# Patient Record
Sex: MALE | Race: WHITE | NOT HISPANIC OR LATINO
[De-identification: names, ages, dates, MRNs, and addresses within clinical notes are randomized per-mention and may not be internally consistent; named-entity substitution may affect disease eponyms.]

---

## 2017-01-04 ENCOUNTER — APPOINTMENT (OUTPATIENT)
Dept: PHYSICAL MEDICINE AND REHAB | Facility: CLINIC | Age: 77
End: 2017-01-04

## 2017-01-04 VITALS
DIASTOLIC BLOOD PRESSURE: 65 MMHG | BODY MASS INDEX: 21.97 KG/M2 | WEIGHT: 140 LBS | HEART RATE: 72 BPM | HEIGHT: 67 IN | SYSTOLIC BLOOD PRESSURE: 98 MMHG | OXYGEN SATURATION: 99 %

## 2017-01-04 LAB — INR PPP: 2.24 RATIO

## 2017-01-09 ENCOUNTER — FORM ENCOUNTER (OUTPATIENT)
Age: 77
End: 2017-01-09

## 2017-01-09 LAB — INR PPP: 1.84 RATIO

## 2017-01-10 ENCOUNTER — OUTPATIENT (OUTPATIENT)
Dept: OUTPATIENT SERVICES | Facility: HOSPITAL | Age: 77
LOS: 1 days | End: 2017-01-10
Payer: MEDICARE

## 2017-01-10 DIAGNOSIS — Z95.1 PRESENCE OF AORTOCORONARY BYPASS GRAFT: Chronic | ICD-10-CM

## 2017-01-10 DIAGNOSIS — R13.19 OTHER DYSPHAGIA: ICD-10-CM

## 2017-01-10 DIAGNOSIS — Z98.89 OTHER SPECIFIED POSTPROCEDURAL STATES: Chronic | ICD-10-CM

## 2017-01-10 DIAGNOSIS — Z95.2 PRESENCE OF PROSTHETIC HEART VALVE: Chronic | ICD-10-CM

## 2017-01-10 PROCEDURE — 92611 MOTION FLUOROSCOPY/SWALLOW: CPT

## 2017-01-10 PROCEDURE — 74230 X-RAY XM SWLNG FUNCJ C+: CPT | Mod: 26

## 2017-01-10 PROCEDURE — G8997: CPT | Mod: CJ

## 2017-01-10 PROCEDURE — 74230 X-RAY XM SWLNG FUNCJ C+: CPT

## 2017-01-10 PROCEDURE — G8996: CPT | Mod: CJ

## 2017-01-10 PROCEDURE — G8998: CPT | Mod: CJ

## 2017-01-11 ENCOUNTER — APPOINTMENT (OUTPATIENT)
Dept: PHYSICAL MEDICINE AND REHAB | Facility: CLINIC | Age: 77
End: 2017-01-11

## 2017-01-13 ENCOUNTER — OUTPATIENT (OUTPATIENT)
Dept: OUTPATIENT SERVICES | Facility: HOSPITAL | Age: 77
LOS: 1 days | End: 2017-01-13

## 2017-01-13 DIAGNOSIS — R27.9 UNSPECIFIED LACK OF COORDINATION: ICD-10-CM

## 2017-01-13 DIAGNOSIS — Z95.2 PRESENCE OF PROSTHETIC HEART VALVE: Chronic | ICD-10-CM

## 2017-01-13 DIAGNOSIS — Z95.1 PRESENCE OF AORTOCORONARY BYPASS GRAFT: Chronic | ICD-10-CM

## 2017-01-13 DIAGNOSIS — I63.9 CEREBRAL INFARCTION, UNSPECIFIED: ICD-10-CM

## 2017-01-13 DIAGNOSIS — I69.322 DYSARTHRIA FOLLOWING CEREBRAL INFARCTION: ICD-10-CM

## 2017-01-13 DIAGNOSIS — Z98.89 OTHER SPECIFIED POSTPROCEDURAL STATES: Chronic | ICD-10-CM

## 2017-01-13 DIAGNOSIS — I69.851 HEMIPLEGIA AND HEMIPARESIS FOLLOWING OTHER CEREBROVASCULAR DISEASE AFFECTING RIGHT DOMINANT SIDE: ICD-10-CM

## 2017-01-13 DIAGNOSIS — G81.91 HEMIPLEGIA, UNSPECIFIED AFFECTING RIGHT DOMINANT SIDE: ICD-10-CM

## 2017-01-13 DIAGNOSIS — R26.89 OTHER ABNORMALITIES OF GAIT AND MOBILITY: ICD-10-CM

## 2017-01-16 LAB — INR PPP: 2.54 RATIO

## 2017-01-23 LAB — INR PPP: 2.14 RATIO

## 2017-01-25 ENCOUNTER — APPOINTMENT (OUTPATIENT)
Dept: INTERNAL MEDICINE | Facility: CLINIC | Age: 77
End: 2017-01-25

## 2017-01-25 VITALS — DIASTOLIC BLOOD PRESSURE: 64 MMHG | HEART RATE: 72 BPM | RESPIRATION RATE: 16 BRPM | SYSTOLIC BLOOD PRESSURE: 110 MMHG

## 2017-01-25 DIAGNOSIS — Z23 ENCOUNTER FOR IMMUNIZATION: ICD-10-CM

## 2017-01-31 ENCOUNTER — MESSAGE (OUTPATIENT)
Age: 77
End: 2017-01-31

## 2017-01-31 LAB — INR PPP: 1.7 RATIO

## 2017-02-02 LAB
ALBUMIN SERPL ELPH-MCNC: 4.4 G/DL
ALP BLD-CCNC: 105 U/L
ALT SERPL-CCNC: 53 U/L
ANION GAP SERPL CALC-SCNC: 21 MMOL/L
AST SERPL-CCNC: 27 U/L
BASOPHILS # BLD AUTO: 0.02 K/UL
BASOPHILS NFR BLD AUTO: 0.2 %
BILIRUB SERPL-MCNC: 0.5 MG/DL
BUN SERPL-MCNC: 68 MG/DL
CALCIUM SERPL-MCNC: 10 MG/DL
CHLORIDE SERPL-SCNC: 102 MMOL/L
CHOLEST SERPL-MCNC: 135 MG/DL
CHOLEST/HDLC SERPL: 3.4 RATIO
CO2 SERPL-SCNC: 16 MMOL/L
CREAT SERPL-MCNC: 2.51 MG/DL
EOSINOPHIL # BLD AUTO: 0.11 K/UL
EOSINOPHIL NFR BLD AUTO: 1.4 %
GLUCOSE SERPL-MCNC: 131 MG/DL
HBA1C MFR BLD HPLC: 6.6 %
HCT VFR BLD CALC: 41.8 %
HDLC SERPL-MCNC: 40 MG/DL
HGB BLD-MCNC: 13.9 G/DL
IMM GRANULOCYTES NFR BLD AUTO: 0.2 %
LDLC SERPL CALC-MCNC: 61 MG/DL
LYMPHOCYTES # BLD AUTO: 0.79 K/UL
LYMPHOCYTES NFR BLD AUTO: 9.7 %
MAN DIFF?: NORMAL
MCHC RBC-ENTMCNC: 32.6 PG
MCHC RBC-ENTMCNC: 33.3 GM/DL
MCV RBC AUTO: 98.1 FL
MONOCYTES # BLD AUTO: 0.54 K/UL
MONOCYTES NFR BLD AUTO: 6.6 %
NEUTROPHILS # BLD AUTO: 6.65 K/UL
NEUTROPHILS NFR BLD AUTO: 81.9 %
PLATELET # BLD AUTO: 192 K/UL
POTASSIUM SERPL-SCNC: 5 MMOL/L
PROT SERPL-MCNC: 7.9 G/DL
RBC # BLD: 4.26 M/UL
RBC # FLD: 14.7 %
SODIUM SERPL-SCNC: 139 MMOL/L
TRIGL SERPL-MCNC: 172 MG/DL
WBC # FLD AUTO: 8.13 K/UL

## 2017-02-06 ENCOUNTER — APPOINTMENT (OUTPATIENT)
Dept: NEPHROLOGY | Facility: CLINIC | Age: 77
End: 2017-02-06

## 2017-02-06 VITALS
BODY MASS INDEX: 22.29 KG/M2 | HEIGHT: 67 IN | DIASTOLIC BLOOD PRESSURE: 70 MMHG | SYSTOLIC BLOOD PRESSURE: 100 MMHG | WEIGHT: 142 LBS

## 2017-02-06 DIAGNOSIS — I25.2 OLD MYOCARDIAL INFARCTION: ICD-10-CM

## 2017-02-06 DIAGNOSIS — Z87.448 PERSONAL HISTORY OF OTHER DISEASES OF URINARY SYSTEM: ICD-10-CM

## 2017-02-06 DIAGNOSIS — Z86.19 PERSONAL HISTORY OF OTHER INFECTIOUS AND PARASITIC DISEASES: ICD-10-CM

## 2017-02-07 ENCOUNTER — FORM ENCOUNTER (OUTPATIENT)
Age: 77
End: 2017-02-07

## 2017-02-07 LAB — INR PPP: 1.94 RATIO

## 2017-02-08 ENCOUNTER — APPOINTMENT (OUTPATIENT)
Dept: ULTRASOUND IMAGING | Facility: CLINIC | Age: 77
End: 2017-02-08

## 2017-02-08 ENCOUNTER — OUTPATIENT (OUTPATIENT)
Dept: OUTPATIENT SERVICES | Facility: HOSPITAL | Age: 77
LOS: 1 days | End: 2017-02-08
Payer: MEDICARE

## 2017-02-08 DIAGNOSIS — N18.3 CHRONIC KIDNEY DISEASE, STAGE 3 (MODERATE): ICD-10-CM

## 2017-02-08 DIAGNOSIS — Z95.2 PRESENCE OF PROSTHETIC HEART VALVE: Chronic | ICD-10-CM

## 2017-02-08 DIAGNOSIS — Z98.89 OTHER SPECIFIED POSTPROCEDURAL STATES: Chronic | ICD-10-CM

## 2017-02-08 DIAGNOSIS — Z95.1 PRESENCE OF AORTOCORONARY BYPASS GRAFT: Chronic | ICD-10-CM

## 2017-02-08 PROCEDURE — 76770 US EXAM ABDO BACK WALL COMP: CPT

## 2017-02-13 ENCOUNTER — APPOINTMENT (OUTPATIENT)
Dept: NEPHROLOGY | Facility: CLINIC | Age: 77
End: 2017-02-13

## 2017-02-13 VITALS — HEIGHT: 67 IN | DIASTOLIC BLOOD PRESSURE: 70 MMHG | SYSTOLIC BLOOD PRESSURE: 110 MMHG

## 2017-02-14 LAB — INR PPP: 2.1 RATIO

## 2017-02-15 LAB
ALBUMIN SERPL ELPH-MCNC: 4.4 G/DL
ANION GAP SERPL CALC-SCNC: 17 MMOL/L
BASOPHILS # BLD AUTO: 0.02 K/UL
BASOPHILS NFR BLD AUTO: 0.3 %
BUN SERPL-MCNC: 66 MG/DL
CALCIUM SERPL-MCNC: 9.9 MG/DL
CHLORIDE SERPL-SCNC: 99 MMOL/L
CO2 SERPL-SCNC: 20 MMOL/L
CREAT SERPL-MCNC: 2.53 MG/DL
EOSINOPHIL # BLD AUTO: 0.16 K/UL
EOSINOPHIL NFR BLD AUTO: 2.2 %
GLUCOSE SERPL-MCNC: 149 MG/DL
HCT VFR BLD CALC: 41.8 %
HGB BLD-MCNC: 13.3 G/DL
IMM GRANULOCYTES NFR BLD AUTO: 0.1 %
LYMPHOCYTES # BLD AUTO: 0.74 K/UL
LYMPHOCYTES NFR BLD AUTO: 10.4 %
MAN DIFF?: NORMAL
MCHC RBC-ENTMCNC: 31.8 GM/DL
MCHC RBC-ENTMCNC: 31.8 PG
MCV RBC AUTO: 100 FL
MONOCYTES # BLD AUTO: 0.41 K/UL
MONOCYTES NFR BLD AUTO: 5.8 %
NEUTROPHILS # BLD AUTO: 5.79 K/UL
NEUTROPHILS NFR BLD AUTO: 81.2 %
PHOSPHATE SERPL-MCNC: 2.8 MG/DL
PLATELET # BLD AUTO: 151 K/UL
POTASSIUM SERPL-SCNC: 4.7 MMOL/L
RBC # BLD: 4.18 M/UL
RBC # FLD: 14.2 %
SODIUM SERPL-SCNC: 136 MMOL/L
WBC # FLD AUTO: 7.13 K/UL

## 2017-02-21 LAB — INR PPP: 2.1 RATIO

## 2017-02-24 ENCOUNTER — APPOINTMENT (OUTPATIENT)
Dept: DERMATOLOGY | Facility: CLINIC | Age: 77
End: 2017-02-24

## 2017-02-27 LAB — INR PPP: 2.7 RATIO

## 2017-03-06 LAB — INR PPP: 2.2 RATIO

## 2017-03-13 LAB — INR PPP: 2.4 RATIO

## 2017-03-15 ENCOUNTER — APPOINTMENT (OUTPATIENT)
Dept: PHYSICAL MEDICINE AND REHAB | Facility: CLINIC | Age: 77
End: 2017-03-15

## 2017-03-20 LAB — INR PPP: 1.75 RATIO

## 2017-03-27 LAB — INR PPP: 2.16 RATIO

## 2017-03-28 ENCOUNTER — RX RENEWAL (OUTPATIENT)
Age: 77
End: 2017-03-28

## 2017-04-03 LAB — INR PPP: 2.1 RATIO

## 2017-04-04 ENCOUNTER — RX RENEWAL (OUTPATIENT)
Age: 77
End: 2017-04-04

## 2017-04-06 ENCOUNTER — APPOINTMENT (OUTPATIENT)
Dept: PHYSICAL MEDICINE AND REHAB | Facility: CLINIC | Age: 77
End: 2017-04-06

## 2017-04-06 VITALS
DIASTOLIC BLOOD PRESSURE: 68 MMHG | SYSTOLIC BLOOD PRESSURE: 106 MMHG | WEIGHT: 142 LBS | HEART RATE: 75 BPM | BODY MASS INDEX: 22.29 KG/M2 | HEIGHT: 67 IN | OXYGEN SATURATION: 99 %

## 2017-04-11 LAB — INR PPP: 1.9 RATIO

## 2017-04-18 LAB — INR PPP: 2.47 RATIO

## 2017-04-21 ENCOUNTER — APPOINTMENT (OUTPATIENT)
Dept: INTERNAL MEDICINE | Facility: CLINIC | Age: 77
End: 2017-04-21

## 2017-04-21 VITALS — RESPIRATION RATE: 16 BRPM | SYSTOLIC BLOOD PRESSURE: 110 MMHG | HEART RATE: 76 BPM | DIASTOLIC BLOOD PRESSURE: 70 MMHG

## 2017-04-21 DIAGNOSIS — R73.03 PREDIABETES.: ICD-10-CM

## 2017-04-21 DIAGNOSIS — Z79.899 OTHER LONG TERM (CURRENT) DRUG THERAPY: ICD-10-CM

## 2017-04-21 DIAGNOSIS — N18.3 CHRONIC KIDNEY DISEASE, STAGE 3 (MODERATE): ICD-10-CM

## 2017-04-21 DIAGNOSIS — E87.2 ACIDOSIS: ICD-10-CM

## 2017-04-21 DIAGNOSIS — D69.6 THROMBOCYTOPENIA, UNSPECIFIED: ICD-10-CM

## 2017-04-22 LAB
ALBUMIN SERPL ELPH-MCNC: 4.2 G/DL
ALP BLD-CCNC: 96 U/L
ALT SERPL-CCNC: 34 U/L
ANION GAP SERPL CALC-SCNC: 16 MMOL/L
AST SERPL-CCNC: 19 U/L
BASOPHILS # BLD AUTO: 0.02 K/UL
BASOPHILS NFR BLD AUTO: 0.3 %
BILIRUB SERPL-MCNC: 0.3 MG/DL
BUN SERPL-MCNC: 61 MG/DL
CALCIUM SERPL-MCNC: 9.5 MG/DL
CHLORIDE SERPL-SCNC: 103 MMOL/L
CHOLEST SERPL-MCNC: 119 MG/DL
CHOLEST/HDLC SERPL: 3.1 RATIO
CO2 SERPL-SCNC: 20 MMOL/L
CREAT SERPL-MCNC: 2.28 MG/DL
EOSINOPHIL # BLD AUTO: 0.29 K/UL
EOSINOPHIL NFR BLD AUTO: 4.1 %
GLUCOSE SERPL-MCNC: 93 MG/DL
HBA1C MFR BLD HPLC: 6.3 %
HCT VFR BLD CALC: 38.8 %
HDLC SERPL-MCNC: 39 MG/DL
HGB BLD-MCNC: 12.4 G/DL
IMM GRANULOCYTES NFR BLD AUTO: 0.3 %
LDLC SERPL CALC-MCNC: 45 MG/DL
LYMPHOCYTES # BLD AUTO: 0.68 K/UL
LYMPHOCYTES NFR BLD AUTO: 9.7 %
MAN DIFF?: NORMAL
MCHC RBC-ENTMCNC: 32 GM/DL
MCHC RBC-ENTMCNC: 32.5 PG
MCV RBC AUTO: 101.8 FL
MONOCYTES # BLD AUTO: 0.79 K/UL
MONOCYTES NFR BLD AUTO: 11.3 %
NEUTROPHILS # BLD AUTO: 5.22 K/UL
NEUTROPHILS NFR BLD AUTO: 74.3 %
PLATELET # BLD AUTO: 160 K/UL
POTASSIUM SERPL-SCNC: 5.2 MMOL/L
PROT SERPL-MCNC: 7.7 G/DL
RBC # BLD: 3.81 M/UL
RBC # FLD: 14.4 %
SODIUM SERPL-SCNC: 139 MMOL/L
TRIGL SERPL-MCNC: 175 MG/DL
WBC # FLD AUTO: 7.02 K/UL

## 2017-04-24 ENCOUNTER — RESULT REVIEW (OUTPATIENT)
Age: 77
End: 2017-04-24

## 2017-04-24 LAB — INR PPP: 3.2 RATIO

## 2017-05-02 LAB — INR PPP: 2.39 RATIO

## 2017-05-09 ENCOUNTER — RX RENEWAL (OUTPATIENT)
Age: 77
End: 2017-05-09

## 2017-05-09 LAB — INR PPP: 2.24 RATIO

## 2017-05-16 LAB — INR PPP: 1.97 RATIO

## 2017-05-17 ENCOUNTER — APPOINTMENT (OUTPATIENT)
Dept: PHYSICAL MEDICINE AND REHAB | Facility: CLINIC | Age: 77
End: 2017-05-17

## 2017-05-17 VITALS
SYSTOLIC BLOOD PRESSURE: 100 MMHG | DIASTOLIC BLOOD PRESSURE: 64 MMHG | BODY MASS INDEX: 22.29 KG/M2 | HEIGHT: 67 IN | WEIGHT: 142 LBS | OXYGEN SATURATION: 97 % | HEART RATE: 88 BPM

## 2017-05-22 LAB — INR PPP: 2.46 RATIO

## 2017-05-31 LAB — INR PPP: 2.17 RATIO

## 2017-06-05 LAB — INR PPP: 1.83 RATIO

## 2017-06-12 LAB — INR PPP: 2.02 RATIO

## 2017-06-19 LAB — INR PPP: 2.05 RATIO

## 2017-06-26 LAB — INR PPP: 1.9 RATIO

## 2017-07-05 LAB — INR PPP: 1.86 RATIO

## 2017-07-06 ENCOUNTER — APPOINTMENT (OUTPATIENT)
Dept: PHYSICAL MEDICINE AND REHAB | Facility: CLINIC | Age: 77
End: 2017-07-06

## 2017-07-06 VITALS
OXYGEN SATURATION: 99 % | DIASTOLIC BLOOD PRESSURE: 73 MMHG | SYSTOLIC BLOOD PRESSURE: 113 MMHG | HEART RATE: 95 BPM | HEIGHT: 67 IN | WEIGHT: 142 LBS | BODY MASS INDEX: 22.29 KG/M2

## 2017-07-06 DIAGNOSIS — R26.9 UNSPECIFIED ABNORMALITIES OF GAIT AND MOBILITY: ICD-10-CM

## 2017-07-06 DIAGNOSIS — G81.10 SPASTIC HEMIPLEGIA AFFECTING UNSPECIFIED SIDE: ICD-10-CM

## 2017-07-11 LAB — INR PPP: 2.68 RATIO

## 2017-07-18 LAB — INR PPP: 2.3 RATIO

## 2017-07-24 LAB — INR PPP: 1.62 RATIO

## 2017-07-25 ENCOUNTER — OUTPATIENT (OUTPATIENT)
Dept: OUTPATIENT SERVICES | Facility: HOSPITAL | Age: 77
LOS: 1 days | End: 2017-07-25
Payer: MEDICARE

## 2017-07-25 DIAGNOSIS — Z95.1 PRESENCE OF AORTOCORONARY BYPASS GRAFT: Chronic | ICD-10-CM

## 2017-07-25 DIAGNOSIS — Z98.89 OTHER SPECIFIED POSTPROCEDURAL STATES: Chronic | ICD-10-CM

## 2017-07-25 DIAGNOSIS — Z51.89 ENCOUNTER FOR OTHER SPECIFIED AFTERCARE: ICD-10-CM

## 2017-07-25 DIAGNOSIS — Z95.2 PRESENCE OF PROSTHETIC HEART VALVE: Chronic | ICD-10-CM

## 2017-07-25 DIAGNOSIS — I63.312 CEREBRAL INFARCTION DUE TO THROMBOSIS OF LEFT MIDDLE CEREBRAL ARTERY: ICD-10-CM

## 2017-07-25 DIAGNOSIS — R27.9 UNSPECIFIED LACK OF COORDINATION: ICD-10-CM

## 2017-07-26 ENCOUNTER — APPOINTMENT (OUTPATIENT)
Dept: INTERNAL MEDICINE | Facility: CLINIC | Age: 77
End: 2017-07-26

## 2017-07-26 VITALS — HEART RATE: 80 BPM | DIASTOLIC BLOOD PRESSURE: 60 MMHG | RESPIRATION RATE: 18 BRPM | SYSTOLIC BLOOD PRESSURE: 114 MMHG

## 2017-07-26 DIAGNOSIS — D63.8 ANEMIA IN OTHER CHRONIC DISEASES CLASSIFIED ELSEWHERE: ICD-10-CM

## 2017-07-26 DIAGNOSIS — Z79.01 LONG TERM (CURRENT) USE OF ANTICOAGULANTS: ICD-10-CM

## 2017-07-26 DIAGNOSIS — I25.10 ATHEROSCLEROTIC HEART DISEASE OF NATIVE CORONARY ARTERY W/OUT ANGINA PECTORIS: ICD-10-CM

## 2017-07-26 DIAGNOSIS — Z74.09 OTHER REDUCED MOBILITY: ICD-10-CM

## 2017-07-26 DIAGNOSIS — I48.91 UNSPECIFIED ATRIAL FIBRILLATION: ICD-10-CM

## 2017-07-26 DIAGNOSIS — Z95.2 PRESENCE OF PROSTHETIC HEART VALVE: ICD-10-CM

## 2017-07-27 ENCOUNTER — RESULT REVIEW (OUTPATIENT)
Age: 77
End: 2017-07-27

## 2017-07-27 LAB
ALBUMIN SERPL ELPH-MCNC: 4.1 G/DL
ALP BLD-CCNC: 103 U/L
ALT SERPL-CCNC: 58 U/L
ANION GAP SERPL CALC-SCNC: 17 MMOL/L
AST SERPL-CCNC: 29 U/L
BASOPHILS # BLD AUTO: 0.02 K/UL
BASOPHILS NFR BLD AUTO: 0.3 %
BILIRUB SERPL-MCNC: 0.3 MG/DL
BUN SERPL-MCNC: 87 MG/DL
CALCIUM SERPL-MCNC: 9.9 MG/DL
CHLORIDE SERPL-SCNC: 95 MMOL/L
CO2 SERPL-SCNC: 24 MMOL/L
CREAT SERPL-MCNC: 2.38 MG/DL
EOSINOPHIL # BLD AUTO: 0.21 K/UL
EOSINOPHIL NFR BLD AUTO: 2.8 %
GLUCOSE SERPL-MCNC: 124 MG/DL
HCT VFR BLD CALC: 37.2 %
HGB BLD-MCNC: 12.1 G/DL
IMM GRANULOCYTES NFR BLD AUTO: 0.3 %
LYMPHOCYTES # BLD AUTO: 0.93 K/UL
LYMPHOCYTES NFR BLD AUTO: 12.2 %
MAN DIFF?: NORMAL
MCHC RBC-ENTMCNC: 32.1 PG
MCHC RBC-ENTMCNC: 32.5 GM/DL
MCV RBC AUTO: 98.7 FL
MONOCYTES # BLD AUTO: 1 K/UL
MONOCYTES NFR BLD AUTO: 13.1 %
NEUTROPHILS # BLD AUTO: 5.45 K/UL
NEUTROPHILS NFR BLD AUTO: 71.3 %
PLATELET # BLD AUTO: 255 K/UL
POTASSIUM SERPL-SCNC: 4.3 MMOL/L
PROT SERPL-MCNC: 8 G/DL
RBC # BLD: 3.77 M/UL
RBC # FLD: 14.1 %
SODIUM SERPL-SCNC: 136 MMOL/L
TSH SERPL-ACNC: 2.09 UIU/ML
WBC # FLD AUTO: 7.63 K/UL

## 2017-07-31 LAB — INR PPP: 1.6 RATIO

## 2017-08-07 ENCOUNTER — FORM ENCOUNTER (OUTPATIENT)
Age: 77
End: 2017-08-07

## 2017-08-07 LAB — INR PPP: 1.42 RATIO

## 2017-08-08 ENCOUNTER — OUTPATIENT (OUTPATIENT)
Dept: OUTPATIENT SERVICES | Facility: HOSPITAL | Age: 77
LOS: 1 days | End: 2017-08-08
Payer: MEDICARE

## 2017-08-08 DIAGNOSIS — Z95.2 PRESENCE OF PROSTHETIC HEART VALVE: Chronic | ICD-10-CM

## 2017-08-08 DIAGNOSIS — R13.10 DYSPHAGIA, UNSPECIFIED: ICD-10-CM

## 2017-08-08 DIAGNOSIS — Z95.1 PRESENCE OF AORTOCORONARY BYPASS GRAFT: Chronic | ICD-10-CM

## 2017-08-08 DIAGNOSIS — Z98.89 OTHER SPECIFIED POSTPROCEDURAL STATES: Chronic | ICD-10-CM

## 2017-08-08 PROCEDURE — 74230 X-RAY XM SWLNG FUNCJ C+: CPT

## 2017-08-08 PROCEDURE — 74230 X-RAY XM SWLNG FUNCJ C+: CPT | Mod: 26

## 2017-08-08 PROCEDURE — 92611 MOTION FLUOROSCOPY/SWALLOW: CPT

## 2017-08-08 PROCEDURE — G8997: CPT | Mod: CJ

## 2017-08-08 PROCEDURE — G8996: CPT | Mod: CJ

## 2017-08-08 PROCEDURE — G8998: CPT | Mod: CJ

## 2017-08-09 ENCOUNTER — APPOINTMENT (OUTPATIENT)
Dept: INTERNAL MEDICINE | Facility: CLINIC | Age: 77
End: 2017-08-09
Payer: MEDICARE

## 2017-08-09 VITALS — DIASTOLIC BLOOD PRESSURE: 70 MMHG | SYSTOLIC BLOOD PRESSURE: 110 MMHG | HEART RATE: 76 BPM

## 2017-08-09 DIAGNOSIS — S51.019A LACERATION W/OUT FOREIGN BODY OF UNSPECIFIED ELBOW, INITIAL ENCOUNTER: ICD-10-CM

## 2017-08-09 DIAGNOSIS — T14.8 OTHER INJURY OF UNSPECIFIED BODY REGION: ICD-10-CM

## 2017-08-09 DIAGNOSIS — Z91.81 HISTORY OF FALLING: ICD-10-CM

## 2017-08-09 PROCEDURE — 99214 OFFICE O/P EST MOD 30 MIN: CPT

## 2017-08-11 DIAGNOSIS — R63.0 ANOREXIA: ICD-10-CM

## 2017-08-14 LAB — INR PPP: 1.7 RATIO

## 2017-08-23 ENCOUNTER — APPOINTMENT (OUTPATIENT)
Dept: PHYSICAL MEDICINE AND REHAB | Facility: CLINIC | Age: 77
End: 2017-08-23
Payer: MEDICARE

## 2017-08-23 VITALS
WEIGHT: 142 LBS | HEIGHT: 67 IN | BODY MASS INDEX: 22.29 KG/M2 | DIASTOLIC BLOOD PRESSURE: 58 MMHG | SYSTOLIC BLOOD PRESSURE: 87 MMHG

## 2017-08-23 DIAGNOSIS — I69.30 UNSPECIFIED SEQUELAE OF CEREBRAL INFARCTION: ICD-10-CM

## 2017-08-23 DIAGNOSIS — R13.19 OTHER DYSPHAGIA: ICD-10-CM

## 2017-08-23 LAB — INR PPP: 1.5 RATIO

## 2017-08-23 PROCEDURE — 99212 OFFICE O/P EST SF 10 MIN: CPT

## 2017-08-24 ENCOUNTER — APPOINTMENT (OUTPATIENT)
Dept: DERMATOLOGY | Facility: CLINIC | Age: 77
End: 2017-08-24

## 2017-08-25 PROCEDURE — 97110 THERAPEUTIC EXERCISES: CPT

## 2017-08-25 PROCEDURE — 97116 GAIT TRAINING THERAPY: CPT

## 2017-08-25 PROCEDURE — G8978: CPT | Mod: CM

## 2017-08-25 PROCEDURE — 97163 PT EVAL HIGH COMPLEX 45 MIN: CPT

## 2017-08-25 PROCEDURE — 97530 THERAPEUTIC ACTIVITIES: CPT

## 2017-08-25 PROCEDURE — 97112 NEUROMUSCULAR REEDUCATION: CPT

## 2017-08-25 PROCEDURE — G8979: CPT | Mod: CL

## 2017-08-28 LAB — INR PPP: 1.6 RATIO

## 2017-08-30 ENCOUNTER — APPOINTMENT (OUTPATIENT)
Dept: CT IMAGING | Facility: CLINIC | Age: 77
End: 2017-08-30
Payer: MEDICARE

## 2017-08-30 ENCOUNTER — APPOINTMENT (OUTPATIENT)
Dept: GASTROENTEROLOGY | Facility: CLINIC | Age: 77
End: 2017-08-30
Payer: MEDICARE

## 2017-08-30 ENCOUNTER — LABORATORY RESULT (OUTPATIENT)
Age: 77
End: 2017-08-30

## 2017-08-30 ENCOUNTER — OUTPATIENT (OUTPATIENT)
Dept: OUTPATIENT SERVICES | Facility: HOSPITAL | Age: 77
LOS: 1 days | End: 2017-08-30
Payer: MEDICARE

## 2017-08-30 VITALS
DIASTOLIC BLOOD PRESSURE: 62 MMHG | HEIGHT: 67 IN | SYSTOLIC BLOOD PRESSURE: 98 MMHG | BODY MASS INDEX: 21.19 KG/M2 | WEIGHT: 135 LBS | HEART RATE: 91 BPM

## 2017-08-30 DIAGNOSIS — N17.9 ACUTE KIDNEY FAILURE, UNSPECIFIED: ICD-10-CM

## 2017-08-30 DIAGNOSIS — R11.2 NAUSEA WITH VOMITING, UNSPECIFIED: ICD-10-CM

## 2017-08-30 DIAGNOSIS — Z95.2 PRESENCE OF PROSTHETIC HEART VALVE: Chronic | ICD-10-CM

## 2017-08-30 DIAGNOSIS — L30.9 DERMATITIS, UNSPECIFIED: ICD-10-CM

## 2017-08-30 DIAGNOSIS — Z95.1 PRESENCE OF AORTOCORONARY BYPASS GRAFT: Chronic | ICD-10-CM

## 2017-08-30 DIAGNOSIS — I10 ESSENTIAL (PRIMARY) HYPERTENSION: ICD-10-CM

## 2017-08-30 DIAGNOSIS — K94.23 GASTROSTOMY MALFUNCTION: ICD-10-CM

## 2017-08-30 DIAGNOSIS — Z87.891 PERSONAL HISTORY OF NICOTINE DEPENDENCE: ICD-10-CM

## 2017-08-30 DIAGNOSIS — Z98.89 OTHER SPECIFIED POSTPROCEDURAL STATES: Chronic | ICD-10-CM

## 2017-08-30 LAB
BASOPHILS # BLD AUTO: 0.01 K/UL
BASOPHILS NFR BLD AUTO: 0.1 %
EOSINOPHIL # BLD AUTO: 0.05 K/UL
EOSINOPHIL NFR BLD AUTO: 0.7 %
HCT VFR BLD CALC: 34.9 %
HGB BLD-MCNC: 11.9 G/DL
IMM GRANULOCYTES NFR BLD AUTO: 0.1 %
LYMPHOCYTES # BLD AUTO: 0.27 K/UL
LYMPHOCYTES NFR BLD AUTO: 3.7 %
MAN DIFF?: NORMAL
MCHC RBC-ENTMCNC: 31.6 PG
MCHC RBC-ENTMCNC: 34.1 GM/DL
MCV RBC AUTO: 92.8 FL
MONOCYTES # BLD AUTO: 0.74 K/UL
MONOCYTES NFR BLD AUTO: 10.2 %
NEUTROPHILS # BLD AUTO: 6.17 K/UL
NEUTROPHILS NFR BLD AUTO: 85.2 %
PLATELET # BLD AUTO: 176 K/UL
RBC # BLD: 3.76 M/UL
RBC # FLD: 14.2 %
WBC # FLD AUTO: 7.25 K/UL

## 2017-08-30 PROCEDURE — 74176 CT ABD & PELVIS W/O CONTRAST: CPT | Mod: 26

## 2017-08-30 PROCEDURE — 74176 CT ABD & PELVIS W/O CONTRAST: CPT

## 2017-08-30 PROCEDURE — 99215 OFFICE O/P EST HI 40 MIN: CPT

## 2017-08-30 RX ORDER — CLOTRIMAZOLE AND BETAMETHASONE DIPROPIONATE 10; .5 MG/G; MG/G
1-0.05 CREAM TOPICAL
Qty: 45 | Refills: 0 | Status: ACTIVE | COMMUNITY
Start: 2016-11-22

## 2017-08-30 RX ORDER — LACTOSE-REDUCED FOOD/FIBER 0.06 G-1.2
LIQUID (ML) ORAL DAILY
Qty: 192 | Refills: 11 | Status: ACTIVE | COMMUNITY
Start: 2017-08-30 | End: 1900-01-01

## 2017-08-30 RX ORDER — DRONABINOL 5 MG/1
5 CAPSULE ORAL TWICE DAILY
Qty: 180 | Refills: 1 | Status: ACTIVE | COMMUNITY
Start: 2017-08-11

## 2017-08-30 RX ORDER — BUPROPION HYDROCHLORIDE 100 MG/1
100 TABLET, FILM COATED, EXTENDED RELEASE ORAL TWICE DAILY
Qty: 180 | Refills: 1 | Status: ACTIVE | COMMUNITY
Start: 2017-01-25

## 2017-08-31 LAB
ALBUMIN SERPL ELPH-MCNC: 4.4 G/DL
ALP BLD-CCNC: 90 U/L
ALT SERPL-CCNC: 67 U/L
AMYLASE/CREAT SERPL: 188 U/L
ANION GAP SERPL CALC-SCNC: 16 MMOL/L
AST SERPL-CCNC: 36 U/L
BILIRUB SERPL-MCNC: 0.4 MG/DL
BUN SERPL-MCNC: 103 MG/DL
CALCIUM SERPL-MCNC: 9.4 MG/DL
CHLORIDE SERPL-SCNC: 75 MMOL/L
CO2 SERPL-SCNC: 30 MMOL/L
CREAT SERPL-MCNC: 2.37 MG/DL
GLUCOSE SERPL-MCNC: 246 MG/DL
LPL SERPL-CCNC: 101 U/L
POTASSIUM SERPL-SCNC: 3.8 MMOL/L
PROT SERPL-MCNC: 7.9 G/DL
SODIUM SERPL-SCNC: 121 MMOL/L

## 2017-09-01 ENCOUNTER — INPATIENT (INPATIENT)
Facility: HOSPITAL | Age: 77
LOS: 4 days | Discharge: HOME | DRG: 683 | End: 2017-09-06
Attending: HOSPITALIST | Admitting: INTERNAL MEDICINE
Payer: MEDICARE

## 2017-09-01 VITALS
SYSTOLIC BLOOD PRESSURE: 105 MMHG | HEIGHT: 69 IN | RESPIRATION RATE: 18 BRPM | TEMPERATURE: 98 F | OXYGEN SATURATION: 97 % | WEIGHT: 169.98 LBS | DIASTOLIC BLOOD PRESSURE: 63 MMHG | HEART RATE: 79 BPM

## 2017-09-01 DIAGNOSIS — I25.10 ATHEROSCLEROTIC HEART DISEASE OF NATIVE CORONARY ARTERY WITHOUT ANGINA PECTORIS: ICD-10-CM

## 2017-09-01 DIAGNOSIS — Z98.89 OTHER SPECIFIED POSTPROCEDURAL STATES: Chronic | ICD-10-CM

## 2017-09-01 DIAGNOSIS — N17.9 ACUTE KIDNEY FAILURE, UNSPECIFIED: ICD-10-CM

## 2017-09-01 DIAGNOSIS — E87.1 HYPO-OSMOLALITY AND HYPONATREMIA: ICD-10-CM

## 2017-09-01 DIAGNOSIS — Z95.2 PRESENCE OF PROSTHETIC HEART VALVE: ICD-10-CM

## 2017-09-01 DIAGNOSIS — I48.91 UNSPECIFIED ATRIAL FIBRILLATION: ICD-10-CM

## 2017-09-01 DIAGNOSIS — Z95.2 PRESENCE OF PROSTHETIC HEART VALVE: Chronic | ICD-10-CM

## 2017-09-01 DIAGNOSIS — I63.9 CEREBRAL INFARCTION, UNSPECIFIED: ICD-10-CM

## 2017-09-01 DIAGNOSIS — Z95.1 PRESENCE OF AORTOCORONARY BYPASS GRAFT: Chronic | ICD-10-CM

## 2017-09-01 DIAGNOSIS — I10 ESSENTIAL (PRIMARY) HYPERTENSION: ICD-10-CM

## 2017-09-01 DIAGNOSIS — E78.00 PURE HYPERCHOLESTEROLEMIA, UNSPECIFIED: ICD-10-CM

## 2017-09-01 LAB
ALBUMIN SERPL ELPH-MCNC: 3.5 G/DL — SIGNIFICANT CHANGE UP (ref 3.3–5.2)
ALP SERPL-CCNC: 72 U/L — SIGNIFICANT CHANGE UP (ref 40–120)
ALT FLD-CCNC: 49 U/L — HIGH
ANION GAP SERPL CALC-SCNC: 15 MMOL/L — SIGNIFICANT CHANGE UP (ref 5–17)
ANION GAP SERPL CALC-SCNC: 16 MMOL/L — SIGNIFICANT CHANGE UP (ref 5–17)
ANION GAP SERPL CALC-SCNC: 18 MMOL/L — HIGH (ref 5–17)
ANISOCYTOSIS BLD QL: SLIGHT — SIGNIFICANT CHANGE UP
APPEARANCE UR: CLEAR — SIGNIFICANT CHANGE UP
APPEARANCE UR: CLEAR — SIGNIFICANT CHANGE UP
APTT BLD: 30.3 SEC — SIGNIFICANT CHANGE UP (ref 27.5–37.4)
AST SERPL-CCNC: 27 U/L — SIGNIFICANT CHANGE UP
BACTERIA # UR AUTO: ABNORMAL
BACTERIA # UR AUTO: ABNORMAL
BILIRUB SERPL-MCNC: 0.5 MG/DL — SIGNIFICANT CHANGE UP (ref 0.4–2)
BILIRUB UR-MCNC: NEGATIVE — SIGNIFICANT CHANGE UP
BILIRUB UR-MCNC: NEGATIVE — SIGNIFICANT CHANGE UP
BUN SERPL-MCNC: 87 MG/DL — HIGH (ref 8–20)
BUN SERPL-MCNC: 88 MG/DL — HIGH (ref 8–20)
BUN SERPL-MCNC: 98 MG/DL — HIGH (ref 8–20)
CALCIUM SERPL-MCNC: 8.7 MG/DL — SIGNIFICANT CHANGE UP (ref 8.6–10.2)
CHLORIDE SERPL-SCNC: 72 MMOL/L — LOW (ref 98–107)
CHLORIDE SERPL-SCNC: 80 MMOL/L — LOW (ref 98–107)
CHLORIDE SERPL-SCNC: 84 MMOL/L — LOW (ref 98–107)
CO2 SERPL-SCNC: 25 MMOL/L — SIGNIFICANT CHANGE UP (ref 22–29)
CO2 SERPL-SCNC: 26 MMOL/L — SIGNIFICANT CHANGE UP (ref 22–29)
CO2 SERPL-SCNC: 26 MMOL/L — SIGNIFICANT CHANGE UP (ref 22–29)
COLOR SPEC: YELLOW — SIGNIFICANT CHANGE UP
COLOR SPEC: YELLOW — SIGNIFICANT CHANGE UP
CREAT SERPL-MCNC: 1.98 MG/DL — HIGH (ref 0.5–1.3)
CREAT SERPL-MCNC: 2.1 MG/DL — HIGH (ref 0.5–1.3)
CREAT SERPL-MCNC: 2.35 MG/DL — HIGH (ref 0.5–1.3)
DIFF PNL FLD: ABNORMAL
DIFF PNL FLD: ABNORMAL
ELLIPTOCYTES BLD QL SMEAR: SLIGHT — SIGNIFICANT CHANGE UP
EOSINOPHIL NFR BLD AUTO: 1 % — SIGNIFICANT CHANGE UP (ref 0–6)
EPI CELLS # UR: SIGNIFICANT CHANGE UP
EPI CELLS # UR: SIGNIFICANT CHANGE UP
GLUCOSE SERPL-MCNC: 133 MG/DL — HIGH (ref 70–115)
GLUCOSE SERPL-MCNC: 199 MG/DL — HIGH (ref 70–115)
GLUCOSE SERPL-MCNC: 99 MG/DL — SIGNIFICANT CHANGE UP (ref 70–115)
GLUCOSE UR QL: NEGATIVE MG/DL — SIGNIFICANT CHANGE UP
GLUCOSE UR QL: NEGATIVE MG/DL — SIGNIFICANT CHANGE UP
HCT VFR BLD CALC: 28 % — LOW (ref 42–52)
HGB BLD-MCNC: 10.2 G/DL — LOW (ref 14–18)
HYPOCHROMIA BLD QL: SLIGHT — SIGNIFICANT CHANGE UP
INR BLD: 1.67 RATIO — HIGH (ref 0.88–1.16)
KETONES UR-MCNC: NEGATIVE — SIGNIFICANT CHANGE UP
KETONES UR-MCNC: NEGATIVE — SIGNIFICANT CHANGE UP
LEUKOCYTE ESTERASE UR-ACNC: ABNORMAL
LEUKOCYTE ESTERASE UR-ACNC: ABNORMAL
LYMPHOCYTES # BLD AUTO: 6 % — LOW (ref 20–55)
MAGNESIUM SERPL-MCNC: 2 MG/DL — SIGNIFICANT CHANGE UP (ref 1.6–2.6)
MCHC RBC-ENTMCNC: 32.4 PG — HIGH (ref 27–31)
MCHC RBC-ENTMCNC: 36.4 G/DL — HIGH (ref 32–36)
MCV RBC AUTO: 88.9 FL — SIGNIFICANT CHANGE UP (ref 80–94)
MONOCYTES NFR BLD AUTO: 3 % — SIGNIFICANT CHANGE UP (ref 3–10)
NEUTROPHILS NFR BLD AUTO: 90 % — HIGH (ref 37–73)
NITRITE UR-MCNC: NEGATIVE — SIGNIFICANT CHANGE UP
NITRITE UR-MCNC: NEGATIVE — SIGNIFICANT CHANGE UP
OSMOLALITY SERPL: 286 MOS/KG — SIGNIFICANT CHANGE UP (ref 275–300)
OSMOLALITY UR: 165 MOSM/KG — LOW (ref 300–1000)
PH UR: 5 — SIGNIFICANT CHANGE UP (ref 5–8)
PH UR: 6 — SIGNIFICANT CHANGE UP (ref 5–8)
PHOSPHATE SERPL-MCNC: 2.5 MG/DL — SIGNIFICANT CHANGE UP (ref 2.4–4.7)
PLAT MORPH BLD: NORMAL — SIGNIFICANT CHANGE UP
PLATELET # BLD AUTO: 128 K/UL — LOW (ref 150–400)
POIKILOCYTOSIS BLD QL AUTO: SLIGHT — SIGNIFICANT CHANGE UP
POTASSIUM SERPL-MCNC: 2.7 MMOL/L — CRITICAL LOW (ref 3.5–5.3)
POTASSIUM SERPL-MCNC: 3 MMOL/L — LOW (ref 3.5–5.3)
POTASSIUM SERPL-MCNC: 3.6 MMOL/L — SIGNIFICANT CHANGE UP (ref 3.5–5.3)
POTASSIUM SERPL-SCNC: 2.7 MMOL/L — CRITICAL LOW (ref 3.5–5.3)
POTASSIUM SERPL-SCNC: 3 MMOL/L — LOW (ref 3.5–5.3)
POTASSIUM SERPL-SCNC: 3.6 MMOL/L — SIGNIFICANT CHANGE UP (ref 3.5–5.3)
PROT SERPL-MCNC: 7 G/DL — SIGNIFICANT CHANGE UP (ref 6.6–8.7)
PROT UR-MCNC: 15 MG/DL
PROT UR-MCNC: 30 MG/DL
PROTHROM AB SERPL-ACNC: 18.6 SEC — HIGH (ref 9.8–12.7)
RBC # BLD: 3.15 M/UL — LOW (ref 4.6–6.2)
RBC # FLD: 13.8 % — SIGNIFICANT CHANGE UP (ref 11–15.6)
RBC BLD AUTO: ABNORMAL
RBC CASTS # UR COMP ASSIST: ABNORMAL /HPF (ref 0–4)
RBC CASTS # UR COMP ASSIST: ABNORMAL /HPF (ref 0–4)
SODIUM SERPL-SCNC: 116 MMOL/L — CRITICAL LOW (ref 135–145)
SODIUM SERPL-SCNC: 122 MMOL/L — LOW (ref 135–145)
SODIUM SERPL-SCNC: 124 MMOL/L — LOW (ref 135–145)
SODIUM UR-SCNC: <30 MMOL/L — SIGNIFICANT CHANGE UP
SP GR SPEC: 1 — LOW (ref 1.01–1.02)
SP GR SPEC: 1.01 — SIGNIFICANT CHANGE UP (ref 1.01–1.02)
T3 SERPL-MCNC: 67 NG/DL — LOW (ref 80–200)
T4 AB SER-ACNC: 7.9 UG/DL — SIGNIFICANT CHANGE UP (ref 4.5–12)
TSH SERPL-MCNC: 1.06 UIU/ML — SIGNIFICANT CHANGE UP (ref 0.27–4.2)
UROBILINOGEN FLD QL: NEGATIVE MG/DL — SIGNIFICANT CHANGE UP
UROBILINOGEN FLD QL: NEGATIVE MG/DL — SIGNIFICANT CHANGE UP
WBC # BLD: 9.8 K/UL — SIGNIFICANT CHANGE UP (ref 4.8–10.8)
WBC # FLD AUTO: 9.8 K/UL — SIGNIFICANT CHANGE UP (ref 4.8–10.8)
WBC UR QL: ABNORMAL
WBC UR QL: ABNORMAL

## 2017-09-01 PROCEDURE — 99223 1ST HOSP IP/OBS HIGH 75: CPT

## 2017-09-01 PROCEDURE — 93010 ELECTROCARDIOGRAM REPORT: CPT

## 2017-09-01 PROCEDURE — 76770 US EXAM ABDO BACK WALL COMP: CPT | Mod: 26

## 2017-09-01 PROCEDURE — 99285 EMERGENCY DEPT VISIT HI MDM: CPT

## 2017-09-01 PROCEDURE — 70450 CT HEAD/BRAIN W/O DYE: CPT | Mod: 26

## 2017-09-01 PROCEDURE — 71010: CPT | Mod: 26

## 2017-09-01 PROCEDURE — 76775 US EXAM ABDO BACK WALL LIM: CPT | Mod: 26

## 2017-09-01 RX ORDER — SODIUM CHLORIDE 9 MG/ML
1000 INJECTION INTRAMUSCULAR; INTRAVENOUS; SUBCUTANEOUS
Qty: 0 | Refills: 0 | Status: DISCONTINUED | OUTPATIENT
Start: 2017-09-01 | End: 2017-09-02

## 2017-09-01 RX ORDER — FOLIC ACID 0.8 MG
1 TABLET ORAL DAILY
Qty: 0 | Refills: 0 | Status: DISCONTINUED | OUTPATIENT
Start: 2017-09-01 | End: 2017-09-06

## 2017-09-01 RX ORDER — FERROUS SULFATE 325(65) MG
325 TABLET ORAL DAILY
Qty: 0 | Refills: 0 | Status: DISCONTINUED | OUTPATIENT
Start: 2017-09-01 | End: 2017-09-06

## 2017-09-01 RX ORDER — FOLIC ACID 0.8 MG
1 TABLET ORAL
Qty: 0 | Refills: 0 | COMMUNITY

## 2017-09-01 RX ORDER — FINASTERIDE 5 MG/1
1 TABLET, FILM COATED ORAL
Qty: 0 | Refills: 0 | COMMUNITY

## 2017-09-01 RX ORDER — WARFARIN SODIUM 2.5 MG/1
4 TABLET ORAL ONCE
Qty: 0 | Refills: 0 | Status: COMPLETED | OUTPATIENT
Start: 2017-09-01 | End: 2017-09-01

## 2017-09-01 RX ORDER — FERROUS SULFATE 325(65) MG
0 TABLET ORAL
Qty: 0 | Refills: 0 | COMMUNITY

## 2017-09-01 RX ORDER — SODIUM CHLORIDE 9 MG/ML
1000 INJECTION INTRAMUSCULAR; INTRAVENOUS; SUBCUTANEOUS
Qty: 0 | Refills: 0 | Status: DISCONTINUED | OUTPATIENT
Start: 2017-09-01 | End: 2017-09-01

## 2017-09-01 RX ORDER — BUPROPION HYDROCHLORIDE 150 MG/1
150 TABLET, EXTENDED RELEASE ORAL DAILY
Qty: 0 | Refills: 0 | Status: DISCONTINUED | OUTPATIENT
Start: 2017-09-01 | End: 2017-09-06

## 2017-09-01 RX ORDER — ATORVASTATIN CALCIUM 80 MG/1
10 TABLET, FILM COATED ORAL AT BEDTIME
Qty: 0 | Refills: 0 | Status: DISCONTINUED | OUTPATIENT
Start: 2017-09-01 | End: 2017-09-06

## 2017-09-01 RX ORDER — POTASSIUM CHLORIDE 20 MEQ
10 PACKET (EA) ORAL ONCE
Qty: 0 | Refills: 0 | Status: COMPLETED | OUTPATIENT
Start: 2017-09-01 | End: 2017-09-01

## 2017-09-01 RX ORDER — METOPROLOL TARTRATE 50 MG
25 TABLET ORAL
Qty: 0 | Refills: 0 | Status: DISCONTINUED | OUTPATIENT
Start: 2017-09-01 | End: 2017-09-06

## 2017-09-01 RX ORDER — PREGABALIN 225 MG/1
1 CAPSULE ORAL
Qty: 0 | Refills: 0 | COMMUNITY

## 2017-09-01 RX ORDER — HEPARIN SODIUM 5000 [USP'U]/ML
5000 INJECTION INTRAVENOUS; SUBCUTANEOUS EVERY 12 HOURS
Qty: 0 | Refills: 0 | Status: DISCONTINUED | OUTPATIENT
Start: 2017-09-01 | End: 2017-09-06

## 2017-09-01 RX ORDER — POTASSIUM CHLORIDE 20 MEQ
10 PACKET (EA) ORAL
Qty: 0 | Refills: 0 | Status: COMPLETED | OUTPATIENT
Start: 2017-09-01 | End: 2017-09-01

## 2017-09-01 RX ADMIN — Medication 100 MILLIEQUIVALENT(S): at 19:54

## 2017-09-01 RX ADMIN — SODIUM CHLORIDE 500 MILLILITER(S): 9 INJECTION INTRAMUSCULAR; INTRAVENOUS; SUBCUTANEOUS at 10:55

## 2017-09-01 RX ADMIN — Medication 100 MILLIEQUIVALENT(S): at 18:50

## 2017-09-01 RX ADMIN — Medication 1 MILLIGRAM(S): at 14:45

## 2017-09-01 RX ADMIN — Medication 100 MILLIEQUIVALENT(S): at 10:55

## 2017-09-01 RX ADMIN — Medication 325 MILLIGRAM(S): at 14:47

## 2017-09-01 RX ADMIN — WARFARIN SODIUM 4 MILLIGRAM(S): 2.5 TABLET ORAL at 14:47

## 2017-09-01 RX ADMIN — Medication 100 MILLIEQUIVALENT(S): at 11:58

## 2017-09-01 RX ADMIN — ATORVASTATIN CALCIUM 10 MILLIGRAM(S): 80 TABLET, FILM COATED ORAL at 21:17

## 2017-09-01 RX ADMIN — SODIUM CHLORIDE 100 MILLILITER(S): 9 INJECTION INTRAMUSCULAR; INTRAVENOUS; SUBCUTANEOUS at 13:40

## 2017-09-01 RX ADMIN — SODIUM CHLORIDE 75 MILLILITER(S): 9 INJECTION INTRAMUSCULAR; INTRAVENOUS; SUBCUTANEOUS at 19:54

## 2017-09-01 RX ADMIN — Medication 100 MILLIEQUIVALENT(S): at 17:46

## 2017-09-01 RX ADMIN — SODIUM CHLORIDE 500 MILLILITER(S): 9 INJECTION INTRAMUSCULAR; INTRAVENOUS; SUBCUTANEOUS at 10:05

## 2017-09-01 RX ADMIN — HEPARIN SODIUM 5000 UNIT(S): 5000 INJECTION INTRAVENOUS; SUBCUTANEOUS at 17:46

## 2017-09-01 RX ADMIN — Medication 25 MILLIGRAM(S): at 17:46

## 2017-09-01 RX ADMIN — BUPROPION HYDROCHLORIDE 150 MILLIGRAM(S): 150 TABLET, EXTENDED RELEASE ORAL at 14:45

## 2017-09-01 RX ADMIN — SODIUM CHLORIDE 75 MILLILITER(S): 9 INJECTION INTRAMUSCULAR; INTRAVENOUS; SUBCUTANEOUS at 18:39

## 2017-09-01 NOTE — CHART NOTE - NSCHARTNOTEFT_GEN_A_CORE
09-01    124<L>  |  84<L>  |  87.0<H>  ----------------------------<  133<H>  3.6   |  25.0  |  1.98<H>    Ca    8.7      01 Sep 2017 21:26  Phos  2.5     09-01  Mg     2.0     09-01      Results of repeat BMP as above.    as requested spoke with renal Dr. shannon Patten about results  -patient admitted with na+ of 116, then increased to 122, now 124 (on 75 cc/hr of NS)    -as discussed with Dr. Patten will turn fluids off and repeat BMP in AM  -renal and MICu attending ok for patient downgrade to medicine at this time, will call and discuss case with hospitalist team in morning

## 2017-09-01 NOTE — CONSULT NOTE ADULT - ASSESSMENT
HypoNatremia etiology? appears clinically dry hypovolemic  Agree for now with IVF NS  I have ordered Urine osm, serum osm, Urine Na, uric acid  will adjust IVF or fluid restict based on labs     KAPIL etiology? hypoperfusion cont IVF  will check renal sono r/u obstructive etiology HypoNatremia etiology? appears clinically dry hypovolemic  Agree for now with IVF NS  I have ordered Urine osm, serum osm, Urine Na, uric acid  will adjust IVF or fluid restict based on labs     KAPIL etiology? hypoperfusion cont IVF  will check renal sono r/u obstructive etiology    HypoKalemia due to GI losses? supplementation noted

## 2017-09-01 NOTE — H&P ADULT - ATTENDING COMMENTS
I saw this patient and agree with the above except would add that his ostomy is clean, dry, intact, pink mucosa.  Unclear reason for it (?diverting?); no sacral issues.  Repeat Na is up to 122 mEQ; will d/c NS at this point and recheck at 2000; if >125mEQ may require free water in PEG vs sterile d5w intravenously.  If <125 mEQ may be able to transfer to hospitalist at that point -- would continue to feed, recheck electrolytes, and attempt no more that 8-10 meQ/24h increase in Na.  Social work/palliative medicine consults pending. I saw this patient and agree with the above except would add that his ostomy is clean, dry, intact, pink mucosa.  Unclear reason for it (history of colectomy for unclear reasons); no sacral issues.  Repeat Na is up to 122 mEQ; will d/c NS at this point and recheck at 2000; if >125mEQ may require free water in PEG vs sterile d5w intravenously.  If <125 mEQ may be able to transfer to hospitalist at that point -- would continue to feed, recheck electrolytes, and attempt no more that 8-10 meQ/24h increase in Na.  Social work/palliative medicine consults pending.

## 2017-09-01 NOTE — ED PROVIDER NOTE - PMH
Afib    BPH (benign prostatic hyperplasia)    CAD (coronary artery disease)    CVA (cerebral vascular accident)    High cholesterol    HTN (hypertension)    Mitral valve replaced

## 2017-09-01 NOTE — H&P ADULT - HISTORY OF PRESENT ILLNESS
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  Now presents with worsening weakness and inability to swallow. He had been eating up until a month ago, with supplemental nutrition through his PEG as needed. He also had been on 'Salt" tablets according to the pt's wife, however he had not been taking them in the last few weeks because their pharmacy was out of them.  IN the ER he was found t have a Sodium of 116.  His Head CT showed no new neurologic event. HIs BP and HR were stable.

## 2017-09-01 NOTE — ED ADULT NURSE NOTE - OBJECTIVE STATEMENT
Patient wife reports HX CVA 1 year ago with dysphagia issues, paralysis to right arm, right leg moves slightly, expressive asphasia. Patient has PEG and ileostomy present, patient progressively over past year has made advancement in diet with therapy and was eating regular diet until July this year. Wife reports event in July where he was gagging, vomiting and rigors and since then every time he eats or drinks he gags. patient wife reports increased malaise, patient not toleraing PO intake, patient wife reinitiated PEG feedings and hydration. During assessment lab reports critical lab value, Na 116 K 2.7. Reported immediately to MD carballo with recommendation of ICU consult. awaiting orders. Patient reports he feels "lousy"

## 2017-09-01 NOTE — ED ADULT NURSE NOTE - CHIEF COMPLAINT QUOTE
Wife called the ambulance because pt "has been sleeping since Wednesday and has not eaten or drank." States went to gastro Wed  ([facility]) and was told he was severely dehydrated and kidney function was poor. Pt paralyzed on right side and speech is severely slurred as per wife from CVA last year. [place] in left ear. 20g present to left AC placed by EMS. PEG and ileostomy present.

## 2017-09-01 NOTE — ED PROVIDER NOTE - CHPI ED SYMPTOMS NEG
no tingling/no decreased eating/drinking/no vomiting/no chills/no nausea/no dizziness/no pain/no fever

## 2017-09-01 NOTE — ED PROVIDER NOTE - OBJECTIVE STATEMENT
patient is a 77 yo male with a hx of a stroke with inability to speak has had increasing trouble swallowing over the last few months patient is also unable to speak  had workup by gi specialist barium swallow is negative ct scan of abdomen was unrevealing PMD is Dr. Pimentel has not eaten or drank for several days . has a PEG tube

## 2017-09-01 NOTE — CONSULT NOTE ADULT - SUBJECTIVE AND OBJECTIVE BOX
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  Now presents with worsening weakness and inability to swallow.  denies HA CP .He had been eating up until a month ago, with supplemental nutrition through his PEG as needed. He also had been on 'Salt" tablets according to the pt's wife, however he had not been taking them in the last few weeks because their pharmacy was out of them.  IN the ER he was found t have a Sodium of 116 after IVF NS Na 118  His Head CT showed no new neurologic event. HIs BP and HR were stable.    ROS per HPI      PAST MEDICAL & SURGICAL HISTORY:  CAD (coronary artery disease)  Afib  CVA (cerebral vascular accident)  Mitral valve replaced  BPH (benign prostatic hyperplasia)  High cholesterol  HTN (hypertension)  H/O heart valve replacement with mechanical valve  S/P CABG x 1  H/O colectomy   DM 2, MO, hypothyroidism, prior DVT and IVC filter; Cholecystectomy    FAMILY HISTORY:  No pertinent family history in first degree relatives  [place]    Social History:Non smoker    MEDICATIONS  (STANDING):  heparin  Injectable 5000 Unit(s) SubCutaneous every 12 hours  buPROPion XL . 150 milliGRAM(s) Oral daily  folic acid 1 milliGRAM(s) Oral daily  metoprolol 25 milliGRAM(s) Oral two times a day  ferrous    sulfate 325 milliGRAM(s) Oral daily  atorvastatin 10 milliGRAM(s) Oral at bedtime  potassium chloride  10 mEq/100 mL IVPB 10 milliEquivalent(s) IV Intermittent every 1 hour    MEDICATIONS  (PRN):   Meds reviewed      Vital Signs Last 24 Hrs  T(C): 36.3 (01 Sep 2017 16:10), Max: 37 (01 Sep 2017 13:06)  T(F): 97.4 (01 Sep 2017 16:10), Max: 98.6 (01 Sep 2017 13:06)  HR: 73 (01 Sep 2017 16:04) (67 - 82)  BP: 102/56 (01 Sep 2017 16:04) (102/52 - 142/82)  BP(mean): 77 (01 Sep 2017 16:04) (72 - 102)  RR: 18 (01 Sep 2017 16:04) (12 - 19)  SpO2: 98% (01 Sep 2017 16:04) (96% - 98%)  Daily Height in cm: 175 (01 Sep 2017 13:06)    Daily Weight in k.8 (01 Sep 2017 13:00)    PHYSICAL EXAM:    GENERAL: appears chronically ill, oriented.  HEAD:  Atraumatic, Normocephalic  EYES: EOMI wears glasses  NECK: Supple  NERVOUS SYSTEM:  Alert & Oriented X3  CHEST/LUNG: Clear to percussion bilaterally  HEART: Regular rate and rhythm; No murmurs  ABDOMEN: Soft, Nontender, Nondistended; Bowel sounds present  EXTREMITIES:  No edema      LABS:                        10.2   9.8   )-----------( 128      ( 01 Sep 2017 08:47 )             28.0         122<L>  |  80<L>  |  88.0<H>  ----------------------------<  99  3.0<L>   |  26.0  |  2.10<H>    Ca    8.7      01 Sep 2017 14:38  Phos  2.5       Mg     2.0         TPro  7.0  /  Alb  3.5  /  TBili  0.5  /  DBili  x   /  AST  27  /  ALT  49<H>  /  AlkPhos  72      PT/INR - ( 01 Sep 2017 08:47 )   PT: 18.6 sec;   INR: 1.67 ratio         PTT - ( 01 Sep 2017 08:47 )  PTT:30.3 sec    Magnesium, Serum: 2.0 mg/dL ( @ 08:47)  Phosphorus Level, Serum: 2.5 mg/dL ( @ 08:47)          RADIOLOGY & ADDITIONAL TESTS:

## 2017-09-01 NOTE — ED ADULT TRIAGE NOTE - CHIEF COMPLAINT QUOTE
Wife called the ambulance because pt "has been sleeping since Wednesday and has not eaten or drank." States went to [facility]  ([facility]) and was told he was severely dehydrated and kidney function was poor. Pt paralyzed on right side and speech is severely slurred as per wife from CVA last year. [place] in left ear. 20g present to left AC placed by EMS. Wife called the ambulance because pt "has been sleeping since Wednesday and has not eaten or drank." States went to gastro Wed  ([facility]) and was told he was severely dehydrated and kidney function was poor. Pt paralyzed on right side and speech is severely slurred as per wife from CVA last year. [place] in left ear. 20g present to left AC placed by EMS. PEG and ileostomy present.

## 2017-09-01 NOTE — H&P ADULT - ASSESSMENT
Impression/ plan    1) Hyponatremia: Etiology not completely clear at this point. He does not appear volume overload or in a sever state of dehydration. The condition appears to be sub-acute , as having occurred over the last few weeks, perhaps related to the lapse in "salt" tabs. At this point will check serum osmolality. Start IV saline and repeat serial BMP to slowly correct Na level. Will ask nephrology to evaluate him as well.    2) Acute on chronic renal failure: Multi-factoral in nature. At this point will gently hydrate and replace the potassium deficit. Will follow, serial BMP level, avoid nephrotoxic agents. Check UA and have Renal evaluate.    3) Worsening weakness/Dysphagia: Head CT doesnt show evidence of new/Acute CVA, especially in caitlyn presence of subtherapeutic INR.  Can not exclude further CVA, however over all decline has been over weeks and months and is more likely related to his general decline, deconditioned status, and poor nutrition, dehydration. Will keep NPO for now. Begin enteral feeds via the PEG. Ask Speech therapy to evaluate his swallowing mechanism. Will consider MRI to exclude neurologic event. Ask PT to evaluate and help with deconditioned state.    4) Afib: Will continue Coumadin and target INR 2.5-3.5    5) Poor nutritional status/Protein malnutrition: Will begin enteral feeds and ask dietary to evaluate for needs.    His over all decline has been over weeks/months and is most likely related to his underlying general deconditioned state following the CVA last year. Will ask Palliative care to see him as well. I spoke to the patient's wife about this and all of the above issues. She understands and agrees.    A total of 45 mins was spent evaluating and treating this critical patient.

## 2017-09-02 DIAGNOSIS — I48.91 UNSPECIFIED ATRIAL FIBRILLATION: ICD-10-CM

## 2017-09-02 DIAGNOSIS — I10 ESSENTIAL (PRIMARY) HYPERTENSION: ICD-10-CM

## 2017-09-02 DIAGNOSIS — N17.9 ACUTE KIDNEY FAILURE, UNSPECIFIED: ICD-10-CM

## 2017-09-02 DIAGNOSIS — I69.30 UNSPECIFIED SEQUELAE OF CEREBRAL INFARCTION: ICD-10-CM

## 2017-09-02 LAB
ANION GAP SERPL CALC-SCNC: 14 MMOL/L — SIGNIFICANT CHANGE UP (ref 5–17)
ANION GAP SERPL CALC-SCNC: 15 MMOL/L — SIGNIFICANT CHANGE UP (ref 5–17)
ANION GAP SERPL CALC-SCNC: 17 MMOL/L — SIGNIFICANT CHANGE UP (ref 5–17)
ANION GAP SERPL CALC-SCNC: 17 MMOL/L — SIGNIFICANT CHANGE UP (ref 5–17)
BUN SERPL-MCNC: 80 MG/DL — HIGH (ref 8–20)
BUN SERPL-MCNC: 82 MG/DL — HIGH (ref 8–20)
BUN SERPL-MCNC: 83 MG/DL — HIGH (ref 8–20)
BUN SERPL-MCNC: 86 MG/DL — HIGH (ref 8–20)
CALCIUM SERPL-MCNC: 8.9 MG/DL — SIGNIFICANT CHANGE UP (ref 8.6–10.2)
CALCIUM SERPL-MCNC: 9.4 MG/DL — SIGNIFICANT CHANGE UP (ref 8.6–10.2)
CALCIUM SERPL-MCNC: 9.4 MG/DL — SIGNIFICANT CHANGE UP (ref 8.6–10.2)
CALCIUM SERPL-MCNC: 9.6 MG/DL — SIGNIFICANT CHANGE UP (ref 8.6–10.2)
CHLORIDE SERPL-SCNC: 88 MMOL/L — LOW (ref 98–107)
CHLORIDE SERPL-SCNC: 88 MMOL/L — LOW (ref 98–107)
CHLORIDE SERPL-SCNC: 90 MMOL/L — LOW (ref 98–107)
CHLORIDE SERPL-SCNC: 91 MMOL/L — LOW (ref 98–107)
CO2 SERPL-SCNC: 23 MMOL/L — SIGNIFICANT CHANGE UP (ref 22–29)
CO2 SERPL-SCNC: 24 MMOL/L — SIGNIFICANT CHANGE UP (ref 22–29)
CREAT SERPL-MCNC: 1.94 MG/DL — HIGH (ref 0.5–1.3)
CREAT SERPL-MCNC: 1.96 MG/DL — HIGH (ref 0.5–1.3)
CREAT SERPL-MCNC: 2.09 MG/DL — HIGH (ref 0.5–1.3)
CREAT SERPL-MCNC: 2.12 MG/DL — HIGH (ref 0.5–1.3)
GLUCOSE SERPL-MCNC: 132 MG/DL — HIGH (ref 70–115)
GLUCOSE SERPL-MCNC: 137 MG/DL — HIGH (ref 70–115)
GLUCOSE SERPL-MCNC: 143 MG/DL — HIGH (ref 70–115)
GLUCOSE SERPL-MCNC: 154 MG/DL — HIGH (ref 70–115)
INR BLD: 1.99 RATIO — HIGH (ref 0.88–1.16)
MAGNESIUM SERPL-MCNC: 2 MG/DL — SIGNIFICANT CHANGE UP (ref 1.6–2.6)
MAGNESIUM SERPL-MCNC: 2.2 MG/DL — SIGNIFICANT CHANGE UP (ref 1.6–2.6)
OSMOLALITY SERPL: 295 MOS/KG — SIGNIFICANT CHANGE UP (ref 275–300)
OSMOLALITY SERPL: 301 MOS/KG — HIGH (ref 275–300)
PHOSPHATE SERPL-MCNC: 2.1 MG/DL — LOW (ref 2.4–4.7)
PHOSPHATE SERPL-MCNC: 2.3 MG/DL — LOW (ref 2.4–4.7)
POTASSIUM SERPL-MCNC: 2.8 MMOL/L — CRITICAL LOW (ref 3.5–5.3)
POTASSIUM SERPL-MCNC: 3.6 MMOL/L — SIGNIFICANT CHANGE UP (ref 3.5–5.3)
POTASSIUM SERPL-MCNC: 3.9 MMOL/L — SIGNIFICANT CHANGE UP (ref 3.5–5.3)
POTASSIUM SERPL-MCNC: 4.6 MMOL/L — SIGNIFICANT CHANGE UP (ref 3.5–5.3)
POTASSIUM SERPL-SCNC: 2.8 MMOL/L — CRITICAL LOW (ref 3.5–5.3)
POTASSIUM SERPL-SCNC: 3.6 MMOL/L — SIGNIFICANT CHANGE UP (ref 3.5–5.3)
POTASSIUM SERPL-SCNC: 3.9 MMOL/L — SIGNIFICANT CHANGE UP (ref 3.5–5.3)
POTASSIUM SERPL-SCNC: 4.6 MMOL/L — SIGNIFICANT CHANGE UP (ref 3.5–5.3)
PROTHROM AB SERPL-ACNC: 22.2 SEC — HIGH (ref 9.8–12.7)
SODIUM SERPL-SCNC: 126 MMOL/L — LOW (ref 135–145)
SODIUM SERPL-SCNC: 128 MMOL/L — LOW (ref 135–145)
SODIUM SERPL-SCNC: 129 MMOL/L — LOW (ref 135–145)
SODIUM SERPL-SCNC: 130 MMOL/L — LOW (ref 135–145)
URATE SERPL-MCNC: 11.3 MG/DL — HIGH (ref 3.4–7)

## 2017-09-02 PROCEDURE — 99233 SBSQ HOSP IP/OBS HIGH 50: CPT

## 2017-09-02 RX ORDER — POTASSIUM CHLORIDE 20 MEQ
40 PACKET (EA) ORAL
Qty: 0 | Refills: 0 | Status: COMPLETED | OUTPATIENT
Start: 2017-09-02 | End: 2017-09-02

## 2017-09-02 RX ORDER — METOPROLOL TARTRATE 50 MG
5 TABLET ORAL ONCE
Qty: 0 | Refills: 0 | Status: COMPLETED | OUTPATIENT
Start: 2017-09-02 | End: 2017-09-02

## 2017-09-02 RX ADMIN — ATORVASTATIN CALCIUM 10 MILLIGRAM(S): 80 TABLET, FILM COATED ORAL at 21:02

## 2017-09-02 RX ADMIN — Medication 1 MILLIGRAM(S): at 11:47

## 2017-09-02 RX ADMIN — Medication 40 MILLIEQUIVALENT(S): at 05:03

## 2017-09-02 RX ADMIN — Medication 5 MILLIGRAM(S): at 03:22

## 2017-09-02 RX ADMIN — Medication 25 MILLIGRAM(S): at 18:43

## 2017-09-02 RX ADMIN — Medication 325 MILLIGRAM(S): at 11:47

## 2017-09-02 RX ADMIN — Medication 40 MILLIEQUIVALENT(S): at 06:54

## 2017-09-02 RX ADMIN — HEPARIN SODIUM 5000 UNIT(S): 5000 INJECTION INTRAVENOUS; SUBCUTANEOUS at 05:03

## 2017-09-02 RX ADMIN — BUPROPION HYDROCHLORIDE 150 MILLIGRAM(S): 150 TABLET, EXTENDED RELEASE ORAL at 11:47

## 2017-09-02 RX ADMIN — Medication 25 MILLIGRAM(S): at 05:02

## 2017-09-02 RX ADMIN — HEPARIN SODIUM 5000 UNIT(S): 5000 INJECTION INTRAVENOUS; SUBCUTANEOUS at 18:43

## 2017-09-02 NOTE — SWALLOW BEDSIDE ASSESSMENT ADULT - SWALLOW EVAL: RECOMMENDED FEEDING/EATING TECHNIQUES
no straws/maintain upright posture during/after eating for 30 mins/alternate food with liquid/small sips/bites/allow for swallow between intakes/crush medication (when feasible)/Effortful swallow/multiple swallows per bolus

## 2017-09-02 NOTE — SWALLOW BEDSIDE ASSESSMENT ADULT - SWALLOW EVAL: DIAGNOSIS
Per MBS 8/8/17 pt with a mild oral stage dysphagia and a mild-moderate pharyngeal stage dysphagia. Clinically swallow function this date is consistent with MBS results

## 2017-09-02 NOTE — PROGRESS NOTE ADULT - SUBJECTIVE AND OBJECTIVE BOX
NEPHROLOGY INTERVAL HPI/OVERNIGHT EVENTS:  pt lying flat and comfortable when seen earlier  no acute distress noted    MEDICATIONS  (STANDING):  heparin  Injectable 5000 Unit(s) SubCutaneous every 12 hours  buPROPion XL . 150 milliGRAM(s) Oral daily  folic acid 1 milliGRAM(s) Oral daily  metoprolol 25 milliGRAM(s) Oral two times a day  ferrous    sulfate 325 milliGRAM(s) Oral daily  atorvastatin 10 milliGRAM(s) Oral at bedtime  levoFLOXacin  Tablet 250 milliGRAM(s) Oral every 24 hours    MEDICATIONS  (PRN):      Allergies    penicillins (Hives)    Intolerances          Vital Signs Last 24 Hrs  T(C): 36.7 (02 Sep 2017 04:00), Max: 37.1 (01 Sep 2017 20:00)  T(F): 98 (02 Sep 2017 04:00), Max: 98.8 (01 Sep 2017 20:00)  HR: 83 (02 Sep 2017 09:00) (67 - 129)  BP: 99/65 (02 Sep 2017 09:00) (93/51 - 142/82)  BP(mean): 77 (02 Sep 2017 09:00) (67 - 102)  RR: 24 (02 Sep 2017 09:00) (12 - 29)  SpO2: 97% (02 Sep 2017 09:00) (96% - 98%)    PHYSICAL EXAM:  GENERAL: appears chronically ill  HEAD:  Atraumatic, Normocephalic  NECK: Supple  NERVOUS SYSTEM:  Alert & Oriented X3  CHEST/LUNG: Clear to percussion bilaterally  HEART: Regular rate and rhythm; No murmurs  ABDOMEN: Soft, Nontender, Nondistended; Bowel sounds present  EXTREMITIES:  No edema    LABS:                        10.2   9.8   )-----------( 128      ( 01 Sep 2017 08:47 )             28.0     09-02    129<L>  |  91<L>  |  83.0<H>  ----------------------------<  154<H>  4.6   |  24.0  |  2.12<H>    Creatinine, Serum: 1.96 mg/dL (17 @ 03:27)        Ca    9.6      02 Sep 2017 08:45  Phos  2.1       Mg     2.2     -02    TPro  7.0  /  Alb  3.5  /  TBili  0.5  /  DBili  x   /  AST  27  /  ALT  49<H>  /  AlkPhos  72      PT/INR - ( 02 Sep 2017 08:44 )   PT: 22.2 sec;   INR: 1.99 ratio         PTT - ( 01 Sep 2017 08:47 )  PTT:30.3 sec  Urinalysis Basic - ( 01 Sep 2017 19:28 )    Color: Yellow / Appearance: Clear / S.010 / pH: x  Gluc: x / Ketone: Negative  / Bili: Negative / Urobili: Negative mg/dL   Blood: x / Protein: 15 mg/dL / Nitrite: Negative   Leuk Esterase: Moderate / RBC: 3-5 /HPF / WBC 11-25   Sq Epi: x / Non Sq Epi: x / Bacteria: x      Magnesium, Serum: 2.2 mg/dL ( @ 08:45)  Phosphorus Level, Serum: 2.1 mg/dL ( @ 08:45)  Magnesium, Serum: 2.0 mg/dL ( @ 03:27)  Phosphorus Level, Serum: 2.3 mg/dL ( @ 03:27)      RADIOLOGY & ADDITIONAL TESTS:  < from: US Renal (17 @ 18:26) >     EXAM:  US KIDNEY(S)                          PROCEDURE DATE:  2017          INTERPRETATION:  CLINICAL HISTORY: Acute renal injury     TECHNIQUE: Sonogram of the kidneys and bladder was performed.     COMPARISON:        FINDINGS: The right kidney shows thinned echogenic renal cortex, right   kidney measuring 9.9 cm in longitudinal dimension. Multiple small   echogenic stones noted within the upper pole calyces. There is mild right   renal hydroureteronephrosis with the proximal ureter measuring 8 mm in   diameter.        The left kidney shows a thinned echogenic renal cortex, left kidney,   measuring 10.7 cm in longitudinal dimension. No left hydronephrosis, mass   or calculi is visualized. Upper pole lateral cyst measures 1.8 x 2.2 cm.    The bladder is normally distended containing sludgelike material within   the dependent the bladder. Ureteral jets not visualized.  IMPRESSION:  Asymmetric mild right hydroureteronephrosis with upper pole calyceal   stones as described.  Echogenic thin renal cortex which may indicate chronic medical renal   disease.                ELISHA KIRAN M.D., ATTENDING RADIOLOGIST  This document has been electronically signed. Sep  1 2017  8:49PM    < end of copied text >

## 2017-09-02 NOTE — PROGRESS NOTE ADULT - SUBJECTIVE AND OBJECTIVE BOX
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  Now presents with worsening weakness and inability to swallow. He had been eating up until a month ago, with supplemental nutrition through his PEG as needed. He also had been on 'Salt" tablets according to the pt's wife, however he had not been taking them in the last few weeks because their pharmacy was out of them.  IN the ER he was found t have a Sodium of 116.  His Head CT showed no new neurologic event. HIs BP and HR were stable. He had received  iv fluid , na level improving, seen by speech therapist continue same diet , satrted on nocturnal feed as well due to dehydration poor oral intake       Allergies    penicillins (Hives)    Intolerances        REVIEW OF SYSTEMS:  per wife weakness, difficulty to swallow   Vital Signs Last 24 Hrs  T(C): 37.1 (02 Sep 2017 16:02), Max: 37.1 (01 Sep 2017 20:00)  T(F): 98.8 (02 Sep 2017 16:02), Max: 98.8 (01 Sep 2017 20:00)  HR: 83 (02 Sep 2017 18:00) (70 - 129)  BP: 109/59 (02 Sep 2017 18:00) (92/55 - 124/78)  BP(mean): 79 (02 Sep 2017 18:00) (67 - 94)  RR: 36 (02 Sep 2017 18:00) (14 - 63)  SpO2: 97% (02 Sep 2017 18:00) (96% - 99%)    PHYSICAL EXAM:    GENERAL: NAD, well-groomed, well-developed  HEAD:  Atraumatic, Normocephalic  EYES: EOMI, PERRLA, conjunctiva and sclera clear  ENMT: No tonsillar erythema, exudates, or enlargement; Moist mucous membranes, Good dentition, No lesions  NECK: Supple, No JVD, Normal thyroid  NERVOUS SYSTEM:  Alert & Oriented X3, Good concentration; right sided weakness hemiplegia   CHEST/LUNG: Clear to auscultation  bilaterally; No rales, rhonchi, wheezing, or rubs  HEART: Regular rate and rhythm; No murmurs, rubs, or gallops  ABDOMEN: Soft, Nontender, Nondistended; Bowel sounds present, ileostomy bag right lower abdomen   EXTREMITIES:  2+ Peripheral Pulses, No clubbing, cyanosis, or edema    SKIN: No rashes or lesions      LABS:                        10.2   9.8   )-----------( 128      ( 01 Sep 2017 08:47 )             28.0     09-    130<L>  |  90<L>  |  80.0<H>  ----------------------------<  143<H>  3.9   |  23.0  |  2.09<H>    Ca    9.4      02 Sep 2017 18:31  Phos  2.1     -  Mg     2.2     -    TPro  7.0  /  Alb  3.5  /  TBili  0.5  /  DBili  x   /  AST  27  /  ALT  49<H>  /  AlkPhos  72  09-    PT/INR - ( 02 Sep 2017 08:44 )   PT: 22.2 sec;   INR: 1.99 ratio         PTT - ( 01 Sep 2017 08:47 )  PTT:30.3 sec  Urinalysis Basic - ( 01 Sep 2017 19:28 )    Color: Yellow / Appearance: Clear / S.010 / pH: x  Gluc: x / Ketone: Negative  / Bili: Negative / Urobili: Negative mg/dL   Blood: x / Protein: 15 mg/dL / Nitrite: Negative   Leuk Esterase: Moderate / RBC: 3-5 /HPF / WBC 11-25   Sq Epi: x / Non Sq Epi: x / Bacteria: x        RADIOLOGY & ADDITIONAL TESTS:

## 2017-09-02 NOTE — PROGRESS NOTE ADULT - SUBJECTIVE AND OBJECTIVE BOX
Patient is a 76y old  Male who presents with a chief complaint of Increasing weakness and poor appetite (01 Sep 2017 13:06)      BRIEF HOSPITAL COURSE: ***    Events last 24 hours: ***    PAST MEDICAL & SURGICAL HISTORY:  CAD (coronary artery disease)  Afib  CVA (cerebral vascular accident)  Mitral valve replaced  BPH (benign prostatic hyperplasia)  High cholesterol  HTN (hypertension)  H/O heart valve replacement with mechanical valve  S/P CABG x 1  H/O colectomy      Review of Systems:  CONSTITUTIONAL: No fever, chills, or fatigue  EYES: No eye pain, visual disturbances, or discharge  ENMT:  No difficulty hearing, tinnitus, vertigo; No sinus or throat pain  NECK: No pain or stiffness  RESPIRATORY: No cough, wheezing, chills or hemoptysis; No shortness of breath  CARDIOVASCULAR: No chest pain, palpitations, dizziness, or leg swelling  GASTROINTESTINAL: No abdominal or epigastric pain. No nausea, vomiting, or hematemesis; No diarrhea or constipation. No melena or hematochezia.  GENITOURINARY: No dysuria, frequency, hematuria, or incontinence  NEUROLOGICAL: No headaches, memory loss, loss of strength, numbness, or tremors  SKIN: No itching, burning, rashes, or lesions   MUSCULOSKELETAL: No joint pain or swelling; No muscle, back, or extremity pain  PSYCHIATRIC: No depression, anxiety, mood swings, or difficulty sleeping      Medications:  levoFLOXacin  Tablet 250 milliGRAM(s) Oral every 24 hours    metoprolol 25 milliGRAM(s) Oral two times a day      buPROPion XL . 150 milliGRAM(s) Oral daily      heparin  Injectable 5000 Unit(s) SubCutaneous every 12 hours        atorvastatin 10 milliGRAM(s) Oral at bedtime    folic acid 1 milliGRAM(s) Oral daily  ferrous    sulfate 325 milliGRAM(s) Oral daily                ICU Vital Signs Last 24 Hrs  T(C): 36.7 (02 Sep 2017 04:00), Max: 37.1 (01 Sep 2017 20:00)  T(F): 98 (02 Sep 2017 04:00), Max: 98.8 (01 Sep 2017 20:00)  HR: 73 (02 Sep 2017 07:00) (67 - 129)  BP: 100/55 (02 Sep 2017 07:00) (93/51 - 142/82)  BP(mean): 74 (02 Sep 2017 07:00) (67 - 102)  ABP: --  ABP(mean): --  RR: 22 (02 Sep 2017 07:00) (12 - 29)  SpO2: 96% (02 Sep 2017 07:00) (96% - 98%)          I&O's Detail    01 Sep 2017 07:01  -  02 Sep 2017 07:00  --------------------------------------------------------  IN:    Enteral Tube Flush: 180 mL    Nepro: 690 mL    sodium chloride 0.9%: 450 mL    sodium chloride 0.9%: 1000 mL    sodium chloride 0.9%: 200 mL    Solution: 300 mL  Total IN: 2820 mL    OUT:    Ileostomy: 1000 mL    Voided: 1950 mL  Total OUT: 2950 mL    Total NET: -130 mL            LABS:                        10.2   9.8   )-----------( 128      ( 01 Sep 2017 08:47 )             28.0     09-02    128<L>  |  88<L>  |  86.0<H>  ----------------------------<  137<H>  2.8<LL>   |  23.0  |  1.96<H>    Ca    8.9      02 Sep 2017 03:27  Phos  2.3     09-02  Mg     2.0     -    TPro  7.0  /  Alb  3.5  /  TBili  0.5  /  DBili  x   /  AST  27  /  ALT  49<H>  /  AlkPhos  72  09-01          CAPILLARY BLOOD GLUCOSE        PT/INR - ( 01 Sep 2017 08:47 )   PT: 18.6 sec;   INR: 1.67 ratio         PTT - ( 01 Sep 2017 08:47 )  PTT:30.3 sec  Urinalysis Basic - ( 01 Sep 2017 19:28 )    Color: Yellow / Appearance: Clear / S.010 / pH: x  Gluc: x / Ketone: Negative  / Bili: Negative / Urobili: Negative mg/dL   Blood: x / Protein: 15 mg/dL / Nitrite: Negative   Leuk Esterase: Moderate / RBC: 3-5 /HPF / WBC 11-25   Sq Epi: x / Non Sq Epi: x / Bacteria: x      CULTURES:      Physical Examination:    General: No acute distress.  Alert, oriented, interactive,     HEENT: Pupils equal, reactive to light.  Symmetric.    PULM: Clear to auscultation bilaterally, no significant sputum production    CVS: Irregular    ABD: Soft, nondistended, nontender, normoactive bowel sounds, no masses, PEG site and colostomy site C/I/D    EXT: No edema, nontender    SKIN: Warm and well perfused, no rashes noted.        CRITICAL CARE TIME SPENT: ***

## 2017-09-02 NOTE — SWALLOW BEDSIDE ASSESSMENT ADULT - SLP PERTINENT HISTORY OF CURRENT PROBLEM
Pt known well to outpt ST service. Dysphagia tx with multiple MBSs completed. Last study completed 8/8/17 with rx for a regular diet with nectar liquids. Results were unchanged from prior MBS 11/22/16.

## 2017-09-03 DIAGNOSIS — R13.12 DYSPHAGIA, OROPHARYNGEAL PHASE: ICD-10-CM

## 2017-09-03 DIAGNOSIS — N40.0 BENIGN PROSTATIC HYPERPLASIA WITHOUT LOWER URINARY TRACT SYMPTOMS: ICD-10-CM

## 2017-09-03 LAB
INR BLD: 1.66 RATIO — HIGH (ref 0.88–1.16)
PROTHROM AB SERPL-ACNC: 18.5 SEC — HIGH (ref 9.8–12.7)

## 2017-09-03 PROCEDURE — 99233 SBSQ HOSP IP/OBS HIGH 50: CPT

## 2017-09-03 RX ORDER — WARFARIN SODIUM 2.5 MG/1
5 TABLET ORAL ONCE
Qty: 0 | Refills: 0 | Status: COMPLETED | OUTPATIENT
Start: 2017-09-03 | End: 2017-09-03

## 2017-09-03 RX ORDER — PANTOPRAZOLE SODIUM 20 MG/1
40 TABLET, DELAYED RELEASE ORAL
Qty: 0 | Refills: 0 | Status: DISCONTINUED | OUTPATIENT
Start: 2017-09-03 | End: 2017-09-06

## 2017-09-03 RX ORDER — FINASTERIDE 5 MG/1
5 TABLET, FILM COATED ORAL DAILY
Qty: 0 | Refills: 0 | Status: DISCONTINUED | OUTPATIENT
Start: 2017-09-03 | End: 2017-09-06

## 2017-09-03 RX ORDER — ACETYLCYSTEINE 200 MG/ML
2 VIAL (ML) MISCELLANEOUS EVERY 6 HOURS
Qty: 0 | Refills: 0 | Status: DISCONTINUED | OUTPATIENT
Start: 2017-09-03 | End: 2017-09-03

## 2017-09-03 RX ORDER — SODIUM CHLORIDE 9 MG/ML
1000 INJECTION INTRAMUSCULAR; INTRAVENOUS; SUBCUTANEOUS
Qty: 0 | Refills: 0 | Status: DISCONTINUED | OUTPATIENT
Start: 2017-09-03 | End: 2017-09-05

## 2017-09-03 RX ORDER — ALBUTEROL 90 UG/1
2.5 AEROSOL, METERED ORAL EVERY 6 HOURS
Qty: 0 | Refills: 0 | Status: DISCONTINUED | OUTPATIENT
Start: 2017-09-03 | End: 2017-09-06

## 2017-09-03 RX ORDER — ACETYLCYSTEINE 200 MG/ML
1 VIAL (ML) MISCELLANEOUS EVERY 6 HOURS
Qty: 0 | Refills: 0 | Status: COMPLETED | OUTPATIENT
Start: 2017-09-03 | End: 2017-09-05

## 2017-09-03 RX ADMIN — BUPROPION HYDROCHLORIDE 150 MILLIGRAM(S): 150 TABLET, EXTENDED RELEASE ORAL at 13:25

## 2017-09-03 RX ADMIN — HEPARIN SODIUM 5000 UNIT(S): 5000 INJECTION INTRAVENOUS; SUBCUTANEOUS at 17:51

## 2017-09-03 RX ADMIN — HEPARIN SODIUM 5000 UNIT(S): 5000 INJECTION INTRAVENOUS; SUBCUTANEOUS at 05:29

## 2017-09-03 RX ADMIN — Medication 325 MILLIGRAM(S): at 13:25

## 2017-09-03 RX ADMIN — ATORVASTATIN CALCIUM 10 MILLIGRAM(S): 80 TABLET, FILM COATED ORAL at 22:09

## 2017-09-03 RX ADMIN — Medication 1 MILLIGRAM(S): at 13:25

## 2017-09-03 RX ADMIN — SODIUM CHLORIDE 75 MILLILITER(S): 9 INJECTION INTRAMUSCULAR; INTRAVENOUS; SUBCUTANEOUS at 13:25

## 2017-09-03 RX ADMIN — Medication 1 MILLILITER(S): at 20:15

## 2017-09-03 RX ADMIN — FINASTERIDE 5 MILLIGRAM(S): 5 TABLET, FILM COATED ORAL at 13:25

## 2017-09-03 RX ADMIN — ALBUTEROL 2.5 MILLIGRAM(S): 90 AEROSOL, METERED ORAL at 15:13

## 2017-09-03 RX ADMIN — Medication 25 MILLIGRAM(S): at 05:29

## 2017-09-03 RX ADMIN — Medication 25 MILLIGRAM(S): at 17:51

## 2017-09-03 RX ADMIN — ALBUTEROL 2.5 MILLIGRAM(S): 90 AEROSOL, METERED ORAL at 20:16

## 2017-09-03 RX ADMIN — Medication 63.75 MILLIMOLE(S): at 13:25

## 2017-09-03 RX ADMIN — WARFARIN SODIUM 5 MILLIGRAM(S): 2.5 TABLET ORAL at 22:11

## 2017-09-03 NOTE — CONSULT NOTE ADULT - SUBJECTIVE AND OBJECTIVE BOX
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  Now presents with worsening weakness and inability to swallow. He had been eating up until a month ago, with supplemental nutrition through his PEG as needed. He also had been on 'Salt" tablets according to the pt's wife, however he had not been taking them in the last few weeks because their pharmacy was out of them.  IN the ER he was found t have a Sodium of 116.  His Head CT showed no new neurologic event. HIs BP and HR were stable. (01 Sep 2017 13:06)    Patient has chronic renal insufficiency and renal sono revealed minimal right renal upper pole hydronephrosis.  He has no voiding complaints since having a turp by Dr. Cox      PAST MEDICAL & SURGICAL HISTORY:  CAD (coronary artery disease)  Afib  CVA (cerebral vascular accident)  Mitral valve replaced  BPH (benign prostatic hyperplasia)  High cholesterol  HTN (hypertension)  H/O heart valve replacement with mechanical valve  S/P CABG x 1  H/O colectomy      REVIEW OF SYSTEMS:    Reviewed h and p ROS  MEDICATIONS  (STANDING):  heparin  Injectable 5000 Unit(s) SubCutaneous every 12 hours  buPROPion XL . 150 milliGRAM(s) Oral daily  folic acid 1 milliGRAM(s) Oral daily  metoprolol 25 milliGRAM(s) Oral two times a day  ferrous    sulfate 325 milliGRAM(s) Oral daily  atorvastatin 10 milliGRAM(s) Oral at bedtime  levoFLOXacin  Tablet 250 milliGRAM(s) Oral every 24 hours  ALBUTerol    0.083% 2.5 milliGRAM(s) Nebulizer every 6 hours  acetylcysteine 20% Inhalation 1 milliLiter(s) Inhalation every 6 hours  sodium chloride 0.9%. 1000 milliLiter(s) (75 mL/Hr) IV Continuous <Continuous>  finasteride 5 milliGRAM(s) Oral daily  warfarin 5 milliGRAM(s) Oral once  pantoprazole    Tablet 40 milliGRAM(s) Oral before breakfast    MEDICATIONS  (PRN):      Allergies    penicillins (Hives)    Intolerances        SOCIAL HISTORY:    FAMILY HISTORY:  No pertinent family history in first degree relatives      Vital Signs Last 24 Hrs  T(C): 36.9 (03 Sep 2017 15:43), Max: 37.1 (02 Sep 2017 19:44)  T(F): 98.4 (03 Sep 2017 15:43), Max: 98.7 (02 Sep 2017 19:44)  HR: 84 (03 Sep 2017 15:43) (67 - 89)  BP: 107/61 (03 Sep 2017 15:43) (99/55 - 120/56)  BP(mean): 80 (03 Sep 2017 01:00) (73 - 80)  RR: 19 (03 Sep 2017 15:43) (18 - 46)  SpO2: 98% (03 Sep 2017 15:43) (96% - 99%)    PHYSICAL EXAM:    abd- soft, nt, nd, bs+, ostomy functioning        LABS:        130<L>  |  90<L>  |  80.0<H>  ----------------------------<  143<H>  3.9   |  23.0  |  2.09<H>    Ca    9.4      02 Sep 2017 18:31  Phos  2.1     09-02  Mg     2.2     09-02      PT/INR - ( 03 Sep 2017 14:56 )   PT: 18.5 sec;   INR: 1.66 ratio           Urinalysis Basic - ( 01 Sep 2017 19:28 )    Color: Yellow / Appearance: Clear / S.010 / pH: x  Gluc: x / Ketone: Negative  / Bili: Negative / Urobili: Negative mg/dL   Blood: x / Protein: 15 mg/dL / Nitrite: Negative   Leuk Esterase: Moderate / RBC: 3-5 /HPF / WBC 11-25   Sq Epi: x / Non Sq Epi: x / Bacteria: x        RADIOLOGY & ADDITIONAL STUDIES:    CT- No hydro    Sono- minimal right upper pole hydronephrosis

## 2017-09-03 NOTE — DIETITIAN INITIAL EVALUATION ADULT. - ETIOLOGY
related to insufficient protein calorie intake, swallowing difficulty, and mood changes after stroke

## 2017-09-03 NOTE — PROGRESS NOTE ADULT - SUBJECTIVE AND OBJECTIVE BOX
NEPHROLOGY INTERVAL HPI/OVERNIGHT EVENTS:  pt lying flat and comfortable  no new complaints    MEDICATIONS  (STANDING):  heparin  Injectable 5000 Unit(s) SubCutaneous every 12 hours  buPROPion XL . 150 milliGRAM(s) Oral daily  folic acid 1 milliGRAM(s) Oral daily  metoprolol 25 milliGRAM(s) Oral two times a day  ferrous    sulfate 325 milliGRAM(s) Oral daily  atorvastatin 10 milliGRAM(s) Oral at bedtime  levoFLOXacin  Tablet 250 milliGRAM(s) Oral every 24 hours  ALBUTerol    0.083% 2.5 milliGRAM(s) Nebulizer every 6 hours  acetylcysteine 20% Inhalation 1 milliLiter(s) Inhalation every 6 hours    MEDICATIONS  (PRN):      Allergies    penicillins (Hives)    Intolerances      Vital Signs Last 24 Hrs  T(C): 36.7 (03 Sep 2017 02:01), Max: 37.1 (02 Sep 2017 16:02)  T(F): 98.1 (03 Sep 2017 02:01), Max: 98.8 (02 Sep 2017 16:02)  HR: 83 (03 Sep 2017 02:01) (67 - 91)  BP: 111/63 (03 Sep 2017 02:01) (92/55 - 124/78)  BP(mean): 80 (03 Sep 2017 01:00) (69 - 94)  RR: 18 (03 Sep 2017 02:01) (18 - 63)  SpO2: 96% (03 Sep 2017 01:00) (96% - 99%)    PHYSICAL EXAM:  GENERAL: appears chronically ill  HEAD:  Atraumatic, Normocephalic  NECK: Supple  NERVOUS SYSTEM:  Alert & Oriented X3  CHEST/LUNG: Clear to percussion bilaterally  HEART: Regular rate and rhythm; No murmurs  ABDOMEN: Soft, Nontender, Nondistended; Bowel sounds present  EXTREMITIES:  No edema    LABS:        130<L>  |  90<L>  |  80.0<H>  ----------------------------<  143<H>  3.9   |  23.0  |  2.09<H>    Ca    9.4      02 Sep 2017 18:31  Phos  2.1       Mg     2.2           PT/INR - ( 02 Sep 2017 08:44 )   PT: 22.2 sec;   INR: 1.99 ratio           Urinalysis Basic - ( 01 Sep 2017 19:28 )    Color: Yellow / Appearance: Clear / S.010 / pH: x  Gluc: x / Ketone: Negative  / Bili: Negative / Urobili: Negative mg/dL   Blood: x / Protein: 15 mg/dL / Nitrite: Negative   Leuk Esterase: Moderate / RBC: 3-5 /HPF / WBC 11-25   Sq Epi: x / Non Sq Epi: x / Bacteria: x          RADIOLOGY & ADDITIONAL TESTS:  < from: US Renal (17 @ 18:26) >   EXAM:  US KIDNEY(S)                          PROCEDURE DATE:  2017          INTERPRETATION:  CLINICAL HISTORY: Acute renal injury     TECHNIQUE: Sonogram of the kidneys and bladder was performed.     COMPARISON:        FINDINGS: The right kidney shows thinned echogenic renal cortex, right   kidney measuring 9.9 cm in longitudinal dimension. Multiple small   echogenic stones noted within the upper pole calyces. There is mild right   renal hydroureteronephrosis with the proximal ureter measuring 8 mm in   diameter.        The left kidney shows a thinned echogenic renal cortex, left kidney,   measuring 10.7 cm in longitudinal dimension. No left hydronephrosis, mass   or calculi is visualized. Upper pole lateral cyst measures 1.8 x 2.2 cm.    The bladder is normally distended containing sludgelike material within   the dependent the bladder. Ureteral jets not visualized.  IMPRESSION:  Asymmetric mild right hydroureteronephrosis with upper pole calyceal   stones as described.  Echogenic thin renal cortex which may indicate chronic medical renal   disease.                ELISHA KIRAN M.D., ATTENDING RADIOLOGIST  This document has been electronically signed. Sep  1 2017  8:49PM    < end of copied text >

## 2017-09-03 NOTE — CHART NOTE - NSCHARTNOTEFT_GEN_A_CORE
Upon Nutritional Assessment by the Registered Dietitian your patient was determined to meet criteria / has evidence of the following diagnosis/diagnoses:          [ ]  Mild Protein Calorie Malnutrition        [ ]  Moderate Protein Calorie Malnutrition        [x ] Severe Protein Calorie Malnutrition        [ ] Unspecified Protein Calorie Malnutrition        [ ] Underweight / BMI <19        [ ] Morbid Obesity / BMI > 40      Findings as based on:  •  Comprehensive nutrition assessment and consultation  •  Calorie counts (nutrient intake analysis)  •  Food acceptance and intake status from observations by staff  •  Follow up  •  Patient education  •  Intervention secondary to interdisciplinary rounds  •   concerns      Treatment:    The following diet has been recommended: Change Nepro to 80 ml/hr 7pm-7am  continue PO diet  ensure pudding TID  Appetite stimulant ? Marinol?  Consider change anti depressant regimine per pt's wife  PROVIDER Section:     By signing this assessment you are acknowledging and agree with the diagnosis/diagnoses assigned by the Registered Dietitian    Comments:

## 2017-09-03 NOTE — PROGRESS NOTE ADULT - SUBJECTIVE AND OBJECTIVE BOX
chart reviewed , pt is seen examined he is awake laert speech problems due to old CVA difficult to understand what he says  He is hungry however difficulty to swallow , PEG ffed overnight completed will resume   renal follow up appreciated noted     Allergies    penicillins (Hives)    Intolerances        REVIEW OF SYSTEMS:  per wife weakness, difficulty to swallow   Vital Signs Last 24 Hrs  T(C): 36.7 (03 Sep 2017 02:01), Max: 37.1 (02 Sep 2017 16:02)  T(F): 98.1 (03 Sep 2017 02:01), Max: 98.8 (02 Sep 2017 16:02)  HR: 83 (03 Sep 2017 02:01) (67 - 91)  BP: 111/63 (03 Sep 2017 02:01) (99/55 - 124/78)  BP(mean): 80 (03 Sep 2017 01:00) (73 - 94)  RR: 18 (03 Sep 2017 02:01) (18 - 63)  SpO2: 96% (03 Sep 2017 01:00) (96% - 99%))    PHYSICAL EXAM:    GENERAL: NAD, well-groomed, well-developed  HEAD:  Atraumatic, Normocephalic  NECK: Supple, No JVD, Normal thyroid  CHEST/LUNG: Bilateral coarse breath sound , rhonchi diffuse   HEART: Regular rate and rhythm; No murmurs, rubs, or gallops  ABDOMEN: Soft, Nontender, Nondistended; Bowel sounds present, ileostomy bag right lower abdomen   EXTREMITIES:  2+ Peripheral Pulses, No clubbing, cyanosis, or edema  SKIN: No rashes or lesions      LABS:  09-02    130<L>  |  90<L>  |  80.0<H>  ----------------------------<  143<H>  3.9   |  23.0  |  2.09<H>    Ca    9.4      02 Sep 2017 18:31  Phos  2.1     09-02  Mg     2.2     09-02

## 2017-09-03 NOTE — CONSULT NOTE ADULT - ASSESSMENT
Renal insufficiency is caused by his medical renal disease.  No need to intervene for his upper pole hydronephrosis

## 2017-09-04 DIAGNOSIS — E87.6 HYPOKALEMIA: ICD-10-CM

## 2017-09-04 DIAGNOSIS — E83.42 HYPOMAGNESEMIA: ICD-10-CM

## 2017-09-04 LAB
ANION GAP SERPL CALC-SCNC: 15 MMOL/L — SIGNIFICANT CHANGE UP (ref 5–17)
BUN SERPL-MCNC: 76 MG/DL — HIGH (ref 8–20)
CALCIUM SERPL-MCNC: 8.7 MG/DL — SIGNIFICANT CHANGE UP (ref 8.6–10.2)
CHLORIDE SERPL-SCNC: 96 MMOL/L — LOW (ref 98–107)
CO2 SERPL-SCNC: 22 MMOL/L — SIGNIFICANT CHANGE UP (ref 22–29)
CREAT SERPL-MCNC: 1.88 MG/DL — HIGH (ref 0.5–1.3)
GLUCOSE SERPL-MCNC: 125 MG/DL — HIGH (ref 70–115)
INR BLD: 1.51 RATIO — HIGH (ref 0.88–1.16)
MAGNESIUM SERPL-MCNC: 1.5 MG/DL — LOW (ref 1.6–2.6)
POTASSIUM SERPL-MCNC: 3.2 MMOL/L — LOW (ref 3.5–5.3)
POTASSIUM SERPL-SCNC: 3.2 MMOL/L — LOW (ref 3.5–5.3)
PROTHROM AB SERPL-ACNC: 16.7 SEC — HIGH (ref 9.8–12.7)
SODIUM SERPL-SCNC: 133 MMOL/L — LOW (ref 135–145)

## 2017-09-04 PROCEDURE — 99233 SBSQ HOSP IP/OBS HIGH 50: CPT

## 2017-09-04 PROCEDURE — 93010 ELECTROCARDIOGRAM REPORT: CPT

## 2017-09-04 RX ORDER — POTASSIUM CHLORIDE 20 MEQ
40 PACKET (EA) ORAL ONCE
Qty: 0 | Refills: 0 | Status: DISCONTINUED | OUTPATIENT
Start: 2017-09-04 | End: 2017-09-04

## 2017-09-04 RX ORDER — POTASSIUM CHLORIDE 20 MEQ
40 PACKET (EA) ORAL EVERY 4 HOURS
Qty: 0 | Refills: 0 | Status: COMPLETED | OUTPATIENT
Start: 2017-09-04 | End: 2017-09-04

## 2017-09-04 RX ORDER — WARFARIN SODIUM 2.5 MG/1
5 TABLET ORAL ONCE
Qty: 0 | Refills: 0 | Status: COMPLETED | OUTPATIENT
Start: 2017-09-04 | End: 2017-09-04

## 2017-09-04 RX ORDER — MAGNESIUM SULFATE 500 MG/ML
2 VIAL (ML) INJECTION
Qty: 0 | Refills: 0 | Status: COMPLETED | OUTPATIENT
Start: 2017-09-04 | End: 2017-09-04

## 2017-09-04 RX ADMIN — Medication 1 MILLILITER(S): at 08:59

## 2017-09-04 RX ADMIN — Medication 25 MILLIGRAM(S): at 17:37

## 2017-09-04 RX ADMIN — Medication 50 GRAM(S): at 10:03

## 2017-09-04 RX ADMIN — Medication 50 GRAM(S): at 12:04

## 2017-09-04 RX ADMIN — PANTOPRAZOLE SODIUM 40 MILLIGRAM(S): 20 TABLET, DELAYED RELEASE ORAL at 06:24

## 2017-09-04 RX ADMIN — Medication 40 MILLIEQUIVALENT(S): at 13:04

## 2017-09-04 RX ADMIN — ALBUTEROL 2.5 MILLIGRAM(S): 90 AEROSOL, METERED ORAL at 20:52

## 2017-09-04 RX ADMIN — Medication 1 MILLILITER(S): at 15:37

## 2017-09-04 RX ADMIN — HEPARIN SODIUM 5000 UNIT(S): 5000 INJECTION INTRAVENOUS; SUBCUTANEOUS at 17:37

## 2017-09-04 RX ADMIN — ALBUTEROL 2.5 MILLIGRAM(S): 90 AEROSOL, METERED ORAL at 08:59

## 2017-09-04 RX ADMIN — Medication 1 MILLIGRAM(S): at 07:56

## 2017-09-04 RX ADMIN — HEPARIN SODIUM 5000 UNIT(S): 5000 INJECTION INTRAVENOUS; SUBCUTANEOUS at 06:24

## 2017-09-04 RX ADMIN — Medication 40 MILLIEQUIVALENT(S): at 10:05

## 2017-09-04 RX ADMIN — ALBUTEROL 2.5 MILLIGRAM(S): 90 AEROSOL, METERED ORAL at 15:37

## 2017-09-04 RX ADMIN — WARFARIN SODIUM 5 MILLIGRAM(S): 2.5 TABLET ORAL at 22:10

## 2017-09-04 RX ADMIN — Medication 1 MILLILITER(S): at 20:52

## 2017-09-04 RX ADMIN — FINASTERIDE 5 MILLIGRAM(S): 5 TABLET, FILM COATED ORAL at 07:56

## 2017-09-04 RX ADMIN — ATORVASTATIN CALCIUM 10 MILLIGRAM(S): 80 TABLET, FILM COATED ORAL at 22:09

## 2017-09-04 RX ADMIN — Medication 325 MILLIGRAM(S): at 07:55

## 2017-09-04 RX ADMIN — SODIUM CHLORIDE 75 MILLILITER(S): 9 INJECTION INTRAMUSCULAR; INTRAVENOUS; SUBCUTANEOUS at 17:38

## 2017-09-04 RX ADMIN — Medication 25 MILLIGRAM(S): at 07:55

## 2017-09-04 RX ADMIN — BUPROPION HYDROCHLORIDE 150 MILLIGRAM(S): 150 TABLET, EXTENDED RELEASE ORAL at 07:55

## 2017-09-04 NOTE — PHYSICAL THERAPY INITIAL EVALUATION ADULT - ACTIVE RANGE OF MOTION EXAMINATION, REHAB EVAL
Left UE Active ROM was WNL (within normal limits)/Min to no active right UE and LE mobility due to severe weakness, minimal left ankle DF active mobility with significant contracture

## 2017-09-04 NOTE — CONSULT NOTE ADULT - SUBJECTIVE AND OBJECTIVE BOX
[facility]                                   [address] [address]                                                      PHONE: [phone]                                                         FAX: [phone]  -------------------------------------------------------------------------------------------------------------------------------    76y Male with past medical history as under presented with worsening weakness and inability to swallow. He had been eating up until a month ago, with supplemental nutrition through his PEG as needed. He also had been on 'Salt" tablets according to the pt's wife, however he had not been taking them in the last few weeks because their pharmacy was out of them.  IN the ER he was found to have a Sodium of 116.  His Head CT showed no new neurologic event. HIs BP and HR were stable. He was transferred to telemetry and noted to have episodes of wide complex tachycardia. At the time of evaluation, pt denies any complains. He has prior CVA with residual deficits. He denies chest pain or dizziness lying in the bed comfortable. He is getting PEG feeds.    PAST MEDICAL & SURGICAL HISTORY:  CAD (coronary artery disease)  Afib  CVA (cerebral vascular accident)  Mitral valve replaced  BPH (benign prostatic hyperplasia)  High cholesterol  HTN (hypertension)  H/O heart valve replacement with mechanical valve  S/P CABG x 1  H/O colectomy      penicillins (Hives)      Review of Systems:   Positive for weakness, dysarthria  Rest of the systems were reviewed and was negative.     Family history reviewed and non-contributory    Social History:  No smoking   No alcohol  No other drug use    Vital Signs Last 24 Hrs  T(C): 36.8 (04 Sep 2017 07:38), Max: 37.1 (03 Sep 2017 20:15)  T(F): 98.2 (04 Sep 2017 07:38), Max: 98.8 (03 Sep 2017 20:15)  HR: 81 (04 Sep 2017 07:38) (79 - 95)  BP: 102/52 (04 Sep 2017 07:38) (94/54 - 113/55)  BP(mean): --  RR: 18 (04 Sep 2017 07:38) (18 - 20)  SpO2: 99% (04 Sep 2017 07:38) (98% - 99%)    PHYSICAL EXAM:  Constitutional: Appears alert  HEENT:     Conjunctiva normal, Normal oral mucosa, No JVD	  Cardiovascular: S1, S2 irregular  Respiratory: Lungs clear to auscultation; no crepitations, no wheeze  Gastrointestinal:  Soft, Non-tender, + BS	+ PEG tube  Extremities: No cyanosis, clubbing or edema  Skin: Warm and dry  Neurologic: Right sided hemiplegia  Psychiatric: affect was flat        LABS:    09-04    133<L>  |  96<L>  |  76.0<H>  ----------------------------<  125<H>  3.2<L>   |  22.0  |  1.88<H>    Ca    8.7      04 Sep 2017 08:01  Mg     1.5     09-04          PT/INR - ( 04 Sep 2017 08:01 )   PT: 16.7 sec;   INR: 1.51 ratio             RADIOLOGY & ADDITIONAL STUDIES:    CARDIOLOGY TESTING:     ECG: AF with inferior infarct    Echocardiogram:    MEDICATIONS:  MEDICATIONS  (STANDING):  heparin  Injectable 5000 Unit(s) SubCutaneous every 12 hours  buPROPion XL . 150 milliGRAM(s) Oral daily  folic acid 1 milliGRAM(s) Oral daily  metoprolol 25 milliGRAM(s) Oral two times a day  ferrous    sulfate 325 milliGRAM(s) Oral daily  atorvastatin 10 milliGRAM(s) Oral at bedtime  ALBUTerol    0.083% 2.5 milliGRAM(s) Nebulizer every 6 hours  acetylcysteine 20% Inhalation 1 milliLiter(s) Inhalation every 6 hours  sodium chloride 0.9%. 1000 milliLiter(s) (75 mL/Hr) IV Continuous <Continuous>  finasteride 5 milliGRAM(s) Oral daily  pantoprazole    Tablet 40 milliGRAM(s) Oral before breakfast  warfarin 5 milliGRAM(s) Oral once  potassium chloride   Powder 40 milliEquivalent(s) Oral every 4 hours  magnesium sulfate  IVPB 2 Gram(s) IV Intermittent every 1 hour    MEDICATIONS  (PRN):      ASSESSMENT AND PLAN:    76 M with prior history of permanent AF on coumadin, CAD s/p CABG/AVR, HTN , Chronic renal insufficiency, CVA with R hemiparesis, Dysphagia, requiring a PEG admitted with increasing weakness and poor appetite and hyponatremia. Na levels improved and pt currently denies any complains. Telemetry noted to have very brief wide complex rhythm without significant change in axis possibly AF with aberrancy.    -  Repeat echo to assess LVEF  -  Fluid status appears stable  -  On coumadin for permanent AF

## 2017-09-04 NOTE — PHYSICAL THERAPY INITIAL EVALUATION ADULT - PASSIVE RANGE OF MOTION EXAMINATION, REHAB EVAL
Left ankle DF contracture, otherwise left LE PROM WNL, left UE WNL, right LE  decreased PROM due to contractures and stiffness

## 2017-09-04 NOTE — PROGRESS NOTE ADULT - SUBJECTIVE AND OBJECTIVE BOX
NEPHROLOGY INTERVAL HPI/OVERNIGHT EVENTS:  pt resting comfortably  no new issues    MEDICATIONS  (STANDING):  heparin  Injectable 5000 Unit(s) SubCutaneous every 12 hours  buPROPion XL . 150 milliGRAM(s) Oral daily  folic acid 1 milliGRAM(s) Oral daily  metoprolol 25 milliGRAM(s) Oral two times a day  ferrous    sulfate 325 milliGRAM(s) Oral daily  atorvastatin 10 milliGRAM(s) Oral at bedtime  ALBUTerol    0.083% 2.5 milliGRAM(s) Nebulizer every 6 hours  acetylcysteine 20% Inhalation 1 milliLiter(s) Inhalation every 6 hours  sodium chloride 0.9%. 1000 milliLiter(s) (75 mL/Hr) IV Continuous <Continuous>  finasteride 5 milliGRAM(s) Oral daily  pantoprazole    Tablet 40 milliGRAM(s) Oral before breakfast  warfarin 5 milliGRAM(s) Oral once  potassium chloride   Powder 40 milliEquivalent(s) Oral every 4 hours  magnesium sulfate  IVPB 2 Gram(s) IV Intermittent every 1 hour    MEDICATIONS  (PRN):      Allergies    penicillins (Hives)    Intolerances            Vital Signs Last 24 Hrs  T(C): 36.8 (04 Sep 2017 07:38), Max: 37.1 (03 Sep 2017 20:15)  T(F): 98.2 (04 Sep 2017 07:38), Max: 98.8 (03 Sep 2017 20:15)  HR: 81 (04 Sep 2017 07:38) (79 - 95)  BP: 102/52 (04 Sep 2017 07:38) (94/54 - 113/55)  BP(mean): --  RR: 18 (04 Sep 2017 07:38) (18 - 20)  SpO2: 99% (04 Sep 2017 07:38) (98% - 99%)    PHYSICAL EXAM:  GENERAL: appears chronically ill  HEAD:  Atraumatic, Normocephalic  NECK: Supple  NERVOUS SYSTEM:  Alert & Oriented X3  CHEST/LUNG: Clear to percussion bilaterally  HEART: Regular rate and rhythm; No murmurs  ABDOMEN: Soft, Nontender, Nondistended; Bowel sounds present  EXTREMITIES:  No edema    LABS:    09-04    133<L>  |  96<L>  |  76.0<H>  ----------------------------<  125<H>  3.2<L>   |  22.0  |  1.88<H>    Ca    8.7      04 Sep 2017 08:01  Mg     1.5     09-04    Creatinine, Serum: 2.09 mg/dL (09.02.17 @ 18:31)  Sodium, Serum: 130 mmol/L (09.02.17 @ 18:31)        PT/INR - ( 04 Sep 2017 08:01 )   PT: 16.7 sec;   INR: 1.51 ratio             Magnesium, Serum: 1.5 mg/dL (09-04 @ 08:01)      RADIOLOGY & ADDITIONAL TESTS:  < from: US Renal (09.01.17 @ 18:26) >   EXAM:  US KIDNEY(S)                          PROCEDURE DATE:  09/01/2017          INTERPRETATION:  CLINICAL HISTORY: Acute renal injury     TECHNIQUE: Sonogram of the kidneys and bladder was performed.     COMPARISON:        FINDINGS: The right kidney shows thinned echogenic renal cortex, right   kidney measuring 9.9 cm in longitudinal dimension. Multiple small   echogenic stones noted within the upper pole calyces. There is mild right   renal hydroureteronephrosis with the proximal ureter measuring 8 mm in   diameter.        The left kidney shows a thinned echogenic renal cortex, left kidney,   measuring 10.7 cm in longitudinal dimension. No left hydronephrosis, mass   or calculi is visualized. Upper pole lateral cyst measures 1.8 x 2.2 cm.    The bladder is normally distended containing sludgelike material within   the dependent the bladder. Ureteral jets not visualized.  IMPRESSION:  Asymmetric mild right hydroureteronephrosis with upper pole calyceal   stones as described.  Echogenic thin renal cortex which may indicate chronic medical renal   disease.    < end of copied text >

## 2017-09-04 NOTE — PHYSICAL THERAPY INITIAL EVALUATION ADULT - ADDITIONAL COMMENTS
Patient is very difficult to understand and no family present, therefore social history is limited.  patient reports that he lives at home with his wife, no home health assistance.  Patient reports that he is not ambulatory but his wife is able to transfer him from bed to chair and back independently, he states that he has rolling walker but does not use.  Patient reports that his wife is also able to help him with shower transfer and car transfer.  Patient has w/c, shower chair.

## 2017-09-04 NOTE — PHYSICAL THERAPY INITIAL EVALUATION ADULT - RANGE OF MOTION EXAMINATION, REHAB EVAL
Left UE ROM was WNL (within normal limits)/left LE WNL except ankle DF significantly limited due to contracture,  right LE limited throughout due to significant weakness and contractures

## 2017-09-04 NOTE — PROGRESS NOTE ADULT - SUBJECTIVE AND OBJECTIVE BOX
teresa reviewed , pt is seen examined he is awake laert speech problems due to old CVA difficult to understand hoarse voice   He is with  difficulty to swallow ,VT?  on cardiac monitor   denies chest pain or dizziness lying in the bed comfortable     Allergies    penicillins (Hives)    Intolerances        REVIEW OF SYSTEMS:   weakness, difficulty to swallow   Vital Signs Last 24 Hrs  T(C): 36.8 (04 Sep 2017 07:38), Max: 37.1 (03 Sep 2017 20:15)  T(F): 98.2 (04 Sep 2017 07:38), Max: 98.8 (03 Sep 2017 20:15)  HR: 81 (04 Sep 2017 07:38) (79 - 95)  BP: 102/52 (04 Sep 2017 07:38) (94/54 - 113/55)  BP(mean): --  RR: 18 (04 Sep 2017 07:38) (18 - 20)  SpO2: 99% (04 Sep 2017 07:38) (98% - 99%)  PHYSICAL EXAM:    GENERAL: NAD, well-groomed, well-developed  NECK: Supple, No JVD, Normal thyroid  CHEST/LUNG: Bilateral coarse breath sound , rhonchi scattered   HEART: Regular rate and rhythm; No murmurs, rubs, or gallops  ABDOMEN: Soft, Nontender, Nondistended; Bowel sounds present, ileostomy bag right lower abdomen   EXTREMITIES:  2+ Peripheral Pulses, No clubbing, cyanosis, or edema  SKIN: No rashes or lesions      LABS:  09-04    133<L>  |  96<L>  |  76.0<H>  ----------------------------<  125<H>  3.2<L>   |  22.0  |  1.88<H>    Ca    8.7      04 Sep 2017 08:01  Mg     1.5     09-04

## 2017-09-04 NOTE — PHYSICAL THERAPY INITIAL EVALUATION ADULT - PERTINENT HX OF CURRENT PROBLEM, REHAB EVAL
Patient with chronic debility due to old CVA resulting in right hemiplegia.  (+) dysphagia and PEG for supplemental feeding, presents to hospital due to decrease in swallowing ability.

## 2017-09-04 NOTE — PHYSICAL THERAPY INITIAL EVALUATION ADULT - MANUAL MUSCLE TESTING RESULTS, REHAB EVAL
left hip and knee at least 3+/5, left ankle DF 1/5,  Right LE with severe weakness 1+ to 2-/5 throughout

## 2017-09-05 DIAGNOSIS — I69.398 OTHER SEQUELAE OF CEREBRAL INFARCTION: ICD-10-CM

## 2017-09-05 DIAGNOSIS — F41.9 ANXIETY DISORDER, UNSPECIFIED: ICD-10-CM

## 2017-09-05 LAB
ANION GAP SERPL CALC-SCNC: 16 MMOL/L — SIGNIFICANT CHANGE UP (ref 5–17)
BUN SERPL-MCNC: 66 MG/DL — HIGH (ref 8–20)
CALCIUM SERPL-MCNC: 8.4 MG/DL — LOW (ref 8.6–10.2)
CHLORIDE SERPL-SCNC: 104 MMOL/L — SIGNIFICANT CHANGE UP (ref 98–107)
CO2 SERPL-SCNC: 19 MMOL/L — LOW (ref 22–29)
CREAT SERPL-MCNC: 1.83 MG/DL — HIGH (ref 0.5–1.3)
CULTURE RESULTS: SIGNIFICANT CHANGE UP
GLUCOSE SERPL-MCNC: 140 MG/DL — HIGH (ref 70–115)
MAGNESIUM SERPL-MCNC: 2.2 MG/DL — SIGNIFICANT CHANGE UP (ref 1.6–2.6)
PHOSPHATE SERPL-MCNC: 3 MG/DL — SIGNIFICANT CHANGE UP (ref 2.4–4.7)
POTASSIUM SERPL-MCNC: 3.9 MMOL/L — SIGNIFICANT CHANGE UP (ref 3.5–5.3)
POTASSIUM SERPL-SCNC: 3.9 MMOL/L — SIGNIFICANT CHANGE UP (ref 3.5–5.3)
SODIUM SERPL-SCNC: 139 MMOL/L — SIGNIFICANT CHANGE UP (ref 135–145)
SPECIMEN SOURCE: SIGNIFICANT CHANGE UP

## 2017-09-05 PROCEDURE — 99223 1ST HOSP IP/OBS HIGH 75: CPT

## 2017-09-05 PROCEDURE — 99233 SBSQ HOSP IP/OBS HIGH 50: CPT

## 2017-09-05 PROCEDURE — 71010: CPT | Mod: 26

## 2017-09-05 RX ORDER — WARFARIN SODIUM 2.5 MG/1
5 TABLET ORAL ONCE
Qty: 0 | Refills: 0 | Status: COMPLETED | OUTPATIENT
Start: 2017-09-05 | End: 2017-09-05

## 2017-09-05 RX ORDER — DRONABINOL 2.5 MG
2.5 CAPSULE ORAL
Qty: 0 | Refills: 0 | Status: DISCONTINUED | OUTPATIENT
Start: 2017-09-05 | End: 2017-09-06

## 2017-09-05 RX ADMIN — ATORVASTATIN CALCIUM 10 MILLIGRAM(S): 80 TABLET, FILM COATED ORAL at 22:24

## 2017-09-05 RX ADMIN — BUPROPION HYDROCHLORIDE 150 MILLIGRAM(S): 150 TABLET, EXTENDED RELEASE ORAL at 13:30

## 2017-09-05 RX ADMIN — PANTOPRAZOLE SODIUM 40 MILLIGRAM(S): 20 TABLET, DELAYED RELEASE ORAL at 06:15

## 2017-09-05 RX ADMIN — Medication 1 MILLIGRAM(S): at 13:30

## 2017-09-05 RX ADMIN — ALBUTEROL 2.5 MILLIGRAM(S): 90 AEROSOL, METERED ORAL at 15:25

## 2017-09-05 RX ADMIN — HEPARIN SODIUM 5000 UNIT(S): 5000 INJECTION INTRAVENOUS; SUBCUTANEOUS at 17:29

## 2017-09-05 RX ADMIN — SODIUM CHLORIDE 75 MILLILITER(S): 9 INJECTION INTRAMUSCULAR; INTRAVENOUS; SUBCUTANEOUS at 06:14

## 2017-09-05 RX ADMIN — HEPARIN SODIUM 5000 UNIT(S): 5000 INJECTION INTRAVENOUS; SUBCUTANEOUS at 06:14

## 2017-09-05 RX ADMIN — Medication 25 MILLIGRAM(S): at 17:29

## 2017-09-05 RX ADMIN — FINASTERIDE 5 MILLIGRAM(S): 5 TABLET, FILM COATED ORAL at 13:30

## 2017-09-05 RX ADMIN — Medication 325 MILLIGRAM(S): at 13:30

## 2017-09-05 RX ADMIN — WARFARIN SODIUM 5 MILLIGRAM(S): 2.5 TABLET ORAL at 22:24

## 2017-09-05 RX ADMIN — Medication 2.5 MILLIGRAM(S): at 17:32

## 2017-09-05 RX ADMIN — ALBUTEROL 2.5 MILLIGRAM(S): 90 AEROSOL, METERED ORAL at 08:35

## 2017-09-05 RX ADMIN — Medication 1 MILLILITER(S): at 08:35

## 2017-09-05 NOTE — PROGRESS NOTE ADULT - PROBLEM SELECTOR PROBLEM 3
Acute renal failure superimposed on stage 3 chronic kidney disease, unspecified acute renal failure type
Atrial fibrillation, unspecified type
Atrial fibrillation, unspecified type
History of CVA with residual deficit

## 2017-09-05 NOTE — PROGRESS NOTE ADULT - PROBLEM SELECTOR PROBLEM 6
Atrial fibrillation, unspecified type
Acute renal failure superimposed on stage 3 chronic kidney disease, unspecified acute renal failure type
Acute renal failure superimposed on stage 3 chronic kidney disease, unspecified acute renal failure type

## 2017-09-05 NOTE — CONSULT NOTE ADULT - PROBLEM SELECTOR RECOMMENDATION 9
Fu primary urologist    no need to intervene for benign finding on renal sono as the ct is less impressive    will sign off
GI work-up negative-Passing Swallowing assessments  Honey consistency, soft yogurt-no solid food

## 2017-09-05 NOTE — PROGRESS NOTE ADULT - SUBJECTIVE AND OBJECTIVE BOX
[place] - [facility]                                   [address] [place] [address]                                                      PHONE: [phone]                                                         FAX: [phone]  [email or web address]  ---------------------------------------------------------------------------------------------------------------------------------    Overnight events/patient complaints:  Denies chest pain or sob     penicillins (Hives)    MEDICATIONS  (STANDING):  heparin  Injectable 5000 Unit(s) SubCutaneous every 12 hours  buPROPion XL . 150 milliGRAM(s) Oral daily  folic acid 1 milliGRAM(s) Oral daily  metoprolol 25 milliGRAM(s) Oral two times a day  ferrous    sulfate 325 milliGRAM(s) Oral daily  atorvastatin 10 milliGRAM(s) Oral at bedtime  ALBUTerol    0.083% 2.5 milliGRAM(s) Nebulizer every 6 hours  sodium chloride 0.9%. 1000 milliLiter(s) (75 mL/Hr) IV Continuous <Continuous>  finasteride 5 milliGRAM(s) Oral daily  pantoprazole    Tablet 40 milliGRAM(s) Oral before breakfast    MEDICATIONS  (PRN):      Vital Signs Last 24 Hrs  T(C): 36.4 (05 Sep 2017 09:07), Max: 36.6 (04 Sep 2017 20:38)  T(F): 97.6 (05 Sep 2017 09:07), Max: 97.9 (04 Sep 2017 20:38)  HR: 76 (05 Sep 2017 09:07) (76 - 88)  BP: 101/58 (05 Sep 2017 09:07) (94/56 - 112/59)  BP(mean): --  RR: 20 (05 Sep 2017 09:07) (18 - 22)  SpO2: 98% (05 Sep 2017 09:07) (96% - 98%)  ICU Vital Signs Last 24 Hrs  CRISTIN ROQUE  I&O's Detail    04 Sep 2017 07:01  -  05 Sep 2017 07:00  --------------------------------------------------------  IN:    Oral Fluid: 240 mL  Total IN: 240 mL    OUT:    Ileostomy: 1350 mL    Voided: 325 mL  Total OUT: 1675 mL    Total NET: -1435 mL        I&O's Summary    04 Sep 2017 07:01  -  05 Sep 2017 07:00  --------------------------------------------------------  IN: 240 mL / OUT: 1675 mL / NET: -1435 mL      Drug Dosing Weight  CRISTIN ROQUE      PHYSICAL EXAM:  General: Appears well developed, well nourished alert and cooperative.  HEENT: Head; normocephalic, atraumatic.  Eyes: Pupils reactive, cornea wnl.  Neck: Supple, no nodes adenopathy, no NVD or carotid bruit or thyromegaly.  CARDIOVASCULAR: Normal S1 and S2, 2/6 murmur, rub, gallop or lift.   LUNGS: No rales, rhonchi or wheeze. Normal breath sounds bilaterally.  ABDOMEN: Soft, nontender without mass or organomegaly. bowel sounds normoactive.  EXTREMITIES: No clubbing, cyanosis or edema. Distal pulses wnl.   SKIN: warm and dry with normal turgor.  PSYCH: normal affect.        LABS:    09-05    139  |  104  |  66.0<H>  ----------------------------<  140<H>  3.9   |  19.0<L>  |  1.83<H>    Ca    8.4<L>      05 Sep 2017 06:57  Phos  3.0     09-05  Mg     2.2     09-05      CRISTIN ROQUE      PT/INR - ( 04 Sep 2017 08:01 )   PT: 16.7 sec;   INR: 1.51 ratio               RADIOLOGY & ADDITIONAL STUDIES:    INTERPRETATION OF TELEMETRY (personally reviewed):    ECG:    ECHO:  Conclusions:  1. Moderate posterolateral mitral regurgitation.  2. Bioprosthetic aortic valve replacement.  3. Normal left ventricular internal dimensions and wall  thicknesses.  4. Endocardium not well visualized; grossly normal left  ventricular systolic function. Flattening of the  interventricular septum in both systole and diastole is  consistent with right ventricular pressure overload. Post  operative septal motion.  5. Severe right atrial enlargement.  6. Right ventricular enlargement with decreased right  ventricular systolic function.   A device wire is noted in  the right heart.  7. Probable tricuspid repair. Severe tricuspid  regurgitation.  8. Normal pulmonic valve.  Moderate pulmonic regurgitation.  Estimated pulmonary artery systolic pressure equals 34 mm  Hg, assuming right atrial pressure equals 8  mm Hg,  consistent with normal pulmonary pressures.  ------------------------------------------------------------------------  Confirmed on  12/12/2015 - 15:58:00 by Torin Cheema M.D.        ASSESSMENT AND PLAN:  In summary, CRISTIN ROQUE is an 76y Male with past medical history significant for permanent AF on coumadin, CAD s/p CABG/AVR, HTN , Chronic renal insufficiency, CVA with R hemiparesis, Dysphagia, requiring a PEG admitted with increasing weakness and poor appetite and hyponatremia AKPIL on CKD improving with IVF; no evidence of heart failure Telemetry noted to have very brief wide complex rhythm without significant change in axis possibly AF with aberrancy. no event over night       Plan  1.  Awaiting ECHO to re-evaluate EF Bio AVR  2.  Continue current CV medications   3.  Agree with IVF and renal follow up   4.  INR goal 2 to 3   5.  Continue TELE

## 2017-09-05 NOTE — CONSULT NOTE ADULT - SUBJECTIVE AND OBJECTIVE BOX
HPI:This is a debilitates 76yoM S/P CVA wioth right hemiaparesis since 12/2015.with increasing difficulty swallowing, speaking, managing his secretions-  He is dysarthric very soft low voice difficult to understand. Cries easily as per wife at bedside who has been his sole caregiver.  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  Now presents with worsening weakness and inability to swallow. He had been eating up until a month ago, with supplemental nutrition through his PEG as needed. He also had been on 'Salt" tablets according to the pt's wife, however he had not been taking them in the last few weeks because their pharmacy was out of them.  IN the ER he was found t have a Sodium of 116.  His Head CT showed no new neurologic event. HIs BP and HR were stable. (01 Sep 2017 13:06)      PERTINENT PMH REVIEWED: Yes                                  PAST MEDICAL & SURGICAL HISTORY:  CAD (coronary artery disease)  Afib  CVA (cerebral vascular accident)  Mitral valve replaced  BPH (benign prostatic hyperplasia)  High cholesterol  HTN (hypertension)  H/O heart valve replacement with mechanical valve  S/P CABG x 1  H/O colectomy      SOCIAL HISTORY:  EtOH    No                                    Drugs    No                                      nonsmoker                                    Admitted from: home Wife Sissy Salomon [phone]    FAMILY HISTORY:  No pertinent family history in first degree relatives      Allergies    penicillins (Hives)    Baseline ADLs (prior to admission):  Dependent      Present Symptoms:   Dyspnea: 0    Nausea/Vomiting:  No  Anxiety:  Yes  Depression: Yes   Fatigue: Yes  Loss of appetite: Yes    Pain: Denies            Character-            Duration-            Effect-            Factors-            Frequency-            Location-            Severity-    Review of Systems: Reviewed                      All others negative    MEDICATIONS  (STANDING):  heparin  Injectable 5000 Unit(s) SubCutaneous every 12 hours  buPROPion XL . 150 milliGRAM(s) Oral daily  folic acid 1 milliGRAM(s) Oral daily  metoprolol 25 milliGRAM(s) Oral two times a day  ferrous    sulfate 325 milliGRAM(s) Oral daily  atorvastatin 10 milliGRAM(s) Oral at bedtime  ALBUTerol    0.083% 2.5 milliGRAM(s) Nebulizer every 6 hours  finasteride 5 milliGRAM(s) Oral daily  pantoprazole    Tablet 40 milliGRAM(s) Oral before breakfast  warfarin 5 milliGRAM(s) Oral once  dronabinol 2.5 milliGRAM(s) Oral two times a day  levoFLOXacin  Tablet 250 milliGRAM(s) Oral every 24 hours    MEDICATIONS  (PRN):      PHYSICAL EXAM:    Vital Signs Last 24 Hrs  T(C): 36.4 (05 Sep 2017 09:07), Max: 36.6 (04 Sep 2017 20:38)  T(F): 97.6 (05 Sep 2017 09:07), Max: 97.9 (04 Sep 2017 20:38)  HR: 76 (05 Sep 2017 09:07) (76 - 88)  BP: 101/58 (05 Sep 2017 09:07) (94/56 - 112/59)  BP(mean): --  RR: 20 (05 Sep 2017 09:07) (18 - 22)  SpO2: 98% (05 Sep 2017 09:07) (96% - 98%)    General: alert  oriented x 2____ anxious                  cachexia  verbal  but difficult to understand    HEENT:  dry mouth     Lungs:   excessive secretions    CV: normal      GI: softly  distended   Ileostomy                PEG tube  constipation  last BM:no     :  puckett    MSK:  weakness              bedbound/wheelchair bound    Skin: normal  pressure - no rash    LABS:    09-05    139  |  104  |  66.0<H>  ----------------------------<  140<H>  3.9   |  19.0<L>  |  1.83<H>    Ca    8.4<L>      05 Sep 2017 06:57  Phos  3.0     09-05  Mg     2.2     09-05      PT/INR - ( 04 Sep 2017 08:01 )   PT: 16.7 sec;   INR: 1.51 ratio             I&O's Summary    04 Sep 2017 07:01  -  05 Sep 2017 07:00  --------------------------------------------------------  IN: 240 mL / OUT: 1675 mL / NET: -1435 mL        RADIOLOGY & ADDITIONAL STUDIES:    ADVANCE DIRECTIVES:   DNR  NO  Completed on:                     MOLST   NO   Completed on:  Living Will   NO   Completed on: HPI:This is a debilitated 75yo M S/P CVA with right hemiaparesis since 12/2015. He has been having  increasing difficulty swallowing, and speaking-voice weak, gravelly-Pharyngeal secretions had been probelmatic.-  He is dysarthric very soft low voice difficult to understand. Cries easily as per wife at bedside who has been his sole caregiver. Refuses sold food. Is now receiving continuous peg tube feeding at this time.  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  Now presents with worsening weakness and inability to swallow. He had been eating up until a month ago, with supplemental nutrition through his PEG as needed. He also had been on 'Salt" tablets according to the pt's wife, however he had not been taking them in the last few weeks because their pharmacy was out of them.  IN the ER he was found t have a Sodium of 116.  His Head CT showed no new neurologic event. HIs BP and HR were stable. (01 Sep 2017 13:06)      PERTINENT PMH REVIEWED: Yes                                  PAST MEDICAL & SURGICAL HISTORY:  CAD (coronary artery disease)  Afib  CVA (cerebral vascular accident)  Mitral valve replaced  BPH (benign prostatic hyperplasia)  High cholesterol  HTN (hypertension)  H/O heart valve replacement with mechanical valve  S/P CABG x 1  H/O colectomy      SOCIAL HISTORY:  EtOH    No                                    Drugs    No                                      nonsmoker                                    Admitted from: home Wife Sissy Salomon [phone]    FAMILY HISTORY:  No pertinent family history in first degree relatives    Allergies  penicillins (Hives)    Baseline ADLs (prior to admission):  Dependent      Present Symptoms:   Dyspnea: 0    Nausea/Vomiting:  No  Anxiety:  Yes  Depression: Yes   Fatigue: Yes  Loss of appetite: Yes    Pain: Denies            Character-            Duration-            Effect-            Factors-            Frequency-            Location-            Severity-    Review of Systems: Reviewed                      All others negative    MEDICATIONS  (STANDING):  heparin  Injectable 5000 Unit(s) SubCutaneous every 12 hours  buPROPion XL . 150 milliGRAM(s) Oral daily  folic acid 1 milliGRAM(s) Oral daily  metoprolol 25 milliGRAM(s) Oral two times a day  ferrous    sulfate 325 milliGRAM(s) Oral daily  atorvastatin 10 milliGRAM(s) Oral at bedtime  ALBUTerol    0.083% 2.5 milliGRAM(s) Nebulizer every 6 hours  finasteride 5 milliGRAM(s) Oral daily  pantoprazole    Tablet 40 milliGRAM(s) Oral before breakfast  warfarin 5 milliGRAM(s) Oral once  dronabinol 2.5 milliGRAM(s) Oral two times a day  levoFLOXacin  Tablet 250 milliGRAM(s) Oral every 24 hours    MEDICATIONS  (PRN):      PHYSICAL EXAM:    Vital Signs Last 24 Hrs  T(C): 36.4 (05 Sep 2017 09:07), Max: 36.6 (04 Sep 2017 20:38)  T(F): 97.6 (05 Sep 2017 09:07), Max: 97.9 (04 Sep 2017 20:38)  HR: 76 (05 Sep 2017 09:07) (76 - 88)  BP: 101/58 (05 Sep 2017 09:07) (94/56 - 112/59)  BP(mean): --  RR: 20 (05 Sep 2017 09:07) (18 - 22)  SpO2: 98% (05 Sep 2017 09:07) (96% - 98%)    General: alert  oriented x 2____ anxious                  cachexia  verbal  but difficult to understand    HEENT:  dry mouth     Lungs:   excessive secretions    CV: normal      GI: softly  distended   Ileostomy                PEG tube  constipation  last BM:?    :  lavern    MSK:  weakness              bedbound/wheelchair bound    Skin: - no rash    LABS:    09-05    139  |  104  |  66.0<H>  ----------------------------<  140<H>  3.9   |  19.0<L>  |  1.83<H>    Ca    8.4<L>      05 Sep 2017 06:57  Phos  3.0     09-05  Mg     2.2     09-05      PT/INR - ( 04 Sep 2017 08:01 )   PT: 16.7 sec;   INR: 1.51 ratio       I&O's Summary    04 Sep 2017 07:01  -  05 Sep 2017 07:00  --------------------------------------------------------  IN: 240 mL / OUT: 1675 mL / NET: -1435 mL      RADIOLOGY & ADDITIONAL STUDIES:    ADVANCE DIRECTIVES:   DNR  NO  Completed on:                     CRISTIN   NO   Completed on:  Introduced CRISTIN to his wife at the bedside. Will complete together tomorrow  Living Will   NO   Completed on:

## 2017-09-05 NOTE — PROGRESS NOTE ADULT - PROBLEM SELECTOR PLAN 6
improving continue to monitor daily lab  renal hydroureteronephrosis Urology consult apprecaited follow up outpatient with his urologist BPH and h/o prostate cancer
improving continue to monitor daily lab  renal hydroureteronephrosis Urology consult apprecaited follow up outpatient with his urologist BPH and h/o prostate cancer
rate mary, continue warfrain , pt /INR in am

## 2017-09-05 NOTE — PROGRESS NOTE ADULT - PROBLEM SELECTOR PLAN 2
I had discussed with Dr Honeycutt , GI who had seen and evaluated pt in the office  had MBS, CT scan of abdomen pelvis no pathology   no further Gi work up needed per GI , records from his office in the chart reviewed
controlled

## 2017-09-05 NOTE — CONSULT NOTE ADULT - ASSESSMENT
77yo M S/P CVA with R Hemiparesis  Significant decline past two months, having difficulty swallowing-GI workup normal  Dysarthria  Excessive secretions  Anxiety/Depression  Failure to Thrive                        PLAN  MRI Brain to R/O  new infarct  BRIDGET Momentum to maintain strength and improve mobility

## 2017-09-05 NOTE — PROGRESS NOTE ADULT - PROBLEM SELECTOR PROBLEM 4
History of CVA with residual deficit
Hypokalemia
Hypokalemia
Acute renal failure superimposed on stage 3 chronic kidney disease, unspecified acute renal failure type

## 2017-09-05 NOTE — PROGRESS NOTE ADULT - SUBJECTIVE AND OBJECTIVE BOX
hrt reviewed , pt is seen examined he is awake alert feels well , denies any complaints or pain   cardiology and renal   follow  up appreciated    no overnight events reported      Allergies    penicillins (Hives)    Intolerances        REVIEW OF SYSTEMS:   weakness, difficulty to swallow     Vital Signs Last 24 Hrs  T(C): 36.4 (05 Sep 2017 09:07), Max: 36.6 (04 Sep 2017 20:38)  T(F): 97.6 (05 Sep 2017 09:07), Max: 97.9 (04 Sep 2017 20:38)  HR: 76 (05 Sep 2017 09:07) (76 - 88)  BP: 101/58 (05 Sep 2017 09:07) (94/56 - 112/59)  BP(mean): --  RR: 20 (05 Sep 2017 09:07) (18 - 22)  SpO2: 98% (05 Sep 2017 09:07) (96% - 98%)  GENERAL: NAD, well-groomed, well-developed  CHEST/LUNG: clear to auscultation anteriorly , no wheezing   HEART: Regular rate and rhythm; No murmurs, rubs, or gallops  ABDOMEN: Soft, Nontender, Nondistended; Bowel sounds present, ileostomy bag right lower abdomen   EXTREMITIES:  2+ Peripheral Pulses, No clubbing, cyanosis, or edema  SKIN: No rashes or lesions      09-05    139  |  104  |  66.0<H>  ----------------------------<  140<H>  3.9   |  19.0<L>  |  1.83<H>    Ca    8.4<L>      05 Sep 2017 06:57  Phos  3.0     09-05  Mg     2.2     09-05

## 2017-09-05 NOTE — PROGRESS NOTE ADULT - SUBJECTIVE AND OBJECTIVE BOX
NEPHROLOGY INTERVAL HPI/OVERNIGHT EVENTS:  pt clinically stable  tolerating tube feeds   no distress    MEDICATIONS  (STANDING):  heparin  Injectable 5000 Unit(s) SubCutaneous every 12 hours  buPROPion XL . 150 milliGRAM(s) Oral daily  folic acid 1 milliGRAM(s) Oral daily  metoprolol 25 milliGRAM(s) Oral two times a day  ferrous    sulfate 325 milliGRAM(s) Oral daily  atorvastatin 10 milliGRAM(s) Oral at bedtime  ALBUTerol    0.083% 2.5 milliGRAM(s) Nebulizer every 6 hours  sodium chloride 0.9%. 1000 milliLiter(s) (75 mL/Hr) IV Continuous <Continuous>  finasteride 5 milliGRAM(s) Oral daily  pantoprazole    Tablet 40 milliGRAM(s) Oral before breakfast    MEDICATIONS  (PRN):      Allergies    penicillins (Hives)    Intolerances        Vital Signs Last 24 Hrs  T(C): 36.4 (05 Sep 2017 09:07), Max: 36.6 (04 Sep 2017 20:38)  T(F): 97.6 (05 Sep 2017 09:07), Max: 97.9 (04 Sep 2017 20:38)  HR: 76 (05 Sep 2017 09:07) (76 - 88)  BP: 101/58 (05 Sep 2017 09:07) (94/56 - 112/59)  BP(mean): --  RR: 20 (05 Sep 2017 09:07) (18 - 22)  SpO2: 98% (05 Sep 2017 09:07) (96% - 98%)    PHYSICAL EXAM:  GENERAL: appears chronically ill  HEAD:  Atraumatic, Normocephalic  NECK: Supple  NERVOUS SYSTEM:  Alert & Oriented X3  CHEST/LUNG: Clear to percussion bilaterally  HEART: Regular rate and rhythm; No murmurs  ABDOMEN: Soft, Nontender, Nondistended; Bowel sounds present  EXTREMITIES:  No edema    LABS:    09-05    139  |  104  |  66.0<H>  ----------------------------<  140<H>  3.9   |  19.0<L>  |  1.83<H>    Ca    8.4<L>      05 Sep 2017 06:57  Phos  3.0     09-05  Mg     2.2     09-05          PT/INR - ( 04 Sep 2017 08:01 )   PT: 16.7 sec;   INR: 1.51 ratio             Phosphorus Level, Serum: 3.0 mg/dL (09-05 @ 06:57)  Magnesium, Serum: 2.2 mg/dL (09-05 @ 06:57)      RADIOLOGY & ADDITIONAL TESTS:

## 2017-09-05 NOTE — CONSULT NOTE ADULT - ATTENDING COMMENTS
COUNSELING:    Face to face meeting to discuss Advanced Care Planning - Time Spent ______ Minutes.  See goals of care note.    More than 50% time spent in counseling and coordinating care. __45____ Minutes.     Thank you for the opportunity to assist with the care of this patient.   [facility] [phone].

## 2017-09-05 NOTE — PROGRESS NOTE ADULT - PROBLEM SELECTOR PLAN 3
improving continue to monitor daily lab  renal hydroureteronephrosis Urology consult called
has VT on monitor , continue B blocker   cardiology consult to evaluate replete K and mag   continue warfrain , pt /INR in am
has wide complex arythmia on monitor cardiac input appreciated   ] on monitor , continue B blocker , ECHO pending   replete electrolytes   continue warfrain , pt /INR in am

## 2017-09-05 NOTE — PROGRESS NOTE ADULT - PROBLEM SELECTOR PLAN 1
continue iv fluid, improving slowly, add free water via peg
continue iv fluid, improved
continue iv fluid, improving slowly, add free water via peg
continue iv fluid, renal consult

## 2017-09-06 ENCOUNTER — TRANSCRIPTION ENCOUNTER (OUTPATIENT)
Age: 77
End: 2017-09-06

## 2017-09-06 VITALS
DIASTOLIC BLOOD PRESSURE: 59 MMHG | RESPIRATION RATE: 20 BRPM | HEART RATE: 94 BPM | TEMPERATURE: 98 F | SYSTOLIC BLOOD PRESSURE: 103 MMHG

## 2017-09-06 LAB
ANION GAP SERPL CALC-SCNC: 13 MMOL/L — SIGNIFICANT CHANGE UP (ref 5–17)
BUN SERPL-MCNC: 65 MG/DL — HIGH (ref 8–20)
CALCIUM SERPL-MCNC: 9 MG/DL — SIGNIFICANT CHANGE UP (ref 8.6–10.2)
CHLORIDE SERPL-SCNC: 109 MMOL/L — HIGH (ref 98–107)
CO2 SERPL-SCNC: 21 MMOL/L — LOW (ref 22–29)
CREAT SERPL-MCNC: 1.77 MG/DL — HIGH (ref 0.5–1.3)
GLUCOSE SERPL-MCNC: 120 MG/DL — HIGH (ref 70–115)
HCT VFR BLD CALC: 28.6 % — LOW (ref 42–52)
HGB BLD-MCNC: 9.3 G/DL — LOW (ref 14–18)
INR BLD: 1.67 RATIO — HIGH (ref 0.88–1.16)
MCHC RBC-ENTMCNC: 32.3 PG — HIGH (ref 27–31)
MCHC RBC-ENTMCNC: 32.5 G/DL — SIGNIFICANT CHANGE UP (ref 32–36)
MCV RBC AUTO: 99.3 FL — HIGH (ref 80–94)
PLATELET # BLD AUTO: 150 K/UL — SIGNIFICANT CHANGE UP (ref 150–400)
POTASSIUM SERPL-MCNC: 4.1 MMOL/L — SIGNIFICANT CHANGE UP (ref 3.5–5.3)
POTASSIUM SERPL-SCNC: 4.1 MMOL/L — SIGNIFICANT CHANGE UP (ref 3.5–5.3)
PROTHROM AB SERPL-ACNC: 18.6 SEC — HIGH (ref 9.8–12.7)
RBC # BLD: 2.88 M/UL — LOW (ref 4.6–6.2)
RBC # FLD: 14.9 % — SIGNIFICANT CHANGE UP (ref 11–15.6)
SODIUM SERPL-SCNC: 143 MMOL/L — SIGNIFICANT CHANGE UP (ref 135–145)
WBC # BLD: 6 K/UL — SIGNIFICANT CHANGE UP (ref 4.8–10.8)
WBC # FLD AUTO: 6 K/UL — SIGNIFICANT CHANGE UP (ref 4.8–10.8)

## 2017-09-06 PROCEDURE — 99239 HOSP IP/OBS DSCHRG MGMT >30: CPT

## 2017-09-06 PROCEDURE — 70551 MRI BRAIN STEM W/O DYE: CPT | Mod: 26

## 2017-09-06 RX ORDER — WARFARIN SODIUM 2.5 MG/1
6 TABLET ORAL ONCE
Qty: 0 | Refills: 0 | Status: DISCONTINUED | OUTPATIENT
Start: 2017-09-06 | End: 2017-09-06

## 2017-09-06 RX ORDER — WARFARIN SODIUM 2.5 MG/1
5 TABLET ORAL ONCE
Qty: 0 | Refills: 0 | Status: DISCONTINUED | OUTPATIENT
Start: 2017-09-06 | End: 2017-09-06

## 2017-09-06 RX ORDER — ATORVASTATIN CALCIUM 80 MG/1
1 TABLET, FILM COATED ORAL
Qty: 0 | Refills: 0 | COMMUNITY

## 2017-09-06 RX ORDER — BUPROPION HYDROCHLORIDE 150 MG/1
1 TABLET, EXTENDED RELEASE ORAL
Qty: 0 | Refills: 0 | COMMUNITY

## 2017-09-06 RX ORDER — MAGNESIUM CARBONATE 54 MG/5 ML
0 LIQUID (ML) ORAL
Qty: 0 | Refills: 0 | COMMUNITY

## 2017-09-06 RX ORDER — DRONABINOL 2.5 MG
1 CAPSULE ORAL
Qty: 0 | Refills: 0 | COMMUNITY
Start: 2017-09-06

## 2017-09-06 RX ORDER — ALBUTEROL 90 UG/1
0 AEROSOL, METERED ORAL
Qty: 0 | Refills: 0 | COMMUNITY
Start: 2017-09-06

## 2017-09-06 RX ORDER — ERYTHROPOIETIN 10000 [IU]/ML
10000 INJECTION, SOLUTION INTRAVENOUS; SUBCUTANEOUS ONCE
Qty: 0 | Refills: 0 | Status: COMPLETED | OUTPATIENT
Start: 2017-09-06 | End: 2017-09-06

## 2017-09-06 RX ORDER — UBIDECARENONE 100 MG
1 CAPSULE ORAL
Qty: 0 | Refills: 0 | COMMUNITY

## 2017-09-06 RX ORDER — PANTOPRAZOLE SODIUM 20 MG/1
1 TABLET, DELAYED RELEASE ORAL
Qty: 0 | Refills: 0 | COMMUNITY
Start: 2017-09-06

## 2017-09-06 RX ADMIN — Medication 2.5 MILLIGRAM(S): at 18:19

## 2017-09-06 RX ADMIN — Medication 1 MILLIGRAM(S): at 12:47

## 2017-09-06 RX ADMIN — Medication 2.5 MILLIGRAM(S): at 05:47

## 2017-09-06 RX ADMIN — ALBUTEROL 2.5 MILLIGRAM(S): 90 AEROSOL, METERED ORAL at 14:13

## 2017-09-06 RX ADMIN — FINASTERIDE 5 MILLIGRAM(S): 5 TABLET, FILM COATED ORAL at 12:47

## 2017-09-06 RX ADMIN — Medication 25 MILLIGRAM(S): at 05:43

## 2017-09-06 RX ADMIN — PANTOPRAZOLE SODIUM 40 MILLIGRAM(S): 20 TABLET, DELAYED RELEASE ORAL at 06:02

## 2017-09-06 RX ADMIN — Medication 325 MILLIGRAM(S): at 12:47

## 2017-09-06 RX ADMIN — HEPARIN SODIUM 5000 UNIT(S): 5000 INJECTION INTRAVENOUS; SUBCUTANEOUS at 05:43

## 2017-09-06 RX ADMIN — ALBUTEROL 2.5 MILLIGRAM(S): 90 AEROSOL, METERED ORAL at 08:27

## 2017-09-06 RX ADMIN — ERYTHROPOIETIN 10000 UNIT(S): 10000 INJECTION, SOLUTION INTRAVENOUS; SUBCUTANEOUS at 18:23

## 2017-09-06 RX ADMIN — BUPROPION HYDROCHLORIDE 150 MILLIGRAM(S): 150 TABLET, EXTENDED RELEASE ORAL at 12:47

## 2017-09-06 NOTE — PROGRESS NOTE ADULT - ASSESSMENT
76M with and extensive history including afib on coumadin, CABG/AVR, HTN , Chronic renal insufficiency, CVA over a year ago with R hemiparesis, Dysphagia, requiring a PEG being treated with hyponatremia, acute on chronic renal failure and failure to thrive    Renal : Hyponatremia likely hypovolemic hyponatremia, responded to NS              Goal for first 24 hr is rise of 8-10meq              checking morning labs and may need to add free water to keep the target              renal failure improving, re[plete electrolytes  CVS; On warfarin for A-fib, monitor closely as patient on antibiotic for UTI           hemodynamically stable on anti-hypertensives  ID: On levofloxacin for UTI  GI: on tube feeds  Neuro: stable  Metabolic: stable  Dispo: will transfer out of ICU once rate of increase in Sodium meets target  DVT prophylaxis: on coumadin
1. elderly male with prior cva and failure to thrive  2. chronic afib-ac'd-current inr subtheraputic  3. cad cabg hx of avr  4. cri  5. right hemiplegia
75 yo male with h/o CVA right sided residual/ dysarthria, HTN admitted for hyponatremia, dysphagia, dehydration and renal failure   Renal sonogarm with right hyroureteonephrosis, renal stone , VT on cardiac monitor , due to dysphagia poor oral intake on continuos  tube feeding now
77 yo male with h/o CVA right sided residual/ dysarthria, HTN admitted for hyponatremia, dysphagia, dehydration and renal failure   Renal sonogarm with right hyroureteonephrosis, renal stone , VT on cardiac monitor , due to dysphagia poor oral intake on continuos  tube feeding now . hyponatremia resolved , hypokalemia and  hypomagnesemia repleted
CRF: suggested by renal sonogram  ARF improved  R mild hydro  - monitor labs  - urology noted==> no further w/u    Hyponatremia: resolved    Anemia - Continue epogen
CRF: suggested by renal sonogram  ARF improved  R mild hydro  - monitor labs  - urology noted==> no further w/u    Hyponatremia: resolved  -  d/c IVF
CRF: suggested by renal sonogram  ARF improved  R mild hydro==> ? obstruction from stones  - supplement K+ and Mg+  - monitor labs  - consider urologic eval    Hyponatremia: hypovolemic  - cont IV NS
CRF: suggested by renal sonogram  R mild hydro==> ? obstruction  - continue IVF and puckett catheter  - monitor labs  - may need urologic eval    Hyponatremia: hypovolemic  - cont IV NS as sodium improved steadily
CRF: suggested by renal sonogram  R mild hydro==> ? obstruction fromstones  - monitor labs  - consider urologic eval    Hyponatremia: hypovolemic  - cont IV NS as sodium improved steadily
77 yo male with h/o CVA right sided residual/ dysarthria, HTn admitted for hyponatremia, dysphagia, dehydration and renal failure   Renal sonogarm with right hyroureteonephrosis, renal stone

## 2017-09-06 NOTE — PROGRESS NOTE ADULT - SUBJECTIVE AND OBJECTIVE BOX
Chief Complaint: chart & course reviewed.    HPI: 77 yo M with multiple chronic and significant comorbidities including cad/cabg AVR/afib/cva/colostomy peg    PAST MEDICAL & SURGICAL HISTORY:  CAD (coronary artery disease)  Afib  CVA (cerebral vascular accident)  Mitral valve replaced  BPH (benign prostatic hyperplasia)  High cholesterol  HTN (hypertension)  H/O heart valve replacement with mechanical valve  S/P CABG x 1  H/O colectomy      PREVIOUS DIAGNOSTIC TESTING:      ECHO  FINDINGS:    STRESS  FINDINGS:    CATHETERIZATION  FINDINGS:    MEDICATIONS  (STANDING):  heparin  Injectable 5000 Unit(s) SubCutaneous every 12 hours  buPROPion XL . 150 milliGRAM(s) Oral daily  folic acid 1 milliGRAM(s) Oral daily  metoprolol 25 milliGRAM(s) Oral two times a day  ferrous    sulfate 325 milliGRAM(s) Oral daily  atorvastatin 10 milliGRAM(s) Oral at bedtime  ALBUTerol    0.083% 2.5 milliGRAM(s) Nebulizer every 6 hours  finasteride 5 milliGRAM(s) Oral daily  pantoprazole    Tablet 40 milliGRAM(s) Oral before breakfast  dronabinol 2.5 milliGRAM(s) Oral two times a day  levoFLOXacin  Tablet 250 milliGRAM(s) Oral every 24 hours    MEDICATIONS  (PRN):      FAMILY HISTORY:  No pertinent family history in first degree relatives      ROS: Negative other than as mentioned in HPI.    Vital Signs Last 24 Hrs  T(C): 36.8 (06 Sep 2017 06:08), Max: 36.8 (06 Sep 2017 06:08)  T(F): 98.2 (06 Sep 2017 06:08), Max: 98.2 (06 Sep 2017 06:08)  HR: 90 (06 Sep 2017 06:08) (90 - 96)  BP: 112/57 (06 Sep 2017 06:08) (110/59 - 112/57)  BP(mean): --  RR: 14 flat  SpO2: 95% (06 Sep 2017 08:28) (95% - 95%)    PHYSICAL EXAM:  General: elderly chronically ill appearing male nad  HEENT: Head; normocephalic, atraumatic.  Eyes;   Pupils reactive, cornea wnl.  Neck; Supple, no nodes adenopathy, no NVD or carotid bruit or thyromegaly.  CARDIOVASCULAR; irreg rhythm w/p murmur rub or gallop  LUNGS; good BS bilat  ABDOMEN ; colostomy and peg  EXTREMITIES; No clubbing, cyanosis or edema.   SKIN; warm and dry with normal turgor.  NEURO; Alert with right hemiplegia   PSYCH; can't assess            INTERPRETATION OF TELEMETRY:    ECG: afib    I&O's Detail    05 Sep 2017 07:01  -  06 Sep 2017 07:00  --------------------------------------------------------  IN:    Oral Fluid: 240 mL  Total IN: 240 mL    OUT:    Ileostomy: 575 mL    Voided: 300 mL  Total OUT: 875 mL    Total NET: -635 mL          LABS:                        9.3    6.0   )-----------( 150      ( 06 Sep 2017 05:46 )             28.6     09-06    143  |  109<H>  |  65.0<H>  ----------------------------<  120<H>  4.1   |  21.0<L>  |  1.77<H>    Ca    9.0      06 Sep 2017 05:46  Phos  3.0     09-05  Mg     2.2     09-05          PT/INR - ( 06 Sep 2017 05:46 )   PT: 18.6 sec;   INR: 1.67 ratio             I&O's Summary    05 Sep 2017 07:01  -  06 Sep 2017 07:00  --------------------------------------------------------  IN: 240 mL / OUT: 875 mL / NET: -635 mL        RADIOLOGY & ADDITIONAL STUDIES: Chief Complaint: chart & course reviewed.    HPI: 77 yo M with multiple chronic and significant comorbidities including cad/cabg AVR/afib/cva/colostomy peg    PAST MEDICAL & SURGICAL HISTORY:  CAD (coronary artery disease)  Afib  CVA (cerebral vascular accident)  Mitral valve replaced  BPH (benign prostatic hyperplasia)  High cholesterol  HTN (hypertension)  H/O heart valve replacement with mechanical valve  S/P CABG x 1  H/O colectomy      PREVIOUS DIAGNOSTIC TESTING:      ECHO  FINDINGS:    STRESS  FINDINGS:    CATHETERIZATION  FINDINGS:    MEDICATIONS  (STANDING):  heparin  Injectable 5000 Unit(s) SubCutaneous every 12 hours  buPROPion XL . 150 milliGRAM(s) Oral daily  folic acid 1 milliGRAM(s) Oral daily  metoprolol 25 milliGRAM(s) Oral two times a day  ferrous    sulfate 325 milliGRAM(s) Oral daily  atorvastatin 10 milliGRAM(s) Oral at bedtime  ALBUTerol    0.083% 2.5 milliGRAM(s) Nebulizer every 6 hours  finasteride 5 milliGRAM(s) Oral daily  pantoprazole    Tablet 40 milliGRAM(s) Oral before breakfast  dronabinol 2.5 milliGRAM(s) Oral two times a day  levoFLOXacin  Tablet 250 milliGRAM(s) Oral every 24 hours    MEDICATIONS  (PRN):      FAMILY HISTORY:  No pertinent family history in first degree relatives      ROS: Negative other than as mentioned in HPI.    Vital Signs Last 24 Hrs  T(C): 36.8 (06 Sep 2017 06:08), Max: 36.8 (06 Sep 2017 06:08)  T(F): 98.2 (06 Sep 2017 06:08), Max: 98.2 (06 Sep 2017 06:08)  HR: 90 (06 Sep 2017 06:08) (90 - 96)  BP: 112/57 (06 Sep 2017 06:08) (110/59 - 112/57)  BP(mean): --  RR: 14 flat  SpO2: 95% (06 Sep 2017 08:28) (95% - 95%)    PHYSICAL EXAM:  General: elderly chronically ill appearing male nad  HEENT: Head; normocephalic, atraumatic.  Eyes;   Pupils reactive, cornea wnl.  Neck; Supple, no nodes adenopathy, no NVD or carotid bruit or thyromegaly.  CARDIOVASCULAR; irreg rhythm w/p murmur rub or gallop  LUNGS; good BS bilat  ABDOMEN ; colostomy and peg  EXTREMITIES; No clubbing, cyanosis or edema.   SKIN; warm and dry with normal turgor.  NEURO; Alert with right hemiplegia   PSYCH; can't assess            INTERPRETATION OF TELEMETRY:    ECG: afib old iwmi  MRI old cva  I&O's Detail    05 Sep 2017 07:01  -  06 Sep 2017 07:00  --------------------------------------------------------  IN:    Oral Fluid: 240 mL  Total IN: 240 mL    OUT:    Ileostomy: 575 mL    Voided: 300 mL  Total OUT: 875 mL    Total NET: -635 mL          LABS:                        9.3    6.0   )-----------( 150      ( 06 Sep 2017 05:46 )             28.6     09-06    143  |  109<H>  |  65.0<H>  ----------------------------<  120<H>  4.1   |  21.0<L>  |  1.77<H>    Ca    9.0      06 Sep 2017 05:46  Phos  3.0     09-05  Mg     2.2     09-05          PT/INR - ( 06 Sep 2017 05:46 )   PT: 18.6 sec;   INR: 1.67 ratio             I&O's Summary    05 Sep 2017 07:01  -  06 Sep 2017 07:00  --------------------------------------------------------  IN: 240 mL / OUT: 875 mL / NET: -635 mL        RADIOLOGY & ADDITIONAL STUDIES:

## 2017-09-06 NOTE — DISCHARGE NOTE ADULT - CARE PROVIDERS DIRECT ADDRESSES
,[email or web address],[email or web address]_Unknown

## 2017-09-06 NOTE — GOALS OF CARE CONVERSATION - PERSONAL ADVANCE DIRECTIVE - CONVERSATION DETAILS
Mtg with ;Pt and wife to review Plan of Care.  Pt is alert and oriented awaiting transfer to [facility] today.  Pt has passed swallow eval but is hesitant to eat PO.  Wife verbalized that she will consider talking to her  about a MOLST but not today it [birth year] his [age].  Wife is hoping that [facility] will assist Pt in becoming stronger prior to his coming home.

## 2017-09-06 NOTE — DISCHARGE NOTE ADULT - HOSPITAL COURSE
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  Now presents with worsening weakness and inability to swallow. He had been eating up until a month ago, with supplemental nutrition through his PEG as needed. He also had been on 'Salt" tablets according to the pt's wife, however he had not been taking them in the last few weeks because their pharmacy was out of them.  IN the ER he was found t have a Sodium of 116.  His Head CT showed no new neurologic event. HIs BP and HR were stable. He had received  iv fluid , na level improving, seen by speech therapist continue same diet , however pt had difficulty to eat swallow , started  on peg feed 24 hours . Discussed with Dr Jones re dysphagia apparently he had extensive evaluation MBS, CT scan of abdomen / pelvis as  outpatient recently no findings and no further work up needed  Patient's sodium level improving , potassium and magnesium  repleted.  He developed wide complex arrythmia on the monitor cardiology consult called likely a fib with abberancy, ECHO ordered.   Due to worsening dyphagia increased weakness recently over the last few weeks wife request work up to r/o CVA , MRI of brain performed 9/06 :   Chronic hemorrhages are noted involving the left basal ganglia, corona   radiata, and left temporal lobe, best appreciated on the GRE series.    < end of copied text >  < from: MRI Head w/o Cont (09.06.17 @ 09:31) >  There is no evidence for recent acute infarct on the diffusion-weighted   series. There is no evidence for acute hemorrhage.  Pt 's INR on admission subthereupetic daily coumadin INR monitoring done, INR today 1.66 , need daily INR till > 2, adjust coumadin order.   Echo

## 2017-09-06 NOTE — DISCHARGE NOTE ADULT - MEDICATION SUMMARY - MEDICATIONS TO STOP TAKING
I will STOP taking the medications listed below when I get home from the hospital:    sodium bicarbonate 650 mg oral tablet  -- 1 tab(s) by gastrostomy tube 3 times a day    Centrum Silver Therapeutic Multiple Vitamins with Minerals oral tablet  -- 1 tab(s) by gastrostomy tube once a day    magnesium carbonate 250 mg oral capsule  --  by mouth

## 2017-09-06 NOTE — PROGRESS NOTE ADULT - PROVIDER SPECIALTY LIST ADULT
Cardiology
Cardiology
Hospitalist
Nephrology
Critical Care

## 2017-09-06 NOTE — DISCHARGE NOTE ADULT - MEDICATION SUMMARY - MEDICATIONS TO TAKE
I will START or STAY ON the medications listed below when I get home from the hospital:    finasteride 5 mg oral tablet  -- 1 tab(s) by mouth once a day  -- Indication: For prostate    Coumadin 2.5 mg oral tablet  -- 1 tab(s) by mouth once a day  monday, wednesday, friday  -- Indication: For Atrial fibrialltion     Coumadin 5 mg oral tablet  -- 1 tab(s) by mouth once a day  Tuesday, thursday, saturday, sunday   -- Indication: For Atrial fibrillation     dronabinol 2.5 mg oral capsule  -- 1 cap(s) by mouth 2 times a day  -- Indication: For Appetite stimulant     atorvastatin 10 mg oral tablet  -- 1 tab(s) by gastrostomy tube once a day (at bedtime)  -- Indication: For Hyperlipidemia     metoprolol tartrate 25 mg oral tablet  -- 0.5 tab(s) by gastrostomy tube once a day  -- Indication: For Atrial fibrillation     albuterol 2.5 mg/3 mL (0.083%) inhalation solution  --  inhaled every 6 hours, As Needed  -- Indication: For dyspnea    ferrous sulfate 325 mg (65 mg elemental iron) oral delayed release tablet  -- 1 tab(s) by gastrostomy tube once a day  -- Indication: For Anemia    pantoprazole 40 mg oral delayed release tablet  -- 1 tab(s) by mouth once a day (before a meal)  -- Indication: For GERD     levoFLOXacin 250 mg oral tablet  -- 1 tab(s) by mouth every 24 hours for 2 days  -- Indication: For infection     buPROPion 75 mg oral tablet  -- 1 tab(s) by mouth 2 times a day  -- Indication: For depression     B-12 Resin 1000 mcg oral tablet  -- 1 tab(s) by mouth once a day  -- Indication: For supplement     folic acid 1 mg oral tablet  -- 1 tab(s) by mouth once a day  -- Indication: For supplement

## 2017-09-06 NOTE — DISCHARGE NOTE ADULT - CARE PLAN
Principal Discharge DX:	Hyponatremia  Goal:	improved  Instructions for follow-up, activity and diet:	continue tube feeding and water via peg as needed  repeat labs in 1-2 weeks  Secondary Diagnosis:	Acute renal failure superimposed on stage 4 chronic kidney disease, unspecified acute renal failure type  Secondary Diagnosis:	Hypomagnesemia  Goal:	repleted  Secondary Diagnosis:	Hypokalemia  Secondary Diagnosis:	History of CVA with residual deficit  Secondary Diagnosis:	Oropharyngeal dysphagia  Secondary Diagnosis:	Chronic atrial fibrillation

## 2017-09-06 NOTE — DISCHARGE NOTE ADULT - SECONDARY DIAGNOSIS.
Acute renal failure superimposed on stage 4 chronic kidney disease, unspecified acute renal failure type Hypomagnesemia Hypokalemia History of CVA with residual deficit Oropharyngeal dysphagia Chronic atrial fibrillation

## 2017-09-06 NOTE — PROGRESS NOTE ADULT - SUBJECTIVE AND OBJECTIVE BOX
NEPHROLOGY INTERVAL HPI/OVERNIGHT EVENTS:    cardio follow up noted   No new events   Meds reviewed     MEDICATIONS  (STANDING):  buPROPion XL . 150 milliGRAM(s) Oral daily  folic acid 1 milliGRAM(s) Oral daily  metoprolol 25 milliGRAM(s) Oral two times a day  ferrous    sulfate 325 milliGRAM(s) Oral daily  atorvastatin 10 milliGRAM(s) Oral at bedtime  ALBUTerol    0.083% 2.5 milliGRAM(s) Nebulizer every 6 hours  finasteride 5 milliGRAM(s) Oral daily  pantoprazole    Tablet 40 milliGRAM(s) Oral before breakfast  dronabinol 2.5 milliGRAM(s) Oral two times a day  levoFLOXacin  Tablet 250 milliGRAM(s) Oral every 24 hours  warfarin 5 milliGRAM(s) Oral once  epoetin [place] Injectable 51306 Unit(s) SubCutaneous once    MEDICATIONS  (PRN):      Allergies    penicillins (Hives)    Intolerances          Vital Signs Last 24 Hrs  T(C): 36.7 (06 Sep 2017 12:44), Max: 36.8 (06 Sep 2017 06:08)  T(F): 98 (06 Sep 2017 12:44), Max: 98.2 (06 Sep 2017 06:08)  HR: 83 (06 Sep 2017 12:44) (83 - 96)  BP: 92/63 (06 Sep 2017 12:44) (92/63 - 112/57)  BP(mean): --  RR: 18 (06 Sep 2017 12:44) (18 - 18)  SpO2: 95% (06 Sep 2017 08:28) (95% - 95%)  Daily     Daily Weight in k.2 (06 Sep 2017 06:56)  I&O's Detail    05 Sep 2017 07:  -  06 Sep 2017 07:00  --------------------------------------------------------  IN:    Oral Fluid: 240 mL  Total IN: 240 mL    OUT:    Ileostomy: 575 mL    Voided: 300 mL  Total OUT: 875 mL    Total NET: -635 mL        I&O's Summary    05 Sep 2017 07:01  -  06 Sep 2017 07:00  --------------------------------------------------------  IN: 240 mL / OUT: 875 mL / NET: -635 mL        PHYSICAL EXAM:  GENERAL: appears chronically ill  HEAD:  Atraumatic, Normocephalic  NECK: Supple  NERVOUS SYSTEM:  Alert & Oriented X3  CHEST/LUNG: Clear to percussion bilaterally  HEART: Regular rate and rhythm; No murmurs  ABDOMEN: Soft, Nontender, Nondistended; Bowel sounds present  EXTREMITIES:  No edema      LABS:                        9.3    6.0   )-----------( 150      ( 06 Sep 2017 05:46 )             28.6     09-06    143  |  109<H>  |  65.0<H>  ----------------------------<  120<H>  4.1   |  21.0<L>  |  1.77<H>    Ca    9.0      06 Sep 2017 05:46  Phos  3.0       Mg     2.2           PT/INR - ( 06 Sep 2017 05:46 )   PT: 18.6 sec;   INR: 1.67 ratio                     RADIOLOGY & ADDITIONAL TESTS:

## 2017-09-06 NOTE — DISCHARGE NOTE ADULT - PATIENT PORTAL LINK FT
“You can access the Follow[employer] Patient Portal, offered by [facility], by registering with the following website: [email or web address]”

## 2017-09-06 NOTE — DISCHARGE NOTE ADULT - CARE PROVIDER_API CALL
Randy Pimentel), Internal Medicine  [address] [place] [address]  [place] [address]  Phone: [phone]  Fax: [phone]    Reinaldo Llamas), Cardiovascular Disease; Internal Medicine  [address] [place] [address]  Phone: [phone]  Fax: [phone]

## 2017-09-06 NOTE — DISCHARGE NOTE ADULT - MEDICATION SUMMARY - MEDICATIONS TO CHANGE
I will SWITCH the dose or number of times a day I take the medications listed below when I get home from the hospital:    buPROPion 100 mg oral tablet  -- 1 tab(s) by mouth 2 times a day

## 2017-10-19 PROCEDURE — 71045 X-RAY EXAM CHEST 1 VIEW: CPT

## 2017-10-19 PROCEDURE — 96376 TX/PRO/DX INJ SAME DRUG ADON: CPT

## 2017-10-19 PROCEDURE — 84436 ASSAY OF TOTAL THYROXINE: CPT

## 2017-10-19 PROCEDURE — 80053 COMPREHEN METABOLIC PANEL: CPT

## 2017-10-19 PROCEDURE — 81001 URINALYSIS AUTO W/SCOPE: CPT

## 2017-10-19 PROCEDURE — 83735 ASSAY OF MAGNESIUM: CPT

## 2017-10-19 PROCEDURE — 83935 ASSAY OF URINE OSMOLALITY: CPT

## 2017-10-19 PROCEDURE — 70450 CT HEAD/BRAIN W/O DYE: CPT

## 2017-10-19 PROCEDURE — 87086 URINE CULTURE/COLONY COUNT: CPT

## 2017-10-19 PROCEDURE — 84480 ASSAY TRIIODOTHYRONINE (T3): CPT

## 2017-10-19 PROCEDURE — 97163 PT EVAL HIGH COMPLEX 45 MIN: CPT

## 2017-10-19 PROCEDURE — 93306 TTE W/DOPPLER COMPLETE: CPT

## 2017-10-19 PROCEDURE — 85027 COMPLETE CBC AUTOMATED: CPT

## 2017-10-19 PROCEDURE — 76770 US EXAM ABDO BACK WALL COMP: CPT

## 2017-10-19 PROCEDURE — 85730 THROMBOPLASTIN TIME PARTIAL: CPT

## 2017-10-19 PROCEDURE — 94640 AIRWAY INHALATION TREATMENT: CPT

## 2017-10-19 PROCEDURE — 94667 MNPJ CHEST WALL 1ST: CPT

## 2017-10-19 PROCEDURE — 84100 ASSAY OF PHOSPHORUS: CPT

## 2017-10-19 PROCEDURE — 70551 MRI BRAIN STEM W/O DYE: CPT

## 2017-10-19 PROCEDURE — 83930 ASSAY OF BLOOD OSMOLALITY: CPT

## 2017-10-19 PROCEDURE — 76775 US EXAM ABDO BACK WALL LIM: CPT

## 2017-10-19 PROCEDURE — 85610 PROTHROMBIN TIME: CPT

## 2017-10-19 PROCEDURE — 84300 ASSAY OF URINE SODIUM: CPT

## 2017-10-19 PROCEDURE — 80048 BASIC METABOLIC PNL TOTAL CA: CPT

## 2017-10-19 PROCEDURE — 84443 ASSAY THYROID STIM HORMONE: CPT

## 2017-10-19 PROCEDURE — 36415 COLL VENOUS BLD VENIPUNCTURE: CPT

## 2017-10-19 PROCEDURE — 92610 EVALUATE SWALLOWING FUNCTION: CPT

## 2017-10-19 PROCEDURE — 84550 ASSAY OF BLOOD/URIC ACID: CPT

## 2017-10-19 PROCEDURE — 99285 EMERGENCY DEPT VISIT HI MDM: CPT | Mod: 25

## 2017-10-19 PROCEDURE — 93005 ELECTROCARDIOGRAM TRACING: CPT

## 2017-10-19 PROCEDURE — 96374 THER/PROPH/DIAG INJ IV PUSH: CPT

## 2017-12-18 ENCOUNTER — INPATIENT (INPATIENT)
Facility: HOSPITAL | Age: 77
LOS: 24 days | Discharge: HOME | DRG: 871 | End: 2018-01-12
Attending: HOSPITALIST | Admitting: INTERNAL MEDICINE
Payer: MEDICARE

## 2017-12-18 VITALS
OXYGEN SATURATION: 92 % | HEART RATE: 108 BPM | DIASTOLIC BLOOD PRESSURE: 58 MMHG | RESPIRATION RATE: 24 BRPM | SYSTOLIC BLOOD PRESSURE: 99 MMHG

## 2017-12-18 DIAGNOSIS — J96.01 ACUTE RESPIRATORY FAILURE WITH HYPOXIA: ICD-10-CM

## 2017-12-18 DIAGNOSIS — E87.2 ACIDOSIS: ICD-10-CM

## 2017-12-18 DIAGNOSIS — Z95.2 PRESENCE OF PROSTHETIC HEART VALVE: Chronic | ICD-10-CM

## 2017-12-18 DIAGNOSIS — A41.9 SEPSIS, UNSPECIFIED ORGANISM: ICD-10-CM

## 2017-12-18 DIAGNOSIS — N17.9 ACUTE KIDNEY FAILURE, UNSPECIFIED: ICD-10-CM

## 2017-12-18 DIAGNOSIS — I48.91 UNSPECIFIED ATRIAL FIBRILLATION: ICD-10-CM

## 2017-12-18 DIAGNOSIS — Z95.1 PRESENCE OF AORTOCORONARY BYPASS GRAFT: Chronic | ICD-10-CM

## 2017-12-18 DIAGNOSIS — Z98.89 OTHER SPECIFIED POSTPROCEDURAL STATES: Chronic | ICD-10-CM

## 2017-12-18 LAB
ALBUMIN SERPL ELPH-MCNC: 3.7 G/DL — SIGNIFICANT CHANGE UP (ref 3.3–5.2)
ALP SERPL-CCNC: 74 U/L — SIGNIFICANT CHANGE UP (ref 40–120)
ALT FLD-CCNC: 27 U/L — SIGNIFICANT CHANGE UP
ANION GAP SERPL CALC-SCNC: 22 MMOL/L — HIGH (ref 5–17)
ANISOCYTOSIS BLD QL: SLIGHT — SIGNIFICANT CHANGE UP
APPEARANCE UR: SIGNIFICANT CHANGE UP
AST SERPL-CCNC: 27 U/L — SIGNIFICANT CHANGE UP
BACTERIA # UR AUTO: ABNORMAL
BASE EXCESS BLDA CALC-SCNC: -15.4 MMOL/L — LOW (ref -2–2)
BASE EXCESS BLDA CALC-SCNC: -16.8 MMOL/L — LOW (ref -2–2)
BASOPHILS # BLD AUTO: 0 K/UL — SIGNIFICANT CHANGE UP (ref 0–0.2)
BASOPHILS NFR BLD AUTO: 0 % — SIGNIFICANT CHANGE UP (ref 0–2)
BILIRUB SERPL-MCNC: 0.6 MG/DL — SIGNIFICANT CHANGE UP (ref 0.4–2)
BILIRUB UR-MCNC: NEGATIVE — SIGNIFICANT CHANGE UP
BLOOD GAS COMMENTS ARTERIAL: SIGNIFICANT CHANGE UP
BLOOD GAS COMMENTS ARTERIAL: SIGNIFICANT CHANGE UP
BUN SERPL-MCNC: 97 MG/DL — HIGH (ref 8–20)
CALCIUM SERPL-MCNC: 9.5 MG/DL — SIGNIFICANT CHANGE UP (ref 8.6–10.2)
CHLORIDE SERPL-SCNC: 95 MMOL/L — LOW (ref 98–107)
CO2 SERPL-SCNC: 11 MMOL/L — LOW (ref 22–29)
COLOR SPEC: YELLOW — SIGNIFICANT CHANGE UP
CREAT SERPL-MCNC: 4.06 MG/DL — HIGH (ref 0.5–1.3)
DACRYOCYTES BLD QL SMEAR: SLIGHT — SIGNIFICANT CHANGE UP
DIFF PNL FLD: ABNORMAL
ELLIPTOCYTES BLD QL SMEAR: SLIGHT — SIGNIFICANT CHANGE UP
EOSINOPHIL # BLD AUTO: 0 K/UL — SIGNIFICANT CHANGE UP (ref 0–0.5)
EOSINOPHIL NFR BLD AUTO: 0 % — SIGNIFICANT CHANGE UP (ref 0–6)
EPI CELLS # UR: SIGNIFICANT CHANGE UP
GAS PNL BLDA: SIGNIFICANT CHANGE UP
GAS PNL BLDA: SIGNIFICANT CHANGE UP
GLUCOSE SERPL-MCNC: 257 MG/DL — HIGH (ref 70–115)
GLUCOSE UR QL: NEGATIVE — SIGNIFICANT CHANGE UP
HCO3 BLDA-SCNC: 12 MMOL/L — LOW (ref 20–26)
HCO3 BLDA-SCNC: 13 MMOL/L — LOW (ref 20–26)
HCT VFR BLD CALC: 37 % — LOW (ref 42–52)
HGB BLD-MCNC: 12.4 G/DL — LOW (ref 14–18)
HOROWITZ INDEX BLDA+IHG-RTO: 0.5 — SIGNIFICANT CHANGE UP
HOROWITZ INDEX BLDA+IHG-RTO: 50 — SIGNIFICANT CHANGE UP
HYPOCHROMIA BLD QL: SLIGHT — SIGNIFICANT CHANGE UP
INR BLD: 4.97 RATIO — HIGH (ref 0.88–1.16)
KETONES UR-MCNC: NEGATIVE — SIGNIFICANT CHANGE UP
LACTATE BLDV-MCNC: 2.9 MMOL/L — HIGH (ref 0.5–2)
LEUKOCYTE ESTERASE UR-ACNC: ABNORMAL
LYMPHOCYTES # BLD AUTO: 0.6 K/UL — LOW (ref 1–4.8)
LYMPHOCYTES # BLD AUTO: 4 % — LOW (ref 20–55)
MACROCYTES BLD QL: SLIGHT — SIGNIFICANT CHANGE UP
MCHC RBC-ENTMCNC: 30.6 PG — SIGNIFICANT CHANGE UP (ref 27–31)
MCHC RBC-ENTMCNC: 33.5 G/DL — SIGNIFICANT CHANGE UP (ref 32–36)
MCV RBC AUTO: 91.4 FL — SIGNIFICANT CHANGE UP (ref 80–94)
METAMYELOCYTES # FLD: 5 % — HIGH (ref 0–0)
MICROCYTES BLD QL: SLIGHT — SIGNIFICANT CHANGE UP
MONOCYTES # BLD AUTO: 1 K/UL — HIGH (ref 0–0.8)
MONOCYTES NFR BLD AUTO: 7 % — SIGNIFICANT CHANGE UP (ref 3–10)
NEUTROPHILS # BLD AUTO: 13.2 K/UL — HIGH (ref 1.8–8)
NEUTROPHILS NFR BLD AUTO: 75 % — HIGH (ref 37–73)
NEUTS BAND # BLD: 9 % — HIGH (ref 0–8)
NITRITE UR-MCNC: NEGATIVE — SIGNIFICANT CHANGE UP
PCO2 BLDA: 28 MMHG — LOW (ref 35–45)
PCO2 BLDA: 28 MMHG — LOW (ref 35–45)
PH BLDA: 7.18 — CRITICAL LOW (ref 7.35–7.45)
PH BLDA: 7.21 — LOW (ref 7.35–7.45)
PH UR: 5 — SIGNIFICANT CHANGE UP (ref 5–8)
PLAT MORPH BLD: NORMAL — SIGNIFICANT CHANGE UP
PLATELET # BLD AUTO: 189 K/UL — SIGNIFICANT CHANGE UP (ref 150–400)
PO2 BLDA: 137 MMHG — HIGH (ref 83–108)
PO2 BLDA: 90 MMHG — SIGNIFICANT CHANGE UP (ref 83–108)
POIKILOCYTOSIS BLD QL AUTO: SLIGHT — SIGNIFICANT CHANGE UP
POLYCHROMASIA BLD QL SMEAR: SLIGHT — SIGNIFICANT CHANGE UP
POTASSIUM SERPL-MCNC: 4.4 MMOL/L — SIGNIFICANT CHANGE UP (ref 3.5–5.3)
POTASSIUM SERPL-SCNC: 4.4 MMOL/L — SIGNIFICANT CHANGE UP (ref 3.5–5.3)
PROT SERPL-MCNC: 8.2 G/DL — SIGNIFICANT CHANGE UP (ref 6.6–8.7)
PROT UR-MCNC: 30 MG/DL
PROTHROM AB SERPL-ACNC: 56.5 SEC — HIGH (ref 9.8–12.7)
RBC # BLD: 4.05 M/UL — LOW (ref 4.6–6.2)
RBC # FLD: 14.7 % — SIGNIFICANT CHANGE UP (ref 11–15.6)
RBC BLD AUTO: ABNORMAL
RBC CASTS # UR COMP ASSIST: ABNORMAL /HPF (ref 0–4)
SAO2 % BLDA: 96 % — SIGNIFICANT CHANGE UP (ref 95–99)
SAO2 % BLDA: 99 % — SIGNIFICANT CHANGE UP (ref 95–99)
SCHISTOCYTES BLD QL AUTO: SLIGHT — SIGNIFICANT CHANGE UP
SODIUM SERPL-SCNC: 128 MMOL/L — LOW (ref 135–145)
SP GR SPEC: 1.02 — SIGNIFICANT CHANGE UP (ref 1.01–1.02)
SPHEROCYTES BLD QL SMEAR: SLIGHT — SIGNIFICANT CHANGE UP
UROBILINOGEN FLD QL: NEGATIVE — SIGNIFICANT CHANGE UP
WBC # BLD: 14.8 K/UL — HIGH (ref 4.8–10.8)
WBC # FLD AUTO: 14.8 K/UL — HIGH (ref 4.8–10.8)
WBC UR QL: ABNORMAL

## 2017-12-18 PROCEDURE — 71010: CPT | Mod: 26,76

## 2017-12-18 PROCEDURE — 99291 CRITICAL CARE FIRST HOUR: CPT

## 2017-12-18 PROCEDURE — 93010 ELECTROCARDIOGRAM REPORT: CPT

## 2017-12-18 RX ORDER — PANTOPRAZOLE SODIUM 20 MG/1
40 TABLET, DELAYED RELEASE ORAL DAILY
Qty: 0 | Refills: 0 | Status: DISCONTINUED | OUTPATIENT
Start: 2017-12-18 | End: 2017-12-20

## 2017-12-18 RX ORDER — ETOMIDATE 2 MG/ML
20 INJECTION INTRAVENOUS ONCE
Qty: 0 | Refills: 0 | Status: COMPLETED | OUTPATIENT
Start: 2017-12-18 | End: 2017-12-18

## 2017-12-18 RX ORDER — ROCURONIUM BROMIDE 10 MG/ML
50 VIAL (ML) INTRAVENOUS ONCE
Qty: 0 | Refills: 0 | Status: COMPLETED | OUTPATIENT
Start: 2017-12-18 | End: 2017-12-18

## 2017-12-18 RX ORDER — KETAMINE HYDROCHLORIDE 100 MG/ML
0.9 INJECTION INTRAMUSCULAR; INTRAVENOUS ONCE
Qty: 0 | Refills: 0 | Status: DISCONTINUED | OUTPATIENT
Start: 2017-12-18 | End: 2017-12-18

## 2017-12-18 RX ORDER — THIAMINE MONONITRATE (VIT B1) 100 MG
200 TABLET ORAL EVERY 12 HOURS
Qty: 0 | Refills: 0 | Status: DISCONTINUED | OUTPATIENT
Start: 2017-12-18 | End: 2017-12-20

## 2017-12-18 RX ORDER — CEFEPIME 1 G/1
1000 INJECTION, POWDER, FOR SOLUTION INTRAMUSCULAR; INTRAVENOUS EVERY 12 HOURS
Qty: 0 | Refills: 0 | Status: DISCONTINUED | OUTPATIENT
Start: 2017-12-18 | End: 2017-12-19

## 2017-12-18 RX ORDER — ASCORBIC ACID 60 MG
1500 TABLET,CHEWABLE ORAL EVERY 6 HOURS
Qty: 0 | Refills: 0 | Status: DISCONTINUED | OUTPATIENT
Start: 2017-12-18 | End: 2017-12-20

## 2017-12-18 RX ORDER — SODIUM CHLORIDE 9 MG/ML
1000 INJECTION, SOLUTION INTRAVENOUS
Qty: 0 | Refills: 0 | Status: DISCONTINUED | OUTPATIENT
Start: 2017-12-18 | End: 2017-12-19

## 2017-12-18 RX ORDER — HYDROCORTISONE 20 MG
50 TABLET ORAL EVERY 6 HOURS
Qty: 0 | Refills: 0 | Status: DISCONTINUED | OUTPATIENT
Start: 2017-12-18 | End: 2017-12-20

## 2017-12-18 RX ORDER — THIAMINE MONONITRATE (VIT B1) 100 MG
200 TABLET ORAL DAILY
Qty: 0 | Refills: 0 | Status: DISCONTINUED | OUTPATIENT
Start: 2017-12-18 | End: 2017-12-18

## 2017-12-18 RX ORDER — FENTANYL CITRATE 50 UG/ML
1 INJECTION INTRAVENOUS
Qty: 5000 | Refills: 0 | Status: DISCONTINUED | OUTPATIENT
Start: 2017-12-18 | End: 2017-12-19

## 2017-12-18 RX ORDER — SODIUM BICARBONATE 1 MEQ/ML
50 SYRINGE (ML) INTRAVENOUS ONCE
Qty: 0 | Refills: 0 | Status: COMPLETED | OUTPATIENT
Start: 2017-12-18 | End: 2017-12-18

## 2017-12-18 RX ORDER — SODIUM CHLORIDE 9 MG/ML
3 INJECTION INTRAMUSCULAR; INTRAVENOUS; SUBCUTANEOUS ONCE
Qty: 0 | Refills: 0 | Status: COMPLETED | OUTPATIENT
Start: 2017-12-18 | End: 2017-12-18

## 2017-12-18 RX ORDER — KETAMINE HYDROCHLORIDE 100 MG/ML
90 INJECTION INTRAMUSCULAR; INTRAVENOUS ONCE
Qty: 0 | Refills: 0 | Status: DISCONTINUED | OUTPATIENT
Start: 2017-12-18 | End: 2017-12-18

## 2017-12-18 RX ORDER — CHLORHEXIDINE GLUCONATE 213 G/1000ML
15 SOLUTION TOPICAL
Qty: 0 | Refills: 0 | Status: DISCONTINUED | OUTPATIENT
Start: 2017-12-18 | End: 2017-12-19

## 2017-12-18 RX ORDER — SUCCINYLCHOLINE CHLORIDE 100 MG/5ML
100 SYRINGE (ML) INTRAVENOUS ONCE
Qty: 0 | Refills: 0 | Status: COMPLETED | OUTPATIENT
Start: 2017-12-18 | End: 2017-12-18

## 2017-12-18 RX ORDER — PHENYLEPHRINE HYDROCHLORIDE 10 MG/ML
0.2 INJECTION INTRAVENOUS ONCE
Qty: 0 | Refills: 0 | Status: COMPLETED | OUTPATIENT
Start: 2017-12-18 | End: 2017-12-18

## 2017-12-18 RX ORDER — NOREPINEPHRINE BITARTRATE/D5W 8 MG/250ML
0.1 PLASTIC BAG, INJECTION (ML) INTRAVENOUS
Qty: 8 | Refills: 0 | Status: DISCONTINUED | OUTPATIENT
Start: 2017-12-18 | End: 2017-12-19

## 2017-12-18 RX ORDER — SODIUM CHLORIDE 9 MG/ML
1000 INJECTION INTRAMUSCULAR; INTRAVENOUS; SUBCUTANEOUS ONCE
Qty: 0 | Refills: 0 | Status: COMPLETED | OUTPATIENT
Start: 2017-12-18 | End: 2017-12-18

## 2017-12-18 RX ORDER — LIDOCAINE HCL 20 MG/ML
10 VIAL (ML) INJECTION ONCE
Qty: 0 | Refills: 0 | Status: COMPLETED | OUTPATIENT
Start: 2017-12-18 | End: 2017-12-18

## 2017-12-18 RX ORDER — SODIUM CHLORIDE 9 MG/ML
500 INJECTION INTRAMUSCULAR; INTRAVENOUS; SUBCUTANEOUS
Qty: 0 | Refills: 0 | Status: COMPLETED | OUTPATIENT
Start: 2017-12-18 | End: 2017-12-18

## 2017-12-18 RX ORDER — CEFEPIME 1 G/1
1000 INJECTION, POWDER, FOR SOLUTION INTRAMUSCULAR; INTRAVENOUS ONCE
Qty: 0 | Refills: 0 | Status: COMPLETED | OUTPATIENT
Start: 2017-12-18 | End: 2017-12-18

## 2017-12-18 RX ORDER — VANCOMYCIN HCL 1 G
1000 VIAL (EA) INTRAVENOUS EVERY 12 HOURS
Qty: 0 | Refills: 0 | Status: DISCONTINUED | OUTPATIENT
Start: 2017-12-18 | End: 2017-12-18

## 2017-12-18 RX ADMIN — Medication 100 MILLIGRAM(S): at 17:10

## 2017-12-18 RX ADMIN — SODIUM CHLORIDE 2000 MILLILITER(S): 9 INJECTION INTRAMUSCULAR; INTRAVENOUS; SUBCUTANEOUS at 16:40

## 2017-12-18 RX ADMIN — ETOMIDATE 20 MILLIGRAM(S): 2 INJECTION INTRAVENOUS at 17:56

## 2017-12-18 RX ADMIN — SODIUM CHLORIDE 2000 MILLILITER(S): 9 INJECTION INTRAMUSCULAR; INTRAVENOUS; SUBCUTANEOUS at 17:08

## 2017-12-18 RX ADMIN — Medication 102 MILLIGRAM(S): at 22:30

## 2017-12-18 RX ADMIN — Medication 16.88 MICROGRAM(S)/KG/MIN: at 19:30

## 2017-12-18 RX ADMIN — SODIUM CHLORIDE 2000 MILLILITER(S): 9 INJECTION INTRAMUSCULAR; INTRAVENOUS; SUBCUTANEOUS at 16:30

## 2017-12-18 RX ADMIN — SODIUM CHLORIDE 2000 MILLILITER(S): 9 INJECTION INTRAMUSCULAR; INTRAVENOUS; SUBCUTANEOUS at 16:00

## 2017-12-18 RX ADMIN — SODIUM CHLORIDE 100 MILLILITER(S): 9 INJECTION, SOLUTION INTRAVENOUS at 17:10

## 2017-12-18 RX ADMIN — Medication 50 MILLIGRAM(S): at 18:30

## 2017-12-18 RX ADMIN — Medication 100 MILLIGRAM(S): at 17:56

## 2017-12-18 RX ADMIN — PANTOPRAZOLE SODIUM 40 MILLIGRAM(S): 20 TABLET, DELAYED RELEASE ORAL at 22:18

## 2017-12-18 RX ADMIN — SODIUM CHLORIDE 3 MILLILITER(S): 9 INJECTION INTRAMUSCULAR; INTRAVENOUS; SUBCUTANEOUS at 16:00

## 2017-12-18 RX ADMIN — Medication 50 MILLIGRAM(S): at 22:18

## 2017-12-18 RX ADMIN — Medication 10 MILLILITER(S): at 17:29

## 2017-12-18 RX ADMIN — CEFEPIME 100 MILLIGRAM(S): 1 INJECTION, POWDER, FOR SOLUTION INTRAMUSCULAR; INTRAVENOUS at 16:55

## 2017-12-18 RX ADMIN — FENTANYL CITRATE 4.5 MICROGRAM(S)/KG/HR: 50 INJECTION INTRAVENOUS at 20:15

## 2017-12-18 RX ADMIN — KETAMINE HYDROCHLORIDE 90 MILLIGRAM(S): 100 INJECTION INTRAMUSCULAR; INTRAVENOUS at 18:31

## 2017-12-18 RX ADMIN — PHENYLEPHRINE HYDROCHLORIDE 0.2 MILLIGRAM(S): 10 INJECTION INTRAVENOUS at 18:30

## 2017-12-18 RX ADMIN — SODIUM CHLORIDE 2000 MILLILITER(S): 9 INJECTION INTRAMUSCULAR; INTRAVENOUS; SUBCUTANEOUS at 16:15

## 2017-12-18 RX ADMIN — CHLORHEXIDINE GLUCONATE 15 MILLILITER(S): 213 SOLUTION TOPICAL at 22:18

## 2017-12-18 RX ADMIN — Medication 50 MILLIEQUIVALENT(S): at 16:15

## 2017-12-18 NOTE — H&P ADULT - PROBLEM SELECTOR PLAN 3
likely acute on chronic but unable to compare since patient has not been to [facility] before  Avoid nephrotoxic agents  f/u urology reccomendation likely acute on chronic but unable to compare since patient has not been to [facility] before  Avoid nephrotoxic agents  consult nephrology  f/u urology recommendation likely AG lactic acidosis   Bicarb gtt   f/u serial ABG and repeat lactate

## 2017-12-18 NOTE — PROCEDURE NOTE - GENERAL PROCEDURE DETAILS
Multiple attempts by staff unsuccessful at placement of puckett.  I used a catheter guide and placed an 18Fr. puckett to drainage.  Hooked to drainage.  Needs to be irrigated but patient now undergoing emergent intubation.

## 2017-12-18 NOTE — H&P ADULT - ATTENDING COMMENTS
I have seen and evaluated this patient independently and agree with the above findings except as follows: 76 y/o MMP debility due to strokes and recent discharge from rehab with found with hypoxic respiratory failure in the setting of aspiration and shock with KAPIL ? CKD component and coagulopathy. Traumatic puckett insertion in ER consider reversal of coumadin if bleeding continues, vent and pressor support, broad spectrum ABx for possible HCAI. Met with patient/wife Benjamin and son at bedside prior to intubation to discuss GOC. Pt agreed to trial of intubation for support in acute phase of illness but expressed he did not want to be vent dependent. The discussion continued with wife and son outside of room while pt was being intubated by [facility] PA. Explained pt's overall critical state and the potential for him to decline further and risk of mortality. Explained resuscitation and what it entails and all potential outcomes, they agree for DNR at this time but to continue full medical therapies as described above as well as short term trial of intubation.

## 2017-12-18 NOTE — H&P ADULT - HISTORY OF PRESENT ILLNESS
76 y/o male pmhx CVA ( 2 years ago and in July 17) w/ residual right upper and lower extremity weakness, s/p PEG,  afib on coumadin, s/p colon resection with ileostomy 8 years ago, s/p CBAGx3,  prostate cancer s/p TURP 3 years ago was BIBEMS after wife found him unresponsive with vomit around his face. Wife states patient had an increase in SOB past 2 days and noticed he was becoming more weak.  Pt was discharged 2 days ago from rehab center and has had a decrease in appetite since he's been home according to his wife and son. Wife states she has been noticing that after PEG feeding he coughs a lot and she compares it to him choking.  She states he has been getting feed through his PEG as well as liquids by mouth which he has been tolerating. On arrival patient was unresponsive and hypoxic.

## 2017-12-18 NOTE — H&P ADULT - ASSESSMENT
76 y/o male pmhx CVA ( 2 years ago and in July 17) w/ residual right upper and lower extremity weakness, s/p PEG,  afib on coumadin, s/p colon resection with ileostomy 8 years ago, s/p CBAGx3,  prostate cancer s/p TURP 3 years ago was BIBEMS after wife found him unresponsive with vomit around his face. Pt admitted with septic shock 2/2 to aspiration pneumonia, hypoxic respiratory failure,  and KAPIL. Pt intubated in ED with single pressor shock.

## 2017-12-18 NOTE — H&P ADULT - PROBLEM SELECTOR PLAN 2
Intubated - AC/VC tv 450/ PEEP 5/ rate 22/ Fio2 60%    VAP ppx  f/u post intubation ABG and continue to trend

## 2017-12-18 NOTE — CONSULT NOTE ADULT - ASSESSMENT
KAPIL on CKD ( prior creat 1.7 ) , ? ATN   Resp failure   PNA   Septic shock   Mixed acidosis   Received Bolus in ER , Continue IV Bicarbonate   Vanco / Cefepime ( Vanco level in am )  Family has made DNR in ER   Continue the current supportive measures   Portable sonogram in am   Will follow

## 2017-12-18 NOTE — ED ADULT NURSE NOTE - NS ED NURSE TRANSPORT WITH
Report given and pt endorsed to oncoming RN Harshil Kerns for follow up and continuity of care and transport to floor. ventilator/Report given and pt endorsed to oncoming RN Harshil Kerns for follow up and continuity of care and transport to floor./IV/IV pump/cardiac monitor

## 2017-12-18 NOTE — PROCEDURE NOTE - PROCEDURE
<<-----Click on this checkbox to enter Procedure Urethral catheterization  12/18/2017    Active  [id number]

## 2017-12-18 NOTE — H&P ADULT - PROBLEM SELECTOR PLAN 1
likely 2/2 to aspiration pneumonia , will cover for HCAP organisms   Titrate levophed to maintain MAP >65  c/w abx   f/u cultures   c/w sepsis fluid resuscitation @ 30cc/kg likely 2/2 to aspiration pneumonia , will cover for HCAP organisms   Titrate levophed to maintain MAP >65  c/w abx   f/u cultures   Marick protocol   c/w sepsis fluid resuscitation @ 30cc/kg

## 2017-12-18 NOTE — ED ADULT NURSE REASSESSMENT NOTE - NS ED NURSE REASSESS COMMENT FT1
Dr Miner at bedside, aware pt bp 80's systolic, orders rec'd for NS bolus, administered as ordered. Wife and son at bedside.
[facility] PA intubated patient with Dr. Miner at bedside
patient to be intubated by Dr. Miner, Dr. Padgett at bedside who spoke with wife, patient is reportedly DNR only, will be intubated for failure of BiPAP and acidosis.
Pt. received at 1930, report given to MICU RN Marya by day shift ED RN, pt. central line confirmed via X-ray by MD Miner. Pt. VSS on vent. awaiting transport to ICU

## 2017-12-18 NOTE — ED ADULT TRIAGE NOTE - CHIEF COMPLAINT QUOTE
PT BIBA for respiratory distress. Pt was discharged from rehab 2 days ago for stroke with right sided deficit. Per EMS wife called and stated pt was having worsening difficulty breathing and lethargy. Pt arrives with non rebreather in place - Dr Miner and respiratory at bedside. Pt has PEG tube in place and right sided colostomy bag.

## 2017-12-18 NOTE — ED PROVIDER NOTE - CRITICAL CARE PROVIDED
direct patient care (not related to procedure)/documentation/conducted a detailed discussion of DNR status/interpretation of diagnostic studies/consult w/ pt's family directly relating to pts condition/additional history taking/consultation with other physicians

## 2017-12-18 NOTE — CONSULT NOTE ADULT - SUBJECTIVE AND OBJECTIVE BOX
Patient is a 77y old  Male who presents with a chief complaint of Found unresponsive in his vomit (18 Dec 2017 19:49)      HPI:  78 y/o male pmhx CVA ( 2 years ago and in ) w/ residual right upper and lower extremity weakness, s/p PEG,  afib on coumadin, s/p colon resection with ileostomy 8 years ago, s/p CBAGx3,  prostate cancer s/p TURP 3 years ago was BIBEMS after wife found him unresponsive with vomit around his face. Wife states patient had an increase in SOB past 2 days and noticed he was becoming more weak.  Pt was discharged 2 days ago from rehab center and has had a decrease in appetite since he's been home according to his wife and son. Wife states she has been noticing that after PEG feeding he coughs a lot and she compares it to him choking.  She states he has been getting feed through his PEG as well as liquids by mouth which he has been tolerating. On arrival patient was unresponsive and hypoxic. (18 Dec 2017 19:49)    Above noted . Pt with CKD ---> Prior creat was 1.7  Seen in ICU , Intubated . Abx's = Vancomycin load and Cefepime  for PNA   received 3 L NS in ER and now on Bicarbonate infusion   + Marik Protocol . Currently on levophed   F ey placed       PAST MEDICAL & SURGICAL HISTORY:  CAD (coronary artery disease)  Afib  CVA (cerebral vascular accident)  Mitral valve replaced  BPH (benign prostatic hyperplasia)  High cholesterol  HTN (hypertension)  H/O heart valve replacement with mechanical valve  S/P CABG x 1  H/O colectomy      FAMILY HISTORY:  No pertinent family history in first degree relatives      Social History:    MEDICATIONS  (STANDING):  ascorbic acid IVPB 1500 milliGRAM(s) IV Intermittent every 6 hours  cefepime  IVPB 1000 milliGRAM(s) IV Intermittent every 12 hours  chlorhexidine 0.12% Liquid 15 milliLiter(s) Swish and Spit two times a day  dextrose 5% 1000 milliLiter(s) (100 mL/Hr) IV Continuous <Continuous>  fentaNYL   Infusion 1 MICROgram(s)/kG/Hr (4.5 mL/Hr) IV Continuous <Continuous>  hydrocortisone sodium succinate Injectable 50 milliGRAM(s) IV Push every 6 hours  norepinephrine Infusion 0.1 MICROgram(s)/kG/Min (16.875 mL/Hr) IV Continuous <Continuous>  pantoprazole  Injectable 40 milliGRAM(s) IV Push daily  thiamine IVPB 200 milliGRAM(s) IV Intermittent every 12 hours    MEDICATIONS  (PRN):      Allergies    penicillins (Hives)    Intolerances        RE    Vital Signs Last 24 Hrs  T(C): 37.1 (18 Dec 2017 20:15), Max: 37.8 (18 Dec 2017 15:39)  T(F): 98.8 (18 Dec 2017 20:15), Max: 100 (18 Dec 2017 15:39)  HR: 95 (18 Dec 2017 22:00) (78 - 108)  BP: 81/49 (18 Dec 2017 22:00) (81/49 - 134/74)  BP(mean): 61 (18 Dec 2017 22:00) (61 - 84)  RR: 21 (18 Dec 2017 22:00) (21 - 32)  SpO2: 97% (18 Dec 2017 22:00) (92% - 100%)  Daily     Daily Weight in k.8 (18 Dec 2017 20:15)  I&O's Detail    18 Dec 2017 07:01  -  18 Dec 2017 23:30  --------------------------------------------------------  IN:    dextrose 5%: 200 mL    fentaNYL  Infusion: 2 mL    norepinephrine Infusion: 6.6 mL  Total IN: 208.6 mL    OUT:  Total OUT: 0 mL    Total NET: 208.6 mL        I&O's Summary    18 Dec 2017 07:01  -  18 Dec 2017 23:30  --------------------------------------------------------  IN: 208.6 mL / OUT: 0 mL / NET: 208.6 mL        PHYSICAL EXAM:    GENERAL: Appears chronically ill , orally intubated   NECK: No JVP   CVS S1S2 no rub   CHEST/LUNG: EAE , Bibasilar crackles   ABDOMEN: Soft, + Ostomy , + feeding tube   EXT - No edema     LABS:                        12.4   14.8  )-----------( 189      ( 18 Dec 2017 15:42 )             37.0     12-18    128<L>  |  95<L>  |  97.0<H>  ----------------------------<  257<H>  4.4   |  11.0<L>  |  4.06<H>    Ca    9.5      18 Dec 2017 15:42    TPro  8.2  /  Alb  3.7  /  TBili  0.6  /  DBili  x   /  AST  27  /  ALT  27  /  AlkPhos  74  12-18    PT/INR - ( 18 Dec 2017 15:42 )   PT: 56.5 sec;   INR: 4.97 ratio           Urinalysis Basic - ( 18 Dec 2017 16:03 )    Color: Yellow / Appearance: Turbid / S.020 / pH: x  Gluc: x / Ketone: Negative  / Bili: Negative / Urobili: Negative   Blood: x / Protein: 30 mg/dL / Nitrite: Negative   Leuk Esterase: Moderate / RBC: 3-5 /HPF / WBC 26-50   Sq Epi: x / Non Sq Epi: Occasional / Bacteria: Occasional        ABG - ( 18 Dec 2017 17:40 )  pH: 7.21  /  pCO2: 28    /  pO2: 137   / HCO3: 13    / Base Excess: -15.4 /  SaO2: 99             EXAM:  US KIDNEY(S)                          PROCEDURE DATE:  2017          INTERPRETATION:  CLINICAL HISTORY: Acute renal injury     TECHNIQUE: Sonogram of the kidneys and bladder was performed.     COMPARISON:        FINDINGS: The right kidney shows thinned echogenic renal cortex, right   kidney measuring 9.9 cm in longitudinal dimension. Multiple small   echogenic stones noted within the upper pole calyces. There is mild right   renal hydroureteronephrosis with the proximal ureter measuring 8 mm in   diameter.        The left kidney shows a thinned echogenic renal cortex, left kidney,   measuring 10.7 cm in longitudinal dimension. No left hydronephrosis, mass   or calculi is visualized. Upper pole lateral cyst measures 1.8 x 2.2 cm.    The bladder is normally distended containing sludgelike material within   the dependent the bladder. Ureteral jets not visualized.  IMPRESSION:  Asymmetric mild right hydroureteronephrosis with upper pole calyceal   stones as described.  Echogenic thin renal cortex which may indicate chronic medical renal   disease.                      RADIOLOGY & ADDITIONAL TESTS:

## 2017-12-18 NOTE — ED PROVIDER NOTE - OBJECTIVE STATEMENT
76 yo male multiple medical problems recently discharged from rehab after 3 month stay s/p cva; BIB wife via EMS for 2 days of increasing SOB now assoc with cough and sputum production; wife states he has difficulty talking and normally gets fed via PEG tube, but has been coughing and choking with a "wet" cough last 2 days; EMS found patient rhonchorous with poor O2 sat on non rebreather; patient poorly verbal and somnolent, difficult to obtain further history

## 2017-12-18 NOTE — ED ADULT NURSE NOTE - OBJECTIVE STATEMENT
77 year old male, tachypnic, alert, following commands, on bipap upon receipt of patient. Pt noted to have ileostomy, peg tube, Stage 1 to buttocks. Per wife pt discharged from rehab after 100 days and came home Thursday, ate Friday and then did not want to eat over the weekend, did not want to get up. Wife reports giving him water through peg "to keep him hydrated", pt vomiting today. Pt on cardiac and O2 monitoring, fall precautions in place, will monitor

## 2017-12-18 NOTE — ED PROVIDER NOTE - MEDICAL DECISION MAKING DETAILS
DNR, trial of intubation; patient increasingly somnolent and acidotic despite BiPAP and bicarb; will intubate, pressors, admit to MICU

## 2017-12-18 NOTE — ED PROVIDER NOTE - CARE PLAN
Principal Discharge DX:	Acute respiratory failure with hypoxia  Secondary Diagnosis:	Metabolic acidosis

## 2017-12-19 LAB
ALBUMIN SERPL ELPH-MCNC: 3.1 G/DL — LOW (ref 3.3–5.2)
ALP SERPL-CCNC: 49 U/L — SIGNIFICANT CHANGE UP (ref 40–120)
ALT FLD-CCNC: 19 U/L — SIGNIFICANT CHANGE UP
ANION GAP SERPL CALC-SCNC: 19 MMOL/L — HIGH (ref 5–17)
APTT BLD: 59.6 SEC — HIGH (ref 27.5–37.4)
AST SERPL-CCNC: 17 U/L — SIGNIFICANT CHANGE UP
BASE EXCESS BLDA CALC-SCNC: -10.2 MMOL/L — LOW (ref -2–2)
BILIRUB SERPL-MCNC: 0.5 MG/DL — SIGNIFICANT CHANGE UP (ref 0.4–2)
BLOOD GAS COMMENTS ARTERIAL: SIGNIFICANT CHANGE UP
BUN SERPL-MCNC: 90 MG/DL — HIGH (ref 8–20)
CALCIUM SERPL-MCNC: 8.4 MG/DL — LOW (ref 8.6–10.2)
CHLORIDE SERPL-SCNC: 99 MMOL/L — SIGNIFICANT CHANGE UP (ref 98–107)
CO2 SERPL-SCNC: 19 MMOL/L — LOW (ref 22–29)
CREAT SERPL-MCNC: 3.51 MG/DL — HIGH (ref 0.5–1.3)
GAS PNL BLDA: SIGNIFICANT CHANGE UP
GAS PNL BLDA: SIGNIFICANT CHANGE UP
GLUCOSE SERPL-MCNC: 186 MG/DL — HIGH (ref 70–115)
HCO3 BLDA-SCNC: 16 MMOL/L — LOW (ref 20–26)
HCT VFR BLD CALC: 31 % — LOW (ref 42–52)
HGB BLD-MCNC: 10.1 G/DL — LOW (ref 14–18)
HOROWITZ INDEX BLDA+IHG-RTO: 0.4 — SIGNIFICANT CHANGE UP
INR BLD: 8.45 RATIO — CRITICAL HIGH (ref 0.88–1.16)
MCHC RBC-ENTMCNC: 29.4 PG — SIGNIFICANT CHANGE UP (ref 27–31)
MCHC RBC-ENTMCNC: 32.6 G/DL — SIGNIFICANT CHANGE UP (ref 32–36)
MCV RBC AUTO: 90.4 FL — SIGNIFICANT CHANGE UP (ref 80–94)
PCO2 BLDA: 35 MMHG — SIGNIFICANT CHANGE UP (ref 35–45)
PH BLDA: 7.27 — LOW (ref 7.35–7.45)
PLATELET # BLD AUTO: 156 K/UL — SIGNIFICANT CHANGE UP (ref 150–400)
PO2 BLDA: 76 MMHG — LOW (ref 83–108)
POTASSIUM SERPL-MCNC: 3.9 MMOL/L — SIGNIFICANT CHANGE UP (ref 3.5–5.3)
POTASSIUM SERPL-SCNC: 3.9 MMOL/L — SIGNIFICANT CHANGE UP (ref 3.5–5.3)
PROT SERPL-MCNC: 6.6 G/DL — SIGNIFICANT CHANGE UP (ref 6.6–8.7)
PROTHROM AB SERPL-ACNC: 97.1 SEC — HIGH (ref 9.8–12.7)
RAPID RVP RESULT: DETECTED
RBC # BLD: 3.43 M/UL — LOW (ref 4.6–6.2)
RBC # FLD: 14.7 % — SIGNIFICANT CHANGE UP (ref 11–15.6)
RV+EV RNA SPEC QL NAA+PROBE: DETECTED
SAO2 % BLDA: 95 % — SIGNIFICANT CHANGE UP (ref 95–99)
SODIUM SERPL-SCNC: 137 MMOL/L — SIGNIFICANT CHANGE UP (ref 135–145)
WBC # BLD: 15.8 K/UL — HIGH (ref 4.8–10.8)
WBC # FLD AUTO: 15.8 K/UL — HIGH (ref 4.8–10.8)

## 2017-12-19 PROCEDURE — 76775 US EXAM ABDO BACK WALL LIM: CPT | Mod: 26

## 2017-12-19 PROCEDURE — 99291 CRITICAL CARE FIRST HOUR: CPT

## 2017-12-19 RX ORDER — VANCOMYCIN HCL 1 G
1000 VIAL (EA) INTRAVENOUS ONCE
Qty: 0 | Refills: 0 | Status: DISCONTINUED | OUTPATIENT
Start: 2017-12-19 | End: 2017-12-19

## 2017-12-19 RX ORDER — PHYTONADIONE (VIT K1) 5 MG
2.5 TABLET ORAL ONCE
Qty: 0 | Refills: 0 | Status: COMPLETED | OUTPATIENT
Start: 2017-12-19 | End: 2017-12-19

## 2017-12-19 RX ORDER — CEFEPIME 1 G/1
1000 INJECTION, POWDER, FOR SOLUTION INTRAMUSCULAR; INTRAVENOUS EVERY 24 HOURS
Qty: 0 | Refills: 0 | Status: COMPLETED | OUTPATIENT
Start: 2017-12-20 | End: 2017-12-23

## 2017-12-19 RX ORDER — VANCOMYCIN HCL 1 G
1000 VIAL (EA) INTRAVENOUS ONCE
Qty: 0 | Refills: 0 | Status: COMPLETED | OUTPATIENT
Start: 2017-12-19 | End: 2017-12-19

## 2017-12-19 RX ADMIN — Medication 50 MILLIGRAM(S): at 05:44

## 2017-12-19 RX ADMIN — Medication 50 MILLIGRAM(S): at 11:22

## 2017-12-19 RX ADMIN — CEFEPIME 100 MILLIGRAM(S): 1 INJECTION, POWDER, FOR SOLUTION INTRAMUSCULAR; INTRAVENOUS at 05:30

## 2017-12-19 RX ADMIN — Medication 2.5 MILLIGRAM(S): at 11:21

## 2017-12-19 RX ADMIN — PANTOPRAZOLE SODIUM 40 MILLIGRAM(S): 20 TABLET, DELAYED RELEASE ORAL at 11:22

## 2017-12-19 RX ADMIN — Medication 50 MILLIGRAM(S): at 01:23

## 2017-12-19 RX ADMIN — CHLORHEXIDINE GLUCONATE 15 MILLILITER(S): 213 SOLUTION TOPICAL at 05:31

## 2017-12-19 RX ADMIN — Medication 102 MILLIGRAM(S): at 05:31

## 2017-12-19 RX ADMIN — SODIUM CHLORIDE 100 MILLILITER(S): 9 INJECTION, SOLUTION INTRAVENOUS at 05:29

## 2017-12-19 RX ADMIN — Medication 102 MILLIGRAM(S): at 17:15

## 2017-12-19 RX ADMIN — Medication 250 MILLIGRAM(S): at 11:21

## 2017-12-19 RX ADMIN — Medication 50 MILLIGRAM(S): at 17:16

## 2017-12-19 RX ADMIN — Medication 50 MILLIGRAM(S): at 23:59

## 2017-12-19 RX ADMIN — Medication 103 MILLIGRAM(S): at 11:23

## 2017-12-19 RX ADMIN — Medication 103 MILLIGRAM(S): at 01:24

## 2017-12-19 RX ADMIN — Medication 103 MILLIGRAM(S): at 05:32

## 2017-12-19 NOTE — PROGRESS NOTE ADULT - ASSESSMENT
78 y/o male pmhx CVA ( 2 years ago and in July 17) w/ residual right upper and lower extremity weakness, s/p PEG,  afib on coumadin, s/p colon resection with ileostomy 8 years ago, s/p CBAGx3,  prostate cancer s/p TURP 3 years ago was BIBEMS after wife found him unresponsive with vomit around his face. Pt admitted with septic shock 2/2 to aspiration pneumonia, hypoxic respiratory failure,  and KAPIL. Pt intubated in ED with shock. 78 y/o male pmhx CVA ( 2 years ago and in July 17) w/ residual right upper and lower extremity weakness, s/p PEG,  afib on coumadin, s/p colon resection with ileostomy 8 years ago, s/p CBAGx3,  prostate cancer s/p TURP 3 years ago was BIBEMS after wife found him unresponsive with vomit around his face. Pt admitted with septic shock 2/2 to aspiration pneumonia, hypoxic respiratory failure,  and KAPIL.

## 2017-12-19 NOTE — PROGRESS NOTE ADULT - PROBLEM SELECTOR PLAN 4
f/u urology recommendation UOP adequate   creat improving   continue puckett for now as pt had traumatic placement w hematuria and remains with supratherapeutic INR

## 2017-12-19 NOTE — PROGRESS NOTE ADULT - SUBJECTIVE AND OBJECTIVE BOX
NEPHROLOGY INTERVAL HPI/OVERNIGHT EVENTS:    Examined earlier  ok uop    MEDICATIONS  (STANDING):  ascorbic acid IVPB 1500 milliGRAM(s) IV Intermittent every 6 hours  hydrocortisone sodium succinate Injectable 50 milliGRAM(s) IV Push every 6 hours  pantoprazole  Injectable 40 milliGRAM(s) IV Push daily  thiamine IVPB 200 milliGRAM(s) IV Intermittent every 12 hours    MEDICATIONS  (PRN):      Allergies    penicillins (Hives)    Intolerances        Vital Signs Last 24 Hrs  T(C): 36.9 (19 Dec 2017 12:01), Max: 37.2 (19 Dec 2017 00:00)  T(F): 98.4 (19 Dec 2017 12:01), Max: 99 (19 Dec 2017 00:00)  HR: 94 (19 Dec 2017 16:00) (76 - 133)  BP: 103/57 (19 Dec 2017 16:00) (81/49 - 153/87)  BP(mean): 78 (19 Dec 2017 16:00) (61 - 111)  RR: 18 (19 Dec 2017 16:00) (18 - 31)  SpO2: 95% (19 Dec 2017 16:00) (93% - 100%)  Daily Height in cm: 172.72 (18 Dec 2017 20:15)    Daily Weight in k.5 (19 Dec 2017 06:00)    PHYSICAL EXAM:  GENERAL: Appears chronically ill , orally intubated   NECK: No JVP   CVS S1S2 no rub   CHEST/LUNG: EAE , Bibasilar crackles   ABDOMEN: Soft, + Ostomy , + feeding tube   EXT - No edema     LABS:                        10.1   15.8  )-----------( 156      ( 19 Dec 2017 09:20 )             31.0     12-    137  |  99  |  90.0<H>  ----------------------------<  186<H>  3.9   |  19.0<L>  |  3.51<H>    Ca    8.4<L>      19 Dec 2017 09:20    TPro  6.6  /  Alb  3.1<L>  /  TBili  0.5  /  DBili  x   /  AST  17  /  ALT  19  /  AlkPhos  49  12-    PT/INR - ( 19 Dec 2017 09:20 )   PT: 97.1 sec;   INR: 8.45 ratio         PTT - ( 19 Dec 2017 09:20 )  PTT:59.6 sec  Urinalysis Basic - ( 18 Dec 2017 16:03 )    Color: Yellow / Appearance: Turbid / S.020 / pH: x  Gluc: x / Ketone: Negative  / Bili: Negative / Urobili: Negative   Blood: x / Protein: 30 mg/dL / Nitrite: Negative   Leuk Esterase: Moderate / RBC: 3-5 /HPF / WBC 26-50   Sq Epi: x / Non Sq Epi: Occasional / Bacteria: Occasional        ABG - ( 19 Dec 2017 08:45 )  pH: 7.30  /  pCO2: 36    /  pO2: 136   / HCO3: 18    / Base Excess: -7.7  /  SaO2: 100                   RADIOLOGY & ADDITIONAL TESTS:

## 2017-12-19 NOTE — PROGRESS NOTE ADULT - ASSESSMENT
KAPIL on CKD ( prior creat 1.7 ) Suspect 2/2 hypoperfusion in setting of sepsis/ ? ATN   renal function improving cr downtrending  Resp failure   PNA / sepsis on Abx/ Grm Neg rods in urine/ Marik protocol  Septic shock / Mixed acidosis / Continue IV Bicarbonate   Vanco / Cefepime ( Vanco level in am )  Family has made DNR in ER   Continue the current supportive measures   Sonogram nonobstructive calculi no hydronephrosis  AM labs

## 2017-12-19 NOTE — PROGRESS NOTE ADULT - PROBLEM SELECTOR PLAN 1
weaned off levophed maintaining BP   c/w abx   f/u cultures   Marik protocol - start to taper steroids tomorrow

## 2017-12-19 NOTE — PROGRESS NOTE ADULT - SUBJECTIVE AND OBJECTIVE BOX
Patient is a 77y old  Male who presents with a chief complaint of Found unresponsive in his vomit (18 Dec 2017 19:49)      BRIEF HOSPITAL COURSE: ***    Events last 24 hours: ***    PAST MEDICAL & SURGICAL HISTORY:  CAD (coronary artery disease)  Afib  CVA (cerebral vascular accident)  Mitral valve replaced  BPH (benign prostatic hyperplasia)  High cholesterol  HTN (hypertension)  H/O heart valve replacement with mechanical valve  S/P CABG x 1  H/O colectomy      Review of Systems:  CONSTITUTIONAL: No fever, chills, or fatigue  EYES: No eye pain, visual disturbances, or discharge  ENMT:  No difficulty hearing, tinnitus, vertigo; No sinus or throat pain  NECK: No pain or stiffness  RESPIRATORY: No cough, wheezing, chills or hemoptysis; No shortness of breath  CARDIOVASCULAR: No chest pain, palpitations, dizziness, or leg swelling  GASTROINTESTINAL: No abdominal or epigastric pain. No nausea, vomiting, or hematemesis; No diarrhea or constipation. No melena or hematochezia.  GENITOURINARY: No dysuria, frequency, hematuria, or incontinence  NEUROLOGICAL: No headaches, memory loss, loss of strength, numbness, or tremors  SKIN: No itching, burning, rashes, or lesions   MUSCULOSKELETAL: No joint pain or swelling; No muscle, back, or extremity pain  PSYCHIATRIC: No depression, anxiety, mood swings, or difficulty sleeping      Medications:    norepinephrine Infusion 0.1 MICROgram(s)/kG/Min IV Continuous <Continuous>            pantoprazole  Injectable 40 milliGRAM(s) IV Push daily      hydrocortisone sodium succinate Injectable 50 milliGRAM(s) IV Push every 6 hours    ascorbic acid IVPB 1500 milliGRAM(s) IV Intermittent every 6 hours  dextrose 5% 1000 milliLiter(s) IV Continuous <Continuous>  thiamine IVPB 200 milliGRAM(s) IV Intermittent every 12 hours      chlorhexidine 0.12% Liquid 15 milliLiter(s) Swish and Spit two times a day        Mode: CPAP with PS,As per MD Padgett.  FiO2: 40  PEEP: 5  PS: 5      ICU Vital Signs Last 24 Hrs  T(C): 36.9 (19 Dec 2017 12:01), Max: 37.8 (18 Dec 2017 15:39)  T(F): 98.4 (19 Dec 2017 12:01), Max: 100 (18 Dec 2017 15:39)  HR: 91 (19 Dec 2017 15:00) (76 - 101)  BP: 116/56 (19 Dec 2017 15:00) (81/49 - 153/87)  BP(mean): 80 (19 Dec 2017 15:00) (61 - 111)  ABP: --  ABP(mean): --  RR: 20 (19 Dec 2017 15:00) (20 - 32)  SpO2: 97% (19 Dec 2017 15:00) (93% - 100%)      ABG - ( 19 Dec 2017 08:45 )  pH: 7.30  /  pCO2: 36    /  pO2: 136   / HCO3: 18    / Base Excess: -7.7  /  SaO2: 100                 I&O's Detail    18 Dec 2017 07:01  -  19 Dec 2017 07:00  --------------------------------------------------------  IN:    dextrose 5%: 1200 mL    fentaNYL  Infusion: 11 mL    norepinephrine Infusion: 63.9 mL    Solution: 200 mL    Solution: 100 mL    Solution: 200 mL  Total IN: 1774.9 mL    OUT:    Indwelling Catheter - Urethral: 140 mL  Total OUT: 140 mL    Total NET: 1634.9 mL      19 Dec 2017 07:01  -  19 Dec 2017 15:22  --------------------------------------------------------  IN:    dextrose 5%: 500 mL    norepinephrine Infusion: 16.8 mL  Total IN: 516.8 mL    OUT:    Indwelling Catheter - Urethral: 255 mL  Total OUT: 255 mL    Total NET: 261.8 mL            LABS:                        10.1   15.8  )-----------( 156      ( 19 Dec 2017 09:20 )             31.0     12-    137  |  99  |  90.0<H>  ----------------------------<  186<H>  3.9   |  19.0<L>  |  3.51<H>    Ca    8.4<L>      19 Dec 2017 09:20    TPro  6.6  /  Alb  3.1<L>  /  TBili  0.5  /  DBili  x   /  AST  17  /  ALT  19  /  AlkPhos  49  12-          CAPILLARY BLOOD GLUCOSE        PT/INR - ( 19 Dec 2017 09:20 )   PT: 97.1 sec;   INR: 8.45 ratio         PTT - ( 19 Dec 2017 09:20 )  PTT:59.6 sec  Urinalysis Basic - ( 18 Dec 2017 16:03 )    Color: Yellow / Appearance: Turbid / S.020 / pH: x  Gluc: x / Ketone: Negative  / Bili: Negative / Urobili: Negative   Blood: x / Protein: 30 mg/dL / Nitrite: Negative   Leuk Esterase: Moderate / RBC: 3-5 /HPF / WBC 26-50   Sq Epi: x / Non Sq Epi: Occasional / Bacteria: Occasional      CULTURES:  Culture Results:   >100,000 CFU/ml Gram Negative Rods Identification and susceptibility to  follow.  Culture in progress ( @ 16:02)      Physical Examination:    General: No acute distress.      HEENT: Pupils equal, reactive to light.  Symmetric.    PULM: Clear to auscultation bilaterally, no significant sputum production    CVS: Regular rate and rhythm, no murmurs, rubs, or gallops    ABD: Soft, nondistended, nontender, normoactive bowel sounds, no masses    EXT: No edema, nontender    SKIN: Warm and well perfused, no rashes noted.    NEURO: Alert, oriented, interactive, nonfocal    RADIOLOGY: ***    CRITICAL CARE TIME SPENT: *** Patient is a 77y old  Male who presents with a chief complaint of Found unresponsive in his vomit (18 Dec 2017 19:49)      BRIEF HOSPITAL COURSE: 78 y/o male PMH CVA ( 2 years ago and in ) w/ residual right upper and lower extremity weakness, s/p PEG,  AF on coumadin, s/p colon resection with ileostomy 8 years ago, s/p CBAGx3,  prostate cancer s/p TURP 3 years ago was BIBEMS after wife found him unresponsive with vomit around his face. Pt admitted with septic shock 2/2 to aspiration pneumonia, hypoxic respiratory failure,  and KAPIL.     Events last 24 hours: off pressors since 6am, awake following commands    PAST MEDICAL & SURGICAL HISTORY:  CAD (coronary artery disease)  Afib  CVA (cerebral vascular accident)  Mitral valve replaced  BPH (benign prostatic hyperplasia)  High cholesterol  HTN (hypertension)  H/O heart valve replacement with mechanical valve  S/P CABG x 1  H/O colectomy      Review of Systems:  Cannot be obtained pt intubated       Medications:    norepinephrine Infusion 0.1 MICROgram(s)/kG/Min IV Continuous <Continuous>            pantoprazole  Injectable 40 milliGRAM(s) IV Push daily      hydrocortisone sodium succinate Injectable 50 milliGRAM(s) IV Push every 6 hours    ascorbic acid IVPB 1500 milliGRAM(s) IV Intermittent every 6 hours  dextrose 5% 1000 milliLiter(s) IV Continuous <Continuous>  thiamine IVPB 200 milliGRAM(s) IV Intermittent every 12 hours      chlorhexidine 0.12% Liquid 15 milliLiter(s) Swish and Spit two times a day        Mode: CPAP with PS,As per MD Padgett.  FiO2: 40  PEEP: 5  PS: 5      ICU Vital Signs Last 24 Hrs  T(C): 36.9 (19 Dec 2017 12:01), Max: 37.8 (18 Dec 2017 15:39)  T(F): 98.4 (19 Dec 2017 12:01), Max: 100 (18 Dec 2017 15:39)  HR: 91 (19 Dec 2017 15:00) (76 - 101)  BP: 116/56 (19 Dec 2017 15:00) (81/49 - 153/87)  BP(mean): 80 (19 Dec 2017 15:00) (61 - 111)  ABP: --  ABP(mean): --  RR: 20 (19 Dec 2017 15:00) (20 - 32)  SpO2: 97% (19 Dec 2017 15:00) (93% - 100%)      ABG - ( 19 Dec 2017 08:45 )  pH: 7.30  /  pCO2: 36    /  pO2: 136   / HCO3: 18    / Base Excess: -7.7  /  SaO2: 100                 I&O's Detail    18 Dec 2017 07:01  -  19 Dec 2017 07:00  --------------------------------------------------------  IN:    dextrose 5%: 1200 mL    fentaNYL  Infusion: 11 mL    norepinephrine Infusion: 63.9 mL    Solution: 200 mL    Solution: 100 mL    Solution: 200 mL  Total IN: 1774.9 mL    OUT:    Indwelling Catheter - Urethral: 140 mL  Total OUT: 140 mL    Total NET: 1634.9 mL      19 Dec 2017 07:01  -  19 Dec 2017 15:22  --------------------------------------------------------  IN:    dextrose 5%: 500 mL    norepinephrine Infusion: 16.8 mL  Total IN: 516.8 mL    OUT:    Indwelling Catheter - Urethral: 255 mL  Total OUT: 255 mL    Total NET: 261.8 mL            LABS:                        10.1   15.8  )-----------( 156      ( 19 Dec 2017 09:20 )             31.0     12-    137  |  99  |  90.0<H>  ----------------------------<  186<H>  3.9   |  19.0<L>  |  3.51<H>    Ca    8.4<L>      19 Dec 2017 09:20    TPro  6.6  /  Alb  3.1<L>  /  TBili  0.5  /  DBili  x   /  AST  17  /  ALT  19  /  AlkPhos  49  12-          CAPILLARY BLOOD GLUCOSE        PT/INR - ( 19 Dec 2017 09:20 )   PT: 97.1 sec;   INR: 8.45 ratio         PTT - ( 19 Dec 2017 09:20 )  PTT:59.6 sec  Urinalysis Basic - ( 18 Dec 2017 16:03 )    Color: Yellow / Appearance: Turbid / S.020 / pH: x  Gluc: x / Ketone: Negative  / Bili: Negative / Urobili: Negative   Blood: x / Protein: 30 mg/dL / Nitrite: Negative   Leuk Esterase: Moderate / RBC: 3-5 /HPF / WBC 26-50   Sq Epi: x / Non Sq Epi: Occasional / Bacteria: Occasional      CULTURES:  Culture Results:   >100,000 CFU/ml Gram Negative Rods Identification and susceptibility to  follow.  Culture in progress ( @ 16:02)      Physical Examination:    General: No acute distress.      HEENT: Pupils equal, reactive to light.  Symmetric.    PULM: diminished bronchovesicular to auscultation bilaterally, no significant sputum production    CVS: irregular rate and rhythm     ABD: Soft, nondistended, nontender, normoactive bowel sounds, ostomy pink and functioning    EXT: + edema, nontender    SKIN: Warm and well perfused, areas of ecchymosis on extremities     NEURO: Arousable, following commands, nonfocal diffusely weak    RADIOLOGY:     < from: US Renal (17 @ 09:30) >    FINDINGS:    Right kidney:  9.8 cm. Small nonobstructive calculus in the upper pole   measuring approximately 4 mm. No hydronephrosis.    Left kidney:  10.1 cm. There is a cyst in the upper pole measuring 2.3 x   2.5 cm. No renal calculi or hydronephrosis are seen.    IMPRESSION:     Nonobstructive calculus in the upper pole of the right kidney, no   hydronephrosis.    Simple cyst in the upper pole of the left kidney.    < end of copied text >  < from: Xray Chest 1 View AP/PA. (17 @ 20:39) >  FINDINGS /   IMPRESSION:     There are interval development of bilateral parenchymal airspace and   interstitial opacities with small bibasilar effusion and subsegmental   atelectasis suggestive of interstitial edema. Patient is status post and   not any and cardiac surgery. On follow-up exam, patient is status post   intubation with ET tube just below the level of clavicles. Nasogastric   tube is noted tip is not imaged but below diaphragm.  There is right IJ   catheter tip in the region of right atrium and just been the suggested.   There is osteopenia and multilevel thoracic and glenohumeral   degeneration.     < end of copied text >    CRITICAL CARE TIME SPENT: 60m

## 2017-12-19 NOTE — AIRWAY REMOVAL NOTE  ADULT & PEDS - ARTIFICAL AIRWAY REMOVAL COMMENTS
Pt extubated to 40% CAM as per MICU PA. Pt tolerated well, NARD. Current Sao2=96%. No stridor present.

## 2017-12-20 DIAGNOSIS — R13.19 OTHER DYSPHAGIA: ICD-10-CM

## 2017-12-20 DIAGNOSIS — R53.81 OTHER MALAISE: ICD-10-CM

## 2017-12-20 DIAGNOSIS — I63.9 CEREBRAL INFARCTION, UNSPECIFIED: ICD-10-CM

## 2017-12-20 DIAGNOSIS — Z51.5 ENCOUNTER FOR PALLIATIVE CARE: ICD-10-CM

## 2017-12-20 DIAGNOSIS — N39.0 URINARY TRACT INFECTION, SITE NOT SPECIFIED: ICD-10-CM

## 2017-12-20 LAB
-  AMIKACIN: SIGNIFICANT CHANGE UP
-  AZTREONAM: SIGNIFICANT CHANGE UP
-  CEFEPIME: SIGNIFICANT CHANGE UP
-  CEFTAZIDIME: SIGNIFICANT CHANGE UP
-  CIPROFLOXACIN: SIGNIFICANT CHANGE UP
-  GENTAMICIN: SIGNIFICANT CHANGE UP
-  IMIPENEM: SIGNIFICANT CHANGE UP
-  LEVOFLOXACIN: SIGNIFICANT CHANGE UP
-  MEROPENEM: SIGNIFICANT CHANGE UP
-  PIPERACILLIN/TAZOBACTAM: SIGNIFICANT CHANGE UP
-  TOBRAMYCIN: SIGNIFICANT CHANGE UP
ANION GAP SERPL CALC-SCNC: 21 MMOL/L — HIGH (ref 5–17)
BUN SERPL-MCNC: 95 MG/DL — HIGH (ref 8–20)
CALCIUM SERPL-MCNC: 8.6 MG/DL — SIGNIFICANT CHANGE UP (ref 8.6–10.2)
CHLORIDE SERPL-SCNC: 98 MMOL/L — SIGNIFICANT CHANGE UP (ref 98–107)
CO2 SERPL-SCNC: 19 MMOL/L — LOW (ref 22–29)
CREAT SERPL-MCNC: 3.67 MG/DL — HIGH (ref 0.5–1.3)
CULTURE RESULTS: SIGNIFICANT CHANGE UP
GLUCOSE SERPL-MCNC: 214 MG/DL — HIGH (ref 70–115)
HCT VFR BLD CALC: 27.7 % — LOW (ref 42–52)
HGB BLD-MCNC: 9.3 G/DL — LOW (ref 14–18)
INR BLD: 3.36 RATIO — HIGH (ref 0.88–1.16)
MAGNESIUM SERPL-MCNC: 1.9 MG/DL — SIGNIFICANT CHANGE UP (ref 1.6–2.6)
MCHC RBC-ENTMCNC: 30 PG — SIGNIFICANT CHANGE UP (ref 27–31)
MCHC RBC-ENTMCNC: 33.6 G/DL — SIGNIFICANT CHANGE UP (ref 32–36)
MCV RBC AUTO: 89.4 FL — SIGNIFICANT CHANGE UP (ref 80–94)
METHOD TYPE: SIGNIFICANT CHANGE UP
ORGANISM # SPEC MICROSCOPIC CNT: SIGNIFICANT CHANGE UP
ORGANISM # SPEC MICROSCOPIC CNT: SIGNIFICANT CHANGE UP
PHOSPHATE SERPL-MCNC: 4.4 MG/DL — SIGNIFICANT CHANGE UP (ref 2.4–4.7)
PLATELET # BLD AUTO: 135 K/UL — LOW (ref 150–400)
POTASSIUM SERPL-MCNC: 3.4 MMOL/L — LOW (ref 3.5–5.3)
POTASSIUM SERPL-SCNC: 3.4 MMOL/L — LOW (ref 3.5–5.3)
PROTHROM AB SERPL-ACNC: 37.9 SEC — HIGH (ref 9.8–12.7)
RBC # BLD: 3.1 M/UL — LOW (ref 4.6–6.2)
RBC # FLD: 15.1 % — SIGNIFICANT CHANGE UP (ref 11–15.6)
SODIUM SERPL-SCNC: 138 MMOL/L — SIGNIFICANT CHANGE UP (ref 135–145)
SPECIMEN SOURCE: SIGNIFICANT CHANGE UP
VANCOMYCIN TROUGH SERPL-MCNC: 14.1 UG/ML — SIGNIFICANT CHANGE UP (ref 10–20)
WBC # BLD: 11.3 K/UL — HIGH (ref 4.8–10.8)
WBC # FLD AUTO: 11.3 K/UL — HIGH (ref 4.8–10.8)

## 2017-12-20 PROCEDURE — 99233 SBSQ HOSP IP/OBS HIGH 50: CPT

## 2017-12-20 PROCEDURE — 99222 1ST HOSP IP/OBS MODERATE 55: CPT

## 2017-12-20 RX ORDER — HYDROCORTISONE 20 MG
50 TABLET ORAL EVERY 8 HOURS
Qty: 0 | Refills: 0 | Status: DISCONTINUED | OUTPATIENT
Start: 2017-12-20 | End: 2017-12-21

## 2017-12-20 RX ORDER — FERROUS SULFATE 325(65) MG
300 TABLET ORAL DAILY
Qty: 0 | Refills: 0 | Status: DISCONTINUED | OUTPATIENT
Start: 2017-12-21 | End: 2017-12-25

## 2017-12-20 RX ORDER — POTASSIUM CHLORIDE 20 MEQ
20 PACKET (EA) ORAL ONCE
Qty: 0 | Refills: 0 | Status: DISCONTINUED | OUTPATIENT
Start: 2017-12-20 | End: 2017-12-20

## 2017-12-20 RX ORDER — PANTOPRAZOLE SODIUM 20 MG/1
40 TABLET, DELAYED RELEASE ORAL
Qty: 0 | Refills: 0 | Status: DISCONTINUED | OUTPATIENT
Start: 2017-12-20 | End: 2017-12-31

## 2017-12-20 RX ORDER — ATORVASTATIN CALCIUM 80 MG/1
10 TABLET, FILM COATED ORAL AT BEDTIME
Qty: 0 | Refills: 0 | Status: DISCONTINUED | OUTPATIENT
Start: 2017-12-21 | End: 2018-01-09

## 2017-12-20 RX ORDER — FINASTERIDE 5 MG/1
5 TABLET, FILM COATED ORAL DAILY
Qty: 0 | Refills: 0 | Status: DISCONTINUED | OUTPATIENT
Start: 2017-12-20 | End: 2018-01-09

## 2017-12-20 RX ORDER — POTASSIUM CHLORIDE 20 MEQ
20 PACKET (EA) ORAL ONCE
Qty: 0 | Refills: 0 | Status: COMPLETED | OUTPATIENT
Start: 2017-12-20 | End: 2017-12-20

## 2017-12-20 RX ORDER — MAGNESIUM SULFATE 500 MG/ML
1 VIAL (ML) INJECTION ONCE
Qty: 0 | Refills: 0 | Status: COMPLETED | OUTPATIENT
Start: 2017-12-20 | End: 2017-12-20

## 2017-12-20 RX ORDER — FOLIC ACID 0.8 MG
1 TABLET ORAL DAILY
Qty: 0 | Refills: 0 | Status: DISCONTINUED | OUTPATIENT
Start: 2017-12-20 | End: 2018-01-12

## 2017-12-20 RX ADMIN — Medication 100 GRAM(S): at 14:38

## 2017-12-20 RX ADMIN — Medication 50 MILLIGRAM(S): at 22:23

## 2017-12-20 RX ADMIN — Medication 103 MILLIGRAM(S): at 00:00

## 2017-12-20 RX ADMIN — Medication 50 MILLIGRAM(S): at 05:15

## 2017-12-20 RX ADMIN — CEFEPIME 100 MILLIGRAM(S): 1 INJECTION, POWDER, FOR SOLUTION INTRAMUSCULAR; INTRAVENOUS at 05:14

## 2017-12-20 RX ADMIN — Medication 102 MILLIGRAM(S): at 05:15

## 2017-12-20 RX ADMIN — Medication 103 MILLIGRAM(S): at 05:14

## 2017-12-20 RX ADMIN — Medication 50 MILLIGRAM(S): at 14:38

## 2017-12-20 RX ADMIN — Medication 20 MILLIEQUIVALENT(S): at 11:00

## 2017-12-20 NOTE — PROGRESS NOTE ADULT - PROBLEM SELECTOR PLAN 4
Related to Septic Shock   creat improving and Urine Output improving   will remove puckett today and mobilize  Continue to follow BUN/Creat andf renal F/U   Avoid nephrotoxic agents

## 2017-12-20 NOTE — CHART NOTE - NSCHARTNOTEFT_GEN_A_CORE
Patient seen and examined at bedside. No acute events overnight. Patient lying in bed comfortably. Patient states he is feeling well, feels a dry mouth. Otherwise is trying to rest and slightly sleepy. Patient denies chest pain, SOB, abd pain, N/V, fever, chills, dysuria or any other complaints. All remainder ROS negative.     ICU Vital Signs Last 24 Hrs  T(C): 36.1 (20 Dec 2017 11:49), Max: 37.1 (20 Dec 2017 04:00)  T(F): 96.9 (20 Dec 2017 11:49), Max: 98.7 (20 Dec 2017 04:00)  HR: 93 (20 Dec 2017 11:49) (86 - 133)  BP: 97/59 (20 Dec 2017 11:49) (97/59 - 122/58)  BP(mean): 78 (20 Dec 2017 08:00) (69 - 82)  ABP: --  ABP(mean): --  RR: 18 (20 Dec 2017 11:49) (17 - 30)  SpO2: 97% (20 Dec 2017 11:49) (94% - 98%)        Physical Exam:  Constitutional: WD WN NAD   Cardio: S1S2 RRR, no MRG   Lungs: b/l rhonchi and decreased air entry   Gastro: Soft, NT ND BS+ normoactive  ostomy bag in place with brown stool  peg in place C/D/I   Ext: No edema, cyanosis or deformity       A/P:  78 y/o male pmhx CVA ( 2 years ago and in July 17) w/ 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 Placed on ventilator and vasopressor support. Septic shock resolved. No longer requiring vasopressors and s/p extubation. Continues on cefepime for pseudomona aeruginosa UTI and enterovirus positive, Bcx negative for growth. His renal function remains compromised but is slowly down trending and likely secondary to ATN from septic shock.     He is extubated and now breathing comfortably on his own. His renal function is still compromised, with the Creatnine being 3.6 today. This is improving though and he is actively making urine. He no can be transitioned out of the ICU to a monitored bed setting. Dr Lord will assume his care at this point and Palliative care will follow up with him. Patient seen and examined at bedside. No acute events overnight. Patient lying in bed comfortably. Patient states he is feeling well, feels a dry mouth. Otherwise is trying to rest and slightly sleepy. Patient denies chest pain, SOB, abd pain, N/V, fever, chills, dysuria or any other complaints. All remainder ROS negative.     ICU Vital Signs Last 24 Hrs  T(C): 36.1 (20 Dec 2017 11:49), Max: 37.1 (20 Dec 2017 04:00)  T(F): 96.9 (20 Dec 2017 11:49), Max: 98.7 (20 Dec 2017 04:00)  HR: 93 (20 Dec 2017 11:49) (86 - 133)  BP: 97/59 (20 Dec 2017 11:49) (97/59 - 122/58)  BP(mean): 78 (20 Dec 2017 08:00) (69 - 82)  ABP: --  ABP(mean): --  RR: 18 (20 Dec 2017 11:49) (17 - 30)  SpO2: 97% (20 Dec 2017 11:49) (94% - 98%)        Physical Exam:  Constitutional: WD WN NAD   Cardio: S1S2 RRR, no MRG   Lungs: b/l rhonchi and decreased air entry   Gastro: Soft, NT ND BS+ normoactive  ostomy bag in place with brown stool  peg in place C/D/I   Ext: No edema, cyanosis or deformity       A/P:  78 y/o male pmhx CVA ( 2 years ago and in July 17) w/ 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 Placed on ventilator and vasopressor support. Septic shock resolved. No longer requiring vasopressors and s/p extubation. Continues on cefepime for pseudomona aeruginosa UTI and enterovirus positive, Bcx negative for growth, leukocytosis trending down and remained afebrile. BP remains normotensive, will c/w tapering off of stress dose steroids. His renal function remains compromised but is slowly down trending and likely secondary to ATN from septic shock with underlying known hx of ckd stage 3. Renal US noted, no obstruction or hydronephrosis noted. Nephrology continues to follow the patient. Electrolytes with hypokalemia replaced. A fib with supratherapeutic INR likely secondary to abx and shock, to remain off of coumadin until INR at goal 2-3 range. Off of AV laurence agents for now given initial shock will monitor HR and BP for now. BPH restarted proscar will also restart flomax if bp remains stable. Anemia likely from CKD on ferrous sulfate and folic acid to be restarted in the am. CAD will restart atorvastatin tomorrow if lfts remain wnl. DVT ppx covered with supra therapeutic INR. GI ppx while on steroids.    .

## 2017-12-20 NOTE — DIETITIAN INITIAL EVALUATION ADULT. - PROBLEM SELECTOR PLAN 4
likely acute on chronic but unable to compare since patient has not been to [facility] before  Avoid nephrotoxic agents  consult nephrology  f/u urology recommendation

## 2017-12-20 NOTE — PROGRESS NOTE ADULT - ASSESSMENT
76 y/o male pmhx CVA ( 2 years ago and in July 17) w/ residual right upper and lower extremity weakness, s/p PEG,  afib on coumadin, s/p colon resection with ileostomy 8 years ago, s/p CBAGx3,  prostate cancer s/p TURP 3 years ago was BIBEMS after wife found him unresponsive with vomit around his face. Pt admitted with septic shock 2/2 to aspiration pneumonia, hypoxic respiratory failure,  and AKPIL.  He has now recovered his septic shock and is no longer requiring vasopressors. He is extubated and now breathing comfortably on his own. His renal function is still compromised, with the Creatnine being 3.6 today. This is improving though and he is actively making urine. He no can be transitioned out of the ICU to a monitored bed setting. Dr Lord will assume his care at this point and Palliative care will follow up with him.    A total of 30 mins was spent evaluating and treating this patient. 76 y/o male pmhx CVA ( 2 years ago and in July 17) w/ residual right upper and lower extremity weakness, s/p PEG,  afib on coumadin, s/p colon resection with ileostomy 8 years ago, s/p CBAGx3,  prostate cancer s/p TURP 3 years ago was BIBEMS after wife found him unresponsive with vomit around his face. Pt admitted with septic shock 2/2 to aspiration pneumonia, hypoxic respiratory failure,  and KAPIL.  He has now recovered his septic shock and is no longer requiring vasopressors. He is extubated and now breathing comfortably on his own. His renal function is still compromised, with the Creatnine being 3.6 today. This is improving though and he is actively making urine. He now can be transitioned out of the ICU to a monitored bed setting. Dr Lord will assume his care at this point and Palliative care will follow up with him.    A total of 30 mins was spent evaluating and treating this patient.

## 2017-12-20 NOTE — PROGRESS NOTE ADULT - PROBLEM SELECTOR PLAN 1
weaned off levophed maintaining BP   c/w abx   F/U cultures showing GNR in the urine. Continue Cefepime x 5 days  Marik protocol - start to taper steroids tomorrow

## 2017-12-20 NOTE — DIETITIAN INITIAL EVALUATION ADULT. - PERTINENT LABORATORY DATA
12-20 Na138 mmol/L Glu 214 mg/dL<H> K+ 3.4 mmol/L<L> Cr  3.67 mg/dL<H> BUN 95.0 mg/dL<H> Phos 4.4 mg/dL Alb n/a   PAB n/a

## 2017-12-20 NOTE — CONSULT NOTE ADULT - ASSESSMENT
77M post CVA in 2015 with right hemiparesis, dysphagia/PEG, acute on chronic kidney dysfunction with pseudomonas UTI, enterovirus, post MICU.

## 2017-12-20 NOTE — CONSULT NOTE ADULT - PROBLEM SELECTOR RECOMMENDATION 5
- poor overall prognosis, multiple readmissions, debilitated, will need to meet with wife to discuss longer term goals.

## 2017-12-20 NOTE — CONSULT NOTE ADULT - SUBJECTIVE AND OBJECTIVE BOX
HPI: 77M with PMH as listed admitted  with acute shortness of breath, weakness, hypoxic respiratory failure, intubation, acute kidney injury requiring admission to MICU.  Now s/p extubation and transfer out of ICU.     PERTINENT PMH REVIEWED: Yes     PAST MEDICAL & SURGICAL HISTORY:  CAD (coronary artery disease)  Afib  CVA (cerebral vascular accident)  Mitral valve replaced  BPH (benign prostatic hyperplasia)  High cholesterol  HTN (hypertension)  H/O heart valve replacement with mechanical valve  S/P CABG x 1  H/O colectomy    SOCIAL HISTORY:  lives with wife at home                                    Admitted from:  home     Surrogate - wife Sissy     FAMILY HISTORY:  No pertinent family history in first degree relatives    Baseline ADLs (prior to admission):  Independent/ Dependent      Allergies    penicillins (Hives)    Present Symptoms:     Dyspnea: 0 1 2 3   Nausea/Vomiting: Yes No  Anxiety:  Yes No  Depression: Yes No  Fatigue: Yes No  Loss of appetite: Yes No    Pain:             Character-            Duration-            Effect-            Factors-            Frequency-            Location-            Severity-    Review of Systems: Reviewed                     Negative:                     Positive:  Unable to obtain due to poor mentation   All others negative    MEDICATIONS  (STANDING):  cefepime  IVPB 1000 milliGRAM(s) IV Intermittent every 24 hours  hydrocortisone sodium succinate Injectable 50 milliGRAM(s) IV Push every 6 hours  magnesium sulfate  IVPB 1 Gram(s) IV Intermittent once  pantoprazole    Tablet 40 milliGRAM(s) Oral before breakfast  potassium chloride   Powder 20 milliEquivalent(s) Oral once    PHYSICAL EXAM:    Vital Signs Last 24 Hrs  T(C): 36.5 (20 Dec 2017 07:27), Max: 37.1 (20 Dec 2017 04:00)  T(F): 97.7 (20 Dec 2017 07:27), Max: 98.7 (20 Dec 2017 04:00)  HR: 86 (20 Dec 2017 08:00) (81 - 133)  BP: 107/55 (20 Dec 2017 08:00) (95/53 - 153/87)  BP(mean): 78 (20 Dec 2017 08:00) (67 - 111)  RR: 18 (20 Dec 2017 08:00) (17 - 30)  SpO2: 98% (20 Dec 2017 08:00) (94% - 98%)    General: alert  oriented x ____ lethargic agitated                  cachexia  nonverbal  coma    Karnofsky:  %    HEENT: normal  dry mouth  ET tube/trach    Lungs: comfortable tachypnea/labored breathing  excessive secretions    CV: normal  tachycardia    GI: normal  distended  tender  no BS               PEG/NG/OG tube  constipation  last BM:     : normal  incontinent  oliguria/anuria  puckett    MSK: normal  weakness  edema             ambulatory  bedbound/wheelchair bound    Skin: normal  pressure ulcers- Stage_____  no rash    LABS:                      9.3    11.3  )-----------( 135      ( 20 Dec 2017 05:56 )             27.7     12-20    138  |  98  |  95.0<H>  ----------------------------<  214<H>  3.4<L>   |  19.0<L>  |  3.67<H>    Ca    8.6      20 Dec 2017 05:56  Phos  4.4       Mg     1.9         TPro  6.6  /  Alb  3.1<L>  /  TBili  0.5  /  DBili  x   /  AST  17  /  ALT  19  /  AlkPhos  49  12-    PT/INR - ( 20 Dec 2017 05:56 )   PT: 37.9 sec;   INR: 3.36 ratio       PTT - ( 19 Dec 2017 09:20 )  PTT:59.6 sec  Urinalysis Basic - ( 18 Dec 2017 16:03 )    Color: Yellow / Appearance: Turbid / S.020 / pH: x  Gluc: x / Ketone: Negative  / Bili: Negative / Urobili: Negative   Blood: x / Protein: 30 mg/dL / Nitrite: Negative   Leuk Esterase: Moderate / RBC: 3-5 /HPF / WBC 26-50   Sq Epi: x / Non Sq Epi: Occasional / Bacteria: Occasional    I&O's Summary    19 Dec 2017 07:  -  20 Dec 2017 07:00  --------------------------------------------------------  IN: 1576.8 mL / OUT: 1055 mL / NET: 521.8 mL    20 Dec 2017 07:01  -  20 Dec 2017 10:23  --------------------------------------------------------  IN: 100 mL / OUT: 70 mL / NET: 30 mL    RADIOLOGY & ADDITIONAL STUDIES:    ADVANCE DIRECTIVES:   DNR YES NO  Completed on:                     MOLST  YES NO   Completed on:  Living Will  YES NO   Completed on: HPI: 77M with PMH as listed admitted  with acute shortness of breath, weakness, hypoxic respiratory failure, intubation, acute kidney injury requiring admission to MICU.  Now s/p extubation and transfer out of ICU. + entero/rhinovirus.     PERTINENT PMH REVIEWED: Yes     PAST MEDICAL & SURGICAL HISTORY:  CAD (coronary artery disease)  Afib  CVA (cerebral vascular accident)  Mitral valve replaced  BPH (benign prostatic hyperplasia)  High cholesterol  HTN (hypertension)  H/O heart valve replacement with mechanical valve  S/P CABG x 1  H/O colectomy    SOCIAL HISTORY:  lives with wife at home                                    Admitted from:  home     Surrogate - wife Sissy     FAMILY HISTORY:  No pertinent family history in first degree relatives    Baseline ADLs (prior to admission):  Independent/ Dependent      Allergies    penicillins (Hives)    Present Symptoms:     Dyspnea: 0 1 2 3   Nausea/Vomiting: Yes No  Anxiety:  Yes No  Depression: Yes No  Fatigue: Yes No  Loss of appetite: Yes No    Pain:             Character-            Duration-            Effect-            Factors-            Frequency-            Location-            Severity-    Review of Systems: Reviewed                     Negative:                     Positive:  Unable to obtain due to poor mentation   All others negative    MEDICATIONS  (STANDING):  cefepime  IVPB 1000 milliGRAM(s) IV Intermittent every 24 hours  hydrocortisone sodium succinate Injectable 50 milliGRAM(s) IV Push every 6 hours  magnesium sulfate  IVPB 1 Gram(s) IV Intermittent once  pantoprazole    Tablet 40 milliGRAM(s) Oral before breakfast  potassium chloride   Powder 20 milliEquivalent(s) Oral once    PHYSICAL EXAM:    Vital Signs Last 24 Hrs  T(C): 36.5 (20 Dec 2017 07:27), Max: 37.1 (20 Dec 2017 04:00)  T(F): 97.7 (20 Dec 2017 07:27), Max: 98.7 (20 Dec 2017 04:00)  HR: 86 (20 Dec 2017 08:00) (81 - 133)  BP: 107/55 (20 Dec 2017 08:00) (95/53 - 153/87)  BP(mean): 78 (20 Dec 2017 08:00) (67 - 111)  RR: 18 (20 Dec 2017 08:00) (17 - 30)  SpO2: 98% (20 Dec 2017 08:00) (94% - 98%)    General: alert  oriented x ____ lethargic agitated                  cachexia  nonverbal  coma    Karnofsky:  %    HEENT: normal  dry mouth  ET tube/trach    Lungs: comfortable tachypnea/labored breathing  excessive secretions    CV: normal  tachycardia    GI: normal  distended  tender  no BS               PEG/NG/OG tube  constipation  last BM:     : normal  incontinent  oliguria/anuria  puckett    MSK: normal  weakness  edema             ambulatory  bedbound/wheelchair bound    Skin: normal  pressure ulcers- Stage_____  no rash    LABS:                      9.3    11.3  )-----------( 135      ( 20 Dec 2017 05:56 )             27.7     12-20    138  |  98  |  95.0<H>  ----------------------------<  214<H>  3.4<L>   |  19.0<L>  |  3.67<H>    Ca    8.6      20 Dec 2017 05:56  Phos  4.4     12-20  Mg     1.9         TPro  6.6  /  Alb  3.1<L>  /  TBili  0.5  /  DBili  x   /  AST  17  /  ALT  19  /  AlkPhos  49  12-19    PT/INR - ( 20 Dec 2017 05:56 )   PT: 37.9 sec;   INR: 3.36 ratio       PTT - ( 19 Dec 2017 09:20 )  PTT:59.6 sec  Urinalysis Basic - ( 18 Dec 2017 16:03 )    Color: Yellow / Appearance: Turbid / S.020 / pH: x  Gluc: x / Ketone: Negative  / Bili: Negative / Urobili: Negative   Blood: x / Protein: 30 mg/dL / Nitrite: Negative   Leuk Esterase: Moderate / RBC: 3-5 /HPF / WBC 26-50   Sq Epi: x / Non Sq Epi: Occasional / Bacteria: Occasional    I&O's Summary    19 Dec 2017 07:  -  20 Dec 2017 07:00  --------------------------------------------------------  IN: 1576.8 mL / OUT: 1055 mL / NET: 521.8 mL    20 Dec 2017 07:  -  20 Dec 2017 10:23  --------------------------------------------------------  IN: 100 mL / OUT: 70 mL / NET: 30 mL    RADIOLOGY & ADDITIONAL STUDIES:    ADVANCE DIRECTIVES:   DNR YES NO  Completed on:                     MOLST  YES NO   Completed on:  Living Will  YES NO   Completed on: HPI: 77M with PMH as listed admitted  with acute shortness of breath, weakness, hypoxic respiratory failure, intubation, acute kidney injury requiring admission to MICU.  Now s/p extubation and transfer out of ICU. + entero/rhinovirus. Pseudomonas UTI, being treated with cefepime.     PERTINENT PMH REVIEWED: Yes     PAST MEDICAL & SURGICAL HISTORY:  CAD (coronary artery disease)  Afib  CVA (cerebral vascular accident)  Mitral valve replaced  BPH (benign prostatic hyperplasia)  High cholesterol  HTN (hypertension)  H/O heart valve replacement with mechanical valve  S/P CABG x 1  H/O colectomy    SOCIAL HISTORY:  lives with wife at home                                    Admitted from:  home     Surrogate - wife Sissy / [phone]     FAMILY HISTORY:  No pertinent family history in first degree relatives    Baseline ADLs (prior to admission):  Dependent      Allergies    penicillins (Hives)    Present Symptoms:     Dyspnea: 0   Nausea/Vomiting: No  Anxiety:  No  Depression: No  Fatigue: Yes   Loss of appetite: No    Pain: none             Character-            Duration-            Effect-            Factors-            Frequency-            Location-            Severity-    Review of Systems: Reviewed          Unable to obtain due to poor mentation   All others negative    MEDICATIONS  (STANDING):  cefepime  IVPB 1000 milliGRAM(s) IV Intermittent every 24 hours  hydrocortisone sodium succinate Injectable 50 milliGRAM(s) IV Push every 6 hours  magnesium sulfate  IVPB 1 Gram(s) IV Intermittent once  pantoprazole    Tablet 40 milliGRAM(s) Oral before breakfast  potassium chloride   Powder 20 milliEquivalent(s) Oral once    PHYSICAL EXAM:    Vital Signs Last 24 Hrs  T(C): 36.5 (20 Dec 2017 07:27), Max: 37.1 (20 Dec 2017 04:00)  T(F): 97.7 (20 Dec 2017 07:27), Max: 98.7 (20 Dec 2017 04:00)  HR: 86 (20 Dec 2017 08:00) (81 - 133)  BP: 107/55 (20 Dec 2017 08:00) (95/53 - 153/87)  BP(mean): 78 (20 Dec 2017 08:00) (67 - 111)  RR: 18 (20 Dec 2017 08:00) (17 - 30)  SpO2: 98% (20 Dec 2017 08:00) (94% - 98%)    General: alert not oriented             Karnofsky:  30%    HEENT: normal; dry mouth     Lungs: comfortable    CV: normal      GI: PEG     : normal    MSK: weakness      Skin:  no rash    LABS:                      9.3    11.3  )-----------( 135      ( 20 Dec 2017 05:56 )             27.7     12-    138  |  98  |  95.0<H>  ----------------------------<  214<H>  3.4<L>   |  19.0<L>  |  3.67<H>    Ca    8.6      20 Dec 2017 05:56  Phos  4.4       Mg     1.9         TPro  6.6  /  Alb  3.1<L>  /  TBili  0.5  /  DBili  x   /  AST  17  /  ALT  19  /  AlkPhos  49      PT/INR - ( 20 Dec 2017 05:56 )   PT: 37.9 sec;   INR: 3.36 ratio       PTT - ( 19 Dec 2017 09:20 )  PTT:59.6 sec  Urinalysis Basic - ( 18 Dec 2017 16:03 )    Color: Yellow / Appearance: Turbid / S.020 / pH: x  Gluc: x / Ketone: Negative  / Bili: Negative / Urobili: Negative   Blood: x / Protein: 30 mg/dL / Nitrite: Negative   Leuk Esterase: Moderate / RBC: 3-5 /HPF / WBC 26-50   Sq Epi: x / Non Sq Epi: Occasional / Bacteria: Occasional    I&O's Summary    19 Dec 2017 07:  -  20 Dec 2017 07:00  --------------------------------------------------------  IN: 1576.8 mL / OUT: 1055 mL / NET: 521.8 mL    20 Dec 2017 07:01  -  20 Dec 2017 10:23  --------------------------------------------------------  IN: 100 mL / OUT: 70 mL / NET: 30 mL    RADIOLOGY & ADDITIONAL STUDIES:    < from: Xray Chest 1 View AP/PA. (17 @ 20:39) >      There are interval development of bilateral parenchymal airspace and   interstitial opacities with small bibasilar effusion and subsegmental   atelectasis suggestive of interstitial edema. Patient is status post and   not any and cardiac surgery. On follow-up exam, patient is status post   intubation with ET tube just below the level of clavicles. Nasogastric   tube is noted tip is not imaged but below diaphragm.  There is right IJ   catheter tip in the region of right atrium and just been the suggested.   There is osteopenia and multilevel thoracic and glenohumeral   degeneration.     Culture - Urine (17 @ 16:02)    -  Amikacin: S <=16    -  Aztreonam: S <=4    -  Ceftazidime: S 4    -  Ciprofloxacin: S <=1    -  Gentamicin: S <=4    -  Imipenem: I 4    -  Levofloxacin: S <=2    -  Meropenem: S <=1    -  Cefepime: S <=4    -  Piperacillin/Tazobactam: S <=16    -  Tobramycin: S <=4    Specimen Source: .Urine Clean Catch (Midstream)    Culture Results:   >100,000 CFU/ml Pseudomonas aeruginosa    Organism Identification: Pseudomonas aeruginosa    Organism: Pseudomonas aeruginosa    Method Type: LILLY      ADVANCE DIRECTIVES: DNR

## 2017-12-20 NOTE — PROGRESS NOTE ADULT - SUBJECTIVE AND OBJECTIVE BOX
Patient is a 77y old  Male who presents with a chief complaint of Found unresponsive in his vomit (18 Dec 2017 19:49)      BRIEF HOSPITAL COURSE: 77M with Pmhx of CVA with residual Right sided weakness, Dysphagia, requiring PEG, HTN, S/P MVR, Afib on coumadin. Presented with hypoxemic respiratory distress, requiring Intubation. He was found to have septic shjock and Acute on Chronic renal failure, related to gram negative Urosepsis.  He was resusciated with aggressive hydration and pressor support. He was started on broad spectrum Abx and the Marick protocol. Although he had and active DNR in place, the patient agreed with intubation and ventilator support.  He improved and weaned from the pressors and was successfully extubated yesterday.         PAST MEDICAL & SURGICAL HISTORY:  CAD (coronary artery disease)  Afib  CVA (cerebral vascular accident)  Mitral valve replaced  BPH (benign prostatic hyperplasia)  High cholesterol  HTN (hypertension)  H/O heart valve replacement with mechanical valve  S/P CABG x 1  H/O colectomy      Review of Systems:  CONSTITUTIONAL: No fever, chills, or fatigue  EYES: No eye pain, visual disturbances, or discharge  ENMT:  No difficulty hearing, tinnitus, vertigo; No sinus or throat pain  NECK: No pain or stiffness  RESPIRATORY: No cough, wheezing, chills or hemoptysis; No shortness of breath  CARDIOVASCULAR: No chest pain, palpitations, dizziness, or leg swelling  GASTROINTESTINAL:Chronic PEG and Colostomy   GENITOURINARY: No dysuria, frequency, hematuria, or incontinence  NEUROLOGICAL:Right sided weakness   SKIN: No itching, burning, rashes, or lesions   MUSCULOSKELETAL: No joint pain or swelling; No muscle, back, or extremity pain  PSYCHIATRIC: No depression, anxiety, mood swings, or difficulty sleeping      Medications:  cefepime  IVPB 1000 milliGRAM(s) IV Intermittent every 24 hours  pantoprazole    Tablet 40 milliGRAM(s) Oral before breakfast  hydrocortisone sodium succinate Injectable 50 milliGRAM(s) IV Push every 6 hours  magnesium sulfate  IVPB 1 Gram(s) IV Intermittent once  potassium chloride   Powder 20 milliEquivalent(s) Oral once            Mode: standby      ICU Vital Signs Last 24 Hrs  T(C): 36.1 (20 Dec 2017 11:49), Max: 37.1 (20 Dec 2017 04:00)  T(F): 96.9 (20 Dec 2017 11:49), Max: 98.7 (20 Dec 2017 04:00)  HR: 93 (20 Dec 2017 11:49) (86 - 133)  BP: 97/59 (20 Dec 2017 11:49) (95/53 - 124/59)  BP(mean): 78 (20 Dec 2017 08:00) (69 - 85)  ABP: --  ABP(mean): --  RR: 18 (20 Dec 2017 11:49) (17 - 30)  SpO2: 97% (20 Dec 2017 11:49) (94% - 98%)      ABG - ( 19 Dec 2017 08:45 )  pH: 7.30  /  pCO2: 36    /  pO2: 136   / HCO3: 18    / Base Excess: -7.7  /  SaO2: 100                 I&O's Detail    19 Dec 2017 07:01  -  20 Dec 2017 07:00  --------------------------------------------------------  IN:    dextrose 5%: 800 mL    Jevity: 410 mL    norepinephrine Infusion: 16.8 mL    Solution: 200 mL    Solution: 50 mL    Solution: 100 mL  Total IN: 1576.8 mL    OUT:    Colostomy: 100 mL    Indwelling Catheter - Urethral: 955 mL  Total OUT: 1055 mL    Total NET: 521.8 mL      20 Dec 2017 07:01  -  20 Dec 2017 12:07  --------------------------------------------------------  IN:    Jevity: 100 mL  Total IN: 100 mL    OUT:    Indwelling Catheter - Urethral: 70 mL  Total OUT: 70 mL    Total NET: 30 mL            LABS:                        9.3    11.3  )-----------( 135      ( 20 Dec 2017 05:56 )             27.7     12-20    138  |  98  |  95.0<H>  ----------------------------<  214<H>  3.4<L>   |  19.0<L>  |  3.67<H>    Ca    8.6      20 Dec 2017 05:56  Phos  4.4     12-20  Mg     1.9     12-20    TPro  6.6  /  Alb  3.1<L>  /  TBili  0.5  /  DBili  x   /  AST  17  /  ALT  19  /  AlkPhos  49  12-19          CAPILLARY BLOOD GLUCOSE        PT/INR - ( 20 Dec 2017 05:56 )   PT: 37.9 sec;   INR: 3.36 ratio         PTT - ( 19 Dec 2017 09:20 )  PTT:59.6 sec  Urinalysis Basic - ( 18 Dec 2017 16:03 )    Color: Yellow / Appearance: Turbid / S.020 / pH: x  Gluc: x / Ketone: Negative  / Bili: Negative / Urobili: Negative   Blood: x / Protein: 30 mg/dL / Nitrite: Negative   Leuk Esterase: Moderate / RBC: 3-5 /HPF / WBC 26-50   Sq Epi: x / Non Sq Epi: Occasional / Bacteria: Occasional      CULTURES:  Rapid RVP Result: Detected (17 @ 20:00)  Culture Results:   >100,000 CFU/ml Pseudomonas aeruginosa (17 @ 16:02)      Physical Examination:    General: No acute distress.  Alert, oriented, interactive, nonfocal    HEENT: Pupils equal, reactive to light.  Symmetric.    PULM: Clear to auscultation bilaterally, no significant sputum production, decreased BS at bases     CVS: Regular rate and rhythm, no murmurs, rubs, or gallops    ABD: Soft, nondistended, nontender, normoactive bowel sounds, no masses. + Active drainage from colostomy     EXT: No edema, nontender    SKIN: Warm and well perfused, no rashes noted.    RADIOLOGY: CXR IMPRESSION:     There are interval development of bilateral parenchymal airspace and   interstitial opacities with small bibasilar effusion and subsegmental   atelectasis suggestive of interstitial edema. Patient is status post and   not any and cardiac surgery. On follow-up exam, patient is status post   intubation with ET tube just below the level of clavicles. Nasogastric   tube is noted tip is not imaged but below diaphragm.  There is right IJ   catheter tip in the region of right atrium and just been the suggested.   There is osteopenia and multilevel thoracic and glenohumeral   degeneration.     Renal US: IMPRESSION:     Nonobstructive calculus in the upper pole of the right kidney, no   hydronephrosis.    Simple cyst in the upper pole of the left kidney.

## 2017-12-20 NOTE — DIETITIAN INITIAL EVALUATION ADULT. - PROBLEM SELECTOR PLAN 1
likely 2/2 to aspiration pneumonia , will cover for HCAP organisms   Titrate levophed to maintain MAP >65  c/w abx   f/u cultures   Marick protocol   c/w sepsis fluid resuscitation @ 30cc/kg

## 2017-12-21 DIAGNOSIS — I48.91 UNSPECIFIED ATRIAL FIBRILLATION: ICD-10-CM

## 2017-12-21 DIAGNOSIS — B34.1 ENTEROVIRUS INFECTION, UNSPECIFIED: ICD-10-CM

## 2017-12-21 DIAGNOSIS — B96.5 PSEUDOMONAS (AERUGINOSA) (MALLEI) (PSEUDOMALLEI) AS THE CAUSE OF DISEASES CLASSIFIED ELSEWHERE: ICD-10-CM

## 2017-12-21 DIAGNOSIS — N17.9 ACUTE KIDNEY FAILURE, UNSPECIFIED: ICD-10-CM

## 2017-12-21 DIAGNOSIS — N40.0 BENIGN PROSTATIC HYPERPLASIA WITHOUT LOWER URINARY TRACT SYMPTOMS: ICD-10-CM

## 2017-12-21 DIAGNOSIS — J15.9 UNSPECIFIED BACTERIAL PNEUMONIA: ICD-10-CM

## 2017-12-21 DIAGNOSIS — Z29.9 ENCOUNTER FOR PROPHYLACTIC MEASURES, UNSPECIFIED: ICD-10-CM

## 2017-12-21 LAB
ALBUMIN SERPL ELPH-MCNC: 3 G/DL — LOW (ref 3.3–5.2)
ALP SERPL-CCNC: 58 U/L — SIGNIFICANT CHANGE UP (ref 40–120)
ALT FLD-CCNC: 17 U/L — SIGNIFICANT CHANGE UP
ANION GAP SERPL CALC-SCNC: 18 MMOL/L — HIGH (ref 5–17)
ANISOCYTOSIS BLD QL: SLIGHT — SIGNIFICANT CHANGE UP
AST SERPL-CCNC: 15 U/L — SIGNIFICANT CHANGE UP
BASE EXCESS BLDA CALC-SCNC: -3.2 MMOL/L — LOW (ref -2–2)
BILIRUB SERPL-MCNC: 0.4 MG/DL — SIGNIFICANT CHANGE UP (ref 0.4–2)
BLOOD GAS COMMENTS ARTERIAL: SIGNIFICANT CHANGE UP
BUN SERPL-MCNC: 101 MG/DL — HIGH (ref 8–20)
CALCIUM SERPL-MCNC: 8.9 MG/DL — SIGNIFICANT CHANGE UP (ref 8.6–10.2)
CHLORIDE SERPL-SCNC: 106 MMOL/L — SIGNIFICANT CHANGE UP (ref 98–107)
CO2 SERPL-SCNC: 20 MMOL/L — LOW (ref 22–29)
CREAT SERPL-MCNC: 3.43 MG/DL — HIGH (ref 0.5–1.3)
GAS PNL BLDA: SIGNIFICANT CHANGE UP
GLUCOSE SERPL-MCNC: 243 MG/DL — HIGH (ref 70–115)
HCO3 BLDA-SCNC: 22 MMOL/L — SIGNIFICANT CHANGE UP (ref 20–26)
HCT VFR BLD CALC: 28.9 % — LOW (ref 42–52)
HGB BLD-MCNC: 9.5 G/DL — LOW (ref 14–18)
HOROWITZ INDEX BLDA+IHG-RTO: SIGNIFICANT CHANGE UP
HYPOCHROMIA BLD QL: SLIGHT — SIGNIFICANT CHANGE UP
LYMPHOCYTES # BLD AUTO: 0.4 K/UL — LOW (ref 1–4.8)
LYMPHOCYTES # BLD AUTO: 3 % — LOW (ref 20–55)
MAGNESIUM SERPL-MCNC: 2.4 MG/DL — SIGNIFICANT CHANGE UP (ref 1.6–2.6)
MCHC RBC-ENTMCNC: 29.6 PG — SIGNIFICANT CHANGE UP (ref 27–31)
MCHC RBC-ENTMCNC: 32.9 G/DL — SIGNIFICANT CHANGE UP (ref 32–36)
MCV RBC AUTO: 90 FL — SIGNIFICANT CHANGE UP (ref 80–94)
METAMYELOCYTES # FLD: 1 % — HIGH (ref 0–0)
MONOCYTES # BLD AUTO: 0.3 K/UL — SIGNIFICANT CHANGE UP (ref 0–0.8)
MONOCYTES NFR BLD AUTO: 3 % — SIGNIFICANT CHANGE UP (ref 3–10)
NEUTROPHILS # BLD AUTO: 11.1 K/UL — HIGH (ref 1.8–8)
NEUTROPHILS NFR BLD AUTO: 87 % — HIGH (ref 37–73)
NEUTS BAND # BLD: 6 % — SIGNIFICANT CHANGE UP (ref 0–8)
PCO2 BLDA: 36 MMHG — SIGNIFICANT CHANGE UP (ref 35–45)
PH BLDA: 7.39 — SIGNIFICANT CHANGE UP (ref 7.35–7.45)
PLAT MORPH BLD: NORMAL — SIGNIFICANT CHANGE UP
PLATELET # BLD AUTO: 125 K/UL — LOW (ref 150–400)
PO2 BLDA: 60 MMHG — LOW (ref 83–108)
POIKILOCYTOSIS BLD QL AUTO: SLIGHT — SIGNIFICANT CHANGE UP
POTASSIUM SERPL-MCNC: 3.2 MMOL/L — LOW (ref 3.5–5.3)
POTASSIUM SERPL-SCNC: 3.2 MMOL/L — LOW (ref 3.5–5.3)
PROT SERPL-MCNC: 6.6 G/DL — SIGNIFICANT CHANGE UP (ref 6.6–8.7)
RBC # BLD: 3.21 M/UL — LOW (ref 4.6–6.2)
RBC # FLD: 14.8 % — SIGNIFICANT CHANGE UP (ref 11–15.6)
RBC BLD AUTO: ABNORMAL
SAO2 % BLDA: 92 % — LOW (ref 95–99)
SODIUM SERPL-SCNC: 144 MMOL/L — SIGNIFICANT CHANGE UP (ref 135–145)
WBC # BLD: 11.8 K/UL — HIGH (ref 4.8–10.8)
WBC # FLD AUTO: 11.8 K/UL — HIGH (ref 4.8–10.8)

## 2017-12-21 PROCEDURE — 99223 1ST HOSP IP/OBS HIGH 75: CPT

## 2017-12-21 PROCEDURE — 71250 CT THORAX DX C-: CPT | Mod: 26

## 2017-12-21 PROCEDURE — 99233 SBSQ HOSP IP/OBS HIGH 50: CPT

## 2017-12-21 RX ORDER — ACETYLCYSTEINE 200 MG/ML
1 VIAL (ML) MISCELLANEOUS EVERY 6 HOURS
Qty: 0 | Refills: 0 | Status: COMPLETED | OUTPATIENT
Start: 2017-12-21 | End: 2017-12-22

## 2017-12-21 RX ORDER — IPRATROPIUM/ALBUTEROL SULFATE 18-103MCG
3 AEROSOL WITH ADAPTER (GRAM) INHALATION ONCE
Qty: 0 | Refills: 0 | Status: COMPLETED | OUTPATIENT
Start: 2017-12-21 | End: 2017-12-21

## 2017-12-21 RX ORDER — ERYTHROPOIETIN 10000 [IU]/ML
20000 INJECTION, SOLUTION INTRAVENOUS; SUBCUTANEOUS
Qty: 0 | Refills: 0 | Status: DISCONTINUED | OUTPATIENT
Start: 2017-12-21 | End: 2018-01-05

## 2017-12-21 RX ORDER — HYDROCORTISONE 20 MG
50 TABLET ORAL EVERY 12 HOURS
Qty: 0 | Refills: 0 | Status: DISCONTINUED | OUTPATIENT
Start: 2017-12-21 | End: 2017-12-25

## 2017-12-21 RX ORDER — POTASSIUM CHLORIDE 20 MEQ
40 PACKET (EA) ORAL ONCE
Qty: 0 | Refills: 0 | Status: COMPLETED | OUTPATIENT
Start: 2017-12-21 | End: 2017-12-21

## 2017-12-21 RX ORDER — SACCHAROMYCES BOULARDII 250 MG
250 POWDER IN PACKET (EA) ORAL
Qty: 0 | Refills: 0 | Status: DISCONTINUED | OUTPATIENT
Start: 2017-12-21 | End: 2018-01-09

## 2017-12-21 RX ORDER — SODIUM CHLORIDE 9 MG/ML
1000 INJECTION, SOLUTION INTRAVENOUS
Qty: 0 | Refills: 0 | Status: DISCONTINUED | OUTPATIENT
Start: 2017-12-21 | End: 2017-12-22

## 2017-12-21 RX ORDER — POTASSIUM CHLORIDE 20 MEQ
40 PACKET (EA) ORAL ONCE
Qty: 0 | Refills: 0 | Status: DISCONTINUED | OUTPATIENT
Start: 2017-12-21 | End: 2017-12-21

## 2017-12-21 RX ORDER — IPRATROPIUM/ALBUTEROL SULFATE 18-103MCG
3 AEROSOL WITH ADAPTER (GRAM) INHALATION EVERY 4 HOURS
Qty: 0 | Refills: 0 | Status: DISCONTINUED | OUTPATIENT
Start: 2017-12-21 | End: 2017-12-26

## 2017-12-21 RX ADMIN — CEFEPIME 100 MILLIGRAM(S): 1 INJECTION, POWDER, FOR SOLUTION INTRAMUSCULAR; INTRAVENOUS at 06:35

## 2017-12-21 RX ADMIN — Medication 3 MILLILITER(S): at 11:10

## 2017-12-21 RX ADMIN — Medication 1 MILLILITER(S): at 15:21

## 2017-12-21 RX ADMIN — Medication 50 MILLIGRAM(S): at 06:35

## 2017-12-21 RX ADMIN — Medication 300 MILLIGRAM(S): at 12:59

## 2017-12-21 RX ADMIN — Medication 3 MILLILITER(S): at 20:48

## 2017-12-21 RX ADMIN — SODIUM CHLORIDE 75 MILLILITER(S): 9 INJECTION, SOLUTION INTRAVENOUS at 22:03

## 2017-12-21 RX ADMIN — FINASTERIDE 5 MILLIGRAM(S): 5 TABLET, FILM COATED ORAL at 12:59

## 2017-12-21 RX ADMIN — Medication 3 MILLILITER(S): at 09:05

## 2017-12-21 RX ADMIN — Medication 1 MILLILITER(S): at 11:09

## 2017-12-21 RX ADMIN — Medication 50 MILLIGRAM(S): at 17:55

## 2017-12-21 RX ADMIN — Medication 40 MILLIEQUIVALENT(S): at 13:11

## 2017-12-21 RX ADMIN — ATORVASTATIN CALCIUM 10 MILLIGRAM(S): 80 TABLET, FILM COATED ORAL at 21:06

## 2017-12-21 RX ADMIN — ERYTHROPOIETIN 20000 UNIT(S): 10000 INJECTION, SOLUTION INTRAVENOUS; SUBCUTANEOUS at 22:04

## 2017-12-21 RX ADMIN — Medication 250 MILLIGRAM(S): at 17:55

## 2017-12-21 RX ADMIN — Medication 1 MILLIGRAM(S): at 12:59

## 2017-12-21 RX ADMIN — Medication 3 MILLILITER(S): at 15:21

## 2017-12-21 RX ADMIN — PANTOPRAZOLE SODIUM 40 MILLIGRAM(S): 20 TABLET, DELAYED RELEASE ORAL at 06:35

## 2017-12-21 RX ADMIN — Medication 1 MILLILITER(S): at 20:53

## 2017-12-21 NOTE — PROGRESS NOTE ADULT - ASSESSMENT
76 y/o male pmhx CVA (2 years ago and in July 17) w/ 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 Placed on ventilator and vasopressor support. Septic shock resolved. No longer requiring vasopressors and s/p extubation and down grade from ICU to medicine hospitalist team on 12/20/17. Continues on cefepime for pseudomona aeruginosa UTI and enterovirus positive, Bcx negative for growth, leukocytosis trending down and remained afebrile. BP remained normotensive, pt continues with being tapered off of stress dose steroids. His renal function remains compromised but is slowly down trending and likely secondary to ATN from septic shock with underlying known hx of ckd stage 3. Renal US noted, no obstruction or hydronephrosis noted. Nephrology continues to follow the patient. 76 y/o male pmhx CVA (2 years ago and in July 17) w/ 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 Placed on ventilator and vasopressor support. Septic shock resolved. No longer requiring vasopressors and s/p extubation and down grade from ICU to medicine hospitalist team on 12/20/17. Continues on cefepime for pseudomona aeruginosa UTI and enterovirus positive, Bcx negative for growth, leukocytosis trending down and remained afebrile. BP remained normotensive, pt continues with being tapered off of stress dose steroids. His renal function remains compromised but is slowly down trending and likely secondary to ATN from septic shock with underlying known hx of ckd stage 3, d/c IVF today. Renal US noted, no obstruction or hydronephrosis noted. Nephrology continues to follow the patient.

## 2017-12-21 NOTE — PROGRESS NOTE ADULT - PROBLEM SELECTOR PLAN 5
-rate controlled  -AV laurence blockers/ anti hypertensives on hold if remains with normotensive bp will restart meds as per NH   -INR pending for this am will restart coumadin when INR goal 2-3 -rate controlled  -AV laurence blockers/ anti hypertensives on hold if remains with normotensive bp will restart meds as per NH   -INR pending for this am which was ordered and never completed, thereafter ordered again at night and still not completed until late, given supratherapeutic INR will hold another day check INR in the am will restart coumadin when INR goal 2-3  -pt is on abx and INR will fluctuate/elevated at times

## 2017-12-21 NOTE — PROGRESS NOTE ADULT - ASSESSMENT
KAPIL on CKD , ATN from shock   Suspect Asp PNA , Pseudomonas in Urine Cx ( blood culture negative )   Baseline creat 1.7   Improved Met Acidosis  No hydro on renal sonogram 12-19   Would resume IVF w/ Bicarb and Potassium   Bladder scan   Abx as per ID     Anemia - Add epogen   Trend labs

## 2017-12-21 NOTE — PROGRESS NOTE ADULT - PROBLEM SELECTOR PLAN 2
Enterovirus positive suspected aspiration pneumonia   -pt afebrile  -leukocytosis persists   -c/w bronchodilators   -c/w mucomyst x 24 hrs   -c/w cefepime 1G IVPB QD D#4  -Bcx negative   -ID consult noted and appreciated Enterovirus positive suspected aspiration pneumonia   -pt afebrile  -leukocytosis persists   -c/w bronchodilators   -c/w mucomyst x 24 hrs   -c/w cefepime 1G IVPB QD D#4  -Bcx negative   -c/w down titrating decadron for septic shock stress dose steroids   -ID consult noted and appreciated  -ct chest ordered and showed bilateral upper lobe infiltrates, right greater than left as well as tree-in-bud inflammatory changes in the lower lobes as well.

## 2017-12-21 NOTE — CONSULT NOTE ADULT - SUBJECTIVE AND OBJECTIVE BOX
[facility] [place] JOSEPH WATERS MD FACP   RICARDO HALL MD  Diplomates American Board of Internal Medicine and Infecctious Diseases      MRN-[id number]  CRISTIN ROQUE is a 77y  Male     CC:    POORLY RESPONSIVE WM TRANSF DROM ICU AFTER ADMIT FOR RESP FAILURE  H/O CVA  ADMITTED SEPT 2017 OFR ASPIRATION AND INABILITY TO SWALLOW - PEG IN PLACE  ILL AT HOME WITH COUGH AND FOUND WITH VOMITUS IN MOUTH  RESP FAILURE - WAS INTUBATED  PSEUDOMONAS UTI  ENTEROVIRUS NASAL SWAB    Past Medical & Surgical Hx:  PAST MEDICAL & SURGICAL HISTORY:  CAD (coronary artery disease)  Afib  CVA (cerebral vascular accident)  Mitral valve replaced  BPH (benign prostatic hyperplasia)  High cholesterol  HTN (hypertension)  H/O heart valve replacement with mechanical valve  S/P CABG x 1  H/O colectomy      Problem List:  HEALTH ISSUES - PROBLEM Dx:  Palliative care encounter: Palliative care encounter  Other dysphagia: Other dysphagia  Debility: Debility  Pseudomonas urinary tract infection: Pseudomonas urinary tract infection  Cerebrovascular accident (CVA), unspecified mechanism: Cerebrovascular accident (CVA), unspecified mechanism  Metabolic acidosis: Metabolic acidosis  Afib: Afib  KAPIL (acute kidney injury): KAPIL (acute kidney injury)  Acute respiratory failure with hypoxia: Acute respiratory failure with hypoxia  Septic shock: Septic shock            Allergies    penicillins (Hives)    Intolerances          ANTIBIOTICS:   cefepime  IVPB 1000 milliGRAM(s) IV Intermittent every 24 hours       Review of Systems: - Negative except as mentioned below  N/A    Physical Exam:    Vital Signs Last 24 Hrs  T(C): 36.8 (21 Dec 2017 04:42), Max: 36.9 (20 Dec 2017 15:54)  T(F): 98.2 (21 Dec 2017 04:42), Max: 98.4 (20 Dec 2017 15:54)  HR: 92 (21 Dec 2017 04:42) (82 - 93)  BP: 124/68 (21 Dec 2017 04:42) (97/59 - 124/68)  BP(mean): --  RR: 18 (21 Dec 2017 04:42) (16 - 18)  SpO2: 98% (21 Dec 2017 04:42) (97% - 98%)        GEN:POORLY RESPONSIVE  nd atraumatic. EOMI. ESTRELLA. Moist mucosa. Clear Posterior pharynx.  NECK: Supple. No carotid bruits.  No lymphadenopathy or thyromegaly.  LUNGS: COARSE RHONCHI  HEART: Regular rate and rhythm without murmur.  ABDOMEN: Soft, nontender, and nondistended.  Positive bowel sounds.  No hepatosplenomegaly was noted.  EXTREMITIES: Without any cyanosis, clubbing, rash, lesions or edema.  NEUROLOGIC: Cranial nerves II through XII are grossly intact.  MUSCULOSKELETAL:  SKIN: No ulceration or induration present.      Labs:                        9.5    11.8  )-----------( 125      ( 21 Dec 2017 07:20 )             28.9     12-21    144  |  106  |  101.0<H>  ----------------------------<  243<H>  3.2<L>   |  20.0<L>  |  3.43<H>    Ca    8.9      21 Dec 2017 07:20  Phos  4.4     12-20  Mg     2.4     12-21    TPro  6.6  /  Alb  3.0<L>  /  TBili  0.4  /  DBili  x   /  AST  15  /  ALT  17  /  AlkPhos  58  12-21    PT/INR - ( 20 Dec 2017 05:56 )   PT: 37.9 sec;   INR: 3.36 ratio               Hematocrit: 28.9 % (12-21 @ 07:20)  Hemoglobin: 9.5 g/dL (12-21 @ 07:20)  Platelet Count - Automated: 125 K/uL (12-21 @ 07:20)  WBC Count: 11.8 K/uL (12-21 @ 07:20)  Hematocrit: 27.7 % (12-20 @ 05:56)  Hemoglobin: 9.3 g/dL (12-20 @ 05:56)  Platelet Count - Automated: 135 K/uL (12-20 @ 05:56)  WBC Count: 11.3 K/uL (12-20 @ 05:56)    Potassium, Serum: 3.2 mmol/L <L> (12-21 @ 07:20)  Blood Urea Nitrogen, Serum: 101.0 mg/dL <H> (12-21 @ 07:20)  Sodium, Serum: 144 mmol/L (12-21 @ 07:20)  Sodium, Serum: 138 mmol/L (12-20 @ 05:56)  Potassium, Serum: 3.4 mmol/L <L> (12-20 @ 05:56)  Blood Urea Nitrogen, Serum: 95.0 mg/dL <H> (12-20 @ 05:56)          MICROBIOLOGY    Culture Results:   >100,000 CFU/ml Pseudomonas aeruginosa (12-18 @ 16:02)  Culture Results:   No growth at 48 hours (12-18 @ 15:48)  Culture Results:   No growth at 48 hours (12-18 @ 15:46)      RADIOLOGY  < from: Xray Chest 1 View AP/PA. (12.18.17 @ 20:39) >  here are interval development of bilateral parenchymal airspace and   interstitial opacities with small bibasilar effusion and subsegmental   atelectasis suggestive of interstitial edema. Patient is status post and   not any and cardiac surgery. On follow-up exam, patient is status post   intubation with ET tube just below the level of clavicles. Nasogastric   tube is noted tip is not imaged but below diaphragm.  There is right IJ   catheter tip in the region of right atrium and just been the suggested.   There is osteopenia and multilevel thoracic and glenohumeral   degeneration.     < end of copied text >              SANDRA WATERS MD FACP

## 2017-12-21 NOTE — CONSULT NOTE ADULT - ASSESSMENT
IMP:  ENTEROVIRAL INFECTION - NO ISOLATION  PSEUDOMONAS BACTERURIA - PROB COLONIZER AND NOT SOURCE OF SEPSIS  PROB ASPIRATION PNEUMONIA BASED ON HX AS COMPLICATION OF VIRAL ILLNESS    WILL CONTINUE CEFEPIME AS PT COLONIZED WITH PSEUDOMONAS AND POSSIBLE SECONDARY PNEUMONIA FROM ASPIRATION

## 2017-12-21 NOTE — PROGRESS NOTE ADULT - PROBLEM SELECTOR PLAN 3
underlying hx CKD stage 3 likely know KAPIL secondary to ATN from septic shock   -c/w monitoring bun/cr down trending   -baseline creatine around 1.7 per prior records  -avoid nephrotoxic meds   -nephrology f/u noted and appreciated underlying hx CKD stage 3 likely know KAPIL secondary to ATN from septic shock   -c/w monitoring bun/cr down trending   -baseline creatine around 1.7 per prior records  -d/c IVF  -metabolic acidosis improved   -avoid nephrotoxic meds   -nephrology f/u noted and appreciated

## 2017-12-21 NOTE — PROGRESS NOTE ADULT - PROBLEM SELECTOR PLAN 1
-Pt may be a colonizer   -pt afebrile - leukocytosis persists  -U cx positive    -Bcx negative   -c/w cefepime 1G IVPB QD D#4  -c/w probiotic  -Now out of shock down titrating steroids to decadron   -ID consult noted and appreciated -Pt may be a colonizer   -pt afebrile - leukocytosis persists  -U cx positive    -Bcx negative   -c/w cefepime 1G IVPB QD D#4  -c/w probiotic  -Now out of shock down titrating steroids to decadron   -d/c puckett trial of void  bladder scan order placed if needed for post void residual   -ID consult noted and appreciated

## 2017-12-21 NOTE — PROGRESS NOTE ADULT - SUBJECTIVE AND OBJECTIVE BOX
NEPHROLOGY INTERVAL HPI/OVERNIGHT EVENTS:    ID follow up noted   Abx = Cefepime   Off IVF       MEDICATIONS  (STANDING):  acetylcysteine 20% Inhalation 1 milliLiter(s) Inhalation every 6 hours  ALBUTerol/ipratropium for Nebulization 3 milliLiter(s) Nebulizer every 4 hours  atorvastatin 10 milliGRAM(s) Oral at bedtime  cefepime  IVPB 1000 milliGRAM(s) IV Intermittent every 24 hours  ferrous    sulfate Liquid 300 milliGRAM(s) Enteral Tube daily  finasteride 5 milliGRAM(s) Oral daily  folic acid 1 milliGRAM(s) Enteral Tube daily  hydrocortisone sodium succinate Injectable 50 milliGRAM(s) IV Push every 12 hours  pantoprazole    Tablet 40 milliGRAM(s) Oral before breakfast  saccharomyces boulardii 250 milliGRAM(s) Oral two times a day    MEDICATIONS  (PRN):      Allergies    penicillins (Hives)    Intolerances    Vital Signs Last 24 Hrs  T(C): 36.8 (21 Dec 2017 04:42), Max: 36.9 (20 Dec 2017 15:54)  T(F): 98.2 (21 Dec 2017 04:42), Max: 98.4 (20 Dec 2017 15:54)  HR: 92 (21 Dec 2017 04:42) (82 - 92)  BP: 124/68 (21 Dec 2017 04:42) (109/63 - 124/68)  BP(mean): --  RR: 18 (21 Dec 2017 04:42) (16 - 18)  SpO2: 98% (21 Dec 2017 04:42) (98% - 98%)  Daily     Daily   I&O's Detail    20 Dec 2017 07:01  -  21 Dec 2017 07:00  --------------------------------------------------------  IN:    Jevity: 500 mL  Total IN: 500 mL    OUT:    Colostomy: 800 mL    Indwelling Catheter - Urethral: 70 mL  Total OUT: 870 mL    Total NET: -370 mL      21 Dec 2017 07:01  -  21 Dec 2017 14:57  --------------------------------------------------------  IN:  Total IN: 0 mL    OUT:    Colostomy: 700 mL  Total OUT: 700 mL    Total NET: -700 mL        I&O's Summary    20 Dec 2017 07:01  -  21 Dec 2017 07:00  --------------------------------------------------------  IN: 500 mL / OUT: 870 mL / NET: -370 mL    21 Dec 2017 07:01  -  21 Dec 2017 14:57  --------------------------------------------------------  IN: 0 mL / OUT: 700 mL / NET: -700 mL        PHYSICAL EXAM:  GENERAL: Appears chronically ill   NECK: No JVP   CVS S1S2 no rub   CHEST/LUNG: EAE , Bibasilar crackles   ABDOMEN: Soft, + Ostomy , + feeding tube   EXT - No edema       LABS:                        9.5    11.8  )-----------( 125      ( 21 Dec 2017 07:20 )             28.9     12-21    144  |  106  |  101.0<H>  ----------------------------<  243<H>  3.2<L>   |  20.0<L>  |  3.43<H>    Ca    8.9      21 Dec 2017 07:20  Phos  4.4     12-20  Mg     2.4     12-21    TPro  6.6  /  Alb  3.0<L>  /  TBili  0.4  /  DBili  x   /  AST  15  /  ALT  17  /  AlkPhos  58  12-21    PT/INR - ( 20 Dec 2017 05:56 )   PT: 37.9 sec;   INR: 3.36 ratio             Magnesium, Serum: 2.4 mg/dL (12-21 @ 07:20)    ABG - ( 21 Dec 2017 10:39 )  pH: 7.39  /  pCO2: 36    /  pO2: 60    / HCO3: 22    / Base Excess: -3.2  /  SaO2: 92           EXAM:  US KIDNEY(S)                          PROCEDURE DATE:  12/19/2017          INTERPRETATION:  CLINICAL INFORMATION: 77-year-old renal failure    COMPARISON: None available.    TECHNIQUE: Sonography of the kidneys and bladder.     FINDINGS:    Right kidney:  9.8 cm. Small nonobstructive calculus in the upper pole   measuring approximately 4 mm. No hydronephrosis.    Left kidney:  10.1 cm. There is a cyst in the upper pole measuring 2.3 x   2.5 cm. No renal calculi or hydronephrosis are seen.    IMPRESSION:     Nonobstructive calculus in the upper pole of the right kidney, no   hydronephrosis.    Simple cyst in the upper pole of the left kidney.                    RADIOLOGY & ADDITIONAL TESTS:

## 2017-12-21 NOTE — PROGRESS NOTE ADULT - SUBJECTIVE AND OBJECTIVE BOX
Patient is a 77y old  Male who presents with a chief complaint of Found unresponsive in his vomit (18 Dec 2017 19:49)      HEALTH ISSUES - PROBLEM Dx:  Enteroviral infection: Enteroviral infection  Pseudomonas infection: Pseudomonas infection  Pneumonia, bacterial: Pneumonia, bacterial  Palliative care encounter: Palliative care encounter  Other dysphagia: Other dysphagia  Debility: Debility  Pseudomonas urinary tract infection: Pseudomonas urinary tract infection  Cerebrovascular accident (CVA), unspecified mechanism: Cerebrovascular accident (CVA), unspecified mechanism  Metabolic acidosis: Metabolic acidosis  Afib: Afib  KAPIL (acute kidney injury): KAPIL (acute kidney injury)  Acute respiratory failure with hypoxia: Acute respiratory failure with hypoxia  Septic shock: Septic shock          INTERVAL HPI/OVERNIGHT EVENTS:  Patient seen and examined at bedside. No acute events overnight. Patient states he feels slightly sob this morning, sounds very congested on exam, persistent cough but unable to expectorate and nebulizer tx given, also increased o2 b/c ABG showed evidence of hypoxia. D/w RN the plan of care. Otherwise patient denies any other complaints.     Vital Signs Last 24 Hrs  T(C): 36.8 (21 Dec 2017 04:42), Max: 36.9 (20 Dec 2017 15:54)  T(F): 98.2 (21 Dec 2017 04:42), Max: 98.4 (20 Dec 2017 15:54)  HR: 92 (21 Dec 2017 04:42) (82 - 92)  BP: 124/68 (21 Dec 2017 04:42) (109/63 - 124/68)  BP(mean): --  RR: 18 (21 Dec 2017 04:42) (16 - 18)  SpO2: 98% (21 Dec 2017 04:42) (98% - 98%)      I&O's Summary    20 Dec 2017 07:01  -  21 Dec 2017 07:00  --------------------------------------------------------  IN: 500 mL / OUT: 870 mL / NET: -370 mL    21 Dec 2017 07:01  -  21 Dec 2017 12:53  --------------------------------------------------------  IN: 0 mL / OUT: 400 mL / NET: -400 mL        CONSTITUTIONAL: Well appearing, well nourished, awake, alert and in no apparent distress  CARDIAC: Normal rate, regular rhythm.  Heart sounds S1, S2.  No murmurs, rubs or gallops   RESPIRATORY: Breath sounds clear and equal bilaterally. No wheezes, rhales or rhonchi  GASTROENTEROLOGY: Soft, NT ND BS +normoactive PEG in place   : Hewitt in place   EXTREMITIES: No edema, cyanosis or deformity   NEUROLOGICAL: Alert and oriented, no focal deficits, no motor or sensory deficits.  SKIN: No rash, skin turgor     MEDICATIONS  (STANDING):  acetylcysteine 20% Inhalation 1 milliLiter(s) Inhalation every 6 hours  ALBUTerol/ipratropium for Nebulization 3 milliLiter(s) Nebulizer every 4 hours  atorvastatin 10 milliGRAM(s) Oral at bedtime  cefepime  IVPB 1000 milliGRAM(s) IV Intermittent every 24 hours  ferrous    sulfate Liquid 300 milliGRAM(s) Enteral Tube daily  finasteride 5 milliGRAM(s) Oral daily  folic acid 1 milliGRAM(s) Enteral Tube daily  hydrocortisone sodium succinate Injectable 50 milliGRAM(s) IV Push every 8 hours  pantoprazole    Tablet 40 milliGRAM(s) Oral before breakfast  potassium chloride   Powder 40 milliEquivalent(s) Oral once  potassium chloride   Solution 40 milliEquivalent(s) Enteral Tube once    MEDICATIONS  (PRN):      LABS:                        9.5    11.8  )-----------( 125      ( 21 Dec 2017 07:20 )             28.9     12-21    144  |  106  |  101.0<H>  ----------------------------<  243<H>  3.2<L>   |  20.0<L>  |  3.43<H>    Ca    8.9      21 Dec 2017 07:20  Phos  4.4     12-20  Mg     2.4     12-21    TPro  6.6  /  Alb  3.0<L>  /  TBili  0.4  /  DBili  x   /  AST  15  /  ALT  17  /  AlkPhos  58  12-21    PT/INR - ( 20 Dec 2017 05:56 )   PT: 37.9 sec;   INR: 3.36 ratio             LIVER FUNCTIONS - ( 21 Dec 2017 07:20 )  Alb: 3.0 g/dL / Pro: 6.6 g/dL / ALK PHOS: 58 U/L / ALT: 17 U/L / AST: 15 U/L / GGT: x             RADIOLOGY & ADDITIONAL TESTS: Patient is a 77y old  Male who presents with a chief complaint of Found unresponsive in his vomit (18 Dec 2017 19:49)      HEALTH ISSUES - PROBLEM Dx:  Enteroviral infection: Enteroviral infection  Pseudomonas infection: Pseudomonas infection  Pneumonia, bacterial: Pneumonia, bacterial  Palliative care encounter: Palliative care encounter  Other dysphagia: Other dysphagia  Debility: Debility  Pseudomonas urinary tract infection: Pseudomonas urinary tract infection  Cerebrovascular accident (CVA), unspecified mechanism: Cerebrovascular accident (CVA), unspecified mechanism  Metabolic acidosis: Metabolic acidosis  Afib: Afib  KAPIL (acute kidney injury): KAPIL (acute kidney injury)  Acute respiratory failure with hypoxia: Acute respiratory failure with hypoxia  Septic shock: Septic shock          INTERVAL HPI/OVERNIGHT EVENTS:  Patient seen and examined at bedside. No acute events overnight. Patient states he feels slightly sob this morning, sounds very congested on exam, persistent cough but unable to expectorate and nebulizer tx given, also increased o2 b/c ABG showed evidence of hypoxia. D/w RN the plan of care. Otherwise patient denies any other complaints. Patient denies chest pain, abd pain, N/V, fever, chills, dysuria or any other complaints. All remainder ROS negative.     Vital Signs Last 24 Hrs  T(C): 36.8 (21 Dec 2017 04:42), Max: 36.9 (20 Dec 2017 15:54)  T(F): 98.2 (21 Dec 2017 04:42), Max: 98.4 (20 Dec 2017 15:54)  HR: 92 (21 Dec 2017 04:42) (82 - 92)  BP: 124/68 (21 Dec 2017 04:42) (109/63 - 124/68)  BP(mean): --  RR: 18 (21 Dec 2017 04:42) (16 - 18)  SpO2: 98% (21 Dec 2017 04:42) (98% - 98%)      I&O's Summary    20 Dec 2017 07:01  -  21 Dec 2017 07:00  --------------------------------------------------------  IN: 500 mL / OUT: 870 mL / NET: -370 mL    21 Dec 2017 07:01  -  21 Dec 2017 12:53  --------------------------------------------------------  IN: 0 mL / OUT: 400 mL / NET: -400 mL        CONSTITUTIONAL: Well appearing, well nourished, awake, alert and in no apparent distress  CARDIAC: Normal rate, regular rhythm.  Heart sounds S1, S2.  No murmurs, rubs or gallops   RESPIRATORY: Decreased air entry b/l, b/l rhonchi and exp wheezing   GASTROENTEROLOGY: Soft, NT ND BS +normoactive PEG in place   : Hewitt in place   EXTREMITIES: No edema, cyanosis or deformity   NEUROLOGICAL: Alert and awake oriented x2 (person and place)   SKIN: No rash, skin turgor poor     MEDICATIONS  (STANDING):  acetylcysteine 20% Inhalation 1 milliLiter(s) Inhalation every 6 hours  ALBUTerol/ipratropium for Nebulization 3 milliLiter(s) Nebulizer every 4 hours  atorvastatin 10 milliGRAM(s) Oral at bedtime  cefepime  IVPB 1000 milliGRAM(s) IV Intermittent every 24 hours  ferrous    sulfate Liquid 300 milliGRAM(s) Enteral Tube daily  finasteride 5 milliGRAM(s) Oral daily  folic acid 1 milliGRAM(s) Enteral Tube daily  hydrocortisone sodium succinate Injectable 50 milliGRAM(s) IV Push every 8 hours  pantoprazole    Tablet 40 milliGRAM(s) Oral before breakfast  potassium chloride   Powder 40 milliEquivalent(s) Oral once  potassium chloride   Solution 40 milliEquivalent(s) Enteral Tube once    MEDICATIONS  (PRN):      LABS:                        9.5    11.8  )-----------( 125      ( 21 Dec 2017 07:20 )             28.9     12-21    144  |  106  |  101.0<H>  ----------------------------<  243<H>  3.2<L>   |  20.0<L>  |  3.43<H>    Ca    8.9      21 Dec 2017 07:20  Phos  4.4     12-20  Mg     2.4     12-21    TPro  6.6  /  Alb  3.0<L>  /  TBili  0.4  /  DBili  x   /  AST  15  /  ALT  17  /  AlkPhos  58  12-21    PT/INR - ( 20 Dec 2017 05:56 )   PT: 37.9 sec;   INR: 3.36 ratio             LIVER FUNCTIONS - ( 21 Dec 2017 07:20 )  Alb: 3.0 g/dL / Pro: 6.6 g/dL / ALK PHOS: 58 U/L / ALT: 17 U/L / AST: 15 U/L / GGT: x             RADIOLOGY & ADDITIONAL TESTS:  No new imaging studies

## 2017-12-22 DIAGNOSIS — N17.1 ACUTE KIDNEY FAILURE WITH ACUTE CORTICAL NECROSIS: ICD-10-CM

## 2017-12-22 LAB
ANION GAP SERPL CALC-SCNC: 18 MMOL/L — HIGH (ref 5–17)
BUN SERPL-MCNC: 103 MG/DL — HIGH (ref 8–20)
CALCIUM SERPL-MCNC: 8.9 MG/DL — SIGNIFICANT CHANGE UP (ref 8.6–10.2)
CHLORIDE SERPL-SCNC: 110 MMOL/L — HIGH (ref 98–107)
CO2 SERPL-SCNC: 21 MMOL/L — LOW (ref 22–29)
CREAT SERPL-MCNC: 3.3 MG/DL — HIGH (ref 0.5–1.3)
GLUCOSE SERPL-MCNC: 318 MG/DL — HIGH (ref 70–115)
INR BLD: 1.96 RATIO — HIGH (ref 0.88–1.16)
INR BLD: 1.99 RATIO — HIGH (ref 0.88–1.16)
MAGNESIUM SERPL-MCNC: 2.4 MG/DL — SIGNIFICANT CHANGE UP (ref 1.6–2.6)
POTASSIUM SERPL-MCNC: 3.3 MMOL/L — LOW (ref 3.5–5.3)
POTASSIUM SERPL-SCNC: 3.3 MMOL/L — LOW (ref 3.5–5.3)
PROTHROM AB SERPL-ACNC: 21.9 SEC — HIGH (ref 9.8–12.7)
PROTHROM AB SERPL-ACNC: 22.2 SEC — HIGH (ref 9.8–12.7)
SODIUM SERPL-SCNC: 149 MMOL/L — HIGH (ref 135–145)

## 2017-12-22 PROCEDURE — 99231 SBSQ HOSP IP/OBS SF/LOW 25: CPT

## 2017-12-22 PROCEDURE — 99233 SBSQ HOSP IP/OBS HIGH 50: CPT

## 2017-12-22 PROCEDURE — 99232 SBSQ HOSP IP/OBS MODERATE 35: CPT

## 2017-12-22 RX ORDER — MORPHINE SULFATE 50 MG/1
2 CAPSULE, EXTENDED RELEASE ORAL EVERY 6 HOURS
Qty: 0 | Refills: 0 | Status: DISCONTINUED | OUTPATIENT
Start: 2017-12-22 | End: 2017-12-24

## 2017-12-22 RX ORDER — TAMSULOSIN HYDROCHLORIDE 0.4 MG/1
0.4 CAPSULE ORAL AT BEDTIME
Qty: 0 | Refills: 0 | Status: DISCONTINUED | OUTPATIENT
Start: 2017-12-22 | End: 2018-01-09

## 2017-12-22 RX ADMIN — Medication 1 MILLILITER(S): at 00:01

## 2017-12-22 RX ADMIN — Medication 250 MILLIGRAM(S): at 06:16

## 2017-12-22 RX ADMIN — PANTOPRAZOLE SODIUM 40 MILLIGRAM(S): 20 TABLET, DELAYED RELEASE ORAL at 06:16

## 2017-12-22 RX ADMIN — Medication 3 MILLILITER(S): at 00:01

## 2017-12-22 RX ADMIN — Medication 3 MILLILITER(S): at 12:12

## 2017-12-22 RX ADMIN — Medication 3 MILLILITER(S): at 23:59

## 2017-12-22 RX ADMIN — Medication 3 MILLILITER(S): at 08:27

## 2017-12-22 RX ADMIN — ATORVASTATIN CALCIUM 10 MILLIGRAM(S): 80 TABLET, FILM COATED ORAL at 22:03

## 2017-12-22 RX ADMIN — Medication 3 MILLILITER(S): at 04:07

## 2017-12-22 RX ADMIN — FINASTERIDE 5 MILLIGRAM(S): 5 TABLET, FILM COATED ORAL at 12:57

## 2017-12-22 RX ADMIN — Medication 1 MILLIGRAM(S): at 12:57

## 2017-12-22 RX ADMIN — Medication 3 MILLILITER(S): at 20:10

## 2017-12-22 RX ADMIN — Medication 50 MILLIGRAM(S): at 18:01

## 2017-12-22 RX ADMIN — Medication 50 MILLIGRAM(S): at 06:16

## 2017-12-22 RX ADMIN — TAMSULOSIN HYDROCHLORIDE 0.4 MILLIGRAM(S): 0.4 CAPSULE ORAL at 22:03

## 2017-12-22 RX ADMIN — Medication 300 MILLIGRAM(S): at 12:57

## 2017-12-22 RX ADMIN — Medication 250 MILLIGRAM(S): at 18:02

## 2017-12-22 RX ADMIN — Medication 1 MILLILITER(S): at 08:27

## 2017-12-22 RX ADMIN — CEFEPIME 100 MILLIGRAM(S): 1 INJECTION, POWDER, FOR SOLUTION INTRAMUSCULAR; INTRAVENOUS at 06:16

## 2017-12-22 NOTE — PROGRESS NOTE ADULT - SUBJECTIVE AND OBJECTIVE BOX
CRISTIN ROQUE  [id number]  77y, Male    Acute respiratory failure with hypoxia      Patient is a 77y old  Male who presents with a chief complaint of Found unresponsive in his vomit (18 Dec 2017 19:49)      HPI:  76 y/o male pmhx CVA ( 2 years ago and in July 17) w/ residual right upper and lower extremity weakness, s/p PEG,  afib on coumadin, s/p colon resection with ileostomy 8 years ago, s/p CBAGx3,  prostate cancer s/p TURP 3 years ago was BIBEMS after wife found him unresponsive with vomit around his face. Wife states patient had an increase in SOB past 2 days and noticed he was becoming more weak.  Pt was discharged 2 days ago from rehab center and has had a decrease in appetite since he's been home according to his wife and son. Wife states she has been noticing that after PEG feeding he coughs a lot and she compares it to him choking.  She states he has been getting feed through his PEG as well as liquids by mouth which he has been tolerating. On arrival patient was unresponsive and hypoxic. (18 Dec 2017 19:49)    Patient had puckett placed by me in ER a couple of days ago. Needed a cath guide as there was a likely false passage created prior to my attempt. Puckett was removed in ICU and the patient was voiding spontaneously.  no abdominal fullness noted. Overall has shown significant improvement.      Allergies    penicillins (Hives)    Intolerances        MEDICATIONS  (STANDING):  ALBUTerol/ipratropium for Nebulization 3 milliLiter(s) Nebulizer every 4 hours  atorvastatin 10 milliGRAM(s) Oral at bedtime  cefepime  IVPB 1000 milliGRAM(s) IV Intermittent every 24 hours  epoetin [place] Injectable 19910 Unit(s) SubCutaneous every 7 days  ferrous    sulfate Liquid 300 milliGRAM(s) Enteral Tube daily  finasteride 5 milliGRAM(s) Oral daily  folic acid 1 milliGRAM(s) Enteral Tube daily  hydrocortisone sodium succinate Injectable 50 milliGRAM(s) IV Push every 12 hours  pantoprazole    Tablet 40 milliGRAM(s) Oral before breakfast  saccharomyces boulardii 250 milliGRAM(s) Oral two times a day    MEDICATIONS  (PRN):  morphine  - Injectable 2 milliGRAM(s) IV Push every 6 hours PRN Moderate Pain (4 - 6)      PAST MEDICAL & SURGICAL HISTORY:  CAD (coronary artery disease)  Afib  CVA (cerebral vascular accident)  Mitral valve replaced  BPH (benign prostatic hyperplasia)  High cholesterol  HTN (hypertension)  H/O heart valve replacement with mechanical valve  S/P CABG x 1  H/O colectomy      I&O's Detail    21 Dec 2017 07:01  -  22 Dec 2017 07:00  --------------------------------------------------------  IN:  Total IN: 0 mL    OUT:    Colostomy: 1100 mL  Total OUT: 1100 mL    Total NET: -1100 mL          PE:    Vital Signs Last 24 Hrs  T(C): 36.4 (22 Dec 2017 10:43), Max: 37 (21 Dec 2017 20:16)  T(F): 97.5 (22 Dec 2017 10:43), Max: 98.6 (21 Dec 2017 20:16)  HR: 102 (22 Dec 2017 10:43) (90 - 117)  BP: 129/70 (22 Dec 2017 10:43) (122/63 - 158/70)  BP(mean): --  RR: 18 (22 Dec 2017 10:43) (17 - 20)  SpO2: 99% (22 Dec 2017 10:43) (96% - 99%)    Daily     Daily     PHYSICAL EXAM:      Constitutional: resting, appears comfortable    Eyes: conjunctivae normal    Respiratory: no use of accessory muscles    Gastrointestinal: non tender, no distention, no palpable mass    Labs:                          9.5    11.8  )-----------( 125      ( 21 Dec 2017 07:20 )             28.9       12-22    149<H>  |  110<H>  |  103.0<H>  ----------------------------<  318<H>  3.3<L>   |  21.0<L>  |  3.30<H>    Ca    8.9      22 Dec 2017 07:14  Mg     2.4     12-22    TPro  6.6  /  Alb  3.0<L>  /  TBili  0.4  /  DBili  x   /  AST  15  /  ALT  17  /  AlkPhos  58  12-21      Impression:    small clots but voiding after puckett removed.  acute on chronic kidney injury     Plan:    Ok to leave puckett out  daily bladder scan and call urology if greater than 150 ml PVR or significant drop in H/H  monitor creatinine    Randy Ellsworth MD  12-22-17 @ 12:02

## 2017-12-22 NOTE — PROGRESS NOTE ADULT - SUBJECTIVE AND OBJECTIVE BOX
Patient seen and examined    Lying quiety  grtting TF/PEG  no c/o CP SOB NV   No swelling feet    Vital Signs Last 24 Hrs  T(C): 36.8 (22 Dec 2017 16:47), Max: 36.9 (22 Dec 2017 05:08)  T(F): 98.3 (22 Dec 2017 16:47), Max: 98.4 (22 Dec 2017 05:08)  HR: 105 (22 Dec 2017 16:47) (102 - 105)  BP: 129/63 (22 Dec 2017 16:47) (122/63 - 129/70)  BP(mean): --  RR: 20 (22 Dec 2017 16:47) (17 - 20)  SpO2: 99% (22 Dec 2017 10:43) (96% - 99%)    PHYSICAL EXAM    GENERAL: NAD, sitting quietly  EYES:  conjunctiva and sclera clear  NECK: Supple, No JVD/Bruit  NERVOUS SYSTEM:  A/a; gen wkness L > R  CHEST:  CTA ,No rales few scatt rhonchi  HEART:  RRR, No murmurs  ABDOMEN: Soft, NT/ND BS+; PEG noted  EXTREMITIES:  No Edema;  SKIN: No rashes    22 Dec 2017 07:14    149    |  110    |  103.0  ----------------------------<  318    3.3     |  21.0   |  3.30     Ca    8.9        22 Dec 2017 07:14  Mg     2.4       22 Dec 2017 07:14    TPro  6.6    /  Alb  3.0    /  TBili  0.4    /  DBili  x      /  AST  15     /  ALT  17     /  AlkPhos  58     21 Dec 2017 07:20                          9.5    11.8  )-----------( 125      ( 21 Dec 2017 07:20 )             28.9       Creat remains elevated but sl lower4.6-> 3.3- cause unclear  Continue same treatment

## 2017-12-22 NOTE — CONSULT NOTE ADULT - ASSESSMENT
76 M with prior history of permanent AF on coumadin, CAD s/p CABG/AVR, HTN , chronic renal insufficiency, CVA with R hemiparesis,  colon resection with ileostomy, dysphagia s/p PEG who presented unresponsive and hypoxic found to have septic shock requiring pressors and acute hypoxic respiratory failure 2/2 aspiration PNA s/p intubation, with course further complicated by KAPIL on CKD, and now with recurrent NSVT    NSVT  - ventricular ectopy likely increased in the setting of hypokalemia   - repeat BMP and replete to K>4, check Mg   - start metoprolol 25mg Q12  - for now will continue maximal medical therapy for CAD and will forgo ischemic evaluation pending stabilization of his myriad of acute medical conditions     CAD s/p CABG/AVR  - recommend ASA 81mg daily   - continue statin therapy     Permanent atrial fibrillation  - coumadin held 2/2 hematuria   - hemodynamics improved, start metoprolol 25mg Q12 76 M with prior history of permanent AF on coumadin, CAD s/p CABG/AVR, HTN , chronic renal insufficiency, CVA with R hemiparesis,  colon resection with ileostomy, dysphagia s/p PEG who presented unresponsive and hypoxic found to have septic shock requiring pressors and acute hypoxic respiratory failure 2/2 aspiration PNA s/p intubation, with course further complicated by KAPIL on CKD, and now with recurrent NSVT    NSVT  - ventricular ectopy likely increased in the setting of hypokalemia and hypoxia, doubt related to acute ischemia    - repeat BMP and replete to K>4, check Mg   - start metoprolol 25mg Q12  - for now will continue maximal medical therapy for CAD and will forgo ischemic evaluation pending stabilization of his myriad of acute medical conditions     CAD s/p CABG/AVR  - recommend ASA 81mg daily   - continue statin therapy     Permanent atrial fibrillation  - coumadin held 2/2 hematuria   - hemodynamics improved, start metoprolol 25mg Q12    acute hypoxic respiratory failure 2/2 aspiration PNA s/p intubation  - close monitoring of respiratory status   - discussed with nursing staff

## 2017-12-22 NOTE — PROGRESS NOTE ADULT - SUBJECTIVE AND OBJECTIVE BOX
CC: Hematuria. Delirium from UTI sepsis, pneumonia, Entero/Rhinovirus infection.  Right side ileostomy with bag, peg tube .  Afib on coumadin.   HPI:  76 y/o male pmhx CVA ( 2 years ago and in July 17) w/ residual right upper and lower extremity weakness, s/p PEG,  afib on coumadin, s/p colon resection with ileostomy 8 years ago, s/p CBAGx3,  prostate cancer s/p TURP 3 years ago was BIBEMS after wife found him unresponsive with vomit around his face. Wife states patient had an increase in SOB past 2 days and noticed he was becoming more weak.  Pt was discharged 2 days ago from rehab center and has had a decrease in appetite since he's been home according to his wife and son. Wife states she has been noticing that after PEG feeding he coughs a lot and she compares it to him choking.  She states he has been getting feed through his PEG as well as liquids by mouth which he has been tolerating. On arrival patient was unresponsive and hypoxic. (18 Dec 2017 19:49)    REVIEW OF SYSTEMS:    Patient denied fever, chills, abdominal pain, nausea, vomiting, cough, shortness of breath, chest pain or palpitations    Vital Signs Last 24 Hrs  T(C): 36.8 (22 Dec 2017 16:47), Max: 37 (21 Dec 2017 20:16)  T(F): 98.3 (22 Dec 2017 16:47), Max: 98.6 (21 Dec 2017 20:16)  HR: 105 (22 Dec 2017 16:47) (90 - 105)  BP: 129/63 (22 Dec 2017 16:47) (122/63 - 140/68)  BP(mean): --  RR: 20 (22 Dec 2017 16:47) (17 - 20)  SpO2: 99% (22 Dec 2017 10:43) (96% - 99%)I&O's Summary    21 Dec 2017 07:01  -  22 Dec 2017 07:00  --------------------------------------------------------  IN: 0 mL / OUT: 1100 mL / NET: -1100 mL      PHYSICAL EXAM:  GENERAL: confusion and lethergy .  HEENT: PERRL, +EOMI, anicteric, no [place]  NECK: Supple, No JVD   CHEST/LUNG: bilateral rales.   HEART: S1S2 Normal intensity, no murmurs, gallops or rubs noted  ABDOMEN: Soft, BS Normoactive, NT, ND, no HSM noted  EXTREMITIES:  2+ radial and DP pulses noted, no clubbing, cyanosis, or edema noted. Limited mobility   SKIN: No rashes or lesions noted  NEURO: Confused, right extremities weakness, CN II-XII intact  PSYCH: depressed mood and affect; insight/judgement inappropriate  LABS:                        9.5    11.8  )-----------( 125      ( 21 Dec 2017 07:20 )             28.9     12-22    149<H>  |  110<H>  |  103.0<H>  ----------------------------<  318<H>  3.3<L>   |  21.0<L>  |  3.30<H>    Ca    8.9      22 Dec 2017 07:14  Mg     2.4     12-22    TPro  6.6  /  Alb  3.0<L>  /  TBili  0.4  /  DBili  x   /  AST  15  /  ALT  17  /  AlkPhos  58  12-21    PT/INR - ( 22 Dec 2017 07:14 )   PT: 21.9 sec;   INR: 1.96 ratio             RADIOLOGY & ADDITIONAL TESTS:    MEDICATIONS:  MEDICATIONS  (STANDING):  ALBUTerol/ipratropium for Nebulization 3 milliLiter(s) Nebulizer every 4 hours  atorvastatin 10 milliGRAM(s) Oral at bedtime  cefepime  IVPB 1000 milliGRAM(s) IV Intermittent every 24 hours  epoetin [place] Injectable 72532 Unit(s) SubCutaneous every 7 days  ferrous    sulfate Liquid 300 milliGRAM(s) Enteral Tube daily  finasteride 5 milliGRAM(s) Oral daily  folic acid 1 milliGRAM(s) Enteral Tube daily  hydrocortisone sodium succinate Injectable 50 milliGRAM(s) IV Push every 12 hours  pantoprazole    Tablet 40 milliGRAM(s) Oral before breakfast  saccharomyces boulardii 250 milliGRAM(s) Oral two times a day    MEDICATIONS  (PRN):  morphine  - Injectable 2 milliGRAM(s) IV Push every 6 hours PRN Moderate Pain (4 - 6)

## 2017-12-22 NOTE — PROGRESS NOTE ADULT - ASSESSMENT
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 Placed on ventilator and vasopressor support. Septic shock resolved. No longer requiring vasopressors and s/p extubation and down grade from ICU to medicine hospitalist team on 12/20/17. Continues on cefepime for pseudomona aeruginosa UTI and enterovirus positive, Bcx negative for growth, leukocytosis trending down and remained afebrile. BP remained normotensive, pt continues with being tapered off of stress dose steroids. His renal function remains compromised but is slowly down trending and likely secondary to ATN from septic shock with underlying known hx of ckd stage 3, d/c IVF today. Renal US noted, no obstruction or hydronephrosis noted. Nephrology continues to follow the patient.      Problem/Plan - 1:  ·  Problem: Pseudomonas urinary tract infection.  Plan: -Pt may be a colonizer   -pt afebrile - leukocytosis resolving   -U cx pseudomonas . Bld cx negative   -c/w cefepime 1G IVPB QD D#5  -c/w probiotic  -Now out of shock down titrating down stress dose hydrocortisone.  -d/c puckett trial of void  bladder scan order placed if needed for post void residual   -ID consult noted and appreciated.      Problem/Plan - 2:  ·  Problem: Pneumonia, bacterial.  Plan: Enterovirus positive suspected aspiration pneumonia   -pt afebrile  -leukocytosis resolving   -c/w bronchodilators   -c/w mucomyst x 24 hrs   -c/w cefepime 1G IVPB QD D#5  -Bcx negative   -ct chest ordered and showed bilateral upper lobe infiltrates, right greater than left as well as tree-in-bud inflammatory changes in the lower lobes as well.      Problem/Plan - 3:  ·  Problem: Acute on chronic renal failure.  Plan: underlying hx CKD stage 3 likely know KAPIL secondary to ATN from septic shock   -c/w monitoring bun/cr down trending   -baseline creatine around 1.7 per prior records  -avoid nephrotoxic meds   -nephrology f/u noted and appreciated.      Problem/Plan - 4:  ·  Problem: Cerebrovascular accident (CVA), unspecified mechanism.  Plan: -c/w atorvastatin 10mg po qhs   -pt is also on coumadin.      Problem/Plan - 5:  ·  Problem: Atrial fibrillation.  Plan: -rate controlled  -AV laurence blockers/ anti hypertensives on hold if remains with normotensive bp will restart meds    Will restart coumadin , daily pt/INR     Problem/Plan - 6:  Problem: BPH (benign prostatic hyperplasia). Plan: -c/w proscar and flomax. Urology recommend leave puckett out . Voiding trial and may recall urology if urinary retention.      Problem/Plan - 7:  ·  Problem: Prophylactic measure.  Plan: DVT ppx covered with coumadin    Disposition :Palliative consulted for goals of care and further quality of life discussions given recurrent hospitalizations and overall poor prognosis.

## 2017-12-22 NOTE — PROGRESS NOTE ADULT - ASSESSMENT
IMP  PROB RESOLVING ASPIRATION PNEUMONIA    ASX PSEUDOMONAS UTI    ENTEROVIRAL PROCESS    COMPLETE IV ABS AS ORDERED IN CHART  NO NEW ISSUES

## 2017-12-22 NOTE — PHYSICAL THERAPY INITIAL EVALUATION ADULT - RANGE OF MOTION EXAMINATION, REHAB EVAL
Left UE ROM was WNL (within normal limits)/except right upper shld flexion 0- 20 , flexed synergy pattern, right hip and knee flexion 0-20/Left LE ROM was WNL (within normal limits)

## 2017-12-22 NOTE — CONSULT NOTE ADULT - SUBJECTIVE AND OBJECTIVE BOX
[facility], [facility]                [address] [address]                                     PHONE: [phone]                                       FAX: [phone]  [email or web address]      Reason for Consult: NSVT    HPI:  76 M with prior history of permanent AF on coumadin, CAD s/p CABG/AVR, HTN , chronic renal insufficiency, CVA with R hemiparesis,  colon resection with ileostomy, dysphagia, requiring a PEG admitted after found unresponsive and hypoxic now with recurrent NSVT. Patient was recently discharged from rehab facility after an admission for weakness and per his wife, was noted to have increased shortness of breath and recurrent coughing post PEG tube feeding. He is now s/p intubation for hypoxic respiratory failure secondary to aspiration pneumonia/pneumonitis and transiently required pressor support for septic shock. He additionally was found to have pseudomonas aeruginosa UTI for which he is being treated, and his course was further complicated by KAPIL on CKD presumed to be secondary to ATN in the setting of septic shock. He is currently hemodynamically stable, however, was noted to have recurrent ventricular ectopy. Patient additionally with hematuria.   He has limited insight into his medical history and currently has no acute concerns.      PAST MEDICAL & SURGICAL HISTORY:  CAD (coronary artery disease)  Afib  CVA (cerebral vascular accident)  Mitral valve replaced  BPH (benign prostatic hyperplasia)  High cholesterol  HTN (hypertension)  H/O heart valve replacement with mechanical valve  S/P CABG x 1  H/O colectomy    PREVIOUS DIAGNOSTIC TESTING:      EKG: AF with inferior infarct age indeterminate     ECHO  FINDINGS:  < from: TTE Echo Complete w/Doppler (09.06.17 @ 15:51) >  1. Left ventricular ejection fraction, by visual estimation, is 60 to 65%.   2. Normal global left ventricular systolic function.   3. Basal and mid inferior wall is abnormal as described above.   4. Moderately dilated right atrium.   5. Thickening of the anterior and posterior mitral valve leaflets.   6. There is either MAC or a prosthetic mitral ring present. An eccentric moderately severe jet of MR is seen.   7. Severe tricuspid regurgitation.   8. Bioprosthesis in the aortic position.   9. Mild aortic regurgitation.  10. Estimated pulmonary artery systolic pressure is 37.8 mmHg assuming a right atrial pressure of 15 mmHg, which is consistent with borderline pulmonary hypertension.  11. Moderately enlarged left atrium.    MEDICATIONS  (STANDING):  ALBUTerol/ipratropium for Nebulization 3 milliLiter(s) Nebulizer every 4 hours  atorvastatin 10 milliGRAM(s) Oral at bedtime  cefepime  IVPB 1000 milliGRAM(s) IV Intermittent every 24 hours  epoetin [place] Injectable 26811 Unit(s) SubCutaneous every 7 days  ferrous    sulfate Liquid 300 milliGRAM(s) Enteral Tube daily  finasteride 5 milliGRAM(s) Oral daily  folic acid 1 milliGRAM(s) Enteral Tube daily  hydrocortisone sodium succinate Injectable 50 milliGRAM(s) IV Push every 12 hours  pantoprazole    Tablet 40 milliGRAM(s) Oral before breakfast  saccharomyces boulardii 250 milliGRAM(s) Oral two times a day  tamsulosin 0.4 milliGRAM(s) Oral at bedtime    MEDICATIONS  (PRN):  morphine  - Injectable 2 milliGRAM(s) IV Push every 6 hours PRN Moderate Pain (4 - 6)    FAMILY HISTORY:  No pertinent family history in first degree relatives    SOCIAL HISTORY:  CIGARETTES: denies  ALCOHOL: denies     ROS: Negative other than as mentioned in HPI.    Vital Signs Last 24 Hrs  T(C): 36.8 (22 Dec 2017 16:47), Max: 37 (21 Dec 2017 20:16)  T(F): 98.3 (22 Dec 2017 16:47), Max: 98.6 (21 Dec 2017 20:16)  HR: 105 (22 Dec 2017 16:47) (90 - 105)  BP: 129/63 (22 Dec 2017 16:47) (122/63 - 140/68)  BP(mean): --  RR: 20 (22 Dec 2017 16:47) (17 - 20)  SpO2: 99% (22 Dec 2017 10:43) (96% - 99%)    PHYSICAL EXAM:  General: Appears well developed, well nourished alert and cooperative.  HEENT: Head; normocephalic, atraumatic.  Eyes;   Pupils reactive, cornea wnl.  Neck; Supple, no nodes adenopathy, no NVD or carotid bruit or thyromegaly.  CARDIOVASCULAR; irregularly irregular, 2/6 ABIEL, no rub, gallop or lift. Normal S1 and S2.  LUNGS; No rales, rhonchi or wheeze. Normal breath sounds bilaterally.  ABDOMEN ; Soft, nontender without mass or organomegaly. bowel sounds normoactive.  EXTREMITIES; No clubbing, cyanosis or edema. Distal pulses wnl. ROM normal.  SKIN; warm and dry with normal turgor.  NEURO; Alert/oriented x 2, right hemiparesis   PSYCH; normal affect.        INTERPRETATION OF TELEMETRY:    ECG:    I&O's Detail    21 Dec 2017 07:01  -  22 Dec 2017 07:00  --------------------------------------------------------  IN:  Total IN: 0 mL    OUT:    Colostomy: 1100 mL  Total OUT: 1100 mL    Total NET: -1100 mL    LABS:                        9.5    11.8  )-----------( 125      ( 21 Dec 2017 07:20 )             28.9     12-22    149<H>  |  110<H>  |  103.0<H>  ----------------------------<  318<H>  3.3<L>   |  21.0<L>  |  3.30<H>    Ca    8.9      22 Dec 2017 07:14  Mg     2.4     12-22    TPro  6.6  /  Alb  3.0<L>  /  TBili  0.4  /  DBili  x   /  AST  15  /  ALT  17  /  AlkPhos  58  12-21    PT/INR - ( 22 Dec 2017 07:14 )   PT: 21.9 sec;   INR: 1.96 ratio         I&O's Summary    21 Dec 2017 07:01  -  22 Dec 2017 07:00  --------------------------------------------------------  IN: 0 mL / OUT: 1100 mL / NET: -1100 mL      RADIOLOGY & ADDITIONAL STUDIES:  < from: CT Chest No Cont (12.21.17 @ 12:33) >  Bilateral upper lobe infiltrates, right greater than left.  Tree-in-bud inflammatory changes in the lower lobes as well.. [facility], [facility]                [address] [address]                                     PHONE: [phone]                                       FAX: [phone]  [email or web address]      Reason for Consult: NSVT    HPI:  76 M with prior history of permanent AF on coumadin, CAD s/p CABG/AVR, HTN , chronic renal insufficiency, CVA with R hemiparesis,  colon resection with ileostomy, dysphagia, requiring a PEG admitted after found unresponsive and hypoxic now with recurrent NSVT. Patient was recently discharged from rehab facility after an admission for weakness and per his wife, was noted to have increased shortness of breath and recurrent coughing post PEG tube feeding. He is now s/p intubation for hypoxic respiratory failure secondary to aspiration pneumonia/pneumonitis and transiently required pressor support for septic shock. He additionally was found to have pseudomonas aeruginosa UTI for which he is being treated, and his course was further complicated by KAPIL on CKD presumed to be secondary to ATN in the setting of septic shock. He is currently hemodynamically stable, however, was noted to have recurrent ventricular ectopy. Patient additionally with hematuria.   He has limited insight into his medical history and currently has no acute concerns.  Patient with paucity of speech.     PAST MEDICAL & SURGICAL HISTORY:  CAD (coronary artery disease)  Afib  CVA (cerebral vascular accident)  Mitral valve replaced  BPH (benign prostatic hyperplasia)  High cholesterol  HTN (hypertension)  H/O heart valve replacement with mechanical valve  S/P CABG x 1  H/O colectomy    PREVIOUS DIAGNOSTIC TESTING:      EKG: AF with inferior infarct age indeterminate     ECHO  FINDINGS:  < from: TTE Echo Complete w/Doppler (09.06.17 @ 15:51) >  1. Left ventricular ejection fraction, by visual estimation, is 60 to 65%.   2. Normal global left ventricular systolic function.   3. Basal and mid inferior wall is abnormal as described above.   4. Moderately dilated right atrium.   5. Thickening of the anterior and posterior mitral valve leaflets.   6. There is either MAC or a prosthetic mitral ring present. An eccentric moderately severe jet of MR is seen.   7. Severe tricuspid regurgitation.   8. Bioprosthesis in the aortic position.   9. Mild aortic regurgitation.  10. Estimated pulmonary artery systolic pressure is 37.8 mmHg assuming a right atrial pressure of 15 mmHg, which is consistent with borderline pulmonary hypertension.  11. Moderately enlarged left atrium.    MEDICATIONS  (STANDING):  ALBUTerol/ipratropium for Nebulization 3 milliLiter(s) Nebulizer every 4 hours  atorvastatin 10 milliGRAM(s) Oral at bedtime  cefepime  IVPB 1000 milliGRAM(s) IV Intermittent every 24 hours  epoetin [place] Injectable 77034 Unit(s) SubCutaneous every 7 days  ferrous    sulfate Liquid 300 milliGRAM(s) Enteral Tube daily  finasteride 5 milliGRAM(s) Oral daily  folic acid 1 milliGRAM(s) Enteral Tube daily  hydrocortisone sodium succinate Injectable 50 milliGRAM(s) IV Push every 12 hours  pantoprazole    Tablet 40 milliGRAM(s) Oral before breakfast  saccharomyces boulardii 250 milliGRAM(s) Oral two times a day  tamsulosin 0.4 milliGRAM(s) Oral at bedtime    MEDICATIONS  (PRN):  morphine  - Injectable 2 milliGRAM(s) IV Push every 6 hours PRN Moderate Pain (4 - 6)    FAMILY HISTORY:  No pertinent family history in first degree relatives. Further details unable to be obtained 2/2 to patient's mental status     SOCIAL HISTORY: unable to obtain 2/2 patient's mental status     ROS: Negative other than as mentioned in HPI.    Vital Signs Last 24 Hrs  T(C): 36.8 (22 Dec 2017 16:47), Max: 37 (21 Dec 2017 20:16)  T(F): 98.3 (22 Dec 2017 16:47), Max: 98.6 (21 Dec 2017 20:16)  HR: 105 (22 Dec 2017 16:47) (90 - 105)  BP: 129/63 (22 Dec 2017 16:47) (122/63 - 140/68)  BP(mean): --  RR: 20 (22 Dec 2017 16:47) (17 - 20)  SpO2: 99% (22 Dec 2017 10:43) (96% - 99%)    PHYSICAL EXAM:  General: lethargic, minimally interactive with interview   HEENT: Head; normocephalic, atraumatic. face mask in place, sclera anicteric   Neck; Supple,no carotid bruit   CARDIOVASCULAR; irregularly irregular, 2/6 ABIEL, no rub, gallop or lift. Normal S1 and S2.  LUNGS; tachypneic, no rales, rhonchi or wheeze.   ABDOMEN ; Soft, nontender without mass or organomegaly. bowel sounds normoactive. PEG in place   EXTREMITIES; No clubbing, cyanosis or edema. ROM normal.  SKIN; warm and dry with normal turgor.  NEURO; Alert/oriented x 1, right hemiparesis   PSYCH; lethargic         INTERPRETATION OF TELEMETRY: atrial fibrillation with multiform PVCs     I&O's Detail    21 Dec 2017 07:01  -  22 Dec 2017 07:00  --------------------------------------------------------  IN:  Total IN: 0 mL    OUT:    Colostomy: 1100 mL  Total OUT: 1100 mL    Total NET: -1100 mL    LABS:                        9.5    11.8  )-----------( 125      ( 21 Dec 2017 07:20 )             28.9     12-22    149<H>  |  110<H>  |  103.0<H>  ----------------------------<  318<H>  3.3<L>   |  21.0<L>  |  3.30<H>    Ca    8.9      22 Dec 2017 07:14  Mg     2.4     12-22    TPro  6.6  /  Alb  3.0<L>  /  TBili  0.4  /  DBili  x   /  AST  15  /  ALT  17  /  AlkPhos  58  12-21    PT/INR - ( 22 Dec 2017 07:14 )   PT: 21.9 sec;   INR: 1.96 ratio         I&O's Summary    21 Dec 2017 07:01  -  22 Dec 2017 07:00  --------------------------------------------------------  IN: 0 mL / OUT: 1100 mL / NET: -1100 mL      RADIOLOGY & ADDITIONAL STUDIES:  < from: CT Chest No Cont (12.21.17 @ 12:33) >  Bilateral upper lobe infiltrates, right greater than left.  Tree-in-bud inflammatory changes in the lower lobes as well..

## 2017-12-22 NOTE — PROGRESS NOTE ADULT - SUBJECTIVE AND OBJECTIVE BOX
[facility] [place] JOSEPH WATERS MD FACP   RICARDO HALL MD  Diplomates American Board of Internal Medicine and Infectious Diseases      CRISTIN ROQUE  MRN-[id number]  77y    INTERVAL HPI/OVERNIGHT EVENTS:    AFEBRILE  VSS  CONFUSED  Vital Signs Last 24 Hrs  T(C): 36.9 (22 Dec 2017 05:08), Max: 37 (21 Dec 2017 20:16)  T(F): 98.4 (22 Dec 2017 05:08), Max: 98.6 (21 Dec 2017 20:16)  HR: 105 (22 Dec 2017 05:08) (90 - 117)  BP: 122/63 (22 Dec 2017 05:08) (122/63 - 158/70)  BP(mean): --  RR: 17 (22 Dec 2017 05:08) (17 - 20)  SpO2: 96% (22 Dec 2017 05:08) (96% - 98%)    ANTIBIOTICS  cefepime  IVPB 1000 milliGRAM(s) IV Intermittent every 24 hours  epoetin [place] Injectable 46913 Unit(s) SubCutaneous every 7 days      Allergies    penicillins (Hives)    Intolerances        REVIEW OF SYSTEMS:N/A    PHYSICAL EXAM:  Vital Signs Last 24 Hrs  T(C): 36.9 (22 Dec 2017 05:08), Max: 37 (21 Dec 2017 20:16)  T(F): 98.4 (22 Dec 2017 05:08), Max: 98.6 (21 Dec 2017 20:16)  HR: 105 (22 Dec 2017 05:08) (90 - 117)  BP: 122/63 (22 Dec 2017 05:08) (122/63 - 158/70)  BP(mean): --  RR: 17 (22 Dec 2017 05:08) (17 - 20)  SpO2: 96% (22 Dec 2017 05:08) (96% - 98%)      GEN: NAD, pleasant  HEENT: normocephalic and atraumatic. EOMI. ESTRELLA. Moist mucosa. Clear Posterior pharynx.  NECK: Supple. No carotid bruits.  No lymphadenopathy or thyromegaly.  LUNGS: Clear to auscultation.  HEART: Regular rate and rhythm without murmur.  ABDOMEN: Soft, nontender, and nondistended.  Positive bowel sounds.  No hepatosplenomegaly was noted.  EXTREMITIES: Without any cyanosis, clubbing, rash, lesions or edema.  NEUROLOGIC: Cranial nerves II through XII are grossly intact.  MUSCULOSKELETAL:  SKIN: No ulceration or induration present    LABS:                        9.5    11.8  )-----------( 125      ( 21 Dec 2017 07:20 )             28.9       12-22    149<H>  |  110<H>  |  103.0<H>  ----------------------------<  318<H>  3.3<L>   |  21.0<L>  |  3.30<H>    Ca    8.9      22 Dec 2017 07:14  Mg     2.4     12-22    TPro  6.6  /  Alb  3.0<L>  /  TBili  0.4  /  DBili  x   /  AST  15  /  ALT  17  /  AlkPhos  58  12-21 12-21 @ 07:20  hct 28.9 % hgb 9.5 g/dL    12-21 @ 07:20  plat 125 K/uL wbc 11.8 K/uL    12-20 @ 05:56  hct 27.7 % hgb 9.3 g/dL    12-20 @ 05:56  plat 135 K/uL wbc 11.3 K/uL      12-22-17  creat 3.30 mg/dL gfr 20 mL/min/1.73M2 bun 103.0 mg/dL k 3.3 mmol/L  12-21-17  creat 3.43 mg/dL gfr 19 mL/min/1.73M2 bun 101.0 mg/dL k 3.2 mmol/L  12-20-17  creat 3.67 mg/dL gfr 17 mL/min/1.73M2 bun 95.0 mg/dL k 3.4 mmol/L      MICROBIOLOGY:  Culture Results:   >100,000 CFU/ml Pseudomonas aeruginosa (12-18 @ 16:02)  Culture Results:   No growth at 48 hours (12-18 @ 15:48)  Culture Results:   No growth at 48 hours (12-18 @ 15:46)        RADIOLOGY & ADDITIONAL STUDIES:          SANDRA WATERS MD FACP

## 2017-12-23 DIAGNOSIS — R33.9 RETENTION OF URINE, UNSPECIFIED: ICD-10-CM

## 2017-12-23 LAB
ANION GAP SERPL CALC-SCNC: 19 MMOL/L — HIGH (ref 5–17)
ANISOCYTOSIS BLD QL: SLIGHT — SIGNIFICANT CHANGE UP
BUN SERPL-MCNC: 104 MG/DL — HIGH (ref 8–20)
CALCIUM SERPL-MCNC: 9 MG/DL — SIGNIFICANT CHANGE UP (ref 8.6–10.2)
CHLORIDE SERPL-SCNC: 110 MMOL/L — HIGH (ref 98–107)
CO2 SERPL-SCNC: 22 MMOL/L — SIGNIFICANT CHANGE UP (ref 22–29)
CREAT SERPL-MCNC: 2.99 MG/DL — HIGH (ref 0.5–1.3)
CULTURE RESULTS: SIGNIFICANT CHANGE UP
CULTURE RESULTS: SIGNIFICANT CHANGE UP
ELLIPTOCYTES BLD QL SMEAR: SLIGHT — SIGNIFICANT CHANGE UP
GLUCOSE SERPL-MCNC: 316 MG/DL — HIGH (ref 70–115)
HCT VFR BLD CALC: 27.8 % — LOW (ref 42–52)
HGB BLD-MCNC: 9 G/DL — LOW (ref 14–18)
HYPOCHROMIA BLD QL: SLIGHT — SIGNIFICANT CHANGE UP
INR BLD: 1.78 RATIO — HIGH (ref 0.88–1.16)
LYMPHOCYTES # BLD AUTO: 3 % — LOW (ref 20–55)
MACROCYTES BLD QL: SLIGHT — SIGNIFICANT CHANGE UP
MCHC RBC-ENTMCNC: 29.5 PG — SIGNIFICANT CHANGE UP (ref 27–31)
MCHC RBC-ENTMCNC: 32.4 G/DL — SIGNIFICANT CHANGE UP (ref 32–36)
MCV RBC AUTO: 91.1 FL — SIGNIFICANT CHANGE UP (ref 80–94)
MICROCYTES BLD QL: SLIGHT — SIGNIFICANT CHANGE UP
MONOCYTES NFR BLD AUTO: 2 % — LOW (ref 3–10)
MYELOCYTES NFR BLD: 1 % — HIGH (ref 0–0)
NEUTROPHILS NFR BLD AUTO: 93 % — HIGH (ref 37–73)
PLAT MORPH BLD: NORMAL — SIGNIFICANT CHANGE UP
PLATELET # BLD AUTO: 129 K/UL — LOW (ref 150–400)
POIKILOCYTOSIS BLD QL AUTO: SLIGHT — SIGNIFICANT CHANGE UP
POTASSIUM SERPL-MCNC: 3.6 MMOL/L — SIGNIFICANT CHANGE UP (ref 3.5–5.3)
POTASSIUM SERPL-SCNC: 3.6 MMOL/L — SIGNIFICANT CHANGE UP (ref 3.5–5.3)
PROTHROM AB SERPL-ACNC: 19.8 SEC — HIGH (ref 9.8–12.7)
RBC # BLD: 3.05 M/UL — LOW (ref 4.6–6.2)
RBC # FLD: 15.3 % — SIGNIFICANT CHANGE UP (ref 11–15.6)
RBC BLD AUTO: ABNORMAL
SODIUM SERPL-SCNC: 151 MMOL/L — HIGH (ref 135–145)
SPECIMEN SOURCE: SIGNIFICANT CHANGE UP
SPECIMEN SOURCE: SIGNIFICANT CHANGE UP
TROPONIN T SERPL-MCNC: 0.03 NG/ML — SIGNIFICANT CHANGE UP (ref 0–0.06)
VARIANT LYMPHS # BLD: 1 % — SIGNIFICANT CHANGE UP (ref 0–6)
WBC # BLD: 9.9 K/UL — SIGNIFICANT CHANGE UP (ref 4.8–10.8)
WBC # FLD AUTO: 9.9 K/UL — SIGNIFICANT CHANGE UP (ref 4.8–10.8)

## 2017-12-23 PROCEDURE — 99232 SBSQ HOSP IP/OBS MODERATE 35: CPT

## 2017-12-23 PROCEDURE — 99233 SBSQ HOSP IP/OBS HIGH 50: CPT

## 2017-12-23 RX ORDER — METOPROLOL TARTRATE 50 MG
25 TABLET ORAL
Qty: 0 | Refills: 0 | Status: DISCONTINUED | OUTPATIENT
Start: 2017-12-23 | End: 2017-12-23

## 2017-12-23 RX ORDER — SODIUM CHLORIDE 9 MG/ML
1000 INJECTION, SOLUTION INTRAVENOUS
Qty: 0 | Refills: 0 | Status: DISCONTINUED | OUTPATIENT
Start: 2017-12-23 | End: 2017-12-24

## 2017-12-23 RX ORDER — ASPIRIN/CALCIUM CARB/MAGNESIUM 324 MG
81 TABLET ORAL DAILY
Qty: 0 | Refills: 0 | Status: DISCONTINUED | OUTPATIENT
Start: 2017-12-23 | End: 2018-01-12

## 2017-12-23 RX ORDER — METOPROLOL TARTRATE 50 MG
25 TABLET ORAL THREE TIMES A DAY
Qty: 0 | Refills: 0 | Status: DISCONTINUED | OUTPATIENT
Start: 2017-12-23 | End: 2018-01-09

## 2017-12-23 RX ORDER — ASPIRIN/CALCIUM CARB/MAGNESIUM 324 MG
81 TABLET ORAL DAILY
Qty: 0 | Refills: 0 | Status: DISCONTINUED | OUTPATIENT
Start: 2017-12-23 | End: 2017-12-23

## 2017-12-23 RX ADMIN — Medication 3 MILLILITER(S): at 12:55

## 2017-12-23 RX ADMIN — Medication 3 MILLILITER(S): at 20:10

## 2017-12-23 RX ADMIN — Medication 25 MILLIGRAM(S): at 22:47

## 2017-12-23 RX ADMIN — Medication 25 MILLIGRAM(S): at 13:45

## 2017-12-23 RX ADMIN — Medication 1 MILLIGRAM(S): at 13:45

## 2017-12-23 RX ADMIN — Medication 50 MILLIGRAM(S): at 06:16

## 2017-12-23 RX ADMIN — CEFEPIME 100 MILLIGRAM(S): 1 INJECTION, POWDER, FOR SOLUTION INTRAMUSCULAR; INTRAVENOUS at 06:16

## 2017-12-23 RX ADMIN — Medication 50 MILLIGRAM(S): at 17:24

## 2017-12-23 RX ADMIN — Medication 3 MILLILITER(S): at 09:25

## 2017-12-23 RX ADMIN — SODIUM CHLORIDE 80 MILLILITER(S): 9 INJECTION, SOLUTION INTRAVENOUS at 09:11

## 2017-12-23 RX ADMIN — Medication 3 MILLILITER(S): at 23:21

## 2017-12-23 RX ADMIN — Medication 250 MILLIGRAM(S): at 17:24

## 2017-12-23 RX ADMIN — TAMSULOSIN HYDROCHLORIDE 0.4 MILLIGRAM(S): 0.4 CAPSULE ORAL at 22:47

## 2017-12-23 RX ADMIN — Medication 250 MILLIGRAM(S): at 06:16

## 2017-12-23 RX ADMIN — Medication 3 MILLILITER(S): at 16:12

## 2017-12-23 RX ADMIN — Medication 81 MILLIGRAM(S): at 17:40

## 2017-12-23 RX ADMIN — MORPHINE SULFATE 2 MILLIGRAM(S): 50 CAPSULE, EXTENDED RELEASE ORAL at 17:21

## 2017-12-23 RX ADMIN — Medication 300 MILLIGRAM(S): at 17:24

## 2017-12-23 RX ADMIN — FINASTERIDE 5 MILLIGRAM(S): 5 TABLET, FILM COATED ORAL at 17:24

## 2017-12-23 RX ADMIN — ATORVASTATIN CALCIUM 10 MILLIGRAM(S): 80 TABLET, FILM COATED ORAL at 22:47

## 2017-12-23 RX ADMIN — MORPHINE SULFATE 2 MILLIGRAM(S): 50 CAPSULE, EXTENDED RELEASE ORAL at 18:06

## 2017-12-23 RX ADMIN — Medication 3 MILLILITER(S): at 03:13

## 2017-12-23 NOTE — PROGRESS NOTE ADULT - ASSESSMENT
76 y/o male pmhx CVA (2 years ago and in July 17) w/ 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 Placed on ventilator and vasopressor support. Septic shock resolved. No longer requiring vasopressors and s/p extubation and down grade from ICU to medicine hospitalist team on 12/20/17. Continues on cefepime for pseudomona aeruginosa UTI and enterovirus positive, Bcx negative for growth, leukocytosis trending down and remained afebrile. BP remained normotensive, pt continues with being tapered off of stress dose steroids. His renal function remains compromised but is slowly down trending and likely secondary to ATN from septic shock with underlying known hx of ckd stage 3, d/c IVF today. Renal US noted, no obstruction or hydronephrosis noted. Nephrology continues to follow the patient.      Problem/Plan - 1:  ·  Problem: Pseudomonas urinary tract infection.  Plan: -Pt may be a colonizer    leukocytosis resolved  -U cx pseudomonas . Bld cx negative   Cefepime completed discontinued  -c/w probiotic  -Now out of shock down titrating down stress dose hydrocortisone.  Hewitt in place for urinary retention and hematuria         Problem/Plan - 2:  ·  Problem: Pneumonia, bacterial.  Plan: Enterovirus positive suspected aspiration pneumonia    -c/w bronchodilators   cefepime completed discontinued  -Bcx negative   -ct chest  showed bilateral upper lobe infiltrates, right greater than left as well as tree-in-bud inflammatory changes in the lower lobes as well.      Problem/Plan - 3:  ·  Problem: Acute on chronic renal failure.  Plan: underlying hx CKD stage 3 likely know KAPIL secondary to ATN from septic shock   worsening of renal indices .  Gentle hydration   -baseline creatine around 1.7 per prior records  -avoid nephrotoxic meds   Nephrology is following.      Problem/Plan - 4:  ·  Problem: Cerebrovascular accident (CVA), unspecified mechanism.  Plan:  Right side hemiparesis. -c/w atorvastatin 10mg po qhs   -pt is also on coumadin.      Problem/Plan - 5:  ·  Problem: Atrial fibrillation.  Plan: Cardiology dr Donovan evaluated, doubt acute ischemia. Considering etiology of electrolyte imbalance and hypoxia.  Recommend beta blocker , statin , aspirin.   Will restart coumadin once hematuria abates. , daily pt/INR     Problem/Plan - 6:  Problem: BPH (benign prostatic hyperplasia). Plan: -c/w proscar and flomax. Hewitt in place for urinary retention. . Voiding trial to follow and will recall urology if urinary retention.     Disposition :Palliative consulted for goals of care and further quality of life discussions given recurrent hospitalizations and overall poor prognosis.

## 2017-12-23 NOTE — PROVIDER CONTACT NOTE (OTHER) - ASSESSMENT
vss, pt denies chest pain and appears asymptomatic
vss
vss denies chest pain
vss, no apparent distress

## 2017-12-23 NOTE — PROGRESS NOTE ADULT - SUBJECTIVE AND OBJECTIVE BOX
Hewitt in place draining clear urine  good urine output  12-23    151<H>  |  110<H>  |  104.0<H>  ----------------------------<  316<H>  3.6   |  22.0  |  2.99<H>    Ca    9.0      23 Dec 2017 06:27  Mg     2.4     12-22                          9.0    9.9   )-----------( 129      ( 23 Dec 2017 06:27 )             27.8

## 2017-12-23 NOTE — PROGRESS NOTE ADULT - ASSESSMENT
IMP  PROB RESOLVING ASPIRATION PNEUMONIA    ASX PSEUDOMONAS UTI    ENTEROVIRAL PROCESS    COMPLETE IV ABS AS ORDERED IN CHART  NO NEW ISSUES    WILL NO LONGER SEE

## 2017-12-23 NOTE — PROGRESS NOTE ADULT - SUBJECTIVE AND OBJECTIVE BOX
CC: Acute resp failure resolving on oxygen via ventimask. Delirium . Aspiration pneumonia . Pseudomonal uti. Hematuria. Hewitt in place.   Multiple ventricular ectopies NSVT .  H  HPI:  76 y/o male pmhx CVA ( 2 years ago and in July 17) w/ residual right upper and lower extremity weakness, s/p PEG,  afib on coumadin, s/p colon resection with ileostomy 8 years ago, s/p CBAGx3,  prostate cancer s/p TURP 3 years ago was BIBEMS after wife found him unresponsive with vomit around his face. Wife states patient had an increase in SOB past 2 days and noticed he was becoming more weak.  Pt was discharged 2 days ago from rehab center and has had a decrease in appetite since he's been home according to his wife and son. Wife states she has been noticing that after PEG feeding he coughs a lot and she compares it to him choking.  She states he has been getting feed through his PEG as well as liquids by mouth which he has been tolerating. On arrival patient was unresponsive and hypoxic. (18 Dec 2017 19:49)    REVIEW OF SYSTEMS:    Patient denied fever, chills, abdominal pain, nausea, vomiting, cough, shortness of breath, chest pain or palpitations    Vital Signs Last 24 Hrs  T(C): 36.8 (23 Dec 2017 09:06), Max: 37.1 (22 Dec 2017 23:10)  T(F): 98.2 (23 Dec 2017 09:06), Max: 98.7 (22 Dec 2017 23:10)  HR: 110 (23 Dec 2017 09:06) (102 - 111)  BP: 119/71 (23 Dec 2017 09:06) (119/71 - 134/66)  BP(mean): --  RR: 20 (23 Dec 2017 09:06) (18 - 20)  SpO2: 92% (23 Dec 2017 09:06) (92% - 100%)I&O's Summary    22 Dec 2017 07:01  -  23 Dec 2017 07:00  --------------------------------------------------------  IN: 0 mL / OUT: 1350 mL / NET: -1350 mL      PHYSICAL EXAM:  GENERAL: ill looking   HEENT: PERRL, +EOMI, anicteric, no [place]  NECK: Supple, No JVD   CHEST/LUNG: Bilateral rales   HEART: S1S2 Normal intensity, no murmurs, gallops or rubs noted  ABDOMEN: Soft, BS Normoactive, NT, Peg tube , right ileostomy with bag  EXTREMITIES:  2+ radial and DP pulses noted, no clubbing, cyanosis, or edema noted. Limited mobility.   SKIN: No rashes or lesions noted  NEURO: Confused lethargic , Right hemiparesis. CN II-XII intact  PSYCH: Depressed mood and affect; insight/judgement inappropriate  LABS:                        9.0    9.9   )-----------( 129      ( 23 Dec 2017 06:27 )             27.8     12-23    151<H>  |  110<H>  |  104.0<H>  ----------------------------<  316<H>  3.6   |  22.0  |  2.99<H>    Ca    9.0      23 Dec 2017 06:27  Mg     2.4     12-22      PT/INR - ( 22 Dec 2017 07:14 )   PT: 21.9 sec;   INR: 1.96 ratio             RADIOLOGY & ADDITIONAL TESTS:    MEDICATIONS:  MEDICATIONS  (STANDING):  ALBUTerol/ipratropium for Nebulization 3 milliLiter(s) Nebulizer every 4 hours  atorvastatin 10 milliGRAM(s) Oral at bedtime  epoetin [place] Injectable 21086 Unit(s) SubCutaneous every 7 days  ferrous    sulfate Liquid 300 milliGRAM(s) Enteral Tube daily  finasteride 5 milliGRAM(s) Oral daily  folic acid 1 milliGRAM(s) Enteral Tube daily  hydrocortisone sodium succinate Injectable 50 milliGRAM(s) IV Push every 12 hours  pantoprazole    Tablet 40 milliGRAM(s) Oral before breakfast  saccharomyces boulardii 250 milliGRAM(s) Oral two times a day  sodium chloride 0.45%. 1000 milliLiter(s) (80 mL/Hr) IV Continuous <Continuous>  tamsulosin 0.4 milliGRAM(s) Oral at bedtime    MEDICATIONS  (PRN):  morphine  - Injectable 2 milliGRAM(s) IV Push every 6 hours PRN Moderate Pain (4 - 6)

## 2017-12-23 NOTE — PROGRESS NOTE ADULT - SUBJECTIVE AND OBJECTIVE BOX
[facility] [place] JOSEPH WATERS MD FACP   RICARDO HALL MD  Diplomates American Board of Internal Medicine and Infectious Diseases      CRISTIN ROQUE  [id number]  77y    INTERVAL HPI/OVERNIGHT EVENTS:    AFEBRILE  VSS  CONFUSED  AFEBRILE  COMPLETED IV ABS  Vital Signs Last 24 Hrs  T(C): 36.9 (22 Dec 2017 05:08), Max: 37 (21 Dec 2017 20:16)  T(F): 98.4 (22 Dec 2017 05:08), Max: 98.6 (21 Dec 2017 20:16)  HR: 105 (22 Dec 2017 05:08) (90 - 117)  BP: 122/63 (22 Dec 2017 05:08) (122/63 - 158/70)  BP(mean): --  RR: 17 (22 Dec 2017 05:08) (17 - 20)  SpO2: 96% (22 Dec 2017 05:08) (96% - 98%)    ANTIBIOTICS  NONE  epoetin [place] Injectable 46217 Unit(s) SubCutaneous every 7 days      Allergies    penicillins (Hives)    Intolerances        REVIEW OF SYSTEMS:N/A    PHYSICAL EXAM:  Vital Signs Last 24 Hrs  T(C): 36.9 (22 Dec 2017 05:08), Max: 37 (21 Dec 2017 20:16)  T(F): 98.4 (22 Dec 2017 05:08), Max: 98.6 (21 Dec 2017 20:16)  HR: 105 (22 Dec 2017 05:08) (90 - 117)  BP: 122/63 (22 Dec 2017 05:08) (122/63 - 158/70)  BP(mean): --  RR: 17 (22 Dec 2017 05:08) (17 - 20)  SpO2: 96% (22 Dec 2017 05:08) (96% - 98%)      GEN: NAD, pleasant  HEENT: normocephalic and atraumatic. EOMI. ESTRELLA. Moist mucosa. Clear Posterior pharynx.  NECK: Supple. No carotid bruits.  No lymphadenopathy or thyromegaly.  LUNGS: Clear to auscultation.  HEART: Regular rate and rhythm without murmur.  ABDOMEN: Soft, nontender, and nondistended.  Positive bowel sounds.  No hepatosplenomegaly was noted.  EXTREMITIES: Without any cyanosis, clubbing, rash, lesions or edema.  NEUROLOGIC: Cranial nerves II through XII are grossly intact.  MUSCULOSKELETAL:  SKIN: No ulceration or induration present    LABS:                        9.5    11.8  )-----------( 125      ( 21 Dec 2017 07:20 )             28.9       12-22    149<H>  |  110<H>  |  103.0<H>  ----------------------------<  318<H>  3.3<L>   |  21.0<L>  |  3.30<H>    Ca    8.9      22 Dec 2017 07:14  Mg     2.4     12-22    TPro  6.6  /  Alb  3.0<L>  /  TBili  0.4  /  DBili  x   /  AST  15  /  ALT  17  /  AlkPhos  58  12-21 12-21 @ 07:20  hct 28.9 % hgb 9.5 g/dL    12-21 @ 07:20  plat 125 K/uL wbc 11.8 K/uL    12-20 @ 05:56  hct 27.7 % hgb 9.3 g/dL    12-20 @ 05:56  plat 135 K/uL wbc 11.3 K/uL      12-22-17  creat 3.30 mg/dL gfr 20 mL/min/1.73M2 bun 103.0 mg/dL k 3.3 mmol/L  12-21-17  creat 3.43 mg/dL gfr 19 mL/min/1.73M2 bun 101.0 mg/dL k 3.2 mmol/L  12-20-17  creat 3.67 mg/dL gfr 17 mL/min/1.73M2 bun 95.0 mg/dL k 3.4 mmol/L      MICROBIOLOGY:  Culture Results:   >100,000 CFU/ml Pseudomonas aeruginosa (12-18 @ 16:02)  Culture Results:   No growth at 48 hours (12-18 @ 15:48)  Culture Results:   No growth at 48 hours (12-18 @ 15:46)        RADIOLOGY & ADDITIONAL STUDIES:          SANDRA WATERS MD FACP

## 2017-12-23 NOTE — PROGRESS NOTE ADULT - SUBJECTIVE AND OBJECTIVE BOX
[place] [facility] - [facility], THE HEART CENTER                                   [address] [place] [address]                                                      PHONE: [phone]                                                         FAX: [phone]  [email or web address]  ---------------------------------------------------------------------------------------------------------------------------------    FU for  Pt seen and examined.     Overnight events/patient complaints:    penicillins (Hives)    MEDICATIONS  (STANDING):  ALBUTerol/ipratropium for Nebulization 3 milliLiter(s) Nebulizer every 4 hours  aspirin enteric coated 81 milliGRAM(s) Oral daily  atorvastatin 10 milliGRAM(s) Oral at bedtime  epoetin [place] Injectable 33088 Unit(s) SubCutaneous every 7 days  ferrous    sulfate Liquid 300 milliGRAM(s) Enteral Tube daily  finasteride 5 milliGRAM(s) Oral daily  folic acid 1 milliGRAM(s) Enteral Tube daily  hydrocortisone sodium succinate Injectable 50 milliGRAM(s) IV Push every 12 hours  metoprolol     tartrate 25 milliGRAM(s) Oral two times a day  pantoprazole    Tablet 40 milliGRAM(s) Oral before breakfast  saccharomyces boulardii 250 milliGRAM(s) Oral two times a day  sodium chloride 0.45%. 1000 milliLiter(s) (80 mL/Hr) IV Continuous <Continuous>  tamsulosin 0.4 milliGRAM(s) Oral at bedtime    MEDICATIONS  (PRN):  morphine  - Injectable 2 milliGRAM(s) IV Push every 6 hours PRN Moderate Pain (4 - 6)      Vital Signs Last 24 Hrs  T(C): 36.8 (23 Dec 2017 09:06), Max: 37.1 (22 Dec 2017 23:10)  T(F): 98.2 (23 Dec 2017 09:06), Max: 98.7 (22 Dec 2017 23:10)  HR: 110 (23 Dec 2017 09:06) (104 - 111)  BP: 119/71 (23 Dec 2017 09:06) (119/71 - 134/66)  BP(mean): --  RR: 20 (23 Dec 2017 09:06) (18 - 20)  SpO2: 92% (23 Dec 2017 09:06) (92% - 100%)  ICU Vital Signs Last 24 Hrs  I&O's Summary    22 Dec 2017 07:01  -  23 Dec 2017 07:00  --------------------------------------------------------  IN: 0 mL / OUT: 1350 mL / NET: -1350 mL        PHYSICAL EXAM:  HEENT: no JVD  CARDIOVASCULAR: Normal S1 and S2, No murmur, rub, gallop or lift.   LUNGS: No rales, rhonchi or wheeze. Normal breath sounds bilaterally.  ABDOMEN: Soft, nontender without mass or organomegaly. bowel sounds normoactive.  EXTREMITIES: no edema          LABS:                        9.0    9.9   )-----------( 129      ( 23 Dec 2017 06:27 )             27.8     12-23    151<H>  |  110<H>  |  104.0<H>  ----------------------------<  316<H>  3.6   |  22.0  |  2.99<H>    Ca    9.0      23 Dec 2017 06:27  Mg     2.4     12-22      CRISTIN ROQUE      PT/INR - ( 23 Dec 2017 10:42 )   PT: 19.8 sec;   INR: 1.78 ratio               RADIOLOGY & ADDITIONAL STUDIES:    LABS:                        9.0    9.9   )-----------( 129      ( 23 Dec 2017 06:27 )             27.8     12-23    151<H>  |  110<H>  |  104.0<H>  ----------------------------<  316<H>  3.6   |  22.0  |  2.99<H>    Ca    9.0      23 Dec 2017 06:27  Mg     2.4     12-22      77y      PT/INR - ( 23 Dec 2017 10:42 )   PT: 19.8 sec;   INR: 1.78 ratio      Assessment and Plan:  76 M with prior history of permanent AF on coumadin, CAD s/p CABG/AVR, HTN , chronic renal insufficiency, CVA with R hemiparesis,  colon resection with ileostomy, dysphagia s/p PEG who presented unresponsive and hypoxic found to have septic shock requiring pressors and acute hypoxic respiratory failure 2/2 aspiration PNA s/p intubation, with course further complicated by KAPIL on CKD, and now with recurrent NSVT    NSVT  few triplets, increase metoprolol 25 tid, will hold off ischemic work up for now given multiple morbidities    CAD s/p CABG/AVR  - recommend ASA 81mg daily   - continue statin therapy     Permanent atrial fibrillation  - coumadin held 2/2 hematuria     acute hypoxic respiratory failure 2/2 aspiration PNA s/p intubation  - close monitoring of respiratory status   - discussed with nursing staff   Renal failure

## 2017-12-24 LAB
ANION GAP SERPL CALC-SCNC: 17 MMOL/L — SIGNIFICANT CHANGE UP (ref 5–17)
BUN SERPL-MCNC: 107 MG/DL — HIGH (ref 8–20)
CALCIUM SERPL-MCNC: 8.7 MG/DL — SIGNIFICANT CHANGE UP (ref 8.6–10.2)
CHLORIDE SERPL-SCNC: 114 MMOL/L — HIGH (ref 98–107)
CO2 SERPL-SCNC: 23 MMOL/L — SIGNIFICANT CHANGE UP (ref 22–29)
CREAT SERPL-MCNC: 2.8 MG/DL — HIGH (ref 0.5–1.3)
GLUCOSE SERPL-MCNC: 218 MG/DL — HIGH (ref 70–115)
HCT VFR BLD CALC: 26 % — LOW (ref 42–52)
HGB BLD-MCNC: 8.3 G/DL — LOW (ref 14–18)
INR BLD: 1.73 RATIO — HIGH (ref 0.88–1.16)
MCHC RBC-ENTMCNC: 29.4 PG — SIGNIFICANT CHANGE UP (ref 27–31)
MCHC RBC-ENTMCNC: 31.9 G/DL — LOW (ref 32–36)
MCV RBC AUTO: 92.2 FL — SIGNIFICANT CHANGE UP (ref 80–94)
PLATELET # BLD AUTO: 123 K/UL — LOW (ref 150–400)
POTASSIUM SERPL-MCNC: 3.6 MMOL/L — SIGNIFICANT CHANGE UP (ref 3.5–5.3)
POTASSIUM SERPL-SCNC: 3.6 MMOL/L — SIGNIFICANT CHANGE UP (ref 3.5–5.3)
PROTHROM AB SERPL-ACNC: 19.3 SEC — HIGH (ref 9.8–12.7)
RBC # BLD: 2.82 M/UL — LOW (ref 4.6–6.2)
RBC # FLD: 15.4 % — SIGNIFICANT CHANGE UP (ref 11–15.6)
SODIUM SERPL-SCNC: 154 MMOL/L — HIGH (ref 135–145)
WBC # BLD: 8.9 K/UL — SIGNIFICANT CHANGE UP (ref 4.8–10.8)
WBC # FLD AUTO: 8.9 K/UL — SIGNIFICANT CHANGE UP (ref 4.8–10.8)

## 2017-12-24 PROCEDURE — 99233 SBSQ HOSP IP/OBS HIGH 50: CPT

## 2017-12-24 RX ORDER — SODIUM CHLORIDE 9 MG/ML
1000 INJECTION INTRAMUSCULAR; INTRAVENOUS; SUBCUTANEOUS
Qty: 0 | Refills: 0 | Status: DISCONTINUED | OUTPATIENT
Start: 2017-12-24 | End: 2017-12-27

## 2017-12-24 RX ADMIN — Medication 81 MILLIGRAM(S): at 14:17

## 2017-12-24 RX ADMIN — MORPHINE SULFATE 2 MILLIGRAM(S): 50 CAPSULE, EXTENDED RELEASE ORAL at 13:13

## 2017-12-24 RX ADMIN — Medication 3 MILLILITER(S): at 08:21

## 2017-12-24 RX ADMIN — SODIUM CHLORIDE 80 MILLILITER(S): 9 INJECTION, SOLUTION INTRAVENOUS at 13:15

## 2017-12-24 RX ADMIN — Medication 3 MILLILITER(S): at 12:07

## 2017-12-24 RX ADMIN — Medication 3 MILLILITER(S): at 20:39

## 2017-12-24 RX ADMIN — Medication 25 MILLIGRAM(S): at 14:12

## 2017-12-24 RX ADMIN — SODIUM CHLORIDE 50 MILLILITER(S): 9 INJECTION INTRAMUSCULAR; INTRAVENOUS; SUBCUTANEOUS at 23:29

## 2017-12-24 RX ADMIN — TAMSULOSIN HYDROCHLORIDE 0.4 MILLIGRAM(S): 0.4 CAPSULE ORAL at 23:28

## 2017-12-24 RX ADMIN — Medication 50 MILLIGRAM(S): at 17:17

## 2017-12-24 RX ADMIN — Medication 25 MILLIGRAM(S): at 23:28

## 2017-12-24 RX ADMIN — FINASTERIDE 5 MILLIGRAM(S): 5 TABLET, FILM COATED ORAL at 14:12

## 2017-12-24 RX ADMIN — Medication 25 MILLIGRAM(S): at 06:15

## 2017-12-24 RX ADMIN — ATORVASTATIN CALCIUM 10 MILLIGRAM(S): 80 TABLET, FILM COATED ORAL at 23:28

## 2017-12-24 RX ADMIN — Medication 250 MILLIGRAM(S): at 06:15

## 2017-12-24 RX ADMIN — Medication 250 MILLIGRAM(S): at 17:17

## 2017-12-24 RX ADMIN — Medication 3 MILLILITER(S): at 03:30

## 2017-12-24 RX ADMIN — Medication 3 MILLILITER(S): at 23:33

## 2017-12-24 RX ADMIN — Medication 1 MILLIGRAM(S): at 14:12

## 2017-12-24 RX ADMIN — MORPHINE SULFATE 2 MILLIGRAM(S): 50 CAPSULE, EXTENDED RELEASE ORAL at 13:40

## 2017-12-24 RX ADMIN — Medication 3 MILLILITER(S): at 15:26

## 2017-12-24 RX ADMIN — Medication 300 MILLIGRAM(S): at 14:11

## 2017-12-24 RX ADMIN — Medication 50 MILLIGRAM(S): at 06:15

## 2017-12-24 NOTE — PROGRESS NOTE ADULT - SUBJECTIVE AND OBJECTIVE BOX
CC: Resp failure from aspiration pneumonia resolved . On oxygen via ventimask. CVA with right hemiparesis.  Pseudomonas uti.  Right ileostomy. Peg tube.  urinary puckett catheter for urinary retention, hematuria.  Afib, holding coumadin for hematuria. Dehydration, hypernatremia, prerenal azotemia.  Patient is more alert today.    HPI:  78 y/o male pmhx CVA ( 2 years ago and in July 17) w/ residual right upper and lower extremity weakness, s/p PEG,  afib on coumadin, s/p colon resection with ileostomy 8 years ago, s/p CBAGx3,  prostate cancer s/p TURP 3 years ago was BIBEMS after wife found him unresponsive with vomit around his face. Wife states patient had an increase in SOB past 2 days and noticed he was becoming more weak.  Pt was discharged 2 days ago from rehab center and has had a decrease in appetite since he's been home according to his wife and son. Wife states she has been noticing that after PEG feeding he coughs a lot and she compares it to him choking.  She states he has been getting feed through his PEG as well as liquids by mouth which he has been tolerating. On arrival patient was unresponsive and hypoxic. (18 Dec 2017 19:49)    REVIEW OF SYSTEMS:    Patient denied fever, chills, abdominal pain, nausea, vomiting, cough, shortness of breath, chest pain or palpitations    Vital Signs Last 24 Hrs  T(C): 37 (24 Dec 2017 05:02), Max: 37 (24 Dec 2017 05:02)  T(F): 98.6 (24 Dec 2017 05:02), Max: 98.6 (24 Dec 2017 05:02)  HR: 103 (24 Dec 2017 05:02) (95 - 137)  BP: 126/74 (24 Dec 2017 05:02) (126/74 - 136/68)  BP(mean): --  RR: 18 (24 Dec 2017 05:02) (18 - 21)  SpO2: 98% (24 Dec 2017 05:02) (96% - 100%)I&O's Summary    23 Dec 2017 07:01  -  24 Dec 2017 07:00  --------------------------------------------------------  IN: 0 mL / OUT: 1350 mL / NET: -1350 mL    24 Dec 2017 07:01  -  24 Dec 2017 11:07  --------------------------------------------------------  IN: 0 mL / OUT: 300 mL / NET: -300 mL      PHYSICAL EXAM:  GENERAL: ill looking   HEENT: PERRL, +EOMI, anicteric, no [place]  NECK: Supple, No JVD   CHEST/LUNG: bilateral rales   HEART: S1S2 Normal intensity, no murmurs, gallops or rubs noted  ABDOMEN: Soft, BS Normoactive, NT, ND, no HSM noted  EXTREMITIES: No cyanosis, No edema noted. Limited mobility   SKIN: No rashes or lesions noted  NEURO: Awake alert, speech is incomprehensible. , Right hemiparesis,   PSYCH: Depressed mood and affect; insight/judgement inappropriate  LABS:                        8.3    8.9   )-----------( 123      ( 24 Dec 2017 07:11 )             26.0     12-24    154<H>  |  114<H>  |  107.0<H>  ----------------------------<  218<H>  3.6   |  23.0  |  2.80<H>    Ca    8.7      24 Dec 2017 07:11      PT/INR - ( 24 Dec 2017 07:11 )   PT: 19.3 sec;   INR: 1.73 ratio             RADIOLOGY & ADDITIONAL TESTS:    MEDICATIONS:  MEDICATIONS  (STANDING):  ALBUTerol/ipratropium for Nebulization 3 milliLiter(s) Nebulizer every 4 hours  aspirin  chewable 81 milliGRAM(s) Oral daily  atorvastatin 10 milliGRAM(s) Oral at bedtime  epoetin [place] Injectable 41183 Unit(s) SubCutaneous every 7 days  ferrous    sulfate Liquid 300 milliGRAM(s) Enteral Tube daily  finasteride 5 milliGRAM(s) Oral daily  folic acid 1 milliGRAM(s) Enteral Tube daily  hydrocortisone sodium succinate Injectable 50 milliGRAM(s) IV Push every 12 hours  metoprolol     tartrate 25 milliGRAM(s) Oral three times a day  pantoprazole    Tablet 40 milliGRAM(s) Oral before breakfast  saccharomyces boulardii 250 milliGRAM(s) Oral two times a day  sodium chloride 0.45%. 1000 milliLiter(s) (80 mL/Hr) IV Continuous <Continuous>  tamsulosin 0.4 milliGRAM(s) Oral at bedtime    MEDICATIONS  (PRN):  morphine  - Injectable 2 milliGRAM(s) IV Push every 6 hours PRN Moderate Pain (4 - 6)

## 2017-12-24 NOTE — PROGRESS NOTE ADULT - ASSESSMENT
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 Placed on ventilator and vasopressor support. Septic shock resolved. No longer requiring vasopressors and s/p extubation and down grade from ICU to medicine hospitalist team on 12/20/17. Continues on cefepime for pseudomona aeruginosa UTI and enterovirus positive, Bcx negative for growth, leukocytosis trending down and remained afebrile. BP remained normotensive, pt continues with being tapered off of stress dose steroids. His renal function remains compromised but is slowly down trending and likely secondary to ATN from septic shock with underlying known hx of ckd stage 3, d/c IVF today.      Problem/Plan - 1:  ·  Problem: Pseudomonas urinary tract infection.  Plan: -    leukocytosis resolved  -U cx pseudomonas . Bld cx negative   Abx  completed discontinued  -c/w probiotic  -Now out of shock down titrating down stress dose hydrocortisone.  Hewitt in place for urinary retention and hematuria       Problem/Plan - 2:  ·  Problem: Pneumonia, bacterial.  Plan: Enterovirus positive suspected aspiration pneumonia    -c/w bronchodilators   cefepime completed discontinued  -Bcx negative   -ct chest  showed bilateral upper lobe infiltrates, right greater than left as well as tree-in-bud inflammatory changes in the lower lobes as well.      Problem/Plan - 3:  ·  Problem: Acute on chronic renal failure.  Plan: underlying hx CKD stage 3 likely know KAPIL secondary to ATN from septic shock   worsening of renal indices .  Gentle hydration   -baseline creatine around 1.7 per prior records  -avoid nephrotoxic meds      Problem/Plan - 4:  ·  Problem: Cerebrovascular accident (CVA), unspecified mechanism.  Plan:  Right side hemiparesis. -c/w atorvastatin 10mg po qhs   Will initiate PT OT when patient is fully orientated.      Problem/Plan - 5:  ·  Problem: Atrial fibrillation.  Plan: Cardiology dr Donovan evaluated, doubt acute ischemia. Considering etiology of electrolyte imbalance and hypoxia.  Recommend beta blocker , statin , aspirin.   Will restart coumadin once hematuria abates. , daily pt/INR     Problem/Plan - 6:  Problem: BPH (benign prostatic hyperplasia). Plan: -c/w proscar and flomax. Hewitt in place for urinary retention. . Voiding trial to follow and will recall urology if urinary retention.     Disposition :Palliative consulted for goals of care and further quality of life discussions given recurrent hospitalizations and overall poor prognosis.

## 2017-12-24 NOTE — PROGRESS NOTE ADULT - SUBJECTIVE AND OBJECTIVE BOX
Patient seen and examined    NS change, Ling quietly  wife present  no c/o CP SOB NV   No swelling feet    Vital Signs Last 24 Hrs  T(C): 36.7 (24 Dec 2017 16:46), Max: 37 (24 Dec 2017 05:02)  T(F): 98.1 (24 Dec 2017 16:46), Max: 98.6 (24 Dec 2017 05:02)  HR: 95 (24 Dec 2017 16:46) (95 - 108)  BP: 132/70 (24 Dec 2017 17:04) (126/74 - 164/82)  BP(mean): --  RR: 16 (24 Dec 2017 16:46) (16 - 21)  SpO2: 97% (24 Dec 2017 16:46) (97% - 100%)    PHYSICAL EXAM    GENERAL: NAD,   EYES:  conjunctiva and sclera clear  NECK: Supple, No JVD/Bruit  NERVOUS SYSTEM:  eyes open; R sided wk 1/5; L side 3-4/5,   CHEST:  CTA ,No rales few rhonchi  HEART:  RRR, No murmurs  ABDOMEN: Soft, NT/ND BS+  EXTREMITIES: mild Edema;  SKIN: No rashes    24 Dec 2017 07:11    154    |  114    |  107.0  ----------------------------<  218    3.6     |  23.0   |  2.80     Ca    8.7        24 Dec 2017 07:11                            8.3    8.9   )-----------( 123      ( 24 Dec 2017 07:11 )             26.0         Na worse  change IVFs to 1/4 NS; already on free water 200cc q 6H  check iron panel  creat sl better

## 2017-12-24 NOTE — PROGRESS NOTE ADULT - SUBJECTIVE AND OBJECTIVE BOX
[place] [facility] - [facility], THE HEART CENTER                                   [address] [place] [address]                                                      PHONE: [phone]                                                         FAX: [phone]  [email or web address]  ---------------------------------------------------------------------------------------------------------------------------------    FU for  Pt seen and examined.     Overnight events/patient complaints:    penicillins (Hives)    MEDICATIONS  (STANDING):  ALBUTerol/ipratropium for Nebulization 3 milliLiter(s) Nebulizer every 4 hours  aspirin  chewable 81 milliGRAM(s) Oral daily  atorvastatin 10 milliGRAM(s) Oral at bedtime  epoetin [place] Injectable 93967 Unit(s) SubCutaneous every 7 days  ferrous    sulfate Liquid 300 milliGRAM(s) Enteral Tube daily  finasteride 5 milliGRAM(s) Oral daily  folic acid 1 milliGRAM(s) Enteral Tube daily  hydrocortisone sodium succinate Injectable 50 milliGRAM(s) IV Push every 12 hours  metoprolol     tartrate 25 milliGRAM(s) Oral three times a day  pantoprazole    Tablet 40 milliGRAM(s) Oral before breakfast  saccharomyces boulardii 250 milliGRAM(s) Oral two times a day  sodium chloride 0.45%. 1000 milliLiter(s) (80 mL/Hr) IV Continuous <Continuous>  tamsulosin 0.4 milliGRAM(s) Oral at bedtime    MEDICATIONS  (PRN):  morphine  - Injectable 2 milliGRAM(s) IV Push every 6 hours PRN Moderate Pain (4 - 6)      Vital Signs Last 24 Hrs  T(C): 37 (24 Dec 2017 05:02), Max: 37 (24 Dec 2017 05:02)  T(F): 98.6 (24 Dec 2017 05:02), Max: 98.6 (24 Dec 2017 05:02)  HR: 103 (24 Dec 2017 05:02) (95 - 137)  BP: 126/74 (24 Dec 2017 05:02) (126/74 - 136/68)  BP(mean): --  RR: 18 (24 Dec 2017 05:02) (18 - 21)  SpO2: 98% (24 Dec 2017 05:02) (96% - 100%)  ICU Vital Signs Last 24 Hrs  I&O's Summary    23 Dec 2017 07:01  -  24 Dec 2017 07:00  --------------------------------------------------------  IN: 0 mL / OUT: 1350 mL / NET: -1350 mL        PHYSICAL EXAM:  HEENT: no JVD  CARDIOVASCULAR: Normal S1 and S2, No murmur, rub, gallop or lift.   LUNGS: No rales, rhonchi or wheeze. Normal breath sounds bilaterally.  ABDOMEN: Soft, nontender without mass or organomegaly. bowel sounds normoactive.  EXTREMITIES: no edema          LABS:                        8.3    8.9   )-----------( 123      ( 24 Dec 2017 07:11 )             26.0     12-24    154<H>  |  114<H>  |  107.0<H>  ----------------------------<  218<H>  3.6   |  23.0  |  2.80<H>    Ca    8.7      24 Dec 2017 07:11      [facility]  [facility] ( 23 Dec 2017 13:24 )  x     / 0.03 ng/mL / x     / x     / x          PT/INR - ( 24 Dec 2017 07:11 )   PT: 19.3 sec;   INR: 1.73 ratio               RADIOLOGY & ADDITIONAL STUDIES:    LABS:                        8.3    8.9   )-----------( 123      ( 24 Dec 2017 07:11 )             26.0     12-24    154<H>  |  114<H>  |  107.0<H>  ----------------------------<  218<H>  3.6   |  23.0  |  2.80<H>    Ca    8.7      24 Dec 2017 07:11      77y  CARDIAC MARKERS ( 23 Dec 2017 13:24 )  x     / 0.03 ng/mL / x     / x     / x          PT/INR - ( 24 Dec 2017 07:11 )   PT: 19.3 sec;   INR: 1.73 ratio               Assessment and Plan:  Assessment and Plan:  76 M with prior history of permanent AF on coumadin, CAD s/p CABG/AVR, HTN , chronic renal insufficiency, CVA with R hemiparesis,  colon resection with ileostomy, dysphagia s/p PEG who presented unresponsive and hypoxic found to have septic shock requiring pressors and acute hypoxic respiratory failure 2/2 aspiration PNA s/p intubation, with course further complicated by KAPIL on CKD, and now with recurrent NSVT    NSVT      CAD s/p CABG/AVR  - recommend ASA 81mg daily   - continue statin therapy     Permanent atrial fibrillation  - coumadin held 2/2 hematuria     acute hypoxic respiratory failure 2/2 aspiration PNA s/p intubation  - close monitoring of respiratory status   - discussed with nursing staff   Renal failure [place] [facility] - [facility] [place], [facility]                                   [address] [place] [address]                                                      PHONE: [phone]                                                         FAX: [phone]  [email or web address]  ---------------------------------------------------------------------------------------------------------------------------------    FU for  Pt seen and examined. resting comfortably    Overnight events/patient complaints:    penicillins (Hives)    MEDICATIONS  (STANDING):  ALBUTerol/ipratropium for Nebulization 3 milliLiter(s) Nebulizer every 4 hours  aspirin  chewable 81 milliGRAM(s) Oral daily  atorvastatin 10 milliGRAM(s) Oral at bedtime  epoetin [place] Injectable 81958 Unit(s) SubCutaneous every 7 days  ferrous    sulfate Liquid 300 milliGRAM(s) Enteral Tube daily  finasteride 5 milliGRAM(s) Oral daily  folic acid 1 milliGRAM(s) Enteral Tube daily  hydrocortisone sodium succinate Injectable 50 milliGRAM(s) IV Push every 12 hours  metoprolol     tartrate 25 milliGRAM(s) Oral three times a day  pantoprazole    Tablet 40 milliGRAM(s) Oral before breakfast  saccharomyces boulardii 250 milliGRAM(s) Oral two times a day  sodium chloride 0.45%. 1000 milliLiter(s) (80 mL/Hr) IV Continuous <Continuous>  tamsulosin 0.4 milliGRAM(s) Oral at bedtime    MEDICATIONS  (PRN):  morphine  - Injectable 2 milliGRAM(s) IV Push every 6 hours PRN Moderate Pain (4 - 6)      Vital Signs Last 24 Hrs  T(C): 37 (24 Dec 2017 05:02), Max: 37 (24 Dec 2017 05:02)  T(F): 98.6 (24 Dec 2017 05:02), Max: 98.6 (24 Dec 2017 05:02)  HR: 103 (24 Dec 2017 05:02) (95 - 137)  BP: 126/74 (24 Dec 2017 05:02) (126/74 - 136/68)  BP(mean): --  RR: 18 (24 Dec 2017 05:02) (18 - 21)  SpO2: 98% (24 Dec 2017 05:02) (96% - 100%)  ICU Vital Signs Last 24 Hrs  I&O's Summary    23 Dec 2017 07:01  -  24 Dec 2017 07:00  --------------------------------------------------------  IN: 0 mL / OUT: 1350 mL / NET: -1350 mL        PHYSICAL EXAM:  HEENT: no JVD  CARDIOVASCULAR: Normal S1 and S2, No murmur, rub, gallop or lift.   LUNGS: No rales, rhonchi or wheeze. Normal breath sounds bilaterally.  ABDOMEN: Soft, nontender without mass or organomegaly. bowel sounds normoactive.  EXTREMITIES: no edema          LABS:                        8.3    8.9   )-----------( 123      ( 24 Dec 2017 07:11 )             26.0     12-24    154<H>  |  114<H>  |  107.0<H>  ----------------------------<  218<H>  3.6   |  23.0  |  2.80<H>    Ca    8.7      24 Dec 2017 07:11      CRISTIN MYA  CARDIAC MARKERS ( 23 Dec 2017 13:24 )  x     / 0.03 ng/mL / x     / x     / x          PT/INR - ( 24 Dec 2017 07:11 )   PT: 19.3 sec;   INR: 1.73 ratio               RADIOLOGY & ADDITIONAL STUDIES:    LABS:                        8.3    8.9   )-----------( 123      ( 24 Dec 2017 07:11 )             26.0     12-24    154<H>  |  114<H>  |  107.0<H>  ----------------------------<  218<H>  3.6   |  23.0  |  2.80<H>    Ca    8.7      24 Dec 2017 07:11      77y  CARDIAC MARKERS ( 23 Dec 2017 13:24 )  x     / 0.03 ng/mL / x     / x     / x          PT/INR - ( 24 Dec 2017 07:11 )   PT: 19.3 sec;   INR: 1.73 ratio               Assessment and Plan:  Assessment and Plan:  76 M with prior history of permanent AF on coumadin, CAD s/p CABG/AVR, HTN , chronic renal insufficiency, CVA with R hemiparesis,  colon resection with ileostomy, dysphagia s/p PEG who presented unresponsive and hypoxic found to have septic shock requiring pressors and acute hypoxic respiratory failure 2/2 aspiration PNA s/p intubation, with course further complicated by KAPIL on CKD, and now with recurrent NSVT    NSVT: doing better on metoprolol      CAD s/p CABG/AVR  - recommend ASA 81mg daily   - continue statin therapy     Permanent atrial fibrillation  - coumadin held 2/2 hematuria     acute hypoxic respiratory failure 2/2 aspiration PNA s/p intubation  - close monitoring of respiratory status   - discussed with nursing staff   Renal failure

## 2017-12-24 NOTE — PROGRESS NOTE ADULT - SUBJECTIVE AND OBJECTIVE BOX
[place] VSS  urine clear via puckett                      8.3    8.9   )-----------( 123      ( 24 Dec 2017 07:11 )             26.0   12-24    154<H>  |  114<H>  |  107.0<H>  ----------------------------<  218<H>  3.6   |  23.0  |  2.80<H>    Ca    8.7      24 Dec 2017 07:11

## 2017-12-25 LAB
ANION GAP SERPL CALC-SCNC: 17 MMOL/L — SIGNIFICANT CHANGE UP (ref 5–17)
BUN SERPL-MCNC: 99 MG/DL — HIGH (ref 8–20)
CALCIUM SERPL-MCNC: 8.7 MG/DL — SIGNIFICANT CHANGE UP (ref 8.6–10.2)
CHLORIDE SERPL-SCNC: 113 MMOL/L — HIGH (ref 98–107)
CO2 SERPL-SCNC: 23 MMOL/L — SIGNIFICANT CHANGE UP (ref 22–29)
CREAT SERPL-MCNC: 2.59 MG/DL — HIGH (ref 0.5–1.3)
FERRITIN SERPL-MCNC: 179.5 NG/ML — SIGNIFICANT CHANGE UP (ref 30–400)
GLUCOSE SERPL-MCNC: 323 MG/DL — HIGH (ref 70–115)
HCT VFR BLD CALC: 27.4 % — LOW (ref 42–52)
HGB BLD-MCNC: 8.8 G/DL — LOW (ref 14–18)
INR BLD: 1.54 RATIO — HIGH (ref 0.88–1.16)
IRON SATN MFR SERPL: 11 % — LOW (ref 16–55)
IRON SATN MFR SERPL: 27 UG/DL — LOW (ref 59–158)
MCHC RBC-ENTMCNC: 29.9 PG — SIGNIFICANT CHANGE UP (ref 27–31)
MCHC RBC-ENTMCNC: 32.1 G/DL — SIGNIFICANT CHANGE UP (ref 32–36)
MCV RBC AUTO: 93.2 FL — SIGNIFICANT CHANGE UP (ref 80–94)
OB PNL STL: POSITIVE
PLATELET # BLD AUTO: 128 K/UL — LOW (ref 150–400)
POTASSIUM SERPL-MCNC: 4 MMOL/L — SIGNIFICANT CHANGE UP (ref 3.5–5.3)
POTASSIUM SERPL-SCNC: 4 MMOL/L — SIGNIFICANT CHANGE UP (ref 3.5–5.3)
PROTHROM AB SERPL-ACNC: 17.1 SEC — HIGH (ref 9.8–12.7)
RBC # BLD: 2.94 M/UL — LOW (ref 4.6–6.2)
RBC # FLD: 15.6 % — SIGNIFICANT CHANGE UP (ref 11–15.6)
SODIUM SERPL-SCNC: 153 MMOL/L — HIGH (ref 135–145)
TIBC SERPL-MCNC: 250 UG/DL — SIGNIFICANT CHANGE UP (ref 220–430)
TRANSFERRIN SERPL-MCNC: 175 MG/DL — LOW (ref 180–329)
WBC # BLD: 10.2 K/UL — SIGNIFICANT CHANGE UP (ref 4.8–10.8)
WBC # FLD AUTO: 10.2 K/UL — SIGNIFICANT CHANGE UP (ref 4.8–10.8)

## 2017-12-25 PROCEDURE — 99233 SBSQ HOSP IP/OBS HIGH 50: CPT

## 2017-12-25 RX ORDER — HYDROCORTISONE 20 MG
40 TABLET ORAL EVERY 12 HOURS
Qty: 0 | Refills: 0 | Status: DISCONTINUED | OUTPATIENT
Start: 2017-12-25 | End: 2017-12-27

## 2017-12-25 RX ORDER — IRON SUCROSE 20 MG/ML
100 INJECTION, SOLUTION INTRAVENOUS DAILY
Qty: 0 | Refills: 0 | Status: DISCONTINUED | OUTPATIENT
Start: 2017-12-25 | End: 2017-12-26

## 2017-12-25 RX ADMIN — Medication 50 MILLIGRAM(S): at 06:14

## 2017-12-25 RX ADMIN — Medication 250 MILLIGRAM(S): at 18:18

## 2017-12-25 RX ADMIN — Medication 25 MILLIGRAM(S): at 23:13

## 2017-12-25 RX ADMIN — Medication 3 MILLILITER(S): at 20:44

## 2017-12-25 RX ADMIN — TAMSULOSIN HYDROCHLORIDE 0.4 MILLIGRAM(S): 0.4 CAPSULE ORAL at 23:14

## 2017-12-25 RX ADMIN — Medication 25 MILLIGRAM(S): at 06:14

## 2017-12-25 RX ADMIN — Medication 1 MILLIGRAM(S): at 12:31

## 2017-12-25 RX ADMIN — Medication 3 MILLILITER(S): at 08:39

## 2017-12-25 RX ADMIN — ATORVASTATIN CALCIUM 10 MILLIGRAM(S): 80 TABLET, FILM COATED ORAL at 23:14

## 2017-12-25 RX ADMIN — FINASTERIDE 5 MILLIGRAM(S): 5 TABLET, FILM COATED ORAL at 12:31

## 2017-12-25 RX ADMIN — Medication 3 MILLILITER(S): at 12:33

## 2017-12-25 RX ADMIN — Medication 40 MILLIGRAM(S): at 18:18

## 2017-12-25 RX ADMIN — Medication 3 MILLILITER(S): at 03:29

## 2017-12-25 RX ADMIN — Medication 250 MILLIGRAM(S): at 06:14

## 2017-12-25 RX ADMIN — SODIUM CHLORIDE 125 MILLILITER(S): 9 INJECTION INTRAMUSCULAR; INTRAVENOUS; SUBCUTANEOUS at 20:41

## 2017-12-25 RX ADMIN — Medication 3 MILLILITER(S): at 23:06

## 2017-12-25 RX ADMIN — Medication 25 MILLIGRAM(S): at 15:43

## 2017-12-25 RX ADMIN — SODIUM CHLORIDE 125 MILLILITER(S): 9 INJECTION INTRAMUSCULAR; INTRAVENOUS; SUBCUTANEOUS at 18:19

## 2017-12-25 RX ADMIN — IRON SUCROSE 210 MILLIGRAM(S): 20 INJECTION, SOLUTION INTRAVENOUS at 12:31

## 2017-12-25 RX ADMIN — Medication 81 MILLIGRAM(S): at 12:31

## 2017-12-25 RX ADMIN — PANTOPRAZOLE SODIUM 40 MILLIGRAM(S): 20 TABLET, DELAYED RELEASE ORAL at 06:14

## 2017-12-25 RX ADMIN — Medication 3 MILLILITER(S): at 16:20

## 2017-12-25 NOTE — PROGRESS NOTE ADULT - SUBJECTIVE AND OBJECTIVE BOX
CC: Resp failure from aspiration pneumonia resolved . On oxygen via ventimask. CVA with right hemiparesis.  Pseudomonas uti.  Right ileostomy. Peg tube.  urinary puckett catheter for urinary retention, hematuria.  Afib, holding coumadin for hematuria. Dehydration, hypernatremia, prerenal azotemia.        Patient is more alert today.  puckett draining clear urine. seen by urology yesterday. to c/w puckett. seen by renal today. IVF was increased and vanofer was added. for labs in am.     HPI:  76 y/o male pmhx CVA ( 2 years ago and in July 17) w/ residual right upper and lower extremity weakness, s/p PEG,  afib on coumadin, s/p colon resection with ileostomy 8 years ago, s/p CBAGx3,  prostate cancer s/p TURP 3 years ago was BIBEMS after wife found him unresponsive with vomit around his face. Wife states patient had an increase in SOB past 2 days and noticed he was becoming more weak.  Pt was discharged 2 days ago from rehab center and has had a decrease in appetite since he's been home according to his wife and son. Wife states she has been noticing that after PEG feeding he coughs a lot and she compares it to him choking.  She states he has been getting feed through his PEG as well as liquids by mouth which he has been tolerating. On arrival patient was unresponsive and hypoxic. (18 Dec 2017 19:49)    REVIEW OF SYSTEMS:    Patient denied fever, chills, abdominal pain, nausea, vomiting, cough, shortness of breath, chest pain or palpitations      MEDICATIONS  (STANDING):  ALBUTerol/ipratropium for Nebulization 3 milliLiter(s) Nebulizer every 4 hours  aspirin  chewable 81 milliGRAM(s) Oral daily  atorvastatin 10 milliGRAM(s) Oral at bedtime  epoetin [place] Injectable 80225 Unit(s) SubCutaneous every 7 days  finasteride 5 milliGRAM(s) Oral daily  folic acid 1 milliGRAM(s) Enteral Tube daily  hydrocortisone sodium succinate Injectable 50 milliGRAM(s) IV Push every 12 hours  iron sucrose IVPB 100 milliGRAM(s) IV Intermittent daily  metoprolol     tartrate 25 milliGRAM(s) Oral three times a day  pantoprazole    Tablet 40 milliGRAM(s) Oral before breakfast  saccharomyces boulardii 250 milliGRAM(s) Oral two times a day  sodium chloride 0.225%. 1000 milliLiter(s) (125 mL/Hr) IV Continuous <Continuous>  tamsulosin 0.4 milliGRAM(s) Oral at bedtime    MEDICATIONS  (PRN):  morphine  - Injectable 2 milliGRAM(s) IV Push every 6 hours PRN Moderate Pain (4 - 6)        PHYSICAL EXAM:    Vital Signs Last 24 Hrs  T(C): 36.6 (25 Dec 2017 05:17), Max: 36.7 (24 Dec 2017 16:46)  T(F): 97.9 (25 Dec 2017 05:17), Max: 98.1 (24 Dec 2017 16:46)  HR: 94 (25 Dec 2017 05:17) (94 - 108)  BP: 130/67 (25 Dec 2017 05:17) (129/76 - 164/82)  BP(mean): --  RR: 17 (25 Dec 2017 05:17) (16 - 18)  SpO2: 97% (25 Dec 2017 05:17) (96% - 98%)      GENERAL: ill looking   HEENT: PERRL, +EOMI, anicteric, no [place]  NECK: Supple, No JVD   CHEST/LUNG: bilateral rales   HEART: S1S2 Normal intensity, no murmurs, gallops or rubs noted  ABDOMEN: Soft, BS Normoactive, NT, ND, no HSM noted  EXTREMITIES: No cyanosis, No edema noted. Limited mobility   SKIN: No rashes or lesions noted  NEURO: Awake alert, speech is incomprehensible. , Right hemiparesis,   PSYCH: Depressed mood and affect; insight/judgement inappropriate      LABS:                                   8.8    10.2  )-----------( 128      ( 25 Dec 2017 06:22 )             27.4       12-25    153<H>  |  113<H>  |  99.0<H>  ----------------------------<  323<H>  4.0   |  23.0  |  2.59<H>    Ca    8.7      25 Dec 2017 06:22          PT/INR - ( 24 Dec 2017 07:11 )   PT: 19.3 sec;   INR: 1.73 ratio             RADIOLOGY & ADDITIONAL TESTS:    < from: CT Chest No Cont (12.21.17 @ 12:33) >  IMPRESSION:     Bilateral upper lobe infiltrates, right greater than left.    Tree-in-bud inflammatory changes in the lower lobes as well..     < end of copied text > CC: Resp failure from aspiration pneumonia resolved . On oxygen via ventimask. CVA with right hemiparesis.  Pseudomonas uti.  Right ileostomy. Peg tube.  urinary puckett catheter for urinary retention, hematuria.  Afib, holding coumadin for hematuria. Dehydration, hypernatremia, prerenal azotemia.        Patient is more alert today.  puckett draining clear urine. seen by urology yesterday. to c/w puckett. sodium still high. seen by renal today. IVF was increased and vanofer was added. for labs in am.     HPI:  78 y/o male pmhx CVA ( 2 years ago and in July 17) w/ residual right upper and lower extremity weakness, s/p PEG,  afib on coumadin, s/p colon resection with ileostomy 8 years ago, s/p CBAGx3,  prostate cancer s/p TURP 3 years ago was BIBEMS after wife found him unresponsive with vomit around his face. Wife states patient had an increase in SOB past 2 days and noticed he was becoming more weak.  Pt was discharged 2 days ago from rehab center and has had a decrease in appetite since he's been home according to his wife and son. Wife states she has been noticing that after PEG feeding he coughs a lot and she compares it to him choking.  She states he has been getting feed through his PEG as well as liquids by mouth which he has been tolerating. On arrival patient was unresponsive and hypoxic. (18 Dec 2017 19:49)    REVIEW OF SYSTEMS:    Patient communicated through noding. denied fever, chills, abdominal pain, nausea, vomiting, cough, shortness of breath, chest pain or palpitations      MEDICATIONS  (STANDING):  ALBUTerol/ipratropium for Nebulization 3 milliLiter(s) Nebulizer every 4 hours  aspirin  chewable 81 milliGRAM(s) Oral daily  atorvastatin 10 milliGRAM(s) Oral at bedtime  epoetin [place] Injectable 07208 Unit(s) SubCutaneous every 7 days  finasteride 5 milliGRAM(s) Oral daily  folic acid 1 milliGRAM(s) Enteral Tube daily  hydrocortisone sodium succinate Injectable 50 milliGRAM(s) IV Push every 12 hours  iron sucrose IVPB 100 milliGRAM(s) IV Intermittent daily  metoprolol     tartrate 25 milliGRAM(s) Oral three times a day  pantoprazole    Tablet 40 milliGRAM(s) Oral before breakfast  saccharomyces boulardii 250 milliGRAM(s) Oral two times a day  sodium chloride 0.225%. 1000 milliLiter(s) (125 mL/Hr) IV Continuous <Continuous>  tamsulosin 0.4 milliGRAM(s) Oral at bedtime    MEDICATIONS  (PRN):  morphine  - Injectable 2 milliGRAM(s) IV Push every 6 hours PRN Moderate Pain (4 - 6)        PHYSICAL EXAM:    Vital Signs Last 24 Hrs  T(C): 36.6 (25 Dec 2017 05:17), Max: 36.7 (24 Dec 2017 16:46)  T(F): 97.9 (25 Dec 2017 05:17), Max: 98.1 (24 Dec 2017 16:46)  HR: 94 (25 Dec 2017 05:17) (94 - 108)  BP: 130/67 (25 Dec 2017 05:17) (129/76 - 164/82)  BP(mean): --  RR: 17 (25 Dec 2017 05:17) (16 - 18)  SpO2: 97% (25 Dec 2017 05:17) (96% - 98%)      GENERAL: ill looking .  HEENT: PERRL, +EOMI, anicteric, no [place]  NECK: Supple, No JVD   CHEST/LUNG: bilateral rales   HEART: S1S2 Normal intensity, no murmurs, gallops or rubs noted  ABDOMEN: Soft, BS Normoactive, NT, ND, no HSM noted  EXTREMITIES: No cyanosis, No edema noted. Limited mobility   SKIN: No rashes or lesions noted. ild erythema over sacral region  NEURO: Awake alert, speech is incomprehensible. , Right hemiparesis,   PSYCH: flat affect      LABS:                                   8.8    10.2  )-----------( 128      ( 25 Dec 2017 06:22 )             27.4       12-25    153<H>  |  113<H>  |  99.0<H>  ----------------------------<  323<H>  4.0   |  23.0  |  2.59<H>    Ca    8.7      25 Dec 2017 06:22          PT/INR - ( 24 Dec 2017 07:11 )   PT: 19.3 sec;   INR: 1.73 ratio             RADIOLOGY & ADDITIONAL TESTS:    < from: CT Chest No Cont (12.21.17 @ 12:33) >  IMPRESSION:     Bilateral upper lobe infiltrates, right greater than left.    Tree-in-bud inflammatory changes in the lower lobes as well..     < end of copied text >

## 2017-12-25 NOTE — PROGRESS NOTE ADULT - ASSESSMENT
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 Placed on ventilator and vasopressor support. Septic shock resolved. No longer requiring vasopressors and s/p extubation and down grade from ICU to medicine hospitalist team on 12/20/17. Continues on cefepime for pseudomona aeruginosa UTI and enterovirus positive, Bcx negative for growth, leukocytosis trending down and remained afebrile. BP remained normotensive, pt continues with being tapered off of stress dose steroids. His renal function remains compromised but is slowly down trending and likely secondary to ATN from septic shock with underlying known hx of ckd stage 3, Patient is more alert today.  puckett draining clear urine. seen by urology yesterday. to c/w puckett. seen by renal today. IVF was increased and vanofer was added. for labs in am.      Problem/Plan - 1:  ·  Problem: Pseudomonas urinary tract infection.  Plan: -    leukocytosis resolved  -U cx pseudomonas . Bld cx negative   Abx  completed discontinued  -c/w probiotic  -Now out of shock down titrating down stress dose hydrocortisone.  Puckett in place for urinary retention and hematuria. no sign of retension/ no hematuria noted      Problem/Plan - 2:  ·  Problem: Pneumonia, bacterial.  Plan: Enterovirus positive suspected aspiration pneumonia    -c/w bronchodilators   cefepime completed discontinued  -Bcx negative   -ct chest  showed bilateral upper lobe infiltrates, right greater than left as well as tree-in-bud inflammatory changes in the lower lobes as well.      Problem/Plan - 3:  ·  Problem: Acute on chronic renal failure.  Plan: underlying hx CKD stage 3 likely know KAPIL secondary to ATN from septic shock   worsening of renal indices .  Gentle hydration   -baseline creatine around 1.7 per prior records  -avoid nephrotoxic meds   - nephrology cx appreciated     Problem/Plan - 4:  ·  Problem: Cerebrovascular accident (CVA), unspecified mechanism.  Plan:  Right side hemiparesis. -c/w atorvastatin 10mg po qhs   Will initiate PT OT when patient is fully orientated.      Problem/Plan - 5:  ·  Problem: Atrial fibrillation.  Plan: Cardiology dr Donovan evaluated, doubt acute ischemia. Considering etiology of electrolyte imbalance and hypoxia.  Recommend beta blocker , statin , aspirin.   Will restart coumadin once hematuria abates. , daily pt/INR     Problem/Plan - 6:  Problem: BPH (benign prostatic hyperplasia). Plan: -c/w proscar and flomax. Puckett in place for urinary retention. urology following    Disposition :Palliative consulted for goals of care and further quality of life discussions given recurrent hospitalizations and overall poor prognosis. 76 y/o male pmhx CVA (2 years ago and in July 17) w/ 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 Placed on ventilator and vasopressor support. Septic shock resolved. No longer requiring vasopressors and s/p extubation and down grade from ICU to medicine hospitalist team on 12/20/17. Continues on cefepime for pseudomona aeruginosa UTI and enterovirus positive, Bcx negative for growth, leukocytosis trending down and remained afebrile. BP remained normotensive, pt continues with being tapered off of stress dose steroids. His renal function remains compromised but is slowly down trending and likely secondary to ATN from septic shock with underlying known hx of ckd stage 3, Patient is more alert today.  puckett draining clear urine. seen by urology yesterday. to c/w puckett. seen by renal today. IVF was increased and vanofer was added. for labs in am.      Problem/Plan - 1:  ·  Problem: Pseudomonas urinary tract infection.  Plan: -    leukocytosis resolved  -U cx pseudomonas . Bld cx negative   Abx  completed discontinued  -c/w probiotic  -Now out of shock down titrating down stress dose hydrocortisone.  Puckett in place for urinary retention and hematuria. no sign of retension/ no hematuria noted. clear urine noted. I and O noted      Problem/Plan - 2:  ·  Problem: Pneumonia, bacterial.  Plan: Enterovirus positive suspected aspiration pneumonia    -c/w bronchodilators   cefepime completed discontinued  -Bcx negative   -ct chest  showed bilateral upper lobe infiltrates, right greater than left as well as tree-in-bud inflammatory changes in the lower lobes as well.      Problem/Plan - 3:  ·  Problem: Acute on chronic renal failure.  Plan: underlying hx CKD stage 3 likely know KAPIL secondary to ATN from septic shock   worsening of renal indices .  Gentle hydration   -baseline creatine around 1.7 per prior records  -avoid nephrotoxic meds   - nephrology cx appreciated     Problem/Plan - 4:  ·  Problem: Cerebrovascular accident (CVA), unspecified mechanism.  Plan:  Right side hemiparesis. -c/w atorvastatin 10mg po qhs   Will initiate PT OT when patient is fully orientated.      Problem/Plan - 5:  ·  Problem: Atrial fibrillation.  Plan: Cardiology dr Donovan evaluated, doubt acute ischemia. Considering etiology of electrolyte imbalance and hypoxia.  Recommend beta blocker , statin , aspirin.   Will restart coumadin once hematuria abates. , daily pt/INR     Problem/Plan - 6:  Problem: BPH (benign prostatic hyperplasia). Plan: -c/w proscar and flomax. Puckett in place for urinary retention. urology following    Disposition :Palliative consulted for goals of care and further quality of life discussions given recurrent hospitalizations and overall poor prognosis. 76 y/o male pmhx CVA (2 years ago and in July 17) w/ 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 Placed on ventilator and vasopressor support. Septic shock resolved. No longer requiring vasopressors and s/p extubation and down grade from ICU to medicine hospitalist team on 12/20/17. Continues on cefepime for pseudomona aeruginosa UTI and enterovirus positive, Bcx negative for growth, leukocytosis trending down and remained afebrile. BP remained normotensive, pt continues with being tapered off of stress dose steroids. His renal function remains compromised but is slowly down trending and likely secondary to ATN from septic shock with underlying known hx of ckd stage 3, Patient is more alert today.  puckett draining clear urine. seen by urology yesterday. to c/w puckett. seen by renal today. IVF was increased and vanofer was added. for labs in am.      Problem/Plan - 1:  ·  Problem: Pseudomonas urinary tract infection.  Plan: -    leukocytosis resolved  -U cx pseudomonas . Bld cx negative   Abx  completed discontinued  -c/w probiotic  -Now out of shock down titrating down stress dose hydrocortisone.  Puckett in place for urinary retention and hematuria. no sign of retension/ no hematuria noted. clear urine noted. I and O noted      Problem/Plan - 2:  ·  Problem: Pneumonia, bacterial.  Plan: Enterovirus positive suspected aspiration pneumonia    -c/w bronchodilators   cefepime completed discontinued  -Bcx negative   -ct chest  showed bilateral upper lobe infiltrates, right greater than left as well as tree-in-bud inflammatory changes in the lower lobes as well.      Problem/Plan - 3:  ·  Problem: Acute on chronic renal failure.  Plan: underlying hx CKD stage 3 likely know KAPIL secondary to ATN from septic shock   worsening of renal indices .  Gentle hydration   -baseline creatine around 1.7 per prior records  -avoid nephrotoxic meds   - nephrology cx appreciated     Problem/Plan - 4:  ·  Problem: Cerebrovascular accident (CVA), unspecified mechanism.  Plan:  Right side hemiparesis. -c/w atorvastatin 10mg po qhs   Will initiate PT OT when patient is fully orientated.      Problem/Plan - 5:  ·  Problem: Atrial fibrillation.  Plan: Cardiology dr Donovan evaluated, doubt acute ischemia. Considering etiology of electrolyte imbalance and hypoxia.  Recommend beta blocker , statin , aspirin.   Will restart coumadin once hematuria abates. , daily pt/INR     Problem/Plan - 6:  Problem: BPH (benign prostatic hyperplasia). Plan: -c/w proscar and flomax. Puckett in place for urinary retention. urology following    problem 6: hypernatremia: on IVF. renal following    Disposition :Palliative consulted for goals of care and further quality of life discussions given recurrent hospitalizations and overall poor prognosis.

## 2017-12-26 DIAGNOSIS — R19.5 OTHER FECAL ABNORMALITIES: ICD-10-CM

## 2017-12-26 DIAGNOSIS — D63.8 ANEMIA IN OTHER CHRONIC DISEASES CLASSIFIED ELSEWHERE: ICD-10-CM

## 2017-12-26 LAB
ALBUMIN SERPL ELPH-MCNC: 2.5 G/DL — LOW (ref 3.3–5.2)
ALP SERPL-CCNC: 86 U/L — SIGNIFICANT CHANGE UP (ref 40–120)
ALT FLD-CCNC: 36 U/L — SIGNIFICANT CHANGE UP
ANION GAP SERPL CALC-SCNC: 15 MMOL/L — SIGNIFICANT CHANGE UP (ref 5–17)
AST SERPL-CCNC: 21 U/L — SIGNIFICANT CHANGE UP
BASE EXCESS BLDA CALC-SCNC: -0.2 MMOL/L — SIGNIFICANT CHANGE UP (ref -2–2)
BILIRUB SERPL-MCNC: 0.4 MG/DL — SIGNIFICANT CHANGE UP (ref 0.4–2)
BLOOD GAS COMMENTS ARTERIAL: SIGNIFICANT CHANGE UP
BUN SERPL-MCNC: 91 MG/DL — HIGH (ref 8–20)
CALCIUM SERPL-MCNC: 8.5 MG/DL — LOW (ref 8.6–10.2)
CHLORIDE SERPL-SCNC: 109 MMOL/L — HIGH (ref 98–107)
CO2 SERPL-SCNC: 24 MMOL/L — SIGNIFICANT CHANGE UP (ref 22–29)
CREAT SERPL-MCNC: 2.4 MG/DL — HIGH (ref 0.5–1.3)
GAS PNL BLDA: SIGNIFICANT CHANGE UP
GLUCOSE SERPL-MCNC: 291 MG/DL — HIGH (ref 70–115)
HCO3 BLDA-SCNC: 24 MMOL/L — SIGNIFICANT CHANGE UP (ref 20–26)
HCT VFR BLD CALC: 25.1 % — LOW (ref 42–52)
HGB BLD-MCNC: 7.9 G/DL — LOW (ref 14–18)
HOROWITZ INDEX BLDA+IHG-RTO: 30 — SIGNIFICANT CHANGE UP
INR BLD: 1.5 RATIO — HIGH (ref 0.88–1.16)
MCHC RBC-ENTMCNC: 29.3 PG — SIGNIFICANT CHANGE UP (ref 27–31)
MCHC RBC-ENTMCNC: 31.5 G/DL — LOW (ref 32–36)
MCV RBC AUTO: 93 FL — SIGNIFICANT CHANGE UP (ref 80–94)
PCO2 BLDA: 36 MMHG — SIGNIFICANT CHANGE UP (ref 35–45)
PH BLDA: 7.43 — SIGNIFICANT CHANGE UP (ref 7.35–7.45)
PLATELET # BLD AUTO: 129 K/UL — LOW (ref 150–400)
PO2 BLDA: 137 MMHG — HIGH (ref 83–108)
POTASSIUM SERPL-MCNC: 3.6 MMOL/L — SIGNIFICANT CHANGE UP (ref 3.5–5.3)
POTASSIUM SERPL-SCNC: 3.6 MMOL/L — SIGNIFICANT CHANGE UP (ref 3.5–5.3)
PROT SERPL-MCNC: 6 G/DL — LOW (ref 6.6–8.7)
PROTHROM AB SERPL-ACNC: 16.6 SEC — HIGH (ref 9.8–12.7)
RBC # BLD: 2.7 M/UL — LOW (ref 4.6–6.2)
RBC # FLD: 15.6 % — SIGNIFICANT CHANGE UP (ref 11–15.6)
SAO2 % BLDA: 100 % — HIGH (ref 95–99)
SODIUM SERPL-SCNC: 148 MMOL/L — HIGH (ref 135–145)
WBC # BLD: 10.9 K/UL — HIGH (ref 4.8–10.8)
WBC # FLD AUTO: 10.9 K/UL — HIGH (ref 4.8–10.8)

## 2017-12-26 PROCEDURE — 99223 1ST HOSP IP/OBS HIGH 75: CPT

## 2017-12-26 PROCEDURE — 99233 SBSQ HOSP IP/OBS HIGH 50: CPT

## 2017-12-26 PROCEDURE — 71010: CPT | Mod: 26

## 2017-12-26 RX ORDER — IRON SUCROSE 20 MG/ML
200 INJECTION, SOLUTION INTRAVENOUS EVERY 24 HOURS
Qty: 0 | Refills: 0 | Status: COMPLETED | OUTPATIENT
Start: 2017-12-26 | End: 2017-12-29

## 2017-12-26 RX ORDER — ALBUTEROL 90 UG/1
2.5 AEROSOL, METERED ORAL EVERY 4 HOURS
Qty: 0 | Refills: 0 | Status: DISCONTINUED | OUTPATIENT
Start: 2017-12-26 | End: 2018-01-07

## 2017-12-26 RX ORDER — ACETYLCYSTEINE 200 MG/ML
3 VIAL (ML) MISCELLANEOUS EVERY 4 HOURS
Qty: 0 | Refills: 0 | Status: DISCONTINUED | OUTPATIENT
Start: 2017-12-26 | End: 2017-12-30

## 2017-12-26 RX ORDER — AMIODARONE HYDROCHLORIDE 400 MG/1
400 TABLET ORAL DAILY
Qty: 0 | Refills: 0 | Status: DISCONTINUED | OUTPATIENT
Start: 2017-12-26 | End: 2018-01-09

## 2017-12-26 RX ADMIN — Medication 250 MILLIGRAM(S): at 18:42

## 2017-12-26 RX ADMIN — SODIUM CHLORIDE 125 MILLILITER(S): 9 INJECTION INTRAMUSCULAR; INTRAVENOUS; SUBCUTANEOUS at 18:43

## 2017-12-26 RX ADMIN — Medication 40 MILLIGRAM(S): at 18:41

## 2017-12-26 RX ADMIN — PANTOPRAZOLE SODIUM 40 MILLIGRAM(S): 20 TABLET, DELAYED RELEASE ORAL at 05:59

## 2017-12-26 RX ADMIN — Medication 1 MILLIGRAM(S): at 13:06

## 2017-12-26 RX ADMIN — Medication 3 MILLILITER(S): at 12:40

## 2017-12-26 RX ADMIN — ALBUTEROL 2.5 MILLIGRAM(S): 90 AEROSOL, METERED ORAL at 15:32

## 2017-12-26 RX ADMIN — Medication 3 MILLILITER(S): at 08:23

## 2017-12-26 RX ADMIN — Medication 3 MILLILITER(S): at 03:21

## 2017-12-26 RX ADMIN — ALBUTEROL 2.5 MILLIGRAM(S): 90 AEROSOL, METERED ORAL at 20:51

## 2017-12-26 RX ADMIN — Medication 25 MILLIGRAM(S): at 13:10

## 2017-12-26 RX ADMIN — Medication 250 MILLIGRAM(S): at 05:58

## 2017-12-26 RX ADMIN — Medication 3 MILLILITER(S): at 15:32

## 2017-12-26 RX ADMIN — Medication 3 MILLILITER(S): at 20:51

## 2017-12-26 RX ADMIN — Medication 81 MILLIGRAM(S): at 13:05

## 2017-12-26 RX ADMIN — Medication 25 MILLIGRAM(S): at 22:02

## 2017-12-26 RX ADMIN — FINASTERIDE 5 MILLIGRAM(S): 5 TABLET, FILM COATED ORAL at 13:05

## 2017-12-26 RX ADMIN — Medication 40 MILLIGRAM(S): at 05:59

## 2017-12-26 RX ADMIN — ALBUTEROL 2.5 MILLIGRAM(S): 90 AEROSOL, METERED ORAL at 12:40

## 2017-12-26 RX ADMIN — SODIUM CHLORIDE 125 MILLILITER(S): 9 INJECTION INTRAMUSCULAR; INTRAVENOUS; SUBCUTANEOUS at 06:01

## 2017-12-26 RX ADMIN — ATORVASTATIN CALCIUM 10 MILLIGRAM(S): 80 TABLET, FILM COATED ORAL at 22:02

## 2017-12-26 RX ADMIN — Medication 25 MILLIGRAM(S): at 05:58

## 2017-12-26 RX ADMIN — TAMSULOSIN HYDROCHLORIDE 0.4 MILLIGRAM(S): 0.4 CAPSULE ORAL at 22:02

## 2017-12-26 RX ADMIN — IRON SUCROSE 210 MILLIGRAM(S): 20 INJECTION, SOLUTION INTRAVENOUS at 13:06

## 2017-12-26 NOTE — PROGRESS NOTE ADULT - ASSESSMENT
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 Placed on ventilator and vasopressor support. Septic shock resolved. No longer requiring vasopressors and s/p extubation and down grade from ICU to medicine hospitalist team on 12/20/17. Continues on cefepime for pseudomona aeruginosa UTI and enterovirus positive, Bcx negative for growth, leukocytosis trending down and remained afebrile. BP remained normotensive, pt continues with being tapered off of stress dose steroids. His renal function remains compromised but is slowly down trending and likely secondary to ATN from septic shock with underlying known hx of ckd stage 3, Patient is alert but more lethargic today. maintaining saturation with venturi mask 30% oxygen.   puckett draining clear urine. seen by urology yesterday. to c/w puckett. seen by renal today. IVF was increased and vanofer was added. for labs in am.      Problem/Plan - 1:  ·  Problem: Pseudomonas urinary tract infection.  Plan: -    leukocytosis resolved  -U cx pseudomonas . Bld cx negative   Abx  completed discontinued  -c/w probiotic  -Now out of shock down titrating down stress dose hydrocortisone.  Puckett in place for urinary retention and hematuria. no sign of retention no hematuria noted. clear urine noted. I and O noted      Problem/Plan - 2:  ·  Problem: Pneumonia, bacterial.  Plan: Enterovirus positive suspected aspiration pneumonia ,cefepime completed   - chest xray portable urgent to r/o repeat aspiration/fluid overload.   -c/w bronchodilators. changed duoneb to albuterol with Mucomyst  -  ABG stat  - ICu eval as patient is more lethargic ,anxious and breathing mild to moderately laboured. ?impending failure  -Bcx negative   -ct chest  showed bilateral upper lobe infiltrates, right greater than left as well as tree-in-bud inflammatory changes in the lower lobes as well.      Problem/Plan - 3:  ·  Problem: Acute on chronic renal failure.  Plan: underlying hx CKD stage 3 likely know KAPIL secondary to ATN from septic shock   worsening of renal indices .  Gentle hydration   -baseline creatine around 1.7 per prior records  -avoid nephrotoxic meds   - nephrology cx appreciated     Problem/Plan - 4:  ·  Problem: Cerebrovascular accident (CVA), unspecified mechanism.  Plan:  Right side hemiparesis. -c/w atorvastatin 10mg po qhs   Will initiate PT OT when patient is fully orientated.      Problem/Plan - 5:  ·  Problem: Atrial fibrillation.  Plan: Cardiology dr Donovan evaluated, doubt acute ischemia. Considering etiology of electrolyte imbalance and hypoxia.  Recommend beta blocker , statin , aspirin. seen by cardiology today. strated on amioderon for PSVT. hematuria resolved. FOBT positive. coumadin on hold. needs to reassess by cardiology before DC      Problem/Plan - 6:  Problem: BPH (benign prostatic hyperplasia). Plan: -c/w proscar and flomax. Puckett in place for urinary retention. urology following    problem 6: hypernatremia: on IVF. renal following    Disposition :Palliative consulted for goals of care and further quality of life discussions given recurrent hospitalizations and overall poor prognosis. 76 y/o male pmhx CVA (2 years ago and in July 17) w/ 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 Placed on ventilator and vasopressor support. Septic shock resolved. No longer requiring vasopressors and s/p extubation and down grade from ICU to medicine hospitalist team on 12/20/17. Continues on cefepime for pseudomona aeruginosa UTI and enterovirus positive, Bcx negative for growth, leukocytosis trending down and remained afebrile. BP remained normotensive, pt continues with being tapered off of stress dose steroids. His renal function remains compromised but is slowly down trending and likely secondary to ATN from septic shock with underlying known hx of ckd stage 3, Patient is alert but more lethargic today. maintaining saturation with venturi mask 30% oxygen.   puckett draining clear urine. seen by urology yesterday. to c/w puckett. seen by renal today. IVF was increased and vanofer was added. for labs in am.      Problem/Plan - 1:  ·  Problem: Pseudomonas urinary tract infection.  Plan: -    leukocytosis resolved  -U cx pseudomonas . Bld cx negative   Abx  completed discontinued  -c/w probiotic  -Now out of shock down titrating down stress dose hydrocortisone.  Puckett in place for urinary retention and hematuria. no sign of retention no hematuria noted. clear urine noted. I and O noted      Problem/Plan - 2:  ·  Problem: Pneumonia, bacterial.  Plan: Enterovirus positive suspected aspiration pneumonia ,cefepime completed   - chest xray portable urgent to r/o repeat aspiration/fluid overload.   -c/w bronchodilators. changed duoneb to albuterol with Mucomyst  -  ABG stat  - ICu eval as patient is more lethargic ,anxious and breathing mild to moderately laboured. ?impending failure  -Bcx negative   -ct chest  showed bilateral upper lobe infiltrates, right greater than left as well as tree-in-bud inflammatory changes in the lower lobes as well.      Problem/Plan - 3:  ·  Problem: Acute on chronic renal failure.  Plan: underlying hx CKD stage 3 likely know KAPIL secondary to ATN from septic shock   worsening of renal indices .  Gentle hydration   -baseline creatine around 1.7 per prior records  -avoid nephrotoxic meds   - nephrology cx appreciated     Problem/Plan - 4:  ·  Problem: Cerebrovascular accident (CVA), unspecified mechanism.  Plan:  Right side hemiparesis. -c/w atorvastatin 10mg po qhs   Will initiate PT OT when patient is fully orientated.      Problem/Plan - 5:  ·  Problem: Atrial fibrillation.  Plan: Cardiology dr Donovan evaluated, doubt acute ischemia. Considering etiology of electrolyte imbalance and hypoxia.  Recommend beta blocker , statin , aspirin. seen by cardiology today. strated on amiodarone for PSVT. hematuria resolved. FOBT positive. coumadin on hold. needs to reassess by cardiology before DC and to restart coumadin. hb dropping. GI cx called     Problem 6: GI bleed: FOBT positive. hb dropping gradullay. GI eval [ dropping hb, FOBT +, need AC h/o afib]. PPI     Problem/Plan - 6:  Problem: BPH (benign prostatic hyperplasia). Plan: -c/w proscar and flomax. Puckett in place for urinary retention. urology following    problem 6: hypernatremia: on IVF. renal following    Disposition :Palliative consulted for goals of care and further quality of life discussions given recurrent hospitalizations and overall poor prognosis.  DNR 76 y/o male pmhx CVA (2 years ago and in July 17) w/ 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 Placed on ventilator and vasopressor support. Septic shock resolved. No longer requiring vasopressors and s/p extubation and down grade from ICU to medicine hospitalist team on 12/20/17. Continues on cefepime for pseudomona aeruginosa UTI and enterovirus positive, Bcx negative for growth, leukocytosis trending down and remained afebrile. BP remained normotensive, pt continues with being tapered off of stress dose steroids. His renal function remains compromised but is slowly down trending and likely secondary to ATN from septic shock with underlying known hx of ckd stage 3, Patient is alert but more lethargic today. maintaining saturation with venturi mask 30% oxygen.   puckett draining clear urine. seen by urology yesterday. to c/w puckett. seen by renal today. IVF was increased and vanofer was added. for labs in am.      Problem/Plan - 1:  ·  Problem: Pseudomonas urinary tract infection.  Plan: -    leukocytosis resolved  -U cx pseudomonas . Bld cx negative   Abx  completed discontinued  -c/w probiotic  -Now out of shock down titrating down stress dose hydrocortisone.  Puckett in place for urinary retention and hematuria. no sign of retention no hematuria noted. clear urine noted. I and O noted      Problem/Plan - 2:  ·  Problem: Pneumonia, bacterial.  Plan: Enterovirus positive suspected aspiration pneumonia ,cefepime completed   - chest xray portable urgent to r/o repeat aspiration/fluid overload.   -c/w bronchodilators. changed duoneb to albuterol with Mucomyst  -  ABG stat  - ICu eval as patient is more lethargic ,anxious and breathing mild to moderately laboured. ?impending failure  -Bcx negative   -ct chest  showed bilateral upper lobe infiltrates, right greater than left as well as tree-in-bud inflammatory changes in the lower lobes as well.      Problem/Plan - 3:  ·  Problem: Acute on chronic renal failure.  Plan: underlying hx CKD stage 3 likely know KAPIL secondary to ATN from septic shock   worsening of renal indices .  Gentle hydration   -baseline creatine around 1.7 per prior records  -avoid nephrotoxic meds   - nephrology cx appreciated     Problem/Plan - 4:  ·  Problem: Cerebrovascular accident (CVA), unspecified mechanism.  Plan:  Right side hemiparesis. -c/w atorvastatin 10mg po qhs   Will initiate PT OT when patient is fully orientated.      Problem/Plan - 5:  ·  Problem: Atrial fibrillation.  Plan: Cardiology dr Donovan evaluated, doubt acute ischemia. Considering etiology of electrolyte imbalance and hypoxia.  Recommend beta blocker , statin , aspirin. seen by cardiology today. strated on amiodarone for PSVT. hematuria resolved. FOBT positive. coumadin on hold. needs to reassess by cardiology before DC and to restart coumadin. hb dropping. GI cx called     Problem 6: GI bleed: FOBT positive. hb dropping gradullay. GI eval [ dropping hb, FOBT +, need AC h/o afib]. PPI     Problem/Plan - 6:  Problem: BPH (benign prostatic hyperplasia). Plan: -c/w proscar and flomax. Puckett in place for urinary retention. urology following    problem 6: hypernatremia: on IVF. renal following    Disposition :Palliative consulted for goals of care and further quality of life discussions given recurrent hospitalizations and overall poor prognosis. spoke to wife today 78 y/o male pmhx CVA (2 years ago and in July 17) w/ 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 Placed on ventilator and vasopressor support. Septic shock resolved. No longer requiring vasopressors and s/p extubation and down grade from ICU to medicine hospitalist team on 12/20/17. Continues on cefepime for pseudomona aeruginosa UTI and enterovirus positive, Bcx negative for growth, leukocytosis trending down and remained afebrile. BP remained normotensive, pt continues with being tapered off of stress dose steroids. His renal function remains compromised but is slowly down trending and likely secondary to ATN from septic shock with underlying known hx of ckd stage 3, Patient is alert but more lethargic today. maintaining saturation with venturi mask 30% oxygen.   puckett draining clear urine. seen by urology yesterday. to c/w puckett. seen by renal today. IVF was increased and vanofer was added. for labs in am.      Problem/Plan - 1:  ·  Problem: Pseudomonas urinary tract infection.  Plan: -    leukocytosis resolved  -U cx pseudomonas . Bld cx negative   Abx  completed discontinued  -c/w probiotic  -Now out of shock down titrating down stress dose hydrocortisone.  Puckett in place for urinary retention and hematuria. no sign of retention no hematuria noted. clear urine noted. I and O noted      Problem/Plan - 2:  ·  Problem: Pneumonia, bacterial.  Plan: Enterovirus positive suspected aspiration pneumonia ,cefepime completed   - chest xray portable urgent to r/o repeat aspiration/fluid overload.   -c/w bronchodilators. changed duoneb to albuterol with Mucomyst  -  ABG stat  - ICu eval as patient is more lethargic ,anxious and breathing mild to moderately laboured. ?impending failure  -Bcx negative   -ct chest  showed bilateral upper lobe infiltrates, right greater than left as well as tree-in-bud inflammatory changes in the lower lobes as well.      Problem/Plan - 3:  ·  Problem: Acute on chronic renal failure.  Plan: underlying hx CKD stage 3 likely know KAPIL secondary to ATN from septic shock   worsening of renal indices .  Gentle hydration   -baseline creatine around 1.7 per prior records  -avoid nephrotoxic meds   - nephrology cx appreciated     Problem/Plan - 4:  ·  Problem: Cerebrovascular accident (CVA), unspecified mechanism.  Plan:  Right side hemiparesis. -c/w atorvastatin 10mg po qhs   Will initiate PT OT when patient is fully orientated.      Problem/Plan - 5:  ·  Problem: Atrial fibrillation.  Plan: Cardiology dr Donovan evaluated, doubt acute ischemia. Considering etiology of electrolyte imbalance and hypoxia.  Recommend beta blocker , statin , aspirin. seen by cardiology today. strated on amiodarone for PSVT. hematuria resolved. FOBT positive. coumadin on hold. needs to reassess by cardiology before DC and to restart coumadin. hb dropping. GI cx called     Problem 6: GI bleed: FOBT positive. hb dropping gradullay. GI eval [ dropping hb, FOBT +, need AC h/o afib]. PPI     Problem/Plan - 6:  Problem: BPH (benign prostatic hyperplasia). Plan: -c/w proscar and flomax. Puckett in place for urinary retention. urology following    problem 6: hypernatremia: on IVF. renal following    Disposition :Palliative consulted for goals of care and further quality of life discussions given recurrent hospitalizations and overall poor prognosis. spoke to wife today [ Ms.Jacqueline Salomon at [phone]. She wants full code. states he does not want DNR/DNI. she will come to hospital today will speak to him again and will sign MOLST form. dced order for DNR. palliative follow up needed.

## 2017-12-26 NOTE — PROGRESS NOTE ADULT - ASSESSMENT
Hypernatremia better  KAPIL - creatinine slowly improving  continue 1/4 for now Hypernatremia better  KAPIL - creatinine slowly improving  continue 1/4 for now  Stool OB +   Add venofer  GI f/u for stool OB +

## 2017-12-26 NOTE — PROGRESS NOTE ADULT - SUBJECTIVE AND OBJECTIVE BOX
Patient seen and examined    NS change  lying quietly   no c/o elicited  No swelling feet    Vital Signs Last 24 Hrs  T(C): 36.7 (12-26-17 @ 15:58), Max: 36.9 (12-26-17 @ 05:00)  T(F): 98 (12-26-17 @ 15:58), Max: 98.5 (12-26-17 @ 05:00)  HR: 98 (12-26-17 @ 15:58) (89 - 101)  BP: 135/74 (12-26-17 @ 15:58) (113/65 - 135/74)  BP(mean): --  RR: 18 (12-26-17 @ 15:58) (16 - 20)  SpO2: 98% (12-26-17 @ 05:00) (97% - 98%)    Chest   clear  CV   no murmur  Abd   soft, NT BS+  Extr   No edema  Neuro  grossly iintact motor      26 Dec 2017 06:06    148    |  109    |  91.0   ----------------------------<  291    3.6     |  24.0   |  2.40     Ca    8.5        26 Dec 2017 06:06    TPro  6.0    /  Alb  2.5    /  TBili  0.4    /  DBili  x      /  AST  21     /  ALT  36     /  AlkPhos  86     26 Dec 2017 06:06                          7.9    10.9  )-----------( 129      ( 26 Dec 2017 06:06 )             25.1

## 2017-12-26 NOTE — PROGRESS NOTE ADULT - SUBJECTIVE AND OBJECTIVE BOX
CC:a/w Resp failure from aspiration pneumonia . breathing labored, lethargic, anxious.      seen at bedside. patient looks more lethargic compared to yesterday. saturating 99% with venturi mask. incomprehensible sound. non-verbal. not answering question even with noding. alert. more secretions  CVA with right hemiparesis.  Pseudomonas uti.  Right ileostomy. Peg tube.  urinary puckett catheter for urinary retention, hematuria.  Afib, holding coumadin for hematuria/FOBT posi. Dehydration, hypernatremia, prerenal azotemia.  on duoneb. not on mucormyst.   seen by cardiology today. started on amiodaron  puckett draining clear urine. urology is following,. to c/w puckett.   seen by nephrology yesterday. IVF was increased and vanofer was added.     HPI:  76 y/o male pmhx CVA ( 2 years ago and in July 17) w/ residual right upper and lower extremity weakness, s/p PEG,  afib on coumadin, s/p colon resection with ileostomy 8 years ago, s/p CBAGx3,  prostate cancer s/p TURP 3 years ago was BIBEMS after wife found him unresponsive with vomit around his face. Wife states patient had an increase in SOB past 2 days and noticed he was becoming more weak.  Pt was discharged 2 days ago from rehab center and has had a decrease in appetite since he's been home according to his wife and son. Wife states she has been noticing that after PEG feeding he coughs a lot and she compares it to him choking.  She states he has been getting feed through his PEG as well as liquids by mouth which he has been tolerating. On arrival patient was unresponsive and hypoxic. (18 Dec 2017 19:49)    REVIEW OF SYSTEMS:    Patient communicated through noding. denied fever, chills, abdominal pain, nausea, vomiting, cough, shortness of breath, chest pain or palpitations      MEDICATIONS  (STANDING):  acetylcysteine 20% Inhalation 3 milliLiter(s) Inhalation every 4 hours  ALBUTerol    0.083% 2.5 milliGRAM(s) Nebulizer every 4 hours  amiodarone    Tablet 400 milliGRAM(s) Oral daily  aspirin  chewable 81 milliGRAM(s) Oral daily  atorvastatin 10 milliGRAM(s) Oral at bedtime  epoetin [place] Injectable 98359 Unit(s) SubCutaneous every 7 days  finasteride 5 milliGRAM(s) Oral daily  folic acid 1 milliGRAM(s) Enteral Tube daily  hydrocortisone sodium succinate Injectable 40 milliGRAM(s) IV Push every 12 hours  iron sucrose IVPB 100 milliGRAM(s) IV Intermittent daily  metoprolol     tartrate 25 milliGRAM(s) Oral three times a day  pantoprazole    Tablet 40 milliGRAM(s) Oral before breakfast  saccharomyces boulardii 250 milliGRAM(s) Oral two times a day  sodium chloride 0.225%. 1000 milliLiter(s) (125 mL/Hr) IV Continuous <Continuous>  tamsulosin 0.4 milliGRAM(s) Oral at bedtime    MEDICATIONS  (PRN):  morphine  - Injectable 2 milliGRAM(s) IV Push every 6 hours PRN Moderate Pain (4 - 6)      PHYSICAL EXAM:    Vital Signs Last 24 Hrs  T(C): 36.9 (26 Dec 2017 08:13), Max: 36.9 (26 Dec 2017 05:00)  T(F): 98.4 (26 Dec 2017 08:13), Max: 98.5 (26 Dec 2017 05:00)  HR: 89 (26 Dec 2017 08:13) (89 - 101)  BP: 117/69 (26 Dec 2017 08:13) (113/65 - 141/69)  BP(mean): --  RR: 16 (26 Dec 2017 08:13) (16 - 20)  SpO2: 98% (26 Dec 2017 05:00) (97% - 98%)    GENERAL: ill looking .  HEENT: PERRL, +EOMI, anicteric, no [place]  NECK: Supple, No JVD   CHEST/LUNG: bilateral rales, increased compared to yesterday. ronchi+.   HEART: S1S2 Normal intensity, no murmurs, gallops or rubs noted  ABDOMEN: Soft, BS Normoactive, NT, ND,   EXTREMITIES: No cyanosis, No edema noted. Limited mobility   SKIN: No rashes or lesions noted. mild erythema over perianal region  NEURO: Awake alert, speech is incomprehensible. , Right hemiparesis,   PSYCH: flat affect      LABS:                                   7.9    10.9  )-----------( 129      ( 26 Dec 2017 06:06 )             25.1       12-26    148<H>  |  109<H>  |  91.0<H>  ----------------------------<  291<H>  3.6   |  24.0  |  2.40<H>    Ca    8.5<L>      26 Dec 2017 06:06    TPro  6.0<L>  /  Alb  2.5<L>  /  TBili  0.4  /  DBili  x   /  AST  21  /  ALT  36  /  AlkPhos  86  12-26       RADIOLOGY & ADDITIONAL TESTS:    < from: CT Chest No Cont (12.21.17 @ 12:33) >  IMPRESSION:     Bilateral upper lobe infiltrates, right greater than left.    Tree-in-bud inflammatory changes in the lower lobes as well..     < end of copied text > CC:a/w Resp failure from aspiration pneumonia . breathing labored, lethargic, anxious.      seen at bedside. patient looks more lethargic compared to yesterday. saturating 99% with venturi mask. incomprehensible sound. non-verbal. not answering question even with noding. alert. more secretions  CVA with right hemiparesis.  Pseudomonas uti.  Right ileostomy. Peg tube.  urinary puckett catheter for urinary retention, hematuria.  Afib, holding coumadin for hematuria/FOBT posi. Dehydration, hypernatremia, prerenal azotemia.  on duoneb. not on mucormyst.   seen by cardiology today. started on amiodaron  puckett draining clear urine. urology is following,. to c/w puckett.   seen by nephrology yesterday. IVF was increased and vanofer was added.     HPI:  78 y/o male pmhx CVA ( 2 years ago and in July 17) w/ residual right upper and lower extremity weakness, s/p PEG,  afib on coumadin, s/p colon resection with ileostomy 8 years ago, s/p CBAGx3,  prostate cancer s/p TURP 3 years ago was BIBEMS after wife found him unresponsive with vomit around his face. Wife states patient had an increase in SOB past 2 days and noticed he was becoming more weak.  Pt was discharged 2 days ago from rehab center and has had a decrease in appetite since he's been home according to his wife and son. Wife states she has been noticing that after PEG feeding he coughs a lot and she compares it to him choking.  She states he has been getting feed through his PEG as well as liquids by mouth which he has been tolerating. On arrival patient was unresponsive and hypoxic. (18 Dec 2017 19:49)    REVIEW OF SYSTEMS:    unable to obtain.      MEDICATIONS  (STANDING):  acetylcysteine 20% Inhalation 3 milliLiter(s) Inhalation every 4 hours  ALBUTerol    0.083% 2.5 milliGRAM(s) Nebulizer every 4 hours  amiodarone    Tablet 400 milliGRAM(s) Oral daily  aspirin  chewable 81 milliGRAM(s) Oral daily  atorvastatin 10 milliGRAM(s) Oral at bedtime  epoetin [place] Injectable 28274 Unit(s) SubCutaneous every 7 days  finasteride 5 milliGRAM(s) Oral daily  folic acid 1 milliGRAM(s) Enteral Tube daily  hydrocortisone sodium succinate Injectable 40 milliGRAM(s) IV Push every 12 hours  iron sucrose IVPB 100 milliGRAM(s) IV Intermittent daily  metoprolol     tartrate 25 milliGRAM(s) Oral three times a day  pantoprazole    Tablet 40 milliGRAM(s) Oral before breakfast  saccharomyces boulardii 250 milliGRAM(s) Oral two times a day  sodium chloride 0.225%. 1000 milliLiter(s) (125 mL/Hr) IV Continuous <Continuous>  tamsulosin 0.4 milliGRAM(s) Oral at bedtime    MEDICATIONS  (PRN):  morphine  - Injectable 2 milliGRAM(s) IV Push every 6 hours PRN Moderate Pain (4 - 6)      PHYSICAL EXAM:    Vital Signs Last 24 Hrs  T(C): 36.9 (26 Dec 2017 08:13), Max: 36.9 (26 Dec 2017 05:00)  T(F): 98.4 (26 Dec 2017 08:13), Max: 98.5 (26 Dec 2017 05:00)  HR: 89 (26 Dec 2017 08:13) (89 - 101)  BP: 117/69 (26 Dec 2017 08:13) (113/65 - 141/69)  BP(mean): --  RR: 16 (26 Dec 2017 08:13) (16 - 20)  SpO2: 98% (26 Dec 2017 05:00) (97% - 98%)    GENERAL: ill looking .  HEENT: PERRL, +EOMI, anicteric, no [place]  NECK: Supple, No JVD   CHEST/LUNG: bilateral rales, increased compared to yesterday. ronchi+.   HEART: S1S2 Normal intensity, no murmurs, gallops or rubs noted  ABDOMEN: Soft, BS Normoactive, NT, ND,   EXTREMITIES: No cyanosis, No edema noted. Limited mobility   SKIN: No rashes or lesions noted. mild erythema over perianal region  NEURO: Awake alert, speech is incomprehensible. , Right hemiparesis,   PSYCH: flat affect      LABS:                                   7.9    10.9  )-----------( 129      ( 26 Dec 2017 06:06 )             25.1       12-26    148<H>  |  109<H>  |  91.0<H>  ----------------------------<  291<H>  3.6   |  24.0  |  2.40<H>    Ca    8.5<L>      26 Dec 2017 06:06    TPro  6.0<L>  /  Alb  2.5<L>  /  TBili  0.4  /  DBili  x   /  AST  21  /  ALT  36  /  AlkPhos  86  12-26       RADIOLOGY & ADDITIONAL TESTS:    < from: CT Chest No Cont (12.21.17 @ 12:33) >  IMPRESSION:     Bilateral upper lobe infiltrates, right greater than left.    Tree-in-bud inflammatory changes in the lower lobes as well..     < end of copied text > CC:a/w Resp failure from aspiration pneumonia . breathing labored, lethargic, anxious.      seen at bedside. patient looks more lethargic compared to yesterday. saturating 99% with venturi mask. incomprehensible sound. non-verbal. not answering question even with noding. alert. more secretions  CVA with right hemiparesis.  Pseudomonas uti.  Right ileostomy. Peg tube.  urinary puckett catheter for urinary retention, hematuria.  Afib, holding coumadin for hematuria/FOBT posi. Dehydration, hypernatremia, prerenal azotemia.  on duoneb. not on mucormyst.   seen by cardiology today. started on amiodaron  puckett draining clear urine. urology is following,. to c/w puckett.   seen by nephrology yesterday. IVF was increased and vanofer was added.   WIFE REPORTED FULL CODE    HPI:  78 y/o male pmhx CVA ( 2 years ago and in July 17) w/ residual right upper and lower extremity weakness, s/p PEG,  afib on coumadin, s/p colon resection with ileostomy 8 years ago, s/p CBAGx3,  prostate cancer s/p TURP 3 years ago was BIBEMS after wife found him unresponsive with vomit around his face. Wife states patient had an increase in SOB past 2 days and noticed he was becoming more weak.  Pt was discharged 2 days ago from rehab center and has had a decrease in appetite since he's been home according to his wife and son. Wife states she has been noticing that after PEG feeding he coughs a lot and she compares it to him choking.  She states he has been getting feed through his PEG as well as liquids by mouth which he has been tolerating. On arrival patient was unresponsive and hypoxic. (18 Dec 2017 19:49)    REVIEW OF SYSTEMS:    unable to obtain.      MEDICATIONS  (STANDING):  acetylcysteine 20% Inhalation 3 milliLiter(s) Inhalation every 4 hours  ALBUTerol    0.083% 2.5 milliGRAM(s) Nebulizer every 4 hours  amiodarone    Tablet 400 milliGRAM(s) Oral daily  aspirin  chewable 81 milliGRAM(s) Oral daily  atorvastatin 10 milliGRAM(s) Oral at bedtime  epoetin [place] Injectable 75744 Unit(s) SubCutaneous every 7 days  finasteride 5 milliGRAM(s) Oral daily  folic acid 1 milliGRAM(s) Enteral Tube daily  hydrocortisone sodium succinate Injectable 40 milliGRAM(s) IV Push every 12 hours  iron sucrose IVPB 100 milliGRAM(s) IV Intermittent daily  metoprolol     tartrate 25 milliGRAM(s) Oral three times a day  pantoprazole    Tablet 40 milliGRAM(s) Oral before breakfast  saccharomyces boulardii 250 milliGRAM(s) Oral two times a day  sodium chloride 0.225%. 1000 milliLiter(s) (125 mL/Hr) IV Continuous <Continuous>  tamsulosin 0.4 milliGRAM(s) Oral at bedtime    MEDICATIONS  (PRN):  morphine  - Injectable 2 milliGRAM(s) IV Push every 6 hours PRN Moderate Pain (4 - 6)      PHYSICAL EXAM:    Vital Signs Last 24 Hrs  T(C): 36.9 (26 Dec 2017 08:13), Max: 36.9 (26 Dec 2017 05:00)  T(F): 98.4 (26 Dec 2017 08:13), Max: 98.5 (26 Dec 2017 05:00)  HR: 89 (26 Dec 2017 08:13) (89 - 101)  BP: 117/69 (26 Dec 2017 08:13) (113/65 - 141/69)  BP(mean): --  RR: 16 (26 Dec 2017 08:13) (16 - 20)  SpO2: 98% (26 Dec 2017 05:00) (97% - 98%)    GENERAL: ill looking .  HEENT: PERRL, +EOMI, anicteric, no [place]  NECK: Supple, No JVD   CHEST/LUNG: bilateral rales, increased compared to yesterday. ronchi+.   HEART: S1S2 Normal intensity, no murmurs, gallops or rubs noted  ABDOMEN: Soft, BS Normoactive, NT, ND,   EXTREMITIES: No cyanosis, No edema noted. Limited mobility   SKIN: No rashes or lesions noted. mild erythema over perianal region  NEURO: Awake alert, speech is incomprehensible. , Right hemiparesis,   PSYCH: flat affect      LABS:                                   7.9    10.9  )-----------( 129      ( 26 Dec 2017 06:06 )             25.1       12-26    148<H>  |  109<H>  |  91.0<H>  ----------------------------<  291<H>  3.6   |  24.0  |  2.40<H>    Ca    8.5<L>      26 Dec 2017 06:06    TPro  6.0<L>  /  Alb  2.5<L>  /  TBili  0.4  /  DBili  x   /  AST  21  /  ALT  36  /  AlkPhos  86  12-26       RADIOLOGY & ADDITIONAL TESTS:    < from: CT Chest No Cont (12.21.17 @ 12:33) >  IMPRESSION:     Bilateral upper lobe infiltrates, right greater than left.    Tree-in-bud inflammatory changes in the lower lobes as well..     < end of copied text >

## 2017-12-26 NOTE — CONSULT NOTE ADULT - SUBJECTIVE AND OBJECTIVE BOX
Patient is a 77y old  Male who presents with a chief complaint of Found unresponsive in his vomit (18 Dec 2017 19:49)      HPI:  78 y/o male pmhx CVA ( 2 years ago and in July 17) w/ residual right upper and lower extremity weakness, s/p PEG,  afib on coumadin, s/p colon resection with ileostomy 8 years ago, s/p CBAGx3,  prostate cancer s/p TURP 3 years ago was BIBEMS after wife found him unresponsive with vomit around his face. Wife states patient had an increase in SOB past 2 days and noticed he was becoming more weak.  Pt was discharged 2 days ago from rehab center and has had a decrease in appetite since he's been home according to his wife and son. Wife states she has been noticing that after PEG feeding he coughs a lot and she compares it to him choking.  She states he has been getting feed through his PEG as well as liquids by mouth which he has been tolerating. On arrival patient was unresponsive and hypoxic. (18 Dec 2017 19:49). Pt. is referred for GI evaluation of dropping Hb and + FOBT. Pt. unable to provide history and is s/p colon resection with chronic RLQ ileosomy and s/p PEG tube placement for previous CVA's and oropharyngeal dysphagia. No Hb dropping and + FOBT w/o gross blood or melena in ostomy bag.      REVIEW OF SYSTEMS: Limited because of pt's current mental status which according to the hospitalist is worse this AM.  Constitutional: No fever, weight loss or fatigue  ENMT:  No difficulty hearing, tinnitus, vertigo; No sinus or throat pain  Respiratory: No cough, wheezing, chills or hemoptysis  Cardiovascular: No chest pain, palpitations, dizziness or leg swelling  Gastrointestinal: No abdominal or epigastric pain. No nausea, vomiting or hematemesis; No diarrhea or constipation. No melena or hematochezia.  Skin: No itching, burning, rashes or lesions   Musculoskeletal: No joint pain or swelling; No muscle, back or extremity pain  Patient has no cardiopulmonary, peripheral vascular, musculoskeletal, dermatological, neurological, or psychological symptoms or complaints at this time    PAST MEDICAL & SURGICAL HISTORY:  CAD (coronary artery disease)  Afib  CVA (cerebral vascular accident)  Mitral valve replaced  BPH (benign prostatic hyperplasia)  High cholesterol  HTN (hypertension)  H/O heart valve replacement with mechanical valve  S/P CABG x 1  H/O colectomy      FAMILY HISTORY:  No pertinent family history in first degree relatives      SOCIAL HISTORY:  Smoking Status: [ ] Current, [ ] Former, [ ] Never Unknown  Pack Years: Unknown no ETOH or drug abuse history.    MEDICATIONS:  MEDICATIONS  (STANDING):  acetylcysteine 20% Inhalation 3 milliLiter(s) Inhalation every 4 hours  ALBUTerol    0.083% 2.5 milliGRAM(s) Nebulizer every 4 hours  amiodarone    Tablet 400 milliGRAM(s) Oral daily  aspirin  chewable 81 milliGRAM(s) Oral daily  atorvastatin 10 milliGRAM(s) Oral at bedtime  epoetin [place] Injectable 00338 Unit(s) SubCutaneous every 7 days  finasteride 5 milliGRAM(s) Oral daily  folic acid 1 milliGRAM(s) Enteral Tube daily  hydrocortisone sodium succinate Injectable 40 milliGRAM(s) IV Push every 12 hours  iron sucrose IVPB 100 milliGRAM(s) IV Intermittent daily  metoprolol     tartrate 25 milliGRAM(s) Oral three times a day  pantoprazole    Tablet 40 milliGRAM(s) Oral before breakfast  saccharomyces boulardii 250 milliGRAM(s) Oral two times a day  sodium chloride 0.225%. 1000 milliLiter(s) (125 mL/Hr) IV Continuous <Continuous>  tamsulosin 0.4 milliGRAM(s) Oral at bedtime    MEDICATIONS  (PRN):  morphine  - Injectable 2 milliGRAM(s) IV Push every 6 hours PRN Moderate Pain (4 - 6)      Allergies    penicillins (Hives)    Intolerances        Vital Signs Last 24 Hrs  T(C): 36.9 (26 Dec 2017 08:13), Max: 36.9 (26 Dec 2017 05:00)  T(F): 98.4 (26 Dec 2017 08:13), Max: 98.5 (26 Dec 2017 05:00)  HR: 89 (26 Dec 2017 08:13) (89 - 101)  BP: 117/69 (26 Dec 2017 08:13) (113/65 - 141/69)  BP(mean): --  RR: 16 (26 Dec 2017 08:13) (16 - 20)  SpO2: 98% (26 Dec 2017 05:00) (97% - 98%)    12-25 @ 07:01  -  12-26 @ 07:00  --------------------------------------------------------  IN: 2275 mL / OUT: 2350 mL / NET: -75 mL          PHYSICAL EXAM:    General: Well developed; well nourished; in no acute distress  HEENT: MMM, conjunctiva pink and sclera anicteric.  Lungs; Decreased breath sound bilaterally.  Cor: Irregular rhythym  Gastrointestinal: Soft, non-tender non-distended; Normal bowel sounds; + RLQ ileostomy with brown stool in ostomy bag. + Peg tube also noted with no surrounding erythema or cellulitis at gastrostomy site.   DAVIAN: Not done as pt has ileostomy.  Extremities: No clubbing, cyanosis or edema  Neurological: Alert but lethargic.        LABS:                        7.9    10.9  )-----------( 129      ( 26 Dec 2017 06:06 )             25.1     12-26    148<H>  |  109<H>  |  91.0<H>  ----------------------------<  291<H>  3.6   |  24.0  |  2.40<H>    Ca    8.5<L>      26 Dec 2017 06:06    TPro  6.0<L>  /  Alb  2.5<L>  /  TBili  0.4  /  DBili  x   /  AST  21  /  ALT  36  /  AlkPhos  86  12-26          RADIOLOGY & ADDITIONAL STUDIES:

## 2017-12-26 NOTE — CONSULT NOTE ADULT - SUBJECTIVE AND OBJECTIVE BOX
PULMONARY CONSULT NOTE      CRISTIN [id number]    Patient is a 77y old  Male who presents with a chief complaint of Found unresponsive in his vomit (18 Dec 2017 19:49)  76 y/o male pmhx CVA ( 2 years ago and in July 17) w/ residual right upper and lower extremity weakness, s/p PEG,  afib on coumadin, s/p colon resection with ileostomy 8 years ago, s/p CBAGx3,  prostate cancer s/p TURP 3 years ago was BIBEMS after wife found him unresponsive with vomit around his face. Wife states patient had an increase in SOB past 2 days and noticed he was becoming more weak.  Pt was discharged 2 days ago from rehab center and has had a decrease in appetite since he's been home according to his wife and son. Wife states she has been noticing that after PEG feeding he coughs a lot and she compares it to him choking.  She states he has been getting feed through his PEG as well as liquids by mouth which he has been tolerating. On arrival patient was unresponsive and hypoxic. (18 Dec 2017 19:49)    HISTORY OF PRESENT ILLNESS:  currently denies any chest pain or sob  MEDICATIONS  (STANDING):  acetylcysteine 20% Inhalation 3 milliLiter(s) Inhalation every 4 hours  ALBUTerol    0.083% 2.5 milliGRAM(s) Nebulizer every 4 hours  amiodarone    Tablet 400 milliGRAM(s) Oral daily  aspirin  chewable 81 milliGRAM(s) Oral daily  atorvastatin 10 milliGRAM(s) Oral at bedtime  epoetin [place] Injectable 84172 Unit(s) SubCutaneous every 7 days  finasteride 5 milliGRAM(s) Oral daily  folic acid 1 milliGRAM(s) Enteral Tube daily  hydrocortisone sodium succinate Injectable 40 milliGRAM(s) IV Push every 12 hours  iron sucrose IVPB 100 milliGRAM(s) IV Intermittent daily  metoprolol     tartrate 25 milliGRAM(s) Oral three times a day  pantoprazole    Tablet 40 milliGRAM(s) Oral before breakfast  saccharomyces boulardii 250 milliGRAM(s) Oral two times a day  sodium chloride 0.225%. 1000 milliLiter(s) (125 mL/Hr) IV Continuous <Continuous>  tamsulosin 0.4 milliGRAM(s) Oral at bedtime      MEDICATIONS  (PRN):  morphine  - Injectable 2 milliGRAM(s) IV Push every 6 hours PRN Moderate Pain (4 - 6)      Allergies    penicillins (Hives)    Intolerances        PAST MEDICAL & SURGICAL HISTORY:  CAD (coronary artery disease)  Afib  CVA (cerebral vascular accident)  Mitral valve replaced  BPH (benign prostatic hyperplasia)  High cholesterol  HTN (hypertension)  H/O heart valve replacement with mechanical valve  S/P CABG x 1  H/O colectomy      FAMILY HISTORY:  No pertinent family history in first degree relatives      SOCIAL HISTORY  Smoking History:     REVIEW OF SYSTEMS:    CONSTITUTIONAL:  No fevers, chills, sweats    HEENT:  Eyes:  No diplopia or blurred vision. ENT:  No earache, sore throat or runny nose.    CARDIOVASCULAR:  No pressure, squeezing, tightness, or heaviness about the chest; no palpitations.    RESPIRATORY:  see hpi    GASTROINTESTINAL:  No abdominal pain, nausea, vomiting or diarrhea.    GENITOURINARY:  No dysuria, frequency or urgency.    NEUROLOGIC:  No paresthesias, fasciculations, seizures or weakness.    PSYCHIATRIC:  No disorder of thought or mood.    Vital Signs Last 24 Hrs  T(C): 36.9 (26 Dec 2017 08:13), Max: 36.9 (26 Dec 2017 05:00)  T(F): 98.4 (26 Dec 2017 08:13), Max: 98.5 (26 Dec 2017 05:00)  HR: 89 (26 Dec 2017 08:13) (89 - 101)  BP: 117/69 (26 Dec 2017 08:13) (113/65 - 141/69)  BP(mean): --  RR: 16 (26 Dec 2017 08:13) (16 - 20)  SpO2: 98% (26 Dec 2017 05:00) (97% - 98%)    PHYSICAL EXAMINATION:    GENERAL: The patient is a well-developed, in no apparent distress.     HEENT: Head is normocephalic and atraumatic. Extraocular muscles are intact. Mucous membranes are moist.     NECK: Supple.     LUNGS: moderate air entry with crackles R base. Respirations unlabored    HEART: Regular rate and rhythm without murmur.    ABDOMEN: Soft, nontender, and nondistended.  No hepatosplenomegaly is noted.    EXTREMITIES: Without any cyanosis, clubbing, rash, lesions or edema.    NEUROLOGIC: Grossly intact.      LABS:                        7.9    10.9  )-----------( 129      ( 26 Dec 2017 06:06 )             25.1     12-26    148<H>  |  109<H>  |  91.0<H>  ----------------------------<  291<H>  3.6   |  24.0  |  2.40<H>    Ca    8.5<L>      26 Dec 2017 06:06    TPro  6.0<L>  /  Alb  2.5<L>  /  TBili  0.4  /  DBili  x   /  AST  21  /  ALT  36  /  AlkPhos  86  12-26    PT/INR - ( 26 Dec 2017 07:13 )   PT: 16.6 sec;   INR: 1.50 ratio             ABG - ( 26 Dec 2017 09:22 )  pH: 7.43  /  pCO2: 36    /  pO2: 137   / HCO3: 24    / Base Excess: -0.2  /  SaO2: 100                             MICROBIOLOGY:    RADIOLOGY & ADDITIONAL STUDIES:  ct can and CXR reviewed

## 2017-12-26 NOTE — CONSULT NOTE ADULT - PROBLEM SELECTOR RECOMMENDATION 2
No clinical signs of active GI bleeding as there is no blood or melena in his ileostomy bag, See above recommendations. Repeat labs ordered for tomorrow AM. Pt. is not a suitable candidate for GI intervention presently.

## 2017-12-26 NOTE — PROGRESS NOTE ADULT - SUBJECTIVE AND OBJECTIVE BOX
[place] [facility] - [facility], THE HEART CENTER                                   [address] [place] [address]                                                      PHONE: [phone]                                                         FAX: [phone]  [email or web address]  ---------------------------------------------------------------------------------------------------------------------------------    Overnight events/patient complaints:  no events overnight     PAST MEDICAL & SURGICAL HISTORY:  CAD (coronary artery disease)  Afib  CVA (cerebral vascular accident)  Mitral valve replaced  BPH (benign prostatic hyperplasia)  High cholesterol  HTN (hypertension)  H/O heart valve replacement with mechanical valve  S/P CABG x 1  H/O colectomy      penicillins (Hives)    MEDICATIONS  (STANDING):  ALBUTerol/ipratropium for Nebulization 3 milliLiter(s) Nebulizer every 4 hours  aspirin  chewable 81 milliGRAM(s) Oral daily  atorvastatin 10 milliGRAM(s) Oral at bedtime  epoetin [place] Injectable 70977 Unit(s) SubCutaneous every 7 days  finasteride 5 milliGRAM(s) Oral daily  folic acid 1 milliGRAM(s) Enteral Tube daily  hydrocortisone sodium succinate Injectable 40 milliGRAM(s) IV Push every 12 hours  iron sucrose IVPB 100 milliGRAM(s) IV Intermittent daily  metoprolol     tartrate 25 milliGRAM(s) Oral three times a day  pantoprazole    Tablet 40 milliGRAM(s) Oral before breakfast  saccharomyces boulardii 250 milliGRAM(s) Oral two times a day  sodium chloride 0.225%. 1000 milliLiter(s) (125 mL/Hr) IV Continuous <Continuous>  tamsulosin 0.4 milliGRAM(s) Oral at bedtime    MEDICATIONS  (PRN):  morphine  - Injectable 2 milliGRAM(s) IV Push every 6 hours PRN Moderate Pain (4 - 6)      Vital Signs Last 24 Hrs  T(C): 36.9 (26 Dec 2017 05:00), Max: 36.9 (26 Dec 2017 05:00)  T(F): 98.5 (26 Dec 2017 05:00), Max: 98.5 (26 Dec 2017 05:00)  HR: 101 (26 Dec 2017 05:00) (96 - 101)  BP: 113/65 (26 Dec 2017 05:00) (113/65 - 141/69)  BP(mean): --  RR: 20 (26 Dec 2017 05:00) (18 - 20)  SpO2: 98% (26 Dec 2017 05:00) (97% - 98%)  ICU Vital Signs Last 24 Hrs  CRISTIN ROQUE  I&O's Detail    25 Dec 2017 07:01  -  26 Dec 2017 07:00  --------------------------------------------------------  IN:    Jevity: 650 mL    sodium chloride 0.225%.: 1625 mL  Total IN: 2275 mL    OUT:    Colostomy: 350 mL    Indwelling Catheter - Urethral: 2000 mL  Total OUT: 2350 mL    Total NET: -75 mL        I&O's Summary    25 Dec 2017 07:01  -  26 Dec 2017 07:00  --------------------------------------------------------  IN: 2275 mL / OUT: 2350 mL / NET: -75 mL      Drug Dosing Weight  CRISTIN ROQUE      PHYSICAL EXAM:  General: Appears well developed, well nourished alert and cooperative.  HEENT: Head; normocephalic, atraumatic.  Eyes: Pupils reactive, cornea wnl.  Neck: Supple, no nodes adenopathy, no NVD or carotid bruit or thyromegaly.  CARDIOVASCULAR: Normal S1 and S2, No murmur, rub, gallop or lift.   LUNGS: No rales, rhonchi or wheeze. Normal breath sounds bilaterally.  ABDOMEN: Soft, nontender without mass or organomegaly. bowel sounds normoactive.  EXTREMITIES: No clubbing, cyanosis or edema. Distal pulses wnl.   SKIN: warm and dry with normal turgor.  NEURO: Alert/oriented x 3/normal motor exam. No pathologic reflexes.    PSYCH: normal affect.        LABS:                        7.9    10.9  )-----------( 129      ( 26 Dec 2017 06:06 )             25.1     12-26    148<H>  |  109<H>  |  91.0<H>  ----------------------------<  291<H>  3.6   |  24.0  |  2.40<H>    Ca    8.5<L>      26 Dec 2017 06:06    TPro  6.0<L>  /  Alb  2.5<L>  /  TBili  0.4  /  DBili  x   /  AST  21  /  ALT  36  /  AlkPhos  86  12-26    CRISTIN ROQUE      PT/INR - ( 26 Dec 2017 07:13 )   PT: 16.6 sec;   INR: 1.50 ratio               RADIOLOGY & ADDITIONAL STUDIES:    INTERPRETATION OF TELEMETRY (personally reviewed):       ASSESSMENT AND PLAN:  76 M with prior history of permanent AF on coumadin, CAD s/p CABG/AVR, HTN , chronic renal insufficiency, CVA with R hemiparesis,  colon resection with ileostomy, dysphagia s/p PEG who presented unresponsive and hypoxic found to have septic shock requiring pressors and acute hypoxic respiratory failure 2/2 aspiration PNA s/p intubation, with course further complicated by KAPIL on CKD, and now with recurrent NSVT    NSVT:  no further episodes      CAD s/p CABG/AVR  - ASA 81mg daily   - continue remainder of cardiac meds/dosages     Permanent atrial fibrillation  - coumadin held 2/2 hematuria and guiac +   - will need to address prior to d/c    acute hypoxic respiratory failure 2/2 aspiration PNA s/p intubation  -improved     Anemia  + guiac   -   likely secondary to CKD +/- Guiac + stools       Thank you for allowing [facility] to participate in the care of this patient.  Please feel free to call with any questions or concerns. [place] [facility], [facility]                                   [address] [place] [address]                                                      PHONE: [phone]                                                         FAX: [phone]  [email or web address]  ---------------------------------------------------------------------------------------------------------------------------------    Overnight events/patient complaints:  no events overnight , nsvt on tele     PAST MEDICAL & SURGICAL HISTORY:  CAD (coronary artery disease)  Afib  CVA (cerebral vascular accident)  Mitral valve replaced  BPH (benign prostatic hyperplasia)  High cholesterol  HTN (hypertension)  H/O heart valve replacement with mechanical valve  S/P CABG x 1  H/O colectomy      penicillins (Hives)    MEDICATIONS  (STANDING):  ALBUTerol/ipratropium for Nebulization 3 milliLiter(s) Nebulizer every 4 hours  aspirin  chewable 81 milliGRAM(s) Oral daily  atorvastatin 10 milliGRAM(s) Oral at bedtime  epoetin [place] Injectable 73129 Unit(s) SubCutaneous every 7 days  finasteride 5 milliGRAM(s) Oral daily  folic acid 1 milliGRAM(s) Enteral Tube daily  hydrocortisone sodium succinate Injectable 40 milliGRAM(s) IV Push every 12 hours  iron sucrose IVPB 100 milliGRAM(s) IV Intermittent daily  metoprolol     tartrate 25 milliGRAM(s) Oral three times a day  pantoprazole    Tablet 40 milliGRAM(s) Oral before breakfast  saccharomyces boulardii 250 milliGRAM(s) Oral two times a day  sodium chloride 0.225%. 1000 milliLiter(s) (125 mL/Hr) IV Continuous <Continuous>  tamsulosin 0.4 milliGRAM(s) Oral at bedtime    MEDICATIONS  (PRN):  morphine  - Injectable 2 milliGRAM(s) IV Push every 6 hours PRN Moderate Pain (4 - 6)      Vital Signs Last 24 Hrs  T(C): 36.9 (26 Dec 2017 05:00), Max: 36.9 (26 Dec 2017 05:00)  T(F): 98.5 (26 Dec 2017 05:00), Max: 98.5 (26 Dec 2017 05:00)  HR: 101 (26 Dec 2017 05:00) (96 - 101)  BP: 113/65 (26 Dec 2017 05:00) (113/65 - 141/69)  BP(mean): --  RR: 20 (26 Dec 2017 05:00) (18 - 20)  SpO2: 98% (26 Dec 2017 05:00) (97% - 98%)  ICU Vital Signs Last 24 Hrs  CRISTIN ROQUE  I&O's Detail    25 Dec 2017 07:01  -  26 Dec 2017 07:00  --------------------------------------------------------  IN:    Jevity: 650 mL    sodium chloride 0.225%.: 1625 mL  Total IN: 2275 mL    OUT:    Colostomy: 350 mL    Indwelling Catheter - Urethral: 2000 mL  Total OUT: 2350 mL    Total NET: -75 mL        I&O's Summary    25 Dec 2017 07:01  -  26 Dec 2017 07:00  --------------------------------------------------------  IN: 2275 mL / OUT: 2350 mL / NET: -75 mL      Drug Dosing Weight  CRISTIN ROQUE      PHYSICAL EXAM:  General: Appears well developed, well nourished alert and cooperative.  HEENT: Head; normocephalic, atraumatic.  Eyes: Pupils reactive, cornea wnl.  Neck: Supple, no nodes adenopathy, no NVD or carotid bruit or thyromegaly.  CARDIOVASCULAR: Normal S1 and S2, No murmur, rub, gallop or lift.   LUNGS: No rales, rhonchi or wheeze. Normal breath sounds bilaterally.  ABDOMEN: Soft, nontender without mass or organomegaly. bowel sounds normoactive.  EXTREMITIES: No clubbing, cyanosis or edema. Distal pulses wnl.   SKIN: warm and dry with normal turgor.  NEURO: Alert/oriented x 3/normal motor exam. No pathologic reflexes.    PSYCH: normal affect.        LABS:                        7.9    10.9  )-----------( 129      ( 26 Dec 2017 06:06 )             25.1     12-26    148<H>  |  109<H>  |  91.0<H>  ----------------------------<  291<H>  3.6   |  24.0  |  2.40<H>    Ca    8.5<L>      26 Dec 2017 06:06    TPro  6.0<L>  /  Alb  2.5<L>  /  TBili  0.4  /  DBili  x   /  AST  21  /  ALT  36  /  AlkPhos  86  12-26    CRISTIN ROQUE      PT/INR - ( 26 Dec 2017 07:13 )   PT: 16.6 sec;   INR: 1.50 ratio               RADIOLOGY & ADDITIONAL STUDIES:    INTERPRETATION OF TELEMETRY (personally reviewed):       ASSESSMENT AND PLAN:  76 M with prior history of permanent AF on coumadin, CAD s/p CABG/AVR, HTN , chronic renal insufficiency, CVA with R hemiparesis,  colon resection with ileostomy, dysphagia s/p PEG who presented unresponsive and hypoxic found to have septic shock requiring pressors and acute hypoxic respiratory failure 2/2 aspiration PNA s/p intubation, with course further complicated by KAPIL on CKD, and now with recurrent NSVT    NSVT:  -starting amio       CAD s/p CABG/AVR  - ASA 81mg daily   - continue remainder of cardiac meds/dosages     Permanent atrial fibrillation  - coumadin held 2/2 hematuria and guiac +   - will need to address prior to d/c - not sure at this point if benefits outweigh risks     acute hypoxic respiratory failure 2/2 aspiration PNA s/p intubation  -improved     Anemia  + guiac   -   likely secondary to CKD +/- Guiac + stools     no further interventions at this time  Thank you for allowing [facility] to participate in the care of this patient.  Please feel free to call with any questions or concerns.

## 2017-12-26 NOTE — CONSULT NOTE ADULT - ASSESSMENT
76 M with prior history of permanent AF on coumadin, CAD s/p CABG/AVR, HTN , chronic renal insufficiency, CVA with R hemiparesis,  colon resection with ileostomy, dysphagia s/p PEG who presented unresponsive and hypoxic found to have septic shock requiring pressors and acute hypoxic respiratory failure 2/2 aspiration PNA s/p intubation, with course further complicated by KAPIL on CKD, and with recurrent NSVT  currently in no distress, oxygenating  adequately on cannula  repeat CXR pending  aggressive  pulmonary toilet, nebs  decrease IVF  off abx per ID  wean hydrocortisone  prognosis guarded, palliative is DNR 76 M with prior history of permanent AF on coumadin, CAD s/p CABG/AVR, HTN , chronic renal insufficiency, CVA with R hemiparesis,  colon resection with ileostomy, dysphagia s/p PEG who presented unresponsive and hypoxic found to have septic shock requiring pressors and acute hypoxic respiratory failure 2/2 aspiration PNA s/p intubation, with course further complicated by KAPIL on CKD, and with recurrent NSVT  currently in no distress, oxygenating  adequately on cannula  repeat CXR pending  last echo with significant MR/TR 9/17  aggressive  pulmonary toilet, nebs  decrease IVF  off abx per ID  wean hydrocortisone  prognosis guarded, palliative is DNR

## 2017-12-27 LAB
ALBUMIN SERPL ELPH-MCNC: 2.6 G/DL — LOW (ref 3.3–5.2)
ALP SERPL-CCNC: 88 U/L — SIGNIFICANT CHANGE UP (ref 40–120)
ALT FLD-CCNC: 38 U/L — SIGNIFICANT CHANGE UP
ANION GAP SERPL CALC-SCNC: 14 MMOL/L — SIGNIFICANT CHANGE UP (ref 5–17)
AST SERPL-CCNC: 22 U/L — SIGNIFICANT CHANGE UP
BASOPHILS # BLD AUTO: 0 K/UL — SIGNIFICANT CHANGE UP (ref 0–0.2)
BASOPHILS NFR BLD AUTO: 0.1 % — SIGNIFICANT CHANGE UP (ref 0–2)
BILIRUB SERPL-MCNC: 0.4 MG/DL — SIGNIFICANT CHANGE UP (ref 0.4–2)
BUN SERPL-MCNC: 80 MG/DL — HIGH (ref 8–20)
CALCIUM SERPL-MCNC: 8.2 MG/DL — LOW (ref 8.6–10.2)
CHLORIDE SERPL-SCNC: 105 MMOL/L — SIGNIFICANT CHANGE UP (ref 98–107)
CO2 SERPL-SCNC: 23 MMOL/L — SIGNIFICANT CHANGE UP (ref 22–29)
CREAT SERPL-MCNC: 2.32 MG/DL — HIGH (ref 0.5–1.3)
EOSINOPHIL # BLD AUTO: 0.1 K/UL — SIGNIFICANT CHANGE UP (ref 0–0.5)
EOSINOPHIL NFR BLD AUTO: 0.8 % — SIGNIFICANT CHANGE UP (ref 0–5)
GLUCOSE BLDC GLUCOMTR-MCNC: 175 MG/DL — HIGH (ref 70–99)
GLUCOSE BLDC GLUCOMTR-MCNC: 184 MG/DL — HIGH (ref 70–99)
GLUCOSE SERPL-MCNC: 318 MG/DL — HIGH (ref 70–115)
HCT VFR BLD CALC: 24.6 % — LOW (ref 42–52)
HGB BLD-MCNC: 7.8 G/DL — LOW (ref 14–18)
INR BLD: 1.48 RATIO — HIGH (ref 0.88–1.16)
LYMPHOCYTES # BLD AUTO: 0.7 K/UL — LOW (ref 1–4.8)
LYMPHOCYTES # BLD AUTO: 5.4 % — LOW (ref 20–55)
MAGNESIUM SERPL-MCNC: 1.5 MG/DL — LOW (ref 1.6–2.6)
MCHC RBC-ENTMCNC: 29.3 PG — SIGNIFICANT CHANGE UP (ref 27–31)
MCHC RBC-ENTMCNC: 31.7 G/DL — LOW (ref 32–36)
MCV RBC AUTO: 92.5 FL — SIGNIFICANT CHANGE UP (ref 80–94)
MONOCYTES # BLD AUTO: 0.3 K/UL — SIGNIFICANT CHANGE UP (ref 0–0.8)
MONOCYTES NFR BLD AUTO: 2.1 % — LOW (ref 3–10)
NEUTROPHILS # BLD AUTO: 11.7 K/UL — HIGH (ref 1.8–8)
NEUTROPHILS NFR BLD AUTO: 90.7 % — HIGH (ref 37–73)
PLATELET # BLD AUTO: 131 K/UL — LOW (ref 150–400)
POTASSIUM SERPL-MCNC: 3.9 MMOL/L — SIGNIFICANT CHANGE UP (ref 3.5–5.3)
POTASSIUM SERPL-SCNC: 3.9 MMOL/L — SIGNIFICANT CHANGE UP (ref 3.5–5.3)
PROT SERPL-MCNC: 5.8 G/DL — LOW (ref 6.6–8.7)
PROTHROM AB SERPL-ACNC: 16.4 SEC — HIGH (ref 9.8–12.7)
RBC # BLD: 2.66 M/UL — LOW (ref 4.6–6.2)
RBC # FLD: 15.6 % — SIGNIFICANT CHANGE UP (ref 11–15.6)
SODIUM SERPL-SCNC: 142 MMOL/L — SIGNIFICANT CHANGE UP (ref 135–145)
WBC # BLD: 12.9 K/UL — HIGH (ref 4.8–10.8)
WBC # FLD AUTO: 12.9 K/UL — HIGH (ref 4.8–10.8)

## 2017-12-27 PROCEDURE — 99233 SBSQ HOSP IP/OBS HIGH 50: CPT

## 2017-12-27 RX ORDER — HEPARIN SODIUM 5000 [USP'U]/ML
5000 INJECTION INTRAVENOUS; SUBCUTANEOUS EVERY 12 HOURS
Qty: 0 | Refills: 0 | Status: DISCONTINUED | OUTPATIENT
Start: 2017-12-27 | End: 2017-12-31

## 2017-12-27 RX ORDER — SODIUM CHLORIDE 9 MG/ML
1000 INJECTION INTRAMUSCULAR; INTRAVENOUS; SUBCUTANEOUS
Qty: 0 | Refills: 0 | Status: DISCONTINUED | OUTPATIENT
Start: 2017-12-27 | End: 2018-01-02

## 2017-12-27 RX ADMIN — AMIODARONE HYDROCHLORIDE 400 MILLIGRAM(S): 400 TABLET ORAL at 05:42

## 2017-12-27 RX ADMIN — Medication 250 MILLIGRAM(S): at 17:27

## 2017-12-27 RX ADMIN — TAMSULOSIN HYDROCHLORIDE 0.4 MILLIGRAM(S): 0.4 CAPSULE ORAL at 21:42

## 2017-12-27 RX ADMIN — ALBUTEROL 2.5 MILLIGRAM(S): 90 AEROSOL, METERED ORAL at 11:59

## 2017-12-27 RX ADMIN — IRON SUCROSE 110 MILLIGRAM(S): 20 INJECTION, SOLUTION INTRAVENOUS at 05:41

## 2017-12-27 RX ADMIN — ALBUTEROL 2.5 MILLIGRAM(S): 90 AEROSOL, METERED ORAL at 15:17

## 2017-12-27 RX ADMIN — Medication 3 MILLILITER(S): at 23:51

## 2017-12-27 RX ADMIN — Medication 25 MILLIGRAM(S): at 05:41

## 2017-12-27 RX ADMIN — Medication 3 MILLILITER(S): at 08:31

## 2017-12-27 RX ADMIN — ALBUTEROL 2.5 MILLIGRAM(S): 90 AEROSOL, METERED ORAL at 08:32

## 2017-12-27 RX ADMIN — ALBUTEROL 2.5 MILLIGRAM(S): 90 AEROSOL, METERED ORAL at 20:53

## 2017-12-27 RX ADMIN — PANTOPRAZOLE SODIUM 40 MILLIGRAM(S): 20 TABLET, DELAYED RELEASE ORAL at 05:41

## 2017-12-27 RX ADMIN — ATORVASTATIN CALCIUM 10 MILLIGRAM(S): 80 TABLET, FILM COATED ORAL at 21:42

## 2017-12-27 RX ADMIN — Medication 81 MILLIGRAM(S): at 13:24

## 2017-12-27 RX ADMIN — FINASTERIDE 5 MILLIGRAM(S): 5 TABLET, FILM COATED ORAL at 13:24

## 2017-12-27 RX ADMIN — Medication 3 MILLILITER(S): at 00:05

## 2017-12-27 RX ADMIN — Medication 3 MILLILITER(S): at 15:17

## 2017-12-27 RX ADMIN — Medication 250 MILLIGRAM(S): at 05:44

## 2017-12-27 RX ADMIN — Medication 3 MILLILITER(S): at 04:36

## 2017-12-27 RX ADMIN — Medication 3 MILLILITER(S): at 11:57

## 2017-12-27 RX ADMIN — ALBUTEROL 2.5 MILLIGRAM(S): 90 AEROSOL, METERED ORAL at 04:36

## 2017-12-27 RX ADMIN — Medication 40 MILLIGRAM(S): at 05:43

## 2017-12-27 RX ADMIN — Medication 25 MILLIGRAM(S): at 21:42

## 2017-12-27 RX ADMIN — Medication 3 MILLILITER(S): at 20:54

## 2017-12-27 RX ADMIN — ALBUTEROL 2.5 MILLIGRAM(S): 90 AEROSOL, METERED ORAL at 23:50

## 2017-12-27 RX ADMIN — Medication 1 MILLIGRAM(S): at 13:24

## 2017-12-27 RX ADMIN — ALBUTEROL 2.5 MILLIGRAM(S): 90 AEROSOL, METERED ORAL at 00:05

## 2017-12-27 RX ADMIN — SODIUM CHLORIDE 60 MILLILITER(S): 9 INJECTION INTRAMUSCULAR; INTRAVENOUS; SUBCUTANEOUS at 20:10

## 2017-12-27 NOTE — PROGRESS NOTE ADULT - SUBJECTIVE AND OBJECTIVE BOX
NEPHROLOGY INTERVAL HPI/OVERNIGHT EVENTS:    Examined earlier  Looks comfortable    MEDICATIONS  (STANDING):  acetylcysteine 20% Inhalation 3 milliLiter(s) Inhalation every 4 hours  ALBUTerol    0.083% 2.5 milliGRAM(s) Nebulizer every 4 hours  amiodarone    Tablet 400 milliGRAM(s) Oral daily  aspirin  chewable 81 milliGRAM(s) Oral daily  atorvastatin 10 milliGRAM(s) Oral at bedtime  epoetin [place] Injectable 19118 Unit(s) SubCutaneous every 7 days  finasteride 5 milliGRAM(s) Oral daily  folic acid 1 milliGRAM(s) Enteral Tube daily  heparin  Injectable 5000 Unit(s) SubCutaneous every 12 hours  iron sucrose IVPB 200 milliGRAM(s) IV Intermittent every 24 hours  metoprolol     tartrate 25 milliGRAM(s) Oral three times a day  pantoprazole    Tablet 40 milliGRAM(s) Oral before breakfast  saccharomyces boulardii 250 milliGRAM(s) Oral two times a day  sodium chloride 0.225%. 1000 milliLiter(s) (60 mL/Hr) IV Continuous <Continuous>  tamsulosin 0.4 milliGRAM(s) Oral at bedtime    MEDICATIONS  (PRN):  morphine  - Injectable 2 milliGRAM(s) IV Push every 6 hours PRN Moderate Pain (4 - 6)      Allergies    penicillins (Hives)    Intolerances        Vital Signs Last 24 Hrs  T(C): 36.3 (27 Dec 2017 08:17), Max: 36.8 (27 Dec 2017 05:05)  T(F): 97.4 (27 Dec 2017 08:17), Max: 98.3 (27 Dec 2017 05:05)  HR: 97 (27 Dec 2017 13:39) (79 - 98)  BP: 101/58 (27 Dec 2017 13:39) (101/58 - 138/70)  BP(mean): --  RR: 18 (27 Dec 2017 08:17) (17 - 20)  SpO2: 97% (27 Dec 2017 08:24) (96% - 98%)  Daily     Daily Weight in k.1 (27 Dec 2017 05:05)    PHYSICAL EXAM:  Chest   clear  CV   no murmur  Abd   soft, NT BS+  Extr   No edema  Neuro  grossly iintact motor    LABS:                        7.8    12.9  )-----------( 131      ( 27 Dec 2017 06:29 )             24.6     12-    142  |  105  |  80.0<H>  ----------------------------<  318<H>  3.9   |  23.0  |  2.32<H>    Ca    8.2<L>      27 Dec 2017 06:29  Mg     1.5         TPro  5.8<L>  /  Alb  2.6<L>  /  TBili  0.4  /  DBili  x   /  AST  22  /  ALT  38  /  AlkPhos  88      PT/INR - ( 27 Dec 2017 06:29 )   PT: 16.4 sec;   INR: 1.48 ratio             Magnesium, Serum: 1.5 mg/dL ( @ 06:29)    ABG - ( 26 Dec 2017 09:22 )  pH: 7.43  /  pCO2: 36    /  pO2: 137   / HCO3: 24    / Base Excess: -0.2  /  SaO2: 100                   RADIOLOGY & ADDITIONAL TESTS:

## 2017-12-27 NOTE — PROGRESS NOTE ADULT - ASSESSMENT
Patient with continued evidence of abnormal CXR but this does not translate into  increased O2 requirements and therefore may represent radiographic lag.  Unable to clear secretions well.  Immobile secondary to CVA and residual.      Plan:  1.Continue to observe  2.Bronchodilators  3.Per cardiology and renal  4.Advise palliative care involvement.

## 2017-12-27 NOTE — PROGRESS NOTE ADULT - SUBJECTIVE AND OBJECTIVE BOX
CC: Resp failure , pneumonia.  Urinary outlet obstruction. Hewitt in place.   Dehydration KAPIL. CVA with right hemiparesis, esophageal dysmotility , peg tube.  Right side ileostomy .  Patient is having labored breathing. CXR showing persistent multifocal airspace consolidation concerning for ARDS.  Anemia. Coumadin coagulopathy resolved.    HPI:  76 y/o male pmhx CVA ( 2 years ago and in July 17) w/ residual right upper and lower extremity weakness, s/p PEG,  afib on coumadin, s/p colon resection with ileostomy 8 years ago, s/p CBAGx3,  prostate cancer s/p TURP 3 years ago was BIBEMS after wife found him unresponsive with vomit around his face. Wife states patient had an increase in SOB past 2 days and noticed he was becoming more weak.  Pt was discharged 2 days ago from rehab center and has had a decrease in appetite since he's been home according to his wife and son. Wife states she has been noticing that after PEG feeding he coughs a lot and she compares it to him choking.  She states he has been getting feed through his PEG as well as liquids by mouth which he has been tolerating. On arrival patient was unresponsive and hypoxic. (18 Dec 2017 19:49)    REVIEW OF SYSTEMS:    Patient denied fever, chills, abdominal pain, nausea, vomiting, cough, shortness of breath, chest pain or palpitations    Vital Signs Last 24 Hrs  T(C): 36.3 (27 Dec 2017 08:17), Max: 36.8 (27 Dec 2017 05:05)  T(F): 97.4 (27 Dec 2017 08:17), Max: 98.3 (27 Dec 2017 05:05)  HR: 95 (27 Dec 2017 08:17) (79 - 98)  BP: 109/72 (27 Dec 2017 08:17) (109/72 - 138/70)  BP(mean): --  RR: 18 (27 Dec 2017 08:17) (17 - 20)  SpO2: 97% (27 Dec 2017 08:24) (96% - 98%)I&O's Summary    26 Dec 2017 07:01  -  27 Dec 2017 07:00  --------------------------------------------------------  IN: 0 mL / OUT: 1975 mL / NET: -1975 mL    27 Dec 2017 07:01  -  27 Dec 2017 11:04  --------------------------------------------------------  IN: 0 mL / OUT: 100 mL / NET: -100 mL      PHYSICAL EXAM:  GENERAL: ill looking .   HEENT: PERRL, +EOMI, anicteric, no [place]  NECK: Supple, No JVD   CHEST/LUNG: bilateral rale, rhonchi   HEART: S1S2 Normal intensity, no murmurs, gallops or rubs noted  ABDOMEN: Soft, BS Normoactive, NT, ND, no HSM noted  EXTREMITIES:  2+ radial and DP pulses noted, no clubbing, cyanosis, or edema noted. Limited mobility   SKIN: No rashes or lesions noted  NEURO: Confused, lethargy, Right hemiparesis.  CN II-XII intact  PSYCH: Depressed  mood and affect; insight/judgement inappropriate  LABS:                        7.8    12.9  )-----------( 131      ( 27 Dec 2017 06:29 )             24.6     12-27    142  |  105  |  80.0<H>  ----------------------------<  318<H>  3.9   |  23.0  |  2.32<H>    Ca    8.2<L>      27 Dec 2017 06:29  Mg     1.5     12-27    TPro  5.8<L>  /  Alb  2.6<L>  /  TBili  0.4  /  DBili  x   /  AST  22  /  ALT  38  /  AlkPhos  88  12-27    PT/INR - ( 27 Dec 2017 06:29 )   PT: 16.4 sec;   INR: 1.48 ratio             RADIOLOGY & ADDITIONAL TESTS:    MEDICATIONS:  MEDICATIONS  (STANDING):  acetylcysteine 20% Inhalation 3 milliLiter(s) Inhalation every 4 hours  ALBUTerol    0.083% 2.5 milliGRAM(s) Nebulizer every 4 hours  amiodarone    Tablet 400 milliGRAM(s) Oral daily  aspirin  chewable 81 milliGRAM(s) Oral daily  atorvastatin 10 milliGRAM(s) Oral at bedtime  epoetin [place] Injectable 98190 Unit(s) SubCutaneous every 7 days  finasteride 5 milliGRAM(s) Oral daily  folic acid 1 milliGRAM(s) Enteral Tube daily  hydrocortisone sodium succinate Injectable 40 milliGRAM(s) IV Push every 12 hours  iron sucrose IVPB 200 milliGRAM(s) IV Intermittent every 24 hours  metoprolol     tartrate 25 milliGRAM(s) Oral three times a day  pantoprazole    Tablet 40 milliGRAM(s) Oral before breakfast  saccharomyces boulardii 250 milliGRAM(s) Oral two times a day  sodium chloride 0.225%. 1000 milliLiter(s) (125 mL/Hr) IV Continuous <Continuous>  tamsulosin 0.4 milliGRAM(s) Oral at bedtime    MEDICATIONS  (PRN):  morphine  - Injectable 2 milliGRAM(s) IV Push every 6 hours PRN Moderate Pain (4 - 6)

## 2017-12-27 NOTE — PROGRESS NOTE ADULT - SUBJECTIVE AND OBJECTIVE BOX
PULMONARY PROGRESS NOTE      CRISTIN ROQUEGRADY-[id number]    Patient is a 77y old  Male who presents with a chief complaint of Found unresponsive in his vomit (18 Dec 2017 19:49)      INTERVAL HPI/OVERNIGHT EVENTS:  No new changes  +cough>rhonchi  Difficult to clear secretions  Remains however on nasal O2 with good sats    MEDICATIONS  (STANDING):  acetylcysteine 20% Inhalation 3 milliLiter(s) Inhalation every 4 hours  ALBUTerol    0.083% 2.5 milliGRAM(s) Nebulizer every 4 hours  amiodarone    Tablet 400 milliGRAM(s) Oral daily  aspirin  chewable 81 milliGRAM(s) Oral daily  atorvastatin 10 milliGRAM(s) Oral at bedtime  epoetin [place] Injectable 01477 Unit(s) SubCutaneous every 7 days  finasteride 5 milliGRAM(s) Oral daily  folic acid 1 milliGRAM(s) Enteral Tube daily  heparin  Injectable 5000 Unit(s) SubCutaneous every 12 hours  iron sucrose IVPB 200 milliGRAM(s) IV Intermittent every 24 hours  metoprolol     tartrate 25 milliGRAM(s) Oral three times a day  pantoprazole    Tablet 40 milliGRAM(s) Oral before breakfast  saccharomyces boulardii 250 milliGRAM(s) Oral two times a day  sodium chloride 0.225%. 1000 milliLiter(s) (60 mL/Hr) IV Continuous <Continuous>  tamsulosin 0.4 milliGRAM(s) Oral at bedtime      MEDICATIONS  (PRN):  morphine  - Injectable 2 milliGRAM(s) IV Push every 6 hours PRN Moderate Pain (4 - 6)      Allergies    penicillins (Hives)    Intolerances        PAST MEDICAL & SURGICAL HISTORY:  CAD (coronary artery disease)  Afib  CVA (cerebral vascular accident)  Mitral valve replaced  BPH (benign prostatic hyperplasia)  High cholesterol  HTN (hypertension)  H/O heart valve replacement with mechanical valve  S/P CABG x 1  H/O colectomy      SOCIAL HISTORY  Smoking History: Distant      REVIEW OF SYSTEMS:    CONSTITUTIONAL:  No distress    HEENT:  Eyes:  No diplopia or blurred vision. ENT:  No earache, sore throat or runny nose.    CARDIOVASCULAR:  No pressure, squeezing, tightness, heaviness or aching about the chest; no palpitations.    RESPIRATORY: Per HPI    GASTROINTESTINAL:  No nausea, vomiting or diarrhea.    GENITOURINARY:  No dysuria, frequency or urgency.    MUSCULOSKELETAL:  No joint pain    SKIN:  No new lesions.    NEUROLOGIC: Right hemiplegia>old    PSYCHIATRIC:  No disorder of thought or mood.    ENDOCRINE:  No heat or cold intolerance, polyuria or polydipsia.    HEMATOLOGICAL:  No easy bruising or bleeding.     Vital Signs Last 24 Hrs  T(C): 36.3 (27 Dec 2017 08:17), Max: 36.8 (27 Dec 2017 05:05)  T(F): 97.4 (27 Dec 2017 08:17), Max: 98.3 (27 Dec 2017 05:05)  HR: 97 (27 Dec 2017 13:39) (79 - 98)  BP: 101/58 (27 Dec 2017 13:39) (101/58 - 138/70)  BP(mean): --  RR: 18 (27 Dec 2017 08:17) (17 - 20)  SpO2: 97% (27 Dec 2017 08:24) (96% - 98%)    PHYSICAL EXAMINATION:    GENERAL: The patient is awake and alert in no apparent distress.     HEENT: Head is normocephalic and atraumatic. Extraocular muscles are intact. Mucous membranes are moist.    NECK: Supple.    LUNGS: Bilateral rhonchi, scattered wheezes    HEART: Iregular rate and rhythm without murmur.    ABDOMEN: Soft, nontender, and nondistended, PEG     EXTREMITIES: Without any cyanosis, clubbing, rash, lesions or edema.    NEUROLOGIC:Right hemiplegia    SKIN: No ulceration or induration present.      LABS:                        7.8    12.9  )-----------( 131      ( 27 Dec 2017 06:29 )             24.6     12-27    142  |  105  |  80.0<H>  ----------------------------<  318<H>  3.9   |  23.0  |  2.32<H>    Ca    8.2<L>      27 Dec 2017 06:29  Mg     1.5     12-27    TPro  5.8<L>  /  Alb  2.6<L>  /  TBili  0.4  /  DBili  x   /  AST  22  /  ALT  38  /  AlkPhos  88  12-27    PT/INR - ( 27 Dec 2017 06:29 )   PT: 16.4 sec;   INR: 1.48 ratio             ABG - ( 26 Dec 2017 09:22 )  pH: 7.43  /  pCO2: 36    /  pO2: 137   / HCO3: 24    / Base Excess: -0.2  /  SaO2: 100                             MICROBIOLOGY:    RADIOLOGY & ADDITIONAL STUDIES:< from: Xray Chest 1 View AP/PA. (12.26.17 @ 10:37) >   EXAM:  CHEST SINGLE VIEW FRONTAL                          PROCEDURE DATE:  12/26/2017          INTERPRETATION:  Portable chest radiograph        CLINICAL INFORMATION:   Aspiration pneumonia.    TECHNIQUE:  Portable  AP view of the chest was obtained.    COMPARISON: No previous examinations are available for review.    FINDINGS:   A multifocal chronic airspace consolidations are noted in the right left   lung which may represent ARDS..    The  heart is enlarged in transverse diameter. No hilar mass. Trachea   midline.       . Status post coronary artery bypass graft procedure. Visualized osseous   structures are intact.        IMPRESSION: Persistent bilateral multifocal airspace consolidations   concerning for ARDS.    < end of copied text >

## 2017-12-27 NOTE — CHART NOTE - NSCHARTNOTEFT_GEN_A_CORE
Source: Patient [ ]  Family [ ]   other [x ]    Current Diet: Jevity 1.5cal at 50cchr. On hold at this time. Provides 1500kcal, 64 g protein    Patient reports [ ] nausea  [ ] vomiting [ ] diarrhea [ ] constipation  [ ]chewing problems [ ] swallowing issues  [ ] other:     PO intake:  < 50% [ ]   50-75%  [ ]   %  [ ]  other :    Source for PO intake [ ] Patient [ ] family [ ] chart [ ] staff [ ] other    Enteral /Parenteral Nutrition: As per nsg puss coming from Peg site. TF on hold.    Current Weight:     % Weight Change     Pertinent Medications: MEDICATIONS  (STANDING):  acetylcysteine 20% Inhalation 3 milliLiter(s) Inhalation every 4 hours  ALBUTerol    0.083% 2.5 milliGRAM(s) Nebulizer every 4 hours  amiodarone    Tablet 400 milliGRAM(s) Oral daily  aspirin  chewable 81 milliGRAM(s) Oral daily  atorvastatin 10 milliGRAM(s) Oral at bedtime  epoetin [place] Injectable 89446 Unit(s) SubCutaneous every 7 days  finasteride 5 milliGRAM(s) Oral daily  folic acid 1 milliGRAM(s) Enteral Tube daily  hydrocortisone sodium succinate Injectable 40 milliGRAM(s) IV Push every 12 hours  iron sucrose IVPB 200 milliGRAM(s) IV Intermittent every 24 hours  metoprolol     tartrate 25 milliGRAM(s) Oral three times a day  pantoprazole    Tablet 40 milliGRAM(s) Oral before breakfast  saccharomyces boulardii 250 milliGRAM(s) Oral two times a day  sodium chloride 0.225%. 1000 milliLiter(s) (125 mL/Hr) IV Continuous <Continuous>  tamsulosin 0.4 milliGRAM(s) Oral at bedtime    MEDICATIONS  (PRN):  morphine  - Injectable 2 milliGRAM(s) IV Push every 6 hours PRN Moderate Pain (4 - 6)    Pertinent Labs: CBC Full  -  ( 27 Dec 2017 06:29 )  WBC Count : 12.9 K/uL  Hemoglobin : 7.8 g/dL  Hematocrit : 24.6 %  Platelet Count - Automated : 131 K/uL  Mean Cell Volume : 92.5 fl  Mean Cell Hemoglobin : 29.3 pg  Mean Cell Hemoglobin Concentration : 31.7 g/dL  Auto Neutrophil # : 11.7 K/uL  Auto Lymphocyte # : 0.7 K/uL  Auto Monocyte # : 0.3 K/uL  Auto Eosinophil # : 0.1 K/uL  Auto Basophil # : 0.0 K/uL  Auto Neutrophil % : 90.7 %  Auto Lymphocyte % : 5.4 %  Auto Monocyte % : 2.1 %  Auto Eosinophil % : 0.8 %  Auto Basophil % : 0.1 %      12-27 Na142 mmol/L Glu 318 mg/dL<H> K+ 3.9 mmol/L Cr  2.32 mg/dL<H> BUN 80.0 mg/dL<H> Phos n/a   Alb 2.6 g/dL<L> PAB n/a           Skin:     Nutrition focused physical exam conducted - found signs of malnutrition [x ]absent [ ]present    Subcutaneous fat loss: [ ] Orbital fat pads region, [ ]Buccal fat region, [ ]Triceps region,  [ ]Ribs region    Muscle wasting: [ ]Temples region, [ ]Clavicle region, [ ]Shoulder region, [ ]Scapula region, [ ]Interosseous region,  [ ]thigh region, [ ]Calf region    Estimated Needs:   [x ] no change since previous assessment  [ ] recalculated:     Current Nutrition Diagnosis: Nutrition Note: Pt with difficulty swallowing related to CVA as evidenced by need for Jevity tube feeds via PEG.  Pt is aphasic. TF on hold. Puss coming from PEG site.    Recommendations: Resume  TF as feasible to meet nutritional needs of pt.     Monitoring and Evaluation:   [ ] PO intake [ ] Tolerance to diet prescription [X] Weights  [X] Follow up per protocol [X] Labs:

## 2017-12-27 NOTE — PROGRESS NOTE ADULT - ASSESSMENT
Hypernatremia better  KAPIL on CKD cr  slowly improving  continue 1/4NS at 60 cc/hr for now  Stool OB +   cont venofer

## 2017-12-27 NOTE — PROGRESS NOTE ADULT - ASSESSMENT
76 y/o male pmhx CVA (2 years ago and in July 17) w/ 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 Placed on ventilator and vasopressor support. Septic shock resolved. No longer requiring vasopressors and s/p extubation and down grade from ICU to medicine hospitalist team on 12/20/17. Continues on cefepime for pseudomona aeruginosa UTI and enterovirus positive, Bcx negative for growth, leukocytosis trending down and remained afebrile. BP remained normotensive, pt continues with being tapered off of stress dose steroids. His renal function remains compromised but is slowly down trending and likely secondary to ATN from septic shock with underlying known hx of ckd stage 3, Patient is alert but more lethargic today. maintaining saturation with venturi mask 30% oxygen.   puckett draining clear urine. seen by urology yesterday. to c/w puckett. seen by renal today. IVF was increased and vanofer was added.      Problem/Plan - 1:  ·  Problem: Pseudomonas urinary tract infection.  Plan: -    leukocytosis resolved  -U cx pseudomonas . Bld cx negative   Abx  completed discontinued  -c/w probiotic  Puckett in place for urinary retention and hematuria. no sign of retention no hematuria noted. clear urine noted. I and O noted      Problem/Plan - 2:  ·  Problem: Pneumonia, bacterial.  Plan:  CXR showing possible ARDS.  Pulmonology dr Melvin is following    -c/w bronchodilators. albuterol and Mucomyst  Go down on fluid to 60ml/hr .  Hold peg tube feeding for one day for possible aspiration.       Problem/Plan - 3:  ·  Problem: Acute on chronic renal failure.  Plan: underlying hx CKD stage 3 likely know KAPIL secondary to ATN from septic shock   Renal indices is improving on gentle hydration , now at 60ml/hr   -baseline creatine around 1.7 per prior records     Problem/Plan - 4:  ·  Problem: Cerebrovascular accident (CVA), unspecified mechanism.  Plan:  Right side hemiparesis. -c/w atorvastatin 10mg po qhs   Will initiate PT OT when patient is fully orientated.      Problem/Plan - 5:  ·  Problem: Atrial fibrillation.  Plan: Cardiology dr Donovan evaluated, doubt acute ischemia. Considering etiology of electrolyte imbalance and hypoxia.  Recommend beta blocker , statin , aspirin. seen by cardiology today. started on amiodarone for PSVT. hematuria resolved. FOBT positive. coumadin on hold. needs to reassess by cardiology before DC and to restart coumadin. Hb dropping.     Problem 6: GI bleed: FOBT positive. Hb dropping.  GI eval  recommend against intervention procedure given patient poor physical condition except family agrees . Will transfuse to keep Hb at 8g% or higher.       Problem/Plan - 6:  Problem: BPH (benign prostatic hyperplasia). Plan: -c/w proscar and flomax. Puckett in place for urinary retention. urology following    problem 6: hypernatremia:  Resolved on IV fluid     Disposition :Palliative consulted for goals of care and further quality of life discussions given recurrent hospitalizations and overall poor prognosis. spoke to wife today [ Ms.Jacqueline Salomon at [phone]. She wants full code. states he does not want DNR/DNI. she will come to hospital and will sign MOLST form. dced order for DNR. palliative follow up needed.

## 2017-12-28 LAB
ANION GAP SERPL CALC-SCNC: 15 MMOL/L — SIGNIFICANT CHANGE UP (ref 5–17)
BUN SERPL-MCNC: 69 MG/DL — HIGH (ref 8–20)
CALCIUM SERPL-MCNC: 8.2 MG/DL — LOW (ref 8.6–10.2)
CHLORIDE SERPL-SCNC: 106 MMOL/L — SIGNIFICANT CHANGE UP (ref 98–107)
CO2 SERPL-SCNC: 21 MMOL/L — LOW (ref 22–29)
CREAT SERPL-MCNC: 2.3 MG/DL — HIGH (ref 0.5–1.3)
GLUCOSE SERPL-MCNC: 120 MG/DL — HIGH (ref 70–115)
HCT VFR BLD CALC: 24.3 % — LOW (ref 42–52)
HGB BLD-MCNC: 7.8 G/DL — LOW (ref 14–18)
MCHC RBC-ENTMCNC: 29.9 PG — SIGNIFICANT CHANGE UP (ref 27–31)
MCHC RBC-ENTMCNC: 32.1 G/DL — SIGNIFICANT CHANGE UP (ref 32–36)
MCV RBC AUTO: 93.1 FL — SIGNIFICANT CHANGE UP (ref 80–94)
PLATELET # BLD AUTO: 151 K/UL — SIGNIFICANT CHANGE UP (ref 150–400)
POTASSIUM SERPL-MCNC: 4.2 MMOL/L — SIGNIFICANT CHANGE UP (ref 3.5–5.3)
POTASSIUM SERPL-SCNC: 4.2 MMOL/L — SIGNIFICANT CHANGE UP (ref 3.5–5.3)
RBC # BLD: 2.61 M/UL — LOW (ref 4.6–6.2)
RBC # FLD: 15.6 % — SIGNIFICANT CHANGE UP (ref 11–15.6)
SODIUM SERPL-SCNC: 142 MMOL/L — SIGNIFICANT CHANGE UP (ref 135–145)
WBC # BLD: 13.8 K/UL — HIGH (ref 4.8–10.8)
WBC # FLD AUTO: 13.8 K/UL — HIGH (ref 4.8–10.8)

## 2017-12-28 PROCEDURE — 99233 SBSQ HOSP IP/OBS HIGH 50: CPT

## 2017-12-28 PROCEDURE — 99232 SBSQ HOSP IP/OBS MODERATE 35: CPT

## 2017-12-28 RX ADMIN — ALBUTEROL 2.5 MILLIGRAM(S): 90 AEROSOL, METERED ORAL at 08:23

## 2017-12-28 RX ADMIN — Medication 3 MILLILITER(S): at 08:23

## 2017-12-28 RX ADMIN — HEPARIN SODIUM 5000 UNIT(S): 5000 INJECTION INTRAVENOUS; SUBCUTANEOUS at 05:54

## 2017-12-28 RX ADMIN — ALBUTEROL 2.5 MILLIGRAM(S): 90 AEROSOL, METERED ORAL at 12:55

## 2017-12-28 RX ADMIN — IRON SUCROSE 110 MILLIGRAM(S): 20 INJECTION, SOLUTION INTRAVENOUS at 05:54

## 2017-12-28 RX ADMIN — ATORVASTATIN CALCIUM 10 MILLIGRAM(S): 80 TABLET, FILM COATED ORAL at 22:15

## 2017-12-28 RX ADMIN — Medication 3 MILLILITER(S): at 03:58

## 2017-12-28 RX ADMIN — Medication 81 MILLIGRAM(S): at 12:35

## 2017-12-28 RX ADMIN — PANTOPRAZOLE SODIUM 40 MILLIGRAM(S): 20 TABLET, DELAYED RELEASE ORAL at 05:54

## 2017-12-28 RX ADMIN — Medication 25 MILLIGRAM(S): at 22:15

## 2017-12-28 RX ADMIN — Medication 250 MILLIGRAM(S): at 05:54

## 2017-12-28 RX ADMIN — HEPARIN SODIUM 5000 UNIT(S): 5000 INJECTION INTRAVENOUS; SUBCUTANEOUS at 17:36

## 2017-12-28 RX ADMIN — ALBUTEROL 2.5 MILLIGRAM(S): 90 AEROSOL, METERED ORAL at 15:20

## 2017-12-28 RX ADMIN — TAMSULOSIN HYDROCHLORIDE 0.4 MILLIGRAM(S): 0.4 CAPSULE ORAL at 22:15

## 2017-12-28 RX ADMIN — Medication 3 MILLILITER(S): at 12:55

## 2017-12-28 RX ADMIN — Medication 25 MILLIGRAM(S): at 05:54

## 2017-12-28 RX ADMIN — Medication 3 MILLILITER(S): at 20:59

## 2017-12-28 RX ADMIN — Medication 3 MILLILITER(S): at 15:20

## 2017-12-28 RX ADMIN — FINASTERIDE 5 MILLIGRAM(S): 5 TABLET, FILM COATED ORAL at 12:35

## 2017-12-28 RX ADMIN — ALBUTEROL 2.5 MILLIGRAM(S): 90 AEROSOL, METERED ORAL at 03:58

## 2017-12-28 RX ADMIN — AMIODARONE HYDROCHLORIDE 400 MILLIGRAM(S): 400 TABLET ORAL at 05:54

## 2017-12-28 RX ADMIN — ALBUTEROL 2.5 MILLIGRAM(S): 90 AEROSOL, METERED ORAL at 20:59

## 2017-12-28 RX ADMIN — Medication 250 MILLIGRAM(S): at 17:36

## 2017-12-28 RX ADMIN — Medication 1 MILLIGRAM(S): at 12:35

## 2017-12-28 NOTE — PROGRESS NOTE ADULT - ASSESSMENT
Hypernatremia better  KAPIL on CKD cr  slowly improving  continue 1/4NS at 60 cc/hr for now  Stool OB +   cont venofer  AM labs Hypernatremia better- cont free water via peg tube  KAPIL on CKD cr  slowly improving  continue 1/4NS at 60 cc/hr for now  Stool OB +   cont venofer  AM labs

## 2017-12-28 NOTE — PROGRESS NOTE ADULT - ASSESSMENT
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 Placed on ventilator and vasopressor support. Septic shock resolved. No longer requiring vasopressors and s/p extubation and down grade from ICU to medicine hospitalist team on 12/20/17. Continues on cefepime for pseudomona aeruginosa UTI and enterovirus positive, Bcx negative for growth, leukocytosis trending down and remained afebrile. BP remained normotensive, pt continues with being tapered off of stress dose steroids. His renal function remains compromised but is slowly down trending and likely secondary to ATN from septic shock with underlying known hx of ckd stage 3, Patient is alert but more lethargic today. maintaining saturation with venturi mask 30% oxygen.   puckett draining clear urine. seen by urology yesterday. to c/w puckett. seen by renal today. IVF was increased and vanofer was added.      Problem/Plan - 1:  ·  Problem: Pseudomonas urinary tract infection.  Plan: -    leukocytosis resolved  -U cx pseudomonas . Bld cx negative   Abx  completed discontinued  -c/w probiotic  Upckett in place for urinary retention and hematuria. no sign of retention no hematuria noted. clear urine noted. I and O noted      Problem/Plan - 2:  ·  Problem: Pneumonia, bacterial.  Plan:  CXR showing possible ARDS.  Pulmonology dr Melvin is following    -c/w bronchodilators. albuterol and Mucomyst  Go down on fluid to 60ml/hr .  Hold peg tube feeding for one day for possible aspiration.       Problem/Plan - 3:  ·  Problem: Acute on chronic renal failure.  Plan: underlying hx CKD stage 3 likely know KAPIL secondary to ATN from septic shock   Renal indices is improving on gentle hydration , now at 60ml/hr   -baseline creatine around 1.7 per prior records     Problem/Plan - 4:  ·  Problem: Cerebrovascular accident (CVA), unspecified mechanism.  Plan:  Right side hemiparesis. -c/w atorvastatin 10mg po qhs   Will initiate PT OT when patient is fully orientated.      Problem/Plan - 5:  ·  Problem: Atrial fibrillation.  Plan: Cardiology dr Donovan evaluated, doubt acute ischemia. Considering etiology of electrolyte imbalance and hypoxia.  Recommend beta blocker , statin , aspirin. seen by cardiology today. started on amiodarone for PSVT. hematuria resolved. FOBT positive. coumadin on hold. needs to reassess by cardiology before DC and to restart coumadin. Hb dropping.     Problem 6: GI bleed: FOBT positive. Hb dropping.  GI eval  recommend against intervention procedure given patient poor physical condition except family agrees . Will transfuse to keep Hb at 8g% or higher.       Problem/Plan - 6:  Problem: BPH (benign prostatic hyperplasia). Plan: -c/w proscar and flomax. Puckett in place for urinary retention. urology following    problem 6: hypernatremia:  Resolved on IV fluid     Disposition :Palliative consulted for goals of care and further quality of life discussions given recurrent hospitalizations and overall poor prognosis. spoke to wife today [ Ms.Jacqueline Salomon at [phone]. She wants full code. states he does not want DNR/DNI. she will come to hospital and will sign MOLST form. dced order for DNR. palliative follow up needed. 76 y/o male pmhx CVA (2 years ago and in July 17) w/ 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 Placed on ventilator and vasopressor support. Septic shock resolved. No longer requiring vasopressors and s/p extubation and down grade from ICU to medicine hospitalist team on 12/20/17. Continues on cefepime for pseudomona aeruginosa UTI and enterovirus positive, Bcx negative for growth, leukocytosis trending down and remained afebrile. BP remained normotensive, pt continues with being tapered off of stress dose steroids. His renal function remains compromised but is slowly down trending and likely secondary to ATN from septic shock with underlying known hx of ckd stage 3, Patient is alert but more lethargic today. maintaining saturation with venturi mask 30% oxygen.   puckett draining clear urine. seen by urology yesterday. to c/w puckett. seen by renal today. IVF was increased and vanofer was added.      Problem/Plan - 1:  ·  Problem: Pseudomonas urinary tract infection.  Plan: - leukocytosis resolved, U cx pseudomonas, Bld cx negative , Abx  completed discontinued, c/w probiotic, Puckett in place for urinary retention and hematuria. no sign of retention no hematuria noted. clear urine noted. I and O noted      Problem/Plan - 2:  ·  Problem: Pneumonia, bacterial.  Plan:  CXR showing possible ARDS.  Pulmonology dr Melvin is following    -c/w bronchodilators. albuterol and Mucomyst, fluid to 60ml/hr .  Hold peg tube feeding for one day for possible aspiration.       Problem/Plan - 3:  ·  Problem: Acute on chronic renal failure.  Plan: underlying hx CKD stage 3 likely know KAPIL secondary to ATN from septic shock   Renal indices is improving on gentle hydration , now at 60ml/hr, baseline creatine around 1.7 per prior records     Problem/Plan - 4:  ·  Problem: Cerebrovascular accident (CVA), unspecified mechanism.  Plan:  Right side hemiparesis. -c/w atorvastatin 10mg po qhs   Will initiate PT OT when patient is fully orientated.      Problem/Plan - 5:  ·  Problem: Atrial fibrillation.  Plan: Cardiology dr Donovan evaluated, doubt acute ischemia. Considering etiology of electrolyte imbalance and hypoxia.  Recommend beta blocker , statin , aspirin. seen by cardiology, started on amiodarone for PSVT. hematuria resolved. FOBT positive. coumadin on hold. needs to reassess by cardiology before DC and to restart coumadin. Hb dropping.     Problem 6: GI bleed: FOBT positive. Hb dropping.  GI eval  recommend against intervention procedure given patient poor physical condition except family agrees . Will transfuse to keep Hb at 8g% or higher.       Problem/Plan - 6:  Problem: BPH (benign prostatic hyperplasia). Plan: -c/w proscar and flomax. Puckett in place for urinary retention. urology following    problem 6: hypernatremia:  Resolved on IV fluid     Disposition :Palliative consulted for goals of care and further quality of life discussions given recurrent hospitalizations and overall poor prognosis, spoke to wife [ Ms.Jacqueline Salomon at [phone]. She wants full code. states he does not want DNR/DNI. she will come to hospital and will sign MOLST form. dced order for DNR. palliative follow up needed.

## 2017-12-28 NOTE — PROGRESS NOTE ADULT - SUBJECTIVE AND OBJECTIVE BOX
CRISTIN ROQUE    [id number]    77y      Male    Patient is a 77y old  Male who presents with a chief complaint of Found unresponsive in his vomit (18 Dec 2017 19:49)      INTERVAL HPI/OVERNIGHT EVENTS:    REVIEW OF SYSTEMS:    Unable to get much info       Vital Signs Last 24 Hrs  T(C): 37.4 (28 Dec 2017 15:44), Max: 37.4 (28 Dec 2017 15:44)  T(F): 99.3 (28 Dec 2017 15:44), Max: 99.3 (28 Dec 2017 15:44)  HR: 101 (28 Dec 2017 15:44) (95 - 929)  BP: 112/54 (28 Dec 2017 15:44) (102/62 - 122/69)  BP(mean): --  RR: 18 (28 Dec 2017 15:44) (18 - 20)  SpO2: 93% (28 Dec 2017 08:00) (93% - 98%)    PHYSICAL EXAM:    GENERAL: ill looking .   HEENT: PERRL, +EOMI, anicteric, no [place]  NECK: Supple, No JVD   CHEST/LUNG: bilateral rale, rhonchi   HEART: S1S2 Normal intensity, no murmurs, gallops or rubs noted  ABDOMEN: Soft, BS Normoactive, NT, ND, no HSM noted  EXTREMITIES:  2+ radial and DP pulses noted, no clubbing, cyanosis, or edema noted. Limited mobility   SKIN: No rashes or lesions noted  NEURO: Confused, lethargy, Right hemiparesis.  CN II-XII intact  PSYCH: Depressed  mood and affect; insight/judgement inappropriate  LABS:      LABS:                        7.8    13.8  )-----------( 151      ( 28 Dec 2017 06:16 )             24.3     12-28    142  |  106  |  69.0<H>  ----------------------------<  120<H>  4.2   |  21.0<L>  |  2.30<H>    Ca    8.2<L>      28 Dec 2017 06:16  Mg     1.5     12-27    TPro  5.8<L>  /  Alb  2.6<L>  /  TBili  0.4  /  DBili  x   /  AST  22  /  ALT  38  /  AlkPhos  88  12-27    PT/INR - ( 27 Dec 2017 06:29 )   PT: 16.4 sec;   INR: 1.48 ratio                 I&O's Summary    27 Dec 2017 07:01  -  28 Dec 2017 07:00  --------------------------------------------------------  IN: 800 mL / OUT: 1775 mL / NET: -975 mL    28 Dec 2017 07:01  -  28 Dec 2017 16:51  --------------------------------------------------------  IN: 0 mL / OUT: 550 mL / NET: -550 mL        MEDICATIONS  (STANDING):  acetylcysteine 20% Inhalation 3 milliLiter(s) Inhalation every 4 hours  ALBUTerol    0.083% 2.5 milliGRAM(s) Nebulizer every 4 hours  amiodarone    Tablet 400 milliGRAM(s) Oral daily  aspirin  chewable 81 milliGRAM(s) Oral daily  atorvastatin 10 milliGRAM(s) Oral at bedtime  epoetin [place] Injectable 10550 Unit(s) SubCutaneous every 7 days  finasteride 5 milliGRAM(s) Oral daily  folic acid 1 milliGRAM(s) Enteral Tube daily  heparin  Injectable 5000 Unit(s) SubCutaneous every 12 hours  iron sucrose IVPB 200 milliGRAM(s) IV Intermittent every 24 hours  metoprolol     tartrate 25 milliGRAM(s) Oral three times a day  pantoprazole    Tablet 40 milliGRAM(s) Oral before breakfast  saccharomyces boulardii 250 milliGRAM(s) Oral two times a day  sodium chloride 0.225%. 1000 milliLiter(s) (60 mL/Hr) IV Continuous <Continuous>  tamsulosin 0.4 milliGRAM(s) Oral at bedtime    MEDICATIONS  (PRN):  morphine  - Injectable 2 milliGRAM(s) IV Push every 6 hours PRN Moderate Pain (4 - 6) CRISTIN BRIGIDSLICK    [id number]    77y      Male    Patient is a 77y old  Male who presents with a chief complaint of Found unresponsive in his vomit (18 Dec 2017 19:49)      INTERVAL HPI/OVERNIGHT EVENTS:    Patient is poor historian, could not get much info.     REVIEW OF SYSTEMS:    Unable to get much info       Vital Signs Last 24 Hrs  T(C): 37.4 (28 Dec 2017 15:44), Max: 37.4 (28 Dec 2017 15:44)  T(F): 99.3 (28 Dec 2017 15:44), Max: 99.3 (28 Dec 2017 15:44)  HR: 101 (28 Dec 2017 15:44) (95 - 929)  BP: 112/54 (28 Dec 2017 15:44) (102/62 - 122/69)  RR: 18 (28 Dec 2017 15:44) (18 - 20)  SpO2: 93% (28 Dec 2017 08:00) (93% - 98%)    PHYSICAL EXAM:    GENERAL: ill looking elderly male   HEENT: anicteric  NECK: Supple, No JVD   CHEST/LUNG: decrease air entry bilateral with some rales, rhonchi   HEART: S1S2 Normal intensity, no murmurs  ABDOMEN: Soft, BS +ve, NT, ND.   EXTREMITIES:  1+ radial and DP pulses noted, no edema noted.   SKIN: No rashes or lesions noted  NEURO: Confused, lethargy, Right hemiparesis.  CN II-XII intact  PSYCH: Depressed  mood and affect        LABS:                        7.8    13.8  )-----------( 151      ( 28 Dec 2017 06:16 )             24.3     12-28    142  |  106  |  69.0<H>  ----------------------------<  120<H>  4.2   |  21.0<L>  |  2.30<H>    Ca    8.2<L>      28 Dec 2017 06:16  Mg     1.5     12-27    TPro  5.8<L>  /  Alb  2.6<L>  /  TBili  0.4  /  DBili  x   /  AST  22  /  ALT  38  /  AlkPhos  88  12-27    PT/INR - ( 27 Dec 2017 06:29 )   PT: 16.4 sec;   INR: 1.48 ratio                 I&O's Summary    27 Dec 2017 07:01  -  28 Dec 2017 07:00  --------------------------------------------------------  IN: 800 mL / OUT: 1775 mL / NET: -975 mL    28 Dec 2017 07:01  -  28 Dec 2017 16:51  --------------------------------------------------------  IN: 0 mL / OUT: 550 mL / NET: -550 mL        MEDICATIONS  (STANDING):  acetylcysteine 20% Inhalation 3 milliLiter(s) Inhalation every 4 hours  ALBUTerol    0.083% 2.5 milliGRAM(s) Nebulizer every 4 hours  amiodarone    Tablet 400 milliGRAM(s) Oral daily  aspirin  chewable 81 milliGRAM(s) Oral daily  atorvastatin 10 milliGRAM(s) Oral at bedtime  epoetin [place] Injectable 37082 Unit(s) SubCutaneous every 7 days  finasteride 5 milliGRAM(s) Oral daily  folic acid 1 milliGRAM(s) Enteral Tube daily  heparin  Injectable 5000 Unit(s) SubCutaneous every 12 hours  iron sucrose IVPB 200 milliGRAM(s) IV Intermittent every 24 hours  metoprolol     tartrate 25 milliGRAM(s) Oral three times a day  pantoprazole    Tablet 40 milliGRAM(s) Oral before breakfast  saccharomyces boulardii 250 milliGRAM(s) Oral two times a day  sodium chloride 0.225%. 1000 milliLiter(s) (60 mL/Hr) IV Continuous <Continuous>  tamsulosin 0.4 milliGRAM(s) Oral at bedtime    MEDICATIONS  (PRN):  morphine  - Injectable 2 milliGRAM(s) IV Push every 6 hours PRN Moderate Pain (4 - 6)

## 2017-12-28 NOTE — PROGRESS NOTE ADULT - SUBJECTIVE AND OBJECTIVE BOX
NEPHROLOGY INTERVAL HPI/OVERNIGHT EVENTS:    Examined   mental status better  Good UOP 1100 cc 24hrs    MEDICATIONS  (STANDING):  acetylcysteine 20% Inhalation 3 milliLiter(s) Inhalation every 4 hours  ALBUTerol    0.083% 2.5 milliGRAM(s) Nebulizer every 4 hours  amiodarone    Tablet 400 milliGRAM(s) Oral daily  aspirin  chewable 81 milliGRAM(s) Oral daily  atorvastatin 10 milliGRAM(s) Oral at bedtime  epoetin [place] Injectable 41330 Unit(s) SubCutaneous every 7 days  finasteride 5 milliGRAM(s) Oral daily  folic acid 1 milliGRAM(s) Enteral Tube daily  heparin  Injectable 5000 Unit(s) SubCutaneous every 12 hours  iron sucrose IVPB 200 milliGRAM(s) IV Intermittent every 24 hours  metoprolol     tartrate 25 milliGRAM(s) Oral three times a day  pantoprazole    Tablet 40 milliGRAM(s) Oral before breakfast  saccharomyces boulardii 250 milliGRAM(s) Oral two times a day  sodium chloride 0.225%. 1000 milliLiter(s) (60 mL/Hr) IV Continuous <Continuous>  tamsulosin 0.4 milliGRAM(s) Oral at bedtime    MEDICATIONS  (PRN):  morphine  - Injectable 2 milliGRAM(s) IV Push every 6 hours PRN Moderate Pain (4 - 6)      Allergies    penicillins (Hives)    Intolerances        Vital Signs Last 24 Hrs  T(C): 36.7 (28 Dec 2017 07:56), Max: 36.8 (28 Dec 2017 05:28)  T(F): 98 (28 Dec 2017 07:56), Max: 98.3 (28 Dec 2017 05:28)  HR: 98 (28 Dec 2017 07:56) (92 - 98)  BP: 112/64 (28 Dec 2017 07:56) (101/58 - 122/69)  BP(mean): --  RR: 19 (28 Dec 2017 07:56) (18 - 20)  SpO2: 93% (28 Dec 2017 08:00) (93% - 100%)  Daily     Daily     PHYSICAL EXAM:  Chest   clear  CV   no murmur  Abd   soft, NT BS+  Extr   No edema  Neuro  grossly iintact motor      LABS:                        7.8    13.8  )-----------( 151      ( 28 Dec 2017 06:16 )             24.3     12-28    142  |  106  |  69.0<H>  ----------------------------<  120<H>  4.2   |  21.0<L>  |  2.30<H>    Ca    8.2<L>      28 Dec 2017 06:16  Mg     1.5     12-27    TPro  5.8<L>  /  Alb  2.6<L>  /  TBili  0.4  /  DBili  x   /  AST  22  /  ALT  38  /  AlkPhos  88  12-27    PT/INR - ( 27 Dec 2017 06:29 )   PT: 16.4 sec;   INR: 1.48 ratio                     RADIOLOGY & ADDITIONAL TESTS:

## 2017-12-28 NOTE — PROGRESS NOTE ADULT - SUBJECTIVE AND OBJECTIVE BOX
PULMONARY PROGRESS NOTE      [id number]    Patient is a 77y old  Male who presents with a chief complaint of Found unresponsive in his vomit (18 Dec 2017 19:49)      INTERVAL HPI/OVERNIGHT EVENTS:  Seems better  Coughing more productively and effectively per wife at bedside  Remains on nasal O2 with maintenance of good O2 sats    MEDICATIONS  (STANDING):  acetylcysteine 20% Inhalation 3 milliLiter(s) Inhalation every 4 hours  ALBUTerol    0.083% 2.5 milliGRAM(s) Nebulizer every 4 hours  amiodarone    Tablet 400 milliGRAM(s) Oral daily  aspirin  chewable 81 milliGRAM(s) Oral daily  atorvastatin 10 milliGRAM(s) Oral at bedtime  epoetin [place] Injectable 42757 Unit(s) SubCutaneous every 7 days  finasteride 5 milliGRAM(s) Oral daily  folic acid 1 milliGRAM(s) Enteral Tube daily  heparin  Injectable 5000 Unit(s) SubCutaneous every 12 hours  iron sucrose IVPB 200 milliGRAM(s) IV Intermittent every 24 hours  metoprolol     tartrate 25 milliGRAM(s) Oral three times a day  pantoprazole    Tablet 40 milliGRAM(s) Oral before breakfast  saccharomyces boulardii 250 milliGRAM(s) Oral two times a day  sodium chloride 0.225%. 1000 milliLiter(s) (60 mL/Hr) IV Continuous <Continuous>  tamsulosin 0.4 milliGRAM(s) Oral at bedtime      MEDICATIONS  (PRN):  morphine  - Injectable 2 milliGRAM(s) IV Push every 6 hours PRN Moderate Pain (4 - 6)      Allergies    penicillins (Hives)    Intolerances        PAST MEDICAL & SURGICAL HISTORY:  CAD (coronary artery disease)  Afib  CVA (cerebral vascular accident)  Mitral valve replaced  BPH (benign prostatic hyperplasia)  High cholesterol  HTN (hypertension)  H/O heart valve replacement with mechanical valve  S/P CABG x 1  H/O colectomy      SOCIAL HISTORY  Smoking History:       REVIEW OF SYSTEMS:    CONSTITUTIONAL:  No distress    HEENT:  Eyes:  No diplopia or blurred vision. ENT:  No earache, sore throat or runny nose.    CARDIOVASCULAR:  No pressure, squeezing, tightness, heaviness or aching about the chest; no palpitations.    RESPIRATORY: Per HPI    GASTROINTESTINAL:  No nausea, vomiting or diarrhea.    GENITOURINARY:  No dysuria, frequency or urgency.    MUSCULOSKELETAL:  No joint pain    SKIN:  No new lesions.    NEUROLOGIC: RIght hemiplegia;     PSYCHIATRIC:  No disorder of thought or mood.    ENDOCRINE:  No heat or cold intolerance, polyuria or polydipsia.    HEMATOLOGICAL:  No easy bruising or bleeding.     Vital Signs Last 24 Hrs  T(C): 36.7 (28 Dec 2017 07:56), Max: 36.8 (28 Dec 2017 05:28)  T(F): 98 (28 Dec 2017 07:56), Max: 98.3 (28 Dec 2017 05:28)  HR: 929 (28 Dec 2017 12:27) (92 - 929)  BP: 102/62 (28 Dec 2017 12:27) (102/62 - 122/69)  BP(mean): --  RR: 19 (28 Dec 2017 07:56) (18 - 20)  SpO2: 93% (28 Dec 2017 08:00) (93% - 100%)    PHYSICAL EXAMINATION:    GENERAL: The patient is awake and alert in no apparent distress.     HEENT: Head is normocephalic and atraumatic. Extraocular muscles are intact. Mucous membranes are moist.    NECK: Supple.    LUNGS: Bilateral rhonchi    HEART: Regular rate and rhythm without murmur.    ABDOMEN: Soft, nontender, and nondistended.      EXTREMITIES: Without any cyanosis, clubbing, rash, lesions or edema.    NEUROLOGIC: Post CVA right hemiplegia    SKIN: No ulceration or induration present.      LABS:                        7.8    13.8  )-----------( 151      ( 28 Dec 2017 06:16 )             24.3     12-28    142  |  106  |  69.0<H>  ----------------------------<  120<H>  4.2   |  21.0<L>  |  2.30<H>    Ca    8.2<L>      28 Dec 2017 06:16  Mg     1.5     12-27    TPro  5.8<L>  /  Alb  2.6<L>  /  TBili  0.4  /  DBili  x   /  AST  22  /  ALT  38  /  AlkPhos  88  12-27    PT/INR - ( 27 Dec 2017 06:29 )   PT: 16.4 sec;   INR: 1.48 ratio                             MICROBIOLOGY:    RADIOLOGY & ADDITIONAL STUDIES:

## 2017-12-28 NOTE — PROGRESS NOTE ADULT - ASSESSMENT
Post respiratory failure  ARDS>likely radiographic lag>stable at this time on nasal O2    Plan:  1.Pulmonary toilet measures  2.Wean O2 as tolerated  3.F/U CXR if stable in a few days.

## 2017-12-29 LAB
ANION GAP SERPL CALC-SCNC: 16 MMOL/L — SIGNIFICANT CHANGE UP (ref 5–17)
BUN SERPL-MCNC: 69 MG/DL — HIGH (ref 8–20)
CALCIUM SERPL-MCNC: 7.9 MG/DL — LOW (ref 8.6–10.2)
CHLORIDE SERPL-SCNC: 104 MMOL/L — SIGNIFICANT CHANGE UP (ref 98–107)
CO2 SERPL-SCNC: 21 MMOL/L — LOW (ref 22–29)
CREAT SERPL-MCNC: 2.47 MG/DL — HIGH (ref 0.5–1.3)
GLUCOSE SERPL-MCNC: 296 MG/DL — HIGH (ref 70–115)
HCT VFR BLD CALC: 23 % — LOW (ref 42–52)
HGB BLD-MCNC: 7.3 G/DL — LOW (ref 14–18)
INR BLD: 1.57 RATIO — HIGH (ref 0.88–1.16)
MCHC RBC-ENTMCNC: 29.2 PG — SIGNIFICANT CHANGE UP (ref 27–31)
MCHC RBC-ENTMCNC: 31.7 G/DL — LOW (ref 32–36)
MCV RBC AUTO: 92 FL — SIGNIFICANT CHANGE UP (ref 80–94)
PLATELET # BLD AUTO: 162 K/UL — SIGNIFICANT CHANGE UP (ref 150–400)
POTASSIUM SERPL-MCNC: 4 MMOL/L — SIGNIFICANT CHANGE UP (ref 3.5–5.3)
POTASSIUM SERPL-SCNC: 4 MMOL/L — SIGNIFICANT CHANGE UP (ref 3.5–5.3)
PROTHROM AB SERPL-ACNC: 17.4 SEC — HIGH (ref 9.8–12.7)
RBC # BLD: 2.5 M/UL — LOW (ref 4.6–6.2)
RBC # FLD: 16 % — HIGH (ref 11–15.6)
SODIUM SERPL-SCNC: 141 MMOL/L — SIGNIFICANT CHANGE UP (ref 135–145)
WBC # BLD: 11.2 K/UL — HIGH (ref 4.8–10.8)
WBC # FLD AUTO: 11.2 K/UL — HIGH (ref 4.8–10.8)

## 2017-12-29 PROCEDURE — 71010: CPT | Mod: 26

## 2017-12-29 PROCEDURE — 99233 SBSQ HOSP IP/OBS HIGH 50: CPT

## 2017-12-29 RX ORDER — IRON SUCROSE 20 MG/ML
250 INJECTION, SOLUTION INTRAVENOUS DAILY
Qty: 0 | Refills: 0 | Status: COMPLETED | OUTPATIENT
Start: 2017-12-29 | End: 2018-01-01

## 2017-12-29 RX ADMIN — Medication 3 MILLILITER(S): at 20:41

## 2017-12-29 RX ADMIN — ALBUTEROL 2.5 MILLIGRAM(S): 90 AEROSOL, METERED ORAL at 20:41

## 2017-12-29 RX ADMIN — Medication 3 MILLILITER(S): at 00:20

## 2017-12-29 RX ADMIN — Medication 3 MILLILITER(S): at 12:41

## 2017-12-29 RX ADMIN — Medication 3 MILLILITER(S): at 23:15

## 2017-12-29 RX ADMIN — ALBUTEROL 2.5 MILLIGRAM(S): 90 AEROSOL, METERED ORAL at 23:15

## 2017-12-29 RX ADMIN — ALBUTEROL 2.5 MILLIGRAM(S): 90 AEROSOL, METERED ORAL at 08:55

## 2017-12-29 RX ADMIN — FINASTERIDE 5 MILLIGRAM(S): 5 TABLET, FILM COATED ORAL at 12:15

## 2017-12-29 RX ADMIN — Medication 3 MILLILITER(S): at 08:55

## 2017-12-29 RX ADMIN — ALBUTEROL 2.5 MILLIGRAM(S): 90 AEROSOL, METERED ORAL at 16:18

## 2017-12-29 RX ADMIN — ATORVASTATIN CALCIUM 10 MILLIGRAM(S): 80 TABLET, FILM COATED ORAL at 22:11

## 2017-12-29 RX ADMIN — IRON SUCROSE 110 MILLIGRAM(S): 20 INJECTION, SOLUTION INTRAVENOUS at 05:39

## 2017-12-29 RX ADMIN — Medication 1 MILLIGRAM(S): at 12:15

## 2017-12-29 RX ADMIN — PANTOPRAZOLE SODIUM 40 MILLIGRAM(S): 20 TABLET, DELAYED RELEASE ORAL at 05:39

## 2017-12-29 RX ADMIN — SODIUM CHLORIDE 60 MILLILITER(S): 9 INJECTION INTRAMUSCULAR; INTRAVENOUS; SUBCUTANEOUS at 18:00

## 2017-12-29 RX ADMIN — TAMSULOSIN HYDROCHLORIDE 0.4 MILLIGRAM(S): 0.4 CAPSULE ORAL at 22:11

## 2017-12-29 RX ADMIN — Medication 25 MILLIGRAM(S): at 13:40

## 2017-12-29 RX ADMIN — Medication 81 MILLIGRAM(S): at 12:15

## 2017-12-29 RX ADMIN — ALBUTEROL 2.5 MILLIGRAM(S): 90 AEROSOL, METERED ORAL at 03:14

## 2017-12-29 RX ADMIN — ALBUTEROL 2.5 MILLIGRAM(S): 90 AEROSOL, METERED ORAL at 12:40

## 2017-12-29 RX ADMIN — HEPARIN SODIUM 5000 UNIT(S): 5000 INJECTION INTRAVENOUS; SUBCUTANEOUS at 05:39

## 2017-12-29 RX ADMIN — Medication 25 MILLIGRAM(S): at 22:11

## 2017-12-29 RX ADMIN — Medication 25 MILLIGRAM(S): at 05:39

## 2017-12-29 RX ADMIN — AMIODARONE HYDROCHLORIDE 400 MILLIGRAM(S): 400 TABLET ORAL at 05:39

## 2017-12-29 RX ADMIN — ALBUTEROL 2.5 MILLIGRAM(S): 90 AEROSOL, METERED ORAL at 00:21

## 2017-12-29 RX ADMIN — Medication 250 MILLIGRAM(S): at 05:40

## 2017-12-29 RX ADMIN — Medication 250 MILLIGRAM(S): at 18:00

## 2017-12-29 RX ADMIN — Medication 3 MILLILITER(S): at 16:18

## 2017-12-29 RX ADMIN — Medication 3 MILLILITER(S): at 03:14

## 2017-12-29 RX ADMIN — HEPARIN SODIUM 5000 UNIT(S): 5000 INJECTION INTRAVENOUS; SUBCUTANEOUS at 18:00

## 2017-12-29 RX ADMIN — IRON SUCROSE 112.5 MILLIGRAM(S): 20 INJECTION, SOLUTION INTRAVENOUS at 12:15

## 2017-12-29 NOTE — PROGRESS NOTE ADULT - ASSESSMENT
76 y/o male pmhx CVA (2 years ago and in July 17) w/ 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 Placed on ventilator and vasopressor support. Septic shock resolved. No longer requiring vasopressors and s/p extubation and down grade from ICU to medicine hospitalist team on 12/20/17. Continues on cefepime for pseudomona aeruginosa UTI and enterovirus positive, Bcx negative for growth, leukocytosis trending down and remained afebrile. BP remained normotensive, pt continues with being tapered off of stress dose steroids. His renal function remains compromised but is slowly down trending and likely secondary to ATN from septic shock with underlying known hx of ckd stage 3, Patient is alert but more lethargic today. maintaining saturation with venturi mask 30% oxygen.   puckett draining clear urine. seen by urology yesterday. to c/w puckett. seen by renal today. IVF was increased and vanofer was added.      Problem/Plan - 1:  ·  Problem: Pseudomonas urinary tract infection.  Plan: - leukocytosis resolved, U cx pseudomonas, Bld cx negative , Abx  completed discontinued, c/w probiotic, Puckett in place for urinary retention and hematuria. no sign of retention no hematuria noted. clear urine noted. I and O noted      Problem/Plan - 2:  ·  Problem: Pneumonia, bacterial.  Plan:  CXR showing possible ARDS.  Pulmonology dr Melvin is following    -c/w bronchodilators. albuterol and Mucomyst, fluid to 60ml/hr .  Hold peg tube feeding for one day for possible aspiration.       Problem/Plan - 3:  ·  Problem: Acute on chronic renal failure.  Plan: underlying hx CKD stage 3 likely know KAPIL secondary to ATN from septic shock   Renal indices is improving on gentle hydration , now at 60ml/hr, baseline creatine around 1.7 per prior records     Problem/Plan - 4:  ·  Problem: Cerebrovascular accident (CVA), unspecified mechanism.  Plan:  Right side hemiparesis. -c/w atorvastatin 10mg po qhs   Will initiate PT OT when patient is fully orientated.      Problem/Plan - 5:  ·  Problem: Atrial fibrillation.  Plan: Cardiology dr Donovan evaluated, doubt acute ischemia. Considering etiology of electrolyte imbalance and hypoxia.  Recommend beta blocker , statin , aspirin. seen by cardiology, started on amiodarone for PSVT. hematuria resolved. FOBT positive. coumadin on hold. needs to reassess by cardiology before DC and to restart coumadin. Hb dropping.     Problem 6: GI bleed: FOBT positive. Hb dropping.  GI eval  recommend against intervention procedure given patient poor physical condition except family agrees . Will transfuse to keep Hb at 8g% or higher.       Problem/Plan - 6:  Problem: BPH (benign prostatic hyperplasia). Plan: -c/w proscar and flomax. Puckett in place for urinary retention. urology following    problem 6: hypernatremia:  Resolved on IV fluid     Disposition :Palliative consulted for goals of care and further quality of life discussions given recurrent hospitalizations and overall poor prognosis, spoke to wife [ Ms.Jacqueline Salomon at [phone]. She wants full code. states he does not want DNR/DNI. she will come to hospital and will sign MOLST form. dced order for DNR. palliative follow up needed. 76 y/o male pmhx CVA (2 years ago and in July 17) w/ 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 Placed on ventilator and vasopressor support. Septic shock resolved. No longer requiring vasopressors and s/p extubation and down grade from ICU to medicine hospitalist team on 12/20/17. Continues on cefepime for pseudomona aeruginosa UTI and enterovirus positive, Bcx negative for growth, leukocytosis trending down and remained afebrile. BP remained normotensive, pt continues with being tapered off of stress dose steroids. His renal function remains compromised but is slowly down trending and likely secondary to ATN from septic shock with underlying known hx of ckd stage 3, Patient is alert but more lethargic today. maintaining saturation with venturi mask 30% oxygen.   puckett draining clear urine. seen by urology, to c/w puckett. seen by renal, IVF was increased and vanofer was added, CBC being monitored      Problem/Plan - 1:  ·  Problem: Pseudomonas urinary tract infection.  Plan: - leukocytosis resolved, U cx pseudomonas, Bld cx negative , Abx  completed., c/w probiotic, Puckett in place for urinary retention and hematuria. no sign of retention no hematuria noted. clear urine noted. I and O noted, will do voiding trial.       Problem/Plan - 2:  ·  Problem: Pneumonia, bacterial.  Plan:  CXR showing possible ARDS, repeat CXR pretty much same, Pulmonology dr Melvin is following    -c/w bronchodilators. albuterol and Mucomyst, fluid to 60ml/hr, on peg tube feeding, will monitor for aspiration, has completed course of antibiotics       Problem/Plan - 3:  ·  Problem: Acute on chronic renal failure.  Plan: underlying hx CKD stage 3 likely know KAPIL secondary to ATN from septic shock   Renal indices is improving on gentle hydration , now at 60ml/hr, baseline creatine around 1.7 per prior records, now creatinine is around 2.4, will continue to monitor.      Problem/Plan - 4:  ·  Problem: Cerebrovascular accident (CVA), unspecified mechanism.  Plan:  Right side hemiparesis. -c/w atorvastatin 10mg po qhs   Will initiate PT OT when patient is fully orientated.      Problem/Plan - 5:  ·  Problem: Atrial fibrillation.  Plan: Cardiology dr Donovan evaluated, doubt acute ischemia. Considering etiology of electrolyte imbalance and hypoxia.  Recommend beta blocker , statin , aspirin. seen by cardiology, started on amiodarone for PSVT. hematuria resolved. FOBT positive. coumadin on hold. needs to reassess by cardiology before DC and to restart coumadin. Hb dropping.     Problem 6: GI bleed: FOBT positive. Hb dropping.  GI eval  recommend against intervention procedure given patient poor physical condition except family agrees . Will transfuse to keep Hb at 8g% or higher.       Problem/Plan - 6:  Problem: BPH (benign prostatic hyperplasia). Plan: -c/w proscar and flomax. Puckett in place for urinary retention. urology following    problem 6: hypernatremia:  Resolved on IV fluid     Disposition :Palliative consulted for goals of care and further quality of life discussions given recurrent hospitalizations and overall poor prognosis, spoke to wife [ Ms.Jacqueline Salomon at [phone]. She wants full code. states he does not want DNR/DNI. she will come to hospital and will sign MOLST form. dced order for DNR. palliative follow up needed.

## 2017-12-29 NOTE — PROGRESS NOTE ADULT - SUBJECTIVE AND OBJECTIVE BOX
CRISTIN ROQUE    [id number]    77y      Male    Patient is a 77y old  Male who presents with a chief complaint of Found unresponsive in his vomit (18 Dec 2017 19:49)      INTERVAL HPI/OVERNIGHT EVENTS:  patient is not feeling good on general, he still have some SOB      REVIEW OF SYSTEMS:    CONSTITUTIONAL: No fever, has fatigue  RESPIRATORY: has shortness of breath  CARDIOVASCULAR: No chest pain    Vital Signs Last 24 Hrs  T(C): 37.1 (29 Dec 2017 16:40), Max: 37.1 (28 Dec 2017 22:52)  T(F): 98.8 (29 Dec 2017 16:40), Max: 98.8 (28 Dec 2017 22:52)  HR: 84 (29 Dec 2017 16:40) (84 - 104)  BP: 123/67 (29 Dec 2017 16:40) (90/57 - 128/67)  RR: 18 (29 Dec 2017 08:28) (18 - 18)  SpO2: 92% (29 Dec 2017 08:28) (92% - 98%)    PHYSICAL EXAM:    PHYSICAL EXAM:    GENERAL: ill looking elderly male   HEENT: anicteric  NECK: Supple, No JVD   CHEST/LUNG: decrease air entry bilateral with some rales, rhonchi   HEART: S1S2 Normal intensity, no murmurs  ABDOMEN: Soft, BS +ve, NT, ND.   EXTREMITIES:  1+ radial and DP pulses noted, no edema noted.   SKIN: No rashes or lesions noted  NEURO: Confused, lethargy, Right hemiparesis.  CN II-XII intact  PSYCH: Depressed  mood and affect      LABS:                        7.3    11.2  )-----------( 162      ( 29 Dec 2017 06:21 )             23.0     12-29    141  |  104  |  69.0<H>  ----------------------------<  296<H>  4.0   |  21.0<L>  |  2.47<H>    Ca    7.9<L>      29 Dec 2017 06:21      PT/INR - ( 29 Dec 2017 06:21 )   PT: 17.4 sec;   INR: 1.57 ratio                 I&O's Summary    28 Dec 2017 07:01  -  29 Dec 2017 07:00  --------------------------------------------------------  IN: 0 mL / OUT: 2030 mL / NET: -2030 mL    29 Dec 2017 07:01  -  29 Dec 2017 18:49  --------------------------------------------------------  IN: 200 mL / OUT: 1100 mL / NET: -900 mL        MEDICATIONS  (STANDING):  acetylcysteine 20% Inhalation 3 milliLiter(s) Inhalation every 4 hours  ALBUTerol    0.083% 2.5 milliGRAM(s) Nebulizer every 4 hours  amiodarone    Tablet 400 milliGRAM(s) Oral daily  aspirin  chewable 81 milliGRAM(s) Oral daily  atorvastatin 10 milliGRAM(s) Oral at bedtime  epoetin [place] Injectable 21856 Unit(s) SubCutaneous every 7 days  finasteride 5 milliGRAM(s) Oral daily  folic acid 1 milliGRAM(s) Enteral Tube daily  heparin  Injectable 5000 Unit(s) SubCutaneous every 12 hours  iron sucrose IVPB 250 milliGRAM(s) IV Intermittent daily  metoprolol     tartrate 25 milliGRAM(s) Oral three times a day  pantoprazole    Tablet 40 milliGRAM(s) Oral before breakfast  saccharomyces boulardii 250 milliGRAM(s) Oral two times a day  sodium chloride 0.225%. 1000 milliLiter(s) (60 mL/Hr) IV Continuous <Continuous>  tamsulosin 0.4 milliGRAM(s) Oral at bedtime    MEDICATIONS  (PRN):  morphine  - Injectable 2 milliGRAM(s) IV Push every 6 hours PRN Moderate Pain (4 - 6) CRISTIN ROQUE    [id number]    77y      Male    Patient is a 77y old  Male who presents with a chief complaint of Found unresponsive in his vomit (18 Dec 2017 19:49)      INTERVAL HPI/OVERNIGHT EVENTS:    patient is not feeling good in general, he still have some SOB, he looks lethargic      REVIEW OF SYSTEMS:    CONSTITUTIONAL: No fever, has fatigue  RESPIRATORY: has shortness of breath  CARDIOVASCULAR: No chest pain    Vital Signs Last 24 Hrs  T(C): 37.1 (29 Dec 2017 16:40), Max: 37.1 (28 Dec 2017 22:52)  T(F): 98.8 (29 Dec 2017 16:40), Max: 98.8 (28 Dec 2017 22:52)  HR: 84 (29 Dec 2017 16:40) (84 - 104)  BP: 123/67 (29 Dec 2017 16:40) (90/57 - 128/67)  RR: 18 (29 Dec 2017 08:28) (18 - 18)  SpO2: 92% (29 Dec 2017 08:28) (92% - 98%)    PHYSICAL EXAM:    PHYSICAL EXAM:    GENERAL: ill looking elderly male   HEENT: anicteric  NECK: Supple, No JVD   CHEST/LUNG: decrease air entry bilateral with some rales and rhonchi   HEART: S1S2 Normal intensity, no murmurs  ABDOMEN: Soft, BS +ve, NT, ND.   EXTREMITIES:  1+ radial and DP pulses noted, no edema noted.   SKIN: No rashes or lesions noted  NEURO: Confused, lethargy, Right hemiparesis.  CN II-XII intact  PSYCH: Depressed  mood and affect      LABS:                        7.3    11.2  )-----------( 162      ( 29 Dec 2017 06:21 )             23.0     12-29    141  |  104  |  69.0<H>  ----------------------------<  296<H>  4.0   |  21.0<L>  |  2.47<H>    Ca    7.9<L>      29 Dec 2017 06:21      PT/INR - ( 29 Dec 2017 06:21 )   PT: 17.4 sec;   INR: 1.57 ratio                 I&O's Summary    28 Dec 2017 07:01  -  29 Dec 2017 07:00  --------------------------------------------------------  IN: 0 mL / OUT: 2030 mL / NET: -2030 mL    29 Dec 2017 07:01  -  29 Dec 2017 18:49  --------------------------------------------------------  IN: 200 mL / OUT: 1100 mL / NET: -900 mL        MEDICATIONS  (STANDING):  acetylcysteine 20% Inhalation 3 milliLiter(s) Inhalation every 4 hours  ALBUTerol    0.083% 2.5 milliGRAM(s) Nebulizer every 4 hours  amiodarone    Tablet 400 milliGRAM(s) Oral daily  aspirin  chewable 81 milliGRAM(s) Oral daily  atorvastatin 10 milliGRAM(s) Oral at bedtime  epoetin [place] Injectable 41111 Unit(s) SubCutaneous every 7 days  finasteride 5 milliGRAM(s) Oral daily  folic acid 1 milliGRAM(s) Enteral Tube daily  heparin  Injectable 5000 Unit(s) SubCutaneous every 12 hours  iron sucrose IVPB 250 milliGRAM(s) IV Intermittent daily  metoprolol     tartrate 25 milliGRAM(s) Oral three times a day  pantoprazole    Tablet 40 milliGRAM(s) Oral before breakfast  saccharomyces boulardii 250 milliGRAM(s) Oral two times a day  sodium chloride 0.225%. 1000 milliLiter(s) (60 mL/Hr) IV Continuous <Continuous>  tamsulosin 0.4 milliGRAM(s) Oral at bedtime    MEDICATIONS  (PRN):  morphine  - Injectable 2 milliGRAM(s) IV Push every 6 hours PRN Moderate Pain (4 - 6)

## 2017-12-29 NOTE — PROGRESS NOTE ADULT - ASSESSMENT
CRF(III): ARF and hypernatremia better  Systemic hypotension contributing to renal hypoperfusion  - avoid nephrotoxins  - free water via PEG  - monitor labs    Anemia: multifactorial  - cont GONZALES  - consider PRBCs  - add IV Fe

## 2017-12-29 NOTE — PROGRESS NOTE ADULT - SUBJECTIVE AND OBJECTIVE BOX
PULMONARY PROGRESS NOTE      CRISTIN ROQUEGRADY-[id number]    Patient is a 77y old  Male who presents with a chief complaint of Found unresponsive in his vomit (18 Dec 2017 19:49)      INTERVAL HPI/OVERNIGHT EVENTS:  Awake on NCO2 NAD    MEDICATIONS  (STANDING):  acetylcysteine 20% Inhalation 3 milliLiter(s) Inhalation every 4 hours  ALBUTerol    0.083% 2.5 milliGRAM(s) Nebulizer every 4 hours  amiodarone    Tablet 400 milliGRAM(s) Oral daily  aspirin  chewable 81 milliGRAM(s) Oral daily  atorvastatin 10 milliGRAM(s) Oral at bedtime  epoetin [place] Injectable 28749 Unit(s) SubCutaneous every 7 days  finasteride 5 milliGRAM(s) Oral daily  folic acid 1 milliGRAM(s) Enteral Tube daily  heparin  Injectable 5000 Unit(s) SubCutaneous every 12 hours  iron sucrose IVPB 250 milliGRAM(s) IV Intermittent daily  metoprolol     tartrate 25 milliGRAM(s) Oral three times a day  pantoprazole    Tablet 40 milliGRAM(s) Oral before breakfast  saccharomyces boulardii 250 milliGRAM(s) Oral two times a day  sodium chloride 0.225%. 1000 milliLiter(s) (60 mL/Hr) IV Continuous <Continuous>  tamsulosin 0.4 milliGRAM(s) Oral at bedtime      MEDICATIONS  (PRN):  morphine  - Injectable 2 milliGRAM(s) IV Push every 6 hours PRN Moderate Pain (4 - 6)      Allergies    penicillins (Hives)    Intolerances        PAST MEDICAL & SURGICAL HISTORY:  CAD (coronary artery disease)  Afib  CVA (cerebral vascular accident)  Mitral valve replaced  BPH (benign prostatic hyperplasia)  High cholesterol  HTN (hypertension)  H/O heart valve replacement with mechanical valve  S/P CABG x 1  H/O colectomy      SOCIAL HISTORY  Smoking History:       REVIEW OF SYSTEMS:    Unable    Vital Signs Last 24 Hrs  T(C): 36.6 (29 Dec 2017 08:28), Max: 37.4 (28 Dec 2017 15:44)  T(F): 97.9 (29 Dec 2017 08:28), Max: 99.3 (28 Dec 2017 15:44)  HR: 90 (29 Dec 2017 08:28) (90 - 101)  BP: 90/57 (29 Dec 2017 08:28) (90/57 - 112/72)  BP(mean): --  RR: 18 (29 Dec 2017 08:28) (18 - 18)  SpO2: 92% (29 Dec 2017 08:28) (92% - 98%)    PHYSICAL EXAMINATION:    GENERAL: The patient is awake and alert in no apparent distress.     HEENT: Head is normocephalic and atraumatic. Extraocular muscles are intact. Mucous membranes are moist.    NECK: Supple.    LUNGS: Clear to auscultation without wheezing, rales or rhonchi; respirations unlabored    HEART: Regular rate and rhythm without murmur.    ABDOMEN: Soft, nontender, and nondistended.      EXTREMITIES: Without any cyanosis, clubbing, rash, lesions or edema.    NEUROLOGIC: Grossly intact.    SKIN: No ulceration or induration present.      LABS:                        7.3    11.2  )-----------( 162      ( 29 Dec 2017 06:21 )             23.0     12-29    141  |  104  |  69.0<H>  ----------------------------<  296<H>  4.0   |  21.0<L>  |  2.47<H>    Ca    7.9<L>      29 Dec 2017 06:21      PT/INR - ( 29 Dec 2017 06:21 )   PT: 17.4 sec;   INR: 1.57 ratio                             MICROBIOLOGY:    RADIOLOGY & ADDITIONAL STUDIES:

## 2017-12-29 NOTE — PROGRESS NOTE ADULT - SUBJECTIVE AND OBJECTIVE BOX
NEPHROLOGY INTERVAL HPI/OVERNIGHT EVENTS:  pt essentially the same  no acute distress  no cp, sob, n/v/d    MEDICATIONS  (STANDING):  acetylcysteine 20% Inhalation 3 milliLiter(s) Inhalation every 4 hours  ALBUTerol    0.083% 2.5 milliGRAM(s) Nebulizer every 4 hours  amiodarone    Tablet 400 milliGRAM(s) Oral daily  aspirin  chewable 81 milliGRAM(s) Oral daily  atorvastatin 10 milliGRAM(s) Oral at bedtime  epoetin [place] Injectable 17014 Unit(s) SubCutaneous every 7 days  finasteride 5 milliGRAM(s) Oral daily  folic acid 1 milliGRAM(s) Enteral Tube daily  heparin  Injectable 5000 Unit(s) SubCutaneous every 12 hours  metoprolol     tartrate 25 milliGRAM(s) Oral three times a day  pantoprazole    Tablet 40 milliGRAM(s) Oral before breakfast  saccharomyces boulardii 250 milliGRAM(s) Oral two times a day  sodium chloride 0.225%. 1000 milliLiter(s) (60 mL/Hr) IV Continuous <Continuous>  tamsulosin 0.4 milliGRAM(s) Oral at bedtime    MEDICATIONS  (PRN):  morphine  - Injectable 2 milliGRAM(s) IV Push every 6 hours PRN Moderate Pain (4 - 6)      Allergies    penicillins (Hives)        Vital Signs Last 24 Hrs  T(C): 36.6 (29 Dec 2017 08:28), Max: 37.4 (28 Dec 2017 15:44)  T(F): 97.9 (29 Dec 2017 08:28), Max: 99.3 (28 Dec 2017 15:44)  HR: 90 (29 Dec 2017 08:28) (90 - 101)  BP: 90/57 (29 Dec 2017 08:28) (90/57 - 112/72)  BP(mean): --  RR: 18 (29 Dec 2017 08:28) (18 - 18)  SpO2: 92% (29 Dec 2017 08:28) (92% - 98%)    PHYSICAL EXAM:  Pt appears chronically ill  Chest   clear  CV   no murmur  Abd   soft, NT BS+  Ext   No edema  Neuro  Awake and alert    LABS:                        7.3    11.2  )-----------( 162      ( 29 Dec 2017 06:21 )             23.0   Hemoglobin: 7.9 g/dL (12.26.17 @ 06:06)        12-29    141  |  104  |  69.0<H>  ----------------------------<  296<H>  4.0   |  21.0<L>  |  2.47<H>    % Saturation, Iron: 11 % (12.25.17 @ 06:22)  Ferritin, Serum: 179.5 ng/mL (12.25.17 @ 06:22)          Ca    7.9<L>      29 Dec 2017 06:21    Creatinine, Serum: 1.77 mg/dL (09.06.17 @ 05:46)  Creatinine, Serum: 3.43 mg/dL (12.21.17 @ 07:20)            PT/INR - ( 29 Dec 2017 06:21 )   PT: 17.4 sec;   INR: 1.57 ratio                 RADIOLOGY & ADDITIONAL TESTS:  < from: US Renal (12.19.17 @ 09:30) >   EXAM:  US KIDNEY(S)                          PROCEDURE DATE:  12/19/2017          INTERPRETATION:  CLINICAL INFORMATION: 77-year-old renal failure    COMPARISON: None available.    TECHNIQUE: Sonography of the kidneys and bladder.     FINDINGS:    Right kidney:  9.8 cm. Small nonobstructive calculus in the upper pole   measuring approximately 4 mm. No hydronephrosis.    Left kidney:  10.1 cm. There is a cyst in the upper pole measuring 2.3 x   2.5 cm. No renal calculi or hydronephrosis are seen.    IMPRESSION:     Nonobstructive calculus in the upper pole of the right kidney, no   hydronephrosis.    Simple cyst in the upper pole of the left kidney.    < end of copied text >

## 2017-12-30 LAB
ANION GAP SERPL CALC-SCNC: 16 MMOL/L — SIGNIFICANT CHANGE UP (ref 5–17)
APPEARANCE UR: ABNORMAL
BACTERIA # UR AUTO: ABNORMAL
BILIRUB UR-MCNC: NEGATIVE — SIGNIFICANT CHANGE UP
BUN SERPL-MCNC: 63 MG/DL — HIGH (ref 8–20)
CALCIUM SERPL-MCNC: 8 MG/DL — LOW (ref 8.6–10.2)
CHLORIDE SERPL-SCNC: 103 MMOL/L — SIGNIFICANT CHANGE UP (ref 98–107)
CO2 SERPL-SCNC: 22 MMOL/L — SIGNIFICANT CHANGE UP (ref 22–29)
COLOR SPEC: YELLOW — SIGNIFICANT CHANGE UP
CREAT SERPL-MCNC: 2.57 MG/DL — HIGH (ref 0.5–1.3)
DIFF PNL FLD: ABNORMAL
GLUCOSE SERPL-MCNC: 247 MG/DL — HIGH (ref 70–115)
GLUCOSE UR QL: NEGATIVE MG/DL — SIGNIFICANT CHANGE UP
HCT VFR BLD CALC: 25.4 % — LOW (ref 42–52)
HGB BLD-MCNC: 7.6 G/DL — LOW (ref 14–18)
KETONES UR-MCNC: NEGATIVE — SIGNIFICANT CHANGE UP
LEUKOCYTE ESTERASE UR-ACNC: ABNORMAL
MAGNESIUM SERPL-MCNC: 1.4 MG/DL — LOW (ref 1.8–2.6)
MCHC RBC-ENTMCNC: 28.7 PG — SIGNIFICANT CHANGE UP (ref 27–31)
MCHC RBC-ENTMCNC: 29.9 G/DL — LOW (ref 32–36)
MCV RBC AUTO: 95.8 FL — HIGH (ref 80–94)
NITRITE UR-MCNC: POSITIVE
PH UR: 6 — SIGNIFICANT CHANGE UP (ref 5–8)
PLATELET # BLD AUTO: 187 K/UL — SIGNIFICANT CHANGE UP (ref 150–400)
POTASSIUM SERPL-MCNC: 4 MMOL/L — SIGNIFICANT CHANGE UP (ref 3.5–5.3)
POTASSIUM SERPL-SCNC: 4 MMOL/L — SIGNIFICANT CHANGE UP (ref 3.5–5.3)
PROT UR-MCNC: 30 MG/DL
RBC # BLD: 2.65 M/UL — LOW (ref 4.6–6.2)
RBC # FLD: 16.5 % — HIGH (ref 11–15.6)
RBC CASTS # UR COMP ASSIST: ABNORMAL /HPF (ref 0–4)
SODIUM SERPL-SCNC: 141 MMOL/L — SIGNIFICANT CHANGE UP (ref 135–145)
SP GR SPEC: 1.01 — SIGNIFICANT CHANGE UP (ref 1.01–1.02)
UROBILINOGEN FLD QL: NEGATIVE MG/DL — SIGNIFICANT CHANGE UP
WBC # BLD: 11.7 K/UL — HIGH (ref 4.8–10.8)
WBC # FLD AUTO: 11.7 K/UL — HIGH (ref 4.8–10.8)
WBC UR QL: ABNORMAL

## 2017-12-30 PROCEDURE — 99233 SBSQ HOSP IP/OBS HIGH 50: CPT

## 2017-12-30 RX ORDER — CEFEPIME 1 G/1
INJECTION, POWDER, FOR SOLUTION INTRAMUSCULAR; INTRAVENOUS
Qty: 0 | Refills: 0 | Status: DISCONTINUED | OUTPATIENT
Start: 2017-12-30 | End: 2017-12-30

## 2017-12-30 RX ORDER — MAGNESIUM SULFATE 500 MG/ML
2 VIAL (ML) INJECTION ONCE
Qty: 0 | Refills: 0 | Status: COMPLETED | OUTPATIENT
Start: 2017-12-30 | End: 2017-12-30

## 2017-12-30 RX ORDER — CEFEPIME 1 G/1
500 INJECTION, POWDER, FOR SOLUTION INTRAMUSCULAR; INTRAVENOUS EVERY 24 HOURS
Qty: 0 | Refills: 0 | Status: DISCONTINUED | OUTPATIENT
Start: 2017-12-30 | End: 2018-01-02

## 2017-12-30 RX ORDER — ACETYLCYSTEINE 200 MG/ML
3 VIAL (ML) MISCELLANEOUS EVERY 6 HOURS
Qty: 0 | Refills: 0 | Status: DISCONTINUED | OUTPATIENT
Start: 2017-12-30 | End: 2018-01-02

## 2017-12-30 RX ADMIN — HEPARIN SODIUM 5000 UNIT(S): 5000 INJECTION INTRAVENOUS; SUBCUTANEOUS at 17:43

## 2017-12-30 RX ADMIN — IRON SUCROSE 112.5 MILLIGRAM(S): 20 INJECTION, SOLUTION INTRAVENOUS at 12:46

## 2017-12-30 RX ADMIN — Medication 50 GRAM(S): at 11:34

## 2017-12-30 RX ADMIN — SODIUM CHLORIDE 60 MILLILITER(S): 9 INJECTION INTRAMUSCULAR; INTRAVENOUS; SUBCUTANEOUS at 17:43

## 2017-12-30 RX ADMIN — Medication 25 MILLIGRAM(S): at 22:12

## 2017-12-30 RX ADMIN — ALBUTEROL 2.5 MILLIGRAM(S): 90 AEROSOL, METERED ORAL at 23:34

## 2017-12-30 RX ADMIN — ATORVASTATIN CALCIUM 10 MILLIGRAM(S): 80 TABLET, FILM COATED ORAL at 22:12

## 2017-12-30 RX ADMIN — PANTOPRAZOLE SODIUM 40 MILLIGRAM(S): 20 TABLET, DELAYED RELEASE ORAL at 05:48

## 2017-12-30 RX ADMIN — CEFEPIME 100 MILLIGRAM(S): 1 INJECTION, POWDER, FOR SOLUTION INTRAMUSCULAR; INTRAVENOUS at 23:55

## 2017-12-30 RX ADMIN — Medication 25 MILLIGRAM(S): at 05:47

## 2017-12-30 RX ADMIN — FINASTERIDE 5 MILLIGRAM(S): 5 TABLET, FILM COATED ORAL at 11:35

## 2017-12-30 RX ADMIN — ALBUTEROL 2.5 MILLIGRAM(S): 90 AEROSOL, METERED ORAL at 13:07

## 2017-12-30 RX ADMIN — ALBUTEROL 2.5 MILLIGRAM(S): 90 AEROSOL, METERED ORAL at 16:05

## 2017-12-30 RX ADMIN — ALBUTEROL 2.5 MILLIGRAM(S): 90 AEROSOL, METERED ORAL at 21:12

## 2017-12-30 RX ADMIN — AMIODARONE HYDROCHLORIDE 400 MILLIGRAM(S): 400 TABLET ORAL at 05:48

## 2017-12-30 RX ADMIN — Medication 250 MILLIGRAM(S): at 17:43

## 2017-12-30 RX ADMIN — Medication 25 MILLIGRAM(S): at 13:50

## 2017-12-30 RX ADMIN — Medication 81 MILLIGRAM(S): at 11:34

## 2017-12-30 RX ADMIN — Medication 1 MILLIGRAM(S): at 11:34

## 2017-12-30 RX ADMIN — Medication 3 MILLILITER(S): at 21:12

## 2017-12-30 RX ADMIN — ALBUTEROL 2.5 MILLIGRAM(S): 90 AEROSOL, METERED ORAL at 03:16

## 2017-12-30 RX ADMIN — HEPARIN SODIUM 5000 UNIT(S): 5000 INJECTION INTRAVENOUS; SUBCUTANEOUS at 05:47

## 2017-12-30 RX ADMIN — Medication 3 MILLILITER(S): at 16:08

## 2017-12-30 RX ADMIN — ALBUTEROL 2.5 MILLIGRAM(S): 90 AEROSOL, METERED ORAL at 08:30

## 2017-12-30 RX ADMIN — Medication 3 MILLILITER(S): at 03:16

## 2017-12-30 RX ADMIN — Medication 250 MILLIGRAM(S): at 05:48

## 2017-12-30 NOTE — PROGRESS NOTE ADULT - ASSESSMENT
76 y/o male pmhx CVA (2 years ago and in July 17) w/ 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 Placed on ventilator and vasopressor support. Septic shock resolved. No longer requiring vasopressors and s/p extubation and down grade from ICU to medicine hospitalist team on 12/20/17. Continues on cefepime for pseudomona aeruginosa UTI and enterovirus positive, Bcx negative for growth, leukocytosis trending down and remained afebrile. BP remained normotensive, pt continues with being tapered off of stress dose steroids. His renal function remains compromised but is slowly down trending and likely secondary to ATN from septic shock with underlying known hx of ckd stage 3, Patient is alert. maintaining saturation with NC.   puckett draining dark urine. seen by urology, to c/w puckett. seen by renal, IVF was increased and vanofer was added, CBC being monitored and remains stable. no ssx of active bleeding. In am, complaints of lower abd pain. abd NT,ND, PEG.      Problem/Plan - 1:  ·  Problem: Pseudomonas urinary tract infection.  Plan: - leukocytosis resolved, U cx pseudomonas, Bld cx negative , Abx  completed., c/w probiotic, Puckett in place for urinary retention and hematuria. no sign of retention no hematuria noted. dark urine noted. I and O noted, will do voiding trial. complaining of Lower abd pain. will repeat UA and UC. afebrile. abc wnl.       Problem/Plan - 2:  ·  Problem: Pneumonia, bacterial.  Plan:  CXR showing possible ARDS, repeat CXR pretty much same, Pulmonology dr Melvin is following    -c/w bronchodilators. albuterol and Mucomyst, fluid to 60ml/hr, on peg tube feeding, will monitor for aspiration, has completed course of antibiotics       Problem/Plan - 3:  ·  Problem: Acute on chronic renal failure.  Plan: underlying hx CKD stage 3 likely know KAPIL secondary to ATN from septic shock   Renal indices is improving on gentle hydration , now at 60ml/hr, baseline creatine around 1.7 per prior records, now creatinine is around 2.4, will continue to monitor.     problem 4: lower abd pain: better after flushing PEG after feeding. abd ND,NT. flush PEG with water after each feeding. repeat UA,UC     Problem/Plan - 4:  ·  Problem: Cerebrovascular accident (CVA), unspecified mechanism.  Plan:  Right side hemiparesis. -c/w atorvastatin 10mg po qhs   Will initiate PT OT when patient is fully orientated.      Problem/Plan - 5:  ·  Problem: Atrial fibrillation.  Plan: Cardiology dr Donovan evaluated, doubt acute ischemia. Considering etiology of electrolyte imbalance and hypoxia.  Recommend beta blocker , statin , aspirin. seen by cardiology, started on amiodarone for PSVT. hematuria resolved. FOBT positive. coumadin on hold. needs to reassess by cardiology before DC and to restart coumadin. Hb stable.     Problem 6: GI bleed: FOBT positive. Hb dropped but stable.  GI eval  recommend against intervention procedure given patient poor physical condition except family agrees . Will transfuse to keep Hb at 8g% or higher.  on epoetin and IV iron     Problem/Plan - 6:  Problem: BPH (benign prostatic hyperplasia). Plan: -c/w proscar and flomax. Puckett in place for urinary retention. urology following. voiding trial    problem 6: hypernatremia:  Resolved on IV fluid     Disposition :Palliative consulted for goals of care and further quality of life discussions given recurrent hospitalizations and overall poor prognosis, spoke to wife [ Ms.Jacqueline Salomon at [phone]. She wants full code. states he does not want DNR/DNI. she will come to hospital and will sign MOLST form. dced order for DNR. palliative follow up needed.

## 2017-12-30 NOTE — PROGRESS NOTE ADULT - SUBJECTIVE AND OBJECTIVE BOX
NEPHROLOGY INTERVAL HPI/OVERNIGHT EVENTS:  pt essentially the same  no acute distress noted    MEDICATIONS  (STANDING):  ALBUTerol    0.083% 2.5 milliGRAM(s) Nebulizer every 4 hours  amiodarone    Tablet 400 milliGRAM(s) Oral daily  aspirin  chewable 81 milliGRAM(s) Oral daily  atorvastatin 10 milliGRAM(s) Oral at bedtime  epoetin [place] Injectable 81301 Unit(s) SubCutaneous every 7 days  finasteride 5 milliGRAM(s) Oral daily  folic acid 1 milliGRAM(s) Enteral Tube daily  heparin  Injectable 5000 Unit(s) SubCutaneous every 12 hours  iron sucrose IVPB 250 milliGRAM(s) IV Intermittent daily  metoprolol     tartrate 25 milliGRAM(s) Oral three times a day  pantoprazole    Tablet 40 milliGRAM(s) Oral before breakfast  saccharomyces boulardii 250 milliGRAM(s) Oral two times a day  sodium chloride 0.225%. 1000 milliLiter(s) (60 mL/Hr) IV Continuous <Continuous>  tamsulosin 0.4 milliGRAM(s) Oral at bedtime    MEDICATIONS  (PRN):      Allergies    penicillins (Hives)          Vital Signs Last 24 Hrs  T(C): 37.1 (30 Dec 2017 04:33), Max: 37.2 (29 Dec 2017 22:04)  T(F): 98.7 (30 Dec 2017 04:33), Max: 98.9 (29 Dec 2017 22:04)  HR: 97 (30 Dec 2017 04:33) (84 - 104)  BP: 118/60 (30 Dec 2017 04:33) (118/60 - 128/67)  BP(mean): --  RR: 18 (30 Dec 2017 04:33) (17 - 18)  SpO2: 97% (30 Dec 2017 08:32) (96% - 97%)    PHYSICAL EXAM:    Pt appears chronically ill  Chest   clear; diminished BS at bases  CV   no murmur  Abd   soft, NT BS+  Ext   No edema  Neuro  Awake and alert; slow to respond    LABS:                        7.3    11.2  )-----------( 162      ( 29 Dec 2017 06:21 )             23.0     12-30    141  |  103  |  63.0<H>  ----------------------------<  247<H>  4.0   |  22.0  |  2.57<H>    Ca    8.0<L>      30 Dec 2017 07:59  Mg     1.4     12-30      PT/INR - ( 29 Dec 2017 06:21 )   PT: 17.4 sec;   INR: 1.57 ratio             Magnesium, Serum: 1.4 mg/dL (12-30 @ 07:59)      RADIOLOGY & ADDITIONAL TESTS:  < from: Xray Chest 1 View AP/PA. (12.29.17 @ 10:58) >     EXAM:  CHEST SINGLE VIEW FRONTAL                          PROCEDURE DATE:  12/29/2017          INTERPRETATION:  CHEST AP PORTABLE:    History: SOB and rhonchi.     Date and time of exam: 12/29/2017 10:35 AM.    Technique: A single AP view of the chest was obtained.    Comparison exam: 12/26/2017.    Findings:  Diffuse bilateral airspace consolidation demonstrating no significant   change since the prior study. Evidence of prior cardiac surgery. Small   right pleural effusion, unchanged..    Impression:  Stable exam without significant change since the previous study..      < end of copied text >

## 2017-12-30 NOTE — PROGRESS NOTE ADULT - ASSESSMENT
CRF(III): ARF and hypernatremia   Systemic hypotension contributing to renal hypoperfusion==> BP better  - avoid nephrotoxins  - free water via PEG  - monitor labs  - supplement Mg+    Anemia: multifactorial  - cont GONZALES  - consider PRBCs  - added IV Fe

## 2017-12-30 NOTE — PROGRESS NOTE ADULT - SUBJECTIVE AND OBJECTIVE BOX
CRISTIN ROQUE    [id number]    77y      Male    Patient is a 77y old  Male who presents with a chief complaint of Found unresponsive in his vomit (18 Dec 2017 19:49)      INTERVAL HPI/OVERNIGHT EVENTS:    seen at bedside. incomprehensible sound. RN reported this am he was complaining of lower abd pain. no fever/chills. no n/v/d/c. on PEG feeding. was comfortable after flushing PEG tube with free water after feeding.   urine dark. no blood.   seen by renal.       REVIEW OF SYSTEMS:  limited    CONSTITUTIONAL: No fever, has fatigue  RESPIRATORY: has shortness of breath, same  CARDIOVASCULAR: No chest pain/palpitation  GI/: as per HPI  CNS: right sided residual weakness, no new weakness or numbness      MEDICATIONS  (STANDING):  acetylcysteine 20% Inhalation 3 milliLiter(s) Inhalation every 6 hours  ALBUTerol    0.083% 2.5 milliGRAM(s) Nebulizer every 4 hours  amiodarone    Tablet 400 milliGRAM(s) Oral daily  aspirin  chewable 81 milliGRAM(s) Oral daily  atorvastatin 10 milliGRAM(s) Oral at bedtime  epoetin [place] Injectable 08032 Unit(s) SubCutaneous every 7 days  finasteride 5 milliGRAM(s) Oral daily  folic acid 1 milliGRAM(s) Enteral Tube daily  heparin  Injectable 5000 Unit(s) SubCutaneous every 12 hours  iron sucrose IVPB 250 milliGRAM(s) IV Intermittent daily  magnesium sulfate  IVPB 2 Gram(s) IV Intermittent once  metoprolol     tartrate 25 milliGRAM(s) Oral three times a day  pantoprazole    Tablet 40 milliGRAM(s) Oral before breakfast  saccharomyces boulardii 250 milliGRAM(s) Oral two times a day  sodium chloride 0.225%. 1000 milliLiter(s) (60 mL/Hr) IV Continuous <Continuous>  tamsulosin 0.4 milliGRAM(s) Oral at bedtime    MEDICATIONS  (PRN):      PHYSICAL EXAM:    Vital Signs Last 24 Hrs  T(C): 37.1 (30 Dec 2017 04:33), Max: 37.2 (29 Dec 2017 22:04)  T(F): 98.7 (30 Dec 2017 04:33), Max: 98.9 (29 Dec 2017 22:04)  HR: 97 (30 Dec 2017 04:33) (84 - 104)  BP: 118/60 (30 Dec 2017 04:33) (118/60 - 128/67)  BP(mean): --  RR: 18 (30 Dec 2017 04:33) (17 - 18)  SpO2: 97% (30 Dec 2017 08:32) (96% - 97%)    GENERAL: ill looking elderly male , sleeping comfortable. alert  HEENT: anicteric, mild Palor  NECK: Supple, No JVD   CHEST/LUNG: decrease air entry bilateral with some rales and rhonchi   HEART: S1S2 Normal intensity, no murmurs  ABDOMEN: Soft, BS +ve, NT, ND.   EXTREMITIES:  1+ radial and DP pulses noted, no edema noted. no calf tenderness  SKIN: No rashes or lesions noted  NEURO: Confused, lethargy, Right hemiparesis.  CN II-XII intact  PSYCH: Depressed  mood and affect      LABS:                                   7.6    11.7  )-----------( 187      ( 30 Dec 2017 08:03 )             25.4        12-30    141  |  103  |  63.0<H>  ----------------------------<  247<H>  4.0   |  22.0  |  2.57<H>    Ca    8.0<L>      30 Dec 2017 07:59  Mg     1.4     12-30      Magnesium, Serum in AM (12.30.17 @ 07:59)    Magnesium, Serum: 1.4 mg/dL    I&O's Summary    29 Dec 2017 07:01  -  30 Dec 2017 07:00  --------------------------------------------------------  IN: 200 mL / OUT: 2700 mL / NET: -2500 mL

## 2017-12-31 LAB
ALBUMIN SERPL ELPH-MCNC: 2.5 G/DL — LOW (ref 3.3–5.2)
ALP SERPL-CCNC: 85 U/L — SIGNIFICANT CHANGE UP (ref 40–120)
ALT FLD-CCNC: 26 U/L — SIGNIFICANT CHANGE UP
ANION GAP SERPL CALC-SCNC: 12 MMOL/L — SIGNIFICANT CHANGE UP (ref 5–17)
AST SERPL-CCNC: 16 U/L — SIGNIFICANT CHANGE UP
BILIRUB SERPL-MCNC: 0.5 MG/DL — SIGNIFICANT CHANGE UP (ref 0.4–2)
BUN SERPL-MCNC: 52 MG/DL — HIGH (ref 8–20)
CALCIUM SERPL-MCNC: 8.2 MG/DL — LOW (ref 8.6–10.2)
CHLORIDE SERPL-SCNC: 105 MMOL/L — SIGNIFICANT CHANGE UP (ref 98–107)
CO2 SERPL-SCNC: 23 MMOL/L — SIGNIFICANT CHANGE UP (ref 22–29)
CREAT SERPL-MCNC: 2.49 MG/DL — HIGH (ref 0.5–1.3)
GLUCOSE SERPL-MCNC: 203 MG/DL — HIGH (ref 70–115)
HCT VFR BLD CALC: 24.6 % — LOW (ref 42–52)
HGB BLD-MCNC: 7.4 G/DL — LOW (ref 14–18)
MAGNESIUM SERPL-MCNC: 1.8 MG/DL — SIGNIFICANT CHANGE UP (ref 1.6–2.6)
MCHC RBC-ENTMCNC: 29.1 PG — SIGNIFICANT CHANGE UP (ref 27–31)
MCHC RBC-ENTMCNC: 30.1 G/DL — LOW (ref 32–36)
MCV RBC AUTO: 96.9 FL — HIGH (ref 80–94)
PLATELET # BLD AUTO: 204 K/UL — SIGNIFICANT CHANGE UP (ref 150–400)
POTASSIUM SERPL-MCNC: 4.1 MMOL/L — SIGNIFICANT CHANGE UP (ref 3.5–5.3)
POTASSIUM SERPL-SCNC: 4.1 MMOL/L — SIGNIFICANT CHANGE UP (ref 3.5–5.3)
PROT SERPL-MCNC: 6.1 G/DL — LOW (ref 6.6–8.7)
RBC # BLD: 2.54 M/UL — LOW (ref 4.6–6.2)
RBC # FLD: 17.1 % — HIGH (ref 11–15.6)
SODIUM SERPL-SCNC: 140 MMOL/L — SIGNIFICANT CHANGE UP (ref 135–145)
WBC # BLD: 11.2 K/UL — HIGH (ref 4.8–10.8)
WBC # FLD AUTO: 11.2 K/UL — HIGH (ref 4.8–10.8)

## 2017-12-31 PROCEDURE — 93971 EXTREMITY STUDY: CPT | Mod: 26,LT

## 2017-12-31 PROCEDURE — 99233 SBSQ HOSP IP/OBS HIGH 50: CPT

## 2017-12-31 RX ORDER — VANCOMYCIN HCL 1 G
1000 VIAL (EA) INTRAVENOUS ONCE
Qty: 0 | Refills: 0 | Status: COMPLETED | OUTPATIENT
Start: 2017-12-31 | End: 2017-12-31

## 2017-12-31 RX ORDER — PANTOPRAZOLE SODIUM 20 MG/1
40 TABLET, DELAYED RELEASE ORAL
Qty: 0 | Refills: 0 | Status: DISCONTINUED | OUTPATIENT
Start: 2017-12-31 | End: 2018-01-02

## 2017-12-31 RX ORDER — ENOXAPARIN SODIUM 100 MG/ML
80 INJECTION SUBCUTANEOUS ONCE
Qty: 0 | Refills: 0 | Status: COMPLETED | OUTPATIENT
Start: 2017-12-31 | End: 2017-12-31

## 2017-12-31 RX ADMIN — ALBUTEROL 2.5 MILLIGRAM(S): 90 AEROSOL, METERED ORAL at 16:15

## 2017-12-31 RX ADMIN — FINASTERIDE 5 MILLIGRAM(S): 5 TABLET, FILM COATED ORAL at 13:20

## 2017-12-31 RX ADMIN — Medication 3 MILLILITER(S): at 08:45

## 2017-12-31 RX ADMIN — SODIUM CHLORIDE 60 MILLILITER(S): 9 INJECTION INTRAMUSCULAR; INTRAVENOUS; SUBCUTANEOUS at 00:20

## 2017-12-31 RX ADMIN — CEFEPIME 100 MILLIGRAM(S): 1 INJECTION, POWDER, FOR SOLUTION INTRAMUSCULAR; INTRAVENOUS at 23:14

## 2017-12-31 RX ADMIN — ALBUTEROL 2.5 MILLIGRAM(S): 90 AEROSOL, METERED ORAL at 20:52

## 2017-12-31 RX ADMIN — ATORVASTATIN CALCIUM 10 MILLIGRAM(S): 80 TABLET, FILM COATED ORAL at 22:36

## 2017-12-31 RX ADMIN — Medication 250 MILLIGRAM(S): at 13:00

## 2017-12-31 RX ADMIN — ALBUTEROL 2.5 MILLIGRAM(S): 90 AEROSOL, METERED ORAL at 12:40

## 2017-12-31 RX ADMIN — Medication 1 MILLIGRAM(S): at 13:20

## 2017-12-31 RX ADMIN — Medication 250 MILLIGRAM(S): at 19:08

## 2017-12-31 RX ADMIN — Medication 25 MILLIGRAM(S): at 05:56

## 2017-12-31 RX ADMIN — HEPARIN SODIUM 5000 UNIT(S): 5000 INJECTION INTRAVENOUS; SUBCUTANEOUS at 05:57

## 2017-12-31 RX ADMIN — IRON SUCROSE 112.5 MILLIGRAM(S): 20 INJECTION, SOLUTION INTRAVENOUS at 13:20

## 2017-12-31 RX ADMIN — Medication 3 MILLILITER(S): at 16:14

## 2017-12-31 RX ADMIN — Medication 3 MILLILITER(S): at 03:31

## 2017-12-31 RX ADMIN — Medication 25 MILLIGRAM(S): at 13:20

## 2017-12-31 RX ADMIN — AMIODARONE HYDROCHLORIDE 400 MILLIGRAM(S): 400 TABLET ORAL at 05:57

## 2017-12-31 RX ADMIN — Medication 81 MILLIGRAM(S): at 13:20

## 2017-12-31 RX ADMIN — ALBUTEROL 2.5 MILLIGRAM(S): 90 AEROSOL, METERED ORAL at 03:31

## 2017-12-31 RX ADMIN — HEPARIN SODIUM 5000 UNIT(S): 5000 INJECTION INTRAVENOUS; SUBCUTANEOUS at 19:08

## 2017-12-31 RX ADMIN — ALBUTEROL 2.5 MILLIGRAM(S): 90 AEROSOL, METERED ORAL at 23:23

## 2017-12-31 RX ADMIN — Medication 3 MILLILITER(S): at 20:52

## 2017-12-31 RX ADMIN — TAMSULOSIN HYDROCHLORIDE 0.4 MILLIGRAM(S): 0.4 CAPSULE ORAL at 22:36

## 2017-12-31 RX ADMIN — Medication 25 MILLIGRAM(S): at 22:36

## 2017-12-31 RX ADMIN — ENOXAPARIN SODIUM 80 MILLIGRAM(S): 100 INJECTION SUBCUTANEOUS at 22:44

## 2017-12-31 RX ADMIN — ALBUTEROL 2.5 MILLIGRAM(S): 90 AEROSOL, METERED ORAL at 08:45

## 2017-12-31 NOTE — PROGRESS NOTE ADULT - ASSESSMENT
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 Placed on ventilator and vasopressor support. Septic shock resolved. No longer requiring vasopressors and s/p extubation and down grade from ICU to medicine hospitalist team on 12/20/17. Continues on cefepime for pseudomona aeruginosa UTI and enterovirus positive, Bcx negative for growth, leukocytosis trending down and remained afebrile. BP remained normotensive, pt continues with being tapered off of stress dose steroids. His renal function remains compromised but is slowly down trending and likely secondary to ATN from septic shock with underlying known hx of ckd stage 3, Patient is alert. maintaining saturation with NC.   puckett draining dark urine. seen by urology, to c/w puckett. seen by renal, IVF was increased and vanofer was added, CBC being monitored and remains stable. no ssx of active bleeding. In am, complaints of lower abd pain. abd NT,ND, PEG.      Problem/Plan - 1:  ·  Problem: Pseudomonas urinary tract infection.  Plan: - leukocytosis resolved, U cx pseudomonas, Bld cx negative , Abx  completed., c/w probiotic, Puckett in place for urinary retention and hematuria. no sign of retention no hematuria noted. dark urine noted. I and O noted, will do voiding trial. complaining of Lower abd pain. will repeat UA and UC. afebrile. abc wnl.       Problem/Plan - 2:  ·  Problem: Pneumonia, bacterial.  Plan:  CXR showing possible ARDS, repeat CXR pretty much same, Pulmonology dr Melvin is following    -c/w bronchodilators. albuterol and Mucomyst, fluid to 60ml/hr, on peg tube feeding, will monitor for aspiration, has completed course of antibiotics       Problem/Plan - 3:  ·  Problem: Acute on chronic renal failure.  Plan: underlying hx CKD stage 3 likely know KAPIL secondary to ATN from septic shock   Renal indices is improving on gentle hydration , now at 60ml/hr, baseline creatine around 1.7 per prior records, now creatinine is around 2.4, will continue to monitor.     problem 4: lower abd pain: better after flushing PEG after feeding. abd ND,NT. flush PEG with water after each feeding. repeat UA,UC     Problem/Plan - 4:  ·  Problem: Cerebrovascular accident (CVA), unspecified mechanism.  Plan:  Right side hemiparesis. -c/w atorvastatin 10mg po qhs   Will initiate PT OT when patient is fully orientated.      Problem/Plan - 5:  ·  Problem: Atrial fibrillation.  Plan: Cardiology dr Donovan evaluated, doubt acute ischemia. Considering etiology of electrolyte imbalance and hypoxia.  Recommend beta blocker , statin , aspirin. seen by cardiology, started on amiodarone for PSVT. hematuria resolved. FOBT positive. coumadin on hold. needs to reassess by cardiology before DC and to restart coumadin. Hb stable.     Problem 6: GI bleed: FOBT positive. Hb dropped but stable.  GI eval  recommend against intervention procedure given patient poor physical condition except family agrees . Will transfuse to keep Hb at 8g% or higher.  on epoetin and IV iron     Problem/Plan - 6:  Problem: BPH (benign prostatic hyperplasia). Plan: -c/w proscar and flomax. Puckett in place for urinary retention. urology following. voiding trial    problem 6: hypernatremia:  Resolved on IV fluid     Disposition :Palliative consulted for goals of care and further quality of life discussions given recurrent hospitalizations and overall poor prognosis, spoke to wife [ Ms.Jacqueline Salomon at [phone]. She wants full code. states he does not want DNR/DNI. she will come to hospital and will sign MOLST form. dced order for DNR. palliative follow up needed. 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 Placed on ventilator and vasopressor support. Septic shock resolved. No longer requiring vasopressors and s/p extubation and down grade from ICU to medicine hospitalist team on 12/20/17. Continues on cefepime for pseudomona aeruginosa UTI and enterovirus positive, Bcx negative for growth, leukocytosis trending down and remained afebrile. BP remained normotensive, pt continues with being tapered off of stress dose steroids. His renal function remains compromised but is slowly down trending and likely secondary to ATN from septic shock with underlying known hx of ckd stage 3, Patient is alert. maintaining saturation with NC.   puckett draining dark urine. seen by urology, to c/w puckett. seen by renal, IVF was increased and vanofer was added, CBC being monitored and remains stable. no ssx of active bleeding. In am, complaints of lower abd pain. abd NT,ND, PEG.      Problem/Plan - 1:  ·  Problem: Pseudomonas urinary tract infection.  Plan: - leukocytosis resolved, U cx pseudomonas, Bld cx negative , Abx  completed., c/w probiotic, Puckett in place for urinary retention and hematuria. no sign of retention no hematuria noted. dark urine noted. I and O noted, will do voiding trial. complaining of Lower abd pain. will repeat UA and UC. afebrile. abc wnl.       Problem/Plan - 2:  ·  Problem: Pneumonia, bacterial.  Plan:  CXR showing possible ARDS, repeat CXR pretty much same, Pulmonology dr Melvin is following    -c/w bronchodilators. albuterol and Mucomyst, fluid to 60ml/hr, on peg tube feeding, will monitor for aspiration, has completed course of antibiotics       Problem/Plan - 3:  ·  Problem: Acute on chronic renal failure.  Plan: underlying hx CKD stage 3 likely know KAPIL secondary to ATN from septic shock   Renal indices is improving on gentle hydration , now at 60ml/hr, baseline creatine around 1.7 per prior records, now creatinine is around 2.4, will continue to monitor.     problem 4: lower abd pain: better after flushing PEG after feeding. abd ND,NT. flush PEG with water after each feeding. repeat UA         Problem/Plan - 4:  ·  Problem: Cerebrovascular accident (CVA), unspecified mechanism.  Plan:  Right side hemiparesis. -c/w atorvastatin 10mg po qhs   Will initiate PT OT when patient is fully orientated.      Problem/Plan - 5:  ·  Problem: Atrial fibrillation.  Plan: Cardiology dr Donoavn evaluated, doubt acute ischemia. Considering etiology of electrolyte imbalance and hypoxia.  Recommend beta blocker , statin , aspirin. seen by cardiology, started on amiodarone for PSVT. hematuria resolved. FOBT positive. coumadin on hold. needs to reassess by cardiology before DC and to restart coumadin. Hb stable.     Problem 6: GI bleed: FOBT positive. Hb dropped but stable.  GI eval  recommend against intervention procedure given patient poor physical condition except family agrees . Will transfuse to keep Hb at 8g% or higher.  on epoetin and IV iron     Problem/Plan - 6:  Problem: BPH (benign prostatic hyperplasia). Plan: -c/w proscar and flomax. Puckett in place for urinary retention. urology following. voiding trial    problem 6: hypernatremia:  Resolved on IV fluid     Disposition :Palliative consulted for goals of care and further quality of life discussions given recurrent hospitalizations and overall poor prognosis, spoke to wife [ Ms.Jacqueline Salomon at [phone]. She wants full code. states he does not want DNR/DNI. she will come to hospital and will sign MOLST form. dced order for DNR. palliative follow up needed. 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 Placed on ventilator and vasopressor support. Septic shock resolved. No longer requiring vasopressors and s/p extubation and down grade from ICU to medicine hospitalist team on 12/20/17. Continues on cefepime for pseudomona aeruginosa UTI and enterovirus positive, Bcx negative for growth, leukocytosis trending down and remained afebrile. BP remained normotensive, pt continues with being tapered off of stress dose steroids. His renal function remains compromised but is slowly down trending and likely secondary to ATN from septic shock with underlying known hx of ckd stage 3, Patient is alert. maintaining saturation with NC.   puckett draining dark urine. seen by urology, to c/w puckett. seen by renal, IVF was increased and vanofer was added, CBC being monitored and remains stable. no ssx of active bleeding. In am, complaints of lower abd pain. abd NT,ND, PEG.      Problem/Plan - 1:  ·  Problem: Pseudomonas urinary tract infection.  Plan: - leukocytosis resolved, U cx pseudomonas, Bld cx negative , Abx  completed., c/w probiotic, Puckett in place for urinary retention and hematuria. no sign of retention no hematuria noted. dark urine noted. I and O noted, will do voiding trial. complaining of Lower abd pain. will repeat UA and UC. afebrile. abc wnl.       Problem/Plan - 2:  ·  Problem: Pneumonia, bacterial.  Plan:  CXR showing possible ARDS, repeat CXR pretty much same, Pulmonology dr Melvin is following    -c/w bronchodilators. albuterol and Mucomyst, fluid to 60ml/hr, on peg tube feeding, will monitor for aspiration, has completed course of antibiotics       Problem/Plan - 3:  ·  Problem: Acute on chronic renal failure.  Plan: underlying hx CKD stage 3 likely know KAPIL secondary to ATN from septic shock   Renal indices is improving on gentle hydration , now at 60ml/hr, baseline creatine around 1.7 per prior records, now creatinine is around 2.4, will continue to monitor.     problem 4: lower abd pain: better after flushing PEG after feeding. abd ND,NT. flush PEG with water after each feeding.   repeat UA from puckett showed UTI, UC pending. puckett dced. patient voided 3 times. unable to collect clean catch yet as penis was turtle ling. bladder scan showed 92 mls residual.   Needs repeat UA and UC as preious sample was sent from puckett.  on cefepime     Problem/Plan - 4:  ·  Problem: Cerebrovascular accident (CVA), unspecified mechanism.  Plan:  Right side hemiparesis. -c/w atorvastatin 10mg po qhs   Will initiate PT OT when patient is fully orientated.      Problem/Plan - 5:  ·  Problem: Atrial fibrillation.  Plan: Cardiology dr Donovan evaluated, doubt acute ischemia. Considering etiology of electrolyte imbalance and hypoxia.  Recommend beta blocker , statin , aspirin. seen by cardiology, started on amiodarone for PSVT. hematuria resolved. FOBT positive. coumadin on hold. needs to reassess by cardiology before DC and to restart coumadin. Hb stable.     Problem 6: GI bleed: FOBT positive. Hb dropped but stable.  GI eval  recommend against intervention procedure given patient poor physical condition except family agrees . Will transfuse to keep Hb at 7g.  on epoetin and IV iron     Problem/Plan - 6:  Problem: BPH (benign prostatic hyperplasia). Plan: -c/w proscar and flomax. Puckett in place for urinary retention. urology was consulted.     problem 6: hypernatremia:  Resolved on IV fluid     Problem 7: left UE swelling/erythema/induration: cellulitis, to r/o DVT: Doppler USg requested. elevate limbs. on cefepime which will cover. one stat dose of vanco given.     Disposition :Palliative consulted for goals of care and further quality of life discussions given recurrent hospitalizations and overall poor prognosis, spoke to wife [ Ms.Jacqueline Salomon at [phone]. She wants full code. states he does not want DNR/DNI. she will come to hospital and will sign MOLST form. dced order for DNR. palliative follow up needed.

## 2017-12-31 NOTE — CHART NOTE - NSCHARTNOTEFT_GEN_A_CORE
PA called by radiologist to report patient with positive DVT to left upper arm. Report: Thrombus in the distal left brachial and basilic veins in the distal left   upper arm extending into the antecubital fossa. Discussed patient with Dr. Clifton. Patient is without active bleed. As per Dr. Clifton's discussion with Dr. Silver, recommends to D/C heparin and administer full dose Lovenox x1 dose tonight. Pt to be f/u in AM. Continue to monitor patient and notify providers in any changes in patient status.

## 2017-12-31 NOTE — PROGRESS NOTE ADULT - SUBJECTIVE AND OBJECTIVE BOX
CRISTIN MYA    [id number]    77y      Male    Patient is a 77y old  Male who presents with a chief complaint of Found unresponsive in his vomit (18 Dec 2017 19:49)      INTERVAL HPI/OVERNIGHT EVENTS:    seen at bedside. incomprehensible sound.   UA showed UTI. cefepime was started. puckett removed after 24-48 hrs voiding trial. on condom catheter awaiting repeat UA and UC   LUE swelling and redness with some induration.  patient denied any pain/fever/CP/SOB/palpitationn/v. no headaches/neck stiffness.  more alert today      REVIEW OF SYSTEMS:  limited    CONSTITUTIONAL: No fever, has fatigue  RESPIRATORY: no shortness of breath,   CARDIOVASCULAR: No chest pain/palpitation  GI/: as per HPI  CNS: right sided residual weakness, no new weakness or numbness    MEDICATIONS  (STANDING):  acetylcysteine 20% Inhalation 3 milliLiter(s) Inhalation every 6 hours  ALBUTerol    0.083% 2.5 milliGRAM(s) Nebulizer every 4 hours  amiodarone    Tablet 400 milliGRAM(s) Oral daily  aspirin  chewable 81 milliGRAM(s) Oral daily  atorvastatin 10 milliGRAM(s) Oral at bedtime  cefepime  IVPB 500 milliGRAM(s) IV Intermittent every 24 hours  epoetin [place] Injectable 02726 Unit(s) SubCutaneous every 7 days  finasteride 5 milliGRAM(s) Oral daily  folic acid 1 milliGRAM(s) Enteral Tube daily  heparin  Injectable 5000 Unit(s) SubCutaneous every 12 hours  iron sucrose IVPB 250 milliGRAM(s) IV Intermittent daily  metoprolol     tartrate 25 milliGRAM(s) Oral three times a day  pantoprazole   Suspension 40 milliGRAM(s) Oral before breakfast  saccharomyces boulardii 250 milliGRAM(s) Oral two times a day  sodium chloride 0.225%. 1000 milliLiter(s) (60 mL/Hr) IV Continuous <Continuous>  tamsulosin 0.4 milliGRAM(s) Oral at bedtime  vancomycin  IVPB 1000 milliGRAM(s) IV Intermittent once    MEDICATIONS  (PRN):        PHYSICAL EXAM:    Vital Signs Last 24 Hrs  T(C): 36.6 (31 Dec 2017 09:35), Max: 36.7 (30 Dec 2017 16:40)  T(F): 97.8 (31 Dec 2017 09:35), Max: 98.1 (30 Dec 2017 16:40)  HR: 90 (31 Dec 2017 09:35) (90 - 108)  BP: 112/62 (31 Dec 2017 09:35) (110/58 - 141/62)  BP(mean): --  RR: 18 (31 Dec 2017 09:35) (18 - 20)  SpO2: 98% (31 Dec 2017 09:35) (94% - 98%))    GENERAL: ill looking elderly male , awake and alert  HEENT: anicteric, mild Pallor  NECK: Supple, No JVD   CHEST/LUNG: decrease air entry bilateral with some rales, no ronchi  HEART: S1S2 Normal intensity, no murmurs  ABDOMEN: Soft, BS +ve, NT, ND.   EXTREMITIES:  1+ radial and DP pulses noted, no calf tenderness  SKIN: No rashes or lesions noted  NEURO: Confused, lethargy, Right hemiparesis.  CN II-XII intact  PSYCH: Depressed  mood and affect      LABS:                                   7.6    11.7  )-----------( 187      ( 30 Dec 2017 08:03 )             25.4        12-30    141  |  103  |  63.0<H>  ----------------------------<  247<H>  4.0   |  22.0  |  2.57<H>    Ca    8.0<L>      30 Dec 2017 07:59  Mg     1.4     12-30      Magnesium, Serum in AM (12.30.17 @ 07:59)    Magnesium, Serum: 1.4 mg/dL    I&O's Summary    29 Dec 2017 07:01  -  30 Dec 2017 07:00  --------------------------------------------------------  IN: 200 mL / OUT: 2700 mL / NET: -2500 mL CRISTIN MYA    [id number]    77y      Male    Patient is a 77y old  Male who presents with a chief complaint of Found unresponsive in his vomit (18 Dec 2017 19:49)      INTERVAL HPI/OVERNIGHT EVENTS:    seen at bedside. incomprehensible sound.   UA showed UTI. cefepime was started. puckett removed after 24-48 hrs voiding trial. on condom catheter awaiting repeat UA and UC   LUE swelling and redness with some induration.  patient denied any pain/fever/CP/SOB/palpitationn/v. no headaches/neck stiffness.  more alert today      REVIEW OF SYSTEMS:  limited    CONSTITUTIONAL: No fever, has fatigue  RESPIRATORY: no shortness of breath,   CARDIOVASCULAR: No chest pain/palpitation  GI/: as per HPI  CNS: right sided residual weakness, no new weakness or numbness    MEDICATIONS  (STANDING):  acetylcysteine 20% Inhalation 3 milliLiter(s) Inhalation every 6 hours  ALBUTerol    0.083% 2.5 milliGRAM(s) Nebulizer every 4 hours  amiodarone    Tablet 400 milliGRAM(s) Oral daily  aspirin  chewable 81 milliGRAM(s) Oral daily  atorvastatin 10 milliGRAM(s) Oral at bedtime  cefepime  IVPB 500 milliGRAM(s) IV Intermittent every 24 hours  epoetin [place] Injectable 37208 Unit(s) SubCutaneous every 7 days  finasteride 5 milliGRAM(s) Oral daily  folic acid 1 milliGRAM(s) Enteral Tube daily  heparin  Injectable 5000 Unit(s) SubCutaneous every 12 hours  iron sucrose IVPB 250 milliGRAM(s) IV Intermittent daily  metoprolol     tartrate 25 milliGRAM(s) Oral three times a day  pantoprazole   Suspension 40 milliGRAM(s) Oral before breakfast  saccharomyces boulardii 250 milliGRAM(s) Oral two times a day  sodium chloride 0.225%. 1000 milliLiter(s) (60 mL/Hr) IV Continuous <Continuous>  tamsulosin 0.4 milliGRAM(s) Oral at bedtime  vancomycin  IVPB 1000 milliGRAM(s) IV Intermittent once    MEDICATIONS  (PRN):        PHYSICAL EXAM:    Vital Signs Last 24 Hrs  T(C): 36.6 (31 Dec 2017 09:35), Max: 36.7 (30 Dec 2017 16:40)  T(F): 97.8 (31 Dec 2017 09:35), Max: 98.1 (30 Dec 2017 16:40)  HR: 90 (31 Dec 2017 09:35) (90 - 108)  BP: 112/62 (31 Dec 2017 09:35) (110/58 - 141/62)  BP(mean): --  RR: 18 (31 Dec 2017 09:35) (18 - 20)  SpO2: 98% (31 Dec 2017 09:35) (94% - 98%))    GENERAL: ill looking elderly male , awake and alert  HEENT: anicteric, mild Pallor  NECK: Supple, No JVD   CHEST/LUNG: decrease air entry bilateral with some rales, no ronchi  HEART: S1S2 Normal intensity, no murmurs  ABDOMEN: Soft, BS +ve, NT, ND.   EXTREMITIES:  1+ radial and DP pulses noted, no calf tenderness. LUE swelling with some erythema and induration.   SKIN: No rashes or lesions noted  NEURO: Confused, lethargy, Right hemiparesis.  CN II-XII intact  PSYCH: Depressed  mood and affect      LABS:                                   7.6    11.7  )-----------( 187      ( 30 Dec 2017 08:03 )             25.4        12-30    141  |  103  |  63.0<H>  ----------------------------<  247<H>  4.0   |  22.0  |  2.57<H>    Ca    8.0<L>      30 Dec 2017 07:59  Mg     1.4     12-30      Magnesium, Serum in AM (12.30.17 @ 07:59)    Magnesium, Serum: 1.4 mg/dL    I&O's Summary    29 Dec 2017 07:01  -  30 Dec 2017 07:00  --------------------------------------------------------  IN: 200 mL / OUT: 2700 mL / NET: -2500 mL

## 2017-12-31 NOTE — PROGRESS NOTE ADULT - SUBJECTIVE AND OBJECTIVE BOX
NEPHROLOGY INTERVAL HPI/OVERNIGHT EVENTS:  pt resting when seen earlier  no acute distress noted    MEDICATIONS  (STANDING):  acetylcysteine 20% Inhalation 3 milliLiter(s) Inhalation every 6 hours  ALBUTerol    0.083% 2.5 milliGRAM(s) Nebulizer every 4 hours  amiodarone    Tablet 400 milliGRAM(s) Oral daily  aspirin  chewable 81 milliGRAM(s) Oral daily  atorvastatin 10 milliGRAM(s) Oral at bedtime  cefepime  IVPB 500 milliGRAM(s) IV Intermittent every 24 hours  epoetin [place] Injectable 86534 Unit(s) SubCutaneous every 7 days  finasteride 5 milliGRAM(s) Oral daily  folic acid 1 milliGRAM(s) Enteral Tube daily  heparin  Injectable 5000 Unit(s) SubCutaneous every 12 hours  iron sucrose IVPB 250 milliGRAM(s) IV Intermittent daily  metoprolol     tartrate 25 milliGRAM(s) Oral three times a day  pantoprazole    Tablet 40 milliGRAM(s) Oral before breakfast  saccharomyces boulardii 250 milliGRAM(s) Oral two times a day  sodium chloride 0.225%. 1000 milliLiter(s) (60 mL/Hr) IV Continuous <Continuous>  tamsulosin 0.4 milliGRAM(s) Oral at bedtime    MEDICATIONS  (PRN):      Allergies    penicillins (Hives)          Vital Signs Last 24 Hrs  T(C): 36.4 (31 Dec 2017 05:49), Max: 37.3 (30 Dec 2017 11:00)  T(F): 97.5 (31 Dec 2017 05:49), Max: 99.1 (30 Dec 2017 11:00)  HR: 106 (31 Dec 2017 05:49) (72 - 108)  BP: 141/62 (31 Dec 2017 05:49) (110/58 - 151/74)  BP(mean): --  RR: 20 (31 Dec 2017 05:49) (18 - 20)  SpO2: 97% (31 Dec 2017 08:46) (94% - 97%)    PHYSICAL EXAM:  NAD  Chest   clear; diminished BS at bases  CV   no murmur  Abd   soft, NT BS+  Ext   No edema  Neuro  Awake and alert; slow to respond    LABS:                        7.4    11.2  )-----------( 204      ( 31 Dec 2017 05:51 )             24.6     12-    140  |  105  |  52.0<H>  ----------------------------<  203<H>  4.1   |  23.0  |  2.49<H>      Ca    8.2<L>      31 Dec 2017 05:51  Mg     1.8         TPro  6.1<L>  /  Alb  2.5<L>  /  TBili  0.5  /  DBili  x   /  AST  16  /  ALT  26  /  AlkPhos  85        Urinalysis Basic - ( 30 Dec 2017 19:11 )    Color: Yellow / Appearance: Slightly Turbid / S.015 / pH: x  Gluc: x / Ketone: Negative  / Bili: Negative / Urobili: Negative mg/dL   Blood: x / Protein: 30 mg/dL / Nitrite: Positive   Leuk Esterase: Moderate / RBC: 25-50 /HPF / WBC 11-25   Sq Epi: x / Non Sq Epi: x / Bacteria: Moderate      Magnesium, Serum: 1.8 mg/dL ( @ 05:51)      RADIOLOGY & ADDITIONAL TESTS:

## 2017-12-31 NOTE — PROGRESS NOTE ADULT - ASSESSMENT
CRF(III): ARF and hypernatremia ==> improved  Systemic hypotension resolving  - avoid nephrotoxins  - free water via PEG  - monitor labs      Anemia: multifactorial  - cont GONZALES  - consider PRBCs  - added IV Fe

## 2018-01-01 DIAGNOSIS — R19.5 OTHER FECAL ABNORMALITIES: ICD-10-CM

## 2018-01-01 DIAGNOSIS — I82.629 ACUTE EMBOLISM AND THROMBOSIS OF DEEP VEINS OF UNSPECIFIED UPPER EXTREMITY: ICD-10-CM

## 2018-01-01 LAB
-  AMIKACIN: SIGNIFICANT CHANGE UP
-  AZTREONAM: SIGNIFICANT CHANGE UP
-  CEFEPIME: SIGNIFICANT CHANGE UP
-  CEFTAZIDIME: SIGNIFICANT CHANGE UP
-  CIPROFLOXACIN: SIGNIFICANT CHANGE UP
-  GENTAMICIN: SIGNIFICANT CHANGE UP
-  IMIPENEM: SIGNIFICANT CHANGE UP
-  LEVOFLOXACIN: SIGNIFICANT CHANGE UP
-  MEROPENEM: SIGNIFICANT CHANGE UP
-  PIPERACILLIN/TAZOBACTAM: SIGNIFICANT CHANGE UP
-  TOBRAMYCIN: SIGNIFICANT CHANGE UP
ALBUMIN SERPL ELPH-MCNC: 2.5 G/DL — LOW (ref 3.3–5.2)
ALP SERPL-CCNC: 96 U/L — SIGNIFICANT CHANGE UP (ref 40–120)
ALT FLD-CCNC: 27 U/L — SIGNIFICANT CHANGE UP
ANION GAP SERPL CALC-SCNC: 13 MMOL/L — SIGNIFICANT CHANGE UP (ref 5–17)
AST SERPL-CCNC: 18 U/L — SIGNIFICANT CHANGE UP
BILIRUB SERPL-MCNC: 0.6 MG/DL — SIGNIFICANT CHANGE UP (ref 0.4–2)
BUN SERPL-MCNC: 54 MG/DL — HIGH (ref 8–20)
CALCIUM SERPL-MCNC: 8.4 MG/DL — LOW (ref 8.6–10.2)
CHLORIDE SERPL-SCNC: 104 MMOL/L — SIGNIFICANT CHANGE UP (ref 98–107)
CO2 SERPL-SCNC: 23 MMOL/L — SIGNIFICANT CHANGE UP (ref 22–29)
CREAT SERPL-MCNC: 2.54 MG/DL — HIGH (ref 0.5–1.3)
CULTURE RESULTS: SIGNIFICANT CHANGE UP
GLUCOSE SERPL-MCNC: 164 MG/DL — HIGH (ref 70–115)
HCT VFR BLD CALC: 25.3 % — LOW (ref 42–52)
HGB BLD-MCNC: 7.5 G/DL — LOW (ref 14–18)
MCHC RBC-ENTMCNC: 29 PG — SIGNIFICANT CHANGE UP (ref 27–31)
MCHC RBC-ENTMCNC: 29.6 G/DL — LOW (ref 32–36)
MCV RBC AUTO: 97.7 FL — HIGH (ref 80–94)
METHOD TYPE: SIGNIFICANT CHANGE UP
ORGANISM # SPEC MICROSCOPIC CNT: SIGNIFICANT CHANGE UP
ORGANISM # SPEC MICROSCOPIC CNT: SIGNIFICANT CHANGE UP
PLATELET # BLD AUTO: 242 K/UL — SIGNIFICANT CHANGE UP (ref 150–400)
POTASSIUM SERPL-MCNC: 4.6 MMOL/L — SIGNIFICANT CHANGE UP (ref 3.5–5.3)
POTASSIUM SERPL-SCNC: 4.6 MMOL/L — SIGNIFICANT CHANGE UP (ref 3.5–5.3)
PROT SERPL-MCNC: 6.5 G/DL — LOW (ref 6.6–8.7)
RBC # BLD: 2.59 M/UL — LOW (ref 4.6–6.2)
RBC # FLD: 17.8 % — HIGH (ref 11–15.6)
SODIUM SERPL-SCNC: 140 MMOL/L — SIGNIFICANT CHANGE UP (ref 135–145)
SPECIMEN SOURCE: SIGNIFICANT CHANGE UP
WBC # BLD: 9.6 K/UL — SIGNIFICANT CHANGE UP (ref 4.8–10.8)
WBC # FLD AUTO: 9.6 K/UL — SIGNIFICANT CHANGE UP (ref 4.8–10.8)

## 2018-01-01 PROCEDURE — 99232 SBSQ HOSP IP/OBS MODERATE 35: CPT

## 2018-01-01 PROCEDURE — 99233 SBSQ HOSP IP/OBS HIGH 50: CPT

## 2018-01-01 RX ORDER — ENOXAPARIN SODIUM 100 MG/ML
80 INJECTION SUBCUTANEOUS DAILY
Qty: 0 | Refills: 0 | Status: DISCONTINUED | OUTPATIENT
Start: 2018-01-01 | End: 2018-01-03

## 2018-01-01 RX ADMIN — Medication 3 MILLILITER(S): at 03:35

## 2018-01-01 RX ADMIN — Medication 3 MILLILITER(S): at 15:22

## 2018-01-01 RX ADMIN — Medication 25 MILLIGRAM(S): at 06:05

## 2018-01-01 RX ADMIN — Medication 250 MILLIGRAM(S): at 17:45

## 2018-01-01 RX ADMIN — FINASTERIDE 5 MILLIGRAM(S): 5 TABLET, FILM COATED ORAL at 13:25

## 2018-01-01 RX ADMIN — ALBUTEROL 2.5 MILLIGRAM(S): 90 AEROSOL, METERED ORAL at 12:10

## 2018-01-01 RX ADMIN — ALBUTEROL 2.5 MILLIGRAM(S): 90 AEROSOL, METERED ORAL at 23:39

## 2018-01-01 RX ADMIN — Medication 25 MILLIGRAM(S): at 22:53

## 2018-01-01 RX ADMIN — PANTOPRAZOLE SODIUM 40 MILLIGRAM(S): 20 TABLET, DELAYED RELEASE ORAL at 06:05

## 2018-01-01 RX ADMIN — ALBUTEROL 2.5 MILLIGRAM(S): 90 AEROSOL, METERED ORAL at 15:22

## 2018-01-01 RX ADMIN — IRON SUCROSE 112.5 MILLIGRAM(S): 20 INJECTION, SOLUTION INTRAVENOUS at 13:34

## 2018-01-01 RX ADMIN — ALBUTEROL 2.5 MILLIGRAM(S): 90 AEROSOL, METERED ORAL at 03:34

## 2018-01-01 RX ADMIN — Medication 250 MILLIGRAM(S): at 06:05

## 2018-01-01 RX ADMIN — Medication 3 MILLILITER(S): at 08:40

## 2018-01-01 RX ADMIN — ALBUTEROL 2.5 MILLIGRAM(S): 90 AEROSOL, METERED ORAL at 20:42

## 2018-01-01 RX ADMIN — Medication 1 MILLIGRAM(S): at 13:31

## 2018-01-01 RX ADMIN — AMIODARONE HYDROCHLORIDE 400 MILLIGRAM(S): 400 TABLET ORAL at 06:11

## 2018-01-01 RX ADMIN — ALBUTEROL 2.5 MILLIGRAM(S): 90 AEROSOL, METERED ORAL at 08:41

## 2018-01-01 RX ADMIN — Medication 81 MILLIGRAM(S): at 13:31

## 2018-01-01 RX ADMIN — ATORVASTATIN CALCIUM 10 MILLIGRAM(S): 80 TABLET, FILM COATED ORAL at 22:53

## 2018-01-01 RX ADMIN — Medication 3 MILLILITER(S): at 20:42

## 2018-01-01 RX ADMIN — CEFEPIME 100 MILLIGRAM(S): 1 INJECTION, POWDER, FOR SOLUTION INTRAMUSCULAR; INTRAVENOUS at 23:14

## 2018-01-01 RX ADMIN — Medication 25 MILLIGRAM(S): at 14:30

## 2018-01-01 RX ADMIN — ERYTHROPOIETIN 20000 UNIT(S): 10000 INJECTION, SOLUTION INTRAVENOUS; SUBCUTANEOUS at 23:17

## 2018-01-01 NOTE — CONSULT NOTE ADULT - PROBLEM SELECTOR RECOMMENDATION 2
Thrombus in the distal left brachial and basilic veins in the distal left   upper arm extending into the antecubital fossa.  Lovenox anticoagulation recommended 1 mg q 12 with continued monitoring for GIB/ serial H/H.  -Hb remains stable.  Risks and benefits of anticoagulation discussed and patients wife electing anticoagulation.  She is aware of FOBT +and no current intervention/workup planned by GI.

## 2018-01-01 NOTE — PROGRESS NOTE ADULT - ASSESSMENT
this 77 y.o. man with recently received course of antibiotics for aspiration PNA and pseudomonas UTI. this 77 y.o. man with recently received course of antibiotics for aspiration PNA and pseudomonas UTI.   RECENT TURBID URINE IN CONTEXT OF MOORE RAISES CONCERN OF CAUTI.

## 2018-01-01 NOTE — PROCEDURE NOTE - NSPOSTCAREGUIDE_GEN_A_CORE
Care for catheter as per unit/ICU protocols
Verbal/written post procedure instructions were given to patient/caregiver/Keep the cast/splint/dressing clean and dry
Verbal/written post procedure instructions were given to patient/caregiver/Instructed patient/caregiver regarding signs and symptoms of infection/Keep the cast/splint/dressing clean and dry/Care for catheter as per unit/ICU protocols

## 2018-01-01 NOTE — PROGRESS NOTE ADULT - SUBJECTIVE AND OBJECTIVE BOX
[facility]  INFECTIOUS DISEASES AND INTERNAL MEDICINE at [place]  =======================================================  Terence Linares MD [facility] CenterLEONARD Mcneal MD  Diplomates American Board of Internal Medicine and Infectious Diseases  =======================================================    CRISTIN ROQUE [id number]  chief complaint: LEFT arm redness; recent pseudomonas     patient completed a course of cefepime on 17;  on  17 per notes, had lower abd pain and darker urine.  Urine cx sent by medical team and restarted on cefepime for presume CAUTI.      patient seen and examined in follow up.  Chart and labs reviewed.     =======================================================  Allergies:  penicillins (Hives)      =======================================================  Medications:  acetylcysteine 20% Inhalation 3 milliLiter(s) Inhalation every 6 hours  ALBUTerol    0.083% 2.5 milliGRAM(s) Nebulizer every 4 hours  amiodarone    Tablet 400 milliGRAM(s) Oral daily  aspirin  chewable 81 milliGRAM(s) Oral daily  atorvastatin 10 milliGRAM(s) Oral at bedtime  cefepime  IVPB 500 milliGRAM(s) IV Intermittent every 24 hours  epoetin [place] Injectable 52157 Unit(s) SubCutaneous every 7 days  finasteride 5 milliGRAM(s) Oral daily  folic acid 1 milliGRAM(s) Enteral Tube daily  metoprolol     tartrate 25 milliGRAM(s) Oral three times a day  pantoprazole   Suspension 40 milliGRAM(s) Oral before breakfast  saccharomyces boulardii 250 milliGRAM(s) Oral two times a day  sodium chloride 0.225%. 1000 milliLiter(s) IV Continuous <Continuous>  tamsulosin 0.4 milliGRAM(s) Oral at bedtime        =======================================================     REVIEW OF SYSTEMS:  CONSTITUTIONAL:  No Fever or chills  HEENT:   No diplopia or blurred vision.  No earache, sore throat or runny nose.  CARDIOVASCULAR:  No pressure, squeezing, strangling, tightness, heaviness or aching about the chest, neck, axilla or epigastrium.  RESPIRATORY:  No cough, shortness of breath, PND or orthopnea.  GASTROINTESTINAL:  No nausea, vomiting or diarrhea.  GENITOURINARY:  No dysuria, frequency or urgency. No Blood in urine  MUSCULOSKELETAL:  no joint aches, no muscle pain  SKIN:  No change in skin, hair or nails.  NEUROLOGIC:  No paresthesias, fasciculations, seizures or weakness.  PSYCHIATRIC:  No disorder of thought or mood.  ENDOCRINE:  No heat or cold intolerance, polyuria or polydipsia.  HEMATOLOGICAL:  No easy bruising or bleeding.     =======================================================    Physical Exam:    Vital Signs Last 24 Hrs  T(C): 36.3 (2018 10:48), Max: 36.6 (31 Dec 2017 16:25)  T(F): 97.3 (2018 10:48), Max: 97.9 (31 Dec 2017 16:25)  HR: 102 (2018 10:48) (88 - 102)  BP: 100/60 (2018 13:44) (100/60 - 118/62)  BP(mean): --  RR: 20 (2018 10:48) (18 - 20)  SpO2: 96% (2018 12:12) (95% - 98%)  GEN: NAD, pleasant  HEENT: normocephalic and atraumatic. EOMI. ESTRELLA.    NECK: Supple. No carotid bruits.  No lymphadenopathy or thyromegaly.  LUNGS: Clear to auscultation.  HEART: Regular rate and rhythm without murmur.  ABDOMEN: Soft, nontender, and nondistended.  Positive bowel sounds.    : No CVA tenderness  EXTREMITIES: Without any cyanosis, clubbing, rash, lesions or edema.  MSK: no joint swelling  NEUROLOGIC: Cranial nerves II through XII are grossly intact.  PSYCHIATRIC: Appropriate affect .  SKIN: No ulceration or induration present.    =======================================================    Labs:      140  |  104  |  54.0<H>  ----------------------------<  164<H>  4.6   |  23.0  |  2.54<H>    Ca    8.4<L>      2018 11:25  Mg     1.8     12-31    TPro  6.5<L>  /  Alb  2.5<L>  /  TBili  0.6  /  DBili  x   /  AST  18  /  ALT  27  /  AlkPhos  96                            7.5    9.6   )-----------( 242      ( 2018 11:25 )             25.3         Urinalysis Basic - ( 30 Dec 2017 19:11 )    Color: Yellow / Appearance: Slightly Turbid / S.015 / pH: x  Gluc: x / Ketone: Negative  / Bili: Negative / Urobili: Negative mg/dL   Blood: x / Protein: 30 mg/dL / Nitrite: Positive   Leuk Esterase: Moderate / RBC: 25-50 /HPF / WBC 11-25   Sq Epi: x / Non Sq Epi: x / Bacteria: Moderate      LIVER FUNCTIONS - ( 2018 11:25 )  Alb: 2.5 g/dL / Pro: 6.5 g/dL / ALK PHOS: 96 U/L / ALT: 27 U/L / AST: 18 U/L / GGT: x               CAPILLARY BLOOD GLUCOSE         RECENT CULTURES:   @ 19:11 .Urine Catheterized     >100,000 CFU/ml Gram Negative Rods Identification and susceptibility to  follow.  Culture in progress          @ 20:00          Detected   @ 16:02 .Urine Clean Catch (Midstream) Pseudomonas aeruginosa    >100,000 CFU/ml Pseudomonas aeruginosa         @ 15:48 .Blood Blood-Peripheral     No growth at 5 days.         @ 15:46 .Blood Blood-Peripheral     No growth at 5 days. [facility]  INFECTIOUS DISEASES AND INTERNAL MEDICINE at [place]  =======================================================  Terence Linares MD [facility]   Froylan Mcneal MD  Diplomates American Board of Internal Medicine and Infectious Diseases  =======================================================    CRISTIN ROQUE [id number]  chief complaint: LEFT arm redness; recent pseudomonas     patient completed a course of cefepime on 17;  on  17 per notes, had lower abd pain and darker urine.  Urine cx sent by medical team and restarted on cefepime for presume CAUTI.   pt's wife provided hx.  he had clearing up of urine in the puckett tube after the first course of antibiotics.  Recently noted to become cloudy again.  eventually puckett removed but could not collect adequate specimen.   no fevers, no flank pain.    patient seen and examined in follow up.  Chart and labs reviewed.     =======================================================  Allergies:  penicillins (Hives)      =======================================================  Medications:  acetylcysteine 20% Inhalation 3 milliLiter(s) Inhalation every 6 hours  ALBUTerol    0.083% 2.5 milliGRAM(s) Nebulizer every 4 hours  amiodarone    Tablet 400 milliGRAM(s) Oral daily  aspirin  chewable 81 milliGRAM(s) Oral daily  atorvastatin 10 milliGRAM(s) Oral at bedtime  cefepime  IVPB 500 milliGRAM(s) IV Intermittent every 24 hours  epoetin [place] Injectable 87378 Unit(s) SubCutaneous every 7 days  finasteride 5 milliGRAM(s) Oral daily  folic acid 1 milliGRAM(s) Enteral Tube daily  metoprolol     tartrate 25 milliGRAM(s) Oral three times a day  pantoprazole   Suspension 40 milliGRAM(s) Oral before breakfast  saccharomyces boulardii 250 milliGRAM(s) Oral two times a day  sodium chloride 0.225%. 1000 milliLiter(s) IV Continuous <Continuous>  tamsulosin 0.4 milliGRAM(s) Oral at bedtime        =======================================================     REVIEW OF SYSTEMS:  CONSTITUTIONAL:  No Fever or chills  HEENT:   No diplopia or blurred vision.  No earache, sore throat or runny nose.  CARDIOVASCULAR:  No pressure, squeezing, strangling, tightness, heaviness or aching about the chest, neck, axilla or epigastrium.  RESPIRATORY:  No cough, shortness of breath, PND or orthopnea.  GASTROINTESTINAL:  No nausea, vomiting or diarrhea.  GENITOURINARY:  No dysuria, frequency or urgency. No Blood in urine  MUSCULOSKELETAL:  no joint aches, no muscle pain  SKIN:  No change in skin, hair or nails.  NEUROLOGIC:  No paresthesias, fasciculations, seizures or weakness.  PSYCHIATRIC:  No disorder of thought or mood.  ENDOCRINE:  No heat or cold intolerance, polyuria or polydipsia.  HEMATOLOGICAL:  No easy bruising or bleeding.     =======================================================    Physical Exam:    Vital Signs Last 24 Hrs  T(C): 36.3 (2018 10:48), Max: 36.6 (31 Dec 2017 16:25)  T(F): 97.3 (2018 10:48), Max: 97.9 (31 Dec 2017 16:25)  HR: 102 (2018 10:48) (88 - 102)  BP: 100/60 (2018 13:44) (100/60 - 118/62)    RR: 20 (2018 10:48) (18 - 20)  SpO2: 96% (2018 12:12) (95% - 98%)  GEN: NAD, pleasant  HEENT: normocephalic and atraumatic. EOMI. ESTRELLA.    NECK: Supple. No carotid bruits.  No lymphadenopathy or thyromegaly.  LUNGS: Clear to auscultation.  HEART: Regular rate and rhythm without murmur.  ABDOMEN: Soft, nontender, and nondistended.  Positive bowel sounds.   + PEG in place  : No CVA tenderness; NO SUPRAPUBIC TENDERNESS  EXTREMITIES: Without any cyanosis, clubbing, rash, lesions or edema.  MSK: no joint swelling  NEUROLOGIC: Cranial nerves II through XII are grossly intact.  PSYCHIATRIC: Appropriate affect .  SKIN: No ulceration or induration present.    =======================================================    Labs:      140  |  104  |  54.0<H>  ----------------------------<  164<H>  4.6   |  23.0  |  2.54<H>    Ca    8.4<L>      2018 11:25  Mg     1.8     12-31    TPro  6.5<L>  /  Alb  2.5<L>  /  TBili  0.6  /  DBili  x   /  AST  18  /  ALT  27  /  AlkPhos  96                            7.5    9.6   )-----------( 242      ( 2018 11:25 )             25.3         Urinalysis Basic - ( 30 Dec 2017 19:11 )    Color: Yellow / Appearance: Slightly Turbid / S.015 / pH: x  Gluc: x / Ketone: Negative  / Bili: Negative / Urobili: Negative mg/dL   Blood: x / Protein: 30 mg/dL / Nitrite: Positive   Leuk Esterase: Moderate / RBC: 25-50 /HPF / WBC 11-25   Sq Epi: x / Non Sq Epi: x / Bacteria: Moderate      LIVER FUNCTIONS - ( 2018 11:25 )  Alb: 2.5 g/dL / Pro: 6.5 g/dL / ALK PHOS: 96 U/L / ALT: 27 U/L / AST: 18 U/L / GGT: x               CAPILLARY BLOOD GLUCOSE         RECENT CULTURES:   @ 19:11 .Urine Catheterized     >100,000 CFU/ml Gram Negative Rods Identification and susceptibility to  follow.  Culture in progress          @ 20:00          Detected   @ 16:02 .Urine Clean Catch (Midstream) Pseudomonas aeruginosa    >100,000 CFU/ml Pseudomonas aeruginosa         @ 15:48 .Blood Blood-Peripheral     No growth at 5 days.         @ 15:46 .Blood Blood-Peripheral     No growth at 5 days.

## 2018-01-01 NOTE — PROGRESS NOTE ADULT - SUBJECTIVE AND OBJECTIVE BOX
PULMONARY PROGRESS NOTE      CRISTIN ROQUEGRADY-[id number]    Patient is a 77y old  Male who presents with a chief complaint of Found unresponsive in his vomit (18 Dec 2017 19:49)      INTERVAL HPI/OVERNIGHT EVENTS: On NCO2. Awake and alert. No resp distress. Getting TFs. Difficulty expectorating.     MEDICATIONS  (STANDING):  acetylcysteine 20% Inhalation 3 milliLiter(s) Inhalation every 6 hours  ALBUTerol    0.083% 2.5 milliGRAM(s) Nebulizer every 4 hours  amiodarone    Tablet 400 milliGRAM(s) Oral daily  aspirin  chewable 81 milliGRAM(s) Oral daily  atorvastatin 10 milliGRAM(s) Oral at bedtime  cefepime  IVPB 500 milliGRAM(s) IV Intermittent every 24 hours  epoetin [place] Injectable 57625 Unit(s) SubCutaneous every 7 days  finasteride 5 milliGRAM(s) Oral daily  folic acid 1 milliGRAM(s) Enteral Tube daily  iron sucrose IVPB 250 milliGRAM(s) IV Intermittent daily  metoprolol     tartrate 25 milliGRAM(s) Oral three times a day  pantoprazole   Suspension 40 milliGRAM(s) Oral before breakfast  saccharomyces boulardii 250 milliGRAM(s) Oral two times a day  sodium chloride 0.225%. 1000 milliLiter(s) (60 mL/Hr) IV Continuous <Continuous>  tamsulosin 0.4 milliGRAM(s) Oral at bedtime      MEDICATIONS  (PRN):      Allergies    penicillins (Hives)    Intolerances        PAST MEDICAL & SURGICAL HISTORY:  CAD (coronary artery disease)  Afib  CVA (cerebral vascular accident)  Mitral valve replaced  BPH (benign prostatic hyperplasia)  High cholesterol  HTN (hypertension)  H/O heart valve replacement with mechanical valve  S/P CABG x 1  H/O colectomy           REVIEW OF SYSTEMS:    unable to get complete ROS    Vital Signs Last 24 Hrs  T(C): 36.3 (01 Jan 2018 10:48), Max: 36.6 (31 Dec 2017 16:25)  T(F): 97.3 (01 Jan 2018 10:48), Max: 97.9 (31 Dec 2017 16:25)  HR: 102 (01 Jan 2018 10:48) (88 - 102)  BP: 103/63 (01 Jan 2018 10:48) (103/63 - 118/62)  BP(mean): --  RR: 20 (01 Jan 2018 10:48) (18 - 20)  SpO2: 96% (01 Jan 2018 09:04) (95% - 98%)    PHYSICAL EXAMINATION:    GENERAL: The patient is awake and alert in no apparent distress.     HEENT: Head is normocephalic and atraumatic. Extraocular muscles are intact. Mucous membranes are moist.    NECK: Supple.    LUNGS: Scattered rhonchi, respirations unlabored    HEART: Regular rate and rhythm      ABDOMEN: Soft, nontender, and nondistended.      EXTREMITIES: Without any cyanosis, clubbing, rash, lesions or edema.    NEUROLOGIC: Grossly intact.    LABS:                        7.5    9.6   )-----------( 242      ( 01 Jan 2018 11:25 )             25.3     01-01    140  |  104  |  54.0<H>  ----------------------------<  164<H>  4.6   |  23.0  |  2.54<H>    Ca    8.4<L>      01 Jan 2018 11:25  Mg     1.8     12-31    TPro  6.5<L>  /  Alb  2.5<L>  /  TBili  0.6  /  DBili  x   /  AST  18  /  ALT  27  /  AlkPhos  96  01-01         MICROBIOLOGY:  Culture - Urine (12.30.17 @ 19:11)    Specimen Source: .Urine Catheterized    Culture Results:   >100,000 CFU/ml Gram Negative Rods Identification and susceptibility to  follow.  Culture in progress

## 2018-01-01 NOTE — CONSULT NOTE ADULT - SUBJECTIVE AND OBJECTIVE BOX
HPI:  76 y/o male pmhx CVA ( 2 years ago and in 2017) w/ residual right upper and lower extremity weakness, s/p PEG,  afib on coumadin, s/p colon resection with ileostomy 8 years ago, s/p CBAGx3,  prostate cancer s/p TURP 3 years ago was BIBEMS after wife found him unresponsive with vomit around his face. Wife states patient had an increase in SOB past 2 days and noticed he was becoming more weak.  Pt was discharged 2 days ago from rehab center and has had a decrease in appetite since he's been home according to his wife and son. Wife states she has been noticing that after PEG feeding he coughs a lot and she compares it to him choking.  She states he has been getting feed through his PEG as well as liquids by mouth which he has been tolerating. On arrival patient was unresponsive and hypoxic.       PAST MEDICAL & SURGICAL HISTORY:  CAD (coronary artery disease)  Afib  CVA (cerebral vascular accident)  Mitral valve replaced  BPH (benign prostatic hyperplasia)  High cholesterol  HTN (hypertension)  H/O heart valve replacement with mechanical valve  S/P CABG x 1  H/O colectomy      MEDICATIONS  (STANDING):  acetylcysteine 20% Inhalation 3 milliLiter(s) Inhalation every 6 hours  ALBUTerol    0.083% 2.5 milliGRAM(s) Nebulizer every 4 hours  amiodarone    Tablet 400 milliGRAM(s) Oral daily  aspirin  chewable 81 milliGRAM(s) Oral daily  atorvastatin 10 milliGRAM(s) Oral at bedtime  cefepime  IVPB 500 milliGRAM(s) IV Intermittent every 24 hours  epoetin [place] Injectable 65184 Unit(s) SubCutaneous every 7 days  finasteride 5 milliGRAM(s) Oral daily  folic acid 1 milliGRAM(s) Enteral Tube daily  metoprolol     tartrate 25 milliGRAM(s) Oral three times a day  pantoprazole   Suspension 40 milliGRAM(s) Oral before breakfast  saccharomyces boulardii 250 milliGRAM(s) Oral two times a day  sodium chloride 0.225%. 1000 milliLiter(s) (60 mL/Hr) IV Continuous <Continuous>  tamsulosin 0.4 milliGRAM(s) Oral at bedtime    MEDICATIONS  (PRN):      Allergies    penicillins (Hives)    Intolerances        FAMILY HISTORY:  No pertinent family history in first degree relatives      Review of Systems    Constitutional, Eyes, ENT, Cardiovascular, Respiratory, Gastrointestinal, Genitourinary, Musculoskeletal, Integumentary, Neurological, Psychiatric, Endocrine, Heme/Lymph and Allergic/Immunologic review of systems are otherwise negative except as noted in HPI.     Vital Signs Last 24 Hrs  T(C): 36.3 (2018 10:48), Max: 36.6 (31 Dec 2017 22:00)  T(F): 97.3 (2018 10:48), Max: 97.8 (31 Dec 2017 22:00)  HR: 102 (2018 10:48) (88 - 102)  BP: 100/60 (2018 13:44) (100/60 - 118/62)  BP(mean): --  RR: 20 (2018 10:48) (18 - 20)  SpO2: 98% (2018 15:24) (95% - 98%)    Physical Exam  Constitutional: well developed, well nourished, in no acute distress and thin.   Eyes: PERRL, EOMI, no conjunctival infection, anicteric.   ENT: pharynx is unremarkable, moist mucus membrane, no oral lesions.   Neck: supple without JVD, no thyromegaly or masses appreciated.   Pulmonary: clear to auscultation bilaterally, no dullness, no wheezing.   Cardiac: RRR, normal S1S2, no murmurs, rubs, gallops.   Vascular: no JVD, no calf tenderness, venous stasis changes, varices.   Abdomen: normoactive bowel sounds, soft and nontender, no hepatosplenomegaly or masses appreciated.   Lymphatic: no peripheral adenopathy appreciated.   Musculoskeletal:  LUE swelling with some erythema and induration.   Skin: normal appearance, no rash, nodules, vesicles, ulcers, erythema.   Neurology: grossly intact.   Psychiatric: affect appropriate.       LABS:  CBC Full  -  ( 2018 11:25 )  WBC Count : 9.6 K/uL  Hemoglobin : 7.5 g/dL  Hematocrit : 25.3 %  Platelet Count - Automated : 242 K/uL  Mean Cell Volume : 97.7 fl  Mean Cell Hemoglobin : 29.0 pg  Mean Cell Hemoglobin Concentration : 29.6 g/dL  Auto Neutrophil # : x  Auto Lymphocyte # : x  Auto Monocyte # : x  Auto Eosinophil # : x  Auto Basophil # : x  Auto Neutrophil % : x  Auto Lymphocyte % : x  Auto Monocyte % : x  Auto Eosinophil % : x  Auto Basophil % : x        140  |  104  |  54.0<H>  ----------------------------<  164<H>  4.6   |  23.0  |  2.54<H>    Ca    8.4<L>      2018 11:25  Mg     1.8         TPro  6.5<L>  /  Alb  2.5<L>  /  TBili  0.6  /  DBili  x   /  AST  18  /  ALT  27  /  AlkPhos  96        Urinalysis Basic - ( 30 Dec 2017 19:11 )    Color: Yellow / Appearance: Slightly Turbid / S.015 / pH: x  Gluc: x / Ketone: Negative  / Bili: Negative / Urobili: Negative mg/dL   Blood: x / Protein: 30 mg/dL / Nitrite: Positive   Leuk Esterase: Moderate / RBC: 25-50 /HPF / WBC 11-25   Sq Epi: x / Non Sq Epi: x / Bacteria: Moderate        RADIOLOGY & ADDITIONAL STUDIES:  < from: US Duplex Venous Upper Ext Ltd, Left (12.31.17 @ 20:01) >  PROCEDURE DATE:  2017          INTERPRETATION:  CLINICAL INFORMATION: Left arm swelling and redness.    COMPARISON: None available.    TECHNIQUE: Duplex sonography of the LEFT UPPER extremity with color and   spectral Doppler, with and without compression.      FINDINGS:    Intraluminal echogenicity with lack of compression and lack of color flow   is seen in the distal left brachial vein and basilic vein in the distal   upper arm extending into the antecubital fossa compatible with an   occlusive thrombus. The distal left basilic vein in the forearm is patent.    The left internal jugular, subclavian, axillary, proximal brachial,   radial, ulnar and cephalic veins are patent and compressible where   applicable. Doppler examination shows normal spontaneous and phasic flow.    IMPRESSION:     Thrombus in the distal left brachial and basilic veins in the distal left   upper arm extending into the antecubital fossa. HPI:  78 y/o male pmhx CVA ( 2 years ago and in 2017) w/ residual right upper and lower extremity weakness, s/p PEG,  afib on coumadin, s/p colon resection with ileostomy 8 years ago, s/p CBAGx3,  prostate cancer s/p TURP 3 years ago was BIBEMS after wife found him unresponsive with vomit around his face. Wife states patient had an increase in SOB past 2 days and noticed he was becoming more weak.  Pt was discharged 2 days ago from rehab center and has had a decrease in appetite since he's been home according to his wife and son. Wife states she has been noticing that after PEG feeding he coughs a lot and she compares it to him choking.  She states he has been getting feed through his PEG as well as liquids by mouth which he has been tolerating. On arrival patient was unresponsive and hypoxic.       PAST MEDICAL & SURGICAL HISTORY:  CAD (coronary artery disease)  Afib  CVA (cerebral vascular accident)  Mitral valve replaced  BPH (benign prostatic hyperplasia)  High cholesterol  HTN (hypertension)  H/O heart valve replacement with mechanical valve  S/P CABG x 1  H/O colectomy      MEDICATIONS  (STANDING):  acetylcysteine 20% Inhalation 3 milliLiter(s) Inhalation every 6 hours  ALBUTerol    0.083% 2.5 milliGRAM(s) Nebulizer every 4 hours  amiodarone    Tablet 400 milliGRAM(s) Oral daily  aspirin  chewable 81 milliGRAM(s) Oral daily  atorvastatin 10 milliGRAM(s) Oral at bedtime  cefepime  IVPB 500 milliGRAM(s) IV Intermittent every 24 hours  epoetin [place] Injectable 08038 Unit(s) SubCutaneous every 7 days  finasteride 5 milliGRAM(s) Oral daily  folic acid 1 milliGRAM(s) Enteral Tube daily  metoprolol     tartrate 25 milliGRAM(s) Oral three times a day  pantoprazole   Suspension 40 milliGRAM(s) Oral before breakfast  saccharomyces boulardii 250 milliGRAM(s) Oral two times a day  sodium chloride 0.225%. 1000 milliLiter(s) (60 mL/Hr) IV Continuous <Continuous>  tamsulosin 0.4 milliGRAM(s) Oral at bedtime    MEDICATIONS  (PRN):      Allergies    penicillins (Hives)    Intolerances        FAMILY HISTORY:  No pertinent family history in first degree relatives      Review of Systems    Constitutional, Eyes, ENT, Cardiovascular, Respiratory, Gastrointestinal, Genitourinary, Musculoskeletal, Integumentary, Neurological, Psychiatric, Endocrine, Heme/Lymph and Allergic/Immunologic review of systems are otherwise negative except as noted in HPI.     Vital Signs Last 24 Hrs  T(C): 36.3 (2018 10:48), Max: 36.6 (31 Dec 2017 22:00)  T(F): 97.3 (2018 10:48), Max: 97.8 (31 Dec 2017 22:00)  HR: 102 (2018 10:48) (88 - 102)  BP: 100/60 (2018 13:44) (100/60 - 118/62)  BP(mean): --  RR: 20 (2018 10:48) (18 - 20)  SpO2: 98% (2018 15:24) (95% - 98%)      LABS:  CBC Full  -  ( 2018 11:25 )  WBC Count : 9.6 K/uL  Hemoglobin : 7.5 g/dL  Hematocrit : 25.3 %  Platelet Count - Automated : 242 K/uL  Mean Cell Volume : 97.7 fl  Mean Cell Hemoglobin : 29.0 pg  Mean Cell Hemoglobin Concentration : 29.6 g/dL  Auto Neutrophil # : x  Auto Lymphocyte # : x  Auto Monocyte # : x  Auto Eosinophil # : x  Auto Basophil # : x  Auto Neutrophil % : x  Auto Lymphocyte % : x  Auto Monocyte % : x  Auto Eosinophil % : x  Auto Basophil % : x        140  |  104  |  54.0<H>  ----------------------------<  164<H>  4.6   |  23.0  |  2.54<H>    Ca    8.4<L>      2018 11:25  Mg     1.8     12-31    TPro  6.5<L>  /  Alb  2.5<L>  /  TBili  0.6  /  DBili  x   /  AST  18  /  ALT  27  /  AlkPhos  96        Urinalysis Basic - ( 30 Dec 2017 19:11 )    Color: Yellow / Appearance: Slightly Turbid / S.015 / pH: x  Gluc: x / Ketone: Negative  / Bili: Negative / Urobili: Negative mg/dL   Blood: x / Protein: 30 mg/dL / Nitrite: Positive   Leuk Esterase: Moderate / RBC: 25-50 /HPF / WBC 11-25   Sq Epi: x / Non Sq Epi: x / Bacteria: Moderate        RADIOLOGY & ADDITIONAL STUDIES:  < from: US Duplex Venous Upper Ext Ltd, Left (12.31.17 @ 20:01) >  PROCEDURE DATE:  2017          INTERPRETATION:  CLINICAL INFORMATION: Left arm swelling and redness.    COMPARISON: None available.    TECHNIQUE: Duplex sonography of the LEFT UPPER extremity with color and   spectral Doppler, with and without compression.      FINDINGS:    Intraluminal echogenicity with lack of compression and lack of color flow   is seen in the distal left brachial vein and basilic vein in the distal   upper arm extending into the antecubital fossa compatible with an   occlusive thrombus. The distal left basilic vein in the forearm is patent.    The left internal jugular, subclavian, axillary, proximal brachial,   radial, ulnar and cephalic veins are patent and compressible where   applicable. Doppler examination shows normal spontaneous and phasic flow.    IMPRESSION:     Thrombus in the distal left brachial and basilic veins in the distal left   upper arm extending into the antecubital fossa.

## 2018-01-01 NOTE — PROGRESS NOTE ADULT - ASSESSMENT
76 y/o male pmhx CVA (2 years ago and in July 17) w/ 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 Placed on ventilator and vasopressor support. Septic shock resolved. No longer requiring vasopressors and s/p extubation and down grade from ICU to medicine hospitalist team on 12/20/17. Continues on cefepime for pseudomona aeruginosa UTI and enterovirus positive, Bcx negative for growth, leukocytosis trending down and remained afebrile. BP remained normotensive, pt was tapered off of stress dose steroids. His renal function remains compromised but is slowly down trending and likely secondary to ATN from septic shock with underlying known hx of ckd stage 3, Patient is alert. maintaining saturation with NC.   puckett removed. no retension. no hematuria. no active bleed at this time. on lovenox for DVT. risk of bleeding very high. patient and family was informed. consented for PRBC transfusion as needed. hematology cx called. ID follow up was called for recurrence of UTI     Problem/Plan - 1:  ·  Problem: Pseudomonas urinary tract infection. recurred  Suspected CaUTI, catheter removed yesterday, incontinent. could not collect enough clean catch sample. may need straight cath to collect sample for repeat UA and UC. on cefepime.  no sign of retention no hematuria noted. dark urine noted. I and O noted, f/u UC. ID f/u appreciated      Problem/Plan - 2:  ·  Problem: Pneumonia, bacterial.  Plan:  CXR showing possible ARDS, repeat CXR pretty much same, Pulmonology dr Melvin is following    -c/w bronchodilators. albuterol and Mucomyst, fluid to 60ml/hr, on peg tube feeding, will monitor for aspiration, has completed course of antibiotics . again on cefepime for UTI     Problem/Plan - 3:  ·  Problem: Acute on chronic renal failure.  Plan: underlying hx CKD  likely know KAPIL secondary to ATN from septic shock   Renal indices is fluctuating , now at 60ml/hr, baseline creatine around 1.7 per prior records, will continue to monitor. Renal f/u    Problem 4: left arm DVt, anemia: c/w lovenox. start warfarin if no bleeding and family and patient continues to agree. watch for bleeding. monitor h/h everyday/ PRBC transfusion as needed. hematology cx called.     problem 5: lower abd pain: improved     Problem/Plan - 4:  ·  Problem: Cerebrovascular accident (CVA), unspecified mechanism.  Plan:  Right side hemiparesis. -c/w atorvastatin 10mg po qhs   Will initiate PT OT when patient is fully orientated.      Problem/Plan - 5:  ·  Problem: Atrial fibrillation.  Plan: Cardiology dr Donovan evaluated, doubt acute ischemia. Considering etiology of electrolyte imbalance and hypoxia.  Recommend beta blocker , statin , aspirin. seen by cardiology, started on amiodarone for PSVT. hematuria resolved. FOBT positive. coumadin on hold. needs to reassess by cardiology before DC and to restart coumadin. Hb stable. currently on lovenox for DVT left arm.     Problem 6: GI bleed: FOBT positive. Hb dropped but stable.  GI eval  recommend against intervention procedure given patient poor physical condition except family agrees . Will transfuse to keep Hb at 7g.  on epoetin and IV iron. patient and family does not want any invasive GI procedure.     Problem/Plan - 7:  Problem: BPH (benign prostatic hyperplasia). Plan: -c/w proscar and flomax. Puckett in place for urinary retention. urology was consulted.     problem 8: hypernatremia:  Resolved on IV fluid       Disposition :Palliative consulted for goals of care and further quality of life discussions given recurrent hospitalizations and overall poor prognosis, spoke to wife [ Ms.Jacqueline Salomon at [phone]. She wants full code. states he does not want DNR/DNI. she will fill out and sign MOLST form. dced order for DNR. palliative follow up needed.

## 2018-01-01 NOTE — PROGRESS NOTE ADULT - ASSESSMENT
-Aspiration pna  -UIT  -Hypoxia  -Sepsis    RECC:  Abx. TFs. Repeat CXR. Mucolytic. O2. Nebs. -Aspiration pna  -UIT  -Hypoxia  -Sepsis  -UE DVT    RECC:  Abx. TFs. Repeat CXR. Mucolytic. O2. Nebs. DVT mgmt per PMT.

## 2018-01-01 NOTE — CONSULT NOTE ADULT - ASSESSMENT
78 y/o male with multiple medical comorbidites including CVA ( 2 years ago and in July 17) w/ residual right upper and lower extremity weakness, s/p PEG,  afib on coumadin, s/p colon resection with ileostomy 8 years ago, s/p CBAGx3,  prostate cancer s/p TURP 3 years ago was BIBEMS after wife found him unresponsive with vomit around his face. Pt admitted with septic shock 2/2 to aspiration pneumonia, hypoxic respiratory failure,  and KAPIL.  Consult called for LUE DVT.

## 2018-01-01 NOTE — PROGRESS NOTE ADULT - SUBJECTIVE AND OBJECTIVE BOX
CRISTIN ROQUE    [id number]    77y      Male    Patient is a 77y old  Male who presents with a chief complaint of Found unresponsive in his vomit (18 Dec 2017 19:49)      INTERVAL HPI/OVERNIGHT EVENTS:    seen at bedside. incomprehensible sound.   denied complaints. wife and other family members at bedside.   Venous doppler positive for DVT. s/p one dose of lovenox last night.   FOBT positive few days ago. currently no active bleed. seen by GI. not a candidate for any invasive procedure. patient and  wife agreed. She took him to his GI doctor on august, suggested the same. Rn reported no blood in urine or stool. no melena. wife reported h/o blood transfusion atleast 6 times in the past due to hematuria. he was on coumadin for Afib.  seen by cardiology. coumadin was held for hematuria and GIB. discussed with patient and his wife at bedside about current problems and risk ad benefit of lovenox/coumadin with ASA. they want to continue all if needed taking the risk of life threatening bleeding.wants to continue anticoag for recent clot  and heart condition. not sure about taking coumadin for stroke prevention only. hematology cx called. left arm redness and swelling is getting better  Seen by ID and suggested to c/w cefepime.   wife discussed with patient and signed blood transfusion for, in case he needed transfusion. risk and benefit discussed    REVIEW OF SYSTEMS:  CONSTITUTIONAL: No fever, has fatigue  RESPIRATORY: no shortness of breath,   CARDIOVASCULAR: No chest pain/palpitation  GI/: no abd pain/n/v/d/c. no hematochezia or melena. no dysuria  CNS: right sided residual weakness, no new weakness or numbness    MEDICATIONS  (STANDING):  acetylcysteine 20% Inhalation 3 milliLiter(s) Inhalation every 6 hours  ALBUTerol    0.083% 2.5 milliGRAM(s) Nebulizer every 4 hours  amiodarone    Tablet 400 milliGRAM(s) Oral daily  aspirin  chewable 81 milliGRAM(s) Oral daily  atorvastatin 10 milliGRAM(s) Oral at bedtime  cefepime  IVPB 500 milliGRAM(s) IV Intermittent every 24 hours  enoxaparin Injectable 80 milliGRAM(s) SubCutaneous daily  epoetin [place] Injectable 02198 Unit(s) SubCutaneous every 7 days  finasteride 5 milliGRAM(s) Oral daily  folic acid 1 milliGRAM(s) Enteral Tube daily  metoprolol     tartrate 25 milliGRAM(s) Oral three times a day  pantoprazole   Suspension 40 milliGRAM(s) Oral before breakfast  saccharomyces boulardii 250 milliGRAM(s) Oral two times a day  sodium chloride 0.225%. 1000 milliLiter(s) (60 mL/Hr) IV Continuous <Continuous>  tamsulosin 0.4 milliGRAM(s) Oral at bedtime    MEDICATIONS  (PRN):        PHYSICAL EXAM:    Vital Signs Last 24 Hrs  T(C): 36.3 (01 Jan 2018 10:48), Max: 36.6 (31 Dec 2017 22:00)  T(F): 97.3 (01 Jan 2018 10:48), Max: 97.8 (31 Dec 2017 22:00)  HR: 102 (01 Jan 2018 10:48) (88 - 102)  BP: 100/60 (01 Jan 2018 13:44) (100/60 - 118/62)  BP(mean): --  RR: 20 (01 Jan 2018 10:48) (18 - 20)  SpO2: 98% (01 Jan 2018 15:24) (95% - 98%)    GENERAL: ill looking elderly male , awake and alert  HEENT: anicteric, mild Pallor  NECK: Supple, No JVD   CHEST/LUNG: decrease air entry bilateral with some rales, no ronchi  HEART: S1S2 Normal intensity, no murmurs  ABDOMEN: Soft, BS +ve, NT, ND.   EXTREMITIES:  1+ radial and DP pulses noted, no calf tenderness. LUE swelling with some erythema and induration, improving  SKIN: No rashes. small laceration from SQ injection sites oozing blood, stopped  NEURO: Confused, lethargy, Right hemiparesis.  CN II-XII intact  PSYCH: Depressed  mood and affect. anxious      LABS:                                              7.5    9.6   )-----------( 242      ( 01 Jan 2018 11:25 )             25.3       01-01    140  |  104  |  54.0<H>  ----------------------------<  164<H>  4.6   |  23.0  |  2.54<H>    Ca    8.4<L>      01 Jan 2018 11:25  Mg     1.8     12-31    TPro  6.5<L>  /  Alb  2.5<L>  /  TBili  0.6  /  DBili  x   /  AST  18  /  ALT  27  /  AlkPhos  96  01-01    I&O's Summary    31 Dec 2017 07:01  -  01 Jan 2018 07:00  --------------------------------------------------------  IN: 810 mL / OUT: 425 mL / NET: 385 mL    01 Jan 2018 07:01  -  01 Jan 2018 18:27  --------------------------------------------------------  IN: 150 mL / OUT: 0 mL / NET: 150 mL      Culture - Urine (collected 30 Dec 2017 19:11)  Source: .Urine Catheterized  Final Report (01 Jan 2018 16:38):    >100,000 CFU/ml Pseudomonas aeruginosa  Organism: Pseudomonas aeruginosa (01 Jan 2018 16:38)  Organism: Pseudomonas aeruginosa (01 Jan 2018 16:38)

## 2018-01-02 LAB
ALBUMIN SERPL ELPH-MCNC: 2.4 G/DL — LOW (ref 3.3–5.2)
ALP SERPL-CCNC: 108 U/L — SIGNIFICANT CHANGE UP (ref 40–120)
ALT FLD-CCNC: 28 U/L — SIGNIFICANT CHANGE UP
ANION GAP SERPL CALC-SCNC: 13 MMOL/L — SIGNIFICANT CHANGE UP (ref 5–17)
APTT BLD: 22.7 SEC — LOW (ref 27.5–37.4)
AST SERPL-CCNC: 30 U/L — SIGNIFICANT CHANGE UP
BILIRUB SERPL-MCNC: 0.7 MG/DL — SIGNIFICANT CHANGE UP (ref 0.4–2)
BUN SERPL-MCNC: 53 MG/DL — HIGH (ref 8–20)
CALCIUM SERPL-MCNC: 8.4 MG/DL — LOW (ref 8.6–10.2)
CHLORIDE SERPL-SCNC: 102 MMOL/L — SIGNIFICANT CHANGE UP (ref 98–107)
CO2 SERPL-SCNC: 22 MMOL/L — SIGNIFICANT CHANGE UP (ref 22–29)
CREAT SERPL-MCNC: 2.48 MG/DL — HIGH (ref 0.5–1.3)
GLUCOSE SERPL-MCNC: 204 MG/DL — HIGH (ref 70–115)
HCT VFR BLD CALC: 24.9 % — LOW (ref 42–52)
HGB BLD-MCNC: 7.2 G/DL — LOW (ref 14–18)
INR BLD: 1.31 RATIO — HIGH (ref 0.88–1.16)
LDH SERPL L TO P-CCNC: 464 U/L — HIGH (ref 98–192)
MCHC RBC-ENTMCNC: 28.9 G/DL — LOW (ref 32–36)
MCHC RBC-ENTMCNC: 28.9 PG — SIGNIFICANT CHANGE UP (ref 27–31)
MCV RBC AUTO: 100 FL — HIGH (ref 80–94)
NT-PROBNP SERPL-SCNC: HIGH PG/ML (ref 0–300)
OB PNL STL: POSITIVE
PLATELET # BLD AUTO: 257 K/UL — SIGNIFICANT CHANGE UP (ref 150–400)
POTASSIUM SERPL-MCNC: 5 MMOL/L — SIGNIFICANT CHANGE UP (ref 3.5–5.3)
POTASSIUM SERPL-SCNC: 5 MMOL/L — SIGNIFICANT CHANGE UP (ref 3.5–5.3)
PROT SERPL-MCNC: 6.5 G/DL — LOW (ref 6.6–8.7)
PROTHROM AB SERPL-ACNC: 14.5 SEC — HIGH (ref 9.8–12.7)
RBC # BLD: 2.49 M/UL — LOW (ref 4.6–6.2)
RBC # BLD: 2.49 M/UL — LOW (ref 4.6–6.2)
RBC # FLD: 18.5 % — HIGH (ref 11–15.6)
RETICS #: 12.2 K/UL — LOW (ref 25–125)
RETICS/RBC NFR: 4.9 % — HIGH (ref 0.5–2.5)
SODIUM SERPL-SCNC: 137 MMOL/L — SIGNIFICANT CHANGE UP (ref 135–145)
WBC # BLD: 8.5 K/UL — SIGNIFICANT CHANGE UP (ref 4.8–10.8)
WBC # FLD AUTO: 8.5 K/UL — SIGNIFICANT CHANGE UP (ref 4.8–10.8)

## 2018-01-02 PROCEDURE — 99221 1ST HOSP IP/OBS SF/LOW 40: CPT

## 2018-01-02 PROCEDURE — 99233 SBSQ HOSP IP/OBS HIGH 50: CPT

## 2018-01-02 PROCEDURE — 93306 TTE W/DOPPLER COMPLETE: CPT | Mod: 26

## 2018-01-02 PROCEDURE — 71045 X-RAY EXAM CHEST 1 VIEW: CPT | Mod: 26

## 2018-01-02 PROCEDURE — 99232 SBSQ HOSP IP/OBS MODERATE 35: CPT

## 2018-01-02 RX ORDER — SUCRALFATE 1 G
1 TABLET ORAL
Qty: 0 | Refills: 0 | Status: DISCONTINUED | OUTPATIENT
Start: 2018-01-02 | End: 2018-01-09

## 2018-01-02 RX ORDER — CIPROFLOXACIN LACTATE 400MG/40ML
250 VIAL (ML) INTRAVENOUS EVERY 24 HOURS
Qty: 0 | Refills: 0 | Status: COMPLETED | OUTPATIENT
Start: 2018-01-02 | End: 2018-01-05

## 2018-01-02 RX ORDER — CIPROFLOXACIN LACTATE 400MG/40ML
250 VIAL (ML) INTRAVENOUS EVERY 24 HOURS
Qty: 0 | Refills: 0 | Status: DISCONTINUED | OUTPATIENT
Start: 2018-01-02 | End: 2018-01-02

## 2018-01-02 RX ORDER — PANTOPRAZOLE SODIUM 20 MG/1
40 TABLET, DELAYED RELEASE ORAL DAILY
Qty: 0 | Refills: 0 | Status: DISCONTINUED | OUTPATIENT
Start: 2018-01-02 | End: 2018-01-03

## 2018-01-02 RX ORDER — WARFARIN SODIUM 2.5 MG/1
1 TABLET ORAL ONCE
Qty: 0 | Refills: 0 | Status: COMPLETED | OUTPATIENT
Start: 2018-01-02 | End: 2018-01-02

## 2018-01-02 RX ADMIN — Medication 250 MILLIGRAM(S): at 18:36

## 2018-01-02 RX ADMIN — Medication 3 MILLILITER(S): at 15:17

## 2018-01-02 RX ADMIN — Medication 3 MILLILITER(S): at 03:28

## 2018-01-02 RX ADMIN — ALBUTEROL 2.5 MILLIGRAM(S): 90 AEROSOL, METERED ORAL at 20:21

## 2018-01-02 RX ADMIN — Medication 250 MILLIGRAM(S): at 06:16

## 2018-01-02 RX ADMIN — ALBUTEROL 2.5 MILLIGRAM(S): 90 AEROSOL, METERED ORAL at 08:38

## 2018-01-02 RX ADMIN — ALBUTEROL 2.5 MILLIGRAM(S): 90 AEROSOL, METERED ORAL at 15:18

## 2018-01-02 RX ADMIN — Medication 3 MILLILITER(S): at 08:38

## 2018-01-02 RX ADMIN — Medication 1 MILLIGRAM(S): at 13:06

## 2018-01-02 RX ADMIN — ENOXAPARIN SODIUM 80 MILLIGRAM(S): 100 INJECTION SUBCUTANEOUS at 13:12

## 2018-01-02 RX ADMIN — Medication 25 MILLIGRAM(S): at 06:12

## 2018-01-02 RX ADMIN — ALBUTEROL 2.5 MILLIGRAM(S): 90 AEROSOL, METERED ORAL at 12:05

## 2018-01-02 RX ADMIN — Medication 250 MILLIGRAM(S): at 18:35

## 2018-01-02 RX ADMIN — AMIODARONE HYDROCHLORIDE 400 MILLIGRAM(S): 400 TABLET ORAL at 06:12

## 2018-01-02 RX ADMIN — Medication 25 MILLIGRAM(S): at 13:06

## 2018-01-02 RX ADMIN — PANTOPRAZOLE SODIUM 40 MILLIGRAM(S): 20 TABLET, DELAYED RELEASE ORAL at 06:12

## 2018-01-02 RX ADMIN — FINASTERIDE 5 MILLIGRAM(S): 5 TABLET, FILM COATED ORAL at 13:06

## 2018-01-02 RX ADMIN — ALBUTEROL 2.5 MILLIGRAM(S): 90 AEROSOL, METERED ORAL at 03:27

## 2018-01-02 RX ADMIN — Medication 81 MILLIGRAM(S): at 13:06

## 2018-01-02 RX ADMIN — Medication 1 GRAM(S): at 18:37

## 2018-01-02 NOTE — PROGRESS NOTE ADULT - ASSESSMENT
-Aspiration pna / UTI  hypoxemia, currently oxygenating adequately on nasal cannula  small R eff minimal   UE DVT -brachial vein distal , with anemia and guaiac positive stools  needs vascular input given site and risk  will obtain echocardiogram  prn transfusion  d/c IVF  follow CXR  prognosis guarded

## 2018-01-02 NOTE — PROGRESS NOTE ADULT - SUBJECTIVE AND OBJECTIVE BOX
NEPHROLOGY INTERVAL HPI/OVERNIGHT EVENTS:  pt clinically the same  no acute distress  lying flat w/o difficulty    MEDICATIONS  (STANDING):  acetylcysteine 20% Inhalation 3 milliLiter(s) Inhalation every 6 hours  ALBUTerol    0.083% 2.5 milliGRAM(s) Nebulizer every 4 hours  amiodarone    Tablet 400 milliGRAM(s) Oral daily  aspirin  chewable 81 milliGRAM(s) Oral daily  atorvastatin 10 milliGRAM(s) Oral at bedtime  cefepime  IVPB 500 milliGRAM(s) IV Intermittent every 24 hours  enoxaparin Injectable 80 milliGRAM(s) SubCutaneous daily  epoetin [place] Injectable 18572 Unit(s) SubCutaneous every 7 days  finasteride 5 milliGRAM(s) Oral daily  folic acid 1 milliGRAM(s) Enteral Tube daily  metoprolol     tartrate 25 milliGRAM(s) Oral three times a day  pantoprazole   Suspension 40 milliGRAM(s) Oral before breakfast  saccharomyces boulardii 250 milliGRAM(s) Oral two times a day  sodium chloride 0.225%. 1000 milliLiter(s) (60 mL/Hr) IV Continuous <Continuous>  tamsulosin 0.4 milliGRAM(s) Oral at bedtime    MEDICATIONS  (PRN):      Allergies    penicillins (Hives)          Vital Signs Last 24 Hrs  T(C): 36.6 (02 Jan 2018 06:08), Max: 37 (01 Jan 2018 16:24)  T(F): 97.9 (02 Jan 2018 06:08), Max: 98.6 (01 Jan 2018 16:24)  HR: 91 (02 Jan 2018 06:08) (91 - 103)  BP: 113/64 (02 Jan 2018 06:08) (100/60 - 113/64)  BP(mean): --  RR: 20 (02 Jan 2018 06:08) (18 - 20)  SpO2: 97% (02 Jan 2018 08:41) (93% - 98%)    PHYSICAL EXAM:    NAD  Chest   clear; diminished BS at bases  CV   no murmur  Abd   soft, NT BS+  Ext   No edema  Neuro  Awake and alert; slow to respond    LABS:                        7.2    8.5   )-----------( 257      ( 02 Jan 2018 07:19 )             24.9     01-02    137  |  102  |  53.0<H>  ----------------------------<  204<H>  5.0   |  22.0  |  2.48<H>    Creatinine, Serum: 2.54 mg/dL (01.01.18 @ 11:25)        Ca    8.4<L>      02 Jan 2018 07:19    TPro  6.5<L>  /  Alb  2.4<L>  /  TBili  0.7  /  DBili  x   /  AST  30  /  ALT  28  /  AlkPhos  108  01-02    PT/INR - ( 02 Jan 2018 07:19 )   PT: 14.5 sec;   INR: 1.31 ratio         PTT - ( 02 Jan 2018 07:19 )  PTT:22.7 sec        RADIOLOGY & ADDITIONAL TESTS:

## 2018-01-02 NOTE — PROGRESS NOTE ADULT - ASSESSMENT
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 Placed on ventilator and vasopressor support. Septic shock resolved. No longer requiring vasopressors and s/p extubation and down grade from ICU to medicine hospitalist team on 12/20/17. Continues on cefepime for pseudomona aeruginosa UTI and enterovirus positive, Bcx negative for growth, leukocytosis trending down and remained afebrile. BP remained normotensive, pt was tapered off of stress dose steroids. His renal function remains compromised but is slowly down trending and likely secondary to ATN from septic shock with underlying known hx of ckd stage 3, Patient is alert. maintaining saturation with NC.   puckett removed. no retension. no hematuria. no active bleed at this time. on lovenox for DVT. risk of bleeding very high. patient and family was informed. consented for PRBC transfusion as needed. hematology cx called. ID follow up was called for recurrence of UTI     Problem/Plan - 1:  ·  Problem: Pseudomonas urinary tract infection. recurred  Suspected CaUTI, catheter removed, incontinent. on Cefepime. can DC with Cipro [ as per ID] mild fever. no leucocytosis      Problem/Plan - 2:  ·  Problem: Pneumonia, bacterial.  Plan:  CXR showing possible ARDS, repeat CXR imrpoving, Pulmonology dr Melvin is following    -c/w bronchodilators. albuterol and Mucomyst, fluid to 60ml/hr, on peg tube feeding, will monitor for aspiration, has completed course of antibiotics . again on cefepime for UTI     Problem/Plan - 3:  ·  Problem: Acute on chronic renal failure.  Plan: underlying hx CKD  likely know KAPIL secondary to ATN from septic shock   Renal indices is fluctuating , now at 60ml/hr, baseline creatine around 1.7 per prior records, will continue to monitor. Renal f/u appreciated  CXR showed new rt effusion. Na normalized. will DC IVF. on PEG feeding/free water    Problem 4: left arm DVt, anemia: c/w lovenox. start warfarin and slowly titrate. monitor INR daily. watch for bleeding. f/u hematology cx    problem 5: lower abd pain: improved     Problem/Plan - 4:  ·  Problem: Cerebrovascular accident (CVA), unspecified mechanism.  Plan:  Right side hemiparesis. -c/w atorvastatin 10mg po qhs   Will initiate PT OT when patient is fully orientated.      Problem/Plan - 5:  ·  Problem: Atrial fibrillation.  Plan: Cardiology dr Donovan evaluated, doubt acute ischemia. Considering etiology of electrolyte imbalance and hypoxia.  Recommend beta blocker , statin , aspirin. seen by cardiology, started on amiodarone for PSVT. hematuria resolved. FOBT positive. Hb stable. currently on lovenox and coumadin being started from [facility] for DVT left arm. patietn wants to continue for atleast for 3 motnhs then will decide about further continuation for prophylaxis for stroke prevention. high risk for stroke, h/o CVA     Problem 6: GI bleed: FOBT positive. Hb dropped but stable.  GI eval  recommend against intervention procedure given patient poor physical condition except family agrees . Will transfuse to keep Hb at 7g.  on epoetin and IV iron. patient and family does not want any invasive GI procedure.     Problem/Plan - 7:  Problem: BPH (benign prostatic hyperplasia). Plan: -c/w proscar and flomax. Puckett in place for urinary retention. urology was consulted.     problem 8: hypernatremia:  Resolved on IV fluid       Disposition :Palliative consulted for goals of care and further quality of life discussions given recurrent hospitalizations and overall poor prognosis, spoke to wife [ Ms.Sissy Salomon at [phone]. She wants full code. states he does not want DNR/DNI. she will fill out and sign MOLST form. dced order for DNR. palliative follow up needed. 76 y/o male pmhx CVA (2 years ago and in July 17) w/ 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 Placed on ventilator and vasopressor support. Septic shock resolved. No longer requiring vasopressors and s/p extubation and down grade from ICU to medicine hospitalist team on 12/20/17. Continues on cefepime for pseudomona aeruginosa UTI and enterovirus positive, Bcx negative for growth, leukocytosis trending down and remained afebrile. BP remained normotensive, pt was tapered off of stress dose steroids. His renal function remains compromised but is slowly down trending and likely secondary to ATN from septic shock with underlying known hx of ckd stage 3, Patient is alert. maintaining saturation with NC.   puckett removed. no retension. no hematuria. no active bleed at this time. on lovenox for DVT. risk of bleeding very high. patient and family was informed. consented for PRBC transfusion as needed. hematology cx called. ID follow up was called for recurrence of UTI     Problem/Plan - 1:  ·  Problem: Pseudomonas urinary tract infection. recurred  Suspected CaUTI, catheter removed, incontinent. on Cefepime. can DC with Cipro [ as per ID] mild fever. no leucocytosis      Problem/Plan - 2:  ·  Problem: Pneumonia, bacterial.  Plan:  CXR showing possible ARDS, repeat CXR imrpoving, Pulmonology dr Melvin is following    -c/w bronchodilators. albuterol and Mucomyst, fluid to 60ml/hr, on peg tube feeding, will monitor for aspiration, has completed course of antibiotics . again on cefepime for UTI     Problem/Plan - 3:  ·  Problem: Acute on chronic renal failure.  Plan: underlying hx CKD  likely know KAPLI secondary to ATN from septic shock   Renal indices is fluctuating , now at 60ml/hr, baseline creatine around 1.7 per prior records, will continue to monitor. Renal f/u appreciated  CXR showed new rt effusion. Na normalized. will DC IVF. on PEG feeding/free water    Problem 4: left arm DVt, anemia: c/w lovenox. start warfarin and slowly titrate. monitor INR daily. watch for bleeding. f/u hematology cx. vascular cx appreciated.     problem 5: lower abd pain: improved     Problem/Plan - 4:  ·  Problem: Cerebrovascular accident (CVA), unspecified mechanism.  Plan:  Right side hemiparesis. -c/w atorvastatin 10mg po qhs   Will initiate PT OT when patient is fully orientated.      Problem/Plan - 5:  ·  Problem: Atrial fibrillation.  Plan: Cardiology dr Donovan evaluated, doubt acute ischemia. Considering etiology of electrolyte imbalance and hypoxia.  Recommend beta blocker , statin , aspirin. seen by cardiology, started on amiodarone for PSVT. hematuria resolved. FOBT positive. Hb stable. currently on lovenox and coumadin being started [facility] for DVT left arm. patietn wants to continue for atleast for 3 motnhs then will decide about further continuation for prophylaxis for stroke prevention. high risk for stroke, h/o CVA     Problem 6: GI bleed: FOBT positive. Hb dropped but stable.  GI eval  recommend against intervention procedure given patient poor physical condition except family agrees . Will transfuse to keep Hb at 7g.  on epoetin and IV iron. patient and family does not want any invasive GI procedure.     Problem/Plan - 7:  Problem: BPH (benign prostatic hyperplasia). Plan: -c/w proscar and flomax. Puckett in place for urinary retention. urology was consulted.     problem 8: hypernatremia:  Resolved on IV fluid       Disposition :Palliative consulted for goals of care and further quality of life discussions given recurrent hospitalizations and overall poor prognosis, spoke to wife [ Ms.Jacqueline Salomon at [phone]. She wants full code. states he does not want DNR/DNI. she will fill out and sign MOLST form. dced order for DNR. palliative follow up needed.

## 2018-01-02 NOTE — PROGRESS NOTE ADULT - SUBJECTIVE AND OBJECTIVE BOX
PULMONARY PROGRESS NOTE      [id number]    Patient is a 77y old  Male who presents with a chief complaint of Found unresponsive in his vomit (18 Dec 2017 19:49)      INTERVAL HPI/OVERNIGHT EVENTS:  denies sob or chest arnold  MEDICATIONS  (STANDING):  acetylcysteine 20% Inhalation 3 milliLiter(s) Inhalation every 6 hours  ALBUTerol    0.083% 2.5 milliGRAM(s) Nebulizer every 4 hours  amiodarone    Tablet 400 milliGRAM(s) Oral daily  aspirin  chewable 81 milliGRAM(s) Oral daily  atorvastatin 10 milliGRAM(s) Oral at bedtime  ciprofloxacin     Tablet 250 milliGRAM(s) Oral every 24 hours  enoxaparin Injectable 80 milliGRAM(s) SubCutaneous daily  epoetin [place] Injectable 19773 Unit(s) SubCutaneous every 7 days  finasteride 5 milliGRAM(s) Oral daily  folic acid 1 milliGRAM(s) Enteral Tube daily  metoprolol     tartrate 25 milliGRAM(s) Oral three times a day  pantoprazole   Suspension 40 milliGRAM(s) Oral daily  saccharomyces boulardii 250 milliGRAM(s) Oral two times a day  sucralfate 1 Gram(s) Oral four times a day  tamsulosin 0.4 milliGRAM(s) Oral at bedtime  warfarin 1 milliGRAM(s) Oral once      MEDICATIONS  (PRN):      Allergies    penicillins (Hives)    Intolerances        PAST MEDICAL & SURGICAL HISTORY:  CAD (coronary artery disease)  Afib  CVA (cerebral vascular accident)  Mitral valve replaced  BPH (benign prostatic hyperplasia)  High cholesterol  HTN (hypertension)  H/O heart valve replacement with mechanical valve  S/P CABG x 1  H/O colectomy      SOCIAL HISTORY  Smoking History:       REVIEW OF SYSTEMS:    CONSTITUTIONAL:  No distress,    HEENT:  Eyes:  No diplopia or blurred vision. ENT:  No earache, sore throat or runny nose.    CARDIOVASCULAR:  No pressure, squeezing, tightness, heaviness or aching about the chest; no palpitations.    RESPIRATORY:  see hpi    GASTROINTESTINAL:  No nausea, vomiting or diarrhea.    GENITOURINARY:  No dysuria, frequency or urgency.    NEUROLOGIC:  No paresthesias, fasciculations, seizures or weakness.    PSYCHIATRIC:  No disorder of thought or mood.    Vital Signs Last 24 Hrs  T(C): 37.5 (02 Jan 2018 08:00), Max: 37.5 (02 Jan 2018 08:00)  T(F): 99.5 (02 Jan 2018 08:00), Max: 99.5 (02 Jan 2018 08:00)  HR: 84 (02 Jan 2018 08:00) (84 - 103)  BP: 106/70 (02 Jan 2018 08:00) (105/68 - 113/64)  BP(mean): --  RR: 16 (02 Jan 2018 08:00) (16 - 20)  SpO2: 97% (02 Jan 2018 08:41) (93% - 98%)    PHYSICAL EXAMINATION:    GENERAL: The patient is awake and alert in no apparent distress.     HEENT: Head is normocephalic and atraumatic. Extraocular muscles are intact. Mucous membranes are moist.    NECK: Supple.    LUNGS: moderate air entry bilat; respirations mildly increased    HEART: Regular rate and rhythm without murmur.    ABDOMEN: Soft, nontender, and nondistended.      EXTREMITIES: Without any cyanosis, clubbing, rash, lesions or edema.    NEUROLOGIC: Grossly intact.    LABS:                        7.2    8.5   )-----------( 257      ( 02 Jan 2018 07:19 )             24.9     01-02    137  |  102  |  53.0<H>  ----------------------------<  204<H>  5.0   |  22.0  |  2.48<H>    Ca    8.4<L>      02 Jan 2018 07:19    TPro  6.5<L>  /  Alb  2.4<L>  /  TBili  0.7  /  DBili  x   /  AST  30  /  ALT  28  /  AlkPhos  108  01-02    PT/INR - ( 02 Jan 2018 07:19 )   PT: 14.5 sec;   INR: 1.31 ratio         PTT - ( 02 Jan 2018 07:19 )  PTT:22.7 sec                    MICROBIOLOGY:    RADIOLOGY & ADDITIONAL STUDIES:  cxr reviewed and compared

## 2018-01-02 NOTE — PROGRESS NOTE ADULT - PROBLEM SELECTOR PLAN 1
- FOLLOW UP ON URINE CULTURE  - CONTINUE CEFEPIME 500MG q24h - on CEFEPIME; will change to CIPRO 250mg PO via PEG x 4 days.

## 2018-01-02 NOTE — PROGRESS NOTE ADULT - ASSESSMENT
this 77 y.o. man with recently received course of antibiotics for aspiration PNA and pseudomonas UTI.   RECENT TURBID URINE, CAUTI - culture with pseudomonas - pan-susceptible,

## 2018-01-02 NOTE — PROGRESS NOTE ADULT - SUBJECTIVE AND OBJECTIVE BOX
[facility]  INFECTIOUS DISEASES AND INTERNAL MEDICINE at [place]  =======================================================  Terence Linares MD [facility]isc[facility] HealthLEONARD Mcneal MD  Diplomates American Board of Internal Medicine and Infectious Diseases  =======================================================    CRISTIN ROQUE [id number]  chief complaint:  pseudomonas UTI     patient seen and examined in follow up.  Chart and labs reviewed.   Soriano susceptible Pseudomonas in urine culture.        =======================================================  Allergies:  penicillins (Hives)      =======================================================  Medications:  acetylcysteine 20% Inhalation 3 milliLiter(s) Inhalation every 6 hours  ALBUTerol    0.083% 2.5 milliGRAM(s) Nebulizer every 4 hours  amiodarone    Tablet 400 milliGRAM(s) Oral daily  aspirin  chewable 81 milliGRAM(s) Oral daily  atorvastatin 10 milliGRAM(s) Oral at bedtime  cefepime  IVPB 500 milliGRAM(s) IV Intermittent every 24 hours  enoxaparin Injectable 80 milliGRAM(s) SubCutaneous daily  epoetin [place] Injectable 97822 Unit(s) SubCutaneous every 7 days  finasteride 5 milliGRAM(s) Oral daily  folic acid 1 milliGRAM(s) Enteral Tube daily  metoprolol     tartrate 25 milliGRAM(s) Oral three times a day  pantoprazole   Suspension 40 milliGRAM(s) Oral before breakfast  saccharomyces boulardii 250 milliGRAM(s) Oral two times a day  sodium chloride 0.225%. 1000 milliLiter(s) IV Continuous <Continuous>  tamsulosin 0.4 milliGRAM(s) Oral at bedtime       =======================================================     REVIEW OF SYSTEMS:  CONSTITUTIONAL:  No Fever or chills  HEENT:   No diplopia or blurred vision.  No earache, sore throat or runny nose.  CARDIOVASCULAR:  No pressure, squeezing, strangling, tightness, heaviness or aching about the chest, neck, axilla or epigastrium.  RESPIRATORY:  No cough, shortness of breath, PND or orthopnea.  GASTROINTESTINAL:  No nausea, vomiting or diarrhea.  GENITOURINARY:  No dysuria, frequency or urgency. No Blood in urine  MUSCULOSKELETAL:  no joint aches, no muscle pain  SKIN:  No change in skin, hair or nails.  NEUROLOGIC:  No paresthesias, fasciculations, seizures or weakness.  PSYCHIATRIC:  No disorder of thought or mood.  ENDOCRINE:  No heat or cold intolerance, polyuria or polydipsia.  HEMATOLOGICAL:  No easy bruising or bleeding.     =======================================================    Physical Exam:    Vital Signs Last 24 Hrs  T(C): 37.5 (02 Jan 2018 08:00), Max: 37.5 (02 Jan 2018 08:00)  T(F): 99.5 (02 Jan 2018 08:00), Max: 99.5 (02 Jan 2018 08:00)  HR: 84 (02 Jan 2018 08:00) (84 - 103)  BP: 106/70 (02 Jan 2018 08:00) (100/60 - 113/64)  BP(mean): --  RR: 16 (02 Jan 2018 08:00) (16 - 20)  SpO2: 97% (02 Jan 2018 08:41) (93% - 98%)      GEN: NAD, pleasant  HEENT: normocephalic and atraumatic. EOMI. ESTRELLA.    NECK: Supple. No carotid bruits.  No lymphadenopathy or thyromegaly.  LUNGS: Clear to auscultation.  HEART: Regular rate and rhythm without murmur.  ABDOMEN: Soft, nontender, and nondistended.  Positive bowel sounds.   + PEG in place  : No CVA tenderness; NO SUPRAPUBIC TENDERNESS  EXTREMITIES: Without any cyanosis, clubbing, rash, lesions or edema.  MSK: no joint swelling  NEUROLOGIC: Cranial nerves II through XII are grossly intact.  PSYCHIATRIC: Appropriate affect .  SKIN: No ulceration or induration present.      =======================================================    Labs:  01-02    137  |  102  |  53.0<H>  ----------------------------<  204<H>  5.0   |  22.0  |  2.48<H>    Ca    8.4<L>      02 Jan 2018 07:19    TPro  6.5<L>  /  Alb  2.4<L>  /  TBili  0.7  /  DBili  x   /  AST  30  /  ALT  28  /  AlkPhos  108  01-02                          7.2    8.5   )-----------( 257      ( 02 Jan 2018 07:19 )             24.9       PT/INR - ( 02 Jan 2018 07:19 )   PT: 14.5 sec;   INR: 1.31 ratio         PTT - ( 02 Jan 2018 07:19 )  PTT:22.7 sec    LIVER FUNCTIONS - ( 02 Jan 2018 07:19 )  Alb: 2.4 g/dL / Pro: 6.5 g/dL / ALK PHOS: 108 U/L / ALT: 28 U/L / AST: 30 U/L / GGT: x                  RECENT CULTURES:  12-30 @ 19:11 .Urine Catheterized Pseudomonas aeruginosa    >100,000 CFU/ml Pseudomonas aeruginosa        12-19 @ 20:00          Detected

## 2018-01-02 NOTE — CONSULT NOTE ADULT - SUBJECTIVE AND OBJECTIVE BOX
Vascular Attending:        HPI:  78 y/o male pmhx CVA ( 2 years ago and in July 17) w/ residual right upper and lower extremity weakness, s/p PEG,  afib on coumadin, s/p colon resection with ileostomy 8 years ago, s/p CBAGx3,  prostate cancer s/p TURP 3 years ago was BIBEMS after wife found him unresponsive with vomit around his face. Wife states patient had an increase in SOB past 2 days and noticed he was becoming more weak.  Pt was discharged 2 days ago from rehab center and has had a decrease in appetite since he's been home according to his wife and son. Wife states she has been noticing that after PEG feeding he coughs a lot and she compares it to him choking.  She states he has been getting feed through his PEG as well as liquids by mouth which he has been tolerating. On arrival patient was unresponsive and hypoxic. (18 Dec 2017 19:49)      Vascular Surgery HPI:  Consulted to see patient secondary to venous duplex findings of left upper extremity venous thrombus.  Patient with multiple comorbidities multiple past surgical interventions, on A/C for afib. Patient has IVC filter placed in the past. Admitted secondary to aspiration PNA.  INr supratherapeutic on admission.  Lovenox to coumadin bridge recently restarted due to duplex findings.  Patient with reported guiac + ostomy output history of hematuria. Vascular input requested regarding treatment of left upper ext venous thrombus. Wife at bedside reports left arm previously swollen; edema has improved over the past 24hrs.  Patient with no complaint of left arm pain.  Currently denies chest pain.     PAST MEDICAL & SURGICAL HISTORY:  CAD (coronary artery disease)  Afib  CVA (cerebral vascular accident)  Mitral valve replaced  BPH (benign prostatic hyperplasia)  High cholesterol  HTN (hypertension)  H/O heart valve replacement with mechanical valve  S/P CABG x 1  H/O colectomy      REVIEW OF SYSTEMS    See HPI         MEDICATIONS  (STANDING):  acetylcysteine 20% Inhalation 3 milliLiter(s) Inhalation every 6 hours  ALBUTerol    0.083% 2.5 milliGRAM(s) Nebulizer every 4 hours  amiodarone    Tablet 400 milliGRAM(s) Oral daily  aspirin  chewable 81 milliGRAM(s) Oral daily  atorvastatin 10 milliGRAM(s) Oral at bedtime  ciprofloxacin     Tablet 250 milliGRAM(s) Oral every 24 hours  enoxaparin Injectable 80 milliGRAM(s) SubCutaneous daily  epoetin [place] Injectable 44853 Unit(s) SubCutaneous every 7 days  finasteride 5 milliGRAM(s) Oral daily  folic acid 1 milliGRAM(s) Enteral Tube daily  metoprolol     tartrate 25 milliGRAM(s) Oral three times a day  pantoprazole   Suspension 40 milliGRAM(s) Oral daily  saccharomyces boulardii 250 milliGRAM(s) Oral two times a day  sucralfate 1 Gram(s) Oral four times a day  tamsulosin 0.4 milliGRAM(s) Oral at bedtime  warfarin 1 milliGRAM(s) Oral once    MEDICATIONS  (PRN):      Allergies    penicillins (Hives)    Intolerances        SOCIAL HISTORY:      Vital Signs Last 24 Hrs  T(C): 37.5 (02 Jan 2018 08:00), Max: 37.5 (02 Jan 2018 08:00)  T(F): 99.5 (02 Jan 2018 08:00), Max: 99.5 (02 Jan 2018 08:00)  HR: 84 (02 Jan 2018 08:00) (84 - 103)  BP: 106/70 (02 Jan 2018 08:00) (105/68 - 113/64)  BP(mean): --  RR: 16 (02 Jan 2018 08:00) (16 - 20)  SpO2: 97% (02 Jan 2018 15:19) (93% - 98%)    PHYSICAL EXAM:      Constitutional:  elderly, sitting up right in bed receiving respiratory therapy  Chest; Course breath sounds  Cardiac: s1/s2  Abd:  Functioning right quadrant ostomy, non bloody stool.  No pulsatile mass  Ext:  Warm, bilateral, upper/lower         Poor active ROM to Right extremities secondary to CVA, Left upper with good motor fxn         bilateral pitting edema to the antecubital regions upper ext. R>L          Left upper ext with venopuncture sites to antecubital/ medial bicep regions         2+ radial and femorals          Sensory function grossly intact to left upper ext.          LABS:                        7.2    8.5   )-----------( 257      ( 02 Jan 2018 07:19 )             24.9     01-02    137  |  102  |  53.0<H>  ----------------------------<  204<H>  5.0   |  22.0  |  2.48<H>    Ca    8.4<L>      02 Jan 2018 07:19    TPro  6.5<L>  /  Alb  2.4<L>  /  TBili  0.7  /  DBili  x   /  AST  30  /  ALT  28  /  AlkPhos  108  01-02    PT/INR - ( 02 Jan 2018 07:19 )   PT: 14.5 sec;   INR: 1.31 ratio         PTT - ( 02 Jan 2018 07:19 )  PTT:22.7 sec      RADIOLOGY & ADDITIONAL STUDIES    < from: US Duplex Venous Upper Ext Ltd, Left (12.31.17 @ 20:01) >     EXAM:  US DPLX UPR EXT VEINS LTD LT                          PROCEDURE DATE:  12/31/2017          INTERPRETATION:  CLINICAL INFORMATION: Left arm swelling and redness.    COMPARISON: None available.    TECHNIQUE: Duplex sonography of the LEFT UPPER extremity with color and   spectral Doppler, with and without compression.      FINDINGS:    Intraluminal echogenicity with lack of compression and lack of color flow   is seen in the distal left brachial vein and basilic vein in the distal   upper arm extending into the antecubital fossa compatible with an   occlusive thrombus. The distal left basilic vein in the forearm is patent.    The left internal jugular, subclavian, axillary, proximal brachial,   radial, ulnar and cephalic veins are patent and compressible where   applicable. Doppler examination shows normal spontaneous and phasic flow.    IMPRESSION:     Thrombus in the distal left brachial and basilic veins in the distal left   upper arm extending into the antecubital fossa.    Call Back:  I discussed this case with CARLA Smith at 8:32 PM on   12/31/2017.  Hospital policies for call back including read back policy   were followed. The verbal communication call back supplements this   written report.            < end of copied text >      Impression and Plan:    Left upper extremity venous thrombus as described in Venous duplex report.  Superficial and deep component  Likey IV catheter associated.    - Apply limb alert wrist band, no IV access permitted to left upper ext   - In the setting of  Chronic Afib, recommendations is to continue anticoagulation with the benefit of associated  treatment coverage for venous thrombus.  - due to primary team concerns of guaiac stools/hematuria, if continuing anticoagulation is contraindicated, recommend compressive therapy with ace bandage to the left upper ext, Limb elevation and observation.  If edema to the left upper extremity worsens and pain is reported, repeat duplex to access for propagation of the thrombus.    d/w covering attending Dr. Oneill

## 2018-01-02 NOTE — PROGRESS NOTE ADULT - SUBJECTIVE AND OBJECTIVE BOX
CRISTIN BRIGIDSLICK    [id number]    77y      Male    Patient is a 77y old  Male who presents with a chief complaint of Found unresponsive in his vomit (18 Dec 2017 19:49)      INTERVAL HPI/OVERNIGHT EVENTS:    seen at bedside. incomprehensible sound.   denied complaints. wife and other family members at bedside.   Venous doppler positive for DVT. on lovenox. discussed with patient and wife. agreed to restart coumadin with slow titration. again discussed risk and benefit of AC and risk of DVT and afib including risk of stroke and PE. They are not sure about  stroke prophylaxis but wanted to continue lovenox/coumadin for DVT treatment as long as needed. left arm getting better  repeat FOBT positive, no hematuria noted.   spoke to Dr. Hinton [ hematology]. agreed to c/w Anti-coag  spoke to , can go home with cipro. [ ID will sign off]  RF stable. Na normalized. CXR showed right effusion which is new        REVIEW OF SYSTEMS:  CONSTITUTIONAL: No fever, + fatigue  RESPIRATORY: + shortness of breath but better,   CARDIOVASCULAR: No chest pain/palpitation  GI/: no abd pain/n/v/d/c. no hematochezia or melena. no dysuria  CNS: right sided residual weakness, no new weakness or numbness    MEDICATIONS  (STANDING):  acetylcysteine 20% Inhalation 3 milliLiter(s) Inhalation every 6 hours  ALBUTerol    0.083% 2.5 milliGRAM(s) Nebulizer every 4 hours  amiodarone    Tablet 400 milliGRAM(s) Oral daily  aspirin  chewable 81 milliGRAM(s) Oral daily  atorvastatin 10 milliGRAM(s) Oral at bedtime  ciprofloxacin     100 mG/mL Suspension 250 milliGRAM(s) Oral every 24 hours  enoxaparin Injectable 80 milliGRAM(s) SubCutaneous daily  epoetin [place] Injectable 48271 Unit(s) SubCutaneous every 7 days  finasteride 5 milliGRAM(s) Oral daily  folic acid 1 milliGRAM(s) Enteral Tube daily  metoprolol     tartrate 25 milliGRAM(s) Oral three times a day  pantoprazole   Suspension 40 milliGRAM(s) Oral daily  saccharomyces boulardii 250 milliGRAM(s) Oral two times a day  sucralfate 1 Gram(s) Oral four times a day  tamsulosin 0.4 milliGRAM(s) Oral at bedtime  warfarin 1 milliGRAM(s) Oral once    MEDICATIONS  (PRN):        PHYSICAL EXAM:    Vital Signs Last 24 Hrs  T(C): 37.5 (02 Jan 2018 08:00), Max: 37.5 (02 Jan 2018 08:00)  T(F): 99.5 (02 Jan 2018 08:00), Max: 99.5 (02 Jan 2018 08:00)  HR: 84 (02 Jan 2018 08:00) (84 - 103)  BP: 106/70 (02 Jan 2018 08:00) (100/60 - 113/64)  BP(mean): --  RR: 16 (02 Jan 2018 08:00) (16 - 20)  SpO2: 97% (02 Jan 2018 08:41) (93% - 98%)    GENERAL: ill looking elderly male , awake and alert  HEENT: anicteric, mild Pallor  NECK: Supple, No JVD   CHEST/LUNG: decrease air entry bilateral with some rales, no ronchi  HEART: S1S2 Normal intensity, no murmurs  ABDOMEN: Soft, BS +ve, NT, ND.   EXTREMITIES:  1+ radial and DP pulses noted, no calf tenderness. LUE swelling with some erythema improving  SKIN: No rashes. no skin break or ulcer. did not check sacral region  NEURO: Confused, lethargy, Right hemiparesis.  CN II-XII intact  PSYCH: Depressed  mood and affect. anxious      LABS:                                              7.2    8.5   )-----------( 257      ( 02 Jan 2018 07:19 )             24.9       01-02    137  |  102  |  53.0<H>  ----------------------------<  204<H>  5.0   |  22.0  |  2.48<H>    Ca    8.4<L>      02 Jan 2018 07:19    TPro  6.5<L>  /  Alb  2.4<L>  /  TBili  0.7  /  DBili  x   /  AST  30  /  ALT  28  /  AlkPhos  108  01-02      I&O's Summary    01 Jan 2018 07:01  -  02 Jan 2018 07:00  --------------------------------------------------------  IN: 1649 mL / OUT: 400 mL / NET: 1249 mL          Culture - Urine (collected 30 Dec 2017 19:11)  Source: .Urine Catheterized  Final Report (01 Jan 2018 16:38):    >100,000 CFU/ml Pseudomonas aeruginosa  Organism: Pseudomonas aeruginosa (01 Jan 2018 16:38)  Organism: Pseudomonas aeruginosa (01 Jan 2018 16:38)      Culture - Urine (collected 30 Dec 2017 19:11)  Source: .Urine Catheterized  Final Report (01 Jan 2018 16:38):    >100,000 CFU/ml Pseudomonas aeruginosa  Organism: Pseudomonas aeruginosa (01 Jan 2018 16:38)  Organism: Pseudomonas aeruginosa (01 Jan 2018 16:38)      < from: Xray Chest 1 View AP -PORTABLE-Routine (01.02.18 @ 05:51) >    IMPRESSION:   A persistent bilateral upper and lower lobe multifocal   airspace consolidations with a new right effusion and/or right lung base   consolidation...          < end of copied text >

## 2018-01-02 NOTE — PROGRESS NOTE ADULT - ASSESSMENT
CRF(III): ARF and hypernatremia ==> stabilizing  - avoid nephrotoxins  - hypotonic fluids as needed  - monitor labs      Anemia: multifactorial  - cont GONZALES  - consider PRBCs  - added IV Fe

## 2018-01-02 NOTE — PROGRESS NOTE ADULT - PROBLEM SELECTOR PROBLEM 2
Acute renal failure with acute renal cortical necrosis superimposed on stage 4 chronic kidney disease

## 2018-01-03 DIAGNOSIS — I47.2 VENTRICULAR TACHYCARDIA: ICD-10-CM

## 2018-01-03 LAB
ALBUMIN SERPL ELPH-MCNC: 2.7 G/DL — LOW (ref 3.3–5.2)
ALP SERPL-CCNC: 98 U/L — SIGNIFICANT CHANGE UP (ref 40–120)
ALT FLD-CCNC: 26 U/L — SIGNIFICANT CHANGE UP
ANION GAP SERPL CALC-SCNC: 13 MMOL/L — SIGNIFICANT CHANGE UP (ref 5–17)
ANION GAP SERPL CALC-SCNC: 14 MMOL/L — SIGNIFICANT CHANGE UP (ref 5–17)
ANION GAP SERPL CALC-SCNC: 15 MMOL/L — SIGNIFICANT CHANGE UP (ref 5–17)
ANISOCYTOSIS BLD QL: SLIGHT — SIGNIFICANT CHANGE UP
APTT BLD: 36.5 SEC — SIGNIFICANT CHANGE UP (ref 27.5–37.4)
AST SERPL-CCNC: 18 U/L — SIGNIFICANT CHANGE UP
BASE EXCESS BLDA CALC-SCNC: 1.8 MMOL/L — SIGNIFICANT CHANGE UP (ref -2–2)
BILIRUB SERPL-MCNC: 0.6 MG/DL — SIGNIFICANT CHANGE UP (ref 0.4–2)
BLOOD GAS COMMENTS ARTERIAL: SIGNIFICANT CHANGE UP
BUN SERPL-MCNC: 51 MG/DL — HIGH (ref 8–20)
BUN SERPL-MCNC: 52 MG/DL — HIGH (ref 8–20)
BUN SERPL-MCNC: 53 MG/DL — HIGH (ref 8–20)
CALCIUM SERPL-MCNC: 8.5 MG/DL — LOW (ref 8.6–10.2)
CALCIUM SERPL-MCNC: 8.7 MG/DL — SIGNIFICANT CHANGE UP (ref 8.6–10.2)
CALCIUM SERPL-MCNC: 8.8 MG/DL — SIGNIFICANT CHANGE UP (ref 8.6–10.2)
CHLORIDE SERPL-SCNC: 102 MMOL/L — SIGNIFICANT CHANGE UP (ref 98–107)
CO2 SERPL-SCNC: 23 MMOL/L — SIGNIFICANT CHANGE UP (ref 22–29)
CO2 SERPL-SCNC: 23 MMOL/L — SIGNIFICANT CHANGE UP (ref 22–29)
CO2 SERPL-SCNC: 25 MMOL/L — SIGNIFICANT CHANGE UP (ref 22–29)
CREAT SERPL-MCNC: 2.71 MG/DL — HIGH (ref 0.5–1.3)
CREAT SERPL-MCNC: 2.75 MG/DL — HIGH (ref 0.5–1.3)
CREAT SERPL-MCNC: 2.75 MG/DL — HIGH (ref 0.5–1.3)
D DIMER BLD IA.RAPID-MCNC: 821 NG/ML DDU — HIGH
ELLIPTOCYTES BLD QL SMEAR: SLIGHT — SIGNIFICANT CHANGE UP
EOSINOPHIL NFR BLD AUTO: 2 % — SIGNIFICANT CHANGE UP (ref 0–6)
GAS PNL BLDA: SIGNIFICANT CHANGE UP
GLUCOSE BLDC GLUCOMTR-MCNC: 120 MG/DL — HIGH (ref 70–99)
GLUCOSE BLDC GLUCOMTR-MCNC: 153 MG/DL — HIGH (ref 70–99)
GLUCOSE SERPL-MCNC: 102 MG/DL — SIGNIFICANT CHANGE UP (ref 70–115)
GLUCOSE SERPL-MCNC: 136 MG/DL — HIGH (ref 70–115)
GLUCOSE SERPL-MCNC: 147 MG/DL — HIGH (ref 70–115)
HCO3 BLDA-SCNC: 26 MMOL/L — SIGNIFICANT CHANGE UP (ref 20–26)
HCT VFR BLD CALC: 25.1 % — LOW (ref 42–52)
HCT VFR BLD CALC: 25.2 % — LOW (ref 42–52)
HCT VFR BLD CALC: 25.4 % — LOW (ref 42–52)
HCT VFR BLD CALC: 25.6 % — LOW (ref 42–52)
HGB BLD-MCNC: 7.4 G/DL — LOW (ref 14–18)
HGB BLD-MCNC: 7.7 G/DL — LOW (ref 14–18)
HGB BLD-MCNC: 7.8 G/DL — LOW (ref 14–18)
HGB BLD-MCNC: 7.8 G/DL — LOW (ref 14–18)
HOROWITZ INDEX BLDA+IHG-RTO: SIGNIFICANT CHANGE UP
HYPOCHROMIA BLD QL: SLIGHT — SIGNIFICANT CHANGE UP
INR BLD: 1.36 RATIO — HIGH (ref 0.88–1.16)
LYMPHOCYTES # BLD AUTO: 3 % — LOW (ref 20–55)
MACROCYTES BLD QL: SLIGHT — SIGNIFICANT CHANGE UP
MAGNESIUM SERPL-MCNC: 1.6 MG/DL — SIGNIFICANT CHANGE UP (ref 1.6–2.6)
MCHC RBC-ENTMCNC: 29.4 G/DL — LOW (ref 32–36)
MCHC RBC-ENTMCNC: 29.6 PG — SIGNIFICANT CHANGE UP (ref 27–31)
MCHC RBC-ENTMCNC: 30.4 PG — SIGNIFICANT CHANGE UP (ref 27–31)
MCHC RBC-ENTMCNC: 30.5 G/DL — LOW (ref 32–36)
MCHC RBC-ENTMCNC: 30.5 PG — SIGNIFICANT CHANGE UP (ref 27–31)
MCHC RBC-ENTMCNC: 30.6 PG — SIGNIFICANT CHANGE UP (ref 27–31)
MCHC RBC-ENTMCNC: 30.7 G/DL — LOW (ref 32–36)
MCHC RBC-ENTMCNC: 30.7 G/DL — LOW (ref 32–36)
MCV RBC AUTO: 100.8 FL — HIGH (ref 80–94)
MCV RBC AUTO: 99.2 FL — HIGH (ref 80–94)
MCV RBC AUTO: 99.6 FL — HIGH (ref 80–94)
MCV RBC AUTO: 99.6 FL — HIGH (ref 80–94)
MICROCYTES BLD QL: SLIGHT — SIGNIFICANT CHANGE UP
MONOCYTES NFR BLD AUTO: 5 % — SIGNIFICANT CHANGE UP (ref 3–10)
NEUTROPHILS NFR BLD AUTO: 90 % — HIGH (ref 37–73)
OVALOCYTES BLD QL SMEAR: SLIGHT — SIGNIFICANT CHANGE UP
PCO2 BLDA: 36 MMHG — SIGNIFICANT CHANGE UP (ref 35–45)
PH BLDA: 7.46 — HIGH (ref 7.35–7.45)
PHOSPHATE SERPL-MCNC: 3.7 MG/DL — SIGNIFICANT CHANGE UP (ref 2.4–4.7)
PLAT MORPH BLD: NORMAL — SIGNIFICANT CHANGE UP
PLATELET # BLD AUTO: 276 K/UL — SIGNIFICANT CHANGE UP (ref 150–400)
PLATELET # BLD AUTO: 322 K/UL — SIGNIFICANT CHANGE UP (ref 150–400)
PLATELET # BLD AUTO: 324 K/UL — SIGNIFICANT CHANGE UP (ref 150–400)
PLATELET # BLD AUTO: 332 K/UL — SIGNIFICANT CHANGE UP (ref 150–400)
PO2 BLDA: 63 MMHG — LOW (ref 83–108)
POIKILOCYTOSIS BLD QL AUTO: SLIGHT — SIGNIFICANT CHANGE UP
POLYCHROMASIA BLD QL SMEAR: SLIGHT — SIGNIFICANT CHANGE UP
POTASSIUM SERPL-MCNC: 4.7 MMOL/L — SIGNIFICANT CHANGE UP (ref 3.5–5.3)
POTASSIUM SERPL-MCNC: 4.9 MMOL/L — SIGNIFICANT CHANGE UP (ref 3.5–5.3)
POTASSIUM SERPL-MCNC: 4.9 MMOL/L — SIGNIFICANT CHANGE UP (ref 3.5–5.3)
POTASSIUM SERPL-SCNC: 4.7 MMOL/L — SIGNIFICANT CHANGE UP (ref 3.5–5.3)
POTASSIUM SERPL-SCNC: 4.9 MMOL/L — SIGNIFICANT CHANGE UP (ref 3.5–5.3)
POTASSIUM SERPL-SCNC: 4.9 MMOL/L — SIGNIFICANT CHANGE UP (ref 3.5–5.3)
PROT SERPL-MCNC: 6.9 G/DL — SIGNIFICANT CHANGE UP (ref 6.6–8.7)
PROTHROM AB SERPL-ACNC: 15.1 SEC — HIGH (ref 9.8–12.7)
RBC # BLD: 2.5 M/UL — LOW (ref 4.6–6.2)
RBC # BLD: 2.52 M/UL — LOW (ref 4.6–6.2)
RBC # BLD: 2.56 M/UL — LOW (ref 4.6–6.2)
RBC # BLD: 2.57 M/UL — LOW (ref 4.6–6.2)
RBC # FLD: 19.2 % — HIGH (ref 11–15.6)
RBC # FLD: 19.4 % — HIGH (ref 11–15.6)
RBC # FLD: 19.5 % — HIGH (ref 11–15.6)
RBC # FLD: 19.8 % — HIGH (ref 11–15.6)
RBC BLD AUTO: ABNORMAL
SAO2 % BLDA: 94 % — LOW (ref 95–99)
SODIUM SERPL-SCNC: 139 MMOL/L — SIGNIFICANT CHANGE UP (ref 135–145)
SODIUM SERPL-SCNC: 140 MMOL/L — SIGNIFICANT CHANGE UP (ref 135–145)
SODIUM SERPL-SCNC: 140 MMOL/L — SIGNIFICANT CHANGE UP (ref 135–145)
TROPONIN T SERPL-MCNC: 0.04 NG/ML — SIGNIFICANT CHANGE UP (ref 0–0.06)
TROPONIN T SERPL-MCNC: 0.05 NG/ML — SIGNIFICANT CHANGE UP (ref 0–0.06)
WBC # BLD: 7.4 K/UL — SIGNIFICANT CHANGE UP (ref 4.8–10.8)
WBC # BLD: 8 K/UL — SIGNIFICANT CHANGE UP (ref 4.8–10.8)
WBC # BLD: 8.2 K/UL — SIGNIFICANT CHANGE UP (ref 4.8–10.8)
WBC # BLD: 8.5 K/UL — SIGNIFICANT CHANGE UP (ref 4.8–10.8)
WBC # FLD AUTO: 7.4 K/UL — SIGNIFICANT CHANGE UP (ref 4.8–10.8)
WBC # FLD AUTO: 8 K/UL — SIGNIFICANT CHANGE UP (ref 4.8–10.8)
WBC # FLD AUTO: 8.2 K/UL — SIGNIFICANT CHANGE UP (ref 4.8–10.8)
WBC # FLD AUTO: 8.5 K/UL — SIGNIFICANT CHANGE UP (ref 4.8–10.8)

## 2018-01-03 PROCEDURE — 99233 SBSQ HOSP IP/OBS HIGH 50: CPT

## 2018-01-03 PROCEDURE — 93010 ELECTROCARDIOGRAM REPORT: CPT

## 2018-01-03 PROCEDURE — 71045 X-RAY EXAM CHEST 1 VIEW: CPT | Mod: 26,77

## 2018-01-03 PROCEDURE — 71045 X-RAY EXAM CHEST 1 VIEW: CPT | Mod: 26

## 2018-01-03 PROCEDURE — 93971 EXTREMITY STUDY: CPT | Mod: 26,LT

## 2018-01-03 RX ORDER — WARFARIN SODIUM 2.5 MG/1
3 TABLET ORAL ONCE
Qty: 0 | Refills: 0 | Status: DISCONTINUED | OUTPATIENT
Start: 2018-01-03 | End: 2018-01-03

## 2018-01-03 RX ORDER — FUROSEMIDE 40 MG
40 TABLET ORAL ONCE
Qty: 0 | Refills: 0 | Status: COMPLETED | OUTPATIENT
Start: 2018-01-03 | End: 2018-01-03

## 2018-01-03 RX ORDER — FUROSEMIDE 40 MG
20 TABLET ORAL ONCE
Qty: 0 | Refills: 0 | Status: COMPLETED | OUTPATIENT
Start: 2018-01-03 | End: 2018-01-03

## 2018-01-03 RX ORDER — MAGNESIUM SULFATE 500 MG/ML
2 VIAL (ML) INJECTION ONCE
Qty: 0 | Refills: 0 | Status: COMPLETED | OUTPATIENT
Start: 2018-01-03 | End: 2018-01-03

## 2018-01-03 RX ORDER — MORPHINE SULFATE 50 MG/1
2 CAPSULE, EXTENDED RELEASE ORAL ONCE
Qty: 0 | Refills: 0 | Status: DISCONTINUED | OUTPATIENT
Start: 2018-01-03 | End: 2018-01-03

## 2018-01-03 RX ORDER — FUROSEMIDE 40 MG
40 TABLET ORAL EVERY 12 HOURS
Qty: 0 | Refills: 0 | Status: DISCONTINUED | OUTPATIENT
Start: 2018-01-04 | End: 2018-01-05

## 2018-01-03 RX ORDER — ENOXAPARIN SODIUM 100 MG/ML
80 INJECTION SUBCUTANEOUS DAILY
Qty: 0 | Refills: 0 | Status: DISCONTINUED | OUTPATIENT
Start: 2018-01-03 | End: 2018-01-04

## 2018-01-03 RX ORDER — PANTOPRAZOLE SODIUM 20 MG/1
40 TABLET, DELAYED RELEASE ORAL
Qty: 0 | Refills: 0 | Status: DISCONTINUED | OUTPATIENT
Start: 2018-01-03 | End: 2018-01-09

## 2018-01-03 RX ADMIN — Medication 1 GRAM(S): at 11:40

## 2018-01-03 RX ADMIN — ATORVASTATIN CALCIUM 10 MILLIGRAM(S): 80 TABLET, FILM COATED ORAL at 21:39

## 2018-01-03 RX ADMIN — Medication 25 MILLIGRAM(S): at 14:05

## 2018-01-03 RX ADMIN — TAMSULOSIN HYDROCHLORIDE 0.4 MILLIGRAM(S): 0.4 CAPSULE ORAL at 21:39

## 2018-01-03 RX ADMIN — PANTOPRAZOLE SODIUM 40 MILLIGRAM(S): 20 TABLET, DELAYED RELEASE ORAL at 18:50

## 2018-01-03 RX ADMIN — AMIODARONE HYDROCHLORIDE 400 MILLIGRAM(S): 400 TABLET ORAL at 11:38

## 2018-01-03 RX ADMIN — FINASTERIDE 5 MILLIGRAM(S): 5 TABLET, FILM COATED ORAL at 11:39

## 2018-01-03 RX ADMIN — Medication 250 MILLIGRAM(S): at 21:39

## 2018-01-03 RX ADMIN — MORPHINE SULFATE 2 MILLIGRAM(S): 50 CAPSULE, EXTENDED RELEASE ORAL at 18:33

## 2018-01-03 RX ADMIN — MORPHINE SULFATE 2 MILLIGRAM(S): 50 CAPSULE, EXTENDED RELEASE ORAL at 18:48

## 2018-01-03 RX ADMIN — ALBUTEROL 2.5 MILLIGRAM(S): 90 AEROSOL, METERED ORAL at 03:42

## 2018-01-03 RX ADMIN — ALBUTEROL 2.5 MILLIGRAM(S): 90 AEROSOL, METERED ORAL at 20:55

## 2018-01-03 RX ADMIN — Medication 40 MILLIGRAM(S): at 18:33

## 2018-01-03 RX ADMIN — ALBUTEROL 2.5 MILLIGRAM(S): 90 AEROSOL, METERED ORAL at 09:17

## 2018-01-03 RX ADMIN — Medication 1 MILLIGRAM(S): at 11:39

## 2018-01-03 RX ADMIN — Medication 81 MILLIGRAM(S): at 11:39

## 2018-01-03 RX ADMIN — ALBUTEROL 2.5 MILLIGRAM(S): 90 AEROSOL, METERED ORAL at 11:48

## 2018-01-03 RX ADMIN — Medication 25 MILLIGRAM(S): at 21:39

## 2018-01-03 RX ADMIN — ENOXAPARIN SODIUM 80 MILLIGRAM(S): 100 INJECTION SUBCUTANEOUS at 11:39

## 2018-01-03 RX ADMIN — Medication 50 GRAM(S): at 18:33

## 2018-01-03 RX ADMIN — Medication 20 MILLIGRAM(S): at 11:38

## 2018-01-03 NOTE — PROGRESS NOTE ADULT - SUBJECTIVE AND OBJECTIVE BOX
PULMONARY PROGRESS NOTE      CRISTIN ROQUEGRADY-[id number]    Patient is a 77y old  Male who presents with a chief complaint of Found unresponsive in his vomit (18 Dec 2017 19:49)      INTERVAL HPI/OVERNIGHT EVENTS:Comfortable though tachypneic on NCO2.  NSVT noted.    MEDICATIONS  (STANDING):  ALBUTerol    0.083% 2.5 milliGRAM(s) Nebulizer every 4 hours  amiodarone    Tablet 400 milliGRAM(s) Oral daily  aspirin  chewable 81 milliGRAM(s) Oral daily  atorvastatin 10 milliGRAM(s) Oral at bedtime  ciprofloxacin     Tablet 250 milliGRAM(s) Oral every 24 hours  enoxaparin Injectable 80 milliGRAM(s) SubCutaneous daily  epoetin [place] Injectable 77347 Unit(s) SubCutaneous every 7 days  finasteride 5 milliGRAM(s) Oral daily  folic acid 1 milliGRAM(s) Enteral Tube daily  metoprolol     tartrate 25 milliGRAM(s) Oral three times a day  pantoprazole  Injectable 40 milliGRAM(s) IV Push two times a day  saccharomyces boulardii 250 milliGRAM(s) Oral two times a day  sucralfate 1 Gram(s) Oral four times a day  tamsulosin 0.4 milliGRAM(s) Oral at bedtime      MEDICATIONS  (PRN):      Allergies    penicillins (Hives)    Intolerances        PAST MEDICAL & SURGICAL HISTORY:  CAD (coronary artery disease)  Afib  CVA (cerebral vascular accident)  Mitral valve replaced  BPH (benign prostatic hyperplasia)  High cholesterol  HTN (hypertension)  H/O heart valve replacement with mechanical valve  S/P CABG x 1  H/O colectomy      SOCIAL HISTORY  Smoking History:       REVIEW OF SYSTEMS:  Unable  Vital Signs Last 24 Hrs  T(C): 36.4 (03 Jan 2018 13:56), Max: 36.8 (02 Jan 2018 16:36)  T(F): 97.5 (03 Jan 2018 13:56), Max: 98.3 (02 Jan 2018 16:36)  HR: 105 (03 Jan 2018 14:30) (80 - 114)  BP: 103/55 (03 Jan 2018 14:30) (99/49 - 121/73)  BP(mean): --  RR: 24 (03 Jan 2018 14:30) (18 - 32)  SpO2: 94% (03 Jan 2018 14:30) (92% - 99%)    PHYSICAL EXAMINATION:    GENERAL: The patient is awake and alert tachypneic    HEENT: Head is normocephalic and atraumatic. Extraocular muscles are intact. Mucous membranes are moist.    NECK: Supple.    LUNGS: scattered crackles    HEART: Regular rate and rhythm without murmur.    ABDOMEN: Soft, nontender, and nondistended.      EXTREMITIES: Without any cyanosis, clubbing, rash, lesions or edema.    NEUROLOGIC: aphasic, R hemiparesis    SKIN: No ulceration or induration present.      LABS:                        7.7    8.5   )-----------( 322      ( 03 Jan 2018 13:34 )             25.1     01-03    139  |  102  |  53.0<H>  ----------------------------<  136<H>  4.9   |  23.0  |  2.71<H>    Ca    8.5<L>      03 Jan 2018 06:36    TPro  6.9  /  Alb  2.7<L>  /  TBili  0.6  /  DBili  x   /  AST  18  /  ALT  26  /  AlkPhos  98  01-03    PT/INR - ( 02 Jan 2018 07:19 )   PT: 14.5 sec;   INR: 1.31 ratio         PTT - ( 02 Jan 2018 07:19 )  PTT:22.7 sec            Serum Pro-Brain Natriuretic Peptide: 65172 pg/mL (01-02-18 @ 17:22)          MICROBIOLOGY:    RADIOLOGY & ADDITIONAL STUDIES:< from: US Duplex Venous [facility], Left (01.03.18 @ 12:51) >     EXAM:  US DPLX UPR EXT VEINS LTD LT                          PROCEDURE DATE:  01/03/2018          INTERPRETATION:  CLINICAL INFORMATION: Assess for thrombosis    COMPARISON: None available.    TECHNIQUE: Duplex sonography of the LEFT UPPER extremity with color and   spectral Doppler, with and without compression.      FINDINGS:    The left internal jugular, subclavian, axillary, brachial, and cephalic   veins are patent and compressible where applicable.     There is partially obstructive thrombosis of the left basilic vein.    IMPRESSION:     Partial thrombosis of the left basilic vein.                    RITA ARVIZU M.D., ATTENDING RADIOLOGIST  This document has been electronically signed. Paddy  3 2018 12:54PM        < end of copied text >  < from: Xray Chest 1 View AP -PORTABLE-Routine (01.02.18 @ 05:51) >    IMPRESSION:   A persistent bilateral upper and lower lobe multifocal   airspace consolidations with a new right effusion and/or right lung base   consolidation...            < end of copied text >  < from: TTE Echo Complete w/Doppler (01.02.18 @ 20:32) >      PHYSICIAN INTERPRETATION:  Left Ventricle: The left ventricular internal cavity size is normal.  Global LV systolic function was mildly decreased. Left ventricular   ejection fraction, by visual estimation, is 40 to 45%. Abnormal   (paradoxical) septal motion consistent with post-operative status and the   interventricular septum is flattened in systole and diastole, consistent   with right ventricular pressure and volume overload. Spectral Doppler   shows restrictive pattern of left ventricular myocardial filling (Grade   III diastolic dysfunction).  Right Ventricle: The right ventricular size is severely enlarged. RV   systolic function is severely reduced. Pulmonary Embolism should be   considered in setting of Severe RV dysfunction with probably preserved RV   apical contraction. TV S' 0.1 m/s.  Left Atrium: Moderately enlarged left atrium.  Right Atrium: The right atrium is moderately dilated.  Pericardium: There is no evidence of pericardial effusion.  Mitral Valve: Thickening and calcification of the anterior and posterior   mitral valve leaflets. Peak transmitral mean gradient equals 3.7 mmHg,   calculated mitral valve area by pressure half time equals 4.68 cm²   consistent with No evidence of mitral stenosis. Moderate mitral valve   regurgitation is seen. Mitral valve mean gradient is 3.7 mmHg consistent   with mild mitral stenosis.  Tricuspid Valve: Moderate tricuspid regurgitation is visualized.   Estimated pulmonary artery systolic pressure is 35.4 mmHg assuming a   right atrial pressure of 8 mmHg, which is consistent with borderline   pulmonary hypertension.  Aortic Valve: The aortic valve was not well visualized. The aortic valve   mean gradient is 2.1 mmHg consistent with normally opening aortic valve.   Peak aortic valve gradient is 5.4 mmHg and the mean gradient is 2.1 mmHg,   which is probably normal in the setting of a prosthetic aortic valve.   Trivial aortic valve regurgitation is seen.  Pulmonic Valve: Mild pulmonic valve regurgitation.  Aorta: The aortic root is normal in size and structure.  Pulmonary Artery: The pulmonary artery is not well seen.  Venous: The inferior vena cava was dilated, with respiratory size   variation less than 50%.  In comparison to the previous echocardiogram(s): Prior examinations are   available and were reviewed for comparison purposes. Compared to Study   from 9/2017 RV functions is severely reduced and LV EF reduced from   60-65% to 40-45%.        Summary:   1. Left ventricular ejection fraction, by visual estimation, is 40 to   45%.   2. Mildly decreased global left ventricular systolic function.   3. Abnormal septal motion consistent with post-operative status and   right ventricular volume and pressure overload.   4. Spectral Doppler shows restrictive pattern of left ventricular   myocardial filling (Grade III diastolic dysfunction).   5. Severely enlarged right ventricle.   6. Severely reduced RV systolic function.   7. Moderately dilated right atrium.   8. Moderate tricuspid regurgitation.   9. Estimated pulmonary artery systolic pressure is 35.4 mmHg assuming a   right atrial pressure of 8 mmHg, which is consistent with borderline   pulmonary hypertension.  10. Mitral valve mean gradient is 3.7 mmHg consistent with mild mitral   stenosis.  11. Moderately enlarged left atrium.  12. The aortic valve mean gradient is 2.1 mmHg consistent with normally   opening aortic valve.  13. Pulmonary Embolism should be considered in setting of Severe RV   dysfunction with probably preserved RV apical contraction.  14. Peak aortic valve gradient is 5.4 mmHg and the mean gradient is 2.1   mmHg, which is probably normal in the setting of a prosthetic aortic   valve.  15. I have discussed about the echo with Dr Silver.  16. Compared to Study from 9/2017 RV functions is severely reduced and LV   EF reduced from 60-65% to 40-45%.     MD Margarita Electronically signed on 1/3/2018 at 1:15:49 PM       < end of copied text >

## 2018-01-03 NOTE — PROGRESS NOTE ADULT - ASSESSMENT
Imp-Pt with multiple comorbidities, asp pneumonia.  Worsening cardiac function, biventricular.  PE is unlikely to have caused deterioration.    Unable to get CTPA due to renal function.  V/Q would be uninterpretable, and suspicion is not high.  Plan--Cardiac optimization, abx for asp pneumonia.  anticoag for AFIB.

## 2018-01-03 NOTE — PROVIDER CONTACT NOTE (EICU) - ASSESSMENT
pt with CHF, high BNP, trop 0.04, KAPIL, DVT on Lovenox once a day renally dosed, likely PE based on echo. CP likely related to PE vs ACS.

## 2018-01-03 NOTE — CONSULT NOTE ADULT - ATTENDING COMMENTS
Agree with PA note above. Has symptomatic LUE distal DVT/SVT.  Previously on AC for afib, presented with supratherapeutic INR. Also w present IVC filter.  Now concerns for GI bleed from guaiac positive stool from ostomy  Plan:  ace compression/elevation  If anticoagulation risks outweigh benefits, then can hold AC, given low risk of embolization from distal upper extremity thrombus  f/u as needed
I have seen and evaluated the patient and agree with the assessment and plan    no need for ICU level care replete electrolytes, continue b-blocker, amiodarone
Thank you for the opportunity to assist with the care of this patient.   [facility] [phone].

## 2018-01-03 NOTE — PROGRESS NOTE ADULT - ASSESSMENT
1. elderly chronically ill M with multiple serious comorbidities including cad/cabg/avr/chronic afib/cva-old- with right hemiplegia/cri/severe anemia/ileostomy/peg/aspiration pna sp rapid response for ectopy and dyspnea. Pt being transferred to ICU for monitoring. Continue current meds including beta blocker and amiodarone. Doubt PE. Prognosis appears poor.

## 2018-01-03 NOTE — CHART NOTE - NSCHARTNOTEFT_GEN_A_CORE
patient had RRT*2. first one for sustained/frequent vtach as medications were not given due to clogged PEG. patient was asymptomatic except mild SOB at that time. cardiology and ICU evaluated and cardiology  and I suggested patient been transferred to CTICU. ICU did not accept. second one for  persistent CP. ICU evaluated. patient was given morphine for SOB and CP as CP was suggested to be due to SOB.  icu reported not a candidate for ICU transfer. please see RRT note for further details. USG neg for DVT today. cardiology [ ] called and informed me about result. . echo showed severe RV dysfunction, EF went down from 60-65% to 40-45%. spoke to pulmonary. does not think PE. b/l extensive infiltrate can explain ARDS. does not think needs CTA. not a candidate for v/q scan. called nephrology advised alternative, if not CTA with small amount of fluid and mucocyst. nephrology needs to be informed if CTA is done. may need HD. discussed with wife with . discussed about risk and benefit and alternative of CTA for diagnosis of PE. discussed about risk/benefit/alternative of AC in current situation. informed her about recent Left arm doppler result. aife wanted opinio from PMD. discussed with PMD . He recommended to do CTA if suspicion is high and family only wants AC if active clot. recommended better to c/w AC if no active bleed and active clot, discussing with family about risk/benefit. Reported patient was on coumadin before coming to office. fluctuating INR.  risk of stroke is high as Patient has h/o previous CVA and last CVA possibly on july 2017 [ as per wife, did not seek medical attention] while patient on coumadin and subtherapeutic INR. patient has h/o hematuria controlled with TURP while on coumadin. His hb has been chronically low. she took him to GI who reported patient is not a candidate for any invasive procedure. With Dr. Perla [ Hospitalist] and alone, I discussed about patient current situations, and plan of care. patient and his wife wanted to continue lovenox/coumadin unless he develops worsening bleeding. lovenox ordered. will monitor for h/h. will order coumadin if no bleeding. he is also on asa for cad. c spoke to  palliative team. patient needs f/u. currently no active bleed. closely monitor h/h and VS. patient is being transferred to SDU.                           7.8    8.0   )-----------( 324      ( 03 Jan 2018 15:05 )             25.4     01-03    140  |  102  |  51.0<H>  ----------------------------<  102  4.9   |  23.0  |  2.75<H>    Ca    8.7      03 Jan 2018 15:05  Phos  3.7     01-03  Mg     1.6     01-03    TPro  6.9  /  Alb  2.7<L>  /  TBili  0.6  /  DBili  x   /  AST  18  /  ALT  26  /  AlkPhos  98  01-03    Magnesium, Serum (01.03.18 @ 15:05)    Magnesium, Serum: 1.6 mg/dL    < from: TTE Echo Complete w/Doppler (01.02.18 @ 20:32) >     Summary:   1. Left ventricular ejection fraction, by visual estimation, is 40 to   45%.   2. Mildly decreased global left ventricular systolic function.   3. Abnormal septal motion consistent with post-operative status and   right ventricular volume and pressure overload.   4. Spectral Doppler shows restrictive pattern of left ventricular   myocardial filling (Grade III diastolic dysfunction).   5. Severely enlarged right ventricle.   6. Severely reduced RV systolic function.   7. Moderately dilated right atrium.   8. Moderate tricuspid regurgitation.   9. Estimated pulmonary artery systolic pressure is 35.4 mmHg assuming a   right atrial pressure of 8 mmHg, which is consistent with borderline   pulmonary hypertension.  10. Mitral valve mean gradient is 3.7 mmHg consistent with mild mitral   stenosis.  11. Moderately enlarged left atrium.  12. The aortic valve mean gradient is 2.1 mmHg consistent with normally   opening aortic valve.  13. Pulmonary Embolism should be considered in setting of Severe RV   dysfunction with probably preserved RV apical contraction.  14. Peak aortic valve gradient is 5.4 mmHg and the mean gradient is 2.1   mmHg, which is probably normal in the setting of a prosthetic aortic   valve.  15. I have discussed about the echo with Dr Silver.  16. Compared to Study from 9/2017 RV functions is severely reduced and LV   EF reduced from 60-65% to 40-45%.    < end of copied text >    CARDIAC MARKERS ( 03 Jan 2018 15:05 )  x     / 0.04 ng/mL / x     / x     / x patient had RRT*2. first one for sustained/frequent vtach as medications were not given due to clogged PEG. patient was asymptomatic except mild SOB at that time. cardiology and ICU evaluated and cardiology  and I suggested patient been transferred to CTICU. ICU did not accept. second one for  persistent CP. ICU evaluated. patient was given morphine for SOB and CP as CP was suggested to be due to SOB.  patient eas evalated by ICU team and was transferred to CICU. please see RRT note for further details. USG neg for DVT today. cardiology [ ] called and informed me about result. . echo showed severe RV dysfunction, EF went down from 60-65% to 40-45%. spoke to pulmonary. does not think PE. b/l extensive infiltrate can explain ARDS. does not think needs CTA. not a candidate for v/q scan. called nephrology advised alternative, if not CTA with small amount of fluid and mucocyst. nephrology needs to be informed if CTA is done. may need HD. discussed with wife with . discussed about risk and benefit and alternative of CTA for diagnosis of PE. discussed about risk/benefit/alternative of AC in current situation. informed her about recent Left arm doppler result. aife wanted opinio from PMD. discussed with PMD . He recommended to do CTA if suspicion is high and family only wants AC if active clot. recommended better to c/w AC if no active bleed and active clot, discussing with family about risk/benefit. Reported patient was on coumadin before coming to office. fluctuating INR.  risk of stroke is high as Patient has h/o previous CVA and last CVA possibly on july 2017 [ as per wife, did not seek medical attention] while patient on coumadin and subtherapeutic INR. patient has h/o hematuria controlled with TURP and 9 units of PRBC while on coumadin. His hb has been chronically low. she took him to GI who reported patient is not a candidate for any invasive procedure. With Dr. Perla [ Hospitalist] and alone, I discussed about patient current situations, and plan of care. patient and his wife wanted to continue lovenox/coumadin unless he develops worsening bleeding. lovenox ordered. will monitor for h/h. will order coumadin if no bleeding. he is also on asa for cad. c spoke to Dr.Sicca palliative team. patient needs f/u. currently no active bleed. closely monitor h/h and VS. patient is being transferred to SDU.                           7.8    8.0   )-----------( 324      ( 03 Jan 2018 15:05 )             25.4     01-03    140  |  102  |  51.0<H>  ----------------------------<  102  4.9   |  23.0  |  2.75<H>    Ca    8.7      03 Jan 2018 15:05  Phos  3.7     01-03  Mg     1.6     01-03    TPro  6.9  /  Alb  2.7<L>  /  TBili  0.6  /  DBili  x   /  AST  18  /  ALT  26  /  AlkPhos  98  01-03    Magnesium, Serum (01.03.18 @ 15:05)    Magnesium, Serum: 1.6 mg/dL    < from: TTE Echo Complete w/Doppler (01.02.18 @ 20:32) >     Summary:   1. Left ventricular ejection fraction, by visual estimation, is 40 to   45%.   2. Mildly decreased global left ventricular systolic function.   3. Abnormal septal motion consistent with post-operative status and   right ventricular volume and pressure overload.   4. Spectral Doppler shows restrictive pattern of left ventricular   myocardial filling (Grade III diastolic dysfunction).   5. Severely enlarged right ventricle.   6. Severely reduced RV systolic function.   7. Moderately dilated right atrium.   8. Moderate tricuspid regurgitation.   9. Estimated pulmonary artery systolic pressure is 35.4 mmHg assuming a   right atrial pressure of 8 mmHg, which is consistent with borderline   pulmonary hypertension.  10. Mitral valve mean gradient is 3.7 mmHg consistent with mild mitral   stenosis.  11. Moderately enlarged left atrium.  12. The aortic valve mean gradient is 2.1 mmHg consistent with normally   opening aortic valve.  13. Pulmonary Embolism should be considered in setting of Severe RV   dysfunction with probably preserved RV apical contraction.  14. Peak aortic valve gradient is 5.4 mmHg and the mean gradient is 2.1   mmHg, which is probably normal in the setting of a prosthetic aortic   valve.  15. I have discussed about the echo with Dr Silver.  16. Compared to Study from 9/2017 RV functions is severely reduced and LV   EF reduced from 60-65% to 40-45%.    < end of copied text >    CARDIAC MARKERS ( 03 Jan 2018 15:05 )  x     / 0.04 ng/mL / x     / x     / x

## 2018-01-03 NOTE — CHART NOTE - NSCHARTNOTEFT_GEN_A_CORE
Source: Patient [ ]  Family [ ]   other [x ]    Current Diet: Jevity at 50cchr via Peg, currently clogged as per nurse    Patient reports [ ] nausea  [ ] vomiting [ ] diarrhea [ ] constipation  [ ]chewing problems [ ] swallowing issues  [ ] other:     PO intake:  < 50% [ ]   50-75%  [ ]   %  [ ]  other :    Source for PO intake [ ] Patient [ ] family [ ] chart [ ] staff [ ] other    Enteral /Parenteral Nutrition: Jevity 1.5cal at 50cchr. Provides [id number], 64g protein    Current Weight:     % Weight Change     Pertinent Medications: MEDICATIONS  (STANDING):  ALBUTerol    0.083% 2.5 milliGRAM(s) Nebulizer every 4 hours  amiodarone    Tablet 400 milliGRAM(s) Oral daily  aspirin  chewable 81 milliGRAM(s) Oral daily  atorvastatin 10 milliGRAM(s) Oral at bedtime  ciprofloxacin     Tablet 250 milliGRAM(s) Oral every 24 hours  enoxaparin Injectable 80 milliGRAM(s) SubCutaneous daily  epoetin [place] Injectable 70632 Unit(s) SubCutaneous every 7 days  finasteride 5 milliGRAM(s) Oral daily  folic acid 1 milliGRAM(s) Enteral Tube daily  furosemide   Injectable 20 milliGRAM(s) IV Push once  metoprolol     tartrate 25 milliGRAM(s) Oral three times a day  pantoprazole  Injectable 40 milliGRAM(s) IV Push two times a day  saccharomyces boulardii 250 milliGRAM(s) Oral two times a day  sucralfate 1 Gram(s) Oral four times a day  tamsulosin 0.4 milliGRAM(s) Oral at bedtime    MEDICATIONS  (PRN):    Pertinent Labs: CBC Full  -  ( 03 Jan 2018 06:36 )  WBC Count : 8.2 K/uL  Hemoglobin : 7.4 g/dL  Hematocrit : 25.2 %  Platelet Count - Automated : 276 K/uL  Mean Cell Volume : 100.8 fl  Mean Cell Hemoglobin : 29.6 pg  Mean Cell Hemoglobin Concentration : 29.4 g/dL  Auto Neutrophil # : x  Auto Lymphocyte # : x  Auto Monocyte # : x  Auto Eosinophil # : x  Auto Basophil # : x  Auto Neutrophil % : x  Auto Lymphocyte % : x  Auto Monocyte % : x  Auto Eosinophil % : x  Auto Basophil % : x      01-03 Na139 mmol/L Glu 136 mg/dL<H> K+ 4.9 mmol/L Cr  2.71 mg/dL<H> BUN 53.0 mg/dL<H> Phos n/a   Alb 2.7 g/dL<L> PAB n/a           Skin:     Nutrition focused physical exam conducted - found signs of malnutrition [x ]absent [ ]present    Subcutaneous fat loss: [ ] Orbital fat pads region, [ ]Buccal fat region, [ ]Triceps region,  [ ]Ribs region    Muscle wasting: [ ]Temples region, [ ]Clavicle region, [ ]Shoulder region, [ ]Scapula region, [ ]Interosseous region,  [ ]thigh region, [ ]Calf region    Estimated Needs:   [x ] no change since previous assessment  [ ] recalculated:     Current Nutrition Diagnosis: Pt with difficulty swallowing related to CVA as evidenced by need for Jevity tube feeds via PEG.  Pt is aphasic. TF currently clogged as per nurse.       Recommendations: Continue 50cchr as medically feasible.     Monitoring and Evaluation:   [ ] PO intake [ ] Tolerance to diet prescription [X] Weights  [X] Follow up per protocol [X] Labs:

## 2018-01-03 NOTE — PROVIDER CONTACT NOTE (EICU) - RECOMMENDATIONS
serial troponin  diurese with lasix, stat cxr  cardiology eval  heparin drip to better control anticoagulation  ICU attending updated about pt's condition. serial troponin  diurese with lasix, stat cxr  cardiology eval  heparin drip to better control anticoagulation  v/q scan , CTA cannot be done due to KAPIL  ICU attending updated about pt's condition.

## 2018-01-03 NOTE — PROVIDER CONTACT NOTE (EICU) - SITUATION
RRT was called for episode of Vtach earlier in the day. pt on amiodarone. Pt has DVT in upper ext. and possible PE based on echo + MCConell sign. MICU was called by EICU , pt seen by MICU earlier, found not candidate for MICU.

## 2018-01-03 NOTE — PROGRESS NOTE ADULT - SUBJECTIVE AND OBJECTIVE BOX
[facility]                                   [address] [address]                                                      PHONE: [phone]                                                         FAX: [phone]  ----------------------------------------------------------------------------------------------------    Pt seen and examined. FU for AF  Noted to have NSVT on tele. Did not receive meds as pts PEG tube was clogged.    Overnight events/Complaints: Pt without complains. Appears alert and answers questions.    Vital Signs Last 24 Hrs  T(C): 36.2 (03 Jan 2018 08:31), Max: 36.8 (02 Jan 2018 16:36)  T(F): 97.2 (03 Jan 2018 08:31), Max: 98.3 (02 Jan 2018 16:36)  HR: 104 (03 Jan 2018 08:31) (80 - 106)  BP: 109/58 (03 Jan 2018 08:31) (100/56 - 121/73)  BP(mean): --  RR: 18 (03 Jan 2018 08:31) (18 - 20)  SpO2: 92% (03 Jan 2018 03:56) (92% - 97%)  I&O's Summary    02 Jan 2018 07:01  -  03 Jan 2018 07:00  --------------------------------------------------------  IN: 0 mL / OUT: 400 mL / NET: -400 mL    PHYSICAL EXAM:  GENERAL: Appears weak and fatigued. Awake and alert  HEENT: anicteric, Pale  NECK: Supple, No JVD   CHEST/LUNG: decrease air entry bilateral with some rales, No wheeze  HEART: S1S2 Normal intensity, no murmurs  ABDOMEN: Soft, BS +ve PEG tube  EXTREMITIES:  DP pulses noted, no calf tenderness. LUE swelling with some erythema improving  SKIN: No rashes.  NEURO: Right hemiparesis.  CN II-XII intact  PSYCH: Depressed  mood and affect. Appears calm        LABS:                        7.4    8.2   )-----------( 276      ( 03 Jan 2018 06:36 )             25.2     01-03    139  |  102  |  53.0<H>  ----------------------------<  136<H>  4.9   |  23.0  |  2.71<H>    Ca    8.5<L>      03 Jan 2018 06:36    TPro  6.9  /  Alb  2.7<L>  /  TBili  0.6  /  DBili  x   /  AST  18  /  ALT  26  /  AlkPhos  98  01-03        PT/INR - ( 02 Jan 2018 07:19 )   PT: 14.5 sec;   INR: 1.31 ratio         PTT - ( 02 Jan 2018 07:19 )  PTT:22.7 sec    RADIOLOGY & ADDITIONAL STUDIES:    CARDIOLOGY TESTING:     Telemetry: AF with brief wide complex rhythm likely NSVT    Echocardiogram:  < from: TTE Echo Complete w/Doppler (09.06.17 @ 15:51) >   Summary:   1. Left ventricular ejection fraction, by visual estimation, is 60 to   65%.   2. Normal global left ventricular systolic function.   3. Basal and mid inferior wall is abnormal as described above.   4. Moderately dilated right atrium.   5. Thickening of the anterior and posterior mitral valve leaflets.   6. There is either MAC or a prosthetic mitral ring present. An eccentric   moderately severe jet of MR is seen.   7. Severe tricuspid regurgitation.   8. Bioprosthesis in the aortic position.   9. Mild aortic regurgitation.  10. Estimated pulmonary artery systolic pressure is 37.8 mmHg assuming a   right atrial pressure of 15 mmHg, which is consistent with borderline   pulmonary hypertension.  11. Moderately enlarged left atrium.     MD Schuyler Electronically signed on 9/6/2017 at 4:45:45 PM    < end of copied text >    MEDICATIONS:  MEDICATIONS  (STANDING):  ALBUTerol    0.083% 2.5 milliGRAM(s) Nebulizer every 4 hours  amiodarone    Tablet 400 milliGRAM(s) Oral daily  aspirin  chewable 81 milliGRAM(s) Oral daily  atorvastatin 10 milliGRAM(s) Oral at bedtime  ciprofloxacin     Tablet 250 milliGRAM(s) Oral every 24 hours  enoxaparin Injectable 80 milliGRAM(s) SubCutaneous daily  epoetin [place] Injectable 73749 Unit(s) SubCutaneous every 7 days  finasteride 5 milliGRAM(s) Oral daily  folic acid 1 milliGRAM(s) Enteral Tube daily  furosemide   Injectable 20 milliGRAM(s) IV Push once  metoprolol     tartrate 25 milliGRAM(s) Oral three times a day  pantoprazole  Injectable 40 milliGRAM(s) IV Push two times a day  saccharomyces boulardii 250 milliGRAM(s) Oral two times a day  sucralfate 1 Gram(s) Oral four times a day  tamsulosin 0.4 milliGRAM(s) Oral at bedtime    MEDICATIONS  (PRN):      ASSESSMENT AND PLAN:    6 M with prior history of permanent AF on coumadin, CAD s/p CABG/AVR, HTN , chronic renal insufficiency, CVA with R hemiparesis,  colon resection with ileostomy, dysphagia s/p PEG who presented unresponsive and hypoxic found to have septic shock requiring pressors and acute hypoxic respiratory failure 2/2 aspiration PNA s/p intubation, with course further complicated by KAPIL on CKD, and now with recurrent NSVT  Did not receive meds due to PEG

## 2018-01-03 NOTE — CONSULT NOTE ADULT - SUBJECTIVE AND OBJECTIVE BOX
Patient is a 77y old  Male who presents with a chief complaint of Found unresponsive in his vomit (18 Dec 2017 19:49)      BRIEF HOSPITAL COURSE:     Events last 24 hours: ***    PAST MEDICAL & SURGICAL HISTORY:  CAD (coronary artery disease)  Afib  CVA (cerebral vascular accident)  Mitral valve replaced  BPH (benign prostatic hyperplasia)  High cholesterol  HTN (hypertension)  H/O heart valve replacement with mechanical valve  S/P CABG x 1  H/O colectomy      Review of Systems:  CONSTITUTIONAL: No fever, chills, or fatigue  EYES: No eye pain, visual disturbances, or discharge  ENMT:  No difficulty hearing, tinnitus, vertigo; No sinus or throat pain  NECK: No pain or stiffness  RESPIRATORY: No cough, wheezing, chills or hemoptysis; No shortness of breath  CARDIOVASCULAR: No chest pain, palpitations, dizziness, or leg swelling  GASTROINTESTINAL: No abdominal or epigastric pain. No nausea, vomiting, or hematemesis; No diarrhea or constipation. No melena or hematochezia.  GENITOURINARY: No dysuria, frequency, hematuria, or incontinence  NEUROLOGICAL: No headaches, memory loss, loss of strength, numbness, or tremors  SKIN: No itching, burning, rashes, or lesions   MUSCULOSKELETAL: No joint pain or swelling; No muscle, back, or extremity pain  PSYCHIATRIC: No depression, anxiety, mood swings, or difficulty sleeping      Medications:  ciprofloxacin     Tablet 250 milliGRAM(s) Oral every 24 hours    amiodarone    Tablet 400 milliGRAM(s) Oral daily  metoprolol     tartrate 25 milliGRAM(s) Oral three times a day  tamsulosin 0.4 milliGRAM(s) Oral at bedtime    ALBUTerol    0.083% 2.5 milliGRAM(s) Nebulizer every 4 hours        aspirin  chewable 81 milliGRAM(s) Oral daily  enoxaparin Injectable 80 milliGRAM(s) SubCutaneous daily    pantoprazole  Injectable 40 milliGRAM(s) IV Push two times a day  sucralfate 1 Gram(s) Oral four times a day      atorvastatin 10 milliGRAM(s) Oral at bedtime  finasteride 5 milliGRAM(s) Oral daily    folic acid 1 milliGRAM(s) Enteral Tube daily    epoetin [place] Injectable 69723 Unit(s) SubCutaneous every 7 days      saccharomyces boulardii 250 milliGRAM(s) Oral two times a day          ICU Vital Signs Last 24 Hrs  T(C): 36.2 (03 Jan 2018 15:21), Max: 36.8 (02 Jan 2018 16:36)  T(F): 97.1 (03 Jan 2018 15:21), Max: 98.3 (02 Jan 2018 16:36)  HR: 98 (03 Jan 2018 15:21) (80 - 114)  BP: 115/73 (03 Jan 2018 15:21) (99/49 - 121/73)  BP(mean): --  ABP: --  ABP(mean): --  RR: 20 (03 Jan 2018 15:21) (18 - 32)  SpO2: 96% (03 Jan 2018 15:21) (92% - 99%)      ABG - ( 03 Jan 2018 15:16 )  pH: 7.46  /  pCO2: 36    /  pO2: 63    / HCO3: 26    / Base Excess: 1.8   /  SaO2: 94                  I&O's Detail    02 Jan 2018 07:01  -  03 Jan 2018 07:00  --------------------------------------------------------  IN:  Total IN: 0 mL    OUT:    Colostomy: 400 mL  Total OUT: 400 mL    Total NET: -400 mL            LABS:                        7.7    8.5   )-----------( 322      ( 03 Jan 2018 13:34 )             25.1     01-03    140  |  102  |  51.0<H>  ----------------------------<  102  4.9   |  23.0  |  2.75<H>    Ca    8.7      03 Jan 2018 15:05  Phos  3.7     01-03  Mg     1.6     01-03    TPro  6.9  /  Alb  2.7<L>  /  TBili  0.6  /  DBili  x   /  AST  18  /  ALT  26  /  AlkPhos  98  01-03      CARDIAC MARKERS ( 03 Jan 2018 15:05 )  x     / 0.04 ng/mL / x     / x     / x          CAPILLARY BLOOD GLUCOSE      POCT Blood Glucose.: 120 mg/dL (03 Jan 2018 13:54)    PT/INR - ( 02 Jan 2018 07:19 )   PT: 14.5 sec;   INR: 1.31 ratio         PTT - ( 02 Jan 2018 07:19 )  PTT:22.7 sec    CULTURES:  Culture Results:   >100,000 CFU/ml Pseudomonas aeruginosa (12-30 @ 19:11)      Physical Examination:    General: No acute distress.      HEENT: Pupils equal, reactive to light.  Symmetric.    PULM: Clear to auscultation bilaterally, no significant sputum production    CVS: Regular rate and rhythm, no murmurs, rubs, or gallops    ABD: Soft, nondistended, nontender, normoactive bowel sounds, no masses    EXT: No edema, nontender    SKIN: Warm and well perfused, no rashes noted.    NEURO: Alert, oriented, interactive, nonfocal    RADIOLOGY: ***    CRITICAL CARE TIME SPENT: I have spent *** minutes of critical care time evaluating and treating this patient, including reviewing charts, labs, imagining studies, and collaborating with interdisciplinary team. Patient is a 77y old  Male who presents with a chief complaint of Found unresponsive in his vomit (18 Dec 2017 19:49)      BRIEF HOSPITAL COURSE: 76 yo male, PMHx CAD s/p CABG, AFib on Coumadin, HTN, prostate CA s/p TURP, s/p PEG and colectomy, prior CVA with expressive aphasia and R hemiparesis, admitted with hypoxemic respiratory failure requiring intubation secondary to aspiration pneumonia, septic shock requiring vasopressors secondary to Gram negative urosepsis. 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    Events last 24 hours: RRT called for 48 seconds of sustained VT. Of note, pt had repeat TTE that showed decreased in EF from 60-65% to 40-45%, grade III diastolic dysfunction, with borderline pHTN, and new RV dysfunction. Cardiology consulted, advised to give Amiodarone PO dose. Also evaluated by Pulmonology. MICU consult was called for concern for submassive PE given TTE findings. Oxygenation has been sustained with SpO2 >94% on 2L nasal cannula without escalation in supplemental oxygen requirements. HR ~110 bpm AFib with few PVCs. On exam, patient appears mildly tachypneic. Patient denies feeling more short of breath than baseline, and patient's wife states his respiratory status appears at baseline. Unable to obtain full ROS from patient due to expressive aphasia, however pt denies chest pain and when asked if he has any new symptoms or complaints, he says no. STAT labs revealed low Mg at 1.6. STAT CXR ordered. ABG 7.46/36/63/26.      PAST MEDICAL & SURGICAL HISTORY:  CAD (coronary artery disease)  Afib  CVA (cerebral vascular accident)  Mitral valve replaced  BPH (benign prostatic hyperplasia)  High cholesterol  HTN (hypertension)  H/O heart valve replacement with mechanical valve  S/P CABG x 1  H/O colectomy      Review of Systems: unable to obtain full ROS given expressive aphasia  CONSTITUTIONAL: No fever, chills, or fatigue  EYES: No eye pain, visual disturbances, or discharge  ENMT:  No difficulty hearing, tinnitus, vertigo; No sinus or throat pain  NECK: No pain or stiffness  RESPIRATORY: No cough, wheezing, chills or hemoptysis; No shortness of breath  CARDIOVASCULAR: No chest pain, palpitations, dizziness, or leg swelling  GASTROINTESTINAL: No abdominal or epigastric pain. No nausea, vomiting, or hematemesis; No diarrhea or constipation. No melena or hematochezia.  GENITOURINARY: No dysuria, frequency, hematuria, or incontinence  NEUROLOGICAL: No headaches, memory loss, loss of strength, numbness, or tremors  SKIN: No itching, burning, rashes, or lesions   MUSCULOSKELETAL: No joint pain or swelling; No muscle, back, or extremity pain  PSYCHIATRIC: No depression, anxiety, mood swings, or difficulty sleeping      Medications:  ciprofloxacin     Tablet 250 milliGRAM(s) Oral every 24 hours  amiodarone    Tablet 400 milliGRAM(s) Oral daily  metoprolol     tartrate 25 milliGRAM(s) Oral three times a day  tamsulosin 0.4 milliGRAM(s) Oral at bedtime  ALBUTerol    0.083% 2.5 milliGRAM(s) Nebulizer every 4 hours  aspirin  chewable 81 milliGRAM(s) Oral daily  enoxaparin Injectable 80 milliGRAM(s) SubCutaneous daily  pantoprazole  Injectable 40 milliGRAM(s) IV Push two times a day  sucralfate 1 Gram(s) Oral four times a day  atorvastatin 10 milliGRAM(s) Oral at bedtime  finasteride 5 milliGRAM(s) Oral daily  folic acid 1 milliGRAM(s) Enteral Tube daily  epoetin [place] Injectable 10401 Unit(s) SubCutaneous every 7 days  saccharomyces boulardii 250 milliGRAM(s) Oral two times a day      ICU Vital Signs Last 24 Hrs  T(C): 36.2 (03 Jan 2018 15:21), Max: 36.8 (02 Jan 2018 16:36)  T(F): 97.1 (03 Jan 2018 15:21), Max: 98.3 (02 Jan 2018 16:36)  HR: 98 (03 Jan 2018 15:21) (80 - 114)  BP: 115/73 (03 Jan 2018 15:21) (99/49 - 121/73)  BP(mean): --  ABP: --  ABP(mean): --  RR: 20 (03 Jan 2018 15:21) (18 - 32)  SpO2: 96% (03 Jan 2018 15:21) (92% - 99%)      ABG - ( 03 Jan 2018 15:16 )  pH: 7.46  /  pCO2: 36    /  pO2: 63    / HCO3: 26    / Base Excess: 1.8   /  SaO2: 94                  I&O's Detail    02 Jan 2018 07:01  -  03 Jan 2018 07:00  --------------------------------------------------------  IN:  Total IN: 0 mL    OUT:    Colostomy: 400 mL  Total OUT: 400 mL    Total NET: -400 mL            LABS:                        7.7    8.5   )-----------( 322      ( 03 Jan 2018 13:34 )             25.1     01-03    140  |  102  |  51.0<H>  ----------------------------<  102  4.9   |  23.0  |  2.75<H>    Ca    8.7      03 Jan 2018 15:05  Phos  3.7     01-03  Mg     1.6     01-03    TPro  6.9  /  Alb  2.7<L>  /  TBili  0.6  /  DBili  x   /  AST  18  /  ALT  26  /  AlkPhos  98  01-03      CARDIAC MARKERS ( 03 Jan 2018 15:05 )  x     / 0.04 ng/mL / x     / x     / x          CAPILLARY BLOOD GLUCOSE      POCT Blood Glucose.: 120 mg/dL (03 Jan 2018 13:54)    PT/INR - ( 02 Jan 2018 07:19 )   PT: 14.5 sec;   INR: 1.31 ratio         PTT - ( 02 Jan 2018 07:19 )  PTT:22.7 sec    CULTURES:  Culture Results:   >100,000 CFU/ml Pseudomonas aeruginosa (12-30 @ 19:11)      Physical Examination:    General: Elderly male sitting up in bed,  in no acute distress.      HEENT: Pupils equal, reactive to light.  Symmetric.    PULM: Purse lip breathing on 2L nasal cannula no accessory muscle use. Coarse to auscultation bilaterally with scattered rales, productive cough    CVS: Irregular rate and irregular rhythm, no murmurs, rubs, or gallops    ABD: Soft, nondistended, nontender, normoactive bowel sounds, no masses. PEG in place, ostomy bag in place with brown stool    EXT: No edema, nontender    SKIN: Warm and well perfused, no rashes noted.    NEURO: Alert, oriented, interactive, R hemiparesis, voice soft poor attention     RADIOLOGY: none recent    CRITICAL CARE TIME SPENT: I have spent 52 minutes of critical care time evaluating and treating this patient, including reviewing charts, labs, imagining studies, and collaborating with interdisciplinary team.

## 2018-01-03 NOTE — PROGRESS NOTE ADULT - ASSESSMENT
CRF(III): ARF and hypernatremia ==>improved  (Screat 1.9mg/dL in Sept 2017)  - avoid nephrotoxins  - hypotonic fluids as needed  - monitor labs      Anemia: multifactorial  - cont GONZALES  - consider PRBCs

## 2018-01-03 NOTE — CHART NOTE - NSCHARTNOTEFT_GEN_A_CORE
Rapid Response PGY 2/ PGY 3 Note    [id number]  CRISTIN MYA    Rapid Repsonse was called on a 77y year old Male patient for V tach on monitor for 48 seconds.   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    Allergies    penicillins (Hives)    Intolerances        PAST MEDICAL & SURGICAL HISTORY:  CAD (coronary artery disease)  Afib  CVA (cerebral vascular accident)  Mitral valve replaced  BPH (benign prostatic hyperplasia)  High cholesterol  HTN (hypertension)  H/O heart valve replacement with mechanical valve  S/P CABG x 1  H/O colectomy      Vital Signs Last 24 Hrs  T(C): 36.2 (03 Jan 2018 08:31), Max: 36.8 (02 Jan 2018 16:36)  T(F): 97.2 (03 Jan 2018 08:31), Max: 98.3 (02 Jan 2018 16:36)  HR: 104 (03 Jan 2018 08:31) (80 - 106)  BP: 109/58 (03 Jan 2018 08:31) (100/56 - 121/73)  BP(mean): --  RR: 18 (03 Jan 2018 08:31) (18 - 20)  SpO2: 92% (03 Jan 2018 03:56) (92% - 97%)          PHYSICAL EXAM:    GENERAL: NAD, sitting up in bed   HEAD:  Atraumatic, Normocephalic  EYES: EOMI, PERRLA, conjunctiva and sclera clear  ENMT: No tonsillar erythema, exudates, or enlargement; Moist mucous membranes, Good dentition, No lesions  NECK: Supple, No JVD, Normal thyroid  NERVOUS SYSTEM:  Alert & Oriented X3, Good concentration; Motor Strength 5/5 B/L upper and lower extremities; DTRs 2+ intact and symmetric  CHEST/LUNG: Clear to percussion bilaterally; No rales, rhonchi, wheezing, or rubs  HEART: Regular rate and rhythm; No murmurs, rubs, or gallops  ABDOMEN: Soft, Nontender, Nondistended; Bowel sounds present  EXTREMITIES:  2+ Peripheral Pulses, No clubbing, cyanosis, or edema  LYMPH: No lymphadenopathy noted  RECTAL: No masses, prostate normal size and smooth, Guiac negative   BREAST: No palpatble masses, skin no lesions no retractions, no discharages. adenexa no palpable masses noted   GYN: uterus normal size, adenexa no palpable masses, no CMT, no uterine discharge   SKIN: No rashes or lesions      01-02 @ 07:01  -  01-03 @ 07:00  --------------------------------------------------------  IN: 0 mL / OUT: 400 mL / NET: -400 mL                              7.7    8.5   )-----------( 322      ( 03 Jan 2018 13:34 )             25.1     01-03    139  |  102  |  53.0<H>  ----------------------------<  136<H>  4.9   |  23.0  |  2.71<H>    Ca    8.5<L>      03 Jan 2018 06:36    TPro  6.9  /  Alb  2.7<L>  /  TBili  0.6  /  DBili  x   /  AST  18  /  ALT  26  /  AlkPhos  98  01-03         LIVER FUNCTIONS - ( 03 Jan 2018 06:36 )  Alb: 2.7 g/dL / Pro: 6.9 g/dL / ALK PHOS: 98 U/L / ALT: 26 U/L / AST: 18 U/L / GGT: x              PT/INR - ( 02 Jan 2018 07:19 )   PT: 14.5 sec;   INR: 1.31 ratio         PTT - ( 02 Jan 2018 07:19 )  PTT:22.7 sec    Vital Signs Last 24 Hrs*       Assessment- Rapid Response called for 77y year old Male with a past medical history of     Plan- Rapid Response PGY 2/ PGY 3 Note    [id number]  CRISTIN MYA    Rapid Repsonse was called at 1356 on a 77y year old Male patient for nonsustained V tach on monitor for 48 seconds since this morning.   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    Allergies    penicillins (Hives)    Intolerances        PAST MEDICAL & SURGICAL HISTORY:  CAD (coronary artery disease)  Afib  CVA (cerebral vascular accident)  Mitral valve replaced  BPH (benign prostatic hyperplasia)  High cholesterol  HTN (hypertension)  H/O heart valve replacement with mechanical valve  S/P CABG x 1  H/O colectomy      Vital Signs Last 24 Hrs  T(C): 36.2 (03 Jan 2018 08:31), Max: 36.8 (02 Jan 2018 16:36)  T(F): 97.2 (03 Jan 2018 08:31), Max: 98.3 (02 Jan 2018 16:36)  HR: 104 (03 Jan 2018 08:31) (80 - 106)  BP: 109/58 (03 Jan 2018 08:31) (100/56 - 121/73)  BP(mean): --  RR: 18 (03 Jan 2018 08:31) (18 - 20)  SpO2: 92% (03 Jan 2018 03:56) (92% - 97%)          PHYSICAL EXAM:    GENERAL: NAD, sitting up in bed   HEAD:  Atraumatic, Normocephalic  EYES: EOMI, PERRLA, conjunctiva and sclera clear  ENMT: No tonsillar erythema, exudates, or enlargement;  Good dentition, No lesions  NECK: Supple, No JVD, Normal thyroid  NERVOUS SYSTEM:  Alert, oriented, no focal deficits  CHEST/LUNG: Clear to percussion bilaterally, diminished   HEART: Regular rate and rhythm; No murmurs, rubs, or gallops  ABDOMEN: Soft, Nontender, Nondistended; Bowel sounds present  EXTREMITIES:  2+ Peripheral Pulses, No clubbing, cyanosis, or edema  LYMPH: No lymphadenopathy noted  RECTAL: No masses, prostate normal size and smooth, Guiac negative   BREAST: No palpatble masses, skin no lesions no retractions, no discharages. adenexa no palpable masses noted   GYN: uterus normal size, adenexa no palpable masses, no CMT, no uterine discharge   SKIN: No rashes or lesions      01-02 @ 07:01  -  01-03 @ 07:00  --------------------------------------------------------  IN: 0 mL / OUT: 400 mL / NET: -400 mL                              7.7    8.5   )-----------( 322      ( 03 Jan 2018 13:34 )             25.1     01-03    139  |  102  |  53.0<H>  ----------------------------<  136<H>  4.9   |  23.0  |  2.71<H>    Ca    8.5<L>      03 Jan 2018 06:36    TPro  6.9  /  Alb  2.7<L>  /  TBili  0.6  /  DBili  x   /  AST  18  /  ALT  26  /  AlkPhos  98  01-03         LIVER FUNCTIONS - ( 03 Jan 2018 06:36 )  Alb: 2.7 g/dL / Pro: 6.9 g/dL / ALK PHOS: 98 U/L / ALT: 26 U/L / AST: 18 U/L / GGT: x              PT/INR - ( 02 Jan 2018 07:19 )   PT: 14.5 sec;   INR: 1.31 ratio         PTT - ( 02 Jan 2018 07:19 )  PTT:22.7 sec    Vital Signs Last 24 Hrs*       Assessment- Rapid Response called for 77y year old Male with a past medical history of     Plan- Rapid Response PGY 2/ PGY 3 Note    [id number]  CRISTIN MYA    Rapid Response was called at 1356 on a 77y year old Male patient for sustained V tach on monitor for 48 seconds since this morning.   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     Patient was seen and examined at the bedside by the rapid response team. Patient is alert, denying chest pain or difficulty breathing, stating he feels "fine" with wife at bedside. Wife state patient appears at baseline and breathing is unchanged from his baseline.     Initially vitals were: temp 97.5   /65, P 114, resp 28, O2 sat 99% on 2L NC  @ 1400: BP 99/49   P 104   RR 24  @ 1406: /57   P 95 RR28  @ 1416: /59   P 110  RR 32  @ 1430: /55   P 105  RR24    Allergies    penicillins (Hives)    Intolerances        PAST MEDICAL & SURGICAL HISTORY:  CAD (coronary artery disease)  Afib  CVA (cerebral vascular accident)  Mitral valve replaced  BPH (benign prostatic hyperplasia)  High cholesterol  HTN (hypertension)  H/O heart valve replacement with mechanical valve  S/P CABG x 1  H/O colectomy      Vital Signs Last 24 Hrs  T(C): 36.2 (03 Jan 2018 08:31), Max: 36.8 (02 Jan 2018 16:36)  T(F): 97.2 (03 Jan 2018 08:31), Max: 98.3 (02 Jan 2018 16:36)  HR: 104 (03 Jan 2018 08:31) (80 - 106)  BP: 109/58 (03 Jan 2018 08:31) (100/56 - 121/73)  BP(mean): --  RR: 18 (03 Jan 2018 08:31) (18 - 20)  SpO2: 92% (03 Jan 2018 03:56) (92% - 97%) 2L nasal canula          PHYSICAL EXAM:    GENERAL: NAD, sitting up in bed   HEAD:  Atraumatic, Normocephalic  EYES: EOMI, PERRLA, conjunctiva and sclera clear  ENMT: No tonsillar erythema, exudates, or enlargement; mucous membranes moist  NECK: Supple, No JVD  NERVOUS SYSTEM:  Alert, oriented, no focal deficits  CHEST/LUNG: diffuse scattered rhonchi, no crackles, diminished breath sounds at bases bilaterally  HEART: irregularly irregular, no murmurs/gallops  ABDOMEN: Soft, Nontender, Nondistended; Bowel sounds present; PEG tube in place   EXTREMITIES:  2+ Peripheral Pulses, No edema, RUE and RLE paralysis with ROM intact to left side  SKIN: No sacral decubitus ulcers, lesion on shin unchanged ,no active bleeding or discharge, otherwise intact      01-02 @ 07:01  -  01-03 @ 07:00  --------------------------------------------------------  IN: 0 mL / OUT: 400 mL / NET: -400 mL                              7.7    8.5   )-----------( 322      ( 03 Jan 2018 13:34 )             25.1     01-03    139  |  102  |  53.0<H>  ----------------------------<  136<H>  4.9   |  23.0  |  2.71<H>    Ca    8.5<L>      03 Jan 2018 06:36    TPro  6.9  /  Alb  2.7<L>  /  TBili  0.6  /  DBili  x   /  AST  18  /  ALT  26  /  AlkPhos  98  01-03         LIVER FUNCTIONS - ( 03 Jan 2018 06:36 )  Alb: 2.7 g/dL / Pro: 6.9 g/dL / ALK PHOS: 98 U/L / ALT: 26 U/L / AST: 18 U/L / GGT: x              PT/INR - ( 02 Jan 2018 07:19 )   PT: 14.5 sec;   INR: 1.31 ratio         PTT - ( 02 Jan 2018 07:19 )  PTT:22.7 sec    Vital Signs Last 24 Hrs*       Assessment- Rapid Response called for 77y year old Male    Plan-    Patient seen and examined with hospitalist (Dr. Silver) , cardiology (Dr. Castillo), pulm (Dr. Shepard), ICU PA An   Recommendations appreciated- cardiology does not recommend amiodarone drip at this time, pulmonology does not recommend further scan (no V/Q necessary)  Will repeat CXR due to tachypnea, rhonchi on auscultation Rapid Response PGY 2/ PGY 3 Note    [id number]  CRISTIN MYA    Rapid Response was called at 1356 on a 77y year old Male patient for sustained V tach on monitor for 48 seconds since this morning.   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     Patient was seen and examined at the bedside by the rapid response team. Patient is alert, denying chest pain or difficulty breathing, stating he feels "fine" with wife at bedside. Wife state patient appears at baseline and breathing is unchanged from his baseline.     Initially vitals were: temp 97.5   /65, P 114, resp 28, O2 sat 99% on 2L NC  @ 1400: BP 99/49   P 104   RR 24  @ 1406: /57   P 95 RR28  @ 1416: /59   P 110  RR 32  @ 1430: /55   P 105  RR24    Allergies    penicillins (Hives)    Intolerances        PAST MEDICAL & SURGICAL HISTORY:  CAD (coronary artery disease)  Afib  CVA (cerebral vascular accident)  Mitral valve replaced  BPH (benign prostatic hyperplasia)  High cholesterol  HTN (hypertension)  H/O heart valve replacement with mechanical valve  S/P CABG x 1  H/O colectomy      Vital Signs Last 24 Hrs  T(C): 36.2 (03 Jan 2018 08:31), Max: 36.8 (02 Jan 2018 16:36)  T(F): 97.2 (03 Jan 2018 08:31), Max: 98.3 (02 Jan 2018 16:36)  HR: 104 (03 Jan 2018 08:31) (80 - 106)  BP: 109/58 (03 Jan 2018 08:31) (100/56 - 121/73)  BP(mean): --  RR: 18 (03 Jan 2018 08:31) (18 - 20)  SpO2: 92% (03 Jan 2018 03:56) (92% - 97%) 2L nasal canula          PHYSICAL EXAM:    GENERAL: NAD, sitting up in bed   HEAD:  Atraumatic, Normocephalic  EYES: EOMI, PERRLA, conjunctiva and sclera clear  ENMT: No tonsillar erythema, exudates, or enlargement; mucous membranes moist  NECK: Supple, No JVD  NERVOUS SYSTEM:  Alert, oriented, no focal deficits  CHEST/LUNG: diffuse scattered rhonchi, no crackles, diminished breath sounds at bases bilaterally  HEART: irregularly irregular, no murmurs/gallops  ABDOMEN: Soft, Nontender, Nondistended; Bowel sounds present; PEG tube in place   EXTREMITIES:  2+ Peripheral Pulses, No edema, RUE and RLE paralysis with ROM intact to left side  SKIN: No sacral decubitus ulcers, lesion on shin unchanged ,no active bleeding or discharge, otherwise intact      01-02 @ 07:01  -  01-03 @ 07:00  --------------------------------------------------------  IN: 0 mL / OUT: 400 mL / NET: -400 mL                              7.7    8.5   )-----------( 322      ( 03 Jan 2018 13:34 )             25.1     01-03    139  |  102  |  53.0<H>  ----------------------------<  136<H>  4.9   |  23.0  |  2.71<H>    Ca    8.5<L>      03 Jan 2018 06:36    TPro  6.9  /  Alb  2.7<L>  /  TBili  0.6  /  DBili  x   /  AST  18  /  ALT  26  /  AlkPhos  98  01-03         LIVER FUNCTIONS - ( 03 Jan 2018 06:36 )  Alb: 2.7 g/dL / Pro: 6.9 g/dL / ALK PHOS: 98 U/L / ALT: 26 U/L / AST: 18 U/L / GGT: x              PT/INR - ( 02 Jan 2018 07:19 )   PT: 14.5 sec;   INR: 1.31 ratio         PTT - ( 02 Jan 2018 07:19 )  PTT:22.7 sec    Vital Signs Last 24 Hrs*       Assessment- Rapid Response called for 77y year old Male    Plan-    Patient seen and examined with hospitalist (Dr. Silver) , cardiology (Dr. Castillo), pulm (Dr. Shepard), ICU CARLA Nolan   EKG done at bedside shows  a fib pulse 106bpm, QRS 100ms, /486 ms   Recommendations appreciated- cardiology does not recommend amiodarone drip/changes to medication, pulmonology does not recommend V/Q scan at this time   Will repeat CXR due to tachypnea, rhonchi on auscultation, ICU recs appreciated Rapid Response PGY 2/ PGY 3 Note    [id number]  CRISTIN MYA    Rapid Response was called at 1356 on a 77y year old Male patient for sustained V tach on monitor for 48 seconds since this morning.   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     Patient was seen and examined at the bedside by the rapid response team. Patient is alert, denying chest pain or difficulty breathing, stating he feels "fine" with wife at bedside. Wife state patient appears at baseline and breathing is unchanged from his baseline.     Initially vitals were: temp 97.5   /65, P 114, resp 28, O2 sat 99% on 2L NC  @ 1400: BP 99/49   P 104   RR 24  @ 1406: /57   P 95 RR28  @ 1416: /59   P 110  RR 32  @ 1430: /55   P 105  RR24    Allergies    penicillins (Hives)    Intolerances        PAST MEDICAL & SURGICAL HISTORY:  CAD (coronary artery disease)  Afib  CVA (cerebral vascular accident)  Mitral valve replaced  BPH (benign prostatic hyperplasia)  High cholesterol  HTN (hypertension)  H/O heart valve replacement with mechanical valve  S/P CABG x 1  H/O colectomy      Vital Signs Last 24 Hrs  T(C): 36.2 (03 Jan 2018 08:31), Max: 36.8 (02 Jan 2018 16:36)  T(F): 97.2 (03 Jan 2018 08:31), Max: 98.3 (02 Jan 2018 16:36)  HR: 104 (03 Jan 2018 08:31) (80 - 106)  BP: 109/58 (03 Jan 2018 08:31) (100/56 - 121/73)  BP(mean): --  RR: 18 (03 Jan 2018 08:31) (18 - 20)  SpO2: 92% (03 Jan 2018 03:56) (92% - 97%) 2L nasal canula          PHYSICAL EXAM:    GENERAL: NAD, sitting up in bed   HEAD:  Atraumatic, Normocephalic  EYES: EOMI, PERRLA, conjunctiva and sclera clear  ENMT: No tonsillar erythema, exudates, or enlargement; mucous membranes moist  NECK: Supple, No JVD  NERVOUS SYSTEM:  Alert, oriented, no focal deficits  CHEST/LUNG: diffuse scattered rhonchi, no crackles, diminished breath sounds at bases bilaterally  HEART: irregularly irregular, no murmurs/gallops  ABDOMEN: Soft, Nontender, Nondistended; Bowel sounds present; PEG tube in place   EXTREMITIES:  2+ Peripheral Pulses, No edema, RUE and RLE paralysis with ROM intact to left side  SKIN: No sacral decubitus ulcers, lesion on shin unchanged ,no active bleeding or discharge, otherwise intact      01-02 @ 07:01  -  01-03 @ 07:00  --------------------------------------------------------  IN: 0 mL / OUT: 400 mL / NET: -400 mL                              7.7    8.5   )-----------( 322      ( 03 Jan 2018 13:34 )             25.1     01-03    139  |  102  |  53.0<H>  ----------------------------<  136<H>  4.9   |  23.0  |  2.71<H>    Ca    8.5<L>      03 Jan 2018 06:36    TPro  6.9  /  Alb  2.7<L>  /  TBili  0.6  /  DBili  x   /  AST  18  /  ALT  26  /  AlkPhos  98  01-03         LIVER FUNCTIONS - ( 03 Jan 2018 06:36 )  Alb: 2.7 g/dL / Pro: 6.9 g/dL / ALK PHOS: 98 U/L / ALT: 26 U/L / AST: 18 U/L / GGT: x              PT/INR - ( 02 Jan 2018 07:19 )   PT: 14.5 sec;   INR: 1.31 ratio         PTT - ( 02 Jan 2018 07:19 )  PTT:22.7 sec    Vital Signs Last 24 Hrs*       Assessment- Rapid Response called for 77y year old Male    Plan-    Patient seen and examined with hospitalist (Dr. Silver) , cardiology (Dr. Castillo), pulm (Dr. Shepard), ICU CARLA Zapien consulted at bedside  EKG done at bedside shows  a fib pulse 106bpm, QRS 100ms, /486 ms   Recommendations appreciated- cardiology does not recommend amiodarone drip/changes to medication, pulmonology does not recommend V/Q scan at this time   Will repeat CXR due to tachypnea, rhonchi on auscultation, ICU recs appreciated    Addendum  -troponin 0.04                        7.8    8.0   )-----------( 324      ( 03 Jan 2018 15:05 )             25.4   01-03    140  |  102  |  51.0<H>  ----------------------------<  102  4.9   |  23.0  |  2.75<H>    Ca    8.7      03 Jan 2018 15:05  Phos  3.7     01-03  Mg     1.6     01-03    TPro  6.9  /  Alb  2.7<L>  /  TBili  0.6  /  DBili  x   /  AST  18  /  ALT  26  /  AlkPhos  98  01-03    -ICU recs appreciated at bedside  -patient to be transferred to [address] [place] [address] for monitoring, ICU does not recommend ICU transfer at this time    - Rapid Response PGY 2/ PGY 3 Note    [id number]  CRISTIN MYA    Rapid Response was called at 1356 on a 77y year old Male patient for sustained V tach on monitor for 48 seconds since this morning.   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     Patient was seen and examined at the bedside by the rapid response team. Patient is alert, denying chest pain or difficulty breathing, stating he feels "fine" with wife at bedside. Wife state patient appears at baseline and breathing is unchanged from his baseline.     Initially vitals were: temp 97.5   /65, P 114, resp 28, O2 sat 99% on 2L NC  @ 1400: BP 99/49   P 104   RR 24  @ 1406: /57   P 95 RR28  @ 1416: /59   P 110  RR 32  @ 1430: /55   P 105  RR24    Allergies    penicillins (Hives)    Intolerances        PAST MEDICAL & SURGICAL HISTORY:  CAD (coronary artery disease)  Afib  CVA (cerebral vascular accident)  Mitral valve replaced  BPH (benign prostatic hyperplasia)  High cholesterol  HTN (hypertension)  H/O heart valve replacement with mechanical valve  S/P CABG x 1  H/O colectomy      Vital Signs Last 24 Hrs  T(C): 36.2 (03 Jan 2018 08:31), Max: 36.8 (02 Jan 2018 16:36)  T(F): 97.2 (03 Jan 2018 08:31), Max: 98.3 (02 Jan 2018 16:36)  HR: 104 (03 Jan 2018 08:31) (80 - 106)  BP: 109/58 (03 Jan 2018 08:31) (100/56 - 121/73)  BP(mean): --  RR: 18 (03 Jan 2018 08:31) (18 - 20)  SpO2: 92% (03 Jan 2018 03:56) (92% - 97%) 2L nasal canula          PHYSICAL EXAM:    GENERAL: NAD, sitting up in bed   HEAD:  Atraumatic, Normocephalic  EYES: EOMI, PERRLA, conjunctiva and sclera clear  ENMT: No tonsillar erythema, exudates, or enlargement; mucous membranes moist  NECK: Supple, No JVD  NERVOUS SYSTEM:  Alert, oriented, no focal deficits  CHEST/LUNG: diffuse scattered rhonchi, no crackles, diminished breath sounds at bases bilaterally  HEART: irregularly irregular, no murmurs/gallops  ABDOMEN: Soft, Nontender, Nondistended; Bowel sounds present; PEG tube in place   EXTREMITIES:  2+ Peripheral Pulses, No edema, RUE and RLE paralysis with ROM intact to left side  SKIN: No sacral decubitus ulcers, lesion on shin unchanged ,no active bleeding or discharge, otherwise intact      01-02 @ 07:01  -  01-03 @ 07:00  --------------------------------------------------------  IN: 0 mL / OUT: 400 mL / NET: -400 mL                              7.7    8.5   )-----------( 322      ( 03 Jan 2018 13:34 )             25.1     01-03    139  |  102  |  53.0<H>  ----------------------------<  136<H>  4.9   |  23.0  |  2.71<H>    Ca    8.5<L>      03 Jan 2018 06:36    TPro  6.9  /  Alb  2.7<L>  /  TBili  0.6  /  DBili  x   /  AST  18  /  ALT  26  /  AlkPhos  98  01-03         LIVER FUNCTIONS - ( 03 Jan 2018 06:36 )  Alb: 2.7 g/dL / Pro: 6.9 g/dL / ALK PHOS: 98 U/L / ALT: 26 U/L / AST: 18 U/L / GGT: x              PT/INR - ( 02 Jan 2018 07:19 )   PT: 14.5 sec;   INR: 1.31 ratio         PTT - ( 02 Jan 2018 07:19 )  PTT:22.7 sec    Vital Signs Last 24 Hrs*       Assessment- Rapid Response called for 77y year old Male    Plan-    Patient seen and examined with hospitalist (Dr. Silver) , cardiology (Dr. Castillo), pulm (Dr. Shepard), ICU CARLA Zapien consulted at bedside  EKG done at bedside shows  a fib pulse 106bpm, QRS 100ms, /486 ms   Recommendations appreciated- cardiology does not recommend amiodarone drip/changes to medication, pulmonology does not recommend V/Q scan at this time   Will repeat CXR due to tachypnea, rhonchi on auscultation, ICU recs appreciated    Addendum  -troponin 0.04                        7.8    8.0   )-----------( 324      ( 03 Jan 2018 15:05 )             25.4   01-03    140  |  102  |  51.0<H>  ----------------------------<  102  4.9   |  23.0  |  2.75<H>    Ca    8.7      03 Jan 2018 15:05  Phos  3.7     01-03  Mg     1.6     01-03    TPro  6.9  /  Alb  2.7<L>  /  TBili  0.6  /  DBili  x   /  AST  18  /  ALT  26  /  AlkPhos  98  01-03    -ICU recs appreciated at bedside  -patient to be transferred to [address] [place] [address] for monitoring, ICU does not recommend ICU transfer at this time  -would recommend supplementing magnesium for optimal control, magnesium 1.6

## 2018-01-03 NOTE — PROGRESS NOTE ADULT - SUBJECTIVE AND OBJECTIVE BOX
CRISTIN ROQUE    [id number]    77y      Male    Patient is a 77y old  Male who presents with a chief complaint of Found unresponsive in his vomit (18 Dec 2017 19:49)      INTERVAL HPI/OVERNIGHT EVENTS:    RN reported PEG tube clogged since last night. PA was informed.  patient did not get any PO med. GI was called this am  Tele showed frequent non-sustained vatch. cardiology f/u was called. patient is on amiodarone and lopressor.  seen at bedside. incomprehensible sound.   denied complaints.   Venous doppler positive for DVT. on lovenox and warfarin. wife and family was informed about risk benefit and alternative. wanted to continue AC for DVT, not decided about Prophylaxis for stroke. patient has h/o afib was on coumadin at home which was held as FOBT positive. h/h stable. repeat FOBT positive, no hematuria noted. no fresh blood on colosotmy bag. on GI cocktail and IV fe. GI on board. not a candidate for Endoscopy.   spoke to Dr. Hinton [ hematology]. agreed to c/w Anti-coag.  seen by miguel last night. recommended to c/w AC as benefit outweigh risk. however can go to conservative if AC is absolutely contra-indicated. Repeat USG ordered today   RF stable. Na normalized. CXR showed right effusion which is new. BNP elevated.         REVIEW OF SYSTEMS:  CONSTITUTIONAL: No fever, + fatigue  RESPIRATORY: + shortness of breath worse today. no cough.   CARDIOVASCULAR: No chest pain/palpitation  GI/: no abd pain/n/v/d/c. no hematochezia or melena. no dysuria  CNS: right sided residual weakness, no new weakness or numbness    MEDICATIONS  (STANDING):  ALBUTerol    0.083% 2.5 milliGRAM(s) Nebulizer every 4 hours  amiodarone    Tablet 400 milliGRAM(s) Oral daily  aspirin  chewable 81 milliGRAM(s) Oral daily  atorvastatin 10 milliGRAM(s) Oral at bedtime  ciprofloxacin     Tablet 250 milliGRAM(s) Oral every 24 hours  enoxaparin Injectable 80 milliGRAM(s) SubCutaneous daily  epoetin [place] Injectable 91713 Unit(s) SubCutaneous every 7 days  finasteride 5 milliGRAM(s) Oral daily  folic acid 1 milliGRAM(s) Enteral Tube daily  metoprolol     tartrate 25 milliGRAM(s) Oral three times a day  pantoprazole  Injectable 40 milliGRAM(s) IV Push two times a day  saccharomyces boulardii 250 milliGRAM(s) Oral two times a day  sucralfate 1 Gram(s) Oral four times a day  tamsulosin 0.4 milliGRAM(s) Oral at bedtime    MEDICATIONS  (PRN):          PHYSICAL EXAM:    Vital Signs Last 24 Hrs  T(C): 36.2 (03 Jan 2018 08:31), Max: 36.8 (02 Jan 2018 16:36)  T(F): 97.2 (03 Jan 2018 08:31), Max: 98.3 (02 Jan 2018 16:36)  HR: 104 (03 Jan 2018 08:31) (80 - 106)  BP: 109/58 (03 Jan 2018 08:31) (100/56 - 121/73)  BP(mean): --  RR: 18 (03 Jan 2018 08:31) (18 - 20)  SpO2: 92% (03 Jan 2018 03:56) (92% - 97%)    GENERAL: ill looking elderly male , awake and alert  HEENT: anicteric, mild Pallor  NECK: Supple, No JVD   CHEST/LUNG: decrease air entry bilateral with some rales, no ronchi  HEART: S1S2 Normal intensity, no murmurs  ABDOMEN: Soft, BS +ve, NT, ND.   EXTREMITIES:  1+ radial and DP pulses noted, no calf tenderness. LUE swelling with some erythema improving  SKIN: No rashes. no skin break or ulcer. did not check sacral region  NEURO: Confused, lethargy, Right hemiparesis.  CN II-XII intact  PSYCH: Depressed  mood and affect. anxious      LABS:                                                         7.4    8.2   )-----------( 276      ( 03 Jan 2018 06:36 )             25.2       01-03    139  |  102  |  53.0<H>  ----------------------------<  136<H>  4.9   |  23.0  |  2.71<H>    Ca    8.5<L>      03 Jan 2018 06:36    TPro  6.9  /  Alb  2.7<L>  /  TBili  0.6  /  DBili  x   /  AST  18  /  ALT  26  /  AlkPhos  98  01-03    PT/INR - ( 02 Jan 2018 07:19 )   PT: 14.5 sec;   INR: 1.31 ratio         PTT - ( 02 Jan 2018 07:19 )  PTT:22.7 sec      I&O's Summary    02 Jan 2018 07:01  -  03 Jan 2018 07:00  --------------------------------------------------------  IN: 0 mL / OUT: 400 mL / NET: -400 mL      IMPRESSION:   A persistent bilateral upper and lower lobe multifocal   airspace consolidations with a new right effusion and/or right lung base   consolidation...      < end of copied text >    Culture - Urine (collected 30 Dec < from: Xray Chest 1 View AP -PORTABLE-Routine (01.02.18 @ 05:51) >    2017 19:11)  Source: .Urine Catheterized  Final Report (01 Jan 2018 16:38):    >100,000 CFU/ml Pseudomonas aeruginosa  Organism: Pseudomonas aeruginosa (01 Jan 2018 16:38)  Organism: Pseudomonas aeruginosa (01 Jan 2018 16:38)      Culture - Urine (collected 30 Dec 2017 19:11)  Source: .Urine Catheterized  Final Report (01 Jan 2018 16:38):    >100,000 CFU/ml Pseudomonas aeruginosa  Organism: Pseudomonas aeruginosa (01 Jan 2018 16:38)  Organism: Pseudomonas aeruginosa (01 Jan 2018 16:38)      < from: Xray Chest 1 View AP -PORTABLE-Routine (01.02.18 @ 05:51) >    IMPRESSION:   A persistent bilateral upper and lower lobe multifocal   airspace consolidations with a new right effusion and/or right lung base   consolidation...          < end of copied text >

## 2018-01-03 NOTE — PROGRESS NOTE ADULT - SUBJECTIVE AND OBJECTIVE BOX
Chief Complaint: called to see pt after a "rapid response"    HPI: 78 yo male (hx and chart reviewed with additional hx from pt's wife.) Complicated and extensive pmh that includes remote cabg and AVR (Ross proceedure)/cva with right hemiplegia/chronic afib-was ac'd/ileostomy following radiation induced colon damage after prostate ca therapy/peg/recent aspiration pneumonia/cri/anemia. Today increased ventric ectopy noted on monitor with "48 seconds of VT". ?dyspnea. Meds reviewed.    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 (MK)  S/P CABG x 1  H/O colectomy      PREVIOUS DIAGNOSTIC TESTING:      ECHO  FINDINGS: Nl LVEF with signif MR and TR mild AI    STRESS  FINDINGS:    CATHETERIZATION  FINDINGS:    MEDICATIONS  (STANDING):  ALBUTerol    0.083% 2.5 milliGRAM(s) Nebulizer every 4 hours  amiodarone    Tablet 400 milliGRAM(s) Oral daily  aspirin  chewable 81 milliGRAM(s) Oral daily  atorvastatin 10 milliGRAM(s) Oral at bedtime  ciprofloxacin     Tablet 250 milliGRAM(s) Oral every 24 hours  enoxaparin Injectable 80 milliGRAM(s) SubCutaneous daily  epoetin [place] Injectable 61114 Unit(s) SubCutaneous every 7 days  finasteride 5 milliGRAM(s) Oral daily  folic acid 1 milliGRAM(s) Enteral Tube daily  metoprolol     tartrate 25 milliGRAM(s) Oral three times a day  pantoprazole  Injectable 40 milliGRAM(s) IV Push two times a day  saccharomyces boulardii 250 milliGRAM(s) Oral two times a day  sucralfate 1 Gram(s) Oral four times a day  tamsulosin 0.4 milliGRAM(s) Oral at bedtime    MEDICATIONS  (PRN):      FAMILY HISTORY:  No pertinent family history in first degree relatives      ROS: Negative other than as mentioned in HPI.    Vital Signs Last 24 Hrs  T(C): 36.4 (03 Jan 2018 13:56), Max: 36.8 (02 Jan 2018 16:36)  T(F): 97.5 (03 Jan 2018 13:56), Max: 98.3 (02 Jan 2018 16:36)  HR: 105 (03 Jan 2018 14:30) (80 - 114)  BP: 110 systolic left arm manually. (03 Jan 2018 14:30) (99/49 - 121/73)  BP(mean): --  RR: 16-non labored. (03 Jan 2018 14:30) (18 - 32)  SpO2: 94% (03 Jan 2018 14:30) (92% - 99%)    PHYSICAL EXAM:  General: Elderly chronically ill appearing male -afebrile/mute   HEENT: Head; normocephalic, atraumatic.  Eyes;   Pupils reactive, cornea wnl.  Neck; Supple, no nodes adenopathy, no NVD or carotid bruit or thyromegaly.  CARDIOVASCULAR; No murmur, rub, gallop or lift. Normal S1 and S2. irreg rhythm  LUNGS; very difficult to eval-no tachypnea  ABDOMEN ; Soft, nontender without mass or organomegaly. bowel sounds normoactive. ileostomy and peg in place  EXTREMITIES; No clubbing, cyanosis or edema. Right upper extremity is edematous.  SKIN; warm and dry with normal turgor.  NEURO; Awake responsive but non verbal. right hemiplegia-old.   PSYCH; non-contrib.            INTERPRETATION OF TELEMETRY:    ECG: Monitor strips reviewed. Baseline rhythm is afib with a bigeminal rhythm-ventric ectopics vs aberrant conduction.  cxr bilat infiltrates and increased markings  I&O's Detail    02 Jan 2018 07:01  -  03 Jan 2018 07:00  --------------------------------------------------------  IN:  Total IN: 0 mL    OUT:    Colostomy: 400 mL  Total OUT: 400 mL    Total NET: -400 mL          LABS:                        7.7    8.5   )-----------( 322      ( 03 Jan 2018 13:34 )             25.1     01-03    139  |  102  |  53.0<H>  ----------------------------<  136<H>  4.9   |  23.0  |  2.71<H>    Ca    8.5<L>      03 Jan 2018 06:36    TPro  6.9  /  Alb  2.7<L>  /  TBili  0.6  /  DBili  x   /  AST  18  /  ALT  26  /  AlkPhos  98  01-03        PT/INR - ( 02 Jan 2018 07:19 )   PT: 14.5 sec;   INR: 1.31 ratio         PTT - ( 02 Jan 2018 07:19 )  PTT:22.7 sec    I&O's Summary    02 Jan 2018 07:01  -  03 Jan 2018 07:00  --------------------------------------------------------  IN: 0 mL / OUT: 400 mL / NET: -400 mL        RADIOLOGY & ADDITIONAL STUDIES:

## 2018-01-03 NOTE — CHART NOTE - NSCHARTNOTEFT_GEN_A_CORE
Called to re-evaluate patient for severe CP - resolved with morphine, BP stable O2 sat stable, zoe already on therapeutic lovenox concern for PE secondary to TTE earlier today, unable to do CTA (creatinin2 >2), clinically zoe appears to be more in heart failure would continue lasix, cycle troponins, V/Q will not be helpful due to highly abnormal cxr, would convert to IV heparin in am (lovenox given at 1130 am), wife at bedside will transfer to ICU for closer monitoring. Discussed code status with wife, zoe currently does not have decisional making capacity on my exam. Wife agreeable to re-instating DNR, CPR would not benefit zoe if he were to go into cardiac arrest.

## 2018-01-03 NOTE — PROVIDER CONTACT NOTE (EICU) - BACKGROUND
RRT called again now for pt c/o severe cp. Pain improving after getting morphine. stat EKG did not show any acute pathology as per bedside team.

## 2018-01-03 NOTE — PROGRESS NOTE ADULT - SUBJECTIVE AND OBJECTIVE BOX
NEPHROLOGY INTERVAL HPI/OVERNIGHT EVENTS:  pt resting when seen earlier  no acute distress noted    MEDICATIONS  (STANDING):  ALBUTerol    0.083% 2.5 milliGRAM(s) Nebulizer every 4 hours  amiodarone    Tablet 400 milliGRAM(s) Oral daily  aspirin  chewable 81 milliGRAM(s) Oral daily  atorvastatin 10 milliGRAM(s) Oral at bedtime  ciprofloxacin     Tablet 250 milliGRAM(s) Oral every 24 hours  enoxaparin Injectable 80 milliGRAM(s) SubCutaneous daily  epoetin [place] Injectable 56691 Unit(s) SubCutaneous every 7 days  finasteride 5 milliGRAM(s) Oral daily  folic acid 1 milliGRAM(s) Enteral Tube daily  furosemide   Injectable 20 milliGRAM(s) IV Push once  metoprolol     tartrate 25 milliGRAM(s) Oral three times a day  pantoprazole  Injectable 40 milliGRAM(s) IV Push two times a day  saccharomyces boulardii 250 milliGRAM(s) Oral two times a day  sucralfate 1 Gram(s) Oral four times a day  tamsulosin 0.4 milliGRAM(s) Oral at bedtime    MEDICATIONS  (PRN):      Allergies    penicillins (Hives)          Vital Signs Last 24 Hrs  T(C): 36.2 (03 Jan 2018 08:31), Max: 36.8 (02 Jan 2018 16:36)  T(F): 97.2 (03 Jan 2018 08:31), Max: 98.3 (02 Jan 2018 16:36)  HR: 104 (03 Jan 2018 08:31) (80 - 106)  BP: 109/58 (03 Jan 2018 08:31) (100/56 - 121/73)  BP(mean): --  RR: 18 (03 Jan 2018 08:31) (18 - 20)  SpO2: 92% (03 Jan 2018 03:56) (92% - 97%)    PHYSICAL EXAM:  NAD  Chest   clear; diminished BS at bases  CV   no murmur  Abd   soft, NT BS+  Ext   No edema  Neuro  Awake and alert; slow to respond    LABS:                        7.4    8.2   )-----------( 276      ( 03 Jan 2018 06:36 )             25.2     01-03    139  |  102  |  53.0<H>  ----------------------------<  136<H>  4.9   |  23.0  |  2.71<H>      Ca    8.5<L>      03 Jan 2018 06:36    TPro  6.9  /  Alb  2.7<L>  /  TBili  0.6  /  DBili  x   /  AST  18  /  ALT  26  /  AlkPhos  98  01-03    PT/INR - ( 02 Jan 2018 07:19 )   PT: 14.5 sec;   INR: 1.31 ratio         PTT - ( 02 Jan 2018 07:19 )  PTT:22.7 sec        RADIOLOGY & ADDITIONAL TESTS:

## 2018-01-03 NOTE — CONSULT NOTE ADULT - ASSESSMENT
76 yo male, PMHx CAD s/p CABG, AFib on Coumadin, HTN, prostate CA s/p TURP, s/p PEG and colectomy, prior CVA with expressive aphasia and R hemiparesis, admitted with hypoxemic respiratory failure requiring intubation secondary to aspiration pneumonia, septic shock requiring vasopressors secondary to Gram negative urosepsis. 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 RRT called for sustained VT in the setting of new biventricular failure, concerns for acute PE however patient is already anticoagulated and in no acute respiratory distress, without hypoxia.     PLAN:   - Continue Amiodarone per 78 yo male, PMHx CAD s/p CABG, AFib on Coumadin, HTN, prostate CA s/p TURP, s/p PEG and colectomy, prior CVA with expressive aphasia and R hemiparesis, admitted with hypoxemic respiratory failure requiring intubation secondary to aspiration pneumonia, septic shock requiring vasopressors secondary to Gram negative urosepsis. 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 RRT called for sustained VT in the setting of new biventricular failure, concerns for acute PE however patient is already anticoagulated and in no acute respiratory distress, without hypoxia. As patient is asymptomatic, now converted back to AFib with no hemodynamic or respiratory compromise, patient is unlikely to gain additional benefit from ICU admission at this time. Please reconsult if clinical condition changes.    PLAN:   - Continue Amiodarone per Cardiology, would increase Metoprolol to 50mg TID for improved HR control  - Continue renally adjusted full dose Lovenox for UE DVT  - f/u CXR  - Consider gentle diuresis as hemodynamically tolerated to offload RV   - Supplement electrolytes with Mg 2g IVPB  - Continue to monitor renal function and electrolytes, replace electrolytes as needed for goal K > 4 and Mg > 2

## 2018-01-03 NOTE — PROGRESS NOTE ADULT - ASSESSMENT
76 y/o male pmhx CVA (2 years ago and in July 17) w/ 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 Placed on ventilator and vasopressor support. Septic shock resolved. No longer requiring vasopressors and s/p extubation and down grade from ICU to medicine hospitalist team on 12/20/17. Continues on cefepime for pseudomona aeruginosa UTI and enterovirus positive, Bcx negative for growth, leukocytosis trending down and remained afebrile. BP remained normotensive, pt was tapered off of stress dose steroids. His renal function remains compromised but is slowly down trending and likely secondary to ATN from septic shock with underlying known hx of ckd stage 3, Patient is alert. maintaining saturation with NC.   puckett removed. no retension. no hematuria. no active bleed at this time. on lovenox for DVT. risk of bleeding very high. patient and family was informed. consented for PRBC transfusion as needed. hematology cx called. ID follow up was called for recurrence of UTI     Problem/Plan - 1:  ·  Problem: Pseudomonas urinary tract infection. recurred  Suspected CaUTI, catheter removed, incontinent. on Cefepime. can DC with Cipro [ as per ID] mild fever. no leucocytosis. afebrile      Problem/Plan - 2:  ·  Problem: Pneumonia, bacterial.  Plan: on Cefepime. Pulmonology dr Melvin is following . -c/w bronchodilators. albuterol and Mucomyst, fluid to 60ml/hr, on peg tube feeding, will monitor for aspiration, has completed course of antibiotics .      Problem/Plan - 3:  ·  Problem: Acute on chronic renal failure.  Plan: underlying hx CKD  likely know KAPIL secondary to ATN from septic shock   Renal indices is fluctuating , now at 60ml/hr, baseline creatine around 1.7 per prior records, will continue to monitor. Renal f/u appreciated  CXR showed new rt effusion. Na normalized. off IVF. on PEG feeding/free water    Problem 4: left arm DVt, anemia: c/w lovenox. swarfarin and slowly titrate. monitor INR daily. watch for bleeding. f/u hematology cx. vascular cx appreciated. closely monitor h/h. Repeat USg for DVT. if neg, discuss with family again about AC.    problem 5: lower abd pain: improved     Problem/Plan - 4:  ·  Problem: Cerebrovascular accident (CVA), unspecified mechanism.  Plan:  Right side hemiparesis. -c/w atorvastatin 10mg po qhs   Will initiate PT OT when patient is fully orientated.      Problem/Plan - 5:  ·  Problem: Atrial fibrillation.  Plan: Cardiology dr Donovan evaluated, doubt acute ischemia. Considering etiology of electrolyte imbalance and hypoxia.  Recommend beta blocker , statin , aspirin. seen by cardiology, started on amiodarone for PSVT. hematuria resolved. FOBT positive. Hb stable. currently on lovenox and coumadin being started for DVT left arm. patietn wants to continue for atleast for 3 month then will decide about further continuation for prophylaxis for stroke prevention. high risk for stroke, h/o CVA     Problem 6: GI bleed: FOBT positive. Hb dropped but stable.  GI eval  recommend against intervention procedure given patient poor physical condition except family agrees . Will transfuse to keep Hb at 7g.  on epoetin and IV iron. patient and family does not want any invasive GI procedure.     Problem/Plan - 7:  Problem: BPH (benign prostatic hyperplasia). Plan: -c/w proscar and flomax. Puckett in place for urinary retention. urology was consulted. Pharmacy reported to  flomax as it cannot be given through PEG. will c/w Proscar for now. patient was only on Proscar at home. will cx with urology PRN    problem 8: hypernatremia:  Resolved on IV fluid       Disposition :Palliative consulted for goals of care and further quality of life discussions given recurrent hospitalizations and overall poor prognosis, spoke to wife [ Ms.Jacqueline Salomon at [phone]. She wants full code. states he does not want DNR/DNI. she will fill out and sign MOLST form. dced order for DNR. palliative follow up needed. informed palliative team

## 2018-01-03 NOTE — CHART NOTE - NSCHARTNOTEFT_GEN_A_CORE
Rapid Response PGY 2/ PGY 3 Note  Patient is a 77y old  Male         admitted for   Rapid response team called because    Rapid Response was called at 18:30 on a 77y year old Male patient for 10/10 chest pain with labored breathing. Of note patient with  sustained V tach on monitor for 48 seconds this morning.     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     Patient was seen and examined at the bedside by the rapid response team. Patient is alert, with chest pain and visible difficulty breathing,       Allergies  penicillins (Hives)    PAST MEDICAL & SURGICAL HISTORY:  CAD (coronary artery disease)  Afib  CVA (cerebral vascular accident)  Mitral valve replaced  BPH (benign prostatic hyperplasia)  High cholesterol  HTN (hypertension)  H/O heart valve replacement with mechanical valve  S/P CABG x 1  H/O colectomy      Vital Signs Last 24 Hrs  T(C): 36.3 (03 Jan 2018 17:22), Max: 36.5 (03 Jan 2018 05:00)  T(F): 97.3 (03 Jan 2018 17:22), Max: 97.7 (03 Jan 2018 05:00)  HR: 108 (03 Jan 2018 17:22) (92 - 114)  BP: 132/63 (03 Jan 2018 17:22) (99/49 - 132/63)  RR: 20 (03 Jan 2018 17:22) (18 - 32)  SpO2: 96% (03 Jan 2018 15:21) (92% - 99%)      GENERAL: Moderate respiratory distress,, sitting up in bed   HEAD:  Atraumatic, Normocephalic  EYES: EOMI, PERRLA, conjunctiva and sclera clear  ENMT: No tonsillar erythema, exudates, or enlargement; mucous membranes moist  NECK: Supple, No JVD  NERVOUS SYSTEM:  Alert, oriented, no focal deficits  CHEST/LUNG: diffuse scattered rhonchi, no crackles, diminished breath sounds at bases bilaterally  HEART: irregularly irregular, no murmurs/gallops  ABDOMEN: Soft, Nontender, Nondistended; Bowel sounds present; PEG tube in place   EXTREMITIES:  2+ Peripheral Pulses, No edema, RUE and RLE paralysis with ROM intact to left side  SKIN: No sacral decubitus ulcers, lesion on shin unchanged ,no active bleeding or discharge, otherwise intact    01-02 @ 07:01  -  01-03 @ 07:00  --------------------------------------------------------  IN: 0 mL / OUT: 400 mL / NET: -400 mL                              7.8    8.0   )-----------( 324      ( 03 Jan 2018 15:05 )             25.4     01-03    140  |  102  |  51.0<H>  ----------------------------<  102  4.9   |  23.0  |  2.75<H>    Ca    8.7      03 Jan 2018 15:05  Phos  3.7     01-03  Mg     1.6     01-03    TPro  6.9  /  Alb  2.7<L>  /  TBili  0.6  /  DBili  x   /  AST  18  /  ALT  26  /  AlkPhos  98  01-03   ABG - ( 03 Jan 2018 15:16 )  pH: 7.46  /  pCO2: 36    /  pO2: 63    / HCO3: 26    / Base Excess: 1.8   /  SaO2: 94          LIVER FUNCTIONS - ( 03 Jan 2018 06:36 )  Alb: 2.7 g/dL / Pro: 6.9 g/dL / ALK PHOS: 98 U/L / ALT: 26 U/L / AST: 18 U/L / GGT: x              PT/INR - ( 02 Jan 2018 07:19 )   PT: 14.5 sec;   INR: 1.31 ratio    PTT - ( 02 Jan 2018 07:19 )  PTT:22.7 sec      Assessment- Rapid Response called for 77y year old Male with a past medical history of  residual right upper and lower extremity weakness, s/p PEG,  afib on coumadin, s/p colon resection with ileostomy 8 years ago, CVA with residual RUE and RLE paralysis, s/p CBAGx3,  prostate cancer s/p TURP 3 years ago, currently on amiodarone, for 10/10 chest pain and respiratory distress.    PLAN:    EKG with no acute changes  Cardiac enzymes, CBC, BMP, CXR ordered STAT  Lasix 40 IV push, due to suspicion for fluid overload on previous CXR earlier today  Patient received 2 mg morphine given with resolution of pain.  E-ICU and in-house ICU attending contacted with plan to transfer patient to MICU.  Plan discussed with SROC Dr. Julian and Hospitalist Dr. Silver.

## 2018-01-04 DIAGNOSIS — I50.9 HEART FAILURE, UNSPECIFIED: ICD-10-CM

## 2018-01-04 DIAGNOSIS — E87.70 FLUID OVERLOAD, UNSPECIFIED: ICD-10-CM

## 2018-01-04 DIAGNOSIS — I82.409 ACUTE EMBOLISM AND THROMBOSIS OF UNSPECIFIED DEEP VEINS OF UNSPECIFIED LOWER EXTREMITY: ICD-10-CM

## 2018-01-04 LAB
ALBUMIN SERPL ELPH-MCNC: 2.8 G/DL — LOW (ref 3.3–5.2)
ALP SERPL-CCNC: 94 U/L — SIGNIFICANT CHANGE UP (ref 40–120)
ALT FLD-CCNC: 24 U/L — SIGNIFICANT CHANGE UP
ANION GAP SERPL CALC-SCNC: 12 MMOL/L — SIGNIFICANT CHANGE UP (ref 5–17)
APTT BLD: 27.4 SEC — LOW (ref 27.5–37.4)
AST SERPL-CCNC: 27 U/L — SIGNIFICANT CHANGE UP
BILIRUB SERPL-MCNC: 0.5 MG/DL — SIGNIFICANT CHANGE UP (ref 0.4–2)
BUN SERPL-MCNC: 49 MG/DL — HIGH (ref 8–20)
CALCIUM SERPL-MCNC: 8.6 MG/DL — SIGNIFICANT CHANGE UP (ref 8.6–10.2)
CHLORIDE SERPL-SCNC: 101 MMOL/L — SIGNIFICANT CHANGE UP (ref 98–107)
CO2 SERPL-SCNC: 25 MMOL/L — SIGNIFICANT CHANGE UP (ref 22–29)
CREAT SERPL-MCNC: 2.67 MG/DL — HIGH (ref 0.5–1.3)
GLUCOSE SERPL-MCNC: 224 MG/DL — HIGH (ref 70–115)
HCT VFR BLD CALC: 24.4 % — LOW (ref 42–52)
HGB BLD-MCNC: 7.4 G/DL — LOW (ref 14–18)
MAGNESIUM SERPL-MCNC: 2.1 MG/DL — SIGNIFICANT CHANGE UP (ref 1.6–2.6)
MCHC RBC-ENTMCNC: 30.3 G/DL — LOW (ref 32–36)
MCHC RBC-ENTMCNC: 31.2 PG — HIGH (ref 27–31)
MCV RBC AUTO: 103 FL — HIGH (ref 80–94)
PHOSPHATE SERPL-MCNC: 4.1 MG/DL — SIGNIFICANT CHANGE UP (ref 2.4–4.7)
PLATELET # BLD AUTO: 166 K/UL — SIGNIFICANT CHANGE UP (ref 150–400)
POTASSIUM SERPL-MCNC: 4.6 MMOL/L — SIGNIFICANT CHANGE UP (ref 3.5–5.3)
POTASSIUM SERPL-SCNC: 4.6 MMOL/L — SIGNIFICANT CHANGE UP (ref 3.5–5.3)
PROT SERPL-MCNC: 6.7 G/DL — SIGNIFICANT CHANGE UP (ref 6.6–8.7)
RBC # BLD: 2.37 M/UL — LOW (ref 4.6–6.2)
RBC # FLD: 20.1 % — HIGH (ref 11–15.6)
SODIUM SERPL-SCNC: 138 MMOL/L — SIGNIFICANT CHANGE UP (ref 135–145)
TROPONIN T SERPL-MCNC: 0.05 NG/ML — SIGNIFICANT CHANGE UP (ref 0–0.06)
WBC # BLD: 5.8 K/UL — SIGNIFICANT CHANGE UP (ref 4.8–10.8)
WBC # FLD AUTO: 5.8 K/UL — SIGNIFICANT CHANGE UP (ref 4.8–10.8)

## 2018-01-04 PROCEDURE — 99233 SBSQ HOSP IP/OBS HIGH 50: CPT

## 2018-01-04 RX ORDER — HEPARIN SODIUM 5000 [USP'U]/ML
INJECTION INTRAVENOUS; SUBCUTANEOUS
Qty: 25000 | Refills: 0 | Status: DISCONTINUED | OUTPATIENT
Start: 2018-01-04 | End: 2018-01-05

## 2018-01-04 RX ORDER — HEPARIN SODIUM 5000 [USP'U]/ML
3000 INJECTION INTRAVENOUS; SUBCUTANEOUS EVERY 6 HOURS
Qty: 0 | Refills: 0 | Status: DISCONTINUED | OUTPATIENT
Start: 2018-01-04 | End: 2018-01-05

## 2018-01-04 RX ORDER — HEPARIN SODIUM 5000 [USP'U]/ML
6500 INJECTION INTRAVENOUS; SUBCUTANEOUS EVERY 6 HOURS
Qty: 0 | Refills: 0 | Status: DISCONTINUED | OUTPATIENT
Start: 2018-01-04 | End: 2018-01-05

## 2018-01-04 RX ADMIN — Medication 1 GRAM(S): at 18:26

## 2018-01-04 RX ADMIN — Medication 250 MILLIGRAM(S): at 19:45

## 2018-01-04 RX ADMIN — ALBUTEROL 2.5 MILLIGRAM(S): 90 AEROSOL, METERED ORAL at 00:19

## 2018-01-04 RX ADMIN — Medication 40 MILLIGRAM(S): at 18:26

## 2018-01-04 RX ADMIN — AMIODARONE HYDROCHLORIDE 400 MILLIGRAM(S): 400 TABLET ORAL at 06:01

## 2018-01-04 RX ADMIN — ALBUTEROL 2.5 MILLIGRAM(S): 90 AEROSOL, METERED ORAL at 11:32

## 2018-01-04 RX ADMIN — HEPARIN SODIUM 1500 UNIT(S)/HR: 5000 INJECTION INTRAVENOUS; SUBCUTANEOUS at 12:29

## 2018-01-04 RX ADMIN — Medication 40 MILLIGRAM(S): at 06:00

## 2018-01-04 RX ADMIN — Medication 25 MILLIGRAM(S): at 21:13

## 2018-01-04 RX ADMIN — Medication 25 MILLIGRAM(S): at 14:10

## 2018-01-04 RX ADMIN — ALBUTEROL 2.5 MILLIGRAM(S): 90 AEROSOL, METERED ORAL at 20:09

## 2018-01-04 RX ADMIN — PANTOPRAZOLE SODIUM 40 MILLIGRAM(S): 20 TABLET, DELAYED RELEASE ORAL at 06:00

## 2018-01-04 RX ADMIN — ALBUTEROL 2.5 MILLIGRAM(S): 90 AEROSOL, METERED ORAL at 03:48

## 2018-01-04 RX ADMIN — Medication 1 GRAM(S): at 06:01

## 2018-01-04 RX ADMIN — Medication 25 MILLIGRAM(S): at 06:01

## 2018-01-04 RX ADMIN — Medication 250 MILLIGRAM(S): at 18:26

## 2018-01-04 RX ADMIN — HEPARIN SODIUM 6500 UNIT(S): 5000 INJECTION INTRAVENOUS; SUBCUTANEOUS at 22:15

## 2018-01-04 RX ADMIN — Medication 1 MILLIGRAM(S): at 12:29

## 2018-01-04 RX ADMIN — HEPARIN SODIUM 1900 UNIT(S)/HR: 5000 INJECTION INTRAVENOUS; SUBCUTANEOUS at 22:05

## 2018-01-04 RX ADMIN — Medication 1 GRAM(S): at 01:43

## 2018-01-04 RX ADMIN — ATORVASTATIN CALCIUM 10 MILLIGRAM(S): 80 TABLET, FILM COATED ORAL at 21:13

## 2018-01-04 RX ADMIN — ALBUTEROL 2.5 MILLIGRAM(S): 90 AEROSOL, METERED ORAL at 15:54

## 2018-01-04 RX ADMIN — Medication 1 GRAM(S): at 14:09

## 2018-01-04 RX ADMIN — TAMSULOSIN HYDROCHLORIDE 0.4 MILLIGRAM(S): 0.4 CAPSULE ORAL at 21:13

## 2018-01-04 RX ADMIN — ALBUTEROL 2.5 MILLIGRAM(S): 90 AEROSOL, METERED ORAL at 07:54

## 2018-01-04 RX ADMIN — Medication 81 MILLIGRAM(S): at 12:29

## 2018-01-04 RX ADMIN — FINASTERIDE 5 MILLIGRAM(S): 5 TABLET, FILM COATED ORAL at 14:09

## 2018-01-04 RX ADMIN — Medication 250 MILLIGRAM(S): at 06:01

## 2018-01-04 RX ADMIN — PANTOPRAZOLE SODIUM 40 MILLIGRAM(S): 20 TABLET, DELAYED RELEASE ORAL at 18:26

## 2018-01-04 NOTE — PROGRESS NOTE ADULT - SUBJECTIVE AND OBJECTIVE BOX
PULMONARY PROGRESS NOTE      CRISTIN ROQUEGRADY-[id number]    Patient is a 77y old  Male who presents with a chief complaint of Found unresponsive in his vomit (18 Dec 2017 19:49)      INTERVAL HPI/OVERNIGHT EVENTS:  Moved to ICU due to Vtach.  On Amiodarone.  Sat 98 on 4l/min. NAD.    MEDICATIONS  (STANDING):  ALBUTerol    0.083% 2.5 milliGRAM(s) Nebulizer every 4 hours  amiodarone    Tablet 400 milliGRAM(s) Oral daily  aspirin  chewable 81 milliGRAM(s) Oral daily  atorvastatin 10 milliGRAM(s) Oral at bedtime  ciprofloxacin     Tablet 250 milliGRAM(s) Oral every 24 hours  enoxaparin Injectable 80 milliGRAM(s) SubCutaneous daily  epoetin [place] Injectable 95021 Unit(s) SubCutaneous every 7 days  finasteride 5 milliGRAM(s) Oral daily  folic acid 1 milliGRAM(s) Enteral Tube daily  furosemide   Injectable 40 milliGRAM(s) IV Push every 12 hours  metoprolol     tartrate 25 milliGRAM(s) Oral three times a day  pantoprazole  Injectable 40 milliGRAM(s) IV Push two times a day  saccharomyces boulardii 250 milliGRAM(s) Oral two times a day  sucralfate 1 Gram(s) Oral four times a day  tamsulosin 0.4 milliGRAM(s) Oral at bedtime      MEDICATIONS  (PRN):      Allergies    penicillins (Hives)    Intolerances        PAST MEDICAL & SURGICAL HISTORY:  CAD (coronary artery disease)  Afib  CVA (cerebral vascular accident)  Mitral valve replaced  BPH (benign prostatic hyperplasia)  High cholesterol  HTN (hypertension)  H/O heart valve replacement with mechanical valve  S/P CABG x 1  H/O colectomy      SOCIAL HISTORY  Smoking History:       REVIEW OF SYSTEMS:    Unable  Vital Signs Last 24 Hrs  T(C): 36.9 (04 Jan 2018 07:00), Max: 36.9 (04 Jan 2018 07:00)  T(F): 98.4 (04 Jan 2018 07:00), Max: 98.4 (04 Jan 2018 07:00)  HR: 80 (04 Jan 2018 08:00) (80 - 115)  BP: 120/58 (04 Jan 2018 08:00) (90/51 - 132/63)  BP(mean): 80 (04 Jan 2018 08:00) (63 - 91)  RR: 20 (04 Jan 2018 08:00) (18 - 32)  SpO2: 98% (04 Jan 2018 08:00) (94% - 100%)    PHYSICAL EXAMINATION:    GENERAL: The patient is awake  in no apparent distress.     HEENT: Head is normocephalic and atraumatic. Extraocular muscles are intact. Mucous membranes are moist.    NECK: Supple.    LUNGS: Diminished bs clear.    HEART: Irregular rate and rhythm without murmur.    ABDOMEN: Soft, nontender, and nondistended.      EXTREMITIES: Without any cyanosis, clubbing, rash, lesions or edema.    NEUROLOGIC: Grossly intact.    SKIN: No ulceration or induration present.      LABS:                        7.4    5.8   )-----------( 166      ( 04 Jan 2018 03:09 )             24.4     01-04    138  |  101  |  49.0<H>  ----------------------------<  224<H>  4.6   |  25.0  |  2.67<H>    Ca    8.6      04 Jan 2018 03:09  Phos  4.1     01-04  Mg     2.1     01-04    TPro  6.7  /  Alb  2.8<L>  /  TBili  0.5  /  DBili  x   /  AST  27  /  ALT  24  /  AlkPhos  94  01-04    PT/INR - ( 03 Jan 2018 19:20 )   PT: 15.1 sec;   INR: 1.36 ratio         PTT - ( 03 Jan 2018 19:20 )  PTT:36.5 sec    ABG - ( 03 Jan 2018 15:16 )  pH: 7.46  /  pCO2: 36    /  pO2: 63    / HCO3: 26    / Base Excess: 1.8   /  SaO2: 94                CARDIAC MARKERS ( 04 Jan 2018 03:09 )  x     / 0.05 ng/mL / x     / x     / x      CARDIAC MARKERS ( 03 Jan 2018 19:20 )  x     / 0.05 ng/mL / x     / x     / x      CARDIAC MARKERS ( 03 Jan 2018 15:05 )  x     / 0.04 ng/mL / x     / x     / x          D-Dimer Assay, Quantitative: 821 ng/mL DDU (01-03-18 @ 15:05)    Serum Pro-Brain Natriuretic Peptide: 62075 pg/mL (01-02-18 @ 17:22)          MICROBIOLOGY:    RADIOLOGY & ADDITIONAL STUDIES:cxr 1/3 no change chemo inf

## 2018-01-04 NOTE — PROGRESS NOTE ADULT - SUBJECTIVE AND OBJECTIVE BOX
NEPHROLOGY INTERVAL HPI/OVERNIGHT EVENTS:  No new events.    MEDICATIONS  (STANDING):  ALBUTerol    0.083% 2.5 milliGRAM(s) Nebulizer every 4 hours  amiodarone    Tablet 400 milliGRAM(s) Oral daily  aspirin  chewable 81 milliGRAM(s) Oral daily  atorvastatin 10 milliGRAM(s) Oral at bedtime  ciprofloxacin     Tablet 250 milliGRAM(s) Oral every 24 hours  enoxaparin Injectable 80 milliGRAM(s) SubCutaneous daily  epoetin [place] Injectable 66348 Unit(s) SubCutaneous every 7 days  finasteride 5 milliGRAM(s) Oral daily  folic acid 1 milliGRAM(s) Enteral Tube daily  furosemide   Injectable 20 milliGRAM(s) IV Push once  metoprolol     tartrate 25 milliGRAM(s) Oral three times a day  pantoprazole  Injectable 40 milliGRAM(s) IV Push two times a day  saccharomyces boulardii 250 milliGRAM(s) Oral two times a day  sucralfate 1 Gram(s) Oral four times a day  tamsulosin 0.4 milliGRAM(s) Oral at bedtime    MEDICATIONS  (PRN):      Allergies    penicillins (Hives)          Vital Signs Last 24 Hrs ICU     T(C): 36.9 (04 Jan 2018 07:00), Max: 36.9 (04 Jan 2018 07:00)  T(F): 98.4 (04 Jan 2018 07:00), Max: 98.4 (04 Jan 2018 07:00)  HR: 79 (04 Jan 2018 10:00) (79 - 115)  BP: 109/55 (04 Jan 2018 10:00) (90/51 - 132/63)  BP(mean): 74 (04 Jan 2018 10:00) (63 - 91)  ABP: --  ABP(mean): --  RR: 18 (04 Jan 2018 10:00) (18 - 32)  SpO2: 98% (04 Jan 2018 10:00) (94% - 100%)    T(C): 36.2 (03 Jan 2018 08:31), Max: 36.8 (02 Jan 2018 16:36)  T(F): 97.2 (03 Jan 2018 08:31), Max: 98.3 (02 Jan 2018 16:36)  HR: 104 (03 Jan 2018 08:31) (80 - 106)  BP: 109/58 (03 Jan 2018 08:31) (100/56 - 121/73)  BP(mean): --  RR: 18 (03 Jan 2018 08:31) (18 - 20)  SpO2: 92% (03 Jan 2018 03:56) (92% - 97%)    PHYSICAL EXAM:  NAD  Chest   clear;   CV   no rub noted  Abd   soft, peg with feed, colostomy noted  Ext   Diffuse muscle wasting  Neuro  Awake and alert;     LABS: 01-04    138  |  101  |  49.0<H>  ----------------------------<  224<H>  4.6   |  25.0  |  2.67<H>    Ca    8.6      04 Jan 2018 03:09  Phos  4.1     01-04  Mg     2.1     01-04    TPro  6.7  /  Alb  2.8<L>  /  TBili  0.5  /  DBili  x   /  AST  27  /  ALT  24  /  AlkPhos  94  01-04                          7.4    8.2   )-----------( 276      ( 03 Jan 2018 06:36 )             25.2     01-03    139  |  102  |  53.0<H>  ----------------------------<  136<H>  4.9   |  23.0  |  2.71<H>      Ca    8.5<L>      03 Jan 2018 06:36    TPro  6.9  /  Alb  2.7<L>  /  TBili  0.6  /  DBili  x   /  AST  18  /  ALT  26  /  AlkPhos  98  01-03    PT/INR - ( 02 Jan 2018 07:19 )   PT: 14.5 sec;   INR: 1.31 ratio         PTT - ( 02 Jan 2018 07:19 )  PTT:22.7 sec        RADIOLOGY & ADDITIONAL TESTS:

## 2018-01-04 NOTE — PROGRESS NOTE ADULT - ATTENDING COMMENTS
Epogen - already received venofer.
Increased iv  fluid, added venofer, labs in am.
will sign off.
Pt seen and examined this morning on rounds with [facility] PA, agree with above care plan. Pt initially a/w septic shock and resp failure rebounded and was extubated/off pressors remains debilitated with ongoing renal dysfunction and multiple comorbidities. Palliative care involved.   Pt no longer requires ICU LOC and may transfer to medical floors. Care endorsed to Dr. Lord.
Pt remains critically ill. Wife updated at bedside.
Dispo: Patient to return to NH when medically stable.

## 2018-01-04 NOTE — PROGRESS NOTE ADULT - SUBJECTIVE AND OBJECTIVE BOX
CRISTIN ROQUE    [id number]    77y      Male    Transfer service note:  76 yo male, PMHx CAD s/p CABG, AFib on Coumadin, HTN, prostate CA s/p TURP, s/p PEG and colectomy, prior CVA with expressive aphasia and R hemiparesis, admitted with hypoxemic respiratory failure requiring intubation secondary to aspiration pneumonia, septic shock requiring vasopressors secondary to Gram negative urosepsis. 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 RRT called for 48 seconds of sustained VT. TTE showed EF 40-45%, grade III diastolic dysfunction, RV dysfunction. Pt transferred to MICU and was diuresed and started on IV heparin.       REVIEW OF SYSTEMS:    CONSTITUTIONAL: No fever   RESPIRATORY: No cough  CARDIOVASCULAR: No chest pain    Vital Signs Last 24 Hrs  T(C): 36.9 (04 Jan 2018 07:00), Max: 36.9 (04 Jan 2018 07:00)  T(F): 98.4 (04 Jan 2018 07:00), Max: 98.4 (04 Jan 2018 07:00)  HR: 84 (04 Jan 2018 14:00) (79 - 115)  BP: 149/67 (04 Jan 2018 14:00) (90/51 - 164/87)  BP(mean): 97 (04 Jan 2018 14:00) (63 - 115)  RR: 21 (04 Jan 2018 14:00) (18 - 30)  SpO2: 96% (04 Jan 2018 14:00) (95% - 100%)    PHYSICAL EXAM:    GENERAL: NAD, comfortable, pleasant, mute  HEENT: PERRL, +EOMI, wears glasses  CHEST/LUNG: Clear to percussion bilaterally  HEART: S1S2+, Regular rate and rhythm  ABDOMEN: Soft, Nontender, Nondistended; Bowel sounds present       LABS:                        7.4    5.8   )-----------( 166      ( 04 Jan 2018 03:09 )             24.4     01-04    138  |  101  |  49.0<H>  ----------------------------<  224<H>  4.6   |  25.0  |  2.67<H>    Ca    8.6      04 Jan 2018 03:09  Phos  4.1     01-04  Mg     2.1     01-04    TPro  6.7  /  Alb  2.8<L>  /  TBili  0.5  /  DBili  x   /  AST  27  /  ALT  24  /  AlkPhos  94  01-04    PT/INR - ( 03 Jan 2018 19:20 )   PT: 15.1 sec;   INR: 1.36 ratio         PTT - ( 03 Jan 2018 19:20 )  PTT:36.5 sec        MEDICATIONS  (STANDING):  ALBUTerol    0.083% 2.5 milliGRAM(s) Nebulizer every 4 hours  amiodarone    Tablet 400 milliGRAM(s) Oral daily  aspirin  chewable 81 milliGRAM(s) Oral daily  atorvastatin 10 milliGRAM(s) Oral at bedtime  ciprofloxacin     Tablet 250 milliGRAM(s) Oral every 24 hours  epoetin [place] Injectable 20180 Unit(s) SubCutaneous every 7 days  finasteride 5 milliGRAM(s) Oral daily  folic acid 1 milliGRAM(s) Enteral Tube daily  furosemide   Injectable 40 milliGRAM(s) IV Push every 12 hours  heparin  Infusion.  Unit(s)/Hr (15 mL/Hr) IV Continuous <Continuous>  metoprolol     tartrate 25 milliGRAM(s) Oral three times a day  pantoprazole  Injectable 40 milliGRAM(s) IV Push two times a day  saccharomyces boulardii 250 milliGRAM(s) Oral two times a day  sucralfate 1 Gram(s) Oral four times a day  tamsulosin 0.4 milliGRAM(s) Oral at bedtime    MEDICATIONS  (PRN):  heparin  Injectable 6500 Unit(s) IV Push every 6 hours PRN For aPTT less than 40  heparin  Injectable 3000 Unit(s) IV Push every 6 hours PRN For aPTT between 40 - 57      RADIOLOGY & ADDITIONAL TESTS:

## 2018-01-04 NOTE — PROVIDER CONTACT NOTE (OTHER) - DATE AND TIME:
03-Jan-2018 08:40
04-Jan-2018
22-Dec-2017 08:15
22-Dec-2017 12:20
22-Dec-2017 13:38
23-Dec-2017 08:07
22-Dec-2017 11:00

## 2018-01-04 NOTE — PROGRESS NOTE ADULT - SUBJECTIVE AND OBJECTIVE BOX
[facility]                                   [address] [place] [address]                                                      PHONE: [phone]                                                         FAX: [phone]  [email or web address]  ---------------------------------------------------------------------------------------------------------------------------------    Overnight events/patient complaints:  PT alert but not answering questions    penicillins (Hives)    MEDICATIONS  (STANDING):  ALBUTerol    0.083% 2.5 milliGRAM(s) Nebulizer every 4 hours  amiodarone    Tablet 400 milliGRAM(s) Oral daily  aspirin  chewable 81 milliGRAM(s) Oral daily  atorvastatin 10 milliGRAM(s) Oral at bedtime  ciprofloxacin     Tablet 250 milliGRAM(s) Oral every 24 hours  enoxaparin Injectable 80 milliGRAM(s) SubCutaneous daily  epoetin [place] Injectable 95748 Unit(s) SubCutaneous every 7 days  finasteride 5 milliGRAM(s) Oral daily  folic acid 1 milliGRAM(s) Enteral Tube daily  furosemide   Injectable 40 milliGRAM(s) IV Push every 12 hours  metoprolol     tartrate 25 milliGRAM(s) Oral three times a day  pantoprazole  Injectable 40 milliGRAM(s) IV Push two times a day  saccharomyces boulardii 250 milliGRAM(s) Oral two times a day  sucralfate 1 Gram(s) Oral four times a day  tamsulosin 0.4 milliGRAM(s) Oral at bedtime    MEDICATIONS  (PRN):      Vital Signs Last 24 Hrs  T(C): 36.9 (04 Jan 2018 07:00), Max: 36.9 (04 Jan 2018 07:00)  T(F): 98.4 (04 Jan 2018 07:00), Max: 98.4 (04 Jan 2018 07:00)  HR: 79 (04 Jan 2018 10:00) (79 - 115)  BP: 109/55 (04 Jan 2018 10:00) (90/51 - 132/63)  BP(mean): 74 (04 Jan 2018 10:00) (63 - 91)  RR: 18 (04 Jan 2018 10:00) (18 - 32)  SpO2: 98% (04 Jan 2018 10:00) (94% - 100%)  ICU Vital Signs Last 24 Hrs  CRISTIN ROQUE  I&O's Detail    03 Jan 2018 07:01  -  04 Jan 2018 07:00  --------------------------------------------------------  IN:    Free Water: 250 mL    Jevity: 400 mL  Total IN: 650 mL    OUT:    Colostomy: 350 mL  Total OUT: 350 mL    Total NET: 300 mL      04 Jan 2018 07:01  -  04 Jan 2018 11:08  --------------------------------------------------------  IN:    Jevity: 150 mL  Total IN: 150 mL    OUT:  Total OUT: 0 mL    Total NET: 150 mL        Drug Dosing Weight  CRISTIN ROQUE      PHYSICAL EXAM:  General: Appears well developed, well nourished alert and cooperative.  HEENT: Head; normocephalic, atraumatic.  Eyes: Pupils reactive, cornea wnl.  Neck: Supple, no nodes adenopathy, no NVD or carotid bruit or thyromegaly.  CARDIOVASCULAR: Normal S1 and S2, No murmur, rub, gallop or lift.   LUNGS: Crackles BL  ABDOMEN: Soft, nontender without mass or organomegaly. bowel sounds normoactive.  EXTREMITIES: No clubbing, cyanosis or edema. Distal pulses wnl.   SKIN: warm and dry with normal turgor.  NEURO: Alert/oriented x 3/normal motor exam. No pathologic reflexes.    PSYCH: normal affect.        LABS:                        7.4    5.8   )-----------( 166      ( 04 Jan 2018 03:09 )             24.4     01-04    138  |  101  |  49.0<H>  ----------------------------<  224<H>  4.6   |  25.0  |  2.67<H>    Ca    8.6      04 Jan 2018 03:09  Phos  4.1     01-04  Mg     2.1     01-04    TPro  6.7  /  Alb  2.8<L>  /  TBili  0.5  /  DBili  x   /  AST  27  /  ALT  24  /  AlkPhos  94  01-04    CRISTIN ROQUE  CARDIAC MARKERS ( 04 Jan 2018 03:09 )  x     / 0.05 ng/mL / x     / x     / x      CARDIAC MARKERS ( 03 Jan 2018 19:20 )  x     / 0.05 ng/mL / x     / x     / x      CARDIAC MARKERS ( 03 Jan 2018 15:05 )  x     / 0.04 ng/mL / x     / x     / x          PT/INR - ( 03 Jan 2018 19:20 )   PT: 15.1 sec;   INR: 1.36 ratio         PTT - ( 03 Jan 2018 19:20 )  PTT:36.5 sec      RADIOLOGY & ADDITIONAL STUDIES:    INTERPRETATION OF TELEMETRY (personally reviewed): no current VT     ECHO: < from: TTE Echo Complete w/Doppler (01.02.18 @ 20:32) >     Summary:   1. Left ventricular ejection fraction, by visual estimation, is 40 to   45%.   2. Mildly decreased global left ventricular systolic function.   3. Abnormal septal motion consistent with post-operative status and   right ventricular volume and pressure overload.   4. Spectral Doppler shows restrictive pattern of left ventricular   myocardial filling (Grade III diastolic dysfunction).   5. Severely enlarged right ventricle.   6. Severely reduced RV systolic function.   7. Moderately dilated right atrium.   8. Moderate tricuspid regurgitation.   9. Estimated pulmonary artery systolic pressure is 35.4 mmHg assuming a   right atrial pressure of 8 mmHg, which is consistent with borderline   pulmonary hypertension.  10. Mitral valve mean gradient is 3.7 mmHg consistent with mild mitral   stenosis.  11. Moderately enlarged left atrium.  12. The aortic valve mean gradient is 2.1 mmHg consistent with normally   opening aortic valve.  13. Pulmonary Embolism should be considered in setting of Severe RV   dysfunction with probably preserved RV apical contraction.  14. Peak aortic valve gradient is 5.4 mmHg and the mean gradient is 2.1   mmHg, which is probably normal in the setting of a prosthetic aortic   valve.  15. I have discussed about the echo with Dr Silver.  16. Compared to Study from 9/2017 RV functions is severely reduced and LV   EF reduced from 60-65% to 40-45%.     MD Margarita Electronically signed on 1/3/2018 at 1:15:49 PM    < end of copied text >           ASSESSMENT AND PLAN:  In summary, CRISTIN ROQUE is an 77y Male with past medical history significant for permanent AF on coumadin, CAD s/p CABG/AVR, HTN , chronic renal insufficiency, CVA with R hemiparesis,  colon resection with ileostomy, dysphagia s/p PEG who presented unresponsive and hypoxic found to have septic shock requiring pressors and acute hypoxic respiratory failure 2/2 aspiration PNA s/p intubation, with course further complicated by KAPIL on CKD, and now with recurrent NSVT.  Yesterday had 48 beats of VT.    1) cw metoprolol, amio  2) agree with IV lasix as pleural effusions  3) Possible ischemic eval prior to discharge depending on clinical course  4) EKG Daily

## 2018-01-04 NOTE — PROVIDER CONTACT NOTE (OTHER) - REASON
3 beet vtach
5 beet vtach
Na 151m  and Creatinine 2.99
Non ST Vtach and clogged peg tube
Request for New P.T. Orders
pt having clotts in urine, pt voiding freely
3 beet vtach

## 2018-01-04 NOTE — PROVIDER CONTACT NOTE (OTHER) - SITUATION
Pt chart reviewed, contents noted, pt transferred to CICU, please enter new P.T. orders when deemed appropriate by CICU team, P.T. to sign off pending receipt of new order.
Pt with non ST Vtach 3-10 beats. Pt did not receive meds on night tour due to clogged peg tube.
3 beet vtach - second on shift
5 beet vtach
Na 151m  and Creatinine 2.99
pt having clotts in urine, pt voiding freely
3 beet vtach

## 2018-01-04 NOTE — PROGRESS NOTE ADULT - ASSESSMENT
76 yo male, PMHx CAD s/p CABG, AFib on Coumadin, HTN, prostate CA s/p TURP, s/p PEG and colectomy, prior CVA with expressive aphasia and R hemiparesis here with VT, DVT, CHF.

## 2018-01-04 NOTE — PROGRESS NOTE ADULT - ASSESSMENT
76 yo male, PMHx CAD s/p CABG, AFib on Coumadin, HTN, prostate CA s/p TURP, s/p PEG and colectomy, prior CVA with expressive aphasia and R hemiparesis, admitted with hypoxemic respiratory failure requiring intubation secondary to aspiration pneumonia, septic shock requiring vasopressors secondary to Gram negative urosepsis. 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 VT concern for PE on echo, CP overnight resolved 76 yo male, PMHx CAD s/p CABG, AFib on Coumadin, HTN, prostate CA s/p TURP, s/p PEG and colectomy, prior CVA with expressive aphasia and R hemiparesis, admitted with hypoxemic respiratory failure requiring intubation secondary to aspiration pneumonia, septic shock requiring vasopressors secondary to Gram negative urosepsis. 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 VT concern for PE on echo, CP overnight resolved    Stable for transfer back to the floor Dr. Jin accepted

## 2018-01-04 NOTE — PROGRESS NOTE ADULT - SUBJECTIVE AND OBJECTIVE BOX
Patient is a 77y old  Male who presents with a chief complaint of Found unresponsive in his vomit (18 Dec 2017 19:49)      BRIEF HOSPITAL COURSE: 76 yo male, PMHx CAD s/p CABG, AFib on Coumadin, HTN, prostate CA s/p TURP, s/p PEG and colectomy, prior CVA with expressive aphasia and R hemiparesis, admitted with hypoxemic respiratory failure requiring intubation secondary to aspiration pneumonia, septic shock requiring vasopressors secondary to Gram negative urosepsis. 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    Events last 24 hours: RRT called for 48 seconds of sustained VT. Of note, pt had repeat TTE that showed decreased in EF from 60-65% to 40-45%, grade III diastolic dysfunction, with borderline pHTN, and new RV dysfunction. Patient received magnesium for Mg 1.6, and PO amiodarone, rapid response called again for CP clinically patient tachypneic but no other change in VS. Patient appeared fluid overload, brought to the ICU duiresied, converted from lovenox to IV heparin for DVT/?PE. This am patient doing well, no further episodes of CP, breathing okay stable for transfer to floor      PAST MEDICAL & SURGICAL HISTORY:  CAD (coronary artery disease)  Afib  CVA (cerebral vascular accident)  Mitral valve replaced  BPH (benign prostatic hyperplasia)  High cholesterol  HTN (hypertension)  H/O heart valve replacement with mechanical valve  S/P CABG x 1  H/O colectomy      Review of Systems:  limited breathing ok, no CP      Medications:  ciprofloxacin     Tablet 250 milliGRAM(s) Oral every 24 hours  amiodarone    Tablet 400 milliGRAM(s) Oral daily  metoprolol     tartrate 25 milliGRAM(s) Oral three times a day  tamsulosin 0.4 milliGRAM(s) Oral at bedtime  ALBUTerol    0.083% 2.5 milliGRAM(s) Nebulizer every 4 hours  aspirin  chewable 81 milliGRAM(s) Oral daily  enoxaparin Injectable 80 milliGRAM(s) SubCutaneous daily  pantoprazole  Injectable 40 milliGRAM(s) IV Push two times a day  sucralfate 1 Gram(s) Oral four times a day  atorvastatin 10 milliGRAM(s) Oral at bedtime  finasteride 5 milliGRAM(s) Oral daily  folic acid 1 milliGRAM(s) Enteral Tube daily  epoetin [place] Injectable 48231 Unit(s) SubCutaneous every 7 days      Basic Metabolic Panel in AM (01.04.16 @ 10:14)    Sodium, Serum: 144 mmol/L    Potassium, Serum: 5.0:    Chloride, Serum: 104 mmol/L    Carbon Dioxide, Serum: 28.0 mmol/L    Anion Gap, Serum: 12 mmol/L    Blood Urea Nitrogen, Serum: 57.0 mg/dL    Creatinine, Serum: 2.07 mg/dL    Glucose, Serum: 179 mg/dL    Calcium, Total Serum: 9.0 mg/dL        ICU Vital Signs Last 24 Hrs  T(C): 36.2 (03 Jan 2018 15:21), Max: 36.8 (02 Jan 2018 16:36)  T(F): 97.1 (03 Jan 2018 15:21), Max: 98.3 (02 Jan 2018 16:36)  HR: 98 (03 Jan 2018 15:21) (80 - 114)  BP: 115/73 (03 Jan 2018 15:21) (99/49 - 121/73)  BP(mean): --  ABP: --  ABP(mean): --  RR: 20 (03 Jan 2018 15:21) (18 - 32)  SpO2: 96% (03 Jan 2018 15:21) (92% - 99%)      ABG - ( 03 Jan 2018 15:16 )  pH: 7.46  /  pCO2: 36    /  pO2: 63    / HCO3: 26    / Base Excess: 1.8   /  SaO2: 94            Physical Examination:    General: Elderly male sitting up in bed,  in no acute distress.      HEENT: Pupils equal, reactive to light.  Symmetric.    PULM: nonlabored, diminshed no wheeze    CVS: Irregular rate and irregular rhythm, no murmurs, rubs, or gallops    ABD: Soft, nondistended, nontender, normoactive bowel sounds, no masses. PEG in place, ostomy bag in place with brown stool    EXT: No edema, nontender    SKIN: Warm and well perfused, no rashes noted.    NEURO: Alert, oriented, interactive, R hemiparesis, voice soft poor attention     RADIOLOGY: < from: Xray Chest 1 View AP- PORTABLE-Urgent (01.03.18 @ 19:34) >  EXAM:  XR CHEST PORTABLE URGENT 1V                          PROCEDURE DATE:  01/03/2018          INTERPRETATION:  History: Dyspnea. Rapid response ;  status post CABG    Technique:  AP portable    Comparisons:  Chest x-ray dated 1/3/2018    Findings: Diffuse bilateral airspace disease is present with bilateral   pleural effusions unchanged. Status post median sternotomy for bypass   surgery. The heart appears enlarged.    Impression: Bilateral diffuse airspace disease. Bilateral pleural   effusions. Status post CABG                LILLIAN JOHNS M.D., ATTENDING RADIOLOGIST  This document has been electronically signed. Jan 4 2018 12:00PM    < end of copied text >

## 2018-01-05 LAB
ANION GAP SERPL CALC-SCNC: 15 MMOL/L — SIGNIFICANT CHANGE UP (ref 5–17)
APTT BLD: 29.9 SEC — SIGNIFICANT CHANGE UP (ref 27.5–37.4)
BUN SERPL-MCNC: 54 MG/DL — HIGH (ref 8–20)
CALCIUM SERPL-MCNC: 8.6 MG/DL — SIGNIFICANT CHANGE UP (ref 8.6–10.2)
CHLORIDE SERPL-SCNC: 98 MMOL/L — SIGNIFICANT CHANGE UP (ref 98–107)
CO2 SERPL-SCNC: 26 MMOL/L — SIGNIFICANT CHANGE UP (ref 22–29)
CREAT SERPL-MCNC: 2.78 MG/DL — HIGH (ref 0.5–1.3)
GLUCOSE SERPL-MCNC: 217 MG/DL — HIGH (ref 70–115)
HCT VFR BLD CALC: 24.3 % — LOW (ref 42–52)
HGB BLD-MCNC: 7.3 G/DL — LOW (ref 14–18)
MAGNESIUM SERPL-MCNC: 1.9 MG/DL — SIGNIFICANT CHANGE UP (ref 1.6–2.6)
MCHC RBC-ENTMCNC: 30 G/DL — LOW (ref 32–36)
MCHC RBC-ENTMCNC: 30 PG — SIGNIFICANT CHANGE UP (ref 27–31)
MCV RBC AUTO: 100 FL — HIGH (ref 80–94)
PLATELET # BLD AUTO: 283 K/UL — SIGNIFICANT CHANGE UP (ref 150–400)
POTASSIUM SERPL-MCNC: 3.5 MMOL/L — SIGNIFICANT CHANGE UP (ref 3.5–5.3)
POTASSIUM SERPL-SCNC: 3.5 MMOL/L — SIGNIFICANT CHANGE UP (ref 3.5–5.3)
RBC # BLD: 2.43 M/UL — LOW (ref 4.6–6.2)
RBC # FLD: 20.4 % — HIGH (ref 11–15.6)
SODIUM SERPL-SCNC: 139 MMOL/L — SIGNIFICANT CHANGE UP (ref 135–145)
WBC # BLD: 5.7 K/UL — SIGNIFICANT CHANGE UP (ref 4.8–10.8)
WBC # FLD AUTO: 5.7 K/UL — SIGNIFICANT CHANGE UP (ref 4.8–10.8)

## 2018-01-05 PROCEDURE — 36569 INSJ PICC 5 YR+ W/O IMAGING: CPT

## 2018-01-05 PROCEDURE — 93970 EXTREMITY STUDY: CPT | Mod: 26

## 2018-01-05 PROCEDURE — 99233 SBSQ HOSP IP/OBS HIGH 50: CPT

## 2018-01-05 PROCEDURE — 76937 US GUIDE VASCULAR ACCESS: CPT | Mod: 26

## 2018-01-05 RX ORDER — ERYTHROPOIETIN 10000 [IU]/ML
15000 INJECTION, SOLUTION INTRAVENOUS; SUBCUTANEOUS
Qty: 0 | Refills: 0 | Status: DISCONTINUED | OUTPATIENT
Start: 2018-01-05 | End: 2018-01-12

## 2018-01-05 RX ORDER — WARFARIN SODIUM 2.5 MG/1
7.5 TABLET ORAL ONCE
Qty: 0 | Refills: 0 | Status: COMPLETED | OUTPATIENT
Start: 2018-01-05 | End: 2018-01-05

## 2018-01-05 RX ORDER — FUROSEMIDE 40 MG
40 TABLET ORAL
Qty: 0 | Refills: 0 | Status: DISCONTINUED | OUTPATIENT
Start: 2018-01-05 | End: 2018-01-06

## 2018-01-05 RX ORDER — ENOXAPARIN SODIUM 100 MG/ML
80 INJECTION SUBCUTANEOUS DAILY
Qty: 0 | Refills: 0 | Status: DISCONTINUED | OUTPATIENT
Start: 2018-01-05 | End: 2018-01-06

## 2018-01-05 RX ADMIN — ATORVASTATIN CALCIUM 10 MILLIGRAM(S): 80 TABLET, FILM COATED ORAL at 20:34

## 2018-01-05 RX ADMIN — WARFARIN SODIUM 7.5 MILLIGRAM(S): 2.5 TABLET ORAL at 06:48

## 2018-01-05 RX ADMIN — Medication 250 MILLIGRAM(S): at 17:27

## 2018-01-05 RX ADMIN — Medication 1 GRAM(S): at 12:43

## 2018-01-05 RX ADMIN — ALBUTEROL 2.5 MILLIGRAM(S): 90 AEROSOL, METERED ORAL at 00:13

## 2018-01-05 RX ADMIN — Medication 1 GRAM(S): at 06:49

## 2018-01-05 RX ADMIN — Medication 1 GRAM(S): at 00:41

## 2018-01-05 RX ADMIN — ENOXAPARIN SODIUM 80 MILLIGRAM(S): 100 INJECTION SUBCUTANEOUS at 06:48

## 2018-01-05 RX ADMIN — ALBUTEROL 2.5 MILLIGRAM(S): 90 AEROSOL, METERED ORAL at 03:56

## 2018-01-05 RX ADMIN — PANTOPRAZOLE SODIUM 40 MILLIGRAM(S): 20 TABLET, DELAYED RELEASE ORAL at 17:25

## 2018-01-05 RX ADMIN — Medication 1 MILLIGRAM(S): at 12:43

## 2018-01-05 RX ADMIN — Medication 1 GRAM(S): at 17:26

## 2018-01-05 RX ADMIN — ALBUTEROL 2.5 MILLIGRAM(S): 90 AEROSOL, METERED ORAL at 20:59

## 2018-01-05 RX ADMIN — FINASTERIDE 5 MILLIGRAM(S): 5 TABLET, FILM COATED ORAL at 12:43

## 2018-01-05 RX ADMIN — Medication 250 MILLIGRAM(S): at 17:26

## 2018-01-05 RX ADMIN — Medication 25 MILLIGRAM(S): at 12:43

## 2018-01-05 RX ADMIN — TAMSULOSIN HYDROCHLORIDE 0.4 MILLIGRAM(S): 0.4 CAPSULE ORAL at 20:34

## 2018-01-05 RX ADMIN — ALBUTEROL 2.5 MILLIGRAM(S): 90 AEROSOL, METERED ORAL at 23:37

## 2018-01-05 RX ADMIN — Medication 250 MILLIGRAM(S): at 06:48

## 2018-01-05 RX ADMIN — Medication 25 MILLIGRAM(S): at 06:49

## 2018-01-05 RX ADMIN — Medication 1 GRAM(S): at 23:45

## 2018-01-05 RX ADMIN — ALBUTEROL 2.5 MILLIGRAM(S): 90 AEROSOL, METERED ORAL at 16:37

## 2018-01-05 RX ADMIN — ALBUTEROL 2.5 MILLIGRAM(S): 90 AEROSOL, METERED ORAL at 08:21

## 2018-01-05 RX ADMIN — AMIODARONE HYDROCHLORIDE 400 MILLIGRAM(S): 400 TABLET ORAL at 06:48

## 2018-01-05 RX ADMIN — ALBUTEROL 2.5 MILLIGRAM(S): 90 AEROSOL, METERED ORAL at 12:18

## 2018-01-05 RX ADMIN — Medication 81 MILLIGRAM(S): at 12:43

## 2018-01-05 NOTE — PROCEDURE NOTE - NSSITEPREP_SKIN_A_CORE
chlorhexidine
povidone iodine (if allergic to chlorhexidine)
Adherence to aseptic technique: hand hygiene prior to donning barriers (gown, gloves), don cap and mask, sterile drape over patient/chlorhexidine
chlorhexidine
chlorhexidine

## 2018-01-05 NOTE — PROCEDURE NOTE - NSTOLERANCE_GEN_A_CORE
Patient tolerated procedure well.

## 2018-01-05 NOTE — PROCEDURE NOTE - PROCEDURE
<<-----Click on this checkbox to enter Procedure Midline catheter insertion  01/05/2018    Active  [id number]

## 2018-01-05 NOTE — PROGRESS NOTE ADULT - SUBJECTIVE AND OBJECTIVE BOX
Patient seen and examined    Feels better; sitting up in bed  more interactive  no c/o CP SOB NV   getting Feeds  No swelling feet    Vital Signs Last 24 Hrs  T(C): 37.1 (05 Jan 2018 17:17), Max: 37.1 (05 Jan 2018 00:00)  T(F): 98.7 (05 Jan 2018 17:17), Max: 98.8 (05 Jan 2018 00:00)  HR: 89 (05 Jan 2018 17:17) (79 - 90)  BP: 94/41 (05 Jan 2018 17:17) (94/41 - 156/65)  BP(mean): 101 (05 Jan 2018 16:00) (82 - 101)  RR: 18 (05 Jan 2018 17:17) (18 - 23)  SpO2: 98% (05 Jan 2018 17:17) (94% - 99%)    PHYSICAL EXAM    GENERAL: NAD,   EYES:  conjunctiva and sclera clear  NECK: Supple, No JVD/Bruit  NERVOUS SYSTEM:  A/O, NS change   CHEST:  CTA ,No rales or rhonchi  HEART:  RRR, No murmurs  ABDOMEN: Soft, NT/ND BS+  EXTREMITIES:  No Edema;  SKIN: No rashes    05 Jan 2018 04:22    139    |  98     |  54.0   ----------------------------<  217    3.5     |  26.0   |  2.78     Ca    8.6        05 Jan 2018 04:22  Phos  4.1       04 Jan 2018 03:09  Mg     1.9       05 Jan 2018 04:22    TPro  6.7    /  Alb  2.8    /  TBili  0.5    /  DBili  x      /  AST  27     /  ALT  24     /  AlkPhos  94     04 Jan 2018 03:09                          7.3    5.7   )-----------( 283      ( 05 Jan 2018 04:22 )             24.3         Continue same treatment

## 2018-01-05 NOTE — PROGRESS NOTE ADULT - ASSESSMENT
CKD - creatinine around 2.75 stable  Anemia- Hb 7.3- cause unclear Recd 4 doses Venofer + on procrit   FOBT  Increase Procrit dose

## 2018-01-05 NOTE — PROGRESS NOTE ADULT - ASSESSMENT
Assessment  Persistent AF with improved VR on AC  RV dysfunction pul htn doubt PE however pt on AC  NSVT on amio  Bilat PNA/aspiration  CAd s/p CABG AVR Ef 45%  CRI    Rec  Cont amio  increase dose lopressor 25 Q 6  Cont full dose AC  eventual repeat echo to assess RV and PAP Assessment  Persistent AF with improved VR on AC  RV dysfunction pul htn doubt PE however pt on AC  NSVT on amio  Bilat PNA/aspiration  CAd s/p CABG AVR Ef 45%  CRI  anemia ? re CRI    Rec  Cont amio  increase dose lopressor 25 Q 6  Cont full dose AC  eventual repeat echo to assess RV and PAP  consider transfuse Hgb > 8.5

## 2018-01-05 NOTE — PROGRESS NOTE ADULT - SUBJECTIVE AND OBJECTIVE BOX
CRISTIN ROQUE    [id number]    77y      Male    INTERVAL HPI/OVERNIGHT EVENTS: Patient lost peripheral IV access overnight. Currently offers no complaints.     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 RRT called for 48 seconds of sustained VT. TTE showed EF 40-45%, grade III diastolic dysfunction, RV dysfunction. Pt transferred to MICU and was diuresed and started on IV heparin.       REVIEW OF SYSTEMS:    CONSTITUTIONAL: No fever, weight loss, or fatigue  RESPIRATORY: No cough, wheezing, hemoptysis; No shortness of breath  CARDIOVASCULAR: No chest pain, palpitations  GASTROINTESTINAL: No abdominal or epigastric pain. No nausea, vomiting  NEUROLOGICAL: No headaches, memory loss, loss of strength.  MISCELLANEOUS:      Vital Signs Last 24 Hrs  T(C): 36.8 (05 Jan 2018 12:00), Max: 37.1 (04 Jan 2018 20:00)  T(F): 98.3 (05 Jan 2018 12:00), Max: 98.8 (04 Jan 2018 20:00)  HR: 90 (05 Jan 2018 12:19) (79 - 97)  BP: 156/65 (05 Jan 2018 12:00) (116/60 - 156/65)  BP(mean): 93 (05 Jan 2018 12:00) (82 - 95)  RR: 23 (05 Jan 2018 12:00) (20 - 23)  SpO2: 95% (05 Jan 2018 12:19) (94% - 99%)    PHYSICAL EXAM:    GENERAL: NAD   HEENT: PERRL, +EOMI, wears glasses  CHEST/LUNG: Clear to percussion bilaterally   HEART: S1S2+   ABDOMEN: Soft, Nontender, Nondistended; Bowel sounds present     LABS:                        7.3    5.7   )-----------( 283      ( 05 Jan 2018 04:22 )             24.3     01-05    139  |  98  |  54.0<H>  ----------------------------<  217<H>  3.5   |  26.0  |  2.78<H>    Ca    8.6      05 Jan 2018 04:22  Phos  4.1     01-04  Mg     1.9     01-05    TPro  6.7  /  Alb  2.8<L>  /  TBili  0.5  /  DBili  x   /  AST  27  /  ALT  24  /  AlkPhos  94  01-04    PT/INR - ( 03 Jan 2018 19:20 )   PT: 15.1 sec;   INR: 1.36 ratio         PTT - ( 05 Jan 2018 04:22 )  PTT:29.9 sec        MEDICATIONS  (STANDING):  ALBUTerol    0.083% 2.5 milliGRAM(s) Nebulizer every 4 hours  amiodarone    Tablet 400 milliGRAM(s) Oral daily  aspirin  chewable 81 milliGRAM(s) Oral daily  atorvastatin 10 milliGRAM(s) Oral at bedtime  ciprofloxacin     Tablet 250 milliGRAM(s) Oral every 24 hours  enoxaparin Injectable 80 milliGRAM(s) SubCutaneous daily  epoetin [place] Injectable 17875 Unit(s) SubCutaneous every 7 days  finasteride 5 milliGRAM(s) Oral daily  folic acid 1 milliGRAM(s) Enteral Tube daily  furosemide    Tablet 40 milliGRAM(s) Oral two times a day  metoprolol     tartrate 25 milliGRAM(s) Oral three times a day  pantoprazole  Injectable 40 milliGRAM(s) IV Push two times a day  saccharomyces boulardii 250 milliGRAM(s) Oral two times a day  sucralfate 1 Gram(s) Oral four times a day  tamsulosin 0.4 milliGRAM(s) Oral at bedtime    MEDICATIONS  (PRN):      RADIOLOGY & ADDITIONAL TESTS:

## 2018-01-05 NOTE — PROCEDURE NOTE - NSINDICATIONS_GEN_A_CORE
venous access
antibiotic therapy
emergency venous access/critical illness
fluid administration/antibiotic therapy
respiratory distress/airway protection/mental status change

## 2018-01-05 NOTE — PROCEDURE NOTE - NSINFORMCONSENT_GEN_A_CORE
Benefits, risks, and possible complications of procedure explained to patient/caregiver who verbalized understanding and gave verbal consent.
This was an emergent procedure.
and wife/Benefits, risks, and possible complications of procedure explained to patient/caregiver who verbalized understanding and gave verbal consent.
Benefits, risks, and possible complications of procedure explained to patient/caregiver who verbalized understanding and gave verbal consent.
Benefits, risks, and possible complications of procedure explained to patient/caregiver who verbalized understanding and gave verbal consent.
This was an emergent procedure.

## 2018-01-05 NOTE — PROCEDURE NOTE - PROCEDURE DATE TIME, MLM
05-Jan-2018 14:41
18-Dec-2017 18:07
22-Dec-2017 18:29
18-Dec-2017 18:46
18-Dec-2017 19:00
31-Dec-2017 23:00

## 2018-01-05 NOTE — PROCEDURE NOTE - NSPROCDETAILS_GEN_ALL_CORE
connected to ventilator/patient pre-oxygenated, tube inserted, placement confirmed
secured in place/ultrasound utilization/blood seen on insertion/flushes easily/dressing applied
sterile technique, catheter placed/ultrasound assessment/sterile dressing applied/supine position/ultrasound guidance/location identified, draped/prepped, sterile technique used
blood seen on insertion/secured in place/sterile technique, catheter placed/location identified, draped/prepped, sterile technique used/flushes easily/dressing applied

## 2018-01-05 NOTE — PROGRESS NOTE ADULT - ASSESSMENT
76 yo male, PMHx CAD s/p CABG, AFib on Coumadin, HTN, prostate CA s/p TURP, s/p PEG and colectomy, prior CVA with expressive aphasia and R hemiparesis here with VT, DVT, CHF.     D/w PA, plan for midline.

## 2018-01-05 NOTE — PROGRESS NOTE ADULT - SUBJECTIVE AND OBJECTIVE BOX
[facility]                                   [address] [place] [address]                                                      PHONE: [phone]                                                         FAX: [phone]  [email or web address]  ---------------------------------------------------------------------------------------------------------------------------------    Overnight events/patient complaints:  afib with improved vr, nsvt MUCH SLOWER RTAES 4 BEAT      penicillins (Hives)    MEDICATIONS  (STANDING):  ALBUTerol    0.083% 2.5 milliGRAM(s) Nebulizer every 4 hours  amiodarone    Tablet 400 milliGRAM(s) Oral daily  aspirin  chewable 81 milliGRAM(s) Oral daily  atorvastatin 10 milliGRAM(s) Oral at bedtime  ciprofloxacin     Tablet 250 milliGRAM(s) Oral every 24 hours  enoxaparin Injectable 80 milliGRAM(s) SubCutaneous daily  epoetin [place] Injectable 40591 Unit(s) SubCutaneous every 7 days  finasteride 5 milliGRAM(s) Oral daily  folic acid 1 milliGRAM(s) Enteral Tube daily  furosemide    Tablet 40 milliGRAM(s) Oral two times a day  metoprolol     tartrate 25 milliGRAM(s) Oral three times a day  pantoprazole  Injectable 40 milliGRAM(s) IV Push two times a day  saccharomyces boulardii 250 milliGRAM(s) Oral two times a day  sucralfate 1 Gram(s) Oral four times a day  tamsulosin 0.4 milliGRAM(s) Oral at bedtime    MEDICATIONS  (PRN):      Vital Signs Last 24 Hrs  T(C): 36.4 (05 Jan 2018 07:00), Max: 37.1 (04 Jan 2018 20:00)  T(F): 97.6 (05 Jan 2018 07:00), Max: 98.8 (04 Jan 2018 20:00)  HR: 79 (05 Jan 2018 08:23) (79 - 97)  BP: 126/62 (05 Jan 2018 08:00) (109/55 - 164/87)  BP(mean): 85 (05 Jan 2018 08:00) (74 - 115)  RR: 20 (05 Jan 2018 08:00) (18 - 23)  SpO2: 97% (05 Jan 2018 08:23) (94% - 99%)  Daily     Daily   ICU Vital Signs Last 24 Hrs  CRISTIN ROQUE  I&O's Detail    04 Jan 2018 07:01  -  05 Jan 2018 07:00  --------------------------------------------------------  IN:    heparin  Infusion.: 87 mL    Jevity: 1100 mL  Total IN: 1187 mL    OUT:    Colostomy: 600 mL  Total OUT: 600 mL    Total NET: 587 mL      05 Jan 2018 07:01  -  05 Jan 2018 08:45  --------------------------------------------------------  IN:    Jevity: 50 mL  Total IN: 50 mL    OUT:  Total OUT: 0 mL    Total NET: 50 mL        I&O's Summary    04 Jan 2018 07:01  -  05 Jan 2018 07:00  --------------------------------------------------------  IN: 1187 mL / OUT: 600 mL / NET: 587 mL    05 Jan 2018 07:01  -  05 Jan 2018 08:45  --------------------------------------------------------  IN: 50 mL / OUT: 0 mL / NET: 50 mL      Drug Dosing Weight  CRISTIN ROQUE      PHYSICAL EXAM:  General: Appears well developed, well nourished alert and cooperative.  HEENT: Head; normocephalic, atraumatic.  Eyes: Pupils reactive, cornea wnl.  Neck: Supple, no nodes adenopathy, no NVD or carotid bruit or thyromegaly.  CARDIOVASCULAR: Normal S1 and S2, No murmur, rub, gallop or lift.   LUNGS: bilast rhonchi.  ABDOMEN: Soft, nontender without mass or organomegaly. bowel sounds normoactive.  EXTREMITIES: No clubbing, cyanosis or edema. Distal pulses wnl.   SKIN: warm and dry with normal turgor.  NEURO: Alert/oriented x 3/normal motor exam. No pathologic reflexes.    PSYCH: normal affect.        LABS:                        7.3    5.7   )-----------( 283      ( 05 Jan 2018 04:22 )             24.3     01-05    139  |  98  |  54.0<H>  ----------------------------<  217<H>  3.5   |  26.0  |  2.78<H>    Ca    8.6      05 Jan 2018 04:22  Phos  4.1     01-04  Mg     1.9     01-05    TPro  6.7  /  Alb  2.8<L>  /  TBili  0.5  /  DBili  x   /  AST  27  /  ALT  24  /  AlkPhos  94  01-04    CRISTIN ROQUE  CARDIAC MARKERS ( 04 Jan 2018 03:09 )  x     / 0.05 ng/mL / x     / x     / x      CARDIAC MARKERS ( 03 Jan 2018 19:20 )  x     / 0.05 ng/mL / x     / x     / x      CARDIAC MARKERS ( 03 Jan 2018 15:05 )  x     / 0.04 ng/mL / x     / x     / x          PT/INR - ( 03 Jan 2018 19:20 )   PT: 15.1 sec;   INR: 1.36 ratio         PTT - ( 05 Jan 2018 04:22 )  PTT:29.9 sec      RADIOLOGY & ADDITIONAL STUDIES:    INTERPRETATION OF TELEMETRY (personally reviewed):    ECG: < from: 12 Lead ECG (01.03.18 @ 14:03) >    Diagnosis Line probable nsr, v bigemi  Nonspecific T wave abnormality  Abnormal ECG    Confirmed by REX CHEEK ([id number]) on 1/3/2018 5:38:58 PM    < end of copied text >      ECHO: < from: TTE Echo Complete w/Doppler (01.02.18 @ 20:32) >   Summary:   1. Left ventricular ejection fraction, by visual estimation, is 40 to   45%.   2. Mildly decreased global left ventricular systolic function.   3. Abnormal septal motion consistent with post-operative status and   right ventricular volume and pressure overload.   4. Spectral Doppler shows restrictive pattern of left ventricular   myocardial filling (Grade III diastolic dysfunction).   5. Severely enlarged right ventricle.   6. Severely reduced RV systolic function.   7. Moderately dilated right atrium.   8. Moderate tricuspid regurgitation.   9. Estimated pulmonary artery systolic pressure is 35.4 mmHg assuming a   right atrial pressure of 8 mmHg, which is consistent with borderline   pulmonary hypertension.  10. Mitral valve mean gradient is 3.7 mmHg consistent with mild mitral   stenosis.  11. Moderately enlarged left atrium.  12. The aortic valve mean gradient is 2.1 mmHg consistent with normally   opening aortic valve.  13. Pulmonary Embolism should be considered in setting of Severe RV   dysfunction with probably preserved RV apical contraction.  14. Peak aortic valve gradient is 5.4 mmHg and the mean gradient is 2.1   mmHg, which is probably normal in the setting of a prosthetic aortic   valve.  15. I have discussed about the echo with Dr Silver.  16. Compared to Study from 9/2017 RV functions is severely reduced and LV   EF reduced from 60-65% to 40-45%.     MD Margarita Electronically signed on 1/3/2018 at 1:15:49 PM          *** Final ***          < end of copied text

## 2018-01-05 NOTE — CHART NOTE - NSCHARTNOTEFT_GEN_A_CORE
RN notified that patient IV has infiltrated and several attempts was unsuccessful. Several attempts also completed by myself, also unsuccessful as access can only be placed in right arm. patient case reviewed and discussed with bedside RN, hep drip discontinued and full dose Lovenox and Coumadin started, IV Lasix changed to oral. Will endorse to primary team the need for access to be placed by picc consult team for possible midline placement.

## 2018-01-06 LAB
ANION GAP SERPL CALC-SCNC: 16 MMOL/L — SIGNIFICANT CHANGE UP (ref 5–17)
APTT BLD: 39.9 SEC — HIGH (ref 27.5–37.4)
BUN SERPL-MCNC: 52 MG/DL — HIGH (ref 8–20)
CALCIUM SERPL-MCNC: 8.7 MG/DL — SIGNIFICANT CHANGE UP (ref 8.6–10.2)
CHLORIDE SERPL-SCNC: 101 MMOL/L — SIGNIFICANT CHANGE UP (ref 98–107)
CO2 SERPL-SCNC: 28 MMOL/L — SIGNIFICANT CHANGE UP (ref 22–29)
CREAT SERPL-MCNC: 3.06 MG/DL — HIGH (ref 0.5–1.3)
GLUCOSE SERPL-MCNC: 185 MG/DL — HIGH (ref 70–115)
HCT VFR BLD CALC: 27.9 % — LOW (ref 42–52)
HGB BLD-MCNC: 8.2 G/DL — LOW (ref 14–18)
INR BLD: 1.48 RATIO — HIGH (ref 0.88–1.16)
MAGNESIUM SERPL-MCNC: 1.8 MG/DL — SIGNIFICANT CHANGE UP (ref 1.6–2.6)
MCHC RBC-ENTMCNC: 29.4 G/DL — LOW (ref 32–36)
MCHC RBC-ENTMCNC: 30.4 PG — SIGNIFICANT CHANGE UP (ref 27–31)
MCV RBC AUTO: 103.3 FL — HIGH (ref 80–94)
OB PNL STL: NEGATIVE — SIGNIFICANT CHANGE UP
PLATELET # BLD AUTO: 274 K/UL — SIGNIFICANT CHANGE UP (ref 150–400)
POTASSIUM SERPL-MCNC: 4 MMOL/L — SIGNIFICANT CHANGE UP (ref 3.5–5.3)
POTASSIUM SERPL-SCNC: 4 MMOL/L — SIGNIFICANT CHANGE UP (ref 3.5–5.3)
PROTHROM AB SERPL-ACNC: 16.4 SEC — HIGH (ref 9.8–12.7)
RBC # BLD: 2.7 M/UL — LOW (ref 4.6–6.2)
RBC # FLD: 21.5 % — HIGH (ref 11–15.6)
SODIUM SERPL-SCNC: 145 MMOL/L — SIGNIFICANT CHANGE UP (ref 135–145)
WBC # BLD: 6.1 K/UL — SIGNIFICANT CHANGE UP (ref 4.8–10.8)
WBC # FLD AUTO: 6.1 K/UL — SIGNIFICANT CHANGE UP (ref 4.8–10.8)

## 2018-01-06 PROCEDURE — 99233 SBSQ HOSP IP/OBS HIGH 50: CPT

## 2018-01-06 PROCEDURE — 99232 SBSQ HOSP IP/OBS MODERATE 35: CPT

## 2018-01-06 RX ORDER — WARFARIN SODIUM 2.5 MG/1
7.5 TABLET ORAL ONCE
Qty: 0 | Refills: 0 | Status: COMPLETED | OUTPATIENT
Start: 2018-01-06 | End: 2018-01-06

## 2018-01-06 RX ORDER — FUROSEMIDE 40 MG
40 TABLET ORAL DAILY
Qty: 0 | Refills: 0 | Status: DISCONTINUED | OUTPATIENT
Start: 2018-01-07 | End: 2018-01-09

## 2018-01-06 RX ADMIN — Medication 25 MILLIGRAM(S): at 14:20

## 2018-01-06 RX ADMIN — Medication 1 GRAM(S): at 11:51

## 2018-01-06 RX ADMIN — Medication 1 GRAM(S): at 17:14

## 2018-01-06 RX ADMIN — ALBUTEROL 2.5 MILLIGRAM(S): 90 AEROSOL, METERED ORAL at 11:34

## 2018-01-06 RX ADMIN — ALBUTEROL 2.5 MILLIGRAM(S): 90 AEROSOL, METERED ORAL at 15:23

## 2018-01-06 RX ADMIN — Medication 81 MILLIGRAM(S): at 11:55

## 2018-01-06 RX ADMIN — PANTOPRAZOLE SODIUM 40 MILLIGRAM(S): 20 TABLET, DELAYED RELEASE ORAL at 06:13

## 2018-01-06 RX ADMIN — AMIODARONE HYDROCHLORIDE 400 MILLIGRAM(S): 400 TABLET ORAL at 06:13

## 2018-01-06 RX ADMIN — ENOXAPARIN SODIUM 80 MILLIGRAM(S): 100 INJECTION SUBCUTANEOUS at 11:51

## 2018-01-06 RX ADMIN — ALBUTEROL 2.5 MILLIGRAM(S): 90 AEROSOL, METERED ORAL at 20:09

## 2018-01-06 RX ADMIN — TAMSULOSIN HYDROCHLORIDE 0.4 MILLIGRAM(S): 0.4 CAPSULE ORAL at 20:52

## 2018-01-06 RX ADMIN — WARFARIN SODIUM 7.5 MILLIGRAM(S): 2.5 TABLET ORAL at 20:52

## 2018-01-06 RX ADMIN — Medication 1 GRAM(S): at 23:03

## 2018-01-06 RX ADMIN — Medication 1 MILLIGRAM(S): at 11:51

## 2018-01-06 RX ADMIN — FINASTERIDE 5 MILLIGRAM(S): 5 TABLET, FILM COATED ORAL at 11:51

## 2018-01-06 RX ADMIN — Medication 40 MILLIGRAM(S): at 06:14

## 2018-01-06 RX ADMIN — ALBUTEROL 2.5 MILLIGRAM(S): 90 AEROSOL, METERED ORAL at 03:42

## 2018-01-06 RX ADMIN — Medication 25 MILLIGRAM(S): at 21:20

## 2018-01-06 RX ADMIN — Medication 250 MILLIGRAM(S): at 17:14

## 2018-01-06 RX ADMIN — ATORVASTATIN CALCIUM 10 MILLIGRAM(S): 80 TABLET, FILM COATED ORAL at 20:51

## 2018-01-06 RX ADMIN — PANTOPRAZOLE SODIUM 40 MILLIGRAM(S): 20 TABLET, DELAYED RELEASE ORAL at 17:14

## 2018-01-06 RX ADMIN — ERYTHROPOIETIN 15000 UNIT(S): 10000 INJECTION, SOLUTION INTRAVENOUS; SUBCUTANEOUS at 12:07

## 2018-01-06 RX ADMIN — Medication 250 MILLIGRAM(S): at 06:13

## 2018-01-06 RX ADMIN — Medication 1 GRAM(S): at 06:14

## 2018-01-06 NOTE — CONSULT NOTE ADULT - PROBLEM SELECTOR PROBLEM 2
Acute respiratory failure with hypoxia
Acute respiratory failure with hypoxia
Occult blood positive stool
DVT of upper extremity (deep vein thrombosis)
DVT of upper extremity (deep vein thrombosis)
Pseudomonas urinary tract infection

## 2018-01-06 NOTE — CONSULT NOTE ADULT - PROBLEM SELECTOR RECOMMENDATION 2
Thrombus in the distal left brachial and basilic veins in the distal left   upper arm extending into the antecubital fossa.  Lovenox anticoagulation recommended 1 mg q 12 with continued monitoring for GIB/ serial H/H.  -Hb remains stable.  Risks and benefits of anticoagulation discussed and patients wife electing anticoagulation.  She is aware of FOBT +and no current intervention/workup planned by GI. Thrombus in the distal left brachial and basilic veins in the distal left   upper arm extending into the antecubital fossa.  Lovenox anticoagulation will be changed to IV heparin given Cr ~3.0.  Will transition to outpatient coumadin x 3 months.    -continued monitoring for GIB/ serial H/H.  -Hb remains stable.  Risks and benefits of anticoagulation discussed and patients wife electing anticoagulation.  She is aware of FOBT +and no current intervention/workup planned by GI.

## 2018-01-06 NOTE — PROGRESS NOTE ADULT - SUBJECTIVE AND OBJECTIVE BOX
CRISTIN ROQUE    [id number]    77y      Male    INTERVAL HPI/OVERNIGHT EVENTS: No events on. S/p midline placement. Offers no complaints.    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 RRT called for 48 seconds of sustained VT. TTE showed EF 40-45%, grade III diastolic dysfunction, RV dysfunction. Pt transferred to MICU and was diuresed and started on IV heparin. Patient lost IV access and was transitioned to lovenox.       REVIEW OF SYSTEMS:    CONSTITUTIONAL: No fever   RESPIRATORY: No cough   CARDIOVASCULAR: No chest pain     Vital Signs Last 24 Hrs  T(C): 36.6 (06 Jan 2018 10:10), Max: 37.1 (05 Jan 2018 17:00)  T(F): 97.9 (06 Jan 2018 10:10), Max: 98.8 (05 Jan 2018 17:00)  HR: 89 (06 Jan 2018 10:10) (86 - 98)  BP: 99/40 (06 Jan 2018 10:10) (94/41 - 145/70)  BP(mean): 101 (05 Jan 2018 16:00) (101 - 101)  RR: 18 (06 Jan 2018 10:10) (18 - 23)  SpO2: 97% (06 Jan 2018 11:35) (95% - 98%)    PHYSICAL EXAM:    GENERAL: NAD  HEENT: PERRL, +EOMI  CHEST/LUNG: Clear to percussion bilaterally  HEART: S1S2+, Regular rate and rhythm  ABDOMEN: Soft, Nontender, Nondistended; Bowel sounds present       LABS:                        8.2    6.1   )-----------( 274      ( 06 Jan 2018 07:43 )             27.9     01-06    145  |  101  |  52.0<H>  ----------------------------<  185<H>  4.0   |  28.0  |  3.06<H>    Ca    8.7      06 Jan 2018 07:43  Mg     1.8     01-06      PTT - ( 05 Jan 2018 04:22 )  PTT:29.9 sec        MEDICATIONS  (STANDING):  ALBUTerol    0.083% 2.5 milliGRAM(s) Nebulizer every 4 hours  amiodarone    Tablet 400 milliGRAM(s) Oral daily  aspirin  chewable 81 milliGRAM(s) Oral daily  atorvastatin 10 milliGRAM(s) Oral at bedtime  enoxaparin Injectable 80 milliGRAM(s) SubCutaneous daily  epoetin [place] Injectable 48389 Unit(s) SubCutaneous every 3 days  finasteride 5 milliGRAM(s) Oral daily  folic acid 1 milliGRAM(s) Enteral Tube daily  furosemide    Tablet 40 milliGRAM(s) Oral two times a day  metoprolol     tartrate 25 milliGRAM(s) Oral three times a day  pantoprazole  Injectable 40 milliGRAM(s) IV Push two times a day  saccharomyces boulardii 250 milliGRAM(s) Oral two times a day  sucralfate 1 Gram(s) Oral four times a day  tamsulosin 0.4 milliGRAM(s) Oral at bedtime    MEDICATIONS  (PRN):      RADIOLOGY & ADDITIONAL TESTS:

## 2018-01-06 NOTE — PROGRESS NOTE ADULT - SUBJECTIVE AND OBJECTIVE BOX
[place] [facility] - [facility], THE HEART CENTER                                   [address] [place] [address]                                                      PHONE: [phone]                                                         FAX: [phone]  [email or web address]  ---------------------------------------------------------------------------------------------------------------------------------    Overnight events/patient complaints:      PAST MEDICAL & SURGICAL HISTORY:  CAD (coronary artery disease)  Afib  CVA (cerebral vascular accident)  Mitral valve replaced  BPH (benign prostatic hyperplasia)  High cholesterol  HTN (hypertension)  H/O heart valve replacement with mechanical valve  S/P CABG x 1  H/O colectomy      penicillins (Hives)    MEDICATIONS  (STANDING):  ALBUTerol    0.083% 2.5 milliGRAM(s) Nebulizer every 4 hours  amiodarone    Tablet 400 milliGRAM(s) Oral daily  aspirin  chewable 81 milliGRAM(s) Oral daily  atorvastatin 10 milliGRAM(s) Oral at bedtime  enoxaparin Injectable 80 milliGRAM(s) SubCutaneous daily  epoetin [place] Injectable 58900 Unit(s) SubCutaneous every 3 days  finasteride 5 milliGRAM(s) Oral daily  folic acid 1 milliGRAM(s) Enteral Tube daily  furosemide    Tablet 40 milliGRAM(s) Oral two times a day  metoprolol     tartrate 25 milliGRAM(s) Oral three times a day  pantoprazole  Injectable 40 milliGRAM(s) IV Push two times a day  saccharomyces boulardii 250 milliGRAM(s) Oral two times a day  sucralfate 1 Gram(s) Oral four times a day  tamsulosin 0.4 milliGRAM(s) Oral at bedtime    MEDICATIONS  (PRN):      Vital Signs Last 24 Hrs  T(C): 36.6 (06 Jan 2018 10:10), Max: 37.1 (05 Jan 2018 17:00)  T(F): 97.9 (06 Jan 2018 10:10), Max: 98.8 (05 Jan 2018 17:00)  HR: 89 (06 Jan 2018 10:10) (86 - 98)  BP: 99/40 (06 Jan 2018 10:10) (94/41 - 156/65)  BP(mean): 101 (05 Jan 2018 16:00) (93 - 101)  RR: 18 (06 Jan 2018 10:10) (18 - 23)  SpO2: 97% (06 Jan 2018 06:08) (95% - 98%)  ICU Vital Signs Last 24 Hrs  CRISTIN ROQUE  I&O's Detail    05 Jan 2018 07:01  -  06 Jan 2018 07:00  --------------------------------------------------------  IN:    Free Water: 400 mL    Jevity: 1000 mL  Total IN: 1400 mL    OUT:    Colostomy: 200 mL  Total OUT: 200 mL    Total NET: 1200 mL        I&O's Summary    05 Jan 2018 07:01  -  06 Jan 2018 07:00  --------------------------------------------------------  IN: 1400 mL / OUT: 200 mL / NET: 1200 mL      Drug Dosing Weight  CRISTIN ROQUE      PHYSICAL EXAM:  General: Appears well developed, well nourished alert and cooperative.  HEENT: Head; normocephalic, atraumatic.  Eyes: Pupils reactive, cornea wnl.  Neck: Supple, no nodes adenopathy, no NVD or carotid bruit or thyromegaly.  CARDIOVASCULAR: Normal S1 and S2, No murmur, rub, gallop or lift.   LUNGS: No rales, rhonchi or wheeze. Normal breath sounds bilaterally.  ABDOMEN: Soft, nontender without mass or organomegaly. bowel sounds normoactive.  EXTREMITIES: No clubbing, cyanosis or edema. Distal pulses wnl.   SKIN: warm and dry with normal turgor.  NEURO: Alert/oriented x 3/normal motor exam. No pathologic reflexes.    PSYCH: normal affect.        LABS:                        8.2    6.1   )-----------( 274      ( 06 Jan 2018 07:43 )             27.9     01-06    145  |  101  |  52.0<H>  ----------------------------<  185<H>  4.0   |  28.0  |  3.06<H>    Ca    8.7      06 Jan 2018 07:43  Mg     1.8     01-06      CRISTIN ROQUE      PTT - ( 05 Jan 2018 04:22 )  PTT:29.9 sec      RADIOLOGY & ADDITIONAL STUDIES:    INTERPRETATION OF TELEMETRY (personally reviewed):  ECHO: < from: TTE Echo Complete w/Doppler (01.02.18 @ 20:32) >     Summary:   1. Left ventricular ejection fraction, by visual estimation, is 40 to   45%.   2. Mildly decreased global left ventricular systolic function.   3. Abnormal septal motion consistent with post-operative status and   right ventricular volume and pressure overload.   4. Spectral Doppler shows restrictive pattern of left ventricular   myocardial filling (Grade III diastolic dysfunction).   5. Severely enlarged right ventricle.   6. Severely reduced RV systolic function.   7. Moderately dilated right atrium.   8. Moderate tricuspid regurgitation.   9. Estimated pulmonary artery systolic pressure is 35.4 mmHg assuming a   right atrial pressure of 8 mmHg, which is consistent with borderline   pulmonary hypertension.  10. Mitral valve mean gradient is 3.7 mmHg consistent with mild mitral   stenosis.  11. Moderately enlarged left atrium.  12. The aortic valve mean gradient is 2.1 mmHg consistent with normally   opening aortic valve.  13. Pulmonary Embolism should be considered in setting of Severe RV   dysfunction with probably preserved RV apical contraction.  14. Peak aortic valve gradient is 5.4 mmHg and the mean gradient is 2.1   mmHg, which is probably normal in the setting of a prosthetic aortic   valve.  15. I have discussed about the echo with Dr Silver.  16. Compared to Study from 9/2017 RV functions is severely reduced and LV   EF reduced from 60-65% to 40-45%.            ASSESSMENT AND PLAN:  In summary, CRISTIN ROQUE is an 77y Male with past medical history significant for permanent AF on coumadin, CAD s/p CABG/AVR, HTN , chronic renal insufficiency, CVA with R hemiparesis,  colon resection with ileostomy, dysphagia s/p PEG who presented unresponsive and hypoxic found to have septic shock requiring pressors and acute hypoxic respiratory failure 2/2 aspiration PNA s/p intubation, with course further complicated by KAPIL on CKD, and now with recurrent NSVT.  Yesterday had 48 beats of VT.    1) metoprolol, amio  2) IV lasix as pleural effusions  3) ischemic eval prior to discharge depending on clinical course  4) Pt with Sustained VT- Needs ICD prior to D/C   4) EKG Daily    Thank you for allowing [facility] to participate in the care of this patient.  Please feel free to call with any questions or concerns. [place] [facility] - [facility], THE [facility] C[facility]                                   [address] [place] [address]                                                      PHONE: [phone]                                                         FAX: [phone]  [email or web address]  ---------------------------------------------------------------------------------------------------------------------------------    Overnight events/patient complaints:  no complaints  not verbal answers yes/no    PAST MEDICAL & SURGICAL HISTORY:  CAD (coronary artery disease)  Afib  CVA (cerebral vascular accident)  Mitral valve replaced  BPH (benign prostatic hyperplasia)  High cholesterol  HTN (hypertension)  H/O heart valve replacement with mechanical valve  S/P CABG x 1  H/O colectomy      penicillins (Hives)    MEDICATIONS  (STANDING):  ALBUTerol    0.083% 2.5 milliGRAM(s) Nebulizer every 4 hours  amiodarone    Tablet 400 milliGRAM(s) Oral daily  aspirin  chewable 81 milliGRAM(s) Oral daily  atorvastatin 10 milliGRAM(s) Oral at bedtime  enoxaparin Injectable 80 milliGRAM(s) SubCutaneous daily  epoetin [place] Injectable 76140 Unit(s) SubCutaneous every 3 days  finasteride 5 milliGRAM(s) Oral daily  folic acid 1 milliGRAM(s) Enteral Tube daily  furosemide    Tablet 40 milliGRAM(s) Oral two times a day  metoprolol     tartrate 25 milliGRAM(s) Oral three times a day  pantoprazole  Injectable 40 milliGRAM(s) IV Push two times a day  saccharomyces boulardii 250 milliGRAM(s) Oral two times a day  sucralfate 1 Gram(s) Oral four times a day  tamsulosin 0.4 milliGRAM(s) Oral at bedtime    MEDICATIONS  (PRN):      Vital Signs Last 24 Hrs  T(C): 36.6 (06 Jan 2018 10:10), Max: 37.1 (05 Jan 2018 17:00)  T(F): 97.9 (06 Jan 2018 10:10), Max: 98.8 (05 Jan 2018 17:00)  HR: 89 (06 Jan 2018 10:10) (86 - 98)  BP: 99/40 (06 Jan 2018 10:10) (94/41 - 156/65)  BP(mean): 101 (05 Jan 2018 16:00) (93 - 101)  RR: 18 (06 Jan 2018 10:10) (18 - 23)  SpO2: 97% (06 Jan 2018 06:08) (95% - 98%)  ICU Vital Signs Last 24 Hrs  CRISTIN ROQUE  I&O's Detail    05 Jan 2018 07:01  -  06 Jan 2018 07:00  --------------------------------------------------------  IN:    Free Water: 400 mL    Jevity: 1000 mL  Total IN: 1400 mL    OUT:    Colostomy: 200 mL  Total OUT: 200 mL    Total NET: 1200 mL        I&O's Summary    05 Jan 2018 07:01  -  06 Jan 2018 07:00  --------------------------------------------------------  IN: 1400 mL / OUT: 200 mL / NET: 1200 mL      Drug Dosing Weight  CRISTIN ROQUE      PHYSICAL EXAM:  General: Appears well developed, well nourished alert and cooperative.  HEENT: Head; normocephalic, atraumatic.  Eyes: Pupils reactive, cornea wnl.  Neck: Supple, no nodes adenopathy, no NVD or carotid bruit or thyromegaly.  CARDIOVASCULAR: Normal S1 and S2, No murmur, rub, gallop or lift.   LUNGS: No rales, rhonchi or wheeze. Normal breath sounds bilaterally.  ABDOMEN: Soft, nontender without mass or organomegaly. bowel sounds normoactive.  EXTREMITIES: No clubbing, cyanosis or edema. Distal pulses wnl.   SKIN: warm and dry with normal turgor.  NEURO: Alert/oriented x 3/normal motor exam. No pathologic reflexes.    PSYCH: normal affect.        LABS:                        8.2    6.1   )-----------( 274      ( 06 Jan 2018 07:43 )             27.9     01-06    145  |  101  |  52.0<H>  ----------------------------<  185<H>  4.0   |  28.0  |  3.06<H>    Ca    8.7      06 Jan 2018 07:43  Mg     1.8     01-06      CRISTIN ROQUE      PTT - ( 05 Jan 2018 04:22 )  PTT:29.9 sec      RADIOLOGY & ADDITIONAL STUDIES:    INTERPRETATION OF TELEMETRY (personally reviewed):  ECHO: < from: TTE Echo Complete w/Doppler (01.02.18 @ 20:32) >     Summary:   1. Left ventricular ejection fraction, by visual estimation, is 40 to   45%.   2. Mildly decreased global left ventricular systolic function.   3. Abnormal septal motion consistent with post-operative status and   right ventricular volume and pressure overload.   4. Spectral Doppler shows restrictive pattern of left ventricular   myocardial filling (Grade III diastolic dysfunction).   5. Severely enlarged right ventricle.   6. Severely reduced RV systolic function.   7. Moderately dilated right atrium.   8. Moderate tricuspid regurgitation.   9. Estimated pulmonary artery systolic pressure is 35.4 mmHg assuming a   right atrial pressure of 8 mmHg, which is consistent with borderline   pulmonary hypertension.  10. Mitral valve mean gradient is 3.7 mmHg consistent with mild mitral   stenosis.  11. Moderately enlarged left atrium.  12. The aortic valve mean gradient is 2.1 mmHg consistent with normally   opening aortic valve.  13. Pulmonary Embolism should be considered in setting of Severe RV   dysfunction with probably preserved RV apical contraction.  14. Peak aortic valve gradient is 5.4 mmHg and the mean gradient is 2.1   mmHg, which is probably normal in the setting of a prosthetic aortic   valve.  15. I have discussed about the echo with Dr Silver.  16. Compared to Study from 9/2017 RV functions is severely reduced and LV   EF reduced from 60-65% to 40-45%.            ASSESSMENT AND PLAN:  In summary, CRISTIN ROQUE is an 77y Male with past medical history significant for permanent AF on coumadin, CAD s/p CABG/AVR, HTN , chronic renal insufficiency, CVA with R hemiparesis,  colon resection with ileostomy, dysphagia s/p PEG who presented unresponsive and hypoxic found to have septic shock requiring pressors and acute hypoxic respiratory failure 2/2 aspiration PNA s/p intubation, with course further complicated by KAPIL on CKD, and now with recurrent NSVT.  Yesterday had 48 beats of VT.    1) metoprolol, amio  2) IV lasix as pleural effusions  3) ischemic eval prior to discharge depending on clinical course  4) Pt with Sustained VT- likely Needs ICD prior to D/C -   5)  EKG Daily    Thank you for allowing [facility] to participate in the care of this patient.  Please feel free to call with any questions or concerns.

## 2018-01-06 NOTE — PROGRESS NOTE ADULT - ASSESSMENT
Assess    Dysphagia post CVA with aspiration  CM  CAD  DVT  Afib    Red    Truncal elevation   Tube feeding  AC  Cardiac regiment  Consider palliative input  Little to off  Re call as needed

## 2018-01-06 NOTE — CONSULT NOTE ADULT - PROBLEM SELECTOR RECOMMENDATION 9
Pt. is not a candidate for GI w/u presently given his current clinical condition which according to the hospitalist is deteriorating neurologically. Would recommend speaking with the pt's family to assess their willingness to proceed with endoscopic evaluation in a chronically debilitated and ill patient such as this. Would keep Hb at 8 grams or higher and keep on Pantoprazole 40 mg. daily.
Patient was evaluated by GI, Dr. Koch.  No clinical signs of active GI bleeding, no blood or melena in his ileostomy bag,  Pt. felt not to be a suitable candidate for GI intervention presently.
Patient was evaluated by GI, Dr. Koch.  No clinical signs of active GI bleeding, no blood or melena in his ileostomy bag,  Pt. felt not to be a suitable candidate for GI intervention presently.
- patient requires a lot fo assistance

## 2018-01-06 NOTE — CONSULT NOTE ADULT - PROBLEM SELECTOR PROBLEM 1
Anemia, chronic disease
Pseudomonas urinary tract infection
Occult blood in stools
Occult blood in stools
Pneumonia, bacterial
Ventricular tachycardia (paroxysmal)
Cerebrovascular accident (CVA), unspecified mechanism

## 2018-01-06 NOTE — CONSULT NOTE ADULT - SUBJECTIVE AND OBJECTIVE BOX
HPI:  78 y/o male pmhx CVA ( 2 years ago and in 2017) w/ residual right upper and lower extremity weakness, s/p PEG,  afib on coumadin, s/p colon resection with ileostomy 8 years ago, s/p CBAGx3,  prostate cancer s/p TURP 3 years ago was BIBEMS after wife found him unresponsive with vomit around his face. Wife states patient had an increase in SOB past 2 days and noticed he was becoming more weak.  Pt was discharged 2 days ago from rehab center and has had a decrease in appetite since he's been home according to his wife and son. Wife states she has been noticing that after PEG feeding he coughs a lot and she compares it to him choking.  She states he has been getting feed through his PEG as well as liquids by mouth which he has been tolerating. On arrival patient was unresponsive and hypoxic.  RRT called for 48 seconds of sustained VT. TTE showed EF 40-45%, grade III diastolic dysfunction, RV dysfunction. Pt transferred to MICU and was diuresed and started on IV heparin. Patient lost IV access and was transitioned to lovenox.         PAST MEDICAL & SURGICAL HISTORY:  CAD (coronary artery disease)  Afib  CVA (cerebral vascular accident)  Mitral valve replaced  BPH (benign prostatic hyperplasia)  High cholesterol  HTN (hypertension)  H/O heart valve replacement with mechanical valve  S/P CABG x 1  H/O colectomy      MEDICATIONS  (STANDING):  acetylcysteine 20% Inhalation 3 milliLiter(s) Inhalation every 6 hours  ALBUTerol    0.083% 2.5 milliGRAM(s) Nebulizer every 4 hours  amiodarone    Tablet 400 milliGRAM(s) Oral daily  aspirin  chewable 81 milliGRAM(s) Oral daily  atorvastatin 10 milliGRAM(s) Oral at bedtime  cefepime  IVPB 500 milliGRAM(s) IV Intermittent every 24 hours  epoetin [place] Injectable 53771 Unit(s) SubCutaneous every 7 days  finasteride 5 milliGRAM(s) Oral daily  folic acid 1 milliGRAM(s) Enteral Tube daily  metoprolol     tartrate 25 milliGRAM(s) Oral three times a day  pantoprazole   Suspension 40 milliGRAM(s) Oral before breakfast  saccharomyces boulardii 250 milliGRAM(s) Oral two times a day  sodium chloride 0.225%. 1000 milliLiter(s) (60 mL/Hr) IV Continuous <Continuous>  tamsulosin 0.4 milliGRAM(s) Oral at bedtime    MEDICATIONS  (PRN):      Allergies    penicillins (Hives)    Intolerances        FAMILY HISTORY:  No pertinent family history in first degree relatives      Review of Systems    Constitutional, Eyes, ENT, Cardiovascular, Respiratory, Gastrointestinal, Genitourinary, Musculoskeletal, Integumentary, Neurological, Psychiatric, Endocrine, Heme/Lymph and Allergic/Immunologic review of systems are otherwise negative except as noted in HPI.     Vital Signs Last 24 Hrs  T(C): 36.3 (2018 10:48), Max: 36.6 (31 Dec 2017 22:00)  T(F): 97.3 (2018 10:48), Max: 97.8 (31 Dec 2017 22:00)  HR: 102 (2018 10:48) (88 - 102)  BP: 100/60 (2018 13:44) (100/60 - 118/62)  BP(mean): --  RR: 20 (2018 10:48) (18 - 20)  SpO2: 98% (2018 15:24) (95% - 98%)    GENERAL: NAD  HEENT: PERRL, +EOMI  CHEST/LUNG: Clear to percussion bilaterally  HEART: S1S2+, Regular rate and rhythm  ABDOMEN: Soft, Nontender, Nondistended; Bowel sounds presen      LABS:  CBC Full  -  ( 2018 11:25 )  WBC Count : 9.6 K/uL  Hemoglobin : 7.5 g/dL  Hematocrit : 25.3 %  Platelet Count - Automated : 242 K/uL  Mean Cell Volume : 97.7 fl  Mean Cell Hemoglobin : 29.0 pg  Mean Cell Hemoglobin Concentration : 29.6 g/dL  Auto Neutrophil # : x  Auto Lymphocyte # : x  Auto Monocyte # : x  Auto Eosinophil # : x  Auto Basophil # : x  Auto Neutrophil % : x  Auto Lymphocyte % : x  Auto Monocyte % : x  Auto Eosinophil % : x  Auto Basophil % : x        140  |  104  |  54.0<H>  ----------------------------<  164<H>  4.6   |  23.0  |  2.54<H>    Ca    8.4<L>      2018 11:25  Mg     1.8     12-31    TPro  6.5<L>  /  Alb  2.5<L>  /  TBili  0.6  /  DBili  x   /  AST  18  /  ALT  27  /  AlkPhos  96        Urinalysis Basic - ( 30 Dec 2017 19:11 )    Color: Yellow / Appearance: Slightly Turbid / S.015 / pH: x  Gluc: x / Ketone: Negative  / Bili: Negative / Urobili: Negative mg/dL   Blood: x / Protein: 30 mg/dL / Nitrite: Positive   Leuk Esterase: Moderate / RBC: 25-50 /HPF / WBC 11-25   Sq Epi: x / Non Sq Epi: x / Bacteria: Moderate        RADIOLOGY & ADDITIONAL STUDIES:  < from:  Duplex Venous Upper Ext Ltd, Left (12.31.17 @ 20:01) >  PROCEDURE DATE:  2017          INTERPRETATION:  CLINICAL INFORMATION: Left arm swelling and redness.    COMPARISON: None available.    TECHNIQUE: Duplex sonography of the LEFT UPPER extremity with color and   spectral Doppler, with and without compression.      FINDINGS:    Intraluminal echogenicity with lack of compression and lack of color flow   is seen in the distal left brachial vein and basilic vein in the distal   upper arm extending into the antecubital fossa compatible with an   occlusive thrombus. The distal left basilic vein in the forearm is patent.    The left internal jugular, subclavian, axillary, proximal brachial,   radial, ulnar and cephalic veins are patent and compressible where   applicable. Doppler examination shows normal spontaneous and phasic flow.    IMPRESSION:     Thrombus in the distal left brachial and basilic veins in the distal left   upper arm extending into the antecubital fossa.

## 2018-01-06 NOTE — PROGRESS NOTE ADULT - SUBJECTIVE AND OBJECTIVE BOX
PULMONARY PROGRESS NOTE      CRISTIN ROQUEGRADY-[id number]    Patient is a 77y old  Male who presents with a chief complaint of Found unresponsive in his vomit (18 Dec 2017 19:49)  CAD, CVA, CVA, aphasia and dysphagia, DVT on AC, aspiration, Prostate Ca, afib, anemia, aspiration    INTERVAL HPI/OVERNIGHT EVENTS:  Non - communicative    MEDICATIONS  (STANDING):  ALBUTerol    0.083% 2.5 milliGRAM(s) Nebulizer every 4 hours  amiodarone    Tablet 400 milliGRAM(s) Oral daily  aspirin  chewable 81 milliGRAM(s) Oral daily  atorvastatin 10 milliGRAM(s) Oral at bedtime  enoxaparin Injectable 80 milliGRAM(s) SubCutaneous daily  epoetin [place] Injectable 27244 Unit(s) SubCutaneous every 3 days  finasteride 5 milliGRAM(s) Oral daily  folic acid 1 milliGRAM(s) Enteral Tube daily  furosemide    Tablet 40 milliGRAM(s) Oral two times a day  metoprolol     tartrate 25 milliGRAM(s) Oral three times a day  pantoprazole  Injectable 40 milliGRAM(s) IV Push two times a day  saccharomyces boulardii 250 milliGRAM(s) Oral two times a day  sucralfate 1 Gram(s) Oral four times a day  tamsulosin 0.4 milliGRAM(s) Oral at bedtime      MEDICATIONS  (PRN):      Allergies    penicillins (Hives)    Intolerances        PAST MEDICAL & SURGICAL HISTORY:  CAD (coronary artery disease)  Afib  CVA (cerebral vascular accident)  Mitral valve replaced  BPH (benign prostatic hyperplasia)  High cholesterol  HTN (hypertension)  H/O heart valve replacement with mechanical valve  S/P CABG x 1  H/O colectomy      SOCIAL HISTORY  Smoking History:       REVIEW OF SYSTEMS:  Unable to obtain - non-conversive      Vital Signs Last 24 Hrs  T(C): 36.6 (06 Jan 2018 10:10), Max: 37.1 (05 Jan 2018 17:00)  T(F): 97.9 (06 Jan 2018 10:10), Max: 98.8 (05 Jan 2018 17:00)  HR: 89 (06 Jan 2018 10:10) (86 - 98)  BP: 99/40 (06 Jan 2018 10:10) (94/41 - 145/70)  BP(mean): 101 (05 Jan 2018 16:00) (101 - 101)  RR: 18 (06 Jan 2018 10:10) (18 - 23)  SpO2: 97% (06 Jan 2018 11:35) (95% - 98%)    PHYSICAL EXAMINATION:    GENERAL: The patient is awake and alert in no apparent distress.     HEENT: Head is normocephalic and atraumatic. Extraocular muscles are intact. Mucous membranes are moist.    NECK: Supple.    LUNGS: Scattered rhonchi to auscultation without wheezing, rales or rhonchi; respirations unlabored    HEART: Regular rate and rhythm without murmur.    ABDOMEN: Soft, nontender, and nondistended.      EXTREMITIES: Without any cyanosis, clubbing, rash, lesions or edema.    NEUROLOGIC: Grossly intact.    LABS:                        8.2    6.1   )-----------( 274      ( 06 Jan 2018 07:43 )             27.9     01-06    145  |  101  |  52.0<H>  ----------------------------<  185<H>  4.0   |  28.0  |  3.06<H>    Ca    8.7      06 Jan 2018 07:43  Mg     1.8     01-06      PTT - ( 05 Jan 2018 04:22 )  PTT:29.9 sec          RADIOLOGY & ADDITIONAL STUDIES:   EXAM:  XR CHEST PORTABLE URGENT 1V                          PROCEDURE DATE:  01/03/2018          INTERPRETATION:  History: Dyspnea. Rapid response ;  status post CABG    Technique:  AP portable    Comparisons:  Chest x-ray dated 1/3/2018    Findings: Diffuse bilateral airspace disease is present with bilateral   pleural effusions unchanged. Status post median sternotomy for bypass   surgery. The heart appears enlarged.    Impression: Bilateral diffuse airspace disease. Bilateral pleural   effusions. Status post CABG      LILLIAN JOHNS M.D., ATTENDING RADIOLOGIST  This document has been electronically signed. Jan 4 2018 12:00PM

## 2018-01-07 LAB
ANION GAP SERPL CALC-SCNC: 14 MMOL/L — SIGNIFICANT CHANGE UP (ref 5–17)
APTT BLD: 42.4 SEC — HIGH (ref 27.5–37.4)
BUN SERPL-MCNC: 50 MG/DL — HIGH (ref 8–20)
CALCIUM SERPL-MCNC: 8.6 MG/DL — SIGNIFICANT CHANGE UP (ref 8.6–10.2)
CHLORIDE SERPL-SCNC: 100 MMOL/L — SIGNIFICANT CHANGE UP (ref 98–107)
CO2 SERPL-SCNC: 28 MMOL/L — SIGNIFICANT CHANGE UP (ref 22–29)
CREAT SERPL-MCNC: 3.07 MG/DL — HIGH (ref 0.5–1.3)
GLUCOSE SERPL-MCNC: 190 MG/DL — HIGH (ref 70–115)
HCT VFR BLD CALC: 27.5 % — LOW (ref 42–52)
HCT VFR BLD CALC: 27.6 % — LOW (ref 42–52)
HGB BLD-MCNC: 8.1 G/DL — LOW (ref 14–18)
HGB BLD-MCNC: 8.2 G/DL — LOW (ref 14–18)
INR BLD: 1.42 RATIO — HIGH (ref 0.88–1.16)
MAGNESIUM SERPL-MCNC: 1.8 MG/DL — SIGNIFICANT CHANGE UP (ref 1.6–2.6)
MCHC RBC-ENTMCNC: 29.5 G/DL — LOW (ref 32–36)
MCHC RBC-ENTMCNC: 29.7 G/DL — LOW (ref 32–36)
MCHC RBC-ENTMCNC: 30.5 PG — SIGNIFICANT CHANGE UP (ref 27–31)
MCHC RBC-ENTMCNC: 30.6 PG — SIGNIFICANT CHANGE UP (ref 27–31)
MCV RBC AUTO: 102.6 FL — HIGH (ref 80–94)
MCV RBC AUTO: 103.8 FL — HIGH (ref 80–94)
PLATELET # BLD AUTO: 211 K/UL — SIGNIFICANT CHANGE UP (ref 150–400)
PLATELET # BLD AUTO: 245 K/UL — SIGNIFICANT CHANGE UP (ref 150–400)
POTASSIUM SERPL-MCNC: 4.5 MMOL/L — SIGNIFICANT CHANGE UP (ref 3.5–5.3)
POTASSIUM SERPL-SCNC: 4.5 MMOL/L — SIGNIFICANT CHANGE UP (ref 3.5–5.3)
PROTHROM AB SERPL-ACNC: 15.7 SEC — HIGH (ref 9.8–12.7)
RBC # BLD: 2.65 M/UL — LOW (ref 4.6–6.2)
RBC # BLD: 2.69 M/UL — LOW (ref 4.6–6.2)
RBC # FLD: 21.1 % — HIGH (ref 11–15.6)
RBC # FLD: 21.4 % — HIGH (ref 11–15.6)
SODIUM SERPL-SCNC: 142 MMOL/L — SIGNIFICANT CHANGE UP (ref 135–145)
WBC # BLD: 5.6 K/UL — SIGNIFICANT CHANGE UP (ref 4.8–10.8)
WBC # BLD: 5.8 K/UL — SIGNIFICANT CHANGE UP (ref 4.8–10.8)
WBC # FLD AUTO: 5.6 K/UL — SIGNIFICANT CHANGE UP (ref 4.8–10.8)
WBC # FLD AUTO: 5.8 K/UL — SIGNIFICANT CHANGE UP (ref 4.8–10.8)

## 2018-01-07 PROCEDURE — 99233 SBSQ HOSP IP/OBS HIGH 50: CPT

## 2018-01-07 RX ORDER — ALBUTEROL 90 UG/1
2.5 AEROSOL, METERED ORAL EVERY 6 HOURS
Qty: 0 | Refills: 0 | Status: DISCONTINUED | OUTPATIENT
Start: 2018-01-07 | End: 2018-01-12

## 2018-01-07 RX ORDER — MAGNESIUM SULFATE 500 MG/ML
2 VIAL (ML) INJECTION ONCE
Qty: 0 | Refills: 0 | Status: COMPLETED | OUTPATIENT
Start: 2018-01-07 | End: 2018-01-07

## 2018-01-07 RX ORDER — WARFARIN SODIUM 2.5 MG/1
7.5 TABLET ORAL ONCE
Qty: 0 | Refills: 0 | Status: COMPLETED | OUTPATIENT
Start: 2018-01-07 | End: 2018-01-07

## 2018-01-07 RX ORDER — HEPARIN SODIUM 5000 [USP'U]/ML
INJECTION INTRAVENOUS; SUBCUTANEOUS
Qty: 25000 | Refills: 0 | Status: DISCONTINUED | OUTPATIENT
Start: 2018-01-07 | End: 2018-01-10

## 2018-01-07 RX ADMIN — Medication 1 GRAM(S): at 05:54

## 2018-01-07 RX ADMIN — FINASTERIDE 5 MILLIGRAM(S): 5 TABLET, FILM COATED ORAL at 11:42

## 2018-01-07 RX ADMIN — PANTOPRAZOLE SODIUM 40 MILLIGRAM(S): 20 TABLET, DELAYED RELEASE ORAL at 17:36

## 2018-01-07 RX ADMIN — WARFARIN SODIUM 7.5 MILLIGRAM(S): 2.5 TABLET ORAL at 22:55

## 2018-01-07 RX ADMIN — Medication 50 GRAM(S): at 10:09

## 2018-01-07 RX ADMIN — ALBUTEROL 2.5 MILLIGRAM(S): 90 AEROSOL, METERED ORAL at 08:47

## 2018-01-07 RX ADMIN — HEPARIN SODIUM 1500 UNIT(S)/HR: 5000 INJECTION INTRAVENOUS; SUBCUTANEOUS at 10:04

## 2018-01-07 RX ADMIN — Medication 250 MILLIGRAM(S): at 17:36

## 2018-01-07 RX ADMIN — ALBUTEROL 2.5 MILLIGRAM(S): 90 AEROSOL, METERED ORAL at 15:26

## 2018-01-07 RX ADMIN — Medication 25 MILLIGRAM(S): at 05:54

## 2018-01-07 RX ADMIN — ALBUTEROL 2.5 MILLIGRAM(S): 90 AEROSOL, METERED ORAL at 21:26

## 2018-01-07 RX ADMIN — Medication 1 GRAM(S): at 17:36

## 2018-01-07 RX ADMIN — Medication 40 MILLIGRAM(S): at 05:54

## 2018-01-07 RX ADMIN — ALBUTEROL 2.5 MILLIGRAM(S): 90 AEROSOL, METERED ORAL at 00:38

## 2018-01-07 RX ADMIN — HEPARIN SODIUM 1700 UNIT(S)/HR: 5000 INJECTION INTRAVENOUS; SUBCUTANEOUS at 19:35

## 2018-01-07 RX ADMIN — TAMSULOSIN HYDROCHLORIDE 0.4 MILLIGRAM(S): 0.4 CAPSULE ORAL at 22:55

## 2018-01-07 RX ADMIN — AMIODARONE HYDROCHLORIDE 400 MILLIGRAM(S): 400 TABLET ORAL at 05:54

## 2018-01-07 RX ADMIN — ATORVASTATIN CALCIUM 10 MILLIGRAM(S): 80 TABLET, FILM COATED ORAL at 22:55

## 2018-01-07 RX ADMIN — Medication 250 MILLIGRAM(S): at 05:54

## 2018-01-07 RX ADMIN — Medication 81 MILLIGRAM(S): at 11:28

## 2018-01-07 RX ADMIN — Medication 1 GRAM(S): at 11:28

## 2018-01-07 RX ADMIN — PANTOPRAZOLE SODIUM 40 MILLIGRAM(S): 20 TABLET, DELAYED RELEASE ORAL at 05:54

## 2018-01-07 RX ADMIN — Medication 25 MILLIGRAM(S): at 15:09

## 2018-01-07 RX ADMIN — Medication 1 MILLIGRAM(S): at 11:28

## 2018-01-07 RX ADMIN — Medication 25 MILLIGRAM(S): at 22:55

## 2018-01-07 RX ADMIN — ALBUTEROL 2.5 MILLIGRAM(S): 90 AEROSOL, METERED ORAL at 03:06

## 2018-01-07 RX ADMIN — Medication 1 GRAM(S): at 22:55

## 2018-01-07 NOTE — PROGRESS NOTE ADULT - ASSESSMENT
CKD - creatinine around 2.75 stable slight inc today  Anemia- Hb 7.3- cause unclear Recd 4 doses Venofer + on procrit   diuretics decreased as a trial   will discuss lilelihood of future HD w pt

## 2018-01-07 NOTE — PROGRESS NOTE ADULT - SUBJECTIVE AND OBJECTIVE BOX
NEPHROLOGY INTERVAL HPI/OVERNIGHT EVENTS:    Examined earlier    MEDICATIONS  (STANDING):  ALBUTerol    0.083% 2.5 milliGRAM(s) Nebulizer every 6 hours  amiodarone    Tablet 400 milliGRAM(s) Oral daily  aspirin  chewable 81 milliGRAM(s) Oral daily  atorvastatin 10 milliGRAM(s) Oral at bedtime  epoetin [place] Injectable 67149 Unit(s) SubCutaneous every 3 days  finasteride 5 milliGRAM(s) Oral daily  folic acid 1 milliGRAM(s) Enteral Tube daily  furosemide    Tablet 40 milliGRAM(s) Oral daily  heparin  Infusion.  Unit(s)/Hr (15 mL/Hr) IV Continuous <Continuous>  metoprolol     tartrate 25 milliGRAM(s) Oral three times a day  pantoprazole  Injectable 40 milliGRAM(s) IV Push two times a day  saccharomyces boulardii 250 milliGRAM(s) Oral two times a day  sucralfate 1 Gram(s) Oral four times a day  tamsulosin 0.4 milliGRAM(s) Oral at bedtime  warfarin 7.5 milliGRAM(s) Oral once    MEDICATIONS  (PRN):      Allergies    penicillins (Hives)    Intolerances        Vital Signs Last 24 Hrs  T(C): 37.1 (2018 17:16), Max: 37.8 (2018 00:05)  T(F): 98.8 (2018 17:16), Max: 100 (2018 00:05)  HR: 85 (2018 17:16) (80 - 100)  BP: 99/53 (2018 17:16) (91/53 - 118/60)  BP(mean): --  RR: 18 (2018 17:16) (18 - 18)  SpO2: 96% (2018 17:16) (96% - 99%)  Daily     Daily Weight in k.5 (2018 05:38)    PHYSICAL EXAM:  GENERAL: NAD,   EYES:  conjunctiva and sclera clear  NECK: Supple, No JVD/Bruit  NERVOUS SYSTEM:  A/O, NS change   CHEST:  CTA ,No rales or rhonchi  HEART:  RRR, No murmurs  ABDOMEN: Soft, NT/ND BS+  EXTREMITIES:  No Edema;  SKIN: No rashes    LABS:                        8.2    5.6   )-----------( 245      ( 2018 18:28 )             27.6         142  |  100  |  50.0<H>  ----------------------------<  190<H>  4.5   |  28.0  |  3.07<H>    Ca    8.6      2018 07:34  Mg     1.8           PT/INR - ( 2018 07:34 )   PT: 15.7 sec;   INR: 1.42 ratio         PTT - ( 2018 18:28 )  PTT:42.4 sec    Magnesium, Serum: 1.8 mg/dL ( @ 07:34)          RADIOLOGY & ADDITIONAL TESTS:

## 2018-01-07 NOTE — PROGRESS NOTE ADULT - SUBJECTIVE AND OBJECTIVE BOX
CRISTIN ROQUE    [id number]    77y      Male    INTERVAL HPI/OVERNIGHT EVENTS: No events on. States that he's tired.    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 RRT called for 48 seconds of sustained VT. TTE showed EF 40-45%, grade III diastolic dysfunction, RV dysfunction. Pt transferred to MICU and was diuresed and started on IV heparin. Patient lost IV access and was transitioned to lovenox. Midline placed by surgical PA. Heparin infusion started.     REVIEW OF SYSTEMS:    CONSTITUTIONAL: No fever   RESPIRATORY: No cough   CARDIOVASCULAR: No chest pain    Vital Signs Last 24 Hrs  T(C): 36.9 (07 Jan 2018 10:15), Max: 37.8 (07 Jan 2018 00:05)  T(F): 98.4 (07 Jan 2018 10:15), Max: 100 (07 Jan 2018 00:05)  HR: 82 (07 Jan 2018 10:15) (82 - 100)  BP: 91/53 (07 Jan 2018 10:15) (91/53 - 127/57)  BP(mean): --  RR: 18 (07 Jan 2018 10:15) (16 - 18)  SpO2: 99% (07 Jan 2018 08:49) (96% - 99%)    PHYSICAL EXAM:    GENERAL: NAD, frail, elderly  HEENT: PERRL, +EOMI, wears glasses  CHEST/LUNG: Clear to percussion bilaterally   HEART: S1S2+   ABDOMEN: Soft, Nontender, Nondistended; Bowel sounds present         LABS:                        8.1    5.8   )-----------( 211      ( 07 Jan 2018 07:34 )             27.5     01-07    142  |  100  |  50.0<H>  ----------------------------<  190<H>  4.5   |  28.0  |  3.07<H>    Ca    8.6      07 Jan 2018 07:34  Mg     1.8     01-07      PT/INR - ( 07 Jan 2018 07:34 )   PT: 15.7 sec;   INR: 1.42 ratio         PTT - ( 06 Jan 2018 13:37 )  PTT:39.9 sec        MEDICATIONS  (STANDING):  amiodarone    Tablet 400 milliGRAM(s) Oral daily  aspirin  chewable 81 milliGRAM(s) Oral daily  atorvastatin 10 milliGRAM(s) Oral at bedtime  epoetin [place] Injectable 21687 Unit(s) SubCutaneous every 3 days  finasteride 5 milliGRAM(s) Oral daily  folic acid 1 milliGRAM(s) Enteral Tube daily  furosemide    Tablet 40 milliGRAM(s) Oral daily  heparin  Infusion.  Unit(s)/Hr (15 mL/Hr) IV Continuous <Continuous>  metoprolol     tartrate 25 milliGRAM(s) Oral three times a day  pantoprazole  Injectable 40 milliGRAM(s) IV Push two times a day  saccharomyces boulardii 250 milliGRAM(s) Oral two times a day  sucralfate 1 Gram(s) Oral four times a day  tamsulosin 0.4 milliGRAM(s) Oral at bedtime  warfarin 7.5 milliGRAM(s) Oral once    MEDICATIONS  (PRN):      RADIOLOGY & ADDITIONAL TESTS:

## 2018-01-07 NOTE — PROGRESS NOTE ADULT - ASSESSMENT
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     D/w PA, plan for midline.

## 2018-01-08 LAB
ANION GAP SERPL CALC-SCNC: 12 MMOL/L — SIGNIFICANT CHANGE UP (ref 5–17)
APTT BLD: 64.2 SEC — HIGH (ref 27.5–37.4)
APTT BLD: 71.5 SEC — HIGH (ref 27.5–37.4)
BUN SERPL-MCNC: 51 MG/DL — HIGH (ref 8–20)
CALCIUM SERPL-MCNC: 8.5 MG/DL — LOW (ref 8.6–10.2)
CHLORIDE SERPL-SCNC: 100 MMOL/L — SIGNIFICANT CHANGE UP (ref 98–107)
CO2 SERPL-SCNC: 27 MMOL/L — SIGNIFICANT CHANGE UP (ref 22–29)
CREAT SERPL-MCNC: 2.96 MG/DL — HIGH (ref 0.5–1.3)
GLUCOSE SERPL-MCNC: 141 MG/DL — HIGH (ref 70–115)
HCT VFR BLD CALC: 25 % — LOW (ref 42–52)
HGB BLD-MCNC: 7.4 G/DL — LOW (ref 14–18)
INR BLD: 1.41 RATIO — HIGH (ref 0.88–1.16)
MAGNESIUM SERPL-MCNC: 2.2 MG/DL — SIGNIFICANT CHANGE UP (ref 1.6–2.6)
MCHC RBC-ENTMCNC: 29.6 G/DL — LOW (ref 32–36)
MCHC RBC-ENTMCNC: 30.1 PG — SIGNIFICANT CHANGE UP (ref 27–31)
MCV RBC AUTO: 101.6 FL — HIGH (ref 80–94)
PLATELET # BLD AUTO: 240 K/UL — SIGNIFICANT CHANGE UP (ref 150–400)
POTASSIUM SERPL-MCNC: 4.4 MMOL/L — SIGNIFICANT CHANGE UP (ref 3.5–5.3)
POTASSIUM SERPL-SCNC: 4.4 MMOL/L — SIGNIFICANT CHANGE UP (ref 3.5–5.3)
PROTHROM AB SERPL-ACNC: 15.6 SEC — HIGH (ref 9.8–12.7)
RBC # BLD: 2.46 M/UL — LOW (ref 4.6–6.2)
RBC # FLD: 20.6 % — HIGH (ref 11–15.6)
SODIUM SERPL-SCNC: 139 MMOL/L — SIGNIFICANT CHANGE UP (ref 135–145)
WBC # BLD: 6.1 K/UL — SIGNIFICANT CHANGE UP (ref 4.8–10.8)
WBC # FLD AUTO: 6.1 K/UL — SIGNIFICANT CHANGE UP (ref 4.8–10.8)

## 2018-01-08 PROCEDURE — 99233 SBSQ HOSP IP/OBS HIGH 50: CPT

## 2018-01-08 RX ORDER — WARFARIN SODIUM 2.5 MG/1
10 TABLET ORAL ONCE
Qty: 0 | Refills: 0 | Status: COMPLETED | OUTPATIENT
Start: 2018-01-08 | End: 2018-01-08

## 2018-01-08 RX ADMIN — FINASTERIDE 5 MILLIGRAM(S): 5 TABLET, FILM COATED ORAL at 13:58

## 2018-01-08 RX ADMIN — Medication 81 MILLIGRAM(S): at 13:57

## 2018-01-08 RX ADMIN — Medication 1 GRAM(S): at 05:46

## 2018-01-08 RX ADMIN — HEPARIN SODIUM 1700 UNIT(S)/HR: 5000 INJECTION INTRAVENOUS; SUBCUTANEOUS at 03:07

## 2018-01-08 RX ADMIN — ALBUTEROL 2.5 MILLIGRAM(S): 90 AEROSOL, METERED ORAL at 19:48

## 2018-01-08 RX ADMIN — Medication 40 MILLIGRAM(S): at 05:46

## 2018-01-08 RX ADMIN — Medication 25 MILLIGRAM(S): at 21:54

## 2018-01-08 RX ADMIN — PANTOPRAZOLE SODIUM 40 MILLIGRAM(S): 20 TABLET, DELAYED RELEASE ORAL at 05:47

## 2018-01-08 RX ADMIN — ALBUTEROL 2.5 MILLIGRAM(S): 90 AEROSOL, METERED ORAL at 08:17

## 2018-01-08 RX ADMIN — Medication 1 MILLIGRAM(S): at 13:58

## 2018-01-08 RX ADMIN — Medication 250 MILLIGRAM(S): at 17:29

## 2018-01-08 RX ADMIN — Medication 1 GRAM(S): at 13:57

## 2018-01-08 RX ADMIN — ATORVASTATIN CALCIUM 10 MILLIGRAM(S): 80 TABLET, FILM COATED ORAL at 21:53

## 2018-01-08 RX ADMIN — PANTOPRAZOLE SODIUM 40 MILLIGRAM(S): 20 TABLET, DELAYED RELEASE ORAL at 17:28

## 2018-01-08 RX ADMIN — TAMSULOSIN HYDROCHLORIDE 0.4 MILLIGRAM(S): 0.4 CAPSULE ORAL at 21:54

## 2018-01-08 RX ADMIN — HEPARIN SODIUM 1700 UNIT(S)/HR: 5000 INJECTION INTRAVENOUS; SUBCUTANEOUS at 10:32

## 2018-01-08 RX ADMIN — AMIODARONE HYDROCHLORIDE 400 MILLIGRAM(S): 400 TABLET ORAL at 05:46

## 2018-01-08 RX ADMIN — WARFARIN SODIUM 10 MILLIGRAM(S): 2.5 TABLET ORAL at 21:54

## 2018-01-08 RX ADMIN — Medication 1 GRAM(S): at 17:29

## 2018-01-08 RX ADMIN — ALBUTEROL 2.5 MILLIGRAM(S): 90 AEROSOL, METERED ORAL at 15:40

## 2018-01-08 NOTE — PROGRESS NOTE ADULT - SUBJECTIVE AND OBJECTIVE BOX
NEPHROLOGY INTERVAL HPI/OVERNIGHT EVENTS:    MEDICATIONS  (STANDING):  ALBUTerol    0.083% 2.5 milliGRAM(s) Nebulizer every 6 hours  amiodarone    Tablet 400 milliGRAM(s) Oral daily  aspirin  chewable 81 milliGRAM(s) Oral daily  atorvastatin 10 milliGRAM(s) Oral at bedtime  epoetin [place] Injectable 59923 Unit(s) SubCutaneous every 3 days  finasteride 5 milliGRAM(s) Oral daily  folic acid 1 milliGRAM(s) Enteral Tube daily  furosemide    Tablet 40 milliGRAM(s) Oral daily  heparin  Infusion.  Unit(s)/Hr (15 mL/Hr) IV Continuous <Continuous>  metoprolol     tartrate 25 milliGRAM(s) Oral three times a day  pantoprazole  Injectable 40 milliGRAM(s) IV Push two times a day  saccharomyces boulardii 250 milliGRAM(s) Oral two times a day  sucralfate 1 Gram(s) Oral four times a day  tamsulosin 0.4 milliGRAM(s) Oral at bedtime  warfarin 10 milliGRAM(s) Oral once    MEDICATIONS  (PRN):      Allergies    penicillins (Hives)    Intolerances        Vital Signs Last 24 Hrs  T(C): 36.9 (2018 10:25), Max: 37.1 (2018 17:16)  T(F): 98.4 (2018 10:25), Max: 98.8 (2018 17:16)  HR: 97 (2018 13:58) (80 - 100)  BP: 95/57 (2018 13:58) (95/57 - 107/53)  BP(mean): --  RR: 18 (2018 13:58) (18 - 18)  SpO2: 96% (2018 13:58) (94% - 98%)  Daily     Daily Weight in k.3 (2018 06:35)    PHYSICAL EXAM:  GENERAL: NAD,   EYES:  conjunctiva and sclera clear  NECK: Supple, No JVD/Bruit  NERVOUS SYSTEM:  A/O, NS change   CHEST:  CTA ,No rales or rhonchi  HEART:  RRR, No murmurs  ABDOMEN: Soft, NT/ND BS+  EXTREMITIES:  No Edema    LABS:                        7.4    6.1   )-----------( 240      ( 2018 02:33 )             25.0         139  |  100  |  51.0<H>  ----------------------------<  141<H>  4.4   |  27.0  |  2.96<H>    Ca    8.5<L>      2018 02:33  Mg     2.2           PT/INR - ( 2018 02:33 )   PT: 15.6 sec;   INR: 1.41 ratio         PTT - ( 2018 09:29 )  PTT:64.2 sec    Magnesium, Serum: 2.2 mg/dL ( @ 02:33)          RADIOLOGY & ADDITIONAL TESTS:

## 2018-01-08 NOTE — PROGRESS NOTE ADULT - ASSESSMENT
CKD - creatinine trending down slowly  Anemia-  Recd 4 doses Venofer + on procrit   diuretics decreased as a trial   AM labs

## 2018-01-08 NOTE — PROGRESS NOTE ADULT - SUBJECTIVE AND OBJECTIVE BOX
[place] [facility]                                   [address] [place] [address]                                                      PHONE: [phone]                                                         FAX: [phone]  [email or web address]  ---------------------------------------------------------------------------------------------------------------------------------    Overnight events/patient complaints:  NAD     penicillins (Hives)    MEDICATIONS  (STANDING):  ALBUTerol    0.083% 2.5 milliGRAM(s) Nebulizer every 6 hours  amiodarone    Tablet 400 milliGRAM(s) Oral daily  aspirin  chewable 81 milliGRAM(s) Oral daily  atorvastatin 10 milliGRAM(s) Oral at bedtime  epoetin [place] Injectable 73098 Unit(s) SubCutaneous every 3 days  finasteride 5 milliGRAM(s) Oral daily  folic acid 1 milliGRAM(s) Enteral Tube daily  furosemide    Tablet 40 milliGRAM(s) Oral daily  heparin  Infusion.  Unit(s)/Hr (15 mL/Hr) IV Continuous <Continuous>  metoprolol     tartrate 25 milliGRAM(s) Oral three times a day  pantoprazole  Injectable 40 milliGRAM(s) IV Push two times a day  saccharomyces boulardii 250 milliGRAM(s) Oral two times a day  sucralfate 1 Gram(s) Oral four times a day  tamsulosin 0.4 milliGRAM(s) Oral at bedtime    MEDICATIONS  (PRN):      Vital Signs Last 24 Hrs  T(C): 36.6 (08 Jan 2018 05:44), Max: 37.1 (07 Jan 2018 17:16)  T(F): 97.9 (08 Jan 2018 05:44), Max: 98.8 (07 Jan 2018 17:16)  HR: 90 (08 Jan 2018 08:19) (80 - 95)  BP: 100/50 (08 Jan 2018 05:44) (91/53 - 116/56)  BP(mean): --  RR: 18 (08 Jan 2018 05:44) (18 - 18)  SpO2: 97% (08 Jan 2018 08:18) (95% - 98%)  ICU Vital Signs Last 24 Hrs  CRISTIN ROQUE  I&O's Detail    07 Jan 2018 07:01  -  08 Jan 2018 07:00  --------------------------------------------------------  IN:    Free Water: 60 mL    heparin  Infusion.: 204 mL    Jevity: 600 mL  Total IN: 864 mL    OUT:    Colostomy: 700 mL  Total OUT: 700 mL    Total NET: 164 mL        I&O's Summary    07 Jan 2018 07:01  -  08 Jan 2018 07:00  --------------------------------------------------------  IN: 864 mL / OUT: 700 mL / NET: 164 mL      Drug Dosing Weight  CRISTIN ROQUE      PHYSICAL EXAM:  General: Appears well developed, well nourished alert and cooperative.  HEENT: Head; normocephalic, atraumatic.  Eyes: Pupils reactive, cornea wnl.  Neck: Supple, no nodes adenopathy, no NVD or carotid bruit or thyromegaly.  CARDIOVASCULAR: Normal S1 and S2, 2/6 murmur, rub, gallop or lift.   LUNGS: Decrease B/L   ABDOMEN: Soft, nontender without mass or organomegaly. bowel sounds normoactive.  EXTREMITIES: No clubbing, cyanosis or edema. Distal pulses wnl.   SKIN: warm and dry with normal turgor.    LABS:                        7.4    6.1   )-----------( 240      ( 08 Jan 2018 02:33 )             25.0     01-08    139  |  100  |  51.0<H>  ----------------------------<  141<H>  4.4   |  27.0  |  2.96<H>    Ca    8.5<L>      08 Jan 2018 02:33  Mg     2.2     01-08      CRISTIN RQOUE      PT/INR - ( 08 Jan 2018 02:33 )   PT: 15.6 sec;   INR: 1.41 ratio         PTT - ( 08 Jan 2018 09:29 )  PTT:64.2 sec      RADIOLOGY & ADDITIONAL STUDIES:    INTERPRETATION OF TELEMETRY (personally reviewed):    ECG:    ECHO:   Summary:   1. Left ventricular ejection fraction, by visual estimation, is 40 to   45%.   2. Mildly decreased global left ventricular systolic function.   3. Abnormal septal motion consistent with post-operative status and   right ventricular volume and pressure overload.   4. Spectral Doppler shows restrictive pattern of left ventricular   myocardial filling (Grade III diastolic dysfunction).   5. Severely enlarged right ventricle.   6. Severely reduced RV systolic function.   7. Moderately dilated right atrium.   8. Moderate tricuspid regurgitation.   9. Estimated pulmonary artery systolic pressure is 35.4 mmHg assuming a   right atrial pressure of 8 mmHg, which is consistent with borderline   pulmonary hypertension.  10. Mitral valve mean gradient is 3.7 mmHg consistent with mild mitral   stenosis.  11. Moderately enlarged left atrium.  12. The aortic valve mean gradient is 2.1 mmHg consistent with normally   opening aortic valve.  13. Pulmonary Embolism should be considered in setting of Severe RV   dysfunction with probably preserved RV apical contraction.  14. Peak aortic valve gradient is 5.4 mmHg and the mean gradient is 2.1   mmHg, which is probably normal in the setting of a prosthetic aortic   valve.  15. I have discussed about the echo with Dr Silver.  16. Compared to Study from 9/2017 RV functions is severely reduced and LV   EF reduced from 60-65% to 40-45%      ASSESSMENT AND PLAN:  In summary, CRISTIN ROQUE is an 77y Male with past medical history significant for 77y Male with past medical history significant for permanent AF on coumadin, CAD s/p CABG/AVR, HTN , chronic renal insufficiency, CVA with R hemiparesis,  colon resection with ileostomy, dysphagia s/p PEG who presented unresponsive and hypoxic found to have septic shock requiring pressors and acute hypoxic respiratory failure 2/2 aspiration PNA s/p intubation, with course further complicated by KAPIL on CKD, and now NSVT vs AF with aberrancy    Plan  1.  Continue current CV medication   2.  Agree with long term AC    3.  Out patient ischemic work up     Conservative cardiovascular therapy

## 2018-01-08 NOTE — PROGRESS NOTE ADULT - SUBJECTIVE AND OBJECTIVE BOX
CRISTIN ROQUE    [id number]    77y      Male    INTERVAL HPI/OVERNIGHT EVENTS: No events on. Feels better today.    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 RRT called for 48 seconds of sustained VT. TTE showed EF 40-45%, grade III diastolic dysfunction, RV dysfunction. Pt transferred to MICU and was diuresed and started on IV heparin. Patient lost IV access and was transitioned to lovenox. Midline placed by surgical PA. Heparin infusion started with bridge to coumadin.     REVIEW OF SYSTEMS:    CONSTITUTIONAL: No fever   RESPIRATORY: No cough   CARDIOVASCULAR: No chest pain     Vital Signs Last 24 Hrs  T(C): 36.9 (08 Jan 2018 10:25), Max: 37.1 (07 Jan 2018 17:16)  T(F): 98.4 (08 Jan 2018 10:25), Max: 98.8 (07 Jan 2018 17:16)  HR: 100 (08 Jan 2018 10:25) (80 - 100)  BP: 107/53 (08 Jan 2018 10:25) (99/53 - 116/56)  BP(mean): --  RR: 18 (08 Jan 2018 10:25) (18 - 18)  SpO2: 94% (08 Jan 2018 10:25) (94% - 98%)    PHYSICAL EXAM:    GENERAL: NAD   HEENT: PERRL, +EOMI  CHEST/LUNG: Clear to percussion bilaterally   HEART: S1S2+   ABDOMEN: Soft, Nontender, Nondistended; Bowel sounds present       LABS:                        7.4    6.1   )-----------( 240      ( 08 Jan 2018 02:33 )             25.0     01-08    139  |  100  |  51.0<H>  ----------------------------<  141<H>  4.4   |  27.0  |  2.96<H>    Ca    8.5<L>      08 Jan 2018 02:33  Mg     2.2     01-08      PT/INR - ( 08 Jan 2018 02:33 )   PT: 15.6 sec;   INR: 1.41 ratio         PTT - ( 08 Jan 2018 09:29 )  PTT:64.2 sec        MEDICATIONS  (STANDING):  ALBUTerol    0.083% 2.5 milliGRAM(s) Nebulizer every 6 hours  amiodarone    Tablet 400 milliGRAM(s) Oral daily  aspirin  chewable 81 milliGRAM(s) Oral daily  atorvastatin 10 milliGRAM(s) Oral at bedtime  epoetin [place] Injectable 18955 Unit(s) SubCutaneous every 3 days  finasteride 5 milliGRAM(s) Oral daily  folic acid 1 milliGRAM(s) Enteral Tube daily  furosemide    Tablet 40 milliGRAM(s) Oral daily  heparin  Infusion.  Unit(s)/Hr (15 mL/Hr) IV Continuous <Continuous>  metoprolol     tartrate 25 milliGRAM(s) Oral three times a day  pantoprazole  Injectable 40 milliGRAM(s) IV Push two times a day  saccharomyces boulardii 250 milliGRAM(s) Oral two times a day  sucralfate 1 Gram(s) Oral four times a day  tamsulosin 0.4 milliGRAM(s) Oral at bedtime    MEDICATIONS  (PRN):      RADIOLOGY & ADDITIONAL TESTS:

## 2018-01-09 LAB
ANION GAP SERPL CALC-SCNC: 15 MMOL/L — SIGNIFICANT CHANGE UP (ref 5–17)
APTT BLD: 83.9 SEC — HIGH (ref 27.5–37.4)
APTT BLD: 89.1 SEC — HIGH (ref 27.5–37.4)
BUN SERPL-MCNC: 53 MG/DL — HIGH (ref 8–20)
CALCIUM SERPL-MCNC: 8.5 MG/DL — LOW (ref 8.6–10.2)
CHLORIDE SERPL-SCNC: 98 MMOL/L — SIGNIFICANT CHANGE UP (ref 98–107)
CO2 SERPL-SCNC: 28 MMOL/L — SIGNIFICANT CHANGE UP (ref 22–29)
CREAT SERPL-MCNC: 2.9 MG/DL — HIGH (ref 0.5–1.3)
GLUCOSE SERPL-MCNC: 188 MG/DL — HIGH (ref 70–115)
HCT VFR BLD CALC: 24.4 % — LOW (ref 42–52)
HGB BLD-MCNC: 7.1 G/DL — LOW (ref 14–18)
INR BLD: 1.6 RATIO — HIGH (ref 0.88–1.16)
INR BLD: 1.92 RATIO — HIGH (ref 0.88–1.16)
MAGNESIUM SERPL-MCNC: 1.9 MG/DL — SIGNIFICANT CHANGE UP (ref 1.8–2.6)
MCHC RBC-ENTMCNC: 29.1 G/DL — LOW (ref 32–36)
MCHC RBC-ENTMCNC: 29.7 PG — SIGNIFICANT CHANGE UP (ref 27–31)
MCV RBC AUTO: 102.1 FL — HIGH (ref 80–94)
PLATELET # BLD AUTO: 221 K/UL — SIGNIFICANT CHANGE UP (ref 150–400)
POTASSIUM SERPL-MCNC: 4.4 MMOL/L — SIGNIFICANT CHANGE UP (ref 3.5–5.3)
POTASSIUM SERPL-SCNC: 4.4 MMOL/L — SIGNIFICANT CHANGE UP (ref 3.5–5.3)
PROTHROM AB SERPL-ACNC: 17.8 SEC — HIGH (ref 9.8–12.7)
PROTHROM AB SERPL-ACNC: 21.4 SEC — HIGH (ref 9.8–12.7)
RBC # BLD: 2.39 M/UL — LOW (ref 4.6–6.2)
RBC # FLD: 20.4 % — HIGH (ref 11–15.6)
SODIUM SERPL-SCNC: 141 MMOL/L — SIGNIFICANT CHANGE UP (ref 135–145)
WBC # BLD: 5.1 K/UL — SIGNIFICANT CHANGE UP (ref 4.8–10.8)
WBC # FLD AUTO: 5.1 K/UL — SIGNIFICANT CHANGE UP (ref 4.8–10.8)

## 2018-01-09 PROCEDURE — 99233 SBSQ HOSP IP/OBS HIGH 50: CPT

## 2018-01-09 PROCEDURE — 99232 SBSQ HOSP IP/OBS MODERATE 35: CPT

## 2018-01-09 RX ORDER — FUROSEMIDE 40 MG
40 TABLET ORAL DAILY
Qty: 0 | Refills: 0 | Status: DISCONTINUED | OUTPATIENT
Start: 2018-01-09 | End: 2018-01-12

## 2018-01-09 RX ORDER — WARFARIN SODIUM 2.5 MG/1
10 TABLET ORAL ONCE
Qty: 0 | Refills: 0 | Status: COMPLETED | OUTPATIENT
Start: 2018-01-09 | End: 2018-01-09

## 2018-01-09 RX ORDER — METOPROLOL TARTRATE 50 MG
25 TABLET ORAL THREE TIMES A DAY
Qty: 0 | Refills: 0 | Status: DISCONTINUED | OUTPATIENT
Start: 2018-01-09 | End: 2018-01-12

## 2018-01-09 RX ORDER — FINASTERIDE 5 MG/1
5 TABLET, FILM COATED ORAL DAILY
Qty: 0 | Refills: 0 | Status: DISCONTINUED | OUTPATIENT
Start: 2018-01-09 | End: 2018-01-12

## 2018-01-09 RX ORDER — ZINC OXIDE 200 MG/G
1 OINTMENT TOPICAL
Qty: 0 | Refills: 0 | Status: DISCONTINUED | OUTPATIENT
Start: 2018-01-09 | End: 2018-01-11

## 2018-01-09 RX ORDER — AMIODARONE HYDROCHLORIDE 400 MG/1
400 TABLET ORAL DAILY
Qty: 0 | Refills: 0 | Status: DISCONTINUED | OUTPATIENT
Start: 2018-01-09 | End: 2018-01-12

## 2018-01-09 RX ORDER — SUCRALFATE 1 G
1 TABLET ORAL
Qty: 0 | Refills: 0 | Status: DISCONTINUED | OUTPATIENT
Start: 2018-01-09 | End: 2018-01-12

## 2018-01-09 RX ORDER — TAMSULOSIN HYDROCHLORIDE 0.4 MG/1
0.4 CAPSULE ORAL AT BEDTIME
Qty: 0 | Refills: 0 | Status: DISCONTINUED | OUTPATIENT
Start: 2018-01-09 | End: 2018-01-12

## 2018-01-09 RX ORDER — PANTOPRAZOLE SODIUM 20 MG/1
40 TABLET, DELAYED RELEASE ORAL
Qty: 0 | Refills: 0 | Status: DISCONTINUED | OUTPATIENT
Start: 2018-01-09 | End: 2018-01-12

## 2018-01-09 RX ORDER — ATORVASTATIN CALCIUM 80 MG/1
10 TABLET, FILM COATED ORAL AT BEDTIME
Qty: 0 | Refills: 0 | Status: DISCONTINUED | OUTPATIENT
Start: 2018-01-09 | End: 2018-01-12

## 2018-01-09 RX ADMIN — TAMSULOSIN HYDROCHLORIDE 0.4 MILLIGRAM(S): 0.4 CAPSULE ORAL at 21:50

## 2018-01-09 RX ADMIN — ATORVASTATIN CALCIUM 10 MILLIGRAM(S): 80 TABLET, FILM COATED ORAL at 21:51

## 2018-01-09 RX ADMIN — PANTOPRAZOLE SODIUM 40 MILLIGRAM(S): 20 TABLET, DELAYED RELEASE ORAL at 22:04

## 2018-01-09 RX ADMIN — HEPARIN SODIUM 1700 UNIT(S)/HR: 5000 INJECTION INTRAVENOUS; SUBCUTANEOUS at 07:27

## 2018-01-09 RX ADMIN — ALBUTEROL 2.5 MILLIGRAM(S): 90 AEROSOL, METERED ORAL at 08:12

## 2018-01-09 RX ADMIN — Medication 1 MILLIGRAM(S): at 15:53

## 2018-01-09 RX ADMIN — ERYTHROPOIETIN 15000 UNIT(S): 10000 INJECTION, SOLUTION INTRAVENOUS; SUBCUTANEOUS at 18:56

## 2018-01-09 RX ADMIN — Medication 81 MILLIGRAM(S): at 15:52

## 2018-01-09 RX ADMIN — ZINC OXIDE 1 APPLICATION(S): 200 OINTMENT TOPICAL at 21:51

## 2018-01-09 RX ADMIN — FINASTERIDE 5 MILLIGRAM(S): 5 TABLET, FILM COATED ORAL at 15:52

## 2018-01-09 RX ADMIN — WARFARIN SODIUM 10 MILLIGRAM(S): 2.5 TABLET ORAL at 23:00

## 2018-01-09 RX ADMIN — ALBUTEROL 2.5 MILLIGRAM(S): 90 AEROSOL, METERED ORAL at 02:51

## 2018-01-09 RX ADMIN — Medication 1 GRAM(S): at 01:03

## 2018-01-09 RX ADMIN — AMIODARONE HYDROCHLORIDE 400 MILLIGRAM(S): 400 TABLET ORAL at 05:59

## 2018-01-09 RX ADMIN — Medication 25 MILLIGRAM(S): at 21:50

## 2018-01-09 RX ADMIN — ALBUTEROL 2.5 MILLIGRAM(S): 90 AEROSOL, METERED ORAL at 15:01

## 2018-01-09 RX ADMIN — Medication 1 GRAM(S): at 15:54

## 2018-01-09 RX ADMIN — Medication 1 GRAM(S): at 23:00

## 2018-01-09 RX ADMIN — Medication 250 MILLIGRAM(S): at 05:48

## 2018-01-09 RX ADMIN — Medication 1 GRAM(S): at 05:49

## 2018-01-09 RX ADMIN — Medication 1 GRAM(S): at 21:50
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
## 2018-01-09 NOTE — PHYSICAL THERAPY INITIAL EVALUATION ADULT - PLANNED THERAPY INTERVENTIONS, PT EVAL
gait training/transfer training/bed mobility training/strengthening/balance training
balance training/transfer training/ROM/strengthening/bed mobility training

## 2018-01-09 NOTE — PHYSICAL THERAPY INITIAL EVALUATION ADULT - IMPAIRMENTS CONTRIBUTING IMPAIRED BED MOBILITY, REHAB EVAL
decreased strength/impaired motor control/abnormal muscle tone/decreased flexibility
decreased ROM/decreased strength

## 2018-01-09 NOTE — CHART NOTE - NSCHARTNOTEFT_GEN_A_CORE
Source: EMR, Non verbal     Current Diet: NPO with tube feeds - tolerating feeds     Enteral /Parenteral Nutrition: PEG- Jeivty 1.5 @50ml/hrx 24hr (1450kcal, 64g pro, 1000ml) based on 20hours     Current Weight: (1/9) 85kg    % Weight Change - stable     Pertinent Medications: MEDICATIONS  (STANDING):  ALBUTerol    0.083% 2.5 milliGRAM(s) Nebulizer every 6 hours  amiodarone    Tablet 400 milliGRAM(s) Oral daily  aspirin  chewable 81 milliGRAM(s) Oral daily  atorvastatin 10 milliGRAM(s) Oral at bedtime  epoetin [place] Injectable 09383 Unit(s) SubCutaneous every 3 days  finasteride 5 milliGRAM(s) Oral daily  folic acid 1 milliGRAM(s) Enteral Tube daily  furosemide    Tablet 40 milliGRAM(s) Oral daily  heparin  Infusion.  Unit(s)/Hr (15 mL/Hr) IV Continuous <Continuous>  metoprolol     tartrate 25 milliGRAM(s) Oral three times a day  pantoprazole   Suspension 40 milliGRAM(s) Oral <User Schedule>  sucralfate 1 Gram(s) Oral four times a day  tamsulosin 0.4 milliGRAM(s) Oral at bedtime  warfarin 10 milliGRAM(s) Oral once    MEDICATIONS  (PRN):    Pertinent Labs: CBC Full  -  ( 09 Jan 2018 06:39 )  WBC Count : 5.1 K/uL  Hemoglobin : 7.1 g/dL  Hematocrit : 24.4 %  Platelet Count - Automated : 221 K/uL  Mean Cell Volume : 102.1 fl  Mean Cell Hemoglobin : 29.7 pg  Mean Cell Hemoglobin Concentration : 29.1 g/dL    Skin: Intact    Nutrition focused physical exam conducted - found signs of malnutrition [X ]absent [ ]present        Estimated Needs:   [X ] no change since previous assessment  [ ] recalculated:     Current Nutrition Diagnosis: Pt continues with chew/swallow impairments related to CVA as evidenced by NPO with tube feeds     Recommendations: Continue TF as tolerated     Monitoring and Evaluation:   [ ] PO intake [ ] Tolerance to diet prescription [X] Weights  [X] Follow up per protocol [X] Labs:

## 2018-01-09 NOTE — PHYSICAL THERAPY INITIAL EVALUATION ADULT - PERTINENT HX OF CURRENT PROBLEM, REHAB EVAL
76 y/o male pmhx CVA ( 2 years ago and in July 17) w/ residual right upper and lower extremity weakness, s/p PEG,  afib on coumadin, s/p colon resection with ileostomy 8 years ago, s/p CBAGx3,  prostate cancer s/p TURP 3 years ago was BIBEMS after wife found him unresponsive with vomit around his face. Pt admitted with septic shock 2/2 to aspiration pneumonia, hypoxic respiratory failure,  and KAPIL. Pt intubated in ED with single pressor shock.
78 y/o male with h/o CVA with right sided UE/LE weakness. Found unresponsive at home by spouse. Intubated in ED. Septic shock.

## 2018-01-09 NOTE — CHART NOTE - NSCHARTNOTEFT_GEN_A_CORE
PA called by nurse to report patient with epistaxis. Patient is a 76 y/o male with a PMH of CVA ( 2 years ago and in July 17) w/ residual right upper and lower extremity weakness, s/p PEG,  afib on coumadin, s/p colon resection with ileostomy 8 years ago, s/p CBAGx3,  prostate cancer s/p TURP 3 years ago (12/18/17). Patient's spouse present, stated this is his second episode of epistaxis today. Ice pack and pressure applied. Patient on Heparin with Coumadin bridge for afib and positive DVT. T 98.2, /64, P 102, RR 18, O2 sat 97% on humidified oxygen at 2L/min vis n/c. Patient without complaints. Ordered STAT aPTT/PT/INR. Will f/u on lab results. On re-evaluation of patient bleeding resolved. Will continue to monitor patient.

## 2018-01-09 NOTE — PHYSICAL THERAPY INITIAL EVALUATION ADULT - ACTIVE RANGE OF MOTION EXAMINATION, REHAB EVAL
Left UE Active ROM was WFL (within functional limits)/Left LE Active ROM was WFL (within functional limits)/lacking dorsiflexion on left. AAROM right LE WFL. PROM right UE

## 2018-01-09 NOTE — PROGRESS NOTE ADULT - SUBJECTIVE AND OBJECTIVE BOX
NEPHROLOGY INTERVAL HPI/OVERNIGHT EVENTS:    Examined earlier  Clinically unchanged    MEDICATIONS  (STANDING):  ALBUTerol    0.083% 2.5 milliGRAM(s) Nebulizer every 6 hours  amiodarone    Tablet 400 milliGRAM(s) Oral daily  aspirin  chewable 81 milliGRAM(s) Oral daily  atorvastatin 10 milliGRAM(s) Oral at bedtime  epoetin [place] Injectable 94566 Unit(s) SubCutaneous every 3 days  finasteride 5 milliGRAM(s) Oral daily  folic acid 1 milliGRAM(s) Enteral Tube daily  furosemide    Tablet 40 milliGRAM(s) Oral daily  heparin  Infusion.  Unit(s)/Hr (15 mL/Hr) IV Continuous <Continuous>  metoprolol     tartrate 25 milliGRAM(s) Oral three times a day  pantoprazole   Suspension 40 milliGRAM(s) Oral <User Schedule>  sucralfate 1 Gram(s) Oral four times a day  tamsulosin 0.4 milliGRAM(s) Oral at bedtime  warfarin 10 milliGRAM(s) Oral once    MEDICATIONS  (PRN):      Allergies    penicillins (Hives)    Intolerances        Vital Signs Last 24 Hrs  T(C): 37.2 (09 Jan 2018 00:50), Max: 37.2 (09 Jan 2018 00:50)  T(F): 98.9 (09 Jan 2018 00:50), Max: 98.9 (09 Jan 2018 00:50)  HR: 81 (09 Jan 2018 05:44) (81 - 105)  BP: 100/56 (09 Jan 2018 05:44) (91/38 - 122/57)  BP(mean): --  RR: 20 (09 Jan 2018 05:44) (18 - 20)  SpO2: 96% (09 Jan 2018 08:14) (90% - 98%)  Daily     Daily     PHYSICAL EXAM:  GENERAL: NAD,   EYES:  conjunctiva and sclera clear  NECK: Supple, No JVD/Bruit  NERVOUS SYSTEM:  A/O, NS change   CHEST:  CTA ,No rales or rhonchi  HEART:  RRR, No murmurs  ABDOMEN: Soft, NT/ND BS+  EXTREMITIES:  No Edema    LABS:                        7.1    5.1   )-----------( 221      ( 09 Jan 2018 06:39 )             24.4     01-09    141  |  98  |  53.0<H>  ----------------------------<  188<H>  4.4   |  28.0  |  2.90<H>    Ca    8.5<L>      09 Jan 2018 06:39  Mg     1.9     01-09      PT/INR - ( 09 Jan 2018 06:39 )   PT: 17.8 sec;   INR: 1.60 ratio         PTT - ( 09 Jan 2018 06:39 )  PTT:83.9 sec    Magnesium, Serum: 1.9 mg/dL (01-09 @ 06:39)          RADIOLOGY & ADDITIONAL TESTS:

## 2018-01-09 NOTE — PHYSICAL THERAPY INITIAL EVALUATION ADULT - DISCHARGE DISPOSITION, PT EVAL
rehabilitation facility/subacute rehabilitation
home w/ home PT/home assist with complete functional assist vs Skilled nursing facility ( long TERM cARE)/home w/ assist

## 2018-01-09 NOTE — PHYSICAL THERAPY INITIAL EVALUATION ADULT - LIVES WITH, PROFILE
spouse
spouse/patient lives with spouse in private house and has few hours per day HHA, unable to ambulate prior to this admission, unable to get much history from patient as garbled speech

## 2018-01-09 NOTE — PROGRESS NOTE ADULT - SUBJECTIVE AND OBJECTIVE BOX
CRISTIN ROQUE    [id number]    77y      Male    INTERVAL HPI/OVERNIGHT EVENTS: No events on. Denies any pain.    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 Hospital course complicated by KAIPL on CKD, recurrent NSVT on Amiodarone, UE DVT on full anticoagulation. RRT called for 48 seconds of sustained VT. TTE showed EF 40-45%, grade III diastolic dysfunction, RV dysfunction. Pt transferred to MICU and was diuresed and started on IV heparin. Patient lost IV access and was transitioned to lovenox. Midline placed by surgical PA. Heparin infusion started with bridge to coumadin.       REVIEW OF SYSTEMS:    CONSTITUTIONAL: No fever   RESPIRATORY: No cough   CARDIOVASCULAR: No chest pain       Vital Signs Last 24 Hrs  T(C): 37.2 (09 Jan 2018 00:50), Max: 37.2 (09 Jan 2018 00:50)  T(F): 98.9 (09 Jan 2018 00:50), Max: 98.9 (09 Jan 2018 00:50)  HR: 81 (09 Jan 2018 05:44) (81 - 105)  BP: 100/56 (09 Jan 2018 05:44) (91/38 - 122/57)  BP(mean): --  RR: 20 (09 Jan 2018 05:44) (18 - 20)  SpO2: 96% (09 Jan 2018 08:14) (90% - 98%)    PHYSICAL EXAM:    GENERAL: NAD, frail, elderly  HEENT: PERRL, +EOMI, dry mm  CHEST/LUNG: Clear to percussion bilaterally   HEART: S1S2+   ABDOMEN: Soft, Nontender, Nondistended; Bowel sounds present         LABS:                        7.1    5.1   )-----------( 221      ( 09 Jan 2018 06:39 )             24.4     01-09    141  |  98  |  53.0<H>  ----------------------------<  188<H>  4.4   |  28.0  |  2.90<H>    Ca    8.5<L>      09 Jan 2018 06:39  Mg     1.9     01-09      PT/INR - ( 09 Jan 2018 06:39 )   PT: 17.8 sec;   INR: 1.60 ratio         PTT - ( 09 Jan 2018 06:39 )  PTT:83.9 sec        MEDICATIONS  (STANDING):  ALBUTerol    0.083% 2.5 milliGRAM(s) Nebulizer every 6 hours  amiodarone    Tablet 400 milliGRAM(s) Oral daily  aspirin  chewable 81 milliGRAM(s) Oral daily  atorvastatin 10 milliGRAM(s) Oral at bedtime  epoetin [place] Injectable 73468 Unit(s) SubCutaneous every 3 days  finasteride 5 milliGRAM(s) Oral daily  folic acid 1 milliGRAM(s) Enteral Tube daily  furosemide    Tablet 40 milliGRAM(s) Oral daily  heparin  Infusion.  Unit(s)/Hr (15 mL/Hr) IV Continuous <Continuous>  metoprolol     tartrate 25 milliGRAM(s) Oral three times a day  pantoprazole  Injectable 40 milliGRAM(s) IV Push two times a day  saccharomyces boulardii 250 milliGRAM(s) Oral two times a day  sucralfate 1 Gram(s) Oral four times a day  tamsulosin 0.4 milliGRAM(s) Oral at bedtime  warfarin 10 milliGRAM(s) Oral once    MEDICATIONS  (PRN):      RADIOLOGY & ADDITIONAL TESTS:

## 2018-01-09 NOTE — CONSULT NOTE ADULT - SUBJECTIVE AND OBJECTIVE BOX
HPI:  76 y/o male pmhx CVA ( 2 years ago and in July 17) w/ residual right upper and lower extremity weakness, s/p PEG,  afib on coumadin, s/p colon resection with ileostomy 8 years ago, s/p CBAGx3,  prostate cancer s/p TURP 3 years ago was BIBEMS after wife found him unresponsive with vomit around his face.   Admitted for Aspiration pneumonia and urosepsis 3 weeks ago.  Treated in ICU with vent support and pressors.  Extubated and sent to floor.  Upper extremity DVT documented along with A Fib and VTach.  Currently on heparin bridge to coumadin.    PEG tube is 2 yrs old and has repeatedly clogged.  Disloged once last week and again today.  Now with modest cellulitis around the peg site.    Currently receiving the Jevity feeds at 50 cc/ hr.  Breathing comfortably with nasal O2.        PAST MEDICAL & SURGICAL HISTORY:  CAD (coronary artery disease)  Afib  CVA (cerebral vascular accident)  Mitral valve replaced  BPH (benign prostatic hyperplasia)  High cholesterol  HTN (hypertension)  H/O heart valve replacement with mechanical valve  S/P CABG x 1  H/O colectomy      ROS:  No Heartburn, regurgitation, dysphagia, odynophagia.  No dyspepsia  No abdominal pain.    No Nausea, vomiting.  No Bleeding.  No hematemesis.   No diarrhea.    No hematochesia.  No weight loss, anorexia.  No edema.      MEDICATIONS  (STANDING):  ALBUTerol    0.083% 2.5 milliGRAM(s) Nebulizer every 6 hours  amiodarone    Tablet 400 milliGRAM(s) Oral daily  aspirin  chewable 81 milliGRAM(s) Oral daily  atorvastatin 10 milliGRAM(s) Oral at bedtime  epoetin [place] Injectable 55546 Unit(s) SubCutaneous every 3 days  finasteride 5 milliGRAM(s) Oral daily  folic acid 1 milliGRAM(s) Enteral Tube daily  furosemide    Tablet 40 milliGRAM(s) Oral daily  heparin  Infusion.  Unit(s)/Hr (15 mL/Hr) IV Continuous <Continuous>  metoprolol     tartrate 25 milliGRAM(s) Oral three times a day  pantoprazole   Suspension 40 milliGRAM(s) Oral <User Schedule>  sucralfate 1 Gram(s) Oral four times a day  tamsulosin 0.4 milliGRAM(s) Oral at bedtime  warfarin 10 milliGRAM(s) Oral once    MEDICATIONS  (PRN):      Allergies  penicillins (Hives)    SOCIAL HISTORY: Irrelevant.     FAMILY HISTORY:  No pertinent family history in first degree relatives      Vital Signs Last 24 Hrs  T(C): 36.4 (09 Jan 2018 15:40), Max: 37.2 (09 Jan 2018 00:50)  T(F): 97.6 (09 Jan 2018 15:40), Max: 98.9 (09 Jan 2018 00:50)  HR: 88 (09 Jan 2018 16:00) (75 - 104)  BP: 98/56 (09 Jan 2018 16:00) (93/52 - 110/60)  BP(mean): --  RR: 20 (09 Jan 2018 15:40) (18 - 20)  SpO2: 96% (09 Jan 2018 15:40) (93% - 98%)    PHYSICAL EXAM:    GENERAL: NAD, well-groomed, well-developed  HEAD:  Atraumatic, Normocephalic  EYES: EOMI, PERRLA, conjunctiva and sclera clear  ENMT: No tonsillar erythema, exudates, or enlargement; Moist mucous membranes, Good dentition, No lesions  NECK: Supple, No JVD, Normal thyroid  CHEST/LUNG: Clear to percussion bilaterally; No rales, rhonchi, wheezing, or rubs  HEART: Regular rate and rhythm; No murmurs, rubs, or gallops  ABDOMEN: Soft, Nontender, Nondistended; Bowel sounds present.  Intact Ileostomy in right mid abdomen.  PEG site is erythematous around the tube with some purulent drainage.      EXTREMITIES:  2+ Peripheral Pulses, No clubbing, cyanosis, or edema  LYMPH: No lymphadenopathy noted  SKIN: No rashes or lesions      LABS:                        7.1    5.1   )-----------( 221      ( 09 Jan 2018 06:39 )             24.4     01-09    141  |  98  |  53.0<H>  ----------------------------<  188<H>  4.4   |  28.0  |  2.90<H>    Ca    8.5<L>      09 Jan 2018 06:39  Mg     1.9     01-09      PT/INR - ( 09 Jan 2018 06:39 )   PT: 17.8 sec;   INR: 1.60 ratio         PTT - ( 09 Jan 2018 06:39 )  PTT:83.9 sec             RADIOLOGY & ADDITIONAL STUDIES:  CXR:  Bilateral interstitial infiltrates.

## 2018-01-09 NOTE — PROGRESS NOTE ADULT - SUBJECTIVE AND OBJECTIVE BOX
[place] [facility] - [facility]                                   [address] [place] [address]                                                      PHONE: [phone]                                                         FAX: [phone]  [email or web address]  ---------------------------------------------------------------------------------------------------------------------------------    Overnight events/patient complaints:  NAD    penicillins (Hives)    MEDICATIONS  (STANDING):  ALBUTerol    0.083% 2.5 milliGRAM(s) Nebulizer every 6 hours  amiodarone    Tablet 400 milliGRAM(s) Oral daily  aspirin  chewable 81 milliGRAM(s) Oral daily  atorvastatin 10 milliGRAM(s) Oral at bedtime  epoetin [place] Injectable 87923 Unit(s) SubCutaneous every 3 days  finasteride 5 milliGRAM(s) Oral daily  folic acid 1 milliGRAM(s) Enteral Tube daily  furosemide    Tablet 40 milliGRAM(s) Oral daily  heparin  Infusion.  Unit(s)/Hr (15 mL/Hr) IV Continuous <Continuous>  metoprolol     tartrate 25 milliGRAM(s) Oral three times a day  pantoprazole  Injectable 40 milliGRAM(s) IV Push two times a day  saccharomyces boulardii 250 milliGRAM(s) Oral two times a day  sucralfate 1 Gram(s) Oral four times a day  tamsulosin 0.4 milliGRAM(s) Oral at bedtime  warfarin 10 milliGRAM(s) Oral once    MEDICATIONS  (PRN):      Vital Signs Last 24 Hrs  T(C): 37.2 (09 Jan 2018 00:50), Max: 37.2 (09 Jan 2018 00:50)  T(F): 98.9 (09 Jan 2018 00:50), Max: 98.9 (09 Jan 2018 00:50)  HR: 81 (09 Jan 2018 05:44) (81 - 105)  BP: 100/56 (09 Jan 2018 05:44) (91/38 - 122/57)  BP(mean): --  RR: 20 (09 Jan 2018 05:44) (18 - 20)  SpO2: 96% (09 Jan 2018 08:14) (90% - 98%)  ICU Vital Signs Last 24 Hrs  CRISTIN ROQUE  I&O's Detail    08 Jan 2018 07:01  -  09 Jan 2018 07:00  --------------------------------------------------------  IN:    Free Water: 60 mL    heparin  Infusion.: 170 mL    Jevity: 450 mL  Total IN: 680 mL    OUT:    Ileostomy: 350 mL  Total OUT: 350 mL    Total NET: 330 mL        I&O's Summary    08 Jan 2018 07:01  -  09 Jan 2018 07:00  --------------------------------------------------------  IN: 680 mL / OUT: 350 mL / NET: 330 mL      Drug Dosing Weight  CRISTIN ROQUE      PHYSICAL EXAM:  General: Appears well developed, well nourished alert and cooperative.  HEENT: Head; normocephalic, atraumatic.  Eyes: Pupils reactive, cornea wnl.  Neck: Supple, no nodes adenopathy, no NVD or carotid bruit or thyromegaly.  CARDIOVASCULAR: Normal S1 and S2, 2/6 murmur, rub, gallop or lift.   LUNGS: No rales, rhonchi or wheeze. Normal breath sounds bilaterally.  ABDOMEN: Soft, nontender without mass or organomegaly. bowel sounds normoactive.  EXTREMITIES: No clubbing, cyanosis or edema. Distal pulses wnl.   SKIN: warm and dry with normal turgor.          LABS:                        7.1    5.1   )-----------( 221      ( 09 Jan 2018 06:39 )             24.4     01-09    141  |  98  |  53.0<H>  ----------------------------<  188<H>  4.4   |  28.0  |  2.90<H>    Ca    8.5<L>      09 Jan 2018 06:39  Mg     1.9     01-09      CRISTIN ROQUE      PT/INR - ( 09 Jan 2018 06:39 )   PT: 17.8 sec;   INR: 1.60 ratio         PTT - ( 09 Jan 2018 06:39 )  PTT:83.9 sec      RADIOLOGY & ADDITIONAL STUDIES:    INTERPRETATION OF TELEMETRY (personally reviewed): rare PVC A fib     ECHO:   Summary:   1. Left ventricular ejection fraction, by visual estimation, is 40 to   45%.   2. Mildly decreased global left ventricular systolic function.   3. Abnormal septal motion consistent with post-operative status and   right ventricular volume and pressure overload.   4. Spectral Doppler shows restrictive pattern of left ventricular   myocardial filling (Grade III diastolic dysfunction).   5. Severely enlarged right ventricle.   6. Severely reduced RV systolic function.   7. Moderately dilated right atrium.   8. Moderate tricuspid regurgitation.   9. Estimated pulmonary artery systolic pressure is 35.4 mmHg assuming a   right atrial pressure of 8 mmHg, which is consistent with borderline   pulmonary hypertension.  10. Mitral valve mean gradient is 3.7 mmHg consistent with mild mitral   stenosis.  11. Moderately enlarged left atrium.  12. The aortic valve mean gradient is 2.1 mmHg consistent with normally   opening aortic valve.  13. Pulmonary Embolism should be considered in setting of Severe RV   dysfunction with probably preserved RV apical contraction.  14. Peak aortic valve gradient is 5.4 mmHg and the mean gradient is 2.1   mmHg, which is probably normal in the setting of a prosthetic aortic   valve.  15. I have discussed about the echo with Dr Silver.  16. Compared to Study from 9/2017 RV functions is severely reduced and LV   EF reduced from 60-65% to 40-45%      ASSESSMENT AND PLAN:  In summary, CRISTIN ROQUE is an 77y Male with past medical history significant for 77y Male with past medical history significant for permanent AF on coumadin, CAD s/p CABG/AVR, HTN , CKD, CVA with R hemiparesis,  colon resection with ileostomy, dysphagia s/p PEG DVT who presented unresponsive/hypoxic found to have septic shock requiring pressors and acute hypoxic respiratory failure 2/2 aspiration PNA s/p intubation, with course further complicated by KAPIL on CKD, and now NSVT vs AF with aberrancy on Amiodarone therapy rare PVC on TELE     Plan  1.  Continue current CV medication   2.  Agree with long term AC    3.  Out patient ischemic work up     Conservative cardiovascular therapy

## 2018-01-09 NOTE — PROGRESS NOTE ADULT - ASSESSMENT
CKD - creatinine trending down slowly  ok UOP cont strict I&Os  Anemia-  Hg remains low Recd 4 doses Venofer + on procrit   diuretics decreased as a trial seems to be working  AM lab

## 2018-01-09 NOTE — PHYSICAL THERAPY INITIAL EVALUATION ADULT - DIAGNOSIS, PT EVAL
decrease mobility and decrease strength
Impaired functional mobility due to decreased strength and endurance

## 2018-01-09 NOTE — PHYSICAL THERAPY INITIAL EVALUATION ADULT - CRITERIA FOR SKILLED THERAPEUTIC INTERVENTIONS
impairments found/functional limitations in following categories/risk reduction/prevention
therapy frequency/risk reduction/prevention/rehab potential/anticipated discharge recommendation/impairments found/functional limitations in following categories/predicted duration of therapy intervention

## 2018-01-09 NOTE — PHYSICAL THERAPY INITIAL EVALUATION ADULT - ADDITIONAL COMMENTS
Pt's spouse reports that pt lives at home with her in a private house. Ramp access. Hospital bed with split rail. Pt was able to ambulate with right LE brace and saida-cane up until approx 5 months ago when began having issues with left foot/ankle. Is pending AFO for left side and hopes to be able to ambulate again. Pt also has modified car seat that rotates and lowers out of vehicle to facilitate transfers. Tub-transfer bench, transport and manual wheelchair. Home health aide 10-2 M_F.

## 2018-01-10 LAB
ANION GAP SERPL CALC-SCNC: 13 MMOL/L — SIGNIFICANT CHANGE UP (ref 5–17)
APTT BLD: 98.1 SEC — HIGH (ref 27.5–37.4)
BUN SERPL-MCNC: 53 MG/DL — HIGH (ref 8–20)
CALCIUM SERPL-MCNC: 8.9 MG/DL — SIGNIFICANT CHANGE UP (ref 8.6–10.2)
CHLORIDE SERPL-SCNC: 99 MMOL/L — SIGNIFICANT CHANGE UP (ref 98–107)
CO2 SERPL-SCNC: 30 MMOL/L — HIGH (ref 22–29)
CREAT SERPL-MCNC: 2.97 MG/DL — HIGH (ref 0.5–1.3)
GLUCOSE SERPL-MCNC: 177 MG/DL — HIGH (ref 70–115)
HCT VFR BLD CALC: 27.2 % — LOW (ref 42–52)
HGB BLD-MCNC: 8 G/DL — LOW (ref 14–18)
INR BLD: 2.69 RATIO — HIGH (ref 0.88–1.16)
MAGNESIUM SERPL-MCNC: 1.9 MG/DL — SIGNIFICANT CHANGE UP (ref 1.6–2.6)
MCHC RBC-ENTMCNC: 29.4 G/DL — LOW (ref 32–36)
MCHC RBC-ENTMCNC: 30.4 PG — SIGNIFICANT CHANGE UP (ref 27–31)
MCV RBC AUTO: 103.4 FL — HIGH (ref 80–94)
PLATELET # BLD AUTO: 227 K/UL — SIGNIFICANT CHANGE UP (ref 150–400)
POTASSIUM SERPL-MCNC: 4.6 MMOL/L — SIGNIFICANT CHANGE UP (ref 3.5–5.3)
POTASSIUM SERPL-SCNC: 4.6 MMOL/L — SIGNIFICANT CHANGE UP (ref 3.5–5.3)
PROTHROM AB SERPL-ACNC: 30.2 SEC — HIGH (ref 9.8–12.7)
RBC # BLD: 2.63 M/UL — LOW (ref 4.6–6.2)
RBC # FLD: 20.5 % — HIGH (ref 11–15.6)
SODIUM SERPL-SCNC: 142 MMOL/L — SIGNIFICANT CHANGE UP (ref 135–145)
WBC # BLD: 5.8 K/UL — SIGNIFICANT CHANGE UP (ref 4.8–10.8)
WBC # FLD AUTO: 5.8 K/UL — SIGNIFICANT CHANGE UP (ref 4.8–10.8)

## 2018-01-10 PROCEDURE — 99222 1ST HOSP IP/OBS MODERATE 55: CPT

## 2018-01-10 PROCEDURE — 99233 SBSQ HOSP IP/OBS HIGH 50: CPT

## 2018-01-10 RX ORDER — WARFARIN SODIUM 2.5 MG/1
3 TABLET ORAL ONCE
Qty: 0 | Refills: 0 | Status: COMPLETED | OUTPATIENT
Start: 2018-01-10 | End: 2018-01-10

## 2018-01-10 RX ADMIN — ATORVASTATIN CALCIUM 10 MILLIGRAM(S): 80 TABLET, FILM COATED ORAL at 21:37

## 2018-01-10 RX ADMIN — FINASTERIDE 5 MILLIGRAM(S): 5 TABLET, FILM COATED ORAL at 12:10

## 2018-01-10 RX ADMIN — WARFARIN SODIUM 3 MILLIGRAM(S): 2.5 TABLET ORAL at 21:37

## 2018-01-10 RX ADMIN — ZINC OXIDE 1 APPLICATION(S): 200 OINTMENT TOPICAL at 05:23

## 2018-01-10 RX ADMIN — Medication 1 GRAM(S): at 18:17

## 2018-01-10 RX ADMIN — ALBUTEROL 2.5 MILLIGRAM(S): 90 AEROSOL, METERED ORAL at 20:11

## 2018-01-10 RX ADMIN — ALBUTEROL 2.5 MILLIGRAM(S): 90 AEROSOL, METERED ORAL at 03:21

## 2018-01-10 RX ADMIN — PANTOPRAZOLE SODIUM 40 MILLIGRAM(S): 20 TABLET, DELAYED RELEASE ORAL at 05:21

## 2018-01-10 RX ADMIN — Medication 1 GRAM(S): at 12:10

## 2018-01-10 RX ADMIN — Medication 81 MILLIGRAM(S): at 12:09

## 2018-01-10 RX ADMIN — TAMSULOSIN HYDROCHLORIDE 0.4 MILLIGRAM(S): 0.4 CAPSULE ORAL at 21:37

## 2018-01-10 RX ADMIN — AMIODARONE HYDROCHLORIDE 400 MILLIGRAM(S): 400 TABLET ORAL at 05:23

## 2018-01-10 RX ADMIN — Medication 40 MILLIGRAM(S): at 12:10

## 2018-01-10 RX ADMIN — PANTOPRAZOLE SODIUM 40 MILLIGRAM(S): 20 TABLET, DELAYED RELEASE ORAL at 18:17

## 2018-01-10 RX ADMIN — Medication 1 GRAM(S): at 05:20

## 2018-01-10 RX ADMIN — ALBUTEROL 2.5 MILLIGRAM(S): 90 AEROSOL, METERED ORAL at 08:40

## 2018-01-10 RX ADMIN — ZINC OXIDE 1 APPLICATION(S): 200 OINTMENT TOPICAL at 18:17

## 2018-01-10 RX ADMIN — ALBUTEROL 2.5 MILLIGRAM(S): 90 AEROSOL, METERED ORAL at 15:01

## 2018-01-10 RX ADMIN — Medication 1 MILLIGRAM(S): at 12:10

## 2018-01-10 NOTE — PROGRESS NOTE ADULT - SUBJECTIVE AND OBJECTIVE BOX
NEPHROLOGY INTERVAL HPI/OVERNIGHT EVENTS:  pt essentially the same  no acute distress when seen earlier    MEDICATIONS  (STANDING):  ALBUTerol    0.083% 2.5 milliGRAM(s) Nebulizer every 6 hours  amiodarone    Tablet 400 milliGRAM(s) Oral daily  aspirin  chewable 81 milliGRAM(s) Oral daily  atorvastatin 10 milliGRAM(s) Oral at bedtime  epoetin [place] Injectable 79090 Unit(s) SubCutaneous every 3 days  finasteride 5 milliGRAM(s) Oral daily  folic acid 1 milliGRAM(s) Enteral Tube daily  furosemide    Tablet 40 milliGRAM(s) Oral daily  heparin  Infusion.  Unit(s)/Hr (15 mL/Hr) IV Continuous <Continuous>  metoprolol     tartrate 25 milliGRAM(s) Oral three times a day  pantoprazole   Suspension 40 milliGRAM(s) Oral <User Schedule>  sucralfate 1 Gram(s) Oral four times a day  tamsulosin 0.4 milliGRAM(s) Oral at bedtime  zinc oxide 12.8% Ointment 1 Application(s) Topical two times a day    MEDICATIONS  (PRN):      Allergies    penicillins (Hives)          Vital Signs Last 24 Hrs  T(C): 37.3 (10 Paddy 2018 00:14), Max: 37.3 (10 Paddy 2018 00:14)  T(F): 99.1 (10 Paddy 2018 00:14), Max: 99.1 (10 Paddy 2018 00:14)  HR: 85 (10 Paddy 2018 07:24) (75 - 102)  BP: 98/44 (10 Paddy 2018 07:24) (90/60 - 124/64)  BP(mean): --  RR: 18 (10 Paddy 2018 04:00) (18 - 20)  SpO2: 97% (10 Paddy 2018 07:24) (96% - 97%)    PHYSICAL EXAM:  GENERAL: Generalized weakness  NECK: Supple, No JVD/Bruit  NERVOUS SYSTEM:  A/O, NS change   CHEST:  CTA ,No rales or rhonchi  HEART:  RRR, No murmurs  ABDOMEN: Soft, NT/ND BS+  EXTREMITIES:  No Edema    LABS:                        7.1    5.1   )-----------( 221      ( 09 Jan 2018 06:39 )             24.4     01-09    141  |  98  |  53.0<H>  ----------------------------<  188<H>  4.4   |  28.0  |  2.90<H>    Ca    8.5<L>      09 Jan 2018 06:39  Mg     1.9     01-09      PT/INR - ( 09 Jan 2018 22:00 )   PT: 21.4 sec;   INR: 1.92 ratio         PTT - ( 09 Jan 2018 22:00 )  PTT:89.1 sec        RADIOLOGY & ADDITIONAL TESTS:

## 2018-01-10 NOTE — PROGRESS NOTE ADULT - ASSESSMENT
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

## 2018-01-10 NOTE — SWALLOW BEDSIDE ASSESSMENT ADULT - SLP PERTINENT HISTORY OF CURRENT PROBLEM
Pt known to this dept for prior admissions, since CVA, as well as course of outpatient therapy. Last MBS completed 8/8/17 with RX for soft & nectar thick fluids. Bedside swallow eval done September 2017 with RX for baseline diet

## 2018-01-10 NOTE — CONSULT NOTE ADULT - SUBJECTIVE AND OBJECTIVE BOX
cc: Rehab evaluation - 77y old  Male who presents with a chief complaint of Found unresponsive in his vomit (18 Dec 2017 19:49)      HPI:  76 y/o male pmhx CVA ( 2 years ago and in July 17) w/ residual right upper and lower extremity weakness, s/p PEG,  afib on coumadin, s/p colon resection with ileostomy 8 years ago, s/p CBAGx3,  prostate cancer s/p TURP 3 years ago was BIBEMS after wife found him unresponsive with vomit around his face. Wife states patient had an increase in SOB past 2 days and noticed he was becoming more weak.  Pt was discharged 2 days ago from rehab center and has had a decrease in appetite since he's been home according to his wife and son. Wife states she has been noticing that after PEG feeding he coughs a lot and she compares it to him choking.  She states he has been getting feed through his PEG as well as liquids by mouth which he has been tolerating. On arrival patient was unresponsive and hypoxic.      CT CHEST: B/L upper lobe infiltrates  12/18 BC neg x2  12/19 U/s renal neg  Trops neg   BNP [id number]  12/30 U/A +  12/31 U/S LUE + DVT   1/5 U/S L/E neg  EF 40-50% Moderate TR      PAST MEDICAL & SURGICAL HISTORY:  CAD (coronary artery disease)  Afib  CVA (cerebral vascular accident)  Mitral valve replaced  BPH (benign prostatic hyperplasia)  High cholesterol  HTN (hypertension)  H/O heart valve replacement with mechanical valve  S/P CABG x 1  H/O colectomy      FAMILY HISTORY:  No pertinent family history in first degree relatives      SOCIAL HISTORY:  TOBACCO: denies history  ALCOHOL: denies abuse  IVDA: denies history    FUNCTIONAL, ENVIRONMENTAL HISTORY:  WORK HISTORY:   LIVES WITH:   HOME LAYOUT:   STAIRS TO ENTER:   STAIRS INSIDE:   FUNCTIONAL HISTORY: independent with ambulation and ADLs    Allergies    penicillins (Hives)    Intolerances        MEDICATIONS  (STANDING):  ALBUTerol    0.083% 2.5 milliGRAM(s) Nebulizer every 6 hours  amiodarone    Tablet 400 milliGRAM(s) Oral daily  aspirin  chewable 81 milliGRAM(s) Oral daily  atorvastatin 10 milliGRAM(s) Oral at bedtime  epoetin [place] Injectable 70018 Unit(s) SubCutaneous every 3 days  finasteride 5 milliGRAM(s) Oral daily  folic acid 1 milliGRAM(s) Enteral Tube daily  furosemide    Tablet 40 milliGRAM(s) Oral daily  heparin  Infusion.  Unit(s)/Hr (15 mL/Hr) IV Continuous <Continuous>  metoprolol     tartrate 25 milliGRAM(s) Oral three times a day  pantoprazole   Suspension 40 milliGRAM(s) Oral <User Schedule>  sucralfate 1 Gram(s) Oral four times a day  tamsulosin 0.4 milliGRAM(s) Oral at bedtime  zinc oxide 12.8% Ointment 1 Application(s) Topical two times a day    MEDICATIONS  (PRN):      REVIEW OF SYSTEMS:    CONSTITUTIONAL: No fever, weight loss, or fatigue  EYES: No eye pain, visual disturbances, or discharge  RESPIRATORY: No cough,   CARDIOVASCULAR: No chest pain,   GASTROINTESTINAL: No abdominal or epigastric pain.  GENITOURINARY: No dysuria,   NEUROLOGICAL: No headaches,  SKIN: No itching, burning, rashes, or lesions   ENDOCRINE: No heat or cold intolerance; No hair loss  MUSCULOSKELETAL: No joint pain or swelling; No muscle, back, or extremity pain  PSYCHIATRIC: No depression, anxiety, mood swings, or difficulty sleeping    Vital Signs Last 24 Hrs  T(C): 37.3 (10 Paddy 2018 00:14), Max: 37.3 (10 Paddy 2018 00:14)  T(F): 99.1 (10 Paddy 2018 00:14), Max: 99.1 (10 Paddy 2018 00:14)  HR: 85 (10 Paddy 2018 07:24) (75 - 102)  BP: 98/44 (10 Paddy 2018 07:24) (90/60 - 124/64)  BP(mean): --  RR: 18 (10 Paddy 2018 04:00) (18 - 20)  SpO2: 97% (10 Paddy 2018 07:24) (96% - 97%)    PHYSICAL EXAM:    GENERAL: NAD, well-groomed, well-developed  HEAD:  Atraumatic, Normocephalic  EYES: EOMI, PERRLA, conjunctiva and sclera clear  HEART: Regular rate and rhythm; No murmurs, rubs, or gallops  CHEST/LUNG: Clear to auscultation bilaterally; No rales, rhonchi, wheezing, or rubs  ABDOMEN: Soft, Nontender, Nondistended; Bowel sounds present  EXTREMITIES:  2+ Peripheral Pulses, No clubbing, cyanosis, or edema  SKIN: No rashes or lesions  NERVOUS SYSTEM:  Alert & Oriented X3, Good concentration  CNs II-XII grossly intact  Motor Strength 5/5 B/L upper and lower extremities  Sensation intact to light touch symmetrically  DTRs 2+ intact and symmetric  FUNCTIONAL EXAM:     LABS:                        7.1    5.1   )-----------( 221      ( 09 Jan 2018 06:39 )             24.4     01-09    141  |  98  |  53.0<H>  ----------------------------<  188<H>  4.4   |  28.0  |  2.90<H>    Ca    8.5<L>      09 Jan 2018 06:39  Mg     1.9     01-09      PT/INR - ( 09 Jan 2018 22:00 )   PT: 21.4 sec;   INR: 1.92 ratio         PTT - ( 09 Jan 2018 22:00 )  PTT:89.1 sec      RADIOLOGY & ADDITIONAL STUDIES: cc: Rehab evaluation - 77y old  Male who presents with a chief complaint of Found unresponsive in his vomit (18 Dec 2017 19:49)      HPI:  78 y/o male pmhx CVA ( 2 years ago and in July 17) w/ residual right upper and lower extremity weakness, s/p PEG,  afib on coumadin, s/p colon resection with ileostomy 8 years ago, s/p CBAGx3,  prostate cancer s/p TURP 3 years ago was BIBEMS after wife found him unresponsive with vomit around his face. Wife states patient had an increase in SOB past 2 days and noticed he was becoming more weak.  Pt was discharged 2 days ago from rehab center and has had a decrease in appetite. Wife states she has been noticing that after PEG feeding he coughs a lot. She states he has been getting feed through his PEG as well as liquids.  Admitted with hypoxemic respiratory failure requiring intubation secondary to aspiration pneumonia, septic shock requiring vasopressors secondary to Gram negative urosepsis. 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 RRT called for 48 seconds of sustained VT. TTE showed EF 40-45%, grade III diastolic dysfunction, RV dysfunction. Pt transferred to MICU and was diuresed and started on IV heparin. Patient lost IV access and was transitioned to lovenox. Midline placed by surgical PA. Heparin infusion started with bridge to coumadin.   mouth which he has been tolerating. On arrival patient was unresponsive and hypoxic.      CT CHEST: B/L upper lobe infiltrates  12/18 BC neg x2  12/19 U/s renal neg  Trops neg   BNP [id number]  12/30 U/A +  12/31 U/S LUE + DVT   1/5 U/S L/E neg  EF 40-50% Moderate TR      PAST MEDICAL & SURGICAL HISTORY:  CAD (coronary artery disease)  Afib  CVA (cerebral vascular accident)  Mitral valve replaced  BPH (benign prostatic hyperplasia)  High cholesterol  HTN (hypertension)  H/O heart valve replacement with mechanical valve  S/P CABG x 1  H/O colectomy      FAMILY HISTORY:  No pertinent family history in first degree relatives      SOCIAL HISTORY:  TOBACCO: denies history  ALCOHOL: denies abuse  IVDA: denies history    FUNCTIONAL, ENVIRONMENTAL HISTORY:  LIVES WITH: spouse  HOME LAYOUT: has ramp  FUNCTIONAL HISTORY:hospital bed with split rail. Pt was able to ambulate with right LE brace and saida-cane up until approx 5 months ago when began having issues with left foot/ankle. Is pending AFO for left side and hopes to be able to ambulate again. Pt also has modified car seat that rotates and lowers out of vehicle to facilitate transfers. Tub-transfer bench, transport and manual wheelchair. Home health aide Monday to Friday    Allergies    penicillins (Hives)    Intolerances        MEDICATIONS  (STANDING):  ALBUTerol    0.083% 2.5 milliGRAM(s) Nebulizer every 6 hours  amiodarone    Tablet 400 milliGRAM(s) Oral daily  aspirin  chewable 81 milliGRAM(s) Oral daily  atorvastatin 10 milliGRAM(s) Oral at bedtime  epoetin [place] Injectable 83450 Unit(s) SubCutaneous every 3 days  finasteride 5 milliGRAM(s) Oral daily  folic acid 1 milliGRAM(s) Enteral Tube daily  furosemide    Tablet 40 milliGRAM(s) Oral daily  heparin  Infusion.  Unit(s)/Hr (15 mL/Hr) IV Continuous <Continuous>  metoprolol     tartrate 25 milliGRAM(s) Oral three times a day  pantoprazole   Suspension 40 milliGRAM(s) Oral <User Schedule>  sucralfate 1 Gram(s) Oral four times a day  tamsulosin 0.4 milliGRAM(s) Oral at bedtime  zinc oxide 12.8% Ointment 1 Application(s) Topical two times a day    MEDICATIONS  (PRN):      REVIEW OF SYSTEMS:    CONSTITUTIONAL: No fever, weight loss, or fatigue  EYES: No eye pain, visual disturbances, or discharge  RESPIRATORY: No cough,   CARDIOVASCULAR: No chest pain,   GASTROINTESTINAL: No abdominal or epigastric pain.  GENITOURINARY: No dysuria,   NEUROLOGICAL: No headaches,  SKIN: No itching, burning, rashes, or lesions   ENDOCRINE: No heat or cold intolerance; No hair loss  MUSCULOSKELETAL: No joint pain or swelling; No muscle, back, or extremity pain  PSYCHIATRIC: No depression, anxiety, mood swings, or difficulty sleeping    Vital Signs Last 24 Hrs  T(C): 37.3 (10 Paddy 2018 00:14), Max: 37.3 (10 Paddy 2018 00:14)  T(F): 99.1 (10 Paddy 2018 00:14), Max: 99.1 (10 Paddy 2018 00:14)  HR: 85 (10 Paddy 2018 07:24) (75 - 102)  BP: 98/44 (10 Paddy 2018 07:24) (90/60 - 124/64)  BP(mean): --  RR: 18 (10 Paddy 2018 04:00) (18 - 20)  SpO2: 97% (10 Paddy 2018 07:24) (96% - 97%)    PHYSICAL EXAM:    GENERAL: NAD, well-groomed, well-developed  HEAD:  Atraumatic, Normocephalic  EYES: EOMI, PERRLA, conjunctiva and sclera clear  HEART: S1S2+  CHEST/LUNG: Clear to auscultation bilaterally;   ABDOMEN: Soft,   EXTREMITIES:  SKIN: No rashes or lesions      FUNCTIONAL EXAM: moderate assist with bed mobility    LABS:                        7.1    5.1   )-----------( 221      ( 09 Jan 2018 06:39 )             24.4     01-09    141  |  98  |  53.0<H>  ----------------------------<  188<H>  4.4   |  28.0  |  2.90<H>    Ca    8.5<L>      09 Jan 2018 06:39  Mg     1.9     01-09      PT/INR - ( 09 Jan 2018 22:00 )   PT: 21.4 sec;   INR: 1.92 ratio         PTT - ( 09 Jan 2018 22:00 )  PTT:89.1 sec      RADIOLOGY & ADDITIONAL STUDIES: cc: Rehab evaluation - 77y old  Male who presents with a chief complaint of Found unresponsive/aspiration. History from chart      HPI:  78 y/o male pmhx CVA ( 2 years ago and in July 17) w/ residual right upper and lower extremity weakness, s/p PEG,  afib on coumadin, s/p colon resection with ileostomy 8 years ago, s/p CBAGx3,  prostate cancer s/p TURP 3 years ago was BIBEMS after wife found him unresponsive with vomit around his face. Wife states patient had an increase in SOB past 2 days and noticed he was becoming more weak.  Pt was discharged 2 days ago from rehab center. Wife states she has been noticing that after PEG feeding he coughs a lot. She states he has been getting feed through his PEG as well as liquids.  Admitted with hypoxemic respiratory failure requiring intubation secondary to aspiration pneumonia, septic shock requiring vasopressors secondary to Gram negative urosepsis. 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 RRT called for 48 seconds of sustained VT. TTE showed EF 40-45%, grade III diastolic dysfunction, RV dysfunction. Pt transferred to MICU and was diuresed and started on IV heparin. Patient lost IV access and was transitioned to lovenox. Midline placed by surgical PA. Heparin infusion started with bridge to coumadin.   CT CHEST: B/L upper lobe infiltrates, 12/18 BC neg x2, 12/19 U/s renal neg, BNP 93939  Rehab evaluation for further recommendations.     PAST MEDICAL & SURGICAL HISTORY:  CAD (coronary artery disease)  Afib  CVA (cerebral vascular accident)  Mitral valve replaced  BPH (benign prostatic hyperplasia)  High cholesterol  HTN (hypertension)  H/O heart valve replacement with mechanical valve  S/P CABG x 1  H/O colectomy      FAMILY HISTORY:  No pertinent family history in first degree relatives      SOCIAL HISTORY:  TOBACCO: denies history  ALCOHOL: denies abuse  IVDA: denies history    FUNCTIONAL, ENVIRONMENTAL HISTORY:  LIVES WITH: spouse  HOME LAYOUT: has ramp  FUNCTIONAL HISTORY:hospital bed with split rail. Pt was able to ambulate with right LE brace and saida-cane up until approx 5 months ago when began having issues with left foot/ankle. Is pending AFO for left side and hopes to be able to ambulate again. Pt also has modified car seat that rotates and lowers out of vehicle to facilitate transfers. Tub-transfer bench, transport and manual wheelchair. Home health aide Monday to Friday. Patient states that he needs assistance with transfers/ADLs    Allergies    penicillins (Hives)    Intolerances        MEDICATIONS  (STANDING):  ALBUTerol    0.083% 2.5 milliGRAM(s) Nebulizer every 6 hours  amiodarone    Tablet 400 milliGRAM(s) Oral daily  aspirin  chewable 81 milliGRAM(s) Oral daily  atorvastatin 10 milliGRAM(s) Oral at bedtime  epoetin [place] Injectable 65281 Unit(s) SubCutaneous every 3 days  finasteride 5 milliGRAM(s) Oral daily  folic acid 1 milliGRAM(s) Enteral Tube daily  furosemide    Tablet 40 milliGRAM(s) Oral daily  heparin  Infusion.  Unit(s)/Hr (15 mL/Hr) IV Continuous <Continuous>  metoprolol     tartrate 25 milliGRAM(s) Oral three times a day  pantoprazole   Suspension 40 milliGRAM(s) Oral <User Schedule>  sucralfate 1 Gram(s) Oral four times a day  tamsulosin 0.4 milliGRAM(s) Oral at bedtime  zinc oxide 12.8% Ointment 1 Application(s) Topical two times a day    MEDICATIONS  (PRN):      REVIEW OF SYSTEMS:limited due to hypophonia, fatigue.       Vital Signs Last 24 Hrs  T(C): 37.3 (10 Paddy 2018 00:14), Max: 37.3 (10 Paddy 2018 00:14)  T(F): 99.1 (10 Paddy 2018 00:14), Max: 99.1 (10 Paddy 2018 00:14)  HR: 85 (10 Paddy 2018 07:24) (75 - 102)  BP: 98/44 (10 Paddy 2018 07:24) (90/60 - 124/64)  BP(mean): --  RR: 18 (10 Paddy 2018 04:00) (18 - 20)  SpO2: 97% (10 Paddy 2018 07:24) (96% - 97%)    PHYSICAL EXAM:    GENERAL: NAD, aphonic/hypophonic  HEAD:  Atraumatic,   EYES: EOMI,  HEART: S1S2+  CHEST/LUNG: rhonchi+,   ABDOMEN: Soft, ileostomy+, PEG +  EXTREMITIES:  No edema, no calf tenderness. Motor Right 0/5, left 4/5      FUNCTIONAL EXAM: moderate assist with bed mobility    LABS:                        7.1    5.1   )-----------( 221      ( 09 Jan 2018 06:39 )             24.4     01-09    141  |  98  |  53.0<H>  ----------------------------<  188<H>  4.4   |  28.0  |  2.90<H>    Ca    8.5<L>      09 Jan 2018 06:39  Mg     1.9     01-09      PT/INR - ( 09 Jan 2018 22:00 )   PT: 21.4 sec;   INR: 1.92 ratio       PTT - ( 09 Jan 2018 22:00 )  PTT:89.1 sec      RADIOLOGY & ADDITIONAL STUDIES:  < from: Xray Chest 1 View AP- PORTABLE-Urgent (01.03.18 @ 19:34) >  EXAM:  XR CHEST PORTABLE URGENT 1V                          PROCEDURE DATE:  01/03/2018          INTERPRETATION:  History: Dyspnea. Rapid response ;  status post CABG    Technique:  AP portable    Comparisons:  Chest x-ray dated 1/3/2018    Findings: Diffuse bilateral airspace disease is present with bilateral   pleural effusions unchanged. Status post median sternotomy for bypass   surgery. The heart appears enlarged.    Impression: Bilateral diffuse airspace disease. Bilateral pleural   effusions. Status post CABG      < end of copied text >    LE doppler: negative for DVT    < from: US Duplex Venous Upper Ext Ltd, Left (01.03.18 @ 12:51) >  EXAM:  US DPLX UPR EXT VEINS LTD LT                          PROCEDURE DATE:  01/03/2018          INTERPRETATION:  CLINICAL INFORMATION: Assess for thrombosis    COMPARISON: None available.    TECHNIQUE: Duplex sonography of the LEFT UPPER extremity with color and   spectral Doppler, with and without compression.      FINDINGS:    The left internal jugular, subclavian, axillary, brachial, and cephalic   veins are patent and compressible where applicable.     There is partially obstructive thrombosis of the left basilic vein.    IMPRESSION:     Partial thrombosis of the left basilic vein.    < end of copied text >      A/P:    74 year old male with h/o CVA with right hemiparesis, PEG, Ileostomy  Admitted with hypoxic resp failure, aspiration PNA. Hospital course with KAPIL., LUE + DVT, Anemia    Patient most likely at baseline, needing assistance with ADLs, transfers.  Continue bedside therapy as tolerated.  SLP to reevaluate patient this noon as unable to do evaluate this am.   Recommend home with home care/ nursing.     Discussed with nurse about upright positioning after TF. Consider bolus feeds if tolerates.    d/w Hospitalist NP, Meenu Prasad and with [facility]    Thank you cc: Rehab evaluation - 77y old  Male who presents with a chief complaint of Found unresponsive/aspiration. History from chart      HPI:  76 y/o male pmhx CVA ( 2 years ago and in July 17) w/ residual right upper and lower extremity weakness, s/p PEG,  afib on coumadin, s/p colon resection with ileostomy 8 years ago, s/p CBAGx3,  prostate cancer s/p TURP 3 years ago was BIBEMS after wife found him unresponsive with vomit around his face. Wife states patient had an increase in SOB past 2 days and noticed he was becoming more weak.  Pt was discharged 2 days ago from rehab center. Wife states she has been noticing that after PEG feeding he coughs a lot. She states he has been getting feed through his PEG as well as liquids.  Admitted with hypoxemic respiratory failure requiring intubation secondary to aspiration pneumonia, septic shock requiring vasopressors secondary to Gram negative urosepsis. 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 RRT called for 48 seconds of sustained VT. TTE showed EF 40-45%, grade III diastolic dysfunction, RV dysfunction. Pt transferred to MICU and was diuresed and started on IV heparin. Patient lost IV access and was transitioned to lovenox. Midline placed by surgical PA. Heparin infusion started with bridge to coumadin.   CT CHEST: B/L upper lobe infiltrates, 12/18 BC neg x2, 12/19 U/s renal neg, BNP [id number]  Rehab evaluation for further recommendations.     PAST MEDICAL & SURGICAL HISTORY:  CAD (coronary artery disease)  Afib  CVA (cerebral vascular accident)  Mitral valve replaced  BPH (benign prostatic hyperplasia)  High cholesterol  HTN (hypertension)  H/O heart valve replacement with mechanical valve  S/P CABG x 1  H/O colectomy      FAMILY HISTORY:  No pertinent family history in first degree relatives      SOCIAL HISTORY:  TOBACCO: denies history  ALCOHOL: denies abuse  IVDA: denies history    FUNCTIONAL, ENVIRONMENTAL HISTORY:  LIVES WITH: spouse  HOME LAYOUT: has ramp  FUNCTIONAL HISTORY:hospital bed with split rail. Pt was able to ambulate with right LE brace and saida-cane up until approx 5 months ago when began having issues with left foot/ankle. Is pending AFO for left side and hopes to be able to ambulate again. Pt also has modified car seat that rotates and lowers out of vehicle to facilitate transfers. Tub-transfer bench, transport and manual wheelchair. Home health aide Monday to Friday. Patient states that he needs assistance with transfers/ADLs    Allergies    penicillins (Hives)    Intolerances        MEDICATIONS  (STANDING):  ALBUTerol    0.083% 2.5 milliGRAM(s) Nebulizer every 6 hours  amiodarone    Tablet 400 milliGRAM(s) Oral daily  aspirin  chewable 81 milliGRAM(s) Oral daily  atorvastatin 10 milliGRAM(s) Oral at bedtime  epoetin [place] Injectable 23639 Unit(s) SubCutaneous every 3 days  finasteride 5 milliGRAM(s) Oral daily  folic acid 1 milliGRAM(s) Enteral Tube daily  furosemide    Tablet 40 milliGRAM(s) Oral daily  heparin  Infusion.  Unit(s)/Hr (15 mL/Hr) IV Continuous <Continuous>  metoprolol     tartrate 25 milliGRAM(s) Oral three times a day  pantoprazole   Suspension 40 milliGRAM(s) Oral <User Schedule>  sucralfate 1 Gram(s) Oral four times a day  tamsulosin 0.4 milliGRAM(s) Oral at bedtime  zinc oxide 12.8% Ointment 1 Application(s) Topical two times a day    MEDICATIONS  (PRN):      REVIEW OF SYSTEMS:limited due to hypophonia, fatigue.       Vital Signs Last 24 Hrs  T(C): 37.3 (10 Paddy 2018 00:14), Max: 37.3 (10 Paddy 2018 00:14)  T(F): 99.1 (10 Paddy 2018 00:14), Max: 99.1 (10 Paddy 2018 00:14)  HR: 85 (10 Paddy 2018 07:24) (75 - 102)  BP: 98/44 (10 Paddy 2018 07:24) (90/60 - 124/64)  BP(mean): --  RR: 18 (10 Paddy 2018 04:00) (18 - 20)  SpO2: 97% (10 Paddy 2018 07:24) (96% - 97%)    PHYSICAL EXAM:    GENERAL: NAD, aphonic/hypophonic  HEAD:  Atraumatic,   EYES: EOMI,  HEART: S1S2+  CHEST/LUNG: rhonchi+,   ABDOMEN: Soft, ileostomy+, PEG +  EXTREMITIES:  No edema, no calf tenderness. Motor Right 0/5, left 4/5      FUNCTIONAL EXAM: moderate assist with bed mobility    LABS:                        7.1    5.1   )-----------( 221      ( 09 Jan 2018 06:39 )             24.4     01-09    141  |  98  |  53.0<H>  ----------------------------<  188<H>  4.4   |  28.0  |  2.90<H>    Ca    8.5<L>      09 Jan 2018 06:39  Mg     1.9     01-09      PT/INR - ( 09 Jan 2018 22:00 )   PT: 21.4 sec;   INR: 1.92 ratio       PTT - ( 09 Jan 2018 22:00 )  PTT:89.1 sec      RADIOLOGY & ADDITIONAL STUDIES:  < from: Xray Chest 1 View AP- PORTABLE-Urgent (01.03.18 @ 19:34) >  EXAM:  XR CHEST PORTABLE URGENT 1V                          PROCEDURE DATE:  01/03/2018          INTERPRETATION:  History: Dyspnea. Rapid response ;  status post CABG    Technique:  AP portable    Comparisons:  Chest x-ray dated 1/3/2018    Findings: Diffuse bilateral airspace disease is present with bilateral   pleural effusions unchanged. Status post median sternotomy for bypass   surgery. The heart appears enlarged.    Impression: Bilateral diffuse airspace disease. Bilateral pleural   effusions. Status post CABG      < end of copied text >    LE doppler: negative for DVT    < from: US Duplex Venous Upper Ext Ltd, Left (01.03.18 @ 12:51) >  EXAM:  US DPLX UPR EXT VEINS LTD LT                          PROCEDURE DATE:  01/03/2018          INTERPRETATION:  CLINICAL INFORMATION: Assess for thrombosis    COMPARISON: None available.    TECHNIQUE: Duplex sonography of the LEFT UPPER extremity with color and   spectral Doppler, with and without compression.      FINDINGS:    The left internal jugular, subclavian, axillary, brachial, and cephalic   veins are patent and compressible where applicable.     There is partially obstructive thrombosis of the left basilic vein.    IMPRESSION:     Partial thrombosis of the left basilic vein.    < end of copied text >      A/P:    74 year old male with h/o CVA with right hemiparesis, PEG, Ileostomy  Admitted with hypoxic resp failure, intubation, aspiration PNA. Hospital course with KAPIL., LUE + DVT, Anemia    Patient most likely at baseline, needing assistance with ADLs, transfers.  Continue bedside therapy as tolerated.  SLP to reevaluate patient this noon as unable to do evaluate this am.   Recommend home with home care/ nursing.     Discussed with nurse about upright positioning after TF. Consider bolus feeds if tolerates.    d/w Hospitalist NP, Meenu Prasad and with [facility]    Thank you

## 2018-01-10 NOTE — SWALLOW BEDSIDE ASSESSMENT ADULT - SWALLOW EVAL: RECOMMENDED FEEDING/EATING TECHNIQUES
crush medication (when feasible)/small sips/bites/position upright (90 degrees)/maintain upright posture during/after eating for 30 mins/oral hygiene

## 2018-01-10 NOTE — CONSULT NOTE ADULT - PROVIDER SPECIALTY LIST ADULT
Cardiology
Critical Care
Gastroenterology
Gastroenterology
Heme/Onc
Heme/Onc
Nephrology
Pulmonology
Rehab Medicine
Vascular Surgery
Palliative Care
Infectious Disease

## 2018-01-10 NOTE — CONSULT NOTE ADULT - CONSULT REASON
Anemia/ + FOBT
Dysfunctional G Tube.
KAPIL on CKD , Resp failure
NSVT
Rehab evaluation
UE DVt
UE DVt
dyspnea/pna
left upper ext thrombus
family support and assistance with goals of care
Ventricular Tachycardia
SEPSIS

## 2018-01-10 NOTE — PROGRESS NOTE ADULT - SUBJECTIVE AND OBJECTIVE BOX
[facility], [facility]                                   [address] [place] [address]                                                      PHONE: [phone]                                                         FAX: [phone]  [email or web address]  ---------------------------------------------------------------------------------------------------------------------------------    Overnight events/patient complaints:  no events on tele     PAST MEDICAL & SURGICAL HISTORY:  CAD (coronary artery disease)  Afib  CVA (cerebral vascular accident)  Mitral valve replaced  BPH (benign prostatic hyperplasia)  High cholesterol  HTN (hypertension)  H/O heart valve replacement with mechanical valve  S/P CABG x 1  H/O colectomy      penicillins (Hives)    MEDICATIONS  (STANDING):  ALBUTerol    0.083% 2.5 milliGRAM(s) Nebulizer every 6 hours  amiodarone    Tablet 400 milliGRAM(s) Oral daily  aspirin  chewable 81 milliGRAM(s) Oral daily  atorvastatin 10 milliGRAM(s) Oral at bedtime  epoetin [place] Injectable 10976 Unit(s) SubCutaneous every 3 days  finasteride 5 milliGRAM(s) Oral daily  folic acid 1 milliGRAM(s) Enteral Tube daily  furosemide    Tablet 40 milliGRAM(s) Oral daily  heparin  Infusion.  Unit(s)/Hr (15 mL/Hr) IV Continuous <Continuous>  metoprolol     tartrate 25 milliGRAM(s) Oral three times a day  pantoprazole   Suspension 40 milliGRAM(s) Oral <User Schedule>  sucralfate 1 Gram(s) Oral four times a day  tamsulosin 0.4 milliGRAM(s) Oral at bedtime  zinc oxide 12.8% Ointment 1 Application(s) Topical two times a day    MEDICATIONS  (PRN):      Vital Signs Last 24 Hrs  T(C): 37.3 (10 Paddy 2018 00:14), Max: 37.3 (10 Paddy 2018 00:14)  T(F): 99.1 (10 Paddy 2018 00:14), Max: 99.1 (10 Paddy 2018 00:14)  HR: 85 (10 Paddy 2018 07:24) (75 - 102)  BP: 98/44 (10 Paddy 2018 07:24) (90/60 - 124/64)  BP(mean): --  RR: 18 (10 Paddy 2018 04:00) (18 - 20)  SpO2: 97% (10 Paddy 2018 07:24) (96% - 97%)  ICU Vital Signs Last 24 Hrs  CRISTIN ROQUE  I&O's Detail    09 Jan 2018 07:01  -  10 Paddy 2018 07:00  --------------------------------------------------------  IN:    Free Water: 800 mL    heparin  Infusion.: 374 mL    Jevity: 900 mL  Total IN: 2074 mL    OUT:    Ileostomy: 850 mL  Total OUT: 850 mL    Total NET: 1224 mL        I&O's Summary    09 Jan 2018 07:01  -  10 Paddy 2018 07:00  --------------------------------------------------------  IN: 2074 mL / OUT: 850 mL / NET: 1224 mL      Drug Dosing Weight  CRISTIN ROQUE      PHYSICAL EXAM:  General: Appears well developed, well nourished alert and cooperative.  HEENT: Head; normocephalic, atraumatic.  Eyes: Pupils reactive, cornea wnl.  Neck: Supple, no nodes adenopathy, no NVD or carotid bruit or thyromegaly.  CARDIOVASCULAR: Normal S1 and S2, No murmur, rub, gallop or lift.   LUNGS: No rales, rhonchi or wheeze. Normal breath sounds bilaterally.  ABDOMEN: Soft, nontender without mass or organomegaly. bowel sounds normoactive.  EXTREMITIES: No clubbing, cyanosis or edema. Distal pulses wnl.   SKIN: warm and dry with normal turgor.  NEURO: Alert/oriented x 3/normal motor exam. No pathologic reflexes.    PSYCH: normal affect.        LABS:                        7.1    5.1   )-----------( 221      ( 09 Jan 2018 06:39 )             24.4     01-09    141  |  98  |  53.0<H>  ----------------------------<  188<H>  4.4   |  28.0  |  2.90<H>    Ca    8.5<L>      09 Jan 2018 06:39  Mg     1.9     01-09      CRISTIN ROQUE      PT/INR - ( 09 Jan 2018 22:00 )   PT: 21.4 sec;   INR: 1.92 ratio         PTT - ( 09 Jan 2018 22:00 )  PTT:89.1 sec      RADIOLOGY & ADDITIONAL STUDIES:    INTERPRETATION OF TELEMETRY (personally reviewed):    INTERPRETATION OF TELEMETRY (personally reviewed): rare PVC A fib     ECHO:   Summary:   1. Left ventricular ejection fraction, by visual estimation, is 40 to   45%.   2. Mildly decreased global left ventricular systolic function.   3. Abnormal septal motion consistent with post-operative status and   right ventricular volume and pressure overload.   4. Spectral Doppler shows restrictive pattern of left ventricular   myocardial filling (Grade III diastolic dysfunction).   5. Severely enlarged right ventricle.   6. Severely reduced RV systolic function.   7. Moderately dilated right atrium.   8. Moderate tricuspid regurgitation.   9. Estimated pulmonary artery systolic pressure is 35.4 mmHg assuming a   right atrial pressure of 8 mmHg, which is consistent with borderline   pulmonary hypertension.  10. Mitral valve mean gradient is 3.7 mmHg consistent with mild mitral   stenosis.  11. Moderately enlarged left atrium.  12. The aortic valve mean gradient is 2.1 mmHg consistent with normally   opening aortic valve.  13. Pulmonary Embolism should be considered in setting of Severe RV   dysfunction with probably preserved RV apical contraction.  14. Peak aortic valve gradient is 5.4 mmHg and the mean gradient is 2.1   mmHg, which is probably normal in the setting of a prosthetic aortic   valve.  15. I have discussed about the echo with Dr Silver.  16. Compared to Study from 9/2017 RV functions is severely reduced and LV   EF reduced from 60-65% to 40-45%      ASSESSMENT AND PLAN:  In summary, CRISTIN ROQUE is an 77y Male with past medical history significant for 77y Male with past medical history significant for permanent AF on coumadin, CAD s/p CABG/AVR, HTN , CKD, CVA with R hemiparesis,  colon resection with ileostomy, dysphagia s/p PEG DVT who presented unresponsive/hypoxic found to have septic shock requiring pressors and acute hypoxic respiratory failure 2/2 aspiration PNA s/p intubation, with course further complicated by KAPIL on CKD, and now NSVT vs AF with aberrancy on Amiodarone therapy rare PVC on TELE     Plan  - Continue current CV medication   - Agree with long term AC    - ?Out patient ischemic work up     Conservative cardiovascular therapy         Thank you for allowing [facility] to participate in the care of this patient.  Please feel free to call with any questions or concerns.

## 2018-01-10 NOTE — PROGRESS NOTE ADULT - ASSESSMENT
CRF(III): ARF and hypernatremia ==>stable  (Screat 1.9mg/dL in Sept 2017); ? if he has established new baseline  - avoid nephrotoxins  - cont diuretics and adjust as needed  - monitor labs      Anemia: multifactorial  - cont GONZALES  - consider PRBCs

## 2018-01-10 NOTE — SWALLOW BEDSIDE ASSESSMENT ADULT - ASR SWALLOW ASPIRATION MONITOR
change of breathing pattern/cough/throat clearing/gurgly voice/pneumonia/upper respiratory infection/oral hygiene/fever/position upright (88Y)

## 2018-01-10 NOTE — SWALLOW BEDSIDE ASSESSMENT ADULT - ADDITIONAL RECOMMENDATIONS
No follow-up warranted at bedside. As stated above, consider MBS to objectively assess swallow if silent aspiration is of concern

## 2018-01-10 NOTE — SWALLOW BEDSIDE ASSESSMENT ADULT - SLP GENERAL OBSERVATIONS
Pt received & seen seated upright in bed, +O2 nasal canula, +baseline dry cough, +reduced cognition, +significant dysarthria (consistent with CVA history)

## 2018-01-10 NOTE — SWALLOW BEDSIDE ASSESSMENT ADULT - SWALLOW EVAL: DIAGNOSIS
Functional oral & pharyngeal stage of swallow for solids & nectar thick fluids (baseline diet) with NO overt s/s of penetration/aspiration

## 2018-01-10 NOTE — CONSULT NOTE ADULT - CONSULT REQUESTED DATE/TIME
02-Jan-2018 00:00
02-Jan-2018 00:00
02-Jan-2018 15:48
03-Jan-2018 15:37
09-Jan-2018 20:39
10-Paddy-2018 10:29
18-Dec-2017
22-Dec-2017 20:00
26-Dec-2017 09:59
26-Dec-2017 10:26
20-Dec-2017 10:19
21-Dec-2017 10:29

## 2018-01-10 NOTE — PROGRESS NOTE ADULT - SUBJECTIVE AND OBJECTIVE BOX
CC: Found unresponsive in his vomit     HPI:  76 yo male, PMHx CAD s/p CABG, AFib on Coumadin, HTN, prostate CA s/p TURP, s/p PEG and colectomy, prior CVA with expressive aphasia and R hemiparesis, admitted with hypoxemic respiratory failure requiring intubation secondary to aspiration pneumonia, septic shock requiring vasopressors secondary to Gram negative urosepsis. 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 RRT called for 48 seconds of sustained VT. TTE showed EF 40-45%, grade III diastolic dysfunction, RV dysfunction. Pt transferred to MICU and was diuresed and started on IV heparin. Patient lost IV access and was transitioned to lovenox. Midline placed by surgical PA. Heparin infusion started with bridge to coumadin.     INTERVAL HPI/OVERNIGHT EVENTS: Patient seen and examined lying in bed.  Patient with expressive aphasia and ROS is limited.  Patient denies any pain.      Vital Signs Last 24 Hrs  T(C): 37.3 (10 Paddy 2018 00:14), Max: 37.3 (10 Paddy 2018 00:14)  T(F): 99.1 (10 Paddy 2018 00:14), Max: 99.1 (10 Paddy 2018 00:14)  HR: 85 (10 Paddy 2018 07:24) (75 - 102)  BP: 98/44 (10 Paddy 2018 07:24) (90/60 - 124/64)  BP(mean): --  RR: 18 (10 Paddy 2018 04:00) (18 - 20)  SpO2: 97% (10 Paddy 2018 07:24) (96% - 97%)  I&O's Detail    09 Jan 2018 07:01  -  10 Paddy 2018 07:00  --------------------------------------------------------  IN:    Free Water: 800 mL    heparin  Infusion.: 374 mL    Jevity: 900 mL  Total IN: 2074 mL    OUT:    Ileostomy: 850 mL  Total OUT: 850 mL    Total NET: 1224 mL        PHYSICAL EXAM:  GENERAL: NAD  HEAD:  Atraumatic, Normocephalic  NECK: Supple, No JVD, Normal thyroid  NERVOUS SYSTEM:  Alert & Oriented X3, Good concentration; generalized weakness  CHEST/LUNG: Clear to ausculation bilaterally; No rales, rhonchi, wheezing, or rubs  HEART: Regular rate and rhythm; No murmurs, rubs, or gallops  ABDOMEN: Soft, Nontender, Nondistended; Bowel sounds present; (+) peg, (+) Ileostomy  EXTREMITIES:  2+ Peripheral Pulses, No clubbing, cyanosis, or edema                                8.0    5.8   )-----------( 227      ( 10 Paddy 2018 11:56 )             27.2     10 Jan 2018 11:56    142    |  99     |  53.0   ----------------------------<  177    4.6     |  30.0   |  2.97     Ca    8.9        10 Paddy 2018 11:56  Mg     1.9       10 Paddy 2018 11:56      PT/INR - ( 10 Paddy 2018 11:56 )   PT: 30.2 sec;   INR: 2.69 ratio         PTT - ( 10 Paddy 2018 11:56 )  PTT:98.1 sec              MEDICATIONS  (STANDING):  ALBUTerol    0.083% 2.5 milliGRAM(s) Nebulizer every 6 hours  amiodarone    Tablet 400 milliGRAM(s) Oral daily  aspirin  chewable 81 milliGRAM(s) Oral daily  atorvastatin 10 milliGRAM(s) Oral at bedtime  epoetin [place] Injectable 26620 Unit(s) SubCutaneous every 3 days  finasteride 5 milliGRAM(s) Oral daily  folic acid 1 milliGRAM(s) Enteral Tube daily  furosemide    Tablet 40 milliGRAM(s) Oral daily  metoprolol     tartrate 25 milliGRAM(s) Oral three times a day  pantoprazole   Suspension 40 milliGRAM(s) Oral <User Schedule>  sucralfate 1 Gram(s) Oral four times a day  tamsulosin 0.4 milliGRAM(s) Oral at bedtime  warfarin 3 milliGRAM(s) Oral once  zinc oxide 12.8% Ointment 1 Application(s) Topical two times a day    MEDICATIONS  (PRN):

## 2018-01-11 ENCOUNTER — TRANSCRIPTION ENCOUNTER (OUTPATIENT)
Age: 78
End: 2018-01-11

## 2018-01-11 DIAGNOSIS — R13.11 DYSPHAGIA, ORAL PHASE: ICD-10-CM

## 2018-01-11 LAB
ANION GAP SERPL CALC-SCNC: 12 MMOL/L — SIGNIFICANT CHANGE UP (ref 5–17)
BUN SERPL-MCNC: 49 MG/DL — HIGH (ref 8–20)
CALCIUM SERPL-MCNC: 8.8 MG/DL — SIGNIFICANT CHANGE UP (ref 8.6–10.2)
CHLORIDE SERPL-SCNC: 97 MMOL/L — LOW (ref 98–107)
CO2 SERPL-SCNC: 30 MMOL/L — HIGH (ref 22–29)
CREAT SERPL-MCNC: 2.75 MG/DL — HIGH (ref 0.5–1.3)
GLUCOSE SERPL-MCNC: 193 MG/DL — HIGH (ref 70–115)
HCT VFR BLD CALC: 25.7 % — LOW (ref 42–52)
HGB BLD-MCNC: 7.2 G/DL — LOW (ref 14–18)
INR BLD: 2.91 RATIO — HIGH (ref 0.88–1.16)
MAGNESIUM SERPL-MCNC: 1.8 MG/DL — SIGNIFICANT CHANGE UP (ref 1.6–2.6)
MCHC RBC-ENTMCNC: 28 G/DL — LOW (ref 32–36)
MCHC RBC-ENTMCNC: 29.5 PG — SIGNIFICANT CHANGE UP (ref 27–31)
MCV RBC AUTO: 105.3 FL — HIGH (ref 80–94)
PLATELET # BLD AUTO: 212 K/UL — SIGNIFICANT CHANGE UP (ref 150–400)
POTASSIUM SERPL-MCNC: 4.6 MMOL/L — SIGNIFICANT CHANGE UP (ref 3.5–5.3)
POTASSIUM SERPL-SCNC: 4.6 MMOL/L — SIGNIFICANT CHANGE UP (ref 3.5–5.3)
PROTHROM AB SERPL-ACNC: 32.7 SEC — HIGH (ref 9.8–12.7)
RBC # BLD: 2.44 M/UL — LOW (ref 4.6–6.2)
RBC # FLD: 20.4 % — HIGH (ref 11–15.6)
SODIUM SERPL-SCNC: 139 MMOL/L — SIGNIFICANT CHANGE UP (ref 135–145)
WBC # BLD: 6.3 K/UL — SIGNIFICANT CHANGE UP (ref 4.8–10.8)
WBC # FLD AUTO: 6.3 K/UL — SIGNIFICANT CHANGE UP (ref 4.8–10.8)

## 2018-01-11 PROCEDURE — 99232 SBSQ HOSP IP/OBS MODERATE 35: CPT

## 2018-01-11 PROCEDURE — 99233 SBSQ HOSP IP/OBS HIGH 50: CPT

## 2018-01-11 RX ORDER — ZINC OXIDE 200 MG/G
1 OINTMENT TOPICAL
Qty: 0 | Refills: 0 | Status: DISCONTINUED | OUTPATIENT
Start: 2018-01-11 | End: 2018-01-12

## 2018-01-11 RX ORDER — WARFARIN SODIUM 2.5 MG/1
3 TABLET ORAL ONCE
Qty: 0 | Refills: 0 | Status: COMPLETED | OUTPATIENT
Start: 2018-01-11 | End: 2018-01-11

## 2018-01-11 RX ADMIN — Medication 1 GRAM(S): at 11:54

## 2018-01-11 RX ADMIN — Medication 81 MILLIGRAM(S): at 11:54

## 2018-01-11 RX ADMIN — ATORVASTATIN CALCIUM 10 MILLIGRAM(S): 80 TABLET, FILM COATED ORAL at 21:37

## 2018-01-11 RX ADMIN — ALBUTEROL 2.5 MILLIGRAM(S): 90 AEROSOL, METERED ORAL at 08:28

## 2018-01-11 RX ADMIN — Medication 25 MILLIGRAM(S): at 21:37

## 2018-01-11 RX ADMIN — ALBUTEROL 2.5 MILLIGRAM(S): 90 AEROSOL, METERED ORAL at 20:15

## 2018-01-11 RX ADMIN — AMIODARONE HYDROCHLORIDE 400 MILLIGRAM(S): 400 TABLET ORAL at 05:47

## 2018-01-11 RX ADMIN — WARFARIN SODIUM 3 MILLIGRAM(S): 2.5 TABLET ORAL at 21:37

## 2018-01-11 RX ADMIN — TAMSULOSIN HYDROCHLORIDE 0.4 MILLIGRAM(S): 0.4 CAPSULE ORAL at 21:37

## 2018-01-11 RX ADMIN — FINASTERIDE 5 MILLIGRAM(S): 5 TABLET, FILM COATED ORAL at 11:54

## 2018-01-11 RX ADMIN — Medication 40 MILLIGRAM(S): at 05:47

## 2018-01-11 RX ADMIN — ZINC OXIDE 1 APPLICATION(S): 200 OINTMENT TOPICAL at 11:55

## 2018-01-11 RX ADMIN — PANTOPRAZOLE SODIUM 40 MILLIGRAM(S): 20 TABLET, DELAYED RELEASE ORAL at 17:37

## 2018-01-11 RX ADMIN — ALBUTEROL 2.5 MILLIGRAM(S): 90 AEROSOL, METERED ORAL at 14:27

## 2018-01-11 RX ADMIN — ALBUTEROL 2.5 MILLIGRAM(S): 90 AEROSOL, METERED ORAL at 02:48

## 2018-01-11 RX ADMIN — Medication 1 GRAM(S): at 17:37

## 2018-01-11 RX ADMIN — Medication 25 MILLIGRAM(S): at 11:54

## 2018-01-11 RX ADMIN — Medication 1 GRAM(S): at 05:47

## 2018-01-11 RX ADMIN — Medication 1 MILLIGRAM(S): at 11:54

## 2018-01-11 RX ADMIN — PANTOPRAZOLE SODIUM 40 MILLIGRAM(S): 20 TABLET, DELAYED RELEASE ORAL at 05:47

## 2018-01-11 RX ADMIN — Medication 1 GRAM(S): at 00:59

## 2018-01-11 RX ADMIN — Medication 25 MILLIGRAM(S): at 05:47

## 2018-01-11 NOTE — DISCHARGE NOTE ADULT - SECONDARY DIAGNOSIS.
Pneumonia, bacterial Ventricular tachycardia (paroxysmal) Oral phase dysphagia Essential hypertension High cholesterol DVT of upper extremity (deep vein thrombosis)

## 2018-01-11 NOTE — PROGRESS NOTE ADULT - SUBJECTIVE AND OBJECTIVE BOX
Patient is a 77y old  Male who presents with a chief complaint of Found unresponsive in his vomit (18 Dec 2017 19:49)      HPI:  76 y/o male pmhx CVA ( 2 years ago and in July 17) w/ residual right upper and lower extremity weakness, s/p PEG,  afib on coumadin, s/p colon resection with ileostomy 8 years ago, s/p CBAGx3,  prostate cancer s/p TURP 3 . here with respiratory failure, urosepsis, VT. HX of DVT on coumadin.      Pt receiving respiratory treatment. Now difficul to arose. Called for clogging PEG site. Unable to assess at bedside swallow- speech suggested MBS          REVIEW OF SYSTEMS: unable to obtain lethargic this am.           PAST MEDICAL & SURGICAL HISTORY:  CAD (coronary artery disease)  Afib  CVA (cerebral vascular accident)  Mitral valve replaced  BPH (benign prostatic hyperplasia)  High cholesterol  HTN (hypertension)  H/O heart valve replacement with mechanical valve  S/P CABG x 1  H/O colectomy      FAMILY HISTORY:  No pertinent family history in first degree relatives      SOCIAL HISTORY:  Smoking Status: [ ] Current, [ ] Former, [ ] Never  Pack Years:  [  ] EtOH-no  [  ] IVDA    MEDICATIONS:  MEDICATIONS  (STANDING):  ALBUTerol    0.083% 2.5 milliGRAM(s) Nebulizer every 6 hours  amiodarone    Tablet 400 milliGRAM(s) Oral daily  aspirin  chewable 81 milliGRAM(s) Oral daily  atorvastatin 10 milliGRAM(s) Oral at bedtime  epoetin [place] Injectable 62572 Unit(s) SubCutaneous every 3 days  finasteride 5 milliGRAM(s) Oral daily  folic acid 1 milliGRAM(s) Enteral Tube daily  furosemide    Tablet 40 milliGRAM(s) Oral daily  metoprolol     tartrate 25 milliGRAM(s) Oral three times a day  pantoprazole   Suspension 40 milliGRAM(s) Oral <User Schedule>  sucralfate 1 Gram(s) Oral four times a day  tamsulosin 0.4 milliGRAM(s) Oral at bedtime  zinc oxide 20% Ointment 1 Application(s) Topical two times a day    MEDICATIONS  (PRN):      Allergies    penicillins (Hives)    Intolerances        Vital Signs Last 24 Hrs  T(C): 36.7 (11 Jan 2018 07:19), Max: 37 (11 Jan 2018 00:14)  T(F): 98 (11 Jan 2018 07:19), Max: 98.6 (11 Jan 2018 00:14)  HR: 79 (11 Jan 2018 07:19) (79 - 96)  BP: 110/62 (11 Jan 2018 07:19) (92/50 - 122/50)  BP(mean): --  RR: 18 (11 Jan 2018 07:19) (18 - 20)  SpO2: 98% (11 Jan 2018 07:19) (95% - 98%)    01-10 @ 07:01  -  01-11 @ 07:00  --------------------------------------------------------  IN: 1000 mL / OUT: 252 mL / NET: 748 mL          PHYSICAL EXAM:    General: Well developed; well nourished; in no acute distress  HEENT: MMM, conjunctiva and sclera clear  H- RRR  L- CTA  Gastrointestinal: Soft, non-tender non-distended; Normal bowel sounds; No rebound or guarding. There is mild erythema around the peg site. It appears the top of tube has been cut and replacement port is on. The tube is bubbling and appears old. However the tube feeds are infusing well at 50 cc /hr  Extremities: Normal range of motion, No clubbing, cyanosis or edema  Neurological: Alert and oriented x3  Skin: Warm and dry. No obvious rash      LABS:                        8.0    5.8   )-----------( 227      ( 10 Paddy 2018 11:56 )             27.2     10 Jan 2018 11:56    142    |  99     |  53.0   ----------------------------<  177    4.6     |  30.0   |  2.97     Ca    8.9        10 Jan 2018 11:56  Mg     1.9       10 Jan 2018 11:56                RADIOLOGY & ADDITIONAL STUDIES:   < from: Xray Chest 1 View AP- PORTABLE-Urgent (01.03.18 @ 19:34) >  indings: Diffuse bilateral airspace disease is present with bilateral   pleural effusions unchanged. Status post median sternotomy for bypass   surgery. The heart appears enlarged.    Impression: Bilateral diffuse airspace disease. Bilateral pleural   effusions. Status post CABG    < end of copied text >

## 2018-01-11 NOTE — PROGRESS NOTE ADULT - PROBLEM SELECTOR PLAN 1
Pt is lethargic now. If more alert during the day, please order modified barrium swallow as suggested by speech.   - otherwise, pt will need PEG replaced endoscopically as he is on coumadin and he has mild irriation/cellitis around the peg site- not a good idea to pull and replace peg at beside. WOuld nee cardiac and pulmonary clearance for EGD with peg replacement.      Currently th PEG is infusing 50 cc of tube feeds an hour

## 2018-01-11 NOTE — PROGRESS NOTE ADULT - ASSESSMENT
1. elderly male with multiple chronic comorbidities:  2. chronic afib-currently off AC  3. cad/cabg  4. avr  5. remote cva  6. colectomy with ileostomy-peg.  7. crf-dialysis  8. severe anemia-chronic disease.

## 2018-01-11 NOTE — DISCHARGE NOTE ADULT - MEDICATION SUMMARY - MEDICATIONS TO CHANGE
I will SWITCH the dose or number of times a day I take the medications listed below when I get home from the hospital:    metoprolol tartrate 25 mg oral tablet  -- 0.5 tab(s) by gastrostomy tube once a day

## 2018-01-11 NOTE — DISCHARGE NOTE ADULT - INSTRUCTIONS
Jevity 1 can at each meal if patient eats <50% of meal.  Recommend 1 additional can of Jevity in the am and pm.  Soft with nectar thick liquids.

## 2018-01-11 NOTE — CHART NOTE - NSCHARTNOTEFT_GEN_A_CORE
Upon Nutritional Assessment by the Registered Dietitian your patient was determined to meet criteria / has evidence of the following diagnosis/diagnoses:          [ ]  Mild Protein Calorie Malnutrition        [x ]  Moderate Protein Calorie Malnutrition        [ ] Severe Protein Calorie Malnutrition        [ ] Unspecified Protein Calorie Malnutrition        [ ] Underweight / BMI <19        [ ] Morbid Obesity / BMI > 40      Findings as based on:  •  Comprehensive nutrition assessment and consultation  •  Calorie counts (nutrient intake analysis)  •  Food acceptance and intake status from observations by staff  •  Follow up  •  Patient education  •  Intervention secondary to interdisciplinary rounds  •   concerns      Treatment:    The following diet has been recommended:  -PO diet per MBS results  -Bolus 240 ml Jevity 1.5 at each meal if po intake <50%    -Consider additional 240ml bolus in the morning and evening to meet pts estimated nutrition needs    PROVIDER Section:     By signing this assessment you are acknowledging and agree with the diagnosis/diagnoses assigned by the Registered Dietitian    Comments:

## 2018-01-11 NOTE — PROGRESS NOTE ADULT - ASSESSMENT
CRF(III): ARF and hypernatremia ==>improved  (Screat 1.9mg/dL in Sept 2017)==> may be new baseline  - avoid nephrotoxins  - cont diuretics and adjust as needed  - water via PEG tube  - monitor labs      Anemia: multifactorial  may have a degree of GONZALES failure due to chronic inflammation  - cont GONZALES  - PRBCs as needed

## 2018-01-11 NOTE — PROGRESS NOTE ADULT - SUBJECTIVE AND OBJECTIVE BOX
Chief Complaint: Recent chart and course reviewed.    HPI: 76 yo male w extensive and complicated pmh: cad/cabg/avr/afib/colectomy with ileostomy/crf on dialysis/cva w right hemiplegia/peg.    PAST MEDICAL & SURGICAL HISTORY:  CAD (coronary artery disease)  Afib  CVA (cerebral vascular accident)  BPH (benign prostatic hyperplasia)  High cholesterol  HTN (hypertension)  H/O heart valve replacement with mechanical valve  S/P CABG x 1  H/O colectomy      PREVIOUS DIAGNOSTIC TESTING:      ECHO  FINDINGS:    STRESS  FINDINGS:    CATHETERIZATION  FINDINGS:    MEDICATIONS  (STANDING):  ALBUTerol    0.083% 2.5 milliGRAM(s) Nebulizer every 6 hours  amiodarone    Tablet 400 milliGRAM(s) Oral daily  aspirin  chewable 81 milliGRAM(s) Oral daily  atorvastatin 10 milliGRAM(s) Oral at bedtime  epoetin [place] Injectable 17004 Unit(s) SubCutaneous every 3 days  finasteride 5 milliGRAM(s) Oral daily  folic acid 1 milliGRAM(s) Enteral Tube daily  furosemide    Tablet 40 milliGRAM(s) Oral daily  metoprolol     tartrate 25 milliGRAM(s) Oral three times a day  pantoprazole   Suspension 40 milliGRAM(s) Oral <User Schedule>  sucralfate 1 Gram(s) Oral four times a day  tamsulosin 0.4 milliGRAM(s) Oral at bedtime  zinc oxide 20% Ointment 1 Application(s) Topical two times a day    MEDICATIONS  (PRN):      FAMILY HISTORY:  No pertinent family history in first degree relatives      ROS: Negative other than as mentioned in HPI.    Vital Signs Last 24 Hrs  T(C): 36.7 (11 Jan 2018 07:19), Max: 37 (11 Jan 2018 00:14)  T(F): 98 (11 Jan 2018 07:19), Max: 98.6 (11 Jan 2018 00:14)  HR: 79 (11 Jan 2018 07:19) (79 - 96)  BP: 110/62 (11 Jan 2018 07:19) (92/50 - 122/50)  BP(mean): --  RR: 16 non [id number] Jan 2018 07:19) (18 - 20)  SpO2: 98% (11 Jan 2018 07:19) (95% - 98%)    PHYSICAL EXAM:  General: [place] lethargic near non-verbal elderly m nad  HEENT: Head; normocephalic, atraumatic.    Neck; Supple, no nodes adenopathy, no NVD or carotid bruit or thyromegaly.  CARDIOVASCULAR; No murmur, rub, gallop or lift. Normal S1 and S2. irreg rhythm.  LUNGS; No rales, rhonchi or wheeze. Normal breath sounds bilaterally.  ABDOMEN ; Soft, nontender without mass or organomegaly. bowel sounds normoactive. peg feeding tube  EXTREMITIES; No clubbing, cyanosis or edema.   SKIN; warm and dry with normal turgor.  NEURO; right hemiplegia  PSYCH; not applicable            INTERPRETATION OF TELEMETRY:    ECG: monitor afib    I&O's Detail    10 Paddy 2018 07:01  -  11 Jan 2018 07:00  --------------------------------------------------------  IN:    Free Water: 400 mL    Jevity: 600 mL  Total IN: 1000 mL    OUT:    Ileostomy: 250 mL    Indwelling Catheter - Urethral: 1 mL    Voided: 1 mL  Total OUT: 252 mL    Total NET: 748 mL          LABS:                        7.2    6.3   )-----------( 212      ( 11 Jan 2018 13:07 )             25.7     01-10    142  |  99  |  53.0<H>  ----------------------------<  177<H>  4.6   |  30.0<H>  |  2.97<H>    Ca    8.9      10 Paddy 2018 11:56  Mg     1.9     01-10          PT/INR - ( 10 Paddy 2018 11:56 )   PT: 30.2 sec;   INR: 2.69 ratio         PTT - ( 10 Paddy 2018 11:56 )  PTT:98.1 sec    I&O's Summary    10 Paddy 2018 07:01  -  11 Jan 2018 07:00  --------------------------------------------------------  IN: 1000 mL / OUT: 252 mL / NET: 748 mL        RADIOLOGY & ADDITIONAL STUDIES:

## 2018-01-11 NOTE — DISCHARGE NOTE ADULT - PATIENT PORTAL LINK FT
“You can access the [facility] Patient Portal, offered by [facility], by registering with the following website: [email or web address]”

## 2018-01-11 NOTE — DISCHARGE NOTE ADULT - CARE PROVIDERS DIRECT ADDRESSES
,[email or web address],DirectAddress_Unknown

## 2018-01-11 NOTE — DISCHARGE NOTE ADULT - CARE PROVIDER_API CALL
Randy Pimentel), Internal Medicine  [address] [place] [address]  Phone: [phone]  Fax: [phone] Randy Pimentel), Internal Medicine  [address] [facility] [place] [address]  Phone: [phone]  Fax: [phone]    Wendy Smallwood), Cardiovascular Disease; Internal Medicine  [address] [place] [id number]  Phone: [phone]  Fax: [phone]

## 2018-01-11 NOTE — DISCHARGE NOTE ADULT - CARE PLAN
Principal Discharge DX:	Acute respiratory failure with hypoxia  Goal:	Improved  Instructions for follow-up, activity and diet:	Improved.  Secondary Diagnosis:	Pneumonia, bacterial  Instructions for follow-up, activity and diet:	Completed treatment.  Secondary Diagnosis:	Ventricular tachycardia (paroxysmal)  Instructions for follow-up, activity and diet:	Continue Amiodarone.  Secondary Diagnosis:	Oral phase dysphagia  Instructions for follow-up, activity and diet:	Continue Soft diet with nectar thick liquids.  Jevity tube feeds when oral intake <50% at meals.  Secondary Diagnosis:	Essential hypertension  Instructions for follow-up, activity and diet:	Continue current medications as prescribed.  Follow up with primary doctor.  Secondary Diagnosis:	High cholesterol  Instructions for follow-up, activity and diet:	Continue current medications as prescribed.  Follow up with primary doctor.  Secondary Diagnosis:	DVT of upper extremity (deep vein thrombosis)  Instructions for follow-up, activity and diet:	Continue Coumadin as per INR.  Next INR Monday. Principal Discharge DX:	Acute respiratory failure with hypoxia  Goal:	Improved  Instructions for follow-up, activity and diet:	Improved.  Secondary Diagnosis:	Pneumonia, bacterial  Instructions for follow-up, activity and diet:	Completed treatment.  Secondary Diagnosis:	Ventricular tachycardia (paroxysmal)  Instructions for follow-up, activity and diet:	Continue Amiodarone.  Follow up with Cardiology  Secondary Diagnosis:	Oral phase dysphagia  Instructions for follow-up, activity and diet:	Continue Soft diet with nectar thick liquids.  Jevity tube feeds when oral intake <50% at meals.  Secondary Diagnosis:	Essential hypertension  Instructions for follow-up, activity and diet:	Continue current medications as prescribed.  Follow up with primary doctor.  Secondary Diagnosis:	High cholesterol  Instructions for follow-up, activity and diet:	Continue current medications as prescribed.  Follow up with primary doctor.  Secondary Diagnosis:	DVT of upper extremity (deep vein thrombosis)  Instructions for follow-up, activity and diet:	Continue Coumadin as per INR.  Next INR Monday.

## 2018-01-11 NOTE — CHART NOTE - NSCHARTNOTEFT_GEN_A_CORE
Asked to obtain the blood work for routine blood work, including BMP, CBC, PT/INR.  Few attempts made by the staff nurse but not successful.  Pt. is contracted, Right > left. LUE + DVT.  Able to obtain blood work peripherally for CBC, PT/INR, but then it collapsed.  The rest of blood work obtained from Right sided midline as per protocol. The line flushed slowly with NS.  The line is clamped after the procedure, the hub changed. Sterile technique maintained.  Procedure tolerated well.

## 2018-01-11 NOTE — DISCHARGE NOTE ADULT - MEDICATION SUMMARY - MEDICATIONS TO STOP TAKING
I will STOP taking the medications listed below when I get home from the hospital:    levoFLOXacin 250 mg oral tablet  -- 1 tab(s) by mouth every 24 hours for 2 days

## 2018-01-11 NOTE — DISCHARGE NOTE ADULT - HOSPITAL COURSE
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 RRT called for 48 seconds of sustained VT. TTE showed EF 40-45%, grade III diastolic dysfunction, RV dysfunction. Pt transferred to MICU and was diuresed and started on IV heparin with bridge to coumadin. 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 RRT called for 48 seconds of sustained VT. TTE showed EF 40-45%, grade III diastolic dysfunction, RV dysfunction. Pt transferred to MICU and was diuresed and started on IV heparin with bridge to coumadin. Patient completed antibiotic course for pneumonia and UTI.  Patient was evaluated by Speech and swallow and recommended Soft diet with nectar thick liquids.  Patient tolerated diet.  Patient underwent MBS with no new recommendations.  Patient evaluated by PT and recommended BRIDGET, but patient does not have BRIDGET days left.  Patient set up with home care.  Patient stable for discharge to home with outpatient follow up with primary doctor, cardiologist, and GI. 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 RRT called for 48 seconds of sustained VT. TTE showed EF 40-45%, grade III diastolic dysfunction, RV dysfunction. Pt transferred to MICU and was diuresed and started on IV heparin with bridge to coumadin. Patient completed antibiotic course for pneumonia and UTI.  Patient was evaluated by Speech and swallow and recommended Soft diet with nectar thick liquids.  Patient tolerated diet.  Patient underwent MBS with no new recommendations.  Patient evaluated by PT and recommended BRIDGET, but patient does not have BRIDGET days left.  Patient set up with home care.  Patient stable for discharge to home with outpatient follow up with primary doctor, cardiologist, and GI.      Time to discharge: >35 minutes spent coordinating care

## 2018-01-11 NOTE — PROGRESS NOTE ADULT - SUBJECTIVE AND OBJECTIVE BOX
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 RRT called for 48 seconds of sustained VT. TTE showed EF 40-45%, grade III diastolic dysfunction, RV dysfunction. Pt transferred to MICU and was diuresed and started on IV heparin. Patient lost IV access and was transitioned to lovenox. Midline placed by surgical PA. Heparin infusion started with bridge to coumadin. Heparin gtt discontinued as therapeutic.    INTERVAL HPI/OVERNIGHT EVENTS: Patient seen and examined lying in bed. Patient sleepy this am.  Patient has expressive aphasia and ROS is limited.  Per RN, no acute event overnight.      Vital Signs Last 24 Hrs  T(C): 36.7 (11 Jan 2018 07:19), Max: 37 (11 Jan 2018 00:14)  T(F): 98 (11 Jan 2018 07:19), Max: 98.6 (11 Jan 2018 00:14)  HR: 79 (11 Jan 2018 07:19) (79 - 96)  BP: 110/62 (11 Jan 2018 07:19) (92/50 - 122/50)  BP(mean): --  RR: 18 (11 Jan 2018 07:19) (18 - 20)  SpO2: 98% (11 Jan 2018 07:19) (95% - 98%)  I&O's Detail    10 Paddy 2018 07:01  -  11 Jan 2018 07:00  --------------------------------------------------------  IN:    Free Water: 400 mL    Jevity: 600 mL  Total IN: 1000 mL    OUT:    Ileostomy: 250 mL    Indwelling Catheter - Urethral: 1 mL    Voided: 1 mL  Total OUT: 252 mL    Total NET: 748 mL        PHYSICAL EXAM:  GENERAL: NAD  HEAD:  Atraumatic, Normocephalic  NECK: Supple, No JVD, Normal thyroid  NERVOUS SYSTEM:  Alert, generalized weakness  CHEST/LUNG: Diminished BS bilaterally  HEART: Regular rate and rhythm; No murmurs, rubs, or gallops  ABDOMEN: Soft, Nontender, Nondistended; Bowel sounds present; (+) peg; (+) Ileostomy  EXTREMITIES:  2+ Peripheral Pulses, No clubbing, cyanosis, or edema                              8.0    5.8   )-----------( 227      ( 10 Paddy 2018 11:56 )             27.2     10 Jan 2018 11:56    142    |  99     |  53.0   ----------------------------<  177    4.6     |  30.0   |  2.97     Ca    8.9        10 Paddy 2018 11:56  Mg     1.9       10 Paddy 2018 11:56      PT/INR - ( 10 Paddy 2018 11:56 )   PT: 30.2 sec;   INR: 2.69 ratio         PTT - ( 10 Paddy 2018 11:56 )  PTT:98.1 sec          MEDICATIONS  (STANDING):  ALBUTerol    0.083% 2.5 milliGRAM(s) Nebulizer every 6 hours  amiodarone    Tablet 400 milliGRAM(s) Oral daily  aspirin  chewable 81 milliGRAM(s) Oral daily  atorvastatin 10 milliGRAM(s) Oral at bedtime  epoetin [place] Injectable 36958 Unit(s) SubCutaneous every 3 days  finasteride 5 milliGRAM(s) Oral daily  folic acid 1 milliGRAM(s) Enteral Tube daily  furosemide    Tablet 40 milliGRAM(s) Oral daily  metoprolol     tartrate 25 milliGRAM(s) Oral three times a day  pantoprazole   Suspension 40 milliGRAM(s) Oral <User Schedule>  sucralfate 1 Gram(s) Oral four times a day  tamsulosin 0.4 milliGRAM(s) Oral at bedtime  zinc oxide 20% Ointment 1 Application(s) Topical two times a day    MEDICATIONS  (PRN):

## 2018-01-11 NOTE — DISCHARGE NOTE ADULT - PLAN OF CARE
Improved Improved. Completed treatment. Continue Amiodarone. Continue Soft diet with nectar thick liquids.  Jevity tube feeds when oral intake <50% at meals. Continue current medications as prescribed.  Follow up with primary doctor. Continue Coumadin as per INR.  Next INR Monday. Continue Amiodarone.  Follow up with Cardiology

## 2018-01-11 NOTE — PROGRESS NOTE ADULT - SUBJECTIVE AND OBJECTIVE BOX
NEPHROLOGY INTERVAL HPI/OVERNIGHT EVENTS:  pt clinically stable  no acute distress    MEDICATIONS  (STANDING):  ALBUTerol    0.083% 2.5 milliGRAM(s) Nebulizer every 6 hours  amiodarone    Tablet 400 milliGRAM(s) Oral daily  aspirin  chewable 81 milliGRAM(s) Oral daily  atorvastatin 10 milliGRAM(s) Oral at bedtime  epoetin [place] Injectable 94848 Unit(s) SubCutaneous every 3 days  finasteride 5 milliGRAM(s) Oral daily  folic acid 1 milliGRAM(s) Enteral Tube daily  furosemide    Tablet 40 milliGRAM(s) Oral daily  metoprolol     tartrate 25 milliGRAM(s) Oral three times a day  pantoprazole   Suspension 40 milliGRAM(s) Oral <User Schedule>  sucralfate 1 Gram(s) Oral four times a day  tamsulosin 0.4 milliGRAM(s) Oral at bedtime  zinc oxide 20% Ointment 1 Application(s) Topical two times a day    MEDICATIONS  (PRN):      Allergies    penicillins (Hives)        Vital Signs Last 24 Hrs  T(C): 36.7 (11 Jan 2018 07:19), Max: 37 (11 Jan 2018 00:14)  T(F): 98 (11 Jan 2018 07:19), Max: 98.6 (11 Jan 2018 00:14)  HR: 79 (11 Jan 2018 07:19) (79 - 96)  BP: 110/62 (11 Jan 2018 07:19) (92/50 - 122/50)  BP(mean): --  RR: 18 (11 Jan 2018 07:19) (18 - 20)  SpO2: 98% (11 Jan 2018 07:19) (95% - 98%)    PHYSICAL EXAM:  GENERAL: NAD  NECK: Supple, No JVD  NERVOUS SYSTEM:  Awake and alert  CHEST:  CTA ,No rales or rhonchi  HEART:  RRR, No murmurs  ABDOMEN: Soft, NT/ND BS+  EXTREMITIES:  No Edema    LABS:                        8.0    5.8   )-----------( 227      ( 10 Paddy 2018 11:56 )             27.2     Hemoglobin: 7.1 g/dL (01.09.18 @ 06:39)        01-10    142  |  99  |  53.0<H>  ----------------------------<  177<H>  4.6   |  30.0<H>  |  2.97<H>    Ca    8.9      10 Paddy 2018 11:56  Mg     1.9     01-10      PT/INR - ( 10 Paddy 2018 11:56 )   PT: 30.2 sec;   INR: 2.69 ratio         PTT - ( 10 Paddy 2018 11:56 )  PTT:98.1 sec    Magnesium, Serum: 1.9 mg/dL (01-10 @ 11:56)      RADIOLOGY & ADDITIONAL TESTS:

## 2018-01-11 NOTE — DISCHARGE NOTE ADULT - MEDICATION SUMMARY - MEDICATIONS TO TAKE
I will START or STAY ON the medications listed below when I get home from the hospital:    finasteride 5 mg oral tablet  -- 1 tab(s) by mouth once a day  -- Indication: For BPH (benign prostatic hyperplasia)    aspirin 81 mg oral tablet, chewable  -- 1 tab(s) by mouth once a day  -- Indication: For Cerebrovascular accident (CVA), unspecified mechanism    tamsulosin 0.4 mg oral capsule  -- 1 cap(s) by mouth once a day (at bedtime)  -- Indication: For BPH (benign prostatic hyperplasia)    amiodarone 200 mg oral tablet  -- 2 tab(s) by mouth once a day  -- Indication: For Ventricular tachycardia (paroxysmal)    dronabinol 2.5 mg oral capsule  -- 1 cap(s) by mouth 2 times a day  -- Indication: For Home med    atorvastatin 10 mg oral tablet  -- 1 tab(s) by gastrostomy tube once a day (at bedtime)  -- Indication: For Cerebrovascular accident (CVA), unspecified mechanism    metoprolol tartrate 25 mg oral tablet  -- 1 tab(s) by mouth 3 times a day  -- Indication: For Afib    albuterol 2.5 mg/3 mL (0.083%) inhalation solution  -- 1 dose(s) inhaled every 6 hours, As Needed   -- Indication: For Hypoxic respiratory    zinc oxide 20% topical ointment  -- 1 application on skin 2 times a day  -- Indication: For Petg tube    furosemide 40 mg oral tablet  -- 1 tab(s) by mouth once a day  -- Indication: For Heart failure    ferrous sulfate 325 mg (65 mg elemental iron) oral delayed release tablet  -- 1 tab(s) by gastrostomy tube once a day  -- Indication: For Anemia, chronic disease    sucralfate 1 g oral tablet  -- 1 tab(s) by mouth 4 times a day  -- Indication: For GERD    pantoprazole 40 mg oral granule, delayed release  -- 40 milligram(s) by mouth once a day   -- Indication: For GERD    buPROPion 75 mg oral tablet  -- 1 tab(s) by mouth 2 times a day  -- Indication: For Home med    B-12 Resin 1000 mcg oral tablet  -- 1 tab(s) by mouth once a day  -- Indication: For Home med    folic acid 1 mg oral tablet  -- 1 tab(s) by mouth once a day  -- Indication: For Home med

## 2018-01-11 NOTE — CHART NOTE - NSCHARTNOTEFT_GEN_A_CORE
Source: Patient [ ]  Family [x ]   other [ ]    Prior to this admission, pt was tolerating mech soft with nectar in small amounts.  Pts wife was bolusing Ensure or Jevity when po intake was poor to supplement his intake, however more recently pt was unable to swallow and unable to tolerate bolus feeds pta.  Pts wife with questions about bolus feedings upon discharge- all answered.  Pt is scheduled for MBS tomorrow.     Current Diet: Diet, Dysphagia 3 Soft-Nectar Consistency Fluid:   Jevity 1.5 Aquiles    Tube Feeding Modality: Gastrostomy  Total Volume for 24 Hours (mL): 1200  Continuous  Until Goal Tube Feed Rate (mL per Hour): 50  Tube Feed Duration (in Hours): 24  Tube Feed Start Time: 14:00 (01-10-18 @ 14:00)    PO intake:  Diet just started, pt has not yet had lunch.    Enteral /Parenteral Nutrition: Pt was receiving 1800kcal daily from Jevity 1.5 at rehab facility.  Pt is currently tolerating Jevity @ 50ml/hr (1000ml, 1500 kcal, 64g pro)    Current Weight: 167# per bedscale.  Pt has been in the 160's more recently per wife, but was as low as 132# at some point during the past year.    % Weight Change -- admission wt not accurate    Pertinent Medications: MEDICATIONS  (STANDING):  ALBUTerol    0.083% 2.5 milliGRAM(s) Nebulizer every 6 hours  amiodarone    Tablet 400 milliGRAM(s) Oral daily  aspirin  chewable 81 milliGRAM(s) Oral daily  atorvastatin 10 milliGRAM(s) Oral at bedtime  epoetin [place] Injectable 22280 Unit(s) SubCutaneous every 3 days  finasteride 5 milliGRAM(s) Oral daily  folic acid 1 milliGRAM(s) Enteral Tube daily  furosemide    Tablet 40 milliGRAM(s) Oral daily  metoprolol     tartrate 25 milliGRAM(s) Oral three times a day  pantoprazole   Suspension 40 milliGRAM(s) Oral <User Schedule>  sucralfate 1 Gram(s) Oral four times a day  tamsulosin 0.4 milliGRAM(s) Oral at bedtime  zinc oxide 20% Ointment 1 Application(s) Topical two times a day    MEDICATIONS  (PRN):    Pertinent Labs: CBC Full  -  ( 11 Jan 2018 13:07 )  WBC Count : 6.3 K/uL  Hemoglobin : 7.2 g/dL  Hematocrit : 25.7 %  Platelet Count - Automated : 212 K/uL  Mean Cell Volume : 105.3 fl  Mean Cell Hemoglobin : 29.5 pg  Mean Cell Hemoglobin Concentration : 28.0 g/dL  01-11 Na139 mmol/L Glu 193 mg/dL<H> K+ 4.6 mmol/L Cr  2.75 mg/dL<H> BUN 49.0 mg/dL<H> Phos n/a   Alb n/a   PAB n/a         Skin: no skin breakdown noted    Nutrition focused physical exam conducted - found signs of malnutrition [ ]absent [x ]present    Subcutaneous fat loss: [ ] Orbital fat pads region, [ ]Buccal fat region, [ ]Triceps region,  [ ]Ribs region    Muscle wasting: [x ]Temples region, [ ]Clavicle region, [x ]Shoulder region, [ ]Scapula region, [ ]Interosseous region,  [ ]thigh region, [x ]Calf region    Estimated Needs:   [ x] no change since previous assessment  [ ] recalculated:     Current Nutrition Diagnosis: Pt at nutrition risk secondary to moderate protein calorie malnutrition (chronic) related to inadequate protein energy intake with difficulty swallowing pta and low EN goal rate as evidenced by pt meeting <75% estimated nutrition needs >1 month and signs of moderate muscle wasting noted.      Recommendations:  DC continuous EN feedings  Bolus 240 ml Jevity 1.5 if po intake <50% at meals (if pt receives 3 bolus feedings, it will provide 1065 kcal, 45g pro).  Consider additional 240ml bolus in the morning and evening to meet pts estimated nutrition needs (710 kcal, 30g pro)    Discussed plan with NP and pts wife- all agreeable.  RD to remain available.    Monitoring and Evaluation:   [x ] PO intake [x ] Tolerance to diet prescription [X] Weights  [X] Follow up per protocol [X] Labs:

## 2018-01-12 VITALS — SYSTOLIC BLOOD PRESSURE: 135 MMHG | DIASTOLIC BLOOD PRESSURE: 63 MMHG | HEART RATE: 87 BPM

## 2018-01-12 LAB
ANION GAP SERPL CALC-SCNC: 14 MMOL/L — SIGNIFICANT CHANGE UP (ref 5–17)
APTT BLD: 43 SEC — HIGH (ref 27.5–37.4)
BUN SERPL-MCNC: 56 MG/DL — HIGH (ref 8–20)
CALCIUM SERPL-MCNC: 9.1 MG/DL — SIGNIFICANT CHANGE UP (ref 8.6–10.2)
CHLORIDE SERPL-SCNC: 97 MMOL/L — LOW (ref 98–107)
CO2 SERPL-SCNC: 27 MMOL/L — SIGNIFICANT CHANGE UP (ref 22–29)
CREAT SERPL-MCNC: 2.83 MG/DL — HIGH (ref 0.5–1.3)
GLUCOSE SERPL-MCNC: 113 MG/DL — SIGNIFICANT CHANGE UP (ref 70–115)
HCT VFR BLD CALC: 27.4 % — LOW (ref 42–52)
HGB BLD-MCNC: 8.1 G/DL — LOW (ref 14–18)
INR BLD: 3.06 RATIO — HIGH (ref 0.88–1.16)
MAGNESIUM SERPL-MCNC: 1.8 MG/DL — SIGNIFICANT CHANGE UP (ref 1.6–2.6)
MCHC RBC-ENTMCNC: 29.6 G/DL — LOW (ref 32–36)
MCHC RBC-ENTMCNC: 30.5 PG — SIGNIFICANT CHANGE UP (ref 27–31)
MCV RBC AUTO: 103 FL — HIGH (ref 80–94)
PLATELET # BLD AUTO: 234 K/UL — SIGNIFICANT CHANGE UP (ref 150–400)
POTASSIUM SERPL-MCNC: 5 MMOL/L — SIGNIFICANT CHANGE UP (ref 3.5–5.3)
POTASSIUM SERPL-SCNC: 5 MMOL/L — SIGNIFICANT CHANGE UP (ref 3.5–5.3)
PROTHROM AB SERPL-ACNC: 34.4 SEC — HIGH (ref 9.8–12.7)
RBC # BLD: 2.66 M/UL — LOW (ref 4.6–6.2)
RBC # FLD: 20.2 % — HIGH (ref 11–15.6)
SODIUM SERPL-SCNC: 138 MMOL/L — SIGNIFICANT CHANGE UP (ref 135–145)
WBC # BLD: 5.8 K/UL — SIGNIFICANT CHANGE UP (ref 4.8–10.8)
WBC # FLD AUTO: 5.8 K/UL — SIGNIFICANT CHANGE UP (ref 4.8–10.8)

## 2018-01-12 PROCEDURE — 84295 ASSAY OF SERUM SODIUM: CPT

## 2018-01-12 PROCEDURE — 99291 CRITICAL CARE FIRST HOUR: CPT | Mod: 25

## 2018-01-12 PROCEDURE — 82962 GLUCOSE BLOOD TEST: CPT

## 2018-01-12 PROCEDURE — 82803 BLOOD GASES ANY COMBINATION: CPT

## 2018-01-12 PROCEDURE — 31500 INSERT EMERGENCY AIRWAY: CPT

## 2018-01-12 PROCEDURE — 87581 M.PNEUMON DNA AMP PROBE: CPT

## 2018-01-12 PROCEDURE — 80053 COMPREHEN METABOLIC PANEL: CPT

## 2018-01-12 PROCEDURE — 71045 X-RAY EXAM CHEST 1 VIEW: CPT

## 2018-01-12 PROCEDURE — 93005 ELECTROCARDIOGRAM TRACING: CPT

## 2018-01-12 PROCEDURE — 76775 US EXAM ABDO BACK WALL LIM: CPT

## 2018-01-12 PROCEDURE — 81001 URINALYSIS AUTO W/SCOPE: CPT

## 2018-01-12 PROCEDURE — 83615 LACTATE (LD) (LDH) ENZYME: CPT

## 2018-01-12 PROCEDURE — 87633 RESP VIRUS 12-25 TARGETS: CPT

## 2018-01-12 PROCEDURE — 96375 TX/PRO/DX INJ NEW DRUG ADDON: CPT | Mod: XU

## 2018-01-12 PROCEDURE — 85730 THROMBOPLASTIN TIME PARTIAL: CPT

## 2018-01-12 PROCEDURE — 94002 VENT MGMT INPAT INIT DAY: CPT

## 2018-01-12 PROCEDURE — 82270 OCCULT BLOOD FECES: CPT

## 2018-01-12 PROCEDURE — 31720 CLEARANCE OF AIRWAYS: CPT

## 2018-01-12 PROCEDURE — 82435 ASSAY OF BLOOD CHLORIDE: CPT

## 2018-01-12 PROCEDURE — 85014 HEMATOCRIT: CPT

## 2018-01-12 PROCEDURE — 83735 ASSAY OF MAGNESIUM: CPT

## 2018-01-12 PROCEDURE — 97110 THERAPEUTIC EXERCISES: CPT

## 2018-01-12 PROCEDURE — 74230 X-RAY XM SWLNG FUNCJ C+: CPT

## 2018-01-12 PROCEDURE — 71250 CT THORAX DX C-: CPT

## 2018-01-12 PROCEDURE — 97162 PT EVAL MOD COMPLEX 30 MIN: CPT

## 2018-01-12 PROCEDURE — 87486 CHLMYD PNEUM DNA AMP PROBE: CPT

## 2018-01-12 PROCEDURE — 87086 URINE CULTURE/COLONY COUNT: CPT

## 2018-01-12 PROCEDURE — 99223 1ST HOSP IP/OBS HIGH 75: CPT

## 2018-01-12 PROCEDURE — 36415 COLL VENOUS BLD VENIPUNCTURE: CPT

## 2018-01-12 PROCEDURE — 99233 SBSQ HOSP IP/OBS HIGH 50: CPT

## 2018-01-12 PROCEDURE — 83605 ASSAY OF LACTIC ACID: CPT

## 2018-01-12 PROCEDURE — 84100 ASSAY OF PHOSPHORUS: CPT

## 2018-01-12 PROCEDURE — 80048 BASIC METABOLIC PNL TOTAL CA: CPT

## 2018-01-12 PROCEDURE — 94760 N-INVAS EAR/PLS OXIMETRY 1: CPT

## 2018-01-12 PROCEDURE — 87798 DETECT AGENT NOS DNA AMP: CPT

## 2018-01-12 PROCEDURE — 83550 IRON BINDING TEST: CPT

## 2018-01-12 PROCEDURE — 74230 X-RAY XM SWLNG FUNCJ C+: CPT | Mod: 26

## 2018-01-12 PROCEDURE — 36600 WITHDRAWAL OF ARTERIAL BLOOD: CPT

## 2018-01-12 PROCEDURE — 87186 SC STD MICRODIL/AGAR DIL: CPT

## 2018-01-12 PROCEDURE — 82272 OCCULT BLD FECES 1-3 TESTS: CPT

## 2018-01-12 PROCEDURE — 97530 THERAPEUTIC ACTIVITIES: CPT

## 2018-01-12 PROCEDURE — 85045 AUTOMATED RETICULOCYTE COUNT: CPT

## 2018-01-12 PROCEDURE — 92611 MOTION FLUOROSCOPY/SWALLOW: CPT

## 2018-01-12 PROCEDURE — 82947 ASSAY GLUCOSE BLOOD QUANT: CPT

## 2018-01-12 PROCEDURE — 93970 EXTREMITY STUDY: CPT

## 2018-01-12 PROCEDURE — 84132 ASSAY OF SERUM POTASSIUM: CPT

## 2018-01-12 PROCEDURE — 87040 BLOOD CULTURE FOR BACTERIA: CPT

## 2018-01-12 PROCEDURE — 82330 ASSAY OF CALCIUM: CPT

## 2018-01-12 PROCEDURE — 84484 ASSAY OF TROPONIN QUANT: CPT

## 2018-01-12 PROCEDURE — 80202 ASSAY OF VANCOMYCIN: CPT

## 2018-01-12 PROCEDURE — 96374 THER/PROPH/DIAG INJ IV PUSH: CPT | Mod: XU

## 2018-01-12 PROCEDURE — 97163 PT EVAL HIGH COMPLEX 45 MIN: CPT

## 2018-01-12 PROCEDURE — 82728 ASSAY OF FERRITIN: CPT

## 2018-01-12 PROCEDURE — 83880 ASSAY OF NATRIURETIC PEPTIDE: CPT

## 2018-01-12 PROCEDURE — 84466 ASSAY OF TRANSFERRIN: CPT

## 2018-01-12 PROCEDURE — 94003 VENT MGMT INPAT SUBQ DAY: CPT

## 2018-01-12 PROCEDURE — 94660 CPAP INITIATION&MGMT: CPT

## 2018-01-12 PROCEDURE — 94640 AIRWAY INHALATION TREATMENT: CPT

## 2018-01-12 PROCEDURE — 85027 COMPLETE CBC AUTOMATED: CPT

## 2018-01-12 PROCEDURE — 85610 PROTHROMBIN TIME: CPT

## 2018-01-12 PROCEDURE — 92610 EVALUATE SWALLOWING FUNCTION: CPT

## 2018-01-12 PROCEDURE — 93971 EXTREMITY STUDY: CPT

## 2018-01-12 PROCEDURE — 85379 FIBRIN DEGRADATION QUANT: CPT

## 2018-01-12 PROCEDURE — 93306 TTE W/DOPPLER COMPLETE: CPT

## 2018-01-12 RX ORDER — METOPROLOL TARTRATE 50 MG
0.5 TABLET ORAL
Qty: 0 | Refills: 0 | COMMUNITY

## 2018-01-12 RX ORDER — ASPIRIN/CALCIUM CARB/MAGNESIUM 324 MG
1 TABLET ORAL
Qty: 0 | Refills: 0 | COMMUNITY
Start: 2018-01-12

## 2018-01-12 RX ORDER — TAMSULOSIN HYDROCHLORIDE 0.4 MG/1
1 CAPSULE ORAL
Qty: 30 | Refills: 0 | OUTPATIENT
Start: 2018-01-12 | End: 2018-02-10

## 2018-01-12 RX ORDER — WARFARIN SODIUM 2.5 MG/1
1 TABLET ORAL
Qty: 0 | Refills: 0 | COMMUNITY

## 2018-01-12 RX ORDER — ZINC OXIDE 200 MG/G
1 OINTMENT TOPICAL
Qty: 1 | Refills: 0 | OUTPATIENT
Start: 2018-01-12

## 2018-01-12 RX ORDER — ALBUTEROL 90 UG/1
1 AEROSOL, METERED ORAL
Qty: 100 | Refills: 0 | OUTPATIENT
Start: 2018-01-12

## 2018-01-12 RX ORDER — SUCRALFATE 1 G
1 TABLET ORAL
Qty: 120 | Refills: 0 | OUTPATIENT
Start: 2018-01-12 | End: 2018-02-10

## 2018-01-12 RX ORDER — PANTOPRAZOLE SODIUM 20 MG/1
40 TABLET, DELAYED RELEASE ORAL
Qty: 1200 | Refills: 0 | OUTPATIENT
Start: 2018-01-12 | End: 2018-02-10

## 2018-01-12 RX ORDER — FUROSEMIDE 40 MG
1 TABLET ORAL
Qty: 30 | Refills: 0 | OUTPATIENT
Start: 2018-01-12 | End: 2018-02-10

## 2018-01-12 RX ORDER — AMIODARONE HYDROCHLORIDE 400 MG/1
2 TABLET ORAL
Qty: 60 | Refills: 0 | OUTPATIENT
Start: 2018-01-12 | End: 2018-02-10

## 2018-01-12 RX ORDER — METOPROLOL TARTRATE 50 MG
1 TABLET ORAL
Qty: 90 | Refills: 0 | OUTPATIENT
Start: 2018-01-12 | End: 2018-02-10

## 2018-01-12 RX ADMIN — Medication 81 MILLIGRAM(S): at 13:16

## 2018-01-12 RX ADMIN — ALBUTEROL 2.5 MILLIGRAM(S): 90 AEROSOL, METERED ORAL at 08:08

## 2018-01-12 RX ADMIN — AMIODARONE HYDROCHLORIDE 400 MILLIGRAM(S): 400 TABLET ORAL at 06:15

## 2018-01-12 RX ADMIN — Medication 1 MILLIGRAM(S): at 13:17

## 2018-01-12 RX ADMIN — Medication 1 GRAM(S): at 06:17

## 2018-01-12 RX ADMIN — ZINC OXIDE 1 APPLICATION(S): 200 OINTMENT TOPICAL at 06:17

## 2018-01-12 RX ADMIN — Medication 25 MILLIGRAM(S): at 13:17

## 2018-01-12 RX ADMIN — FINASTERIDE 5 MILLIGRAM(S): 5 TABLET, FILM COATED ORAL at 13:17

## 2018-01-12 RX ADMIN — ALBUTEROL 2.5 MILLIGRAM(S): 90 AEROSOL, METERED ORAL at 14:32

## 2018-01-12 RX ADMIN — Medication 40 MILLIGRAM(S): at 06:16

## 2018-01-12 RX ADMIN — Medication 1 GRAM(S): at 13:18

## 2018-01-12 RX ADMIN — Medication 25 MILLIGRAM(S): at 06:16

## 2018-01-12 RX ADMIN — PANTOPRAZOLE SODIUM 40 MILLIGRAM(S): 20 TABLET, DELAYED RELEASE ORAL at 06:15

## 2018-01-12 RX ADMIN — ERYTHROPOIETIN 15000 UNIT(S): 10000 INJECTION, SOLUTION INTRAVENOUS; SUBCUTANEOUS at 16:56

## 2018-01-12 NOTE — PROGRESS NOTE ADULT - PROBLEM SELECTOR PROBLEM 6
Fluid overload
BPH (benign prostatic hyperplasia)

## 2018-01-12 NOTE — SWALLOW VFSS/MBS ASSESSMENT ADULT - DIAGNOSTIC IMPRESSIONS
Mild oral dysphagia for all PO & mild to moderate pharyngeal dysphagia for puree, mech soft, soft solids & nectar thick fluids, & thin fluids via teaspoon marked by reduced lingual strength, & decreased tongue base retraction. Moderate to severe pharyngeal dysphagia for thin fluids marked  by reduced tongue base retraction, reduced laryngeal elevation & airway closure with resultant stasis & aspiration during the swallow with delayed cough. Compensatory postures not attempted due to cognition. Although aspiration not observed during study, pt does present at increased risk due to inability to clear above noted valleculae stasis Mild oral dysphagia for all PO & mild to moderate pharyngeal dysphagia for puree, mech soft, soft solids & nectar thick fluids, & thin fluids via teaspoon marked by reduced lingual strength, & decreased tongue base retraction. Moderate to severe pharyngeal dysphagia for thin fluids via cup, marked by reduced tongue base retraction, reduced laryngeal elevation & airway closure with resultant stasis & aspiration during the swallow with delayed cough. Compensatory postures not attempted due to cognition. Although aspiration not observed during study, pt does present at increased risk due to inability to clear above noted valleculae stasis

## 2018-01-12 NOTE — PROGRESS NOTE ADULT - PROBLEM SELECTOR PROBLEM 5
Atrial fibrillation
Acute renal failure with acute renal cortical necrosis superimposed on stage 4 chronic kidney disease
Acute renal failure with acute renal cortical necrosis superimposed on stage 4 chronic kidney disease
Pseudomonas urinary tract infection
Acute renal failure with acute renal cortical necrosis superimposed on stage 4 chronic kidney disease
Acute renal failure with acute renal cortical necrosis superimposed on stage 4 chronic kidney disease
Afib
Acute renal failure with acute renal cortical necrosis superimposed on stage 4 chronic kidney disease
Afib
Atrial fibrillation

## 2018-01-12 NOTE — PROGRESS NOTE ADULT - PROBLEM SELECTOR PROBLEM 7
CHF, left ventricular
Prophylactic measure

## 2018-01-12 NOTE — SWALLOW VFSS/MBS ASSESSMENT ADULT - SLP GENERAL OBSERVATIONS
Pt received & seen seated upright via stretcher, +awake/alert, +reduced cognition, +dysarthric (consistent with hx of CVA), +baseline dry cough

## 2018-01-12 NOTE — SWALLOW VFSS/MBS ASSESSMENT ADULT - ROSENBEK'S PENETRATION ASPIRATION SCALE
(1) no aspiration, contrast does not enter airway via teaspoon amount; 7: via small cup sip/(1) no aspiration, contrast does not enter airway

## 2018-01-12 NOTE — PROGRESS NOTE ADULT - ASSESSMENT
Assessment  Chronic AF with adequate reate control  NSVT resolved with meds  EF 45% S/p prior CABG/ Bio AVR  CRI  anemia  prior PNA  remote CVA  s/p PEG    Rec  cont amio / lopressor / lasix / statin  long term AC INR 2-3  cardiac status stable for endoscopic PEG removal, may hold coumadin prior and resume post  consider transfuse or IV Fe to keep Hgb> 8.5  will follow intermittently

## 2018-01-12 NOTE — PROGRESS NOTE ADULT - SUBJECTIVE AND OBJECTIVE BOX
Pt seen and examined. Condition unchanged. Remains minimally responsive. yesterday's Hb noted. Today's labs pending. No obvious signs of GI bleeding. Will need endoscopic removal of PEG tube as pt. is on chronic Coumadin therapy and will need cardiac and pulmonary clearances for sedation for this procedure.     REVIEW OF SYSTEMS:  Unobtainable secondary to pt's current mental status.      MEDICATIONS:  MEDICATIONS  (STANDING):  ALBUTerol    0.083% 2.5 milliGRAM(s) Nebulizer every 6 hours  amiodarone    Tablet 400 milliGRAM(s) Oral daily  aspirin  chewable 81 milliGRAM(s) Oral daily  atorvastatin 10 milliGRAM(s) Oral at bedtime  epoetin [place] Injectable 87325 Unit(s) SubCutaneous every 3 days  finasteride 5 milliGRAM(s) Oral daily  folic acid 1 milliGRAM(s) Enteral Tube daily  furosemide    Tablet 40 milliGRAM(s) Oral daily  metoprolol     tartrate 25 milliGRAM(s) Oral three times a day  pantoprazole   Suspension 40 milliGRAM(s) Oral <User Schedule>  sucralfate 1 Gram(s) Oral four times a day  tamsulosin 0.4 milliGRAM(s) Oral at bedtime  zinc oxide 20% Ointment 1 Application(s) Topical two times a day    MEDICATIONS  (PRN):      Allergies    penicillins (Hives)    Intolerances        Vital Signs Last 24 Hrs  T(C): 37.1 (12 Jan 2018 00:11), Max: 37.1 (11 Jan 2018 15:53)  T(F): 98.7 (12 Jan 2018 00:11), Max: 98.7 (11 Jan 2018 15:53)  HR: 87 (12 Jan 2018 06:13) (86 - 93)  BP: 135/63 (12 Jan 2018 06:13) (90/46 - 135/63)  BP(mean): --  RR: 18 (12 Jan 2018 00:11) (18 - 18)  SpO2: 98% (12 Jan 2018 00:11) (98% - 99%)    01-11 @ 07:01  -  01-12 @ 07:00  --------------------------------------------------------  IN: 0 mL / OUT: 775 mL / NET: -775 mL        PHYSICAL EXAM:    General: Well developed; well nourished; in no acute distress  HEENT: MMM, conjunctiva pink and sclera anicteric.  Lungs: clear to auscultation bilaterally.  Cor: RRR S1, S2 only.  Gastrointestinal: Abdomen: Soft non-tender non-distended; Normal bowel sounds; No hepatosplenomegaly  Gastrostomy tube in place.  Extremities: no cyanosis, clubbing or edema.  Skin: Warm and dry. No obvious rash  Neuro: Unchanged.  LABS:      CBC Full  -  ( 11 Jan 2018 13:07 )  WBC Count : 6.3 K/uL  Hemoglobin : 7.2 g/dL  Hematocrit : 25.7 %  Platelet Count - Automated : 212 K/uL  Mean Cell Volume : 105.3 fl  Mean Cell Hemoglobin : 29.5 pg  Mean Cell Hemoglobin Concentration : 28.0 g/dL  Auto Neutrophil # : x  Auto Lymphocyte # : x  Auto Monocyte # : x  Auto Eosinophil # : x  Auto Basophil # : x  Auto Neutrophil % : x  Auto Lymphocyte % : x  Auto Monocyte % : x  Auto Eosinophil % : x  Auto Basophil % : x    01-11    139  |  97<L>  |  49.0<H>  ----------------------------<  193<H>  4.6   |  30.0<H>  |  2.75<H>    Ca    8.8      11 Jan 2018 13:07  Mg     1.8     01-11      PT/INR - ( 11 Jan 2018 13:07 )   PT: 32.7 sec;   INR: 2.91 ratio         PTT - ( 10 Paddy 2018 11:56 )  PTT:98.1 sec                  RADIOLOGY & ADDITIONAL STUDIES (The following images were personally reviewed):

## 2018-01-12 NOTE — PROGRESS NOTE ADULT - SUBJECTIVE AND OBJECTIVE BOX
CC: Found unresponsive     HPI:  78 yo male, PMHx CAD s/p CABG, AFib on Coumadin, HTN, prostate CA s/p TURP, s/p PEG and colectomy, prior CVA with expressive aphasia and R hemiparesis, admitted with hypoxemic respiratory failure requiring intubation secondary to aspiration pneumonia, septic shock requiring vasopressors secondary to Gram negative urosepsis. 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 RRT called for 48 seconds of sustained VT. TTE showed EF 40-45%, grade III diastolic dysfunction, RV dysfunction. Pt transferred to MICU and was diuresed and started on IV heparin. Patient lost IV access and was transitioned to lovenox. Midline placed by surgical PA. Heparin infusion started with bridge to coumadin. Heparin gtt discontinued as therapeutic.    INTERVAL HPI/OVERNIGHT EVENTS: Patient seen and examined lying in bed.  S/p MBS.  Patient refusing to eat breakfast this am with staff.  Tolerated some food last night.  Patient denies any pain or SOB.  ROS limited secondary to expressive aphasia.    Vital Signs Last 24 Hrs  T(C): 37.1 (12 Jan 2018 00:11), Max: 37.1 (11 Jan 2018 15:53)  T(F): 98.7 (12 Jan 2018 00:11), Max: 98.7 (11 Jan 2018 15:53)  HR: 87 (12 Jan 2018 06:13) (86 - 93)  BP: 135/63 (12 Jan 2018 06:13) (90/46 - 135/63)  BP(mean): --  RR: 18 (12 Jan 2018 00:11) (18 - 18)  SpO2: 98% (12 Jan 2018 00:11) (98% - 99%)  I&O's Detail    11 Jan 2018 07:01  -  12 Jan 2018 07:00  --------------------------------------------------------  IN:  Total IN: 0 mL    OUT:    Ileostomy: 775 mL  Total OUT: 775 mL    Total NET: -775 mL        PHYSICAL EXAM:  GENERAL: NAD, well-groomed, well-developed  HEAD:  Atraumatic, Normocephalic  NECK: Supple, No JVD, Normal thyroid  NERVOUS SYSTEM:  Alert & Oriented X3, Good concentration; generalized weakness  CHEST/LUNG: Diminished BS bilaterally; No rales, rhonchi, wheezing, or rubs  HEART: Regular rate and rhythm; No murmurs, rubs, or gallops  ABDOMEN: Soft, Nontender, Nondistended; Bowel sounds present  EXTREMITIES:  2+ Peripheral Pulses, No clubbing, cyanosis, or edema                                7.2    6.3   )-----------( 212      ( 11 Jan 2018 13:07 )             25.7     11 Jan 2018 13:07    139    |  97     |  49.0   ----------------------------<  193    4.6     |  30.0   |  2.75     Ca    8.8        11 Jan 2018 13:07  Mg     1.8       11 Jan 2018 13:07      PT/INR - ( 11 Jan 2018 13:07 )   PT: 32.7 sec;   INR: 2.91 ratio          MEDICATIONS  (STANDING):  ALBUTerol    0.083% 2.5 milliGRAM(s) Nebulizer every 6 hours  amiodarone    Tablet 400 milliGRAM(s) Oral daily  aspirin  chewable 81 milliGRAM(s) Oral daily  atorvastatin 10 milliGRAM(s) Oral at bedtime  epoetin [place] Injectable 30789 Unit(s) SubCutaneous every 3 days  finasteride 5 milliGRAM(s) Oral daily  folic acid 1 milliGRAM(s) Enteral Tube daily  furosemide    Tablet 40 milliGRAM(s) Oral daily  metoprolol     tartrate 25 milliGRAM(s) Oral three times a day  pantoprazole   Suspension 40 milliGRAM(s) Oral <User Schedule>  sucralfate 1 Gram(s) Oral four times a day  tamsulosin 0.4 milliGRAM(s) Oral at bedtime  zinc oxide 20% Ointment 1 Application(s) Topical two times a day    MEDICATIONS  (PRN):

## 2018-01-12 NOTE — PROGRESS NOTE ADULT - PROBLEM SELECTOR PLAN 6
lasix
c/w flomax
-c/w proscar and flomax
c/w flomax

## 2018-01-12 NOTE — PROGRESS NOTE ADULT - PROBLEM SELECTOR PROBLEM 4
Urinary retention
Anemia, chronic disease
Anemia, chronic disease
Pneumonia, bacterial
Anemia, chronic disease
Anemia, chronic disease
KAPIL (acute kidney injury)
Anemia, chronic disease
Cerebrovascular accident (CVA), unspecified mechanism
KAPIL (acute kidney injury)

## 2018-01-12 NOTE — PROGRESS NOTE ADULT - SUBJECTIVE AND OBJECTIVE BOX
Spoke with wife at bedside   Patient follows with Dr Ashley  I spoke with him   He will follow up with patient today

## 2018-01-12 NOTE — CHART NOTE - NSCHARTNOTEFT_GEN_A_CORE
Called by RN to draw blood specimen from this patient. I obtained without difficulty two tubes of blood for INR to be run by laboratory.. patient tolerated procedure well. I stuck patient only once in order to obtain blood specimens. Family members at bed-side. Patient was in same condition when I left the room as when I entered.

## 2018-01-12 NOTE — SWALLOW VFSS/MBS ASSESSMENT ADULT - COMMENTS
76 yo male, PMHx CAD s/p CABG, AFib on Coumadin, HTN, prostate CA s/p TURP, s/p PEG and colectomy, prior CVA with expressive aphasia and R hemiparesis, admitted with hypoxemic respiratory failure requiring intubation secondary to aspiration pneumonia, septic shock requiring asopressors secondary to Gram negative urosepsis. 76 yo male, PMHx CAD s/p CABG, AFib on Coumadin, HTN, prostate CA s/p TURP, s/p PEG and colectomy, prior CVA with R hemiparesis, admitted with hypoxemic respiratory failure requiring intubation secondary to aspiration pneumonia, septic shock requiring asopressors secondary to Gram negative urosepsis.

## 2018-01-12 NOTE — PROGRESS NOTE ADULT - PROBLEM SELECTOR PLAN 8
Speech and swallow following.    Soft with nectar consistency started.  Continue tube feeds for supplement.  Dietician consulted for transitioning to bolus feeds.
Speech and swallow following.    Soft with nectar consistency started.  Continue bolus feeds through peg for supplement.  MBS appreciated - continue current diet.    Patient to follow up with GI regarding possible peg replacement.

## 2018-01-12 NOTE — SWALLOW VFSS/MBS ASSESSMENT ADULT - NS SWALLOW VFSS REC ASPIR MON
cough/fever/pneumonia/throat clearing/change of breathing pattern/oral hygiene/position upright (90Y)/upper respiratory infection/gurgly voice

## 2018-01-12 NOTE — PROGRESS NOTE ADULT - SUBJECTIVE AND OBJECTIVE BOX
Renal :    138    |  97<L>  |  56.0<H>  ----------------------------<  113    Ca:9.1   (2018 12:23)  5.0     |  27.0   |  2.83<H>      eGFR if Non : 20 <L>  eGFR if : 23 <L>                            8.1<L>  5.8   )-----------( 234      ( 2018 12:23 )             27.4<L>    Phos:-- M.8 mg/dL PTH:-- Uric acid:-- Serum Osm:--  Ferritin:-- Iron:-- TIBC:-- Tsat:--  B12:-- TSH:-- ( @ 12:23)    Initial Note:     CC: Found unresponsive     HPI:  76 yo male, PMHx CAD s/p CABG, AFib on Coumadin, HTN, prostate CA s/p TURP, s/p PEG and colectomy, prior CVA with expressive aphasia and R hemiparesis, admitted with hypoxemic respiratory failure requiring intubation secondary to aspiration pneumonia, septic shock requiring vasopressors secondary to Gram negative urosepsis. 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 RRT called for 48 seconds of sustained VT. TTE showed EF 40-45%, grade III diastolic dysfunction, RV dysfunction. Pt transferred to MICU and was diuresed and started on IV heparin. Patient lost IV access and was transitioned to lovenox. Midline placed by surgical PA. Heparin infusion started with bridge to coumadin. Heparin gtt discontinued as therapeutic.    INTERVAL HPI/ OVERNIGHT EVENTS: Patient seen and examined lying in bed.    S/p MBS.  Patient refusing to eat breakfast this am with staff.  Tolerated some food last night.  Patient denies any pain or SOB.  ROS limited secondary to expressive aphasia.    Vital Signs Last 24 Hrs  T(C): 37.1 (2018 00:11), Max: 37.1 (2018 15:53)  T(F): 98.7 (2018 00:11), Max: 98.7 (2018 15:53)  HR: 87 (2018 06:13) (86 - 93)  BP: 135/63 (2018 06:13) (90/46 - 135/63)  BP(mean): --  RR: 18 (2018 00:11) (18 - 18)  SpO2: 98% (2018 00:11) (98% - 99%)  I&O's Detail    2018 07:01  -  2018 07:00  --------------------------------------------------------  IN:  Total IN: 0 mL    OUT:    Ileostomy: 775 mL  Total OUT: 775 mL    Total NET: -775 mL    PHYSICAL EXAM:  GENERAL: NAD, well-groomed, well-developed  HEAD:  Atraumatic, Normocephalic  NECK: Supple, No JVD, Normal thyroid  NERVOUS SYSTEM:  Alert & Oriented X3, Good concentration; generalized weakness  CHEST/LUNG: Diminished BS bilaterally; No rales, rhonchi, wheezing, or rubs  HEART: Regular rate and rhythm; No murmurs, rubs, or gallops  ABDOMEN: Soft, Nontender, Nondistended; Bowel sounds present  EXTREMITIES:  2+ Peripheral Pulses, No clubbing, cyanosis, or edema                        7.2    6.3   )-----------( 212      ( 2018 13:07 )             25.7     2018 13:07    139    |  97     |  49.0   ----------------------------<  193    4.6     |  30.0   |  2.75     Ca    8.8        2018 13:07  Mg     1.8       2018 13:07      PT/INR - ( 2018 13:07 )   PT: 32.7 sec;   INR: 2.91 ratio      MEDICATIONS  (STANDING):  ALBUTerol    0.083% 2.5 milliGRAM(s) Nebulizer every 6 hours  amiodarone    Tablet 400 milliGRAM(s) Oral daily  aspirin  chewable 81 milliGRAM(s) Oral daily  atorvastatin 10 milliGRAM(s) Oral at bedtime  epoetin [place] Injectable 36048 Unit(s) SubCutaneous every 3 days  finasteride 5 milliGRAM(s) Oral daily  folic acid 1 milliGRAM(s) Enteral Tube daily  furosemide    Tablet 40 milliGRAM(s) Oral daily  metoprolol     tartrate 25 milliGRAM(s) Oral three times a day  pantoprazole   Suspension 40 milliGRAM(s) Oral <User Schedule>  sucralfate 1 Gram(s) Oral four times a day  tamsulosin 0.4 milliGRAM(s) Oral at bedtime  zinc oxide 20% Ointment 1 Application(s) Topical two times a day    MEDICATIONS  (PRN):        Assessment and Plan:      76 yo male, PMH:  CAD s/p CABG, AFib on Coumadin, HTN, prostate CA s/p TURP, s/p PEG and colectomy, prior CVA with expressive aphasia and R hemiparesis here with VT, DVT, CHF.       Problem/Plan - 1:  ·  Problem: CHF, left ventricular.      Plan: TTE reviewed  Continue lasix 40mg qd , Until Decongested,  C/w metoprolol 25mg tid.         Problem/Plan - 2:  ·  Problem: Ventricular tachycardia (paroxysmal).      Plan: c/w amiodarone and metoprolol.     Problem/Plan - 3:  ·  Problem: Anemia, chronic disease.      Plan: Monitor Hgb., On GONZALES,    Problem/Plan - 4:  ·  Problem: Acute renal failure with acute renal cortical necrosis superimposed on stage 4 chronic kidney disease.      Plan: Stable  Monitor  UP & SCr.

## 2018-01-12 NOTE — PROGRESS NOTE ADULT - SUBJECTIVE AND OBJECTIVE BOX
[facility]                                   [address] [place] [address]                                                      PHONE: [phone]                                                         FAX: [phone]  [email or web address]  ---------------------------------------------------------------------------------------------------------------------------------    Overnight events/patient complaints: no complaints, tele AF with occ PVCs      penicillins (Hives)    MEDICATIONS  (STANDING):  ALBUTerol    0.083% 2.5 milliGRAM(s) Nebulizer every 6 hours  amiodarone    Tablet 400 milliGRAM(s) Oral daily  aspirin  chewable 81 milliGRAM(s) Oral daily  atorvastatin 10 milliGRAM(s) Oral at bedtime  epoetin [place] Injectable 84331 Unit(s) SubCutaneous every 3 days  finasteride 5 milliGRAM(s) Oral daily  folic acid 1 milliGRAM(s) Enteral Tube daily  furosemide    Tablet 40 milliGRAM(s) Oral daily  metoprolol     tartrate 25 milliGRAM(s) Oral three times a day  pantoprazole   Suspension 40 milliGRAM(s) Oral <User Schedule>  sucralfate 1 Gram(s) Oral four times a day  tamsulosin 0.4 milliGRAM(s) Oral at bedtime  zinc oxide 20% Ointment 1 Application(s) Topical two times a day    MEDICATIONS  (PRN):      Vital Signs Last 24 Hrs  T(C): 37.1 (2018 00:11), Max: 37.1 (2018 15:53)  T(F): 98.7 (2018 00:11), Max: 98.7 (2018 15:53)  HR: 87 (2018 06:13) (86 - 93)  BP: 135/63 (2018 06:13) (90/46 - 135/63)  BP(mean): --  RR: 18 (2018 00:11) (18 - 18)  SpO2: 98% (2018 00:11) (98% - 99%)  Daily     Daily Weight in k.3 (2018 15:12)  ICU Vital Signs Last 24 Hrs  CRISTIN ROQUE  I&O's Detail    2018 07:01  -  2018 07:00  --------------------------------------------------------  IN:  Total IN: 0 mL    OUT:    Ileostomy: 775 mL  Total OUT: 775 mL    Total NET: -775 mL        I&O's Summary    2018 07:01  -  2018 07:00  --------------------------------------------------------  IN: 0 mL / OUT: 775 mL / NET: -775 mL      Drug Dosing Weight  CRISTIN ROQUE      PHYSICAL EXAM:  General: Appears well developed, well nourished alert and cooperative.  HEENT: Head; normocephalic, atraumatic.  Eyes: Pupils reactive, cornea wnl.  Neck: Supple, no nodes adenopathy, no NVD or carotid bruit or thyromegaly.  CARDIOVASCULAR: Normal S1 and S2, No murmur, rub, gallop or lift.   LUNGS: No rales, rhonchi or wheeze. Normal breath sounds bilaterally.  ABDOMEN: Soft, nontender without mass or organomegaly. bowel sounds normoactive.  EXTREMITIES: No clubbing, cyanosis or edema. Distal pulses wnl.   SKIN: warm and dry with normal turgor.  NEURO: Alert/oriented x 3/normal motor exam. No pathologic reflexes.    PSYCH: normal affect.        LABS:                        7.2    6.3   )-----------( 212      ( 2018 13:07 )             25.7         139  |  97<L>  |  49.0<H>  ----------------------------<  193<H>  4.6   |  30.0<H>  |  2.75<H>    Ca    8.8      2018 13:07  Mg     1.8           CRISTIN ROQUE      PT/INR - ( 2018 13:07 )   PT: 32.7 sec;   INR: 2.91 ratio         PTT - ( 10 Paddy 2018 11:56 )  PTT:98.1 sec      RADIOLOGY & ADDITIONAL STUDIES:    INTERPRETATION OF TELEMETRY (personally reviewed):    ECG:< from: 12 Lead ECG (18 @ 18:27) >    Diagnosis Line Atrial fibrillation with rapid ventricular response with premature ventricular or aberrantly conducted complexes  Cannot rule out Inferior infarct , age undetermined  Abnormal ECG    Confirmed by REX CHEEK ([id number]) on 2018 9:00:21 AM    < end of copied text >      ECHO: < from: TTE Echo Complete w/Doppler (18 @ 20:32) >   Summary:   1. Left ventricular ejection fraction, by visual estimation, is 40 to   45%.   2. Mildly decreased global left ventricular systolic function.   3. Abnormal septal motion consistent with post-operative status and   right ventricular volume and pressure overload.   4. Spectral Doppler shows restrictive pattern of left ventricular   myocardial filling (Grade III diastolic dysfunction).   5. Severely enlarged right ventricle.   6. Severely reduced RV systolic function.   7. Moderately dilated right atrium.   8. Moderate tricuspid regurgitation.   9. Estimated pulmonary artery systolic pressure is 35.4 mmHg assuming a   right atrial pressure of 8 mmHg, which is consistent with borderline   pulmonary hypertension.  10. Mitral valve mean gradient is 3.7 mmHg consistent with mild mitral   stenosis.  11. Moderately enlarged left atrium.  12. The aortic valve mean gradient is 2.1 mmHg consistent with normally   opening aortic valve.  13. Pulmonary Embolism should be considered in setting of Severe RV   dysfunction with probably preserved RV apical contraction.  14. Peak aortic valve gradient is 5.4 mmHg and the mean gradient is 2.1   mmHg, which is probably normal in the setting of a prosthetic aortic   valve.  15. I have discussed about the echo with Dr Silver.  16. Compared to Study from 2017 RV functions is severely reduced and LV   EF reduced from 60-65% to 40-45%.     MD Margarita Electronically signed on 1/3/2018 at 1:15:49 PM          *** Final ***                  < end of copied text >

## 2018-01-12 NOTE — PROGRESS NOTE ADULT - PROBLEM SELECTOR PLAN 7
lasix
on heparin  Physical therapy recommended BRIDGET, but patient has no more BRIDGET days.  PM&R consulted but recommended Home with home care.    OOB to chair
on heparin
on heparin
Coumadin  Physical therapy recommended BRIDGET, but patient has no more BRIDGET days.  PM&R consulted but recommended Home with home care.    OOB to chair
DVT ppx covered with coumadin/ supra therapeutic INR      Advanced Directives: DNR not DNI   Palliative consulted for goals of care and further quality of life discussions given recurrent hospitalizations and overall guarded prognosis.
on heparin
on heparin  Physical therapy pending  OOB to chair
on heparin  Physical therapy recommended BRIDGET, but patient has no more BRIDGET days.  PM&R consulted but recommended Home with home care.    OOB to chair

## 2018-01-12 NOTE — CHART NOTE - NSCHARTNOTEFT_GEN_A_CORE
Called by RN to obtain blood specimen from patients arm. Lab unable to perform procedure. I was able to collect blood specimen of 4 tubes of blood without difficulty for evaluation by laboratory. Patient tolerated procedure well and I had to stick the patient only once in order to obtain the specimens. The patient was in same condition when I left the room as when I entered. Blood specimens given to RN to send to lab for evaluation.

## 2018-01-12 NOTE — PROGRESS NOTE ADULT - ASSESSMENT
CRF(III): ARF and hypernatremia ==>improved  (Screat 1.9mg/dL in Sept 2017)==> may be new baseline  - avoid nephrotoxins  - cont diuretics ,  - water via PEG tube  - monitor labs      Anemia: multifactorial  may have a degree of GONZALES failure due to chronic inflammation  - cont GONZALES  - PRBCs as needed

## 2018-01-12 NOTE — SWALLOW VFSS/MBS ASSESSMENT ADULT - RECOMMENDED FEEDING/EATING TECHNIQUES
swallow x2 after each bite/sip/allow for swallow between intakes/oral hygiene/provide rest periods between swallows/position upright (90 degrees)

## 2018-01-12 NOTE — PROGRESS NOTE ADULT - PROBLEM SELECTOR PROBLEM 1
Oral phase dysphagia
Oral phase dysphagia
CHF, left ventricular
CHF, left ventricular
Septic shock
Acute renal failure with acute renal cortical necrosis superimposed on stage 4 chronic kidney disease
Acute renal failure with acute renal cortical necrosis superimposed on stage 4 chronic kidney disease
CHF, left ventricular
Pseudomonas urinary tract infection
Pseudomonas urinary tract infection
Septic shock
Urinary retention
Urinary retention
Acute renal failure with acute renal cortical necrosis superimposed on stage 4 chronic kidney disease
Ventricular tachycardia (paroxysmal)
CHF, left ventricular
CHF, left ventricular

## 2018-01-12 NOTE — SWALLOW VFSS/MBS ASSESSMENT ADULT - RESIDUE IN VALLECULAE
reduced to mild with spontaneous successive swallow/Moderate Moderate/reduced to mild with spontaneous successive swallow Moderate/Mild/reduced to mild with spontaneous successive swallow Mild/Moderate

## 2018-01-12 NOTE — PROGRESS NOTE ADULT - PROBLEM SELECTOR PROBLEM 3
Ventricular tachycardia (paroxysmal)
Enteroviral infection
Metabolic acidosis
Ventricular tachycardia (paroxysmal)
Ventricular tachycardia (paroxysmal)
Acute on chronic renal failure
Metabolic acidosis
Ventricular tachycardia (paroxysmal)
Anemia, chronic disease
Ventricular tachycardia (paroxysmal)

## 2018-01-12 NOTE — PROGRESS NOTE ADULT - PROVIDER SPECIALTY LIST ADULT
Cardiology
Critical Care
Hospitalist
Infectious Disease
Nephrology
Pulmonology
Urology
Urology
Cardiology
Infectious Disease
Urology
Cardiology
Hospitalist
Hospitalist
Pulmonology
Pulmonology
Hospitalist
Gastroenterology
Gastroenterology
Critical Care
Critical Care
Hospitalist

## 2018-01-12 NOTE — PROGRESS NOTE ADULT - PROBLEM SELECTOR PLAN 1
Will need endoscopic PEG tube removal and replacement while on chronic Coumadin therapy which prohibits manual traction removal because of increased risk of bleeding from gastrostomy site. If cardiac and pulmonary clearances can be obtained today can proceed with this procedure later today. Keep NPO for now in the event that this can be done later today.

## 2018-01-16 LAB — INR PPP: 1.8

## 2018-01-18 LAB — INR PPP: 2.24

## 2018-01-22 LAB — INR PPP: 3.15

## 2018-01-26 LAB — INR PPP: 2.17

## 2018-01-29 LAB — INR PPP: 3.2

## 2018-01-30 ENCOUNTER — INPATIENT (INPATIENT)
Facility: HOSPITAL | Age: 78
LOS: 9 days | Discharge: HOME | DRG: 378 | End: 2018-02-09
Attending: FAMILY MEDICINE | Admitting: INTERNAL MEDICINE
Payer: MEDICARE

## 2018-01-30 VITALS
WEIGHT: 164.91 LBS | OXYGEN SATURATION: 97 % | HEIGHT: 68 IN | RESPIRATION RATE: 20 BRPM | DIASTOLIC BLOOD PRESSURE: 59 MMHG | TEMPERATURE: 98 F | HEART RATE: 73 BPM | SYSTOLIC BLOOD PRESSURE: 105 MMHG

## 2018-01-30 DIAGNOSIS — I48.2 CHRONIC ATRIAL FIBRILLATION: ICD-10-CM

## 2018-01-30 DIAGNOSIS — Z95.2 PRESENCE OF PROSTHETIC HEART VALVE: Chronic | ICD-10-CM

## 2018-01-30 DIAGNOSIS — K92.2 GASTROINTESTINAL HEMORRHAGE, UNSPECIFIED: ICD-10-CM

## 2018-01-30 DIAGNOSIS — I63.9 CEREBRAL INFARCTION, UNSPECIFIED: ICD-10-CM

## 2018-01-30 DIAGNOSIS — E87.1 HYPO-OSMOLALITY AND HYPONATREMIA: ICD-10-CM

## 2018-01-30 DIAGNOSIS — Z95.1 PRESENCE OF AORTOCORONARY BYPASS GRAFT: Chronic | ICD-10-CM

## 2018-01-30 DIAGNOSIS — R13.10 DYSPHAGIA, UNSPECIFIED: ICD-10-CM

## 2018-01-30 DIAGNOSIS — Z98.89 OTHER SPECIFIED POSTPROCEDURAL STATES: Chronic | ICD-10-CM

## 2018-01-30 DIAGNOSIS — N17.9 ACUTE KIDNEY FAILURE, UNSPECIFIED: ICD-10-CM

## 2018-01-30 LAB
ALBUMIN SERPL ELPH-MCNC: 3.2 G/DL — LOW (ref 3.3–5.2)
ALP SERPL-CCNC: 128 U/L — HIGH (ref 40–120)
ALT FLD-CCNC: 37 U/L — SIGNIFICANT CHANGE UP
ANION GAP SERPL CALC-SCNC: 17 MMOL/L — SIGNIFICANT CHANGE UP (ref 5–17)
APTT BLD: 40.4 SEC — HIGH (ref 27.5–37.4)
AST SERPL-CCNC: 46 U/L — HIGH
BILIRUB SERPL-MCNC: 0.7 MG/DL — SIGNIFICANT CHANGE UP (ref 0.4–2)
BLD GP AB SCN SERPL QL: SIGNIFICANT CHANGE UP
BUN SERPL-MCNC: 97 MG/DL — HIGH (ref 8–20)
CALCIUM SERPL-MCNC: 9.2 MG/DL — SIGNIFICANT CHANGE UP (ref 8.6–10.2)
CHLORIDE SERPL-SCNC: 92 MMOL/L — LOW (ref 98–107)
CO2 SERPL-SCNC: 17 MMOL/L — LOW (ref 22–29)
CREAT SERPL-MCNC: 3.28 MG/DL — HIGH (ref 0.5–1.3)
FERRITIN SERPL-MCNC: 442.3 NG/ML — HIGH (ref 30–400)
GLUCOSE SERPL-MCNC: 105 MG/DL — SIGNIFICANT CHANGE UP (ref 70–115)
HCT VFR BLD CALC: 25.8 % — LOW (ref 42–52)
HCT VFR BLD CALC: 31.5 % — LOW (ref 42–52)
HGB BLD-MCNC: 10.3 G/DL — LOW (ref 14–18)
HGB BLD-MCNC: 8.3 G/DL — LOW (ref 14–18)
INR BLD: 2.24 RATIO — HIGH (ref 0.88–1.16)
IRON SATN MFR SERPL: 12 % — LOW (ref 16–55)
IRON SATN MFR SERPL: 47 UG/DL — LOW (ref 59–158)
MCHC RBC-ENTMCNC: 30.5 PG — SIGNIFICANT CHANGE UP (ref 27–31)
MCHC RBC-ENTMCNC: 30.6 PG — SIGNIFICANT CHANGE UP (ref 27–31)
MCHC RBC-ENTMCNC: 32.2 G/DL — SIGNIFICANT CHANGE UP (ref 32–36)
MCHC RBC-ENTMCNC: 32.7 G/DL — SIGNIFICANT CHANGE UP (ref 32–36)
MCV RBC AUTO: 93.5 FL — SIGNIFICANT CHANGE UP (ref 80–94)
MCV RBC AUTO: 94.9 FL — HIGH (ref 80–94)
PLATELET # BLD AUTO: 168 K/UL — SIGNIFICANT CHANGE UP (ref 150–400)
PLATELET # BLD AUTO: 199 K/UL — SIGNIFICANT CHANGE UP (ref 150–400)
POTASSIUM SERPL-MCNC: 5.4 MMOL/L — HIGH (ref 3.5–5.3)
POTASSIUM SERPL-SCNC: 5.4 MMOL/L — HIGH (ref 3.5–5.3)
PROT SERPL-MCNC: 8.1 G/DL — SIGNIFICANT CHANGE UP (ref 6.6–8.7)
PROTHROM AB SERPL-ACNC: 25 SEC — HIGH (ref 9.8–12.7)
RBC # BLD: 2.72 M/UL — LOW (ref 4.6–6.2)
RBC # BLD: 3.37 M/UL — LOW (ref 4.6–6.2)
RBC # FLD: 18 % — HIGH (ref 11–15.6)
RBC # FLD: 18.3 % — HIGH (ref 11–15.6)
SODIUM SERPL-SCNC: 126 MMOL/L — LOW (ref 135–145)
TIBC SERPL-MCNC: 385 UG/DL — SIGNIFICANT CHANGE UP (ref 220–430)
TRANSFERRIN SERPL-MCNC: 269 MG/DL — SIGNIFICANT CHANGE UP (ref 180–329)
TYPE + AB SCN PNL BLD: SIGNIFICANT CHANGE UP
WBC # BLD: 6.5 K/UL — SIGNIFICANT CHANGE UP (ref 4.8–10.8)
WBC # BLD: 8 K/UL — SIGNIFICANT CHANGE UP (ref 4.8–10.8)
WBC # FLD AUTO: 6.5 K/UL — SIGNIFICANT CHANGE UP (ref 4.8–10.8)
WBC # FLD AUTO: 8 K/UL — SIGNIFICANT CHANGE UP (ref 4.8–10.8)

## 2018-01-30 PROCEDURE — 93010 ELECTROCARDIOGRAM REPORT: CPT

## 2018-01-30 PROCEDURE — 99285 EMERGENCY DEPT VISIT HI MDM: CPT

## 2018-01-30 PROCEDURE — 99223 1ST HOSP IP/OBS HIGH 75: CPT

## 2018-01-30 PROCEDURE — 71045 X-RAY EXAM CHEST 1 VIEW: CPT | Mod: 26

## 2018-01-30 RX ORDER — SODIUM CHLORIDE 9 MG/ML
1000 INJECTION INTRAMUSCULAR; INTRAVENOUS; SUBCUTANEOUS ONCE
Qty: 0 | Refills: 0 | Status: COMPLETED | OUTPATIENT
Start: 2018-01-30 | End: 2018-01-30

## 2018-01-30 RX ORDER — METOPROLOL TARTRATE 50 MG
25 TABLET ORAL THREE TIMES A DAY
Qty: 0 | Refills: 0 | Status: DISCONTINUED | OUTPATIENT
Start: 2018-01-30 | End: 2018-02-09

## 2018-01-30 RX ORDER — TAMSULOSIN HYDROCHLORIDE 0.4 MG/1
0.4 CAPSULE ORAL AT BEDTIME
Qty: 0 | Refills: 0 | Status: DISCONTINUED | OUTPATIENT
Start: 2018-01-30 | End: 2018-02-09

## 2018-01-30 RX ORDER — SODIUM CHLORIDE 9 MG/ML
1000 INJECTION INTRAMUSCULAR; INTRAVENOUS; SUBCUTANEOUS
Qty: 0 | Refills: 0 | Status: DISCONTINUED | OUTPATIENT
Start: 2018-01-30 | End: 2018-02-02

## 2018-01-30 RX ORDER — AMIODARONE HYDROCHLORIDE 400 MG/1
400 TABLET ORAL DAILY
Qty: 0 | Refills: 0 | Status: DISCONTINUED | OUTPATIENT
Start: 2018-01-30 | End: 2018-02-09

## 2018-01-30 RX ORDER — FINASTERIDE 5 MG/1
5 TABLET, FILM COATED ORAL DAILY
Qty: 0 | Refills: 0 | Status: DISCONTINUED | OUTPATIENT
Start: 2018-01-30 | End: 2018-02-09

## 2018-01-30 RX ORDER — PANTOPRAZOLE SODIUM 20 MG/1
40 TABLET, DELAYED RELEASE ORAL EVERY 12 HOURS
Qty: 0 | Refills: 0 | Status: DISCONTINUED | OUTPATIENT
Start: 2018-01-30 | End: 2018-02-09

## 2018-01-30 RX ORDER — ATORVASTATIN CALCIUM 80 MG/1
10 TABLET, FILM COATED ORAL AT BEDTIME
Qty: 0 | Refills: 0 | Status: DISCONTINUED | OUTPATIENT
Start: 2018-01-30 | End: 2018-02-09

## 2018-01-30 RX ORDER — DRONABINOL 2.5 MG
2.5 CAPSULE ORAL
Qty: 0 | Refills: 0 | Status: DISCONTINUED | OUTPATIENT
Start: 2018-01-30 | End: 2018-02-06

## 2018-01-30 RX ORDER — BUPROPION HYDROCHLORIDE 150 MG/1
150 TABLET, EXTENDED RELEASE ORAL DAILY
Qty: 0 | Refills: 0 | Status: DISCONTINUED | OUTPATIENT
Start: 2018-01-30 | End: 2018-02-09

## 2018-01-30 RX ADMIN — TAMSULOSIN HYDROCHLORIDE 0.4 MILLIGRAM(S): 0.4 CAPSULE ORAL at 23:58

## 2018-01-30 RX ADMIN — BUPROPION HYDROCHLORIDE 150 MILLIGRAM(S): 150 TABLET, EXTENDED RELEASE ORAL at 17:15

## 2018-01-30 RX ADMIN — ATORVASTATIN CALCIUM 10 MILLIGRAM(S): 80 TABLET, FILM COATED ORAL at 23:58

## 2018-01-30 RX ADMIN — SODIUM CHLORIDE 75 MILLILITER(S): 9 INJECTION INTRAMUSCULAR; INTRAVENOUS; SUBCUTANEOUS at 18:58

## 2018-01-30 RX ADMIN — Medication 2.5 MILLIGRAM(S): at 17:15

## 2018-01-30 RX ADMIN — SODIUM CHLORIDE 1000 MILLILITER(S): 9 INJECTION INTRAMUSCULAR; INTRAVENOUS; SUBCUTANEOUS at 15:16

## 2018-01-30 RX ADMIN — SODIUM CHLORIDE 75 MILLILITER(S): 9 INJECTION INTRAMUSCULAR; INTRAVENOUS; SUBCUTANEOUS at 16:39

## 2018-01-30 NOTE — ED PROVIDER NOTE - PROGRESS NOTE DETAILS
pts GI Dr Harrison called and Dr Hinkle covering and will see pt ion ed for GI bleeding Dr Hinkle Gi Rec admit and Hospitalist Dr Castillo case discussed and he will admit

## 2018-01-30 NOTE — CONSULT NOTE ADULT - SUBJECTIVE AND OBJECTIVE BOX
[place] CARDIOVASCULAR [facility], THE HEART CENTER                                   [address] [place] [address]                                                      PHONE: [phone]                                                         FAX: [phone]  [email or web address]  ---------------------------------------------------------------------------------------------------------------------------------    Reason for Consult: AFIB GI BLEEDING    HPI:  CRISTIN ROQUE is an 77y Male with extensive cardiac Hx CAD S/P CABG, S/P AVR Ross procedure, Afib, s/p CVA with Aphasia and R side weakness on chronic AC, s/p IVC filter, S/P colectomy. Prostate CA recently hospitalized at [facility] MICU for aspiration Pneumonia, intubated for a few days in the hospital for almost a month as per fam Hx.   Presenting this time with bleeding at the site of colostomy bag for GI and surgical evaluation.         PAST MEDICAL & SURGICAL HISTORY:  CAD (coronary artery disease)  Afib  CVA (cerebral vascular accident)  Mitral valve replaced  BPH (benign prostatic hyperplasia)  High cholesterol  HTN (hypertension)  H/O heart valve replacement with mechanical valve  S/P CABG x 1  H/O colectomy      penicillins (Hives)      MEDICATIONS  (STANDING):  amiodarone    Tablet 400 milliGRAM(s) Oral daily  atorvastatin 10 milliGRAM(s) Oral at bedtime  buPROPion XL . 150 milliGRAM(s) Oral daily  dronabinol 2.5 milliGRAM(s) Oral two times a day  finasteride 5 milliGRAM(s) Oral daily  metoprolol     tartrate 25 milliGRAM(s) Oral three times a day  pantoprazole  Injectable 40 milliGRAM(s) IV Push every 12 hours  sodium chloride 0.9%. 1000 milliLiter(s) (75 mL/Hr) IV Continuous <Continuous>  tamsulosin 0.4 milliGRAM(s) Oral at bedtime    MEDICATIONS  (PRN):      Social History:  Cigarettes:                    Alchohol:                 Illicit Drug Abuse:      REVIEW OF SYSTEMS:    Constitutional: No fever, weight loss or fatigue  Eyes: No eye pain, visual disturbances, or discharge  ENMT:  No difficulty hearing, tinnitus, vertigo; No sinus or throat pain  Neck: No pain or stiffness  Respiratory: No cough, wheezing, chills or hemoptysis  Cardiovascular: No chest pain, palpitations, shortness of breath, dizziness or leg swelling  Gastrointestinal: No abdominal or epigastric pain. No nausea, vomiting or hematemesis; No diarrhea or constipation. No melena or hematochezia.  Genitourinary: No dysuria, frequency, hematuria or incontinence  Rectal: No pain, hemorrhoids or incontinence  Neurological: Aphasic Skin: No itching, burning, rashes or lesions   Lymph Nodes: No enlarged glands  Endocrine: No heat or cold intolerance; No hair loss    Vital Signs Last 24 Hrs  T(C): 36.6 (30 Jan 2018 11:43), Max: 36.6 (30 Jan 2018 11:43)  T(F): 97.9 (30 Jan 2018 11:43), Max: 97.9 (30 Jan 2018 11:43)  HR: 73 (30 Jan 2018 11:43) (73 - 73)  BP: 105/59 (30 Jan 2018 11:43) (105/59 - 105/59)  BP(mean): --  RR: 20 (30 Jan 2018 11:43) (20 - 20)  SpO2: 97% (30 Jan 2018 11:43) (97% - 97%)  ICU Vital Signs Last 24 Hrs  CRISTIN ROQUE  I&O's Detail    I&O's Summary    Drug Dosing Weight  CRISTIN ROQUE      PHYSICAL EXAM:  General: Appears well developed, well nourished alert and cooperative.  HEENT: Head; normocephalic, atraumatic.  Eyes: Pupils reactive, cornea wnl.  Neck: Supple, no nodes adenopathy, no NVD or carotid bruit or thyromegaly.  CARDIOVASCULAR: Normal S1 and S2, No murmur, rub, gallop or lift.   LUNGS: No rales, rhonchi or wheeze. Normal breath sounds bilaterally.  ABDOMEN: Soft, nontender without mass or organomegaly. bowel sounds normoactive.  EXTREMITIES: No clubbing, cyanosis or edema. Distal pulses wnl.   SKIN: warm and dry with normal turgor.  NEURO: Aphasic R side weakness        LABS:                        10.3   8.0   )-----------( 199      ( 30 Jan 2018 13:15 )             31.5     01-30    126<L>  |  92<L>  |  97.0<H>  ----------------------------<  105  5.4<H>   |  17.0<L>  |  3.28<H>    Ca    9.2      30 Jan 2018 13:15    TPro  8.1  /  Alb  3.2<L>  /  TBili  0.7  /  DBili  x   /  AST  46<H>  /  ALT  37  /  AlkPhos  128<H>  01-30    CRISTIN ROQUE      PT/INR - ( 30 Jan 2018 13:15 )   PT: 25.0 sec;   INR: 2.24 ratio         PTT - ( 30 Jan 2018 13:15 )  PTT:40.4 sec      RADIOLOGY & ADDITIONAL STUDIES:      ACTIVE PROBLEMS:  HEALTH ISSUES - PROBLEM Dx:  CVA (cerebral vascular accident): CVA (cerebral vascular accident)  Dysphagia: Dysphagia  Acute kidney injury superimposed on chronic kidney disease: Acute kidney injury superimposed on chronic kidney disease  Hyponatremia: Hyponatremia  Chronic atrial fibrillation: Chronic atrial fibrillation  Gastrointestinal hemorrhage, unspecified gastrointestinal hemorrhage type: Gastrointestinal hemorrhage, unspecified gastrointestinal hemorrhage type          Assessment and Plan:    HPI:  CRISTIN ROQUE is an 77y Male with extensive cardiac Hx CAD S/P CABG, S/P AVR Ross procedure, Afib, s/p CVA with Aphasia and R side weakness on chronic AC, s/p IVC filter, S/P colectomy. Prostate CA recently hospitalized at [facility] MICU for aspiration Pneumonia, intubated for a few days in the hospital for almost a month as per [facility] Hx.   Presenting this time with bleeding at the site of colostomy bag for GI and surgical evaluation.     Telemetry monitoring  hold   EP eval for Watchman  Continue present cardiac therapy    FARIHA MALONEY MD, FACC, JASON

## 2018-01-30 NOTE — H&P ADULT - PROBLEM SELECTOR PLAN 2
HOLD coumadin and aspirin  c/w amiodarone  cardiology consulted for EGD clearance if needed  c/w lopressor

## 2018-01-30 NOTE — CONSULT NOTE ADULT - ASSESSMENT
Patient on A fib and on coumadin with ? blood in ileostomy with elevated BUN/c with ? melena as per wife.    1. Monitor CBC  2. IV fluid hydration  3. Cardiology evaluation  4. If ok with cardiology, EGD.

## 2018-01-30 NOTE — CONSULT NOTE ADULT - SUBJECTIVE AND OBJECTIVE BOX
HPI:  78 yo male PMHx CAD s/p CABG, AFib on Coumadin, HTN, prostate CA s/p TURP, s/p PEG and colectomy, prior CVA with expressive aphasia and R hemiparesis, CKD 3, recent dc 3 weeks ago for resp failure s/p intubation due to aspiration pneumonia, Septic shock 2/2 UTI, KAPIL on CKD, recurrent NSVT, UE DVT presents from home for blood in colostomy bag.    patient unable to voice concerns but follows some directions.  per wife at bedside, patient noted to have intermittent black and bloody output from colostomy bag since discharge.  Poor PO intake.  Endoscopy was deferred during last hospitalization due to co morbidities.  On coumadin and lasix at home. non ambulatory.     PAST MEDICAL & SURGICAL HISTORY:  CAD (coronary artery disease)  Afib  CVA (cerebral vascular accident)  Mitral valve replaced  BPH (benign prostatic hyperplasia)  High cholesterol  HTN (hypertension)  H/O heart valve replacement with mechanical valve  S/P CABG x 1  H/O colectomy      ROS:  Could not be obtained.    MEDICATIONS  (STANDING):  amiodarone    Tablet 400 milliGRAM(s) Oral daily  atorvastatin 10 milliGRAM(s) Oral at bedtime  buPROPion XL . 150 milliGRAM(s) Oral daily  dronabinol 2.5 milliGRAM(s) Oral two times a day  finasteride 5 milliGRAM(s) Oral daily  metoprolol     tartrate 25 milliGRAM(s) Oral three times a day  pantoprazole  Injectable 40 milliGRAM(s) IV Push every 12 hours  sodium chloride 0.9%. 1000 milliLiter(s) (75 mL/Hr) IV Continuous <Continuous>  tamsulosin 0.4 milliGRAM(s) Oral at bedtime    MEDICATIONS  (PRN):      Allergies    penicillins (Hives)    Intolerances        SOCIAL HISTORY: Denies     ENDOSCOPIC/GI HISTORY:No recent procedures    FAMILY HISTORY:  No pertinent family history in first degree relatives      Vital Signs Last 24 Hrs  T(C): 36.6 (30 Jan 2018 11:43), Max: 36.6 (30 Jan 2018 11:43)  T(F): 97.9 (30 Jan 2018 11:43), Max: 97.9 (30 Jan 2018 11:43)  HR: 73 (30 Jan 2018 11:43) (73 - 73)  BP: 105/59 (30 Jan 2018 11:43) (105/59 - 105/59)  BP(mean): --  RR: 20 (30 Jan 2018 11:43) (20 - 20)  SpO2: 97% (30 Jan 2018 11:43) (97% - 97%)    PHYSICAL EXAM:    GENERAL: NAD, non communicative   HEAD:  Atraumatic, Normocephalic  EYES: EOMI, PERRLA, conjunctiva and sclera clear  ENMT: No tonsillar erythema, exudates, or enlargement; Moist mucous membranes, Good dentition, No lesions  NECK: Supple, No JVD, Normal thyroid  CHEST/LUNG: Clear to percussion bilaterally; No rales, rhonchi, wheezing, or rubs  HEART: Regular rate and rhythm; No murmurs, rubs, or gallops  ABDOMEN: Soft, Nontender, Nondistended; Bowel sounds present. PEG tube in place. Ostomy-dark colored stools.  EXTREMITIES:  2+ Peripheral Pulses, No clubbing, cyanosis, or edema  LYMPH: No lymphadenopathy noted  SKIN: No rashes or lesions      LABS:                        10.3   8.0   )-----------( 199      ( 30 Jan 2018 13:15 )             31.5     01-30    126<L>  |  92<L>  |  97.0<H>  ----------------------------<  105  5.4<H>   |  17.0<L>  |  3.28<H>    Ca    9.2      30 Jan 2018 13:15    TPro  8.1  /  Alb  3.2<L>  /  TBili  0.7  /  DBili  x   /  AST  46<H>  /  ALT  37  /  AlkPhos  128<H>  01-30    PT/INR - ( 30 Jan 2018 13:15 )   PT: 25.0 sec;   INR: 2.24 ratio         PTT - ( 30 Jan 2018 13:15 )  PTT:40.4 sec       LIVER FUNCTIONS - ( 30 Jan 2018 13:15 )  Alb: 3.2 g/dL / Pro: 8.1 g/dL / ALK PHOS: 128 U/L / ALT: 37 U/L / AST: 46 U/L / GGT: x               RADIOLOGY & ADDITIONAL STUDIES:

## 2018-01-30 NOTE — ED ADULT NURSE NOTE - CHPI ED SYMPTOMS NEG
no numbness/no pain/no nausea/no decreased eating/drinking/no tingling/no chills/no vomiting/no dizziness/no fever

## 2018-01-30 NOTE — ED ADULT NURSE NOTE - OBJECTIVE STATEMENT
Patient arrived to the ED from home. Patient states that for the last three days he has been having bright red blood coming from his stoma. Patient states

## 2018-01-30 NOTE — ED ADULT NURSE REASSESSMENT NOTE - NS ED NURSE REASSESS COMMENT FT1
Pt received from ED RN Phoebe. alert, able to answer yes/no questions. unable to verbalize needs. MD Garcia at bedside admitting patient, will wait for further orders. Pt placed on cardiac monitor and . Afib on monitor. RLQ illeostomy excreting dark brown liquid stool. Pt also presents with Peg tube. As per wife at bedside, pt receives jevity 1.6 feedings 8oz 5-6x daily. Pt NPO at this time. unknown when feedings will be started. R side weakness. Cardio and GI to see patient. Will continue to monitor, safety maintained.

## 2018-01-30 NOTE — H&P ADULT - ASSESSMENT
76 yo male PMHx CAD s/p CABG, AFib on Coumadin, HTN, prostate CA s/p TURP, s/p PEG and colectomy, prior CVA with expressive aphasia and R hemiparesis, CKD 3, recent dc 3 weeks ago for resp failure s/p intubation due to aspiration pneumonia, Septic shock 2/2 UTI, KAPIL on CKD, recurrent NSVT, UE DVT presents from home for blood in colostomy bag.

## 2018-01-30 NOTE — ED PROVIDER NOTE - OBJECTIVE STATEMENT
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

## 2018-01-30 NOTE — ED ADULT TRIAGE NOTE - CHIEF COMPLAINT QUOTE
Patient brought in by ambulance A/Ox3, c/o bleeding ileostomy, can only take VSS on right arm has blood clot in left arm.  Hx of CVA

## 2018-01-30 NOTE — H&P ADULT - HISTORY OF PRESENT ILLNESS
76 yo male PMHx CAD s/p CABG, AFib on Coumadin, HTN, prostate CA s/p TURP, s/p PEG and colectomy, prior CVA with expressive aphasia and R hemiparesis, CKD 3, recent dc 3 weeks ago for resp failure s/p intubation due to aspiration pneumonia, Septic shock 2/2 UTI, KAPIL on CKD, recurrent NSVT, UE DVT presents from home for blood in colostomy bag.    patient unable to voice concerns but follows some directions.  per wife at bedside, patient noted to have intermittent black and bloody output from colostomy bag since discharge.  Poor PO intake.  Endoscopy was deferred during last hospitalization due to co morbidities.  On coumadin and lasix at home. non ambulatory.

## 2018-01-30 NOTE — ED PROVIDER NOTE - NEUROLOGICAL, MLM
Alert and oriented, no focal deficits, no motor or sensory deficits. except pos bilateral legs paralysed and arms weak

## 2018-01-31 DIAGNOSIS — K92.1 MELENA: ICD-10-CM

## 2018-01-31 DIAGNOSIS — D50.0 IRON DEFICIENCY ANEMIA SECONDARY TO BLOOD LOSS (CHRONIC): ICD-10-CM

## 2018-01-31 LAB
ANION GAP SERPL CALC-SCNC: 16 MMOL/L — SIGNIFICANT CHANGE UP (ref 5–17)
ANION GAP SERPL CALC-SCNC: 17 MMOL/L — SIGNIFICANT CHANGE UP (ref 5–17)
APTT BLD: 33.3 SEC — SIGNIFICANT CHANGE UP (ref 27.5–37.4)
BUN SERPL-MCNC: 88 MG/DL — HIGH (ref 8–20)
BUN SERPL-MCNC: 93 MG/DL — HIGH (ref 8–20)
CALCIUM SERPL-MCNC: 8.4 MG/DL — LOW (ref 8.6–10.2)
CALCIUM SERPL-MCNC: 8.5 MG/DL — LOW (ref 8.6–10.2)
CHLORIDE SERPL-SCNC: 101 MMOL/L — SIGNIFICANT CHANGE UP (ref 98–107)
CHLORIDE SERPL-SCNC: 104 MMOL/L — SIGNIFICANT CHANGE UP (ref 98–107)
CO2 SERPL-SCNC: 15 MMOL/L — LOW (ref 22–29)
CO2 SERPL-SCNC: 15 MMOL/L — LOW (ref 22–29)
CREAT SERPL-MCNC: 3.01 MG/DL — HIGH (ref 0.5–1.3)
CREAT SERPL-MCNC: 3.06 MG/DL — HIGH (ref 0.5–1.3)
GLUCOSE SERPL-MCNC: 108 MG/DL — SIGNIFICANT CHANGE UP (ref 70–115)
GLUCOSE SERPL-MCNC: 93 MG/DL — SIGNIFICANT CHANGE UP (ref 70–115)
HCT VFR BLD CALC: 24.5 % — LOW (ref 42–52)
HGB BLD-MCNC: 8 G/DL — LOW (ref 14–18)
INR BLD: 2.4 RATIO — HIGH (ref 0.88–1.16)
MCHC RBC-ENTMCNC: 31.4 PG — HIGH (ref 27–31)
MCHC RBC-ENTMCNC: 32.7 G/DL — SIGNIFICANT CHANGE UP (ref 32–36)
MCV RBC AUTO: 96.1 FL — HIGH (ref 80–94)
PLATELET # BLD AUTO: 180 K/UL — SIGNIFICANT CHANGE UP (ref 150–400)
POTASSIUM SERPL-MCNC: 3.7 MMOL/L — SIGNIFICANT CHANGE UP (ref 3.5–5.3)
POTASSIUM SERPL-MCNC: 3.8 MMOL/L — SIGNIFICANT CHANGE UP (ref 3.5–5.3)
POTASSIUM SERPL-SCNC: 3.7 MMOL/L — SIGNIFICANT CHANGE UP (ref 3.5–5.3)
POTASSIUM SERPL-SCNC: 3.8 MMOL/L — SIGNIFICANT CHANGE UP (ref 3.5–5.3)
PROTHROM AB SERPL-ACNC: 26.9 SEC — HIGH (ref 9.8–12.7)
RBC # BLD: 2.55 M/UL — LOW (ref 4.6–6.2)
RBC # FLD: 17.9 % — HIGH (ref 11–15.6)
SODIUM SERPL-SCNC: 132 MMOL/L — LOW (ref 135–145)
SODIUM SERPL-SCNC: 136 MMOL/L — SIGNIFICANT CHANGE UP (ref 135–145)
WBC # BLD: 7.2 K/UL — SIGNIFICANT CHANGE UP (ref 4.8–10.8)
WBC # FLD AUTO: 7.2 K/UL — SIGNIFICANT CHANGE UP (ref 4.8–10.8)

## 2018-01-31 PROCEDURE — 99232 SBSQ HOSP IP/OBS MODERATE 35: CPT

## 2018-01-31 PROCEDURE — 99233 SBSQ HOSP IP/OBS HIGH 50: CPT | Mod: GC

## 2018-01-31 RX ADMIN — Medication 25 MILLIGRAM(S): at 06:07

## 2018-01-31 RX ADMIN — ATORVASTATIN CALCIUM 10 MILLIGRAM(S): 80 TABLET, FILM COATED ORAL at 21:15

## 2018-01-31 RX ADMIN — Medication 2.5 MILLIGRAM(S): at 18:55

## 2018-01-31 RX ADMIN — SODIUM CHLORIDE 75 MILLILITER(S): 9 INJECTION INTRAMUSCULAR; INTRAVENOUS; SUBCUTANEOUS at 05:21

## 2018-01-31 RX ADMIN — PANTOPRAZOLE SODIUM 40 MILLIGRAM(S): 20 TABLET, DELAYED RELEASE ORAL at 18:16

## 2018-01-31 RX ADMIN — Medication 2.5 MILLIGRAM(S): at 06:07

## 2018-01-31 RX ADMIN — AMIODARONE HYDROCHLORIDE 200 MILLIGRAM(S): 400 TABLET ORAL at 06:06

## 2018-01-31 RX ADMIN — TAMSULOSIN HYDROCHLORIDE 0.4 MILLIGRAM(S): 0.4 CAPSULE ORAL at 21:15

## 2018-01-31 RX ADMIN — FINASTERIDE 5 MILLIGRAM(S): 5 TABLET, FILM COATED ORAL at 18:15

## 2018-01-31 RX ADMIN — PANTOPRAZOLE SODIUM 40 MILLIGRAM(S): 20 TABLET, DELAYED RELEASE ORAL at 06:06

## 2018-01-31 NOTE — PROCEDURE NOTE - PROCEDURE
<<-----Click on this checkbox to enter Procedure Peripheral intravenous catheter assessment  01/31/2018  #18G  1.75"  IV ns flus good heme back right cephalic vein  Active  JSTEELE  Vascular access with ultrasound guidance  01/31/2018  Patent right cephalic vein  Active  JSTEELE

## 2018-01-31 NOTE — PROGRESS NOTE ADULT - SUBJECTIVE AND OBJECTIVE BOX
[place] [facility]ARDIOVASCULAR [facility], THE HEART CENTER                                   [address] [place] [address]                                                      PHONE: [phone]                                                         FAX: [phone]  [email or web address]  ---------------------------------------------------------------------------------------------------------------------------------    Reason for Consult: AFIB GI BLEEDING    HPI:  CRISTIN ROQUE is an 77y Male with extensive cardiac Hx CAD S/P CABG, S/P AVR Ross procedure, Afib, s/p CVA with Aphasia and R side weakness on chronic AC, s/p IVC filter, S/P colectomy. Prostate CA recently hospitalized at [facility] MICU for aspiration Pneumonia, intubated for a few days in the hospital for almost a month as per fam Hx.   Presenting this time with bleeding at the site of colostomy bag for GI and surgical evaluation.         PAST MEDICAL & SURGICAL HISTORY:  CAD (coronary artery disease)  Afib  CVA (cerebral vascular accident)  Mitral valve replaced  BPH (benign prostatic hyperplasia)  High cholesterol  HTN (hypertension)  H/O heart valve replacement with mechanical valve  S/P CABG x 1  H/O colectomy      penicillins (Hives)      MEDICATIONS  (STANDING):  amiodarone    Tablet 400 milliGRAM(s) Oral daily  atorvastatin 10 milliGRAM(s) Oral at bedtime  buPROPion XL . 150 milliGRAM(s) Oral daily  dronabinol 2.5 milliGRAM(s) Oral two times a day  finasteride 5 milliGRAM(s) Oral daily  metoprolol     tartrate 25 milliGRAM(s) Oral three times a day  pantoprazole  Injectable 40 milliGRAM(s) IV Push every 12 hours  sodium chloride 0.9%. 1000 milliLiter(s) (75 mL/Hr) IV Continuous <Continuous>  tamsulosin 0.4 milliGRAM(s) Oral at bedtime    MEDICATIONS  (PRN):      Social History:  Cigarettes:                    Alchohol:                 Illicit Drug Abuse:      REVIEW OF SYSTEMS:    Constitutional: No fever, weight loss or fatigue  Eyes: No eye pain, visual disturbances, or discharge  ENMT:  No difficulty hearing, tinnitus, vertigo; No sinus or throat pain  Neck: No pain or stiffness  Respiratory: No cough, wheezing, chills or hemoptysis  Cardiovascular: No chest pain, palpitations, shortness of breath, dizziness or leg swelling  Gastrointestinal: No abdominal or epigastric pain. No nausea, vomiting or hematemesis; No diarrhea or constipation. No melena or hematochezia.  Genitourinary: No dysuria, frequency, hematuria or incontinence  Rectal: No pain, hemorrhoids or incontinence  Neurological: Aphasic Skin: No itching, burning, rashes or lesions   Lymph Nodes: No enlarged glands  Endocrine: No heat or cold intolerance; No hair loss    Vital Signs Last 24 Hrs  T(C): 36.6 (30 Jan 2018 11:43), Max: 36.6 (30 Jan 2018 11:43)  T(F): 97.9 (30 Jan 2018 11:43), Max: 97.9 (30 Jan 2018 11:43)  HR: 73 (30 Jan 2018 11:43) (73 - 73)  BP: 105/59 (30 Jan 2018 11:43) (105/59 - 105/59)  BP(mean): --  RR: 20 (30 Jan 2018 11:43) (20 - 20)  SpO2: 97% (30 Jan 2018 11:43) (97% - 97%)  ICU Vital Signs Last 24 Hrs  CRISTIN ROQUE  I&O's Detail    I&O's Summary    Drug Dosing Weight  CRISTIN ROQUE      PHYSICAL EXAM:  General: Appears well developed, well nourished alert and cooperative.  HEENT: Head; normocephalic, atraumatic.  Eyes: Pupils reactive, cornea wnl.  Neck: Supple, no nodes adenopathy, no NVD or carotid bruit or thyromegaly.  CARDIOVASCULAR: Normal S1 and S2, No murmur, rub, gallop or lift.   LUNGS: No rales, rhonchi or wheeze. Normal breath sounds bilaterally.  ABDOMEN: Soft, nontender without mass or organomegaly. bowel sounds normoactive.  EXTREMITIES: No clubbing, cyanosis or edema. Distal pulses wnl.   SKIN: warm and dry with normal turgor.  NEURO: Aphasic R side weakness        LABS:                        10.3   8.0   )-----------( 199      ( 30 Jan 2018 13:15 )             31.5     01-30    126<L>  |  92<L>  |  97.0<H>  ----------------------------<  105  5.4<H>   |  17.0<L>  |  3.28<H>    Ca    9.2      30 Jan 2018 13:15    TPro  8.1  /  Alb  3.2<L>  /  TBili  0.7  /  DBili  x   /  AST  46<H>  /  ALT  37  /  AlkPhos  128<H>  01-30    CRISTIN ROQUE      PT/INR - ( 30 Jan 2018 13:15 )   PT: 25.0 sec;   INR: 2.24 ratio         PTT - ( 30 Jan 2018 13:15 )  PTT:40.4 sec      RADIOLOGY & ADDITIONAL STUDIES:      ACTIVE PROBLEMS:  HEALTH ISSUES - PROBLEM Dx:  CVA (cerebral vascular accident): CVA (cerebral vascular accident)  Dysphagia: Dysphagia  Acute kidney injury superimposed on chronic kidney disease: Acute kidney injury superimposed on chronic kidney disease  Hyponatremia: Hyponatremia  Chronic atrial fibrillation: Chronic atrial fibrillation  Gastrointestinal hemorrhage, unspecified gastrointestinal hemorrhage type: Gastrointestinal hemorrhage, unspecified gastrointestinal hemorrhage type          Assessment and Plan:    HPI:  CRISTIN ROQUE is an 77y Male with extensive cardiac Hx CAD S/P CABG, S/P AVR Ross procedure, Afib, s/p CVA with Aphasia and R side weakness on chronic AC, s/p IVC filter, S/P colectomy. Prostate CA recently hospitalized at [facility] MICU for aspiration Pneumonia, intubated for a few days in the hospital for almost a month as per [facility] Hx.   Presenting this time with bleeding at the site of colostomy bag for GI and surgical evaluation.     Telemetry monitoring  hold AC  EP eval for Watchman  Continue present cardiac therapy  Pt clear to proceed with GI work up, there is no cardiac contraindications    FARIHA MALONEY MD, FACC, JASON

## 2018-01-31 NOTE — PROGRESS NOTE ADULT - ASSESSMENT
77 y.o. 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

## 2018-01-31 NOTE — PROGRESS NOTE ADULT - ASSESSMENT
Patient with possible GI bleed with ? melena and has G tube in place and also colostomy    1. PPI bid  2. Possible EGD tomorrow if cleared by cardiology  3. Continue to hold coumadin. May give low dose vitamin K   4. GI will follow up

## 2018-01-31 NOTE — PATIENT PROFILE ADULT. - VISION (WITH CORRECTIVE LENSES IF THE PATIENT USUALLY WEARS THEM):
We addressed the following today:    1. Lumbar arthritis    Activity modification as discussed    Home exercises as instructed    Topical Treatments: Ice    Over the counter medication: Acetaminophen (Tylenol) 1000 mg every 6 hours with food (Maximum of 3000 mg/day)    Follow-up for left sided lumbar medial branch blocks as discussed for diagnostic purposes (call direct clinic number [[phone]] at any time with questions or concerns)       Partially impaired: cannot see medication labels or newsprint, but can see obstacles in path, and the surrounding layout; can count fingers at arm's length

## 2018-01-31 NOTE — PROGRESS NOTE ADULT - SUBJECTIVE AND OBJECTIVE BOX
CC: bleeding in colostomy bag (30 Jan 2018 16:07)    HPI: 77 y.o. 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    patient unable to voice concerns but follows some directions.  per wife at bedside, patient noted to have intermittent black and bloody output from colostomy bag since discharge.  Poor PO intake.  Endoscopy was deferred during last hospitalization due to co morbidities.  On coumadin and lasix at home. non ambulatory. (30 Jan 2018 16:07)    INTERVAL HPI/OVERNIGHT EVENTS: weak, tired, pale, +right HP and aphasia    Vital Signs Last 24 Hrs  T(C): 36.4 (31 Jan 2018 11:48), Max: 36.9 (30 Jan 2018 18:50)  T(F): 97.6 (31 Jan 2018 11:48), Max: 98.5 (30 Jan 2018 18:50)  HR: 76 (31 Jan 2018 11:48) (75 - 83)  BP: 95/52 (31 Jan 2018 11:48) (95/52 - 129/79)  RR: 14 (31 Jan 2018 11:48) (14 - 20)  SpO2: 97% (31 Jan 2018 11:48) (97% - 98%)                       8.0    7.2   )-----------( 180      ( 31 Jan 2018 06:38 )             24.5     31 Jan 2018 06:38    136    |  104    |  88.0   ----------------------------<  108    3.8     |  15.0   |  3.01     Ca    8.5        31 Jan 2018 06:38    TPro  8.1    /  Alb  3.2    /  TBili  0.7    /  DBili  x      /  AST  46     /  ALT  37     /  AlkPhos  128    30 Jan 2018 13:15    PT/INR - ( 31 Jan 2018 06:38 )   PT: 26.9 sec;   INR: 2.40 ratio       PTT - ( 31 Jan 2018 06:38 )  PTT:33.3 sec    LIVER FUNCTIONS - ( 30 Jan 2018 13:15 )  Alb: 3.2 g/dL / Pro: 8.1 g/dL / ALK PHOS: 128 U/L / ALT: 37 U/L / AST: 46 U/L / GGT: x           MEDICATIONS  (STANDING):  amiodarone    Tablet 400 milliGRAM(s) Oral daily  atorvastatin 10 milliGRAM(s) Oral at bedtime  buPROPion XL . 150 milliGRAM(s) Oral daily  dronabinol 2.5 milliGRAM(s) Oral two times a day  finasteride 5 milliGRAM(s) Oral daily  metoprolol     tartrate 25 milliGRAM(s) Oral three times a day  pantoprazole  Injectable 40 milliGRAM(s) IV Push every 12 hours  sodium chloride 0.9%. 1000 milliLiter(s) (75 mL/Hr) IV Continuous <Continuous>  tamsulosin 0.4 milliGRAM(s) Oral at bedtime    MEDICATIONS  (PRN):    RADIOLOGY & ADDITIONAL TESTS: personally visualized    PHYSICAL EXAM:    General: elderly fragile male in no acute distress  Eyes: PERRLA, EOMI; conjunctiva and sclera clear  Head: Normocephalic; atraumatic  ENMT: No nasal discharge; airway clear, dry mucosal membranes  Neck: Supple; non tender; no masses  Respiratory: No wheezes, rales or rhonchi, decreased BS at bases  Cardiovascular: Irregular rate and rhythm. S1 and S2   Gastrointestinal: Soft abd, PEG in place, colostomy bag with no leakage and melanotic stool in the bag  Genitourinary: No costovertebral angle tenderness  Extremities: Right HP, +PP, no edema  Vascular: Peripheral pulses palpable 2+ bilaterally  Neurological: Alert and oriented x3, Right HP, aphasic  Skin: Warm and dry. pale  Musculoskeletal: Normal tone  Psychiatric: Cooperative, debilitated, refers to wife for decision making

## 2018-01-31 NOTE — PROCEDURE NOTE - NSPOSTCAREGUIDE_GEN_A_CORE
Instructed patient/caregiver regarding signs and symptoms of infection/Care for catheter as per unit/ICU protocols/Instructed patient/caregiver to follow-up with primary care physician/Keep the cast/splint/dressing clean and dry/Verbal/written post procedure instructions were given to patient/caregiver

## 2018-01-31 NOTE — PROGRESS NOTE ADULT - SUBJECTIVE AND OBJECTIVE BOX
INTERVAL HPI/OVERNIGHT EVENTS: FU for GI bleeding. No large volume bleeding noted.     MEDICATIONS  (STANDING):  amiodarone    Tablet 400 milliGRAM(s) Oral daily  atorvastatin 10 milliGRAM(s) Oral at bedtime  buPROPion XL . 150 milliGRAM(s) Oral daily  dronabinol 2.5 milliGRAM(s) Oral two times a day  finasteride 5 milliGRAM(s) Oral daily  metoprolol     tartrate 25 milliGRAM(s) Oral three times a day  pantoprazole  Injectable 40 milliGRAM(s) IV Push every 12 hours  sodium chloride 0.9%. 1000 milliLiter(s) (75 mL/Hr) IV Continuous <Continuous>  tamsulosin 0.4 milliGRAM(s) Oral at bedtime    MEDICATIONS  (PRN):      Allergies    penicillins (Hives)    Intolerances        Vital Signs Last 24 Hrs  T(C): 36.4 (31 Jan 2018 07:34), Max: 36.9 (30 Jan 2018 18:50)  T(F): 97.6 (31 Jan 2018 07:34), Max: 98.5 (30 Jan 2018 18:50)  HR: 75 (31 Jan 2018 07:34) (73 - 83)  BP: 104/54 (31 Jan 2018 07:34) (97/51 - 129/79)  BP(mean): --  RR: 20 (31 Jan 2018 07:34) (20 - 20)  SpO2: 97% (31 Jan 2018 07:34) (97% - 98%)    LABS:                        8.3    6.5   )-----------( 168      ( 30 Jan 2018 23:33 )             25.8     01-31    136  |  104  |  88.0<H>  ----------------------------<  108  3.8   |  15.0<L>  |  3.01<H>    Ca    8.5<L>      31 Jan 2018 06:38    TPro  8.1  /  Alb  3.2<L>  /  TBili  0.7  /  DBili  x   /  AST  46<H>  /  ALT  37  /  AlkPhos  128<H>  01-30    PT/INR - ( 31 Jan 2018 06:38 )   PT: 26.9 sec;   INR: 2.40 ratio         PTT - ( 31 Jan 2018 06:38 )  PTT:33.3 sec      RADIOLOGY & ADDITIONAL TESTS: INTERVAL HPI/OVERNIGHT EVENTS: FU for GI bleeding. No large volume bleeding noted. Hct trended down after hydration. No particular complaints. INR still high    MEDICATIONS  (STANDING):  amiodarone    Tablet 400 milliGRAM(s) Oral daily  atorvastatin 10 milliGRAM(s) Oral at bedtime  buPROPion XL . 150 milliGRAM(s) Oral daily  dronabinol 2.5 milliGRAM(s) Oral two times a day  finasteride 5 milliGRAM(s) Oral daily  metoprolol     tartrate 25 milliGRAM(s) Oral three times a day  pantoprazole  Injectable 40 milliGRAM(s) IV Push every 12 hours  sodium chloride 0.9%. 1000 milliLiter(s) (75 mL/Hr) IV Continuous <Continuous>  tamsulosin 0.4 milliGRAM(s) Oral at bedtime    MEDICATIONS  (PRN):      Allergies    penicillins (Hives)    Intolerances        Vital Signs Last 24 Hrs  T(C): 36.4 (31 Jan 2018 07:34), Max: 36.9 (30 Jan 2018 18:50)  T(F): 97.6 (31 Jan 2018 07:34), Max: 98.5 (30 Jan 2018 18:50)  HR: 75 (31 Jan 2018 07:34) (73 - 83)  BP: 104/54 (31 Jan 2018 07:34) (97/51 - 129/79)  BP(mean): --  RR: 20 (31 Jan 2018 07:34) (20 - 20)  SpO2: 97% (31 Jan 2018 07:34) (97% - 98%)    LABS:                        8.3    6.5   )-----------( 168      ( 30 Jan 2018 23:33 )             25.8     01-31    136  |  104  |  88.0<H>  ----------------------------<  108  3.8   |  15.0<L>  |  3.01<H>    Ca    8.5<L>      31 Jan 2018 06:38    TPro  8.1  /  Alb  3.2<L>  /  TBili  0.7  /  DBili  x   /  AST  46<H>  /  ALT  37  /  AlkPhos  128<H>  01-30    PT/INR - ( 31 Jan 2018 06:38 )   PT: 26.9 sec;   INR: 2.40 ratio         PTT - ( 31 Jan 2018 06:38 )  PTT:33.3 sec      RADIOLOGY & ADDITIONAL TESTS:

## 2018-02-01 DIAGNOSIS — Z29.9 ENCOUNTER FOR PROPHYLACTIC MEASURES, UNSPECIFIED: ICD-10-CM

## 2018-02-01 DIAGNOSIS — I10 ESSENTIAL (PRIMARY) HYPERTENSION: ICD-10-CM

## 2018-02-01 DIAGNOSIS — I25.10 ATHEROSCLEROTIC HEART DISEASE OF NATIVE CORONARY ARTERY WITHOUT ANGINA PECTORIS: ICD-10-CM

## 2018-02-01 LAB
ANION GAP SERPL CALC-SCNC: 15 MMOL/L — SIGNIFICANT CHANGE UP (ref 5–17)
APTT BLD: 32.1 SEC — SIGNIFICANT CHANGE UP (ref 27.5–37.4)
BUN SERPL-MCNC: 84 MG/DL — HIGH (ref 8–20)
CALCIUM SERPL-MCNC: 8.9 MG/DL — SIGNIFICANT CHANGE UP (ref 8.6–10.2)
CHLORIDE SERPL-SCNC: 111 MMOL/L — HIGH (ref 98–107)
CO2 SERPL-SCNC: 13 MMOL/L — LOW (ref 22–29)
CREAT SERPL-MCNC: 2.72 MG/DL — HIGH (ref 0.5–1.3)
GLUCOSE SERPL-MCNC: 102 MG/DL — SIGNIFICANT CHANGE UP (ref 70–115)
HCT VFR BLD CALC: 32.1 % — LOW (ref 42–52)
HGB BLD-MCNC: 10.1 G/DL — LOW (ref 14–18)
INR BLD: 2.31 RATIO — HIGH (ref 0.88–1.16)
INR BLD: 2.51 RATIO — HIGH (ref 0.88–1.16)
MCHC RBC-ENTMCNC: 29.4 PG — SIGNIFICANT CHANGE UP (ref 27–31)
MCHC RBC-ENTMCNC: 31.5 G/DL — LOW (ref 32–36)
MCV RBC AUTO: 93.3 FL — SIGNIFICANT CHANGE UP (ref 80–94)
PLATELET # BLD AUTO: 135 K/UL — LOW (ref 150–400)
POTASSIUM SERPL-MCNC: 3.9 MMOL/L — SIGNIFICANT CHANGE UP (ref 3.5–5.3)
POTASSIUM SERPL-SCNC: 3.9 MMOL/L — SIGNIFICANT CHANGE UP (ref 3.5–5.3)
PROTHROM AB SERPL-ACNC: 25.8 SEC — HIGH (ref 9.8–12.7)
PROTHROM AB SERPL-ACNC: 28.1 SEC — HIGH (ref 9.8–12.7)
RBC # BLD: 3.44 M/UL — LOW (ref 4.6–6.2)
RBC # FLD: 20 % — HIGH (ref 11–15.6)
SODIUM SERPL-SCNC: 139 MMOL/L — SIGNIFICANT CHANGE UP (ref 135–145)
WBC # BLD: 6.7 K/UL — SIGNIFICANT CHANGE UP (ref 4.8–10.8)
WBC # FLD AUTO: 6.7 K/UL — SIGNIFICANT CHANGE UP (ref 4.8–10.8)

## 2018-02-01 PROCEDURE — 99233 SBSQ HOSP IP/OBS HIGH 50: CPT

## 2018-02-01 PROCEDURE — 99233 SBSQ HOSP IP/OBS HIGH 50: CPT | Mod: GC

## 2018-02-01 PROCEDURE — 99223 1ST HOSP IP/OBS HIGH 75: CPT

## 2018-02-01 RX ADMIN — Medication 2.5 MILLIGRAM(S): at 17:49

## 2018-02-01 RX ADMIN — ATORVASTATIN CALCIUM 10 MILLIGRAM(S): 80 TABLET, FILM COATED ORAL at 20:40

## 2018-02-01 RX ADMIN — Medication 25 MILLIGRAM(S): at 13:32

## 2018-02-01 RX ADMIN — TAMSULOSIN HYDROCHLORIDE 0.4 MILLIGRAM(S): 0.4 CAPSULE ORAL at 20:41

## 2018-02-01 RX ADMIN — BUPROPION HYDROCHLORIDE 150 MILLIGRAM(S): 150 TABLET, EXTENDED RELEASE ORAL at 13:33

## 2018-02-01 RX ADMIN — SODIUM CHLORIDE 75 MILLILITER(S): 9 INJECTION INTRAMUSCULAR; INTRAVENOUS; SUBCUTANEOUS at 13:33

## 2018-02-01 RX ADMIN — FINASTERIDE 5 MILLIGRAM(S): 5 TABLET, FILM COATED ORAL at 13:33

## 2018-02-01 RX ADMIN — PANTOPRAZOLE SODIUM 40 MILLIGRAM(S): 20 TABLET, DELAYED RELEASE ORAL at 17:49

## 2018-02-01 RX ADMIN — Medication 25 MILLIGRAM(S): at 20:41

## 2018-02-01 RX ADMIN — AMIODARONE HYDROCHLORIDE 400 MILLIGRAM(S): 400 TABLET ORAL at 06:00

## 2018-02-01 NOTE — PROGRESS NOTE ADULT - SUBJECTIVE AND OBJECTIVE BOX
Patient is a 77y old  Male who presents with a chief complaint of bleeding in colostomy bag.  He is scheduled to have an EGD. (2018 16:07)      PAST MEDICAL HISTORY:  L.V.E.F. = 40 to 45% (2018) with Grade III diastolic dysfunction.  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 150 milliGRAM(s) Oral daily  dronabinol 2.5 milliGRAM(s) Oral two times a day  finasteride 5 milliGRAM(s) Oral daily  metoprolol     tartrate 25 milliGRAM(s) Oral three times a day  pantoprazole  Injectable 40 milliGRAM(s) IV Push every 12 hours  sodium chloride 0.9%. 1000 milliLiter(s) (75 mL/Hr) IV Continuous <Continuous>  tamsulosin 0.4 milliGRAM(s) Oral at bedtime    MEDICATIONS  (PRN):      Allergies:     penicillins (Hives)        SOCIAL HISTORY:  He likes to drink beer and scotch and does so infrequently.  He quit                             smoking in  after smoking tiparillos for 20 years.  He never                              used illicit drugs.                          8.0    7.2   )-----------( 180      ( 2018 06:38 )             24.5       PT/INR - ( 2018 06:38 )   PT: 26.9 sec;   INR: 2.40 ratio         PTT - ( 2018 06:38 )  PTT:33.3 sec        136  |  104  |  88.0<H>  ----------------------------<  108  3.8   |  15.0<L>  |  3.01<H>    Ca    8.5<L>      2018 06:38    TPro  8.1  /  Alb  3.2<L>  /  TBili  0.7  /  DBili  x   /  AST  46<H>  /  ALT  37  /  AlkPhos  128<H>      [id number]       Atrial fibrillation with rapid ventricular response with premature ventricular or aberrantly conducted complexes  Cannot rule out Inferior infarct , age undetermined  Abnormal ECG    TT ECHO:  -  2018   Summary:   1. Left ventricular ejection fraction, by visual estimation, is 40 to   45%.   2. Mildly decreased global left ventricular systolic function.   3. Abnormal septal motion consistent with post-operative status and   right ventricular volume and pressure overload.   4. Spectral Doppler shows restrictive pattern of left ventricular   myocardial filling (Grade III diastolic dysfunction).   5. Severely enlarged right ventricle.   6. Severely reduced RV systolic function.   7. Moderately dilated right atrium.   8. Moderate tricuspid regurgitation.   9. Estimated pulmonary artery systolic pressure is 35.4 mmHg assuming a   right atrial pressure of 8 mmHg, which is consistent with borderline   pulmonary hypertension.  10. Mitral valve mean gradient is 3.7 mmHg consistent with mild mitral   stenosis.  11. Moderately enlarged left atrium.  12. The aortic valve mean gradient is 2.1 mmHg consistent with normally   opening aortic valve.  13. Pulmonary Embolism should be considered in setting of Severe RV   dysfunction with probably preserved RV apical contraction.  14. Peak aortic valve gradient is 5.4 mmHg and the mean gradient is 2.1   mmHg, which is probably normal in the setting of a prosthetic aortic   valve.  15. I have discussed about the echo with Dr Silver.  16. Compared to Study from 2017 RV functions is severely reduced and LV   EF reduced from 60-65% to 40-45%.    CHEST X-RAY   -   2018  IMPRESSION:   Residual increased interstitial markings right upper lobe   which may present fibrotic sequela following bilateral airspace   consolidations on 1/3/2018 examination. Cardiomegaly..                  ASA # =             Mallampati # = Patient is a 77y old  Male who presents with a chief complaint of bleeding in colostomy bag.  He is scheduled to have an EGD. (2018 16:07)      PAST MEDICAL HISTORY:  L.V.E.F. = 40 to 45% (2018) with Grade III diastolic dysfunction.  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  Mitral valve replaced  prostate cancer treated with seed implants  High cholesterol  HTN (hypertension)      PAST SURGICAL HISTORY:  H/O heart valve replacement   S/P CABG x 3  H/O colectomy      MEDICATIONS  (STANDING):  amiodarone    Tablet 400 milliGRAM(s) Oral daily  atorvastatin 10 milliGRAM(s) Oral at bedtime  buPROPion XL . 150 milliGRAM(s) Oral daily  dronabinol 2.5 milliGRAM(s) Oral two times a day  finasteride 5 milliGRAM(s) Oral daily  metoprolol     tartrate 25 milliGRAM(s) Oral three times a day  pantoprazole  Injectable 40 milliGRAM(s) IV Push every 12 hours  sodium chloride 0.9%. 1000 milliLiter(s) (75 mL/Hr) IV Continuous <Continuous>  tamsulosin 0.4 milliGRAM(s) Oral at bedtime    MEDICATIONS  (PRN):      Allergies:     penicillins (Hives)        SOCIAL HISTORY:  He likes to drink beer and scotch and does so infrequently.  He quit                             smoking in  after smoking tiparillos for 20 years.  He never                              used illicit drugs.                          8.0    7.2   )-----------( 180      ( 2018 06:38 )             24.5       PT/INR - ( 2018 06:38 )   PT: 26.9 sec;   INR: 2.40 ratio         PTT - ( 2018 06:38 )  PTT:33.3 sec        136  |  104  |  88.0<H>  ----------------------------<  108  3.8   |  15.0<L>  |  3.01<H>    Ca    8.5<L>      2018 06:38    TPro  8.1  /  Alb  3.2<L>  /  TBili  0.7  /  DBili  x   /  AST  46<H>  /  ALT  37  /  AlkPhos  128<H>      [id number]       Atrial fibrillation with rapid ventricular response with premature ventricular or aberrantly conducted complexes  Cannot rule out Inferior infarct , age undetermined  Abnormal ECG    TT ECHO:  -  2018   Summary:   1. Left ventricular ejection fraction, by visual estimation, is 40 to   45%.   2. Mildly decreased global left ventricular systolic function.   3. Abnormal septal motion consistent with post-operative status and   right ventricular volume and pressure overload.   4. Spectral Doppler shows restrictive pattern of left ventricular   myocardial filling (Grade III diastolic dysfunction).   5. Severely enlarged right ventricle.   6. Severely reduced RV systolic function.   7. Moderately dilated right atrium.   8. Moderate tricuspid regurgitation.   9. Estimated pulmonary artery systolic pressure is 35.4 mmHg assuming a   right atrial pressure of 8 mmHg, which is consistent with borderline   pulmonary hypertension.  10. Mitral valve mean gradient is 3.7 mmHg consistent with mild mitral   stenosis.  11. Moderately enlarged left atrium.  12. The aortic valve mean gradient is 2.1 mmHg consistent with normally   opening aortic valve.  13. Pulmonary Embolism should be considered in setting of Severe RV   dysfunction with probably preserved RV apical contraction.  14. Peak aortic valve gradient is 5.4 mmHg and the mean gradient is 2.1   mmHg, which is probably normal in the setting of a prosthetic aortic   valve.  15. I have discussed about the echo with Dr Silver.  16. Compared to Study from 2017 RV functions is severely reduced and LV   EF reduced from 60-65% to 40-45%.    CHEST X-RAY   -   2018  IMPRESSION:   Residual increased interstitial markings right upper lobe   which may present fibrotic sequela following bilateral airspace   consolidations on 1/3/2018 examination. Cardiomegaly..                  ASA # =             Mallampati # =

## 2018-02-01 NOTE — CONSULT NOTE ADULT - SUBJECTIVE AND OBJECTIVE BOX
78 yo male PMHx stage 3 CKD, aortic & mitral valvular disease and CAD s/p AV replacement (via Ross procedure), mitral ring and CABG x 3 approximately 20 years ago, longstanding chronic AF on Coumadin, NSVT being maintained on amiodarone, HTN, prostate CA s/p radiation seed placement complicated by seed dislodgment resulting in damage to large intestines requiring colectomy w/ colostomy (circa 2007), further complicated by DVTs and PE (during post operative course) and subsequent IVC filter placement. In December 2015, he had a CVA with residual expressive aphasia, R hemiparesis and severe dysphagia, ultimately requiring placement of a PEG. He subsequently had significant hematuria, which was successfully treated with TURP. Following this hospitalization, he was noted to have bright red blood in his colostomy bag, which was found to be the result of inflammation/injury to the stoma. He was admitted 3 weeks ago for resp failure requiring intubation due to aspiration pneumonia and Septic shock 2/2 UTI with KAPIL on CKD. He returned 1/30/18 with melena associated w/ anemia requiring 2 units packed RBC and is currently awaiting endoscopy. Cooperative, but very limited communication. Defers to wife for conversation, including medical history and medical decision making.      CHADS-Vasc = 6 (HTN, age, CVA, CAD) = 9.8% annual stroke risk  HAS-BLED = 6 (HTN, CKD w/ Cr 2.7, CVA, major bleeding hx, age, antiplatelet tx) = >12.5% annual bleeding risk      PAST MEDICAL & SURGICAL HISTORY:  CAD (coronary artery disease)  Afib  CVA (cerebral vascular accident)  BPH (benign prostatic hyperplasia)  High cholesterol  HTN (hypertension)  H/O heart valve replacement with mechanical valve  S/P CABG x 1  H/O colectomy      REVIEW OF SYSTEMS: see HPI      MEDICATIONS  (STANDING):  amiodarone    Tablet 400 milliGRAM(s) Oral daily  atorvastatin 10 milliGRAM(s) Oral at bedtime  buPROPion XL . 150 milliGRAM(s) Oral daily  dronabinol 2.5 milliGRAM(s) Oral two times a day  finasteride 5 milliGRAM(s) Oral daily  metoprolol     tartrate 25 milliGRAM(s) Oral three times a day  pantoprazole  Injectable 40 milliGRAM(s) IV Push every 12 hours  sodium chloride 0.9%. 1000 milliLiter(s) (75 mL/Hr) IV Continuous <Continuous>  tamsulosin 0.4 milliGRAM(s) Oral at bedtime      Allergies  penicillins (Hives)      SOCIAL HISTORY: bed bound; lives w/ wife     FAMILY HISTORY:  No pertinent family history in first degree relatives      Vital Signs Last 24 Hrs  T(C): 36.3 (01 Feb 2018 15:25), Max: 36.7 (31 Jan 2018 22:12)  T(F): 97.4 (01 Feb 2018 15:25), Max: 98 (31 Jan 2018 22:12)  HR: 69 (01 Feb 2018 15:25) (69 - 93)  BP: 94/60 (01 Feb 2018 15:25) (94/60 - 119/60)  BP(mean): --  RR: 18 (01 Feb 2018 15:25) (18 - 18)  SpO2: 97% (01 Feb 2018 08:54) (97% - 97%)    Physical Exam:  Constitutional: drowsy, but arousable to voice, oriented x 3  Neck: supple, No JVD  Cardiovascular: +S1S2, regular rate, irregular rhythm  Pulmonary: CTA b/l, unlabored, no wheezes, rales. rhonci  Abdomen: +BS, soft NTND, PEG in place, colostomy bag with no leakage and melanotic stool in the bag  Extremities: no edema b/l, +distal pulses b/l, R hemiparesis   Neuro: largely aphasic    LABS:                        10.1   6.7   )-----------( 135      ( 01 Feb 2018 09:03 )             32.1   02-01    139  |  111<H>  |  84.0<H>  ----------------------------<  102  3.9   |  13.0<L>  |  2.72<H>    Ca    8.9      01 Feb 2018 06:51      PT/INR - ( 01 Feb 2018 09:03 )   PT: 28.1 sec;   INR: 2.51 ratio         PTT - ( 01 Feb 2018 06:51 )  PTT:32.1 sec

## 2018-02-01 NOTE — CONSULT NOTE ADULT - ATTENDING COMMENTS
Pt with hx of severe CVA, and now with recurrent bleeding complications on anticoagulation. Candidate for Watchman procedure as alternative to long-term anticoagulation if this procedure is in line with pt's overall goals of care. Presence of IVC filter may create difficulty in device delivery, but not absolute contraindication. Will followup after GI evaluation, and consider procedure if pt and family agreeable. Would get LIVAN to evaluate TATYANA anatomy prior to procedure if considering proceeding.

## 2018-02-01 NOTE — PROGRESS NOTE ADULT - SUBJECTIVE AND OBJECTIVE BOX
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    patient unable to voice concerns but follows some directions.  per wife at bedside, patient noted to have intermittent black and bloody output from colostomy bag since discharge.  Poor PO intake.  Endoscopy was deferred during last hospitalization due to co morbidities.  On coumadin and lasix at home. non ambulatory. (30 Jan 2018 16:07)    HPI 2/1/18:    Patient seen and examined at the bedside. He appears in NAD. Lying in bed. He denies CP, SOB, fever, chills, nausea, vomiting, diarrhea, constipation, abdominal pain. He does state his colostomy output has been darker in color, appears melonic.     I&O's Summary    31 Jan 2018 07:01  -  01 Feb 2018 07:00  --------------------------------------------------------  IN: 825 mL / OUT: 600 mL / NET: 225 mL      Allergies    penicillins (Hives)    Intolerances      HEALTH ISSUES - PROBLEM Dx:  Anemia due to blood loss: Anemia due to blood loss  Gastrointestinal hemorrhage with melena: Gastrointestinal hemorrhage with melena  CVA (cerebral vascular accident): CVA (cerebral vascular accident)  Dysphagia: Dysphagia  Acute kidney injury superimposed on chronic kidney disease: Acute kidney injury superimposed on chronic kidney disease  Hyponatremia: Hyponatremia  Chronic atrial fibrillation: Chronic atrial fibrillation  Gastrointestinal hemorrhage, unspecified gastrointestinal hemorrhage type: Gastrointestinal hemorrhage, unspecified gastrointestinal hemorrhage type        PAST MEDICAL & SURGICAL HISTORY:  CAD (coronary artery disease)  Afib  CVA (cerebral vascular accident)  Mitral valve replaced  BPH (benign prostatic hyperplasia)  High cholesterol  HTN (hypertension)  H/O heart valve replacement with mechanical valve  S/P CABG x 1  H/O colectomy          Vital Signs Last 24 Hrs  T(C): 36.3 (01 Feb 2018 08:54), Max: 36.7 (31 Jan 2018 22:12)  T(F): 97.4 (01 Feb 2018 08:54), Max: 98 (31 Jan 2018 22:12)  HR: 93 (01 Feb 2018 08:54) (65 - 93)  BP: 119/60 (01 Feb 2018 08:54) (108/58 - 119/60)  RR: 18 (01 Feb 2018 08:54) (17 - 19)  SpO2: 97% (01 Feb 2018 08:54) (97% - 98%)T    PHYSICAL EXAM:    GENERAL: NAD  HEAD:  Atraumatic, Normocephalic  EYES: EOMI, PERRLA, conjunctiva and sclera clear  ENMT:  Moist mucous membranes,  No lesions  NERVOUS SYSTEM:  Alert & Oriented X3,  Moves upper and lower extremities  CHEST/LUNG: Clear to auscultation bilaterally; No rales, rhonchi, wheezing,   HEART: Regular rate irregualr rhythm; No murmurs,   ABDOMEN: Soft, Nontender, Nondistended; Bowel sounds present, PEG site clean dry intact, melonic stool in colostomy bag   EXTREMITIES:  Peripheral Pulses, No  cyanosis, or edema      amiodarone    Tablet 400 milliGRAM(s) Oral daily  atorvastatin 10 milliGRAM(s) Oral at bedtime  buPROPion XL . 150 milliGRAM(s) Oral daily  dronabinol 2.5 milliGRAM(s) Oral two times a day  finasteride 5 milliGRAM(s) Oral daily  metoprolol     tartrate 25 milliGRAM(s) Oral three times a day  pantoprazole  Injectable 40 milliGRAM(s) IV Push every 12 hours  sodium chloride 0.9%. 1000 milliLiter(s) IV Continuous <Continuous>  tamsulosin 0.4 milliGRAM(s) Oral at bedtime      LABS:                          10.1   6.7   )-----------( 135      ( 01 Feb 2018 09:03 )             32.1     02-01    139  |  111<H>  |  84.0<H>  ----------------------------<  102  3.9   |  13.0<L>  |  2.72<H>    Ca    8.9      01 Feb 2018 06:51        PT/INR - ( 01 Feb 2018 09:03 )   PT: 28.1 sec;   INR: 2.51 ratio         PTT - ( 01 Feb 2018 06:51 )  PTT:32.1 sec        Consultant notes reviewed    Case discussed with consultant/provider/ family /patient

## 2018-02-01 NOTE — PROGRESS NOTE ADULT - ASSESSMENT
77 y.o. 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 Couamdin was placed on hold and INR followed daily. GI was consulted for GI bleed. Plan for coloscopy when INR <1.5. EP evaluated the patient for a watchman procedure, but commented that an IVC filter would make it difficult. 77 y.o. 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 Couamdin was placed on hold and INR followed daily. GI was consulted for GI bleed. Plan for EGD when INR <1.5. EP evaluated the patient for a watchman procedure, but commented that an IVC filter would make it difficult.

## 2018-02-01 NOTE — CONSULT NOTE ADULT - ASSESSMENT
76 yo male PMHx stage 3 CKD, aortic & mitral valvular disease and CAD s/p AV replacement (via Ross procedure), mitral ring and CABG x 3 approximately 20 years ago, longstanding chronic AF on Coumadin, NSVT being maintained on amiodarone, HTN, prostate CA s/p radiation seed placement complicated by seed dislodgment resulting in damage to large intestines requiring colectomy w/ colostomy (circa 2007), further complicated by DVTs and PE (during post operative course) and subsequent IVC filter placement. In December 2015, he had a CVA with residual expressive aphasia, R hemiparesis and severe dysphagia, ultimately requiring placement of a PEG. He subsequently had significant hematuria, which was successfully treated with TURP. Following this hospitalization, he was noted to have bright red blood in his colostomy bag, which was found to be the result of inflammation/injury to the stoma. He was admitted 3 weeks ago for resp failure requiring intubation due to aspiration pneumonia and Septic shock 2/2 UTI with KAPIL on CKD. He returned 1/30/18 with melena associated w/ anemia requiring 2 units packed RBC and is currently awaiting endoscopy.     Plan:   Pt would be a candidate for Watchman.   Would need LIVAN to determine if TATYANA anatomy is amenable to Watchman device.   Pt's wife is currently very hesitant to consent to invasive procedures. This procedure could certainly be deferred and readdressed as an outpatient.

## 2018-02-01 NOTE — CONSULT NOTE ADULT - SUBJECTIVE AND OBJECTIVE BOX
[place] [facility], THE HEART CENTER                                   [address] [place] [address]                                                      PHONE: [phone]                                                         FAX: [phone]  [email or web address]  ---------------------------------------------------------------------------------------------------------------------------------    Reason for Consult:    HPI:  CRISTIN ROQUE is an 77y Male with history longstanding persistent AF, CABG, bio AVR/Ross procedure, CVA, DVT/IVC filter, colectomy/colostomy, recent adm for PNA, admitted with blood in colostomy bag.  Now s/p PRBC.  Awaiting EGD.  No CP or SOB, no palps or dizziness.  Has had hematuria in past as well. Asked to comment re: candidacy for Watchman given intolerance to Coumadin    PAST MEDICAL & SURGICAL HISTORY:  CAD (coronary artery disease)  Afib  CVA (cerebral vascular accident)  Mitral valve replaced  BPH (benign prostatic hyperplasia)  High cholesterol  HTN (hypertension)  H/O heart valve replacement with mechanical valve  S/P CABG x 1  H/O colectomy      penicillins (Hives)      MEDICATIONS  (STANDING):  amiodarone    Tablet 400 milliGRAM(s) Oral daily  atorvastatin 10 milliGRAM(s) Oral at bedtime  buPROPion XL . 150 milliGRAM(s) Oral daily  dronabinol 2.5 milliGRAM(s) Oral two times a day  finasteride 5 milliGRAM(s) Oral daily  metoprolol     tartrate 25 milliGRAM(s) Oral three times a day  pantoprazole  Injectable 40 milliGRAM(s) IV Push every 12 hours  sodium chloride 0.9%. 1000 milliLiter(s) (75 mL/Hr) IV Continuous <Continuous>  tamsulosin 0.4 milliGRAM(s) Oral at bedtime    MEDICATIONS  (PRN):      Social History:  Cigarettes:                    Alchohol:                 Illicit Drug Abuse:      ROS: Negative other than as mentioned in HPI.    Vital Signs Last 24 Hrs  T(C): 36.3 (01 Feb 2018 08:54), Max: 36.7 (31 Jan 2018 22:12)  T(F): 97.4 (01 Feb 2018 08:54), Max: 98 (31 Jan 2018 22:12)  HR: 93 (01 Feb 2018 08:54) (65 - 93)  BP: 119/60 (01 Feb 2018 08:54) (108/58 - 119/60)  BP(mean): --  RR: 18 (01 Feb 2018 08:54) (17 - 19)  SpO2: 97% (01 Feb 2018 08:54) (97% - 98%)  ICU Vital Signs Last 24 Hrs  CRISTIN ROQUE  I&O's Detail    31 Jan 2018 07:01  -  01 Feb 2018 07:00  --------------------------------------------------------  IN:    sodium chloride 0.9%.: 825 mL  Total IN: 825 mL    OUT:    Ileostomy: 600 mL  Total OUT: 600 mL    Total NET: 225 mL        I&O's Summary    31 Jan 2018 07:01  -  01 Feb 2018 07:00  --------------------------------------------------------  IN: 825 mL / OUT: 600 mL / NET: 225 mL      Drug Dosing Weight  CRISTIN ROQUE      PHYSICAL EXAM:  General: Appears well developed, well nourished alert and cooperative.  HEENT: Head; normocephalic, atraumatic.  Eyes: Pupils reactive, cornea wnl.  Neck: Supple, no nodes adenopathy, no NVD or carotid bruit or thyromegaly.  CARDIOVASCULAR: irregularly irregular, 2/6 crescendo syst m.   LUNGS: No rales, rhonchi or wheeze. Normal breath sounds bilaterally.  ABDOMEN: Soft, nontender , +colostomy.  EXTREMITIES: No clubbing, cyanosis or edema. Distal pulses wnl.   SKIN: warm and dry with normal turgor.  NEURO: Alert/oriented x 3/normal motor exam. No pathologic reflexes.    PSYCH: normal affect.        LABS:                        10.1   6.7   )-----------( 135      ( 01 Feb 2018 09:03 )             32.1     02-01    139  |  111<H>  |  84.0<H>  ----------------------------<  102  3.9   |  13.0<L>  |  2.72<H>    Ca    8.9      01 Feb 2018 06:51      CRISTIN ROQUE      PT/INR - ( 01 Feb 2018 09:03 )   PT: 28.1 sec;   INR: 2.51 ratio         PTT - ( 01 Feb 2018 06:51 )  PTT:32.1 sec      RADIOLOGY & ADDITIONAL STUDIES:    INTERPRETATION OF TELEMETRY (personally reviewed):    ECG :AF, ?old IMI    ECHO: 1/2/18- EF 40-45%, RV dilated/hypokinetic, normal prosthetic AV fxn, mod TR        Assessment and Plan:  In summary, CRISTIN ROQUE is an 77y Male with past medical history significant for chronic AF, CVA, DVT, CKD, IVC filter, hematuria, prostatic hypertrophy, colostmy, CAD and AVR, chronic systolic CHF, admitted with apparent GI bleeding.  Coumadin held.  For EGD probably tomorrow.  Given history of CVA and multiple bleeding events on Coumadin, would likely be a good candidate for Watchman TATYANA occlusion device for long term CVA prophylaxis, although presence of IVC filter may make device delivery impossible.  Discussed with patient and wife, who seem interested in procedure.  WIll follow and re-eval after GI workup and treatment.

## 2018-02-01 NOTE — PROGRESS NOTE ADULT - PROBLEM SELECTOR PLAN 5
-Supportive care  -PEG care  -Feeds as tolerated, nectar thick w/ soft diet as per speech and swallow on 1/12/18, supplement PEG feeds as needed  -For now clear liquid and NPO after midnight  -Hold ASA and coumadin given GI bleed  -Continue statin

## 2018-02-02 ENCOUNTER — APPOINTMENT (OUTPATIENT)
Dept: INTERNAL MEDICINE | Facility: CLINIC | Age: 78
End: 2018-02-02

## 2018-02-02 DIAGNOSIS — E87.2 ACIDOSIS: ICD-10-CM

## 2018-02-02 DIAGNOSIS — R13.12 DYSPHAGIA, OROPHARYNGEAL PHASE: ICD-10-CM

## 2018-02-02 LAB
ANION GAP SERPL CALC-SCNC: 16 MMOL/L — SIGNIFICANT CHANGE UP (ref 5–17)
APTT BLD: 38.5 SEC — HIGH (ref 27.5–37.4)
BUN SERPL-MCNC: 72 MG/DL — HIGH (ref 8–20)
CALCIUM SERPL-MCNC: 9 MG/DL — SIGNIFICANT CHANGE UP (ref 8.6–10.2)
CHLORIDE SERPL-SCNC: 113 MMOL/L — HIGH (ref 98–107)
CO2 SERPL-SCNC: 12 MMOL/L — LOW (ref 22–29)
CREAT SERPL-MCNC: 2.79 MG/DL — HIGH (ref 0.5–1.3)
GLUCOSE SERPL-MCNC: 118 MG/DL — HIGH (ref 70–115)
HCT VFR BLD CALC: 29.5 % — LOW (ref 42–52)
HGB BLD-MCNC: 9.2 G/DL — LOW (ref 14–18)
INR BLD: 2.55 RATIO — HIGH (ref 0.88–1.16)
MCHC RBC-ENTMCNC: 29.3 PG — SIGNIFICANT CHANGE UP (ref 27–31)
MCHC RBC-ENTMCNC: 31.2 G/DL — LOW (ref 32–36)
MCV RBC AUTO: 93.9 FL — SIGNIFICANT CHANGE UP (ref 80–94)
PLATELET # BLD AUTO: 112 K/UL — LOW (ref 150–400)
POTASSIUM SERPL-MCNC: 4.1 MMOL/L — SIGNIFICANT CHANGE UP (ref 3.5–5.3)
POTASSIUM SERPL-SCNC: 4.1 MMOL/L — SIGNIFICANT CHANGE UP (ref 3.5–5.3)
PROTHROM AB SERPL-ACNC: 28.6 SEC — HIGH (ref 9.8–12.7)
RBC # BLD: 3.14 M/UL — LOW (ref 4.6–6.2)
RBC # FLD: 20.2 % — HIGH (ref 11–15.6)
SODIUM SERPL-SCNC: 141 MMOL/L — SIGNIFICANT CHANGE UP (ref 135–145)
WBC # BLD: 6.6 K/UL — SIGNIFICANT CHANGE UP (ref 4.8–10.8)
WBC # FLD AUTO: 6.6 K/UL — SIGNIFICANT CHANGE UP (ref 4.8–10.8)

## 2018-02-02 PROCEDURE — 99223 1ST HOSP IP/OBS HIGH 75: CPT

## 2018-02-02 PROCEDURE — 99232 SBSQ HOSP IP/OBS MODERATE 35: CPT

## 2018-02-02 PROCEDURE — 99233 SBSQ HOSP IP/OBS HIGH 50: CPT | Mod: GC

## 2018-02-02 RX ORDER — SODIUM CHLORIDE 9 MG/ML
1000 INJECTION, SOLUTION INTRAVENOUS
Qty: 0 | Refills: 0 | Status: DISCONTINUED | OUTPATIENT
Start: 2018-02-02 | End: 2018-02-02

## 2018-02-02 RX ORDER — IPRATROPIUM/ALBUTEROL SULFATE 18-103MCG
3 AEROSOL WITH ADAPTER (GRAM) INHALATION ONCE
Qty: 0 | Refills: 0 | Status: COMPLETED | OUTPATIENT
Start: 2018-02-02 | End: 2018-02-02

## 2018-02-02 RX ORDER — NYSTATIN CREAM 100000 [USP'U]/G
1 CREAM TOPICAL DAILY
Qty: 0 | Refills: 0 | Status: DISCONTINUED | OUTPATIENT
Start: 2018-02-02 | End: 2018-02-09

## 2018-02-02 RX ORDER — SODIUM CHLORIDE 9 MG/ML
1000 INJECTION, SOLUTION INTRAVENOUS
Qty: 0 | Refills: 0 | Status: DISCONTINUED | OUTPATIENT
Start: 2018-02-02 | End: 2018-02-03

## 2018-02-02 RX ADMIN — AMIODARONE HYDROCHLORIDE 400 MILLIGRAM(S): 400 TABLET ORAL at 05:27

## 2018-02-02 RX ADMIN — Medication 25 MILLIGRAM(S): at 22:06

## 2018-02-02 RX ADMIN — FINASTERIDE 5 MILLIGRAM(S): 5 TABLET, FILM COATED ORAL at 11:38

## 2018-02-02 RX ADMIN — Medication 3 MILLILITER(S): at 11:43

## 2018-02-02 RX ADMIN — PANTOPRAZOLE SODIUM 40 MILLIGRAM(S): 20 TABLET, DELAYED RELEASE ORAL at 17:38

## 2018-02-02 RX ADMIN — BUPROPION HYDROCHLORIDE 150 MILLIGRAM(S): 150 TABLET, EXTENDED RELEASE ORAL at 11:38

## 2018-02-02 RX ADMIN — Medication 2.5 MILLIGRAM(S): at 17:38

## 2018-02-02 RX ADMIN — SODIUM CHLORIDE 75 MILLILITER(S): 9 INJECTION, SOLUTION INTRAVENOUS at 12:24

## 2018-02-02 RX ADMIN — ATORVASTATIN CALCIUM 10 MILLIGRAM(S): 80 TABLET, FILM COATED ORAL at 22:06

## 2018-02-02 RX ADMIN — PANTOPRAZOLE SODIUM 40 MILLIGRAM(S): 20 TABLET, DELAYED RELEASE ORAL at 05:26

## 2018-02-02 RX ADMIN — TAMSULOSIN HYDROCHLORIDE 0.4 MILLIGRAM(S): 0.4 CAPSULE ORAL at 22:06

## 2018-02-02 RX ADMIN — SODIUM CHLORIDE 100 MILLILITER(S): 9 INJECTION, SOLUTION INTRAVENOUS at 17:38

## 2018-02-02 RX ADMIN — NYSTATIN CREAM 1 APPLICATION(S): 100000 CREAM TOPICAL at 13:00

## 2018-02-02 NOTE — DIETITIAN INITIAL EVALUATION ADULT. - PERTINENT LABORATORY DATA
02-02 Na141 mmol/L Glu 118 mg/dL<H> K+ 4.1 mmol/L Cr  2.79 mg/dL<H> BUN 72.0 mg/dL<H> Phos n/a   Alb n/a   PAB n/a

## 2018-02-02 NOTE — ADVANCED PRACTICE NURSE CONSULT - RECOMMEDATIONS
Instructed pt and wife to contact ostomy nurse if there is any concern with colostomy pouching system or colostomy, will follow up with pt upon request.

## 2018-02-02 NOTE — ADVANCED PRACTICE NURSE CONSULT - ASSESSMENT
Pt is 78 y/o male, alert and oriented x3, with very slow and sluggish speech, wife at the bedside. As per wife, pt had colostomy for 8 years from [facility], wife has been taking care of the ostomy at home for the pt, no leaking issues and peristomal skin issues. Wife requested high output two piece Brown pouch for the pt, the pouching were not in [place] formulary now. Provided alternative pouching system to patient and wife, instructed wife to take the home supplies to the hospital for now. Special order for ostomy supplies requested from material management as per wife's request, informed the pt and wife that the hospital will provide additional pouching for the pt to take home once the special order arrived.

## 2018-02-02 NOTE — PROGRESS NOTE ADULT - SUBJECTIVE AND OBJECTIVE BOX
[place] [facility], THE HEART [facility]ENTER                                   [address] [place] [address]                                                      PHONE: [phone]                                                         FAX: [phone]  [email or web address]  ---------------------------------------------------------------------------------------------------------------------------------    Overnight events/patient complaints: seen by EP for Watchman device in view of IVC filter placement this may not be feasible, will reevaluate after GI wup    PAST MEDICAL & SURGICAL HISTORY:  CAD (coronary artery disease)  Afib  CVA (cerebral vascular accident)  BPH (benign prostatic hyperplasia)  High cholesterol  HTN (hypertension)  H/O heart valve replacement with mechanical valve  S/P CABG x 1  H/O colectomy    penicillins (Hives)    MEDICATIONS  (STANDING):  ALBUTerol/ipratropium for Nebulization. 3 milliLiter(s) Nebulizer once  amiodarone    Tablet 400 milliGRAM(s) Oral daily  atorvastatin 10 milliGRAM(s) Oral at bedtime  buPROPion XL . 150 milliGRAM(s) Oral daily  dronabinol 2.5 milliGRAM(s) Oral two times a day  finasteride 5 milliGRAM(s) Oral daily  metoprolol     tartrate 25 milliGRAM(s) Oral three times a day  pantoprazole  Injectable 40 milliGRAM(s) IV Push every 12 hours  sodium chloride 0.9%. 1000 milliLiter(s) (75 mL/Hr) IV Continuous <Continuous>  tamsulosin 0.4 milliGRAM(s) Oral at bedtime    MEDICATIONS  (PRN):      Vital Signs Last 24 Hrs  T(C): 36.9 (02 Feb 2018 05:14), Max: 36.9 (02 Feb 2018 05:14)  T(F): 98.4 (02 Feb 2018 05:14), Max: 98.4 (02 Feb 2018 05:14)  HR: 84 (02 Feb 2018 05:14) (69 - 84)  BP: 94/60 (02 Feb 2018 05:14) (94/60 - 123/61)  BP(mean): --  RR: 19 (02 Feb 2018 05:14) (18 - 20)  SpO2: 96% (02 Feb 2018 05:14) (96% - 96%)  ICU Vital Signs Last 24 Hrs  CRISTIN ROQUE  I&O's Detail    01 Feb 2018 07:01  -  02 Feb 2018 07:00  --------------------------------------------------------  IN:    Oral Fluid: 360 mL    sodium chloride 0.9%.: 900 mL  Total IN: 1260 mL    OUT:    Ileostomy: 450 mL  Total OUT: 450 mL    Total NET: 810 mL        I&O's Summary    01 Feb 2018 07:01  -  02 Feb 2018 07:00  --------------------------------------------------------  IN: 1260 mL / OUT: 450 mL / NET: 810 mL      Drug Dosing Weight  CRISTIN ROQUE    REVIEW OF SYSTEMS:    Constitutional: No fever, weight loss or fatigue  Eyes: No eye pain, visual disturbances, or discharge  ENMT:  No difficulty hearing, tinnitus, vertigo; No sinus or throat pain  Neck: No pain or stiffness  Respiratory: No cough, wheezing, chills or hemoptysis  Cardiovascular: No chest pain, palpitations, shortness of breath, dizziness or leg swelling  Gastrointestinal: No abdominal or epigastric pain. No nausea, vomiting or hematemesis; No diarrhea or constipation. No melena or hematochezia.  Genitourinary: No dysuria, frequency, hematuria or incontinence  Rectal: No pain, hemorrhoids or incontinence  Neurological: No headaches, memory loss, loss of strength, numbness or tremors  Skin: No itching, burning, rashes or lesions   Lymph Nodes: No enlarged glands  Endocrine: No heat or cold intolerance; No hair loss  Musculoskeletal: No joint pain or swelling; No muscle, back or extremity pain  Psychiatric: No depression, anxiety, mood swings or difficulty sleeping  Heme/Lymph: No easy bruising or bleeding gums  Allergy and Immunologic: No hives or eczema    PHYSICAL EXAM:  General: Appears well developed, well nourished alert  HEENT: Head; normocephalic, atraumatic.  Eyes: Pupils reactive, cornea wnl.  Neck: Supple, no nodes adenopathy, no NVD or carotid bruit or thyromegaly.  CARDIOVASCULAR: Normal S1 and S2, No murmur, rub, gallop or lift.   LUNGS: No rales, rhonchi or wheeze. Normal breath sounds bilaterally.  ABDOMEN: Soft, nontender without mass or organomegaly. bowel sounds normoactive.  EXTREMITIES: No clubbing, cyanosis or edema. Distal pulses wnl.   SKIN: warm and dry with normal turgor.  NEURO: R side weakness           LABS:                        9.2    6.6   )-----------( 112      ( 02 Feb 2018 07:54 )             29.5     02-02    141  |  113<H>  |  72.0<H>  ----------------------------<  118<H>  4.1   |  12.0<L>  |  2.79<H>    Ca    9.0      02 Feb 2018 05:50      CRISTIN ROQUE      PT/INR - ( 02 Feb 2018 07:54 )   PT: 28.6 sec;   INR: 2.55 ratio         PTT - ( 02 Feb 2018 07:54 )  PTT:38.5 sec      RADIOLOGY & ADDITIONAL STUDIES:    INTERPRETATION OF TELEMETRY (personally reviewed):    ECG:    ECHO: < from: TTE Echo Complete w/Doppler (01.02.18 @ 20:32) >   Summary:   1. Left ventricular ejection fraction, by visual estimation, is 40 to   45%.   2. Mildly decreased global left ventricular systolic function.   3. Abnormal septal motion consistent with post-operative status and   right ventricular volume and pressure overload.   4. Spectral Doppler shows restrictive pattern of left ventricular   myocardial filling (Grade III diastolic dysfunction).   5. Severely enlarged right ventricle.   6. Severely reduced RV systolic function.   7. Moderately dilated right atrium.   8. Moderate tricuspid regurgitation.   9. Estimated pulmonary artery systolic pressure is 35.4 mmHg assuming a   right atrial pressure of 8 mmHg, which is consistent with borderline   pulmonary hypertension.  10. Mitral valve mean gradient is 3.7 mmHg consistent with mild mitral   stenosis.  11. Moderately enlarged left atrium.  12. The aortic valve mean gradient is 2.1 mmHg consistent with normally   opening aortic valve.  13. Pulmonary Embolism should be considered in setting of Severe RV   dysfunction with probably preserved RV apical contraction.  14. Peak aortic valve gradient is 5.4 mmHg and the mean gradient is 2.1   mmHg, which is probably normal in the setting of a prosthetic aortic   valve.  15. I have discussed about the echo with Dr Silver.  16. Compared to Study from 9/2017 RV functions is severely reduced and LV   EF reduced from 60-65% to 40-45%.    < end of copied text >      STRESS TEST:    CARDIAC CATHETERIZATION:    ACTIVE PROBLEMS:  HEALTH ISSUES - PROBLEM Dx:  Oropharyngeal dysphagia: Oropharyngeal dysphagia  Prophylactic measure: Prophylactic measure  HTN (hypertension): HTN (hypertension)  CAD (coronary artery disease): CAD (coronary artery disease)  Anemia due to blood loss: Anemia due to blood loss  Gastrointestinal hemorrhage with melena: Gastrointestinal hemorrhage with melena  CVA (cerebral vascular accident): CVA (cerebral vascular accident)  Dysphagia: Dysphagia  Acute kidney injury superimposed on chronic kidney disease: Acute kidney injury superimposed on chronic kidney disease  Hyponatremia: Hyponatremia  Chronic atrial fibrillation: Chronic atrial fibrillation  Gastrointestinal hemorrhage, unspecified gastrointestinal hemorrhage type: Gastrointestinal hemorrhage, unspecified gastrointestinal hemorrhage type

## 2018-02-02 NOTE — PROGRESS NOTE ADULT - SUBJECTIVE AND OBJECTIVE BOX
1FFLAVIO ROQUE Patient is a 77y old  Male who presents with a chief complaint of bleeding in colostomy bag (30 Jan 2018 16:07)     HPI:  78 yo male PMHx CAD s/p CABG, AFib on Coumadin, HTN, prostate CA s/p TURP, s/p PEG and colectomy, prior CVA with expressive aphasia and R hemiparesis, CKD 3, recent dc 3 weeks ago for resp failure s/p intubation due to aspiration pneumonia, Septic shock 2/2 UTI, KAPIL on CKD, recurrent NSVT, UE DVT presents from home for blood in colostomy bag.    patient unable to voice concerns but follows some directions.  per wife at bedside, patient noted to have intermittent black and bloody output from colostomy bag since discharge.  Poor PO intake.  Endoscopy was deferred during last hospitalization due to co morbidities.  On coumadin and lasix at home. non ambulatory. (30 Jan 2018 16:07)    HPI 2/2/18:    Patient seen and examined at the bedside. He appears in NAD. Lying in bed resting comfortably. He offers no complaints. ileostomy bag with evidence of loose brown stool. He denies CP, SOB, fever, chills, nausea, vomiting, abdominal pain. INR 2.5 today so he will not be going for EGD today. Will start patient on dysphagia diet with nectar thick liquids. Plan for EGD on Monday. Give 2 units FFP Sunday night and 1 unit enroute to endoscopy.    I&O's Summary    01 Feb 2018 07:01  -  02 Feb 2018 07:00  --------------------------------------------------------  IN: 1260 mL / OUT: 450 mL / NET: 810 mL    02 Feb 2018 07:01  -  02 Feb 2018 13:22  --------------------------------------------------------  IN: 950 mL / OUT: 461 mL / NET: 489 mL      Allergies    penicillins (Hives)    Intolerances      HEALTH ISSUES - PROBLEM Dx:  Oropharyngeal dysphagia: Oropharyngeal dysphagia  Prophylactic measure: Prophylactic measure  HTN (hypertension): HTN (hypertension)  CAD (coronary artery disease): CAD (coronary artery disease)  Anemia due to blood loss: Anemia due to blood loss  Gastrointestinal hemorrhage with melena: Gastrointestinal hemorrhage with melena  CVA (cerebral vascular accident): CVA (cerebral vascular accident)  Dysphagia: Dysphagia  Acute kidney injury superimposed on chronic kidney disease: Acute kidney injury superimposed on chronic kidney disease  Hyponatremia: Hyponatremia  Chronic atrial fibrillation: Chronic atrial fibrillation  Gastrointestinal hemorrhage, unspecified gastrointestinal hemorrhage type: Gastrointestinal hemorrhage, unspecified gastrointestinal hemorrhage type        PAST MEDICAL & SURGICAL HISTORY:  CAD (coronary artery disease)  Afib  CVA (cerebral vascular accident)  BPH (benign prostatic hyperplasia)  High cholesterol  HTN (hypertension)  H/O heart valve replacement with mechanical valve  S/P CABG x 1  H/O colectomy          Vital Signs Last 24 Hrs  T(C): 36.3 (02 Feb 2018 09:49), Max: 36.9 (02 Feb 2018 05:14)  T(F): 97.3 (02 Feb 2018 09:49), Max: 98.4 (02 Feb 2018 05:14)  HR: 95 (02 Feb 2018 13:01) (69 - 95)  BP: 95/59 (02 Feb 2018 13:01) (94/60 - 123/61)  RR: 18 (02 Feb 2018 09:49) (18 - 20)  SpO2: 96% (02 Feb 2018 11:47) (95% - 96%)    PHYSICAL EXAM:    GENERAL: NAD  HEAD:  Atraumatic, Normocephalic  EYES: EOMI, PERRLA, conjunctiva and sclera clear  ENMT:  Moist mucous membranes,  No lesions  NERVOUS SYSTEM:  Alert & Oriented, Moves upper and lower extremities  CHEST/LUNG: Clear to auscultation bilaterally; No rales, rhonchi, wheezing,   HEART: Regular rate and rhythm; No murmurs,   ABDOMEN: Soft, Nontender, Nondistended; Bowel sounds present, loose brown stool in ileostomy bag   EXTREMITIES:  Peripheral Pulses, No  cyanosis, or edema      amiodarone    Tablet 400 milliGRAM(s) Oral daily  atorvastatin 10 milliGRAM(s) Oral at bedtime  buPROPion XL . 150 milliGRAM(s) Oral daily  dronabinol 2.5 milliGRAM(s) Oral two times a day  finasteride 5 milliGRAM(s) Oral daily  metoprolol     tartrate 25 milliGRAM(s) Oral three times a day  multiple electrolytes Injection Type 1 1000 milliLiter(s) IV Continuous <Continuous>  nystatin Powder 1 Application(s) Topical daily  pantoprazole  Injectable 40 milliGRAM(s) IV Push every 12 hours  tamsulosin 0.4 milliGRAM(s) Oral at bedtime      LABS:                          9.2    6.6   )-----------( 112      ( 02 Feb 2018 07:54 )             29.5     02-02    141  |  113<H>  |  72.0<H>  ----------------------------<  118<H>  4.1   |  12.0<L>  |  2.79<H>    Ca    9.0      02 Feb 2018 05:50        PT/INR - ( 02 Feb 2018 07:54 )   PT: 28.6 sec;   INR: 2.55 ratio         PTT - ( 02 Feb 2018 07:54 )  PTT:38.5 sec      RADIOLOGY & ADDITIONAL TESTS:    XAM:  XR CHEST AP OR PA 1V                          PROCEDURE DATE:  01/30/2018          INTERPRETATION:  Portable chest radiograph        CLINICAL INFORMATION:   Flulike symptoms. Assess for pneumonia. GI   bleeding.    TECHNIQUE:  Portable  AP view of the chest was obtained.    COMPARISON: 1/3/2018 available for review.    FINDINGS:   The lungs a right upper lobe interstitial infiltrates of versus fibrosis   from sequela from prior airspace consolidation on prior 1/3/2018 exam.   Mild diffuse increased interstitial markings noted without large airspace   consolidation or gross effusion. The  heart is enlarged in transverse   diameter. No hilar mass. Trachea midline.       . Status post coronary artery bypass graft procedure. Visualized osseous   structures are intact.        IMPRESSION:   Residual increased interstitial markings right upper lobe   which may present fibrotic sequela following bilateral airspace   consolidations on 1/3/2018 examination. Cardiomegaly..                ELISHA KIRAN M.D., ATTENDING RADIOLOGIST  This document has been electronically signed. Jan 30 2018  1:58PM        Consultant notes reviewed    Case discussed with consultant/provider/ family /patient

## 2018-02-02 NOTE — PROGRESS NOTE ADULT - SUBJECTIVE AND OBJECTIVE BOX
Pt seen and examined. He is tolerating his current dysphagia diet with thickened liquids diet and we are awaiting his INR to fall to 1.7 or less before we proceed with EGD and PEG tube removal and replacement.     REVIEW OF SYSTEMS:  Unobtainable given his mental status    MEDICATIONS:  MEDICATIONS  (STANDING):  amiodarone    Tablet 400 milliGRAM(s) Oral daily  atorvastatin 10 milliGRAM(s) Oral at bedtime  buPROPion XL . 150 milliGRAM(s) Oral daily  dronabinol 2.5 milliGRAM(s) Oral two times a day  finasteride 5 milliGRAM(s) Oral daily  metoprolol     tartrate 25 milliGRAM(s) Oral three times a day  pantoprazole  Injectable 40 milliGRAM(s) IV Push every 12 hours  sodium chloride 0.9%. 1000 milliLiter(s) (75 mL/Hr) IV Continuous <Continuous>  tamsulosin 0.4 milliGRAM(s) Oral at bedtime    MEDICATIONS  (PRN):      Allergies    penicillins (Hives)    Intolerances        Vital Signs Last 24 Hrs  T(C): 36.9 (02 Feb 2018 05:14), Max: 36.9 (02 Feb 2018 05:14)  T(F): 98.4 (02 Feb 2018 05:14), Max: 98.4 (02 Feb 2018 05:14)  HR: 84 (02 Feb 2018 05:14) (69 - 93)  BP: 94/60 (02 Feb 2018 05:14) (94/60 - 123/61)  BP(mean): --  RR: 19 (02 Feb 2018 05:14) (18 - 20)  SpO2: 96% (02 Feb 2018 05:14) (96% - 97%)    02-01 @ 07:01  -  02-02 @ 07:00  --------------------------------------------------------  IN: 1260 mL / OUT: 450 mL / NET: 810 mL        PHYSICAL EXAM:    General: Well developed; well nourished; in no acute distress  HEENT: MMM, conjunctiva pink and sclera anicteric.  Lungs: clear to auscultation bilaterally.  Cor: RRR S1, S2 only.  Gastrointestinal: Abdomen: Soft non-tender non-distended; Normal bowel sounds; No hepatosplenomegaly  G-tube in place not being used.  Extremities: no cyanosis, clubbing or edema.  Skin: Warm and dry. No obvious rash  Neuro: Unchanged    LABS:      CBC Full  -  ( 01 Feb 2018 09:03 )  WBC Count : 6.7 K/uL  Hemoglobin : 10.1 g/dL  Hematocrit : 32.1 %  Platelet Count - Automated : 135 K/uL  Mean Cell Volume : 93.3 fl  Mean Cell Hemoglobin : 29.4 pg  Mean Cell Hemoglobin Concentration : 31.5 g/dL  Auto Neutrophil # : x  Auto Lymphocyte # : x  Auto Monocyte # : x  Auto Eosinophil # : x  Auto Basophil # : x  Auto Neutrophil % : x  Auto Lymphocyte % : x  Auto Monocyte % : x  Auto Eosinophil % : x  Auto Basophil % : x    02-02    141  |  113<H>  |  72.0<H>  ----------------------------<  118<H>  4.1   |  12.0<L>  |  2.79<H>    Ca    9.0      02 Feb 2018 05:50      PT/INR - ( 01 Feb 2018 09:03 )   PT: 28.1 sec;   INR: 2.51 ratio         PTT - ( 01 Feb 2018 06:51 )  PTT:32.1 sec                  RADIOLOGY & ADDITIONAL STUDIES (The following images were personally reviewed):

## 2018-02-02 NOTE — CONSULT NOTE ADULT - SUBJECTIVE AND OBJECTIVE BOX
[facility] DIVISION OF KIDNEY DISEASES AND HYPERTENSION -- INITIAL CONSULT NOTE  --------------------------------------------------------------------------------  HPI:  77M with CAD, CABG, AFib on AC, HTN, Prostate ca s/p radioactive seed implants leading to bowel perforation and s/p colectomy; PEG placement after multiple strokes , expressive aphasia, R sided weakness, CKD III, DVTs, here for blood in ostomy bag. Pt seen and examined; lying in bed in no distress; history obtained from wife who was present at bedside. Pt is able to eat pureed diet at home; supplemented with 3-4 cans of jevity via PEG daily. For past few days; pt has been NPO; decreased oral intake; with passing of blood in ostomy bag; awaiting EGD at current.    PAST HISTORY  --------------------------------------------------------------------------------  PAST MEDICAL & SURGICAL HISTORY:  CAD (coronary artery disease)  Afib  CVA (cerebral vascular accident)  BPH (benign prostatic hyperplasia)  High cholesterol  HTN (hypertension)  H/O heart valve replacement with mechanical valve  S/P CABG x 1  H/O colectomy    FAMILY HISTORY:  No pertinent family history in first degree relatives    PAST SOCIAL HISTORY:    ALLERGIES & MEDICATIONS  --------------------------------------------------------------------------------  Allergies    penicillins (Hives)    Intolerances      Standing Inpatient Medications  amiodarone    Tablet 400 milliGRAM(s) Oral daily  atorvastatin 10 milliGRAM(s) Oral at bedtime  buPROPion XL . 150 milliGRAM(s) Oral daily  dronabinol 2.5 milliGRAM(s) Oral two times a day  finasteride 5 milliGRAM(s) Oral daily  metoprolol     tartrate 25 milliGRAM(s) Oral three times a day  multiple electrolytes Injection Type 1 1000 milliLiter(s) IV Continuous <Continuous>  nystatin Powder 1 Application(s) Topical daily  pantoprazole  Injectable 40 milliGRAM(s) IV Push every 12 hours  tamsulosin 0.4 milliGRAM(s) Oral at bedtime    PRN Inpatient Medications      REVIEW OF SYSTEMS  --------------------------------------------------------------------------------  unable to obtain    VITALS/PHYSICAL EXAM  --------------------------------------------------------------------------------  T(C): 36.3 (02-02-18 @ 09:49), Max: 36.9 (02-02-18 @ 05:14)  HR: 95 (02-02-18 @ 13:01) (73 - 95)  BP: 95/59 (02-02-18 @ 13:01) (94/60 - 123/61)  RR: 18 (02-02-18 @ 09:49) (18 - 20)  SpO2: 96% (02-02-18 @ 11:47) (95% - 96%)  Wt(kg): --        02-01-18 @ 07:01  -  02-02-18 @ 07:00  --------------------------------------------------------  IN: 1260 mL / OUT: 450 mL / NET: 810 mL    02-02-18 @ 07:01  -  02-02-18 @ 15:28  --------------------------------------------------------  IN: 1650 mL / OUT: 461 mL / NET: 1189 mL      Physical Exam:              Gen: NAD, chronically ill appearing  	HEENT: supple neck  	Pulm: CTA B/L  	CV: RRR, S1S2; no rub  	Back: No spinal or CVA tenderness; no sacral edema  	Abd: + ostomy  	LE: Warm, FROM, no clubbing, intact strength; trace edema  	Neuro: No focal deficits, intact gait  	Psych: Normal affect and mood  	Skin: Warm, without rashes    LABS/STUDIES  --------------------------------------------------------------------------------              9.2    6.6   >-----------<  112      [02-02-18 @ 07:54]              29.5     141  |  113  |  72.0  ----------------------------<  118      [02-02-18 @ 05:50]  4.1   |  12.0  |  2.79        Ca     9.0     [02-02-18 @ 05:50]      PT/INR: PT 28.6 , INR 2.55       [02-02-18 @ 07:54]  PTT: 38.5       [02-02-18 @ 07:54]      Creatinine Trend:  SCr 2.79 [02-02 @ 05:50]  SCr 2.72 [02-01 @ 06:51]  SCr 3.01 [01-31 @ 06:38]  SCr 3.06 [01-30 @ 23:33]  SCr 3.28 [01-30 @ 13:15]    Urinalysis - [12-30-17 @ 19:11]      Color Yellow / Appearance Slightly Turbid / SG 1.015 / pH 6.0      Gluc Negative / Ketone Negative  / Bili Negative / Urobili Negative       Blood Large / Protein 30 / Leuk Est Moderate / Nitrite Positive      RBC 25-50 / WBC 11-25 / Hyaline  / Gran  / Sq Epi  / Non Sq Epi  / Bacteria Moderate      Iron 47, TIBC 385, %sat 12      [01-30-18 @ 13:15]  Ferritin 442.3      [01-30-18 @ 13:15]  HbA1c 6.2      [12-09-15 @ 12:54]  TSH 1.06      [09-01-17 @ 11:01]    HBsAb Nonreact      [12-25-15 @ 19:58]  HBsAg Nonreact      [12-25-15 @ 19:58]  HBcAb Nonreact      [12-25-15 @ 19:58]  HCV 0.12, Nonreact      [12-25-15 @ 19:58]    XIN: titer 1:160, pattern Speckled      [12-27-15 @ 12:24]

## 2018-02-02 NOTE — PROGRESS NOTE ADULT - ASSESSMENT
77 y.o. male PMHx CAD s/p CABG, AFib on Coumadin, HTN, prostate CA s/p TURP, s/p PEG and colectomy, prior CVA with expressive aphasia and R hemiparesis, CKD st III, recent dc 3 weeks ago for resp failure s/p intubation due to aspiration pneumonia, Septic shock 2/2 UTI, KAPIL on CKD, recurrent NSVT, UE DVT/?PE with RC dysfunction and decreased EF presents from home for dark blood in ileostomy bag. Coumadin was placed on hold and INR followed daily. GI was consulted for GI bleed. Plan for EGD when INR <1.5. EP evaluated the patient for a watchman procedure, but commented that an IVC filter would make it difficult. Given INR 2.5 on 2/2/18 EGD was postponed to 2/5/18. Will give a diet over the weekend and check INR daily, will also give IVF for 24 hours given dehydration and low bicarb values.

## 2018-02-02 NOTE — CONSULT NOTE ADULT - ASSESSMENT
1) KAPIL on CKD III in the setting of GIB and decreased access to po; hypoperfusion low BPs  2) Anemia  3) Acidosis  4) HTN    Patient has KAPIL on CKD III in the setting of GIB;  Likely prerenal azotemia; with element of hypoperfusion given low BPs;   would check Matthew, UCl, UOsm, Urinalysis  Hydrate with 1/2NS with 75meq of NaHCO3 @ 100cc/hr for now  EGD planned for Monday  d/w Dr. Howe

## 2018-02-02 NOTE — PROGRESS NOTE ADULT - PROBLEM SELECTOR PLAN 6
-Supportive care  -PEG care  -Feeds as tolerated, nectar thick w/ soft diet as per speech and swallow on 1/12/18, supplement PEG feeds as needed  -Hold ASA and coumadin given GI bleed  -Continue statin

## 2018-02-02 NOTE — PROGRESS NOTE ADULT - ASSESSMENT
FLAVIO ROQUE is an 77y Male with past medical history significant for chronic AF, CVA, DVT, CKD, IVC filter, hematuria, prostatic hypertrophy, colostmy, CAD and AVR, chronic systolic CHF, admitted with apparent GI bleeding.  Coumadin held.  For EGD today  Given history of CVA and multiple bleeding events on Coumadin, would likely be a good candidate for Watchman TATYANA occlusion device for long term CVA prophylaxis, although presence of IVC filter may make device delivery impossible.      WIll follow and re-eval after GI workup and treatment.  Continue present cardiac therapy for now

## 2018-02-03 LAB
ANION GAP SERPL CALC-SCNC: 16 MMOL/L — SIGNIFICANT CHANGE UP (ref 5–17)
ANISOCYTOSIS BLD QL: SLIGHT — SIGNIFICANT CHANGE UP
APPEARANCE UR: SIGNIFICANT CHANGE UP
APTT BLD: 37 SEC — SIGNIFICANT CHANGE UP (ref 27.5–37.4)
BACTERIA # UR AUTO: ABNORMAL
BILIRUB UR-MCNC: NEGATIVE — SIGNIFICANT CHANGE UP
BUN SERPL-MCNC: 66 MG/DL — HIGH (ref 8–20)
CALCIUM SERPL-MCNC: 8.5 MG/DL — LOW (ref 8.6–10.2)
CHLORIDE SERPL-SCNC: 116 MMOL/L — HIGH (ref 98–107)
CHLORIDE UR-SCNC: <27 MMOL/L — SIGNIFICANT CHANGE UP
CO2 SERPL-SCNC: 15 MMOL/L — LOW (ref 22–29)
COLOR SPEC: YELLOW — SIGNIFICANT CHANGE UP
CREAT ?TM UR-MCNC: 86 MG/DL — SIGNIFICANT CHANGE UP
CREAT ?TM UR-MCNC: 87 MG/DL — SIGNIFICANT CHANGE UP
CREAT SERPL-MCNC: 2.86 MG/DL — HIGH (ref 0.5–1.3)
DACRYOCYTES BLD QL SMEAR: SLIGHT — SIGNIFICANT CHANGE UP
DIFF PNL FLD: ABNORMAL
EOSINOPHIL NFR BLD AUTO: 1 % — SIGNIFICANT CHANGE UP (ref 0–6)
EPI CELLS # UR: SIGNIFICANT CHANGE UP
GLUCOSE SERPL-MCNC: 115 MG/DL — SIGNIFICANT CHANGE UP (ref 70–115)
GLUCOSE UR QL: NEGATIVE — SIGNIFICANT CHANGE UP
HCT VFR BLD CALC: 28.2 % — LOW (ref 42–52)
HGB BLD-MCNC: 8.9 G/DL — LOW (ref 14–18)
HYPOCHROMIA BLD QL: SLIGHT — SIGNIFICANT CHANGE UP
INR BLD: 2.44 RATIO — HIGH (ref 0.88–1.16)
KETONES UR-MCNC: NEGATIVE — SIGNIFICANT CHANGE UP
LEUKOCYTE ESTERASE UR-ACNC: ABNORMAL
LYMPHOCYTES # BLD AUTO: 1 % — LOW (ref 20–55)
MACROCYTES BLD QL: SLIGHT — SIGNIFICANT CHANGE UP
MCHC RBC-ENTMCNC: 30.3 PG — SIGNIFICANT CHANGE UP (ref 27–31)
MCHC RBC-ENTMCNC: 31.6 G/DL — LOW (ref 32–36)
MCV RBC AUTO: 95.9 FL — HIGH (ref 80–94)
MONOCYTES NFR BLD AUTO: 2 % — LOW (ref 3–10)
NEUTROPHILS NFR BLD AUTO: 96 % — HIGH (ref 37–73)
NITRITE UR-MCNC: POSITIVE
OSMOLALITY UR: 447 MOSM/KG — SIGNIFICANT CHANGE UP (ref 300–1000)
PH UR: 5 — SIGNIFICANT CHANGE UP (ref 5–8)
PLAT MORPH BLD: NORMAL — SIGNIFICANT CHANGE UP
PLATELET # BLD AUTO: 119 K/UL — LOW (ref 150–400)
POIKILOCYTOSIS BLD QL AUTO: SLIGHT — SIGNIFICANT CHANGE UP
POTASSIUM SERPL-MCNC: 4 MMOL/L — SIGNIFICANT CHANGE UP (ref 3.5–5.3)
POTASSIUM SERPL-SCNC: 4 MMOL/L — SIGNIFICANT CHANGE UP (ref 3.5–5.3)
PROT ?TM UR-MCNC: 66 MG/DL — HIGH (ref 0–12)
PROT UR-MCNC: 100
PROT/CREAT UR-RTO: 0.8 RATIO — HIGH
PROTHROM AB SERPL-ACNC: 27.3 SEC — HIGH (ref 9.8–12.7)
RBC # BLD: 2.94 M/UL — LOW (ref 4.6–6.2)
RBC # FLD: 20.1 % — HIGH (ref 11–15.6)
RBC BLD AUTO: ABNORMAL
RBC CASTS # UR COMP ASSIST: >50 /HPF (ref 0–4)
SODIUM SERPL-SCNC: 147 MMOL/L — HIGH (ref 135–145)
SODIUM UR-SCNC: <30 MMOL/L — SIGNIFICANT CHANGE UP
SP GR SPEC: 1.01 — SIGNIFICANT CHANGE UP (ref 1.01–1.02)
UROBILINOGEN FLD QL: NEGATIVE — SIGNIFICANT CHANGE UP
WBC # BLD: 7.4 K/UL — SIGNIFICANT CHANGE UP (ref 4.8–10.8)
WBC # FLD AUTO: 7.4 K/UL — SIGNIFICANT CHANGE UP (ref 4.8–10.8)
WBC UR QL: ABNORMAL

## 2018-02-03 PROCEDURE — 99233 SBSQ HOSP IP/OBS HIGH 50: CPT

## 2018-02-03 PROCEDURE — 99232 SBSQ HOSP IP/OBS MODERATE 35: CPT

## 2018-02-03 PROCEDURE — 99233 SBSQ HOSP IP/OBS HIGH 50: CPT | Mod: GC

## 2018-02-03 RX ORDER — PHYTONADIONE (VIT K1) 5 MG
2.5 TABLET ORAL ONCE
Qty: 0 | Refills: 0 | Status: COMPLETED | OUTPATIENT
Start: 2018-02-03 | End: 2018-02-03

## 2018-02-03 RX ORDER — ALBUTEROL 90 UG/1
1 AEROSOL, METERED ORAL EVERY 4 HOURS
Qty: 0 | Refills: 0 | Status: DISCONTINUED | OUTPATIENT
Start: 2018-02-03 | End: 2018-02-09

## 2018-02-03 RX ORDER — SODIUM CHLORIDE 9 MG/ML
1000 INJECTION, SOLUTION INTRAVENOUS
Qty: 0 | Refills: 0 | Status: DISCONTINUED | OUTPATIENT
Start: 2018-02-03 | End: 2018-02-04

## 2018-02-03 RX ORDER — ALBUTEROL 90 UG/1
2.5 AEROSOL, METERED ORAL EVERY 6 HOURS
Qty: 0 | Refills: 0 | Status: DISCONTINUED | OUTPATIENT
Start: 2018-02-03 | End: 2018-02-06

## 2018-02-03 RX ADMIN — SODIUM CHLORIDE 75 MILLILITER(S): 9 INJECTION, SOLUTION INTRAVENOUS at 10:16

## 2018-02-03 RX ADMIN — Medication 2.5 MILLIGRAM(S): at 17:42

## 2018-02-03 RX ADMIN — Medication 25 MILLIGRAM(S): at 22:05

## 2018-02-03 RX ADMIN — ALBUTEROL 2.5 MILLIGRAM(S): 90 AEROSOL, METERED ORAL at 20:14

## 2018-02-03 RX ADMIN — Medication 2.5 MILLIGRAM(S): at 11:45

## 2018-02-03 RX ADMIN — TAMSULOSIN HYDROCHLORIDE 0.4 MILLIGRAM(S): 0.4 CAPSULE ORAL at 22:05

## 2018-02-03 RX ADMIN — Medication 25 MILLIGRAM(S): at 06:00

## 2018-02-03 RX ADMIN — PANTOPRAZOLE SODIUM 40 MILLIGRAM(S): 20 TABLET, DELAYED RELEASE ORAL at 06:00

## 2018-02-03 RX ADMIN — FINASTERIDE 5 MILLIGRAM(S): 5 TABLET, FILM COATED ORAL at 11:45

## 2018-02-03 RX ADMIN — ATORVASTATIN CALCIUM 10 MILLIGRAM(S): 80 TABLET, FILM COATED ORAL at 22:05

## 2018-02-03 RX ADMIN — Medication 25 MILLIGRAM(S): at 13:38

## 2018-02-03 RX ADMIN — ALBUTEROL 2.5 MILLIGRAM(S): 90 AEROSOL, METERED ORAL at 15:52

## 2018-02-03 RX ADMIN — NYSTATIN CREAM 1 APPLICATION(S): 100000 CREAM TOPICAL at 11:46

## 2018-02-03 RX ADMIN — PANTOPRAZOLE SODIUM 40 MILLIGRAM(S): 20 TABLET, DELAYED RELEASE ORAL at 17:31

## 2018-02-03 RX ADMIN — Medication 2.5 MILLIGRAM(S): at 06:00

## 2018-02-03 RX ADMIN — BUPROPION HYDROCHLORIDE 150 MILLIGRAM(S): 150 TABLET, EXTENDED RELEASE ORAL at 11:45

## 2018-02-03 RX ADMIN — AMIODARONE HYDROCHLORIDE 400 MILLIGRAM(S): 400 TABLET ORAL at 06:00

## 2018-02-03 NOTE — PROGRESS NOTE ADULT - SUBJECTIVE AND OBJECTIVE BOX
Patient is a 77y old  Male who presents with a chief complaint of bleeding in colostomy bag (30 Jan 2018 16:07)      HPI:  78 yo male PMHx CAD s/p CABG, AFib on Coumadin, HTN, prostate CA s/p TURP, s/p PEG and colectomy, prior CVA with expressive aphasia and R hemiparesis, CKD 3, recent dc 3 weeks ago for resp failure s/p intubation due to aspiration pneumonia, Septic shock 2/2 UTI, KAPIL on CKD, recurrent NSVT, UE DVT presents from home for blood in colostomy bag.      Patient was awake and alert. No abdominal pain. Taking po. States he uses the peg at home and takes po. Brown stool from ostomy      REVIEW OF SYSTEMS:  Constitutional: No fever, weight loss or fatigue  ENMT:  No difficulty hearing, tinnitus, vertigo; No sinus or throat pain  Respiratory: No cough, wheezing, chills or hemoptysis  Cardiovascular: No chest pain, palpitations, dizziness or leg swelling  Gastrointestinal: No abdominal or epigastric pain. No nausea, vomiting or hematemesis; No diarrhea or constipation. No melena or hematochezia.  Skin: No itching, burning, rashes or lesions   Musculoskeletal: No joint pain or swelling; No muscle, back or extremity pain    PAST MEDICAL & SURGICAL HISTORY:  CAD (coronary artery disease)  Afib  CVA (cerebral vascular accident)  BPH (benign prostatic hyperplasia)  High cholesterol  HTN (hypertension)  H/O heart valve replacement with mechanical valve  S/P CABG x 1  H/O colectomy      FAMILY HISTORY:  No pertinent family history in first degree relatives      SOCIAL HISTORY:  Smoking Status: [ ] Current, [ ] Former, [ ] Never  Pack Years:  [  ] EtOH-no  [  ] IVDA    MEDICATIONS:  MEDICATIONS  (STANDING):  ALBUTerol    0.083% 2.5 milliGRAM(s) Nebulizer every 6 hours  ALBUTerol    90 MICROgram(s) HFA Inhaler 1 Puff(s) Inhalation every 4 hours  amiodarone    Tablet 400 milliGRAM(s) Oral daily  atorvastatin 10 milliGRAM(s) Oral at bedtime  buPROPion XL . 150 milliGRAM(s) Oral daily  dronabinol 2.5 milliGRAM(s) Oral two times a day  finasteride 5 milliGRAM(s) Oral daily  metoprolol     tartrate 25 milliGRAM(s) Oral three times a day  nystatin Powder 1 Application(s) Topical daily  pantoprazole  Injectable 40 milliGRAM(s) IV Push every 12 hours  phytonadione   Solution 2.5 milliGRAM(s) Oral once  sodium chloride 0.45% 1000 milliLiter(s) (75 mL/Hr) IV Continuous <Continuous>  tamsulosin 0.4 milliGRAM(s) Oral at bedtime    MEDICATIONS  (PRN):      Allergies    penicillins (Hives)    Intolerances        Vital Signs Last 24 Hrs  T(C): 36.1 (03 Feb 2018 10:41), Max: 36.7 (02 Feb 2018 16:26)  T(F): 97 (03 Feb 2018 10:41), Max: 98 (02 Feb 2018 16:26)  HR: 76 (03 Feb 2018 10:41) (76 - 95)  BP: 117/71 (03 Feb 2018 10:41) (95/59 - 136/67)  BP(mean): --  RR: 18 (03 Feb 2018 10:41) (18 - 19)  SpO2: 97% (03 Feb 2018 10:41) (95% - 97%)    02-02 @ 07:01  -  02-03 @ 07:00  --------------------------------------------------------  IN: 3250 mL / OUT: 1381 mL / NET: 1869 mL          PHYSICAL EXAM:    General: Well developed; well nourished; in no acute distress  HEENT: MMM, conjunctiva and sclera clear  H-RRR  L-CTA  Gastrointestinal: Soft, non-tender non-distended; Normal bowel sounds; No rebound or guarding. Brown stool from ostomy  Extremities: Normal range of motion, No clubbing, cyanosis or edema  Neurological: Alert and oriented x3  Skin: Warm and dry. No obvious rash      LABS:                        8.9    7.4   )-----------( 119      ( 03 Feb 2018 07:07 )             28.2     03 Feb 2018 07:07    147    |  116    |  66.0   ----------------------------<  115    4.0     |  15.0   |  2.86     Ca    8.5        03 Feb 2018 07:07                RADIOLOGY & ADDITIONAL STUDIES:     < from: CT Abdomen and Pelvis w/ Oral Cont (08.30.17 @ 14:30) >  IMPRESSION: No bowel obstruction.      < end of copied text >

## 2018-02-03 NOTE — PROGRESS NOTE ADULT - ASSESSMENT
1) KAPIL on CKD III in the setting of GIB and decreased access to po; hypoperfusion low BPs  2) Anemia  3) Acidosis  4) HTN    Patient has KAPIL on CKD III in the setting of GIB;  Likely prerenal azotemia; with element of hypoperfusion given low BPs;   Urine studies noted; Matthew < 30 and UCl < 30; consistent with prerenal azotemia;  Can change fluids to 1/2 NS; still behind on fluids per urine studies  Has blood and protein in UA; would check urine protein/cr ratio. Holding off on nephritic workup given prerenal picture;   EGD planned for Monday pending INR correction  d/w Dr. Howe

## 2018-02-03 NOTE — PROGRESS NOTE ADULT - SUBJECTIVE AND OBJECTIVE BOX
CC: bleeding in colostomy bag (2018 16:07)    HPI: 77 y.o. 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    patient unable to voice concerns but follows some directions.  per wife at bedside, patient noted to have intermittent black and bloody output from colostomy bag since discharge.  Poor PO intake.  Endoscopy was deferred during last hospitalization due to co morbidities.  On coumadin and lasix at home. non ambulatory. (2018 16:07)    INTERVAL HPI/OVERNIGHT EVENTS: weak, tired, pale, +right HP and aphasia, no blood in ileostomy bag, elevated INR, + chronic SOB    Vital Signs Last 24 Hrs  T(C): 36.1 (2018 10:41), Max: 36.7 (2018 16:26)  T(F): 97 (2018 10:41), Max: 98 (2018 16:26)  HR: 76 (2018 10:41) (76 - 87)  BP: 117/71 (2018 10:41) (99/60 - 136/67)  RR: 18 (2018 10:41) (18 - 19)  SpO2: 97% (2018 10:41) (95% - 97%)                     8.9    7.4   )-----------( 119      ( 2018 07:07 )             28.2     2018 07:07    147    |  116    |  66.0   ----------------------------<  115    4.0     |  15.0   |  2.86     Ca    8.5        2018 07:07    PT/INR - ( 2018 07:07 )   PT: 27.3 sec;   INR: 2.44 ratio       PTT - ( 2018 07:07 )  PTT:37.0 sec    Urinalysis Basic - ( 2018 12:39 )    Color: Yellow / Appearance: Turbid / S.015 / pH: x  Gluc: x / Ketone: Negative  / Bili: Negative / Urobili: Negative   Blood: x / Protein: 100 / Nitrite: Positive   Leuk Esterase: Moderate / RBC: x / WBC x   Sq Epi: x / Non Sq Epi: x / Bacteria: x    MEDICATIONS  (STANDING):  ALBUTerol    0.083% 2.5 milliGRAM(s) Nebulizer every 6 hours  ALBUTerol    90 MICROgram(s) HFA Inhaler 1 Puff(s) Inhalation every 4 hours  amiodarone    Tablet 400 milliGRAM(s) Oral daily  atorvastatin 10 milliGRAM(s) Oral at bedtime  buPROPion XL . 150 milliGRAM(s) Oral daily  dronabinol 2.5 milliGRAM(s) Oral two times a day  finasteride 5 milliGRAM(s) Oral daily  metoprolol     tartrate 25 milliGRAM(s) Oral three times a day  nystatin Powder 1 Application(s) Topical daily  pantoprazole  Injectable 40 milliGRAM(s) IV Push every 12 hours  sodium chloride 0.45% 1000 milliLiter(s) (75 mL/Hr) IV Continuous <Continuous>  tamsulosin 0.4 milliGRAM(s) Oral at bedtime    MEDICATIONS  (PRN):    RADIOLOGY & ADDITIONAL TESTS: personally visualized    PHYSICAL EXAM:    General: elderly fragile male in no acute distress  Eyes: PERRLA, EOMI; conjunctiva and sclera clear  Head: Normocephalic; atraumatic  ENMT: No nasal discharge; airway clear, dry mucosal membranes  Neck: Supple; non tender; no masses  Respiratory: occasional end-exp wheezes, no rales or rhonchi, decreased BS at bases  Cardiovascular: Irregular rate and rhythm. S1 and S2   Gastrointestinal: Soft abd, PEG in place, colostomy bag with no leakage and brown stool in the bag  Genitourinary: No costovertebral angle tenderness  Extremities: Right HP, +PP, no edema  Vascular: Peripheral pulses palpable 2+ bilaterally  Neurological: Alert and oriented x3, Right HP, aphasic  Skin: Warm and dry. pale  Musculoskeletal: Normal tone  Psychiatric: Cooperative, debilitated, refers to wife for decision making

## 2018-02-04 LAB
ANION GAP SERPL CALC-SCNC: 16 MMOL/L — SIGNIFICANT CHANGE UP (ref 5–17)
APTT BLD: 34.1 SEC — SIGNIFICANT CHANGE UP (ref 27.5–37.4)
BUN SERPL-MCNC: 62 MG/DL — HIGH (ref 8–20)
CALCIUM SERPL-MCNC: 8.9 MG/DL — SIGNIFICANT CHANGE UP (ref 8.6–10.2)
CHLORIDE SERPL-SCNC: 115 MMOL/L — HIGH (ref 98–107)
CO2 SERPL-SCNC: 17 MMOL/L — LOW (ref 22–29)
CREAT SERPL-MCNC: 2.69 MG/DL — HIGH (ref 0.5–1.3)
GLUCOSE SERPL-MCNC: 105 MG/DL — SIGNIFICANT CHANGE UP (ref 70–115)
HCT VFR BLD CALC: 30 % — LOW (ref 42–52)
HGB BLD-MCNC: 9.1 G/DL — LOW (ref 14–18)
INR BLD: 1.8 RATIO — HIGH (ref 0.88–1.16)
MCHC RBC-ENTMCNC: 29.4 PG — SIGNIFICANT CHANGE UP (ref 27–31)
MCHC RBC-ENTMCNC: 30.3 G/DL — LOW (ref 32–36)
MCV RBC AUTO: 96.8 FL — HIGH (ref 80–94)
PLATELET # BLD AUTO: 110 K/UL — LOW (ref 150–400)
POTASSIUM SERPL-MCNC: 3.9 MMOL/L — SIGNIFICANT CHANGE UP (ref 3.5–5.3)
POTASSIUM SERPL-SCNC: 3.9 MMOL/L — SIGNIFICANT CHANGE UP (ref 3.5–5.3)
PROTHROM AB SERPL-ACNC: 20 SEC — HIGH (ref 9.8–12.7)
RBC # BLD: 3.1 M/UL — LOW (ref 4.6–6.2)
RBC # FLD: 20.3 % — HIGH (ref 11–15.6)
SODIUM SERPL-SCNC: 148 MMOL/L — HIGH (ref 135–145)
WBC # BLD: 7.4 K/UL — SIGNIFICANT CHANGE UP (ref 4.8–10.8)
WBC # FLD AUTO: 7.4 K/UL — SIGNIFICANT CHANGE UP (ref 4.8–10.8)

## 2018-02-04 PROCEDURE — 99233 SBSQ HOSP IP/OBS HIGH 50: CPT | Mod: GC

## 2018-02-04 PROCEDURE — 99232 SBSQ HOSP IP/OBS MODERATE 35: CPT

## 2018-02-04 PROCEDURE — 99233 SBSQ HOSP IP/OBS HIGH 50: CPT

## 2018-02-04 RX ORDER — SODIUM CHLORIDE 9 MG/ML
1000 INJECTION, SOLUTION INTRAVENOUS
Qty: 0 | Refills: 0 | Status: DISCONTINUED | OUTPATIENT
Start: 2018-02-04 | End: 2018-02-06

## 2018-02-04 RX ADMIN — TAMSULOSIN HYDROCHLORIDE 0.4 MILLIGRAM(S): 0.4 CAPSULE ORAL at 21:32

## 2018-02-04 RX ADMIN — PANTOPRAZOLE SODIUM 40 MILLIGRAM(S): 20 TABLET, DELAYED RELEASE ORAL at 05:58

## 2018-02-04 RX ADMIN — Medication 2.5 MILLIGRAM(S): at 18:02

## 2018-02-04 RX ADMIN — ALBUTEROL 2.5 MILLIGRAM(S): 90 AEROSOL, METERED ORAL at 02:50

## 2018-02-04 RX ADMIN — ALBUTEROL 2.5 MILLIGRAM(S): 90 AEROSOL, METERED ORAL at 14:21

## 2018-02-04 RX ADMIN — Medication 25 MILLIGRAM(S): at 13:34

## 2018-02-04 RX ADMIN — SODIUM CHLORIDE 75 MILLILITER(S): 9 INJECTION, SOLUTION INTRAVENOUS at 15:52

## 2018-02-04 RX ADMIN — BUPROPION HYDROCHLORIDE 150 MILLIGRAM(S): 150 TABLET, EXTENDED RELEASE ORAL at 11:33

## 2018-02-04 RX ADMIN — Medication 2.5 MILLIGRAM(S): at 05:58

## 2018-02-04 RX ADMIN — Medication 25 MILLIGRAM(S): at 05:58

## 2018-02-04 RX ADMIN — AMIODARONE HYDROCHLORIDE 400 MILLIGRAM(S): 400 TABLET ORAL at 05:58

## 2018-02-04 RX ADMIN — Medication 25 MILLIGRAM(S): at 21:32

## 2018-02-04 RX ADMIN — ALBUTEROL 2.5 MILLIGRAM(S): 90 AEROSOL, METERED ORAL at 08:11

## 2018-02-04 RX ADMIN — NYSTATIN CREAM 1 APPLICATION(S): 100000 CREAM TOPICAL at 11:33

## 2018-02-04 RX ADMIN — ATORVASTATIN CALCIUM 10 MILLIGRAM(S): 80 TABLET, FILM COATED ORAL at 21:32

## 2018-02-04 RX ADMIN — PANTOPRAZOLE SODIUM 40 MILLIGRAM(S): 20 TABLET, DELAYED RELEASE ORAL at 18:02

## 2018-02-04 RX ADMIN — ALBUTEROL 2.5 MILLIGRAM(S): 90 AEROSOL, METERED ORAL at 20:08

## 2018-02-04 RX ADMIN — FINASTERIDE 5 MILLIGRAM(S): 5 TABLET, FILM COATED ORAL at 11:33

## 2018-02-04 NOTE — PROGRESS NOTE ADULT - PROBLEM SELECTOR PLAN 3
with recent drop in EF and RV dysfunction due to suspected PE + UE DVT and Hx of CVA with right HP/dyphagia s/p PEG - pt is high [id number] score as well as high bleeding risk. May not tolerate 6 weeks of VKA for WATCHMAN and it will not solve the problem of DVT/PE as well - doubt it will be beneficial
with recent drop in EF and RV dysfunction due to suspected PE + UE DVT and Hx of CVA with right HP/dyphagia s/p PEG - pt is high [id number] score as well as high bleeding risk. May not tolerate 6 weeks of VKA for WATCHMAN and it will not solve the problem of DVT/PE as well - doubt it will be beneficial  Hold AC  Continue rate control medications  Appreciate EP consult for watchman procedure
with recent drop in EF and RV dysfunction due to suspected PE + UE DVT and Hx of CVA with right HP/dyphagia s/p PEG - pt is high [id number] score as well as high bleeding risk. May not tolerate 6 weeks of VKA for WATCHMAN and it will not solve the problem of DVT/PE as well - doubt it will be beneficial  Hold AC  Continue rate control medications  Appreciate EP consult for watchman procedure
with recent drop in EF and RV dysfunction due to suspected PE + UE DVT and Hx of CVA with right HP/dyphagia s/p PEG - pt is high [id number] score as well as high bleeding risk. May not tolerate 6 weeks of VKA for WATCHMAN and it will not solve the problem of DVT/PE as well - doubt it will be beneficial
with recent drop in EF and RV dysfunction due to suspected PE + UE DVT and Hx of CVA with right HP/dyphagia s/p PEG - pt is high [id number] score as well as high bleeding risk. May not tolerate 6 weeks of VKA for WATCHMAN and it will not solve the problem of DVT/PE as well - doubt it will be beneficial

## 2018-02-04 NOTE — PROGRESS NOTE ADULT - PROBLEM SELECTOR PLAN 1
- appears to have resolved.   - INR trending down. Probable EGD in am,. Check IN R in am  NPO after midnight for EGD with PEG replacement
No signs of GI bleeding while here on current diet. Awaiting INR to drop to 1.7 or less before proceeding with EGD and PEG tube removal and replacement. Await this Am's PT / INR.
Patient with dark stools on admission. H+H stable ( dropped little from yesterday)  PPI  - I spoke to PA yesterday. INR still high. Will give FFP tomorrow and keep NPO after midnight for possible EGD monday
acute, s/p 2 units PRBCs with improved HH - now trending down slowly, plan for EGD on monday, cont PPI  Vit K po x1 and FFP prior to EGD  GI f/u
hemoglobin stable   INR is 2.3.  Needs to be less than 1.5 for EGD/PEG replacement  may start peg feeds and then keep NPO after midnight. will do EGD if INR less than 1.5  - check PT  in am
acute, transfuse to HH>8.5 due to comorbidities, plan for EGD on Monday 2/5/18, cont PPI  Give 2 units FFP Sunday night, and 1 unit enroute to endoscopy the day of  no Vit K unless severe hemorrhage due to recent DVT/PE  GI f/u, appreciate consult  EGD once INR <1.7  Dysphagia diet ordered, IVF for dehydration   Patient now with loose brown stool in ileostomy bag
acute, transfuse to HH>8.5 due to comorbidities, plan for EGD, cont PPI  no Vit K unless severe hemorrhage due to recent DVT/PE  GI f/u
acute, transfuse to HH>8.5 due to comorbidities, plan for EGD, cont PPI  no Vit K unless severe hemorrhage due to recent DVT/PE  GI f/u, appreciate consult  EGD once INR <1.5  Clear liquid diet for now, NPO after midnight  IVF
acute, s/p 2 units PRBCs with improved HH - now trending down slowly, plan for EGD on monday, cont PPI  s/p Vit K po x1 on 2/3/18 and FFP if needed prior to EGD  GI f/u appreciated  no active hemorrhage

## 2018-02-04 NOTE — PROGRESS NOTE ADULT - PROBLEM SELECTOR PLAN 4
with hypercloremic metabolic acidosid with GI fluid losses - IV 1/2NS with sodium bicarbonate as discussed with renal - improving, creatinine at baseline
with hypercloremic metabolic acidosid with GI fluid losses - IV 1/2NS with sodium bicarbonate as discussed with renal
2/2 dehydration, poor Po intake, and UGIB  -Appreciate nephrology consult   -Plasmalyte @ 100ml/hr for 24 hours  -BMP in AM  -Will also get urine lytes and UA
Improved 2/2 hypovolemia, continue IVF  -f.u BMP in AM
due to hypovolemia with #1

## 2018-02-04 NOTE — PROGRESS NOTE ADULT - ASSESSMENT
1) KAPIL on CKD III in the setting of GIB and decreased access to po; hypoperfusion low BPs  2) Anemia  3) Acidosis  4) HTN    Patient has KAPIL on CKD III in the setting of GIB;  Likely prerenal azotemia; with element of hypoperfusion given low BPs;   Urine studies noted; Matthew < 30 and UCl < 30; consistent with prerenal azotemia;  Change fluids to D5W with 75MeQ of NaHCO3; hyperchloremic on labs today and hypernatremic  Bicarb component can be supplemented in drip for now; when off fluids will start on Sodium Bicarbonate 650mg TID to slow progression of CKD; appears to be at his renal baseline  Has blood and protein in UA; would check urine protein/cr ratio. Holding off on nephritic workup given prerenal picture;   EGD planned for Monday pending INR correction  d/w Dr. Howe

## 2018-02-04 NOTE — PROGRESS NOTE ADULT - SUBJECTIVE AND OBJECTIVE BOX
Patient is a 77y old  Male who presents with a chief complaint of bleeding in colostomy bag (30 Jan 2018 16:07)      HPI:  76 yo male PMHx CAD s/p CABG, AFib on Coumadin, HTN, prostate CA s/p TURP, s/p PEG and colectomy, prior CVA with expressive aphasia and R hemiparesis, CKD 3, . Tolerating po diet . Stool brown in ostomy. Hgb stable. INR is 1.8. No abdominal pain    REVIEW OF SYSTEMS:  Constitutional: No fever, weight loss or fatigue  ENMT:  No difficulty hearing, tinnitus, vertigo; No sinus or throat pain  Respiratory: No cough, wheezing, chills or hemoptysis  Cardiovascular: No chest pain, palpitations, dizziness or leg swelling  Gastrointestinal: No abdominal or epigastric pain. No nausea, vomiting or hematemesis; No diarrhea or constipation. No melena or hematochezia.  Skin: No itching, burning, rashes or lesions   Musculoskeletal: No joint pain or swelling; No muscle, back or extremity pain    PAST MEDICAL & SURGICAL HISTORY:  CAD (coronary artery disease)  Afib  CVA (cerebral vascular accident)  BPH (benign prostatic hyperplasia)  High cholesterol  HTN (hypertension)  H/O heart valve replacement with mechanical valve  S/P CABG x 1  H/O colectomy      FAMILY HISTORY:  No pertinent family history in first degree relatives      SOCIAL HISTORY:  Smoking Status: [ ] Current, [ ] Former, [ ] Never  Pack Years:  [  ] EtOH-no  [  ] IVDA    MEDICATIONS:  MEDICATIONS  (STANDING):  ALBUTerol    0.083% 2.5 milliGRAM(s) Nebulizer every 6 hours  ALBUTerol    90 MICROgram(s) HFA Inhaler 1 Puff(s) Inhalation every 4 hours  amiodarone    Tablet 400 milliGRAM(s) Oral daily  atorvastatin 10 milliGRAM(s) Oral at bedtime  buPROPion XL . 150 milliGRAM(s) Oral daily  dronabinol 2.5 milliGRAM(s) Oral two times a day  finasteride 5 milliGRAM(s) Oral daily  metoprolol     tartrate 25 milliGRAM(s) Oral three times a day  nystatin Powder 1 Application(s) Topical daily  pantoprazole  Injectable 40 milliGRAM(s) IV Push every 12 hours  sodium chloride 0.45% 1000 milliLiter(s) (75 mL/Hr) IV Continuous <Continuous>  tamsulosin 0.4 milliGRAM(s) Oral at bedtime    MEDICATIONS  (PRN):      Allergies    penicillins (Hives)    Intolerances        Vital Signs Last 24 Hrs  T(C): 36.4 (04 Feb 2018 08:47), Max: 36.7 (03 Feb 2018 16:23)  T(F): 97.5 (04 Feb 2018 08:47), Max: 98 (03 Feb 2018 16:23)  HR: 80 (04 Feb 2018 08:47) (66 - 85)  BP: 138/71 (04 Feb 2018 08:47) (111/67 - 138/71)  BP(mean): --  RR: 18 (04 Feb 2018 08:47) (18 - 18)  SpO2: 95% (04 Feb 2018 08:47) (95% - 98%)    02-03 @ 07:01  -  02-04 @ 07:00  --------------------------------------------------------  IN: 1600 mL / OUT: 1050 mL / NET: 550 mL    02-04 @ 07:01  -  02-04 @ 11:23  --------------------------------------------------------  IN: 150 mL / OUT: 0 mL / NET: 150 mL          PHYSICAL EXAM:    General: Well developed; well nourished; in no acute distress  HEENT: MMM, conjunctiva and sclera clear  H- RRR  l -CTA  Gastrointestinal: Soft, non-tender non-distended; Normal bowel sounds; No rebound or guarding  Extremities: Normal range of motion, No clubbing, cyanosis or edema  Neurological: Alert and oriented x3  Skin: Warm and dry. No obvious rash      LABS:                        9.1    7.4   )-----------( 110      ( 04 Feb 2018 06:20 )             30.0     04 Feb 2018 06:20    148    |  115    |  62.0   ----------------------------<  105    3.9     |  17.0   |  2.69     Ca    8.9        04 Feb 2018 06:20                RADIOLOGY & ADDITIONAL STUDIES:     < from: Xray Chest 1 View AP/PA. (01.30.18 @ 13:45) >  MPRESSION:   Residual increased interstitial markings right upper lobe   which may present fibrotic sequela following bilateral airspace   consolidations on 1/3/2018 examination. Cardiomegaly..          < end of copied text >

## 2018-02-04 NOTE — PROGRESS NOTE ADULT - SUBJECTIVE AND OBJECTIVE BOX
[facility] DIVISION OF KIDNEY DISEASES AND HYPERTENSION -- FOLLOW UP NOTE  --------------------------------------------------------------------------------  Chief Complaint: KAPIL on CKD    24 hour events/subjective:  Pt seen and examined this AM  Lying in bed in NAD  Expressive aphasia  Renal fx appears to be at his baseline      PAST HISTORY  --------------------------------------------------------------------------------  No significant changes to PMH, PSH, FHx, SHx, unless otherwise noted    ALLERGIES & MEDICATIONS  --------------------------------------------------------------------------------  Allergies    penicillins (Hives)    Intolerances      Standing Inpatient Medications  ALBUTerol    0.083% 2.5 milliGRAM(s) Nebulizer every 6 hours  ALBUTerol    90 MICROgram(s) HFA Inhaler 1 Puff(s) Inhalation every 4 hours  amiodarone    Tablet 400 milliGRAM(s) Oral daily  atorvastatin 10 milliGRAM(s) Oral at bedtime  buPROPion XL . 150 milliGRAM(s) Oral daily  dronabinol 2.5 milliGRAM(s) Oral two times a day  finasteride 5 milliGRAM(s) Oral daily  metoprolol     tartrate 25 milliGRAM(s) Oral three times a day  nystatin Powder 1 Application(s) Topical daily  pantoprazole  Injectable 40 milliGRAM(s) IV Push every 12 hours  sodium chloride 0.45% 1000 milliLiter(s) IV Continuous <Continuous>  tamsulosin 0.4 milliGRAM(s) Oral at bedtime    PRN Inpatient Medications      REVIEW OF SYSTEMS  --------------------------------------------------------------------------------  Unable to obtain; aphasia    VITALS/PHYSICAL EXAM  --------------------------------------------------------------------------------  T(C): 36.4 (02-04-18 @ 08:47), Max: 36.7 (02-03-18 @ 16:23)  HR: 80 (02-04-18 @ 08:47) (66 - 85)  BP: 138/71 (02-04-18 @ 08:47) (111/67 - 138/71)  RR: 18 (02-04-18 @ 08:47) (18 - 18)  SpO2: 95% (02-04-18 @ 08:47) (95% - 98%)  Wt(kg): --        02-03-18 @ 07:01  -  02-04-18 @ 07:00  --------------------------------------------------------  IN: 1600 mL / OUT: 1050 mL / NET: 550 mL    02-04-18 @ 07:01  -  02-04-18 @ 13:07  --------------------------------------------------------  IN: 1200 mL / OUT: 250 mL / NET: 950 mL      Physical Exam:  	Gen: NAD, chronically ill appearing, pale  	HEENT: supple neck  	Pulm: CTA B/L  	CV: RRR, S1S2; no rub  	Back: No spinal or CVA tenderness; no sacral edema  	Abd: + ostomy  	LE: Warm, FROM, no clubbing, intact strength; trace edema  	Neuro: No focal deficits, intact gait  	Psych: Normal affect and mood  	Skin: Warm, without rashes    LABS/STUDIES  --------------------------------------------------------------------------------              9.1    7.4   >-----------<  110      [02-04-18 @ 06:20]              30.0     148  |  115  |  62.0  ----------------------------<  105      [02-04-18 @ 06:20]  3.9   |  17.0  |  2.69        Ca     8.9     [02-04-18 @ 06:20]      PT/INR: PT 20.0 , INR 1.80       [02-04-18 @ 06:20]  PTT: 34.1       [02-04-18 @ 06:20]      Creatinine Trend:  SCr 2.69 [02-04 @ 06:20]  SCr 2.86 [02-03 @ 07:07]  SCr 2.79 [02-02 @ 05:50]  SCr 2.72 [02-01 @ 06:51]  SCr 3.01 [01-31 @ 06:38]    Urinalysis - [02-03-18 @ 12:39]      Color Yellow / Appearance Turbid / SG 1.015 / pH 5.0      Gluc Negative / Ketone Negative  / Bili Negative / Urobili Negative       Blood Large / Protein 100 / Leuk Est Moderate / Nitrite Positive      RBC >50 / WBC 26-50 / Hyaline  / Gran  / Sq Epi  / Non Sq Epi Few / Bacteria Moderate    Urine Creatinine 86      [02-03-18 @ 14:56]  Urine Protein 66.0      [02-03-18 @ 14:56]  Urine Sodium <30      [02-03-18 @ 12:37]  Urine Chloride <27      [02-03-18 @ 12:37]  Urine Osmolality 447      [02-03-18 @ 12:39]    Iron 47, TIBC 385, %sat 12      [01-30-18 @ 13:15]  Ferritin 442.3      [01-30-18 @ 13:15]  HbA1c 6.2      [12-09-15 @ 12:54]  TSH 1.06      [09-01-17 @ 11:01]

## 2018-02-04 NOTE — PROGRESS NOTE ADULT - PROBLEM SELECTOR PROBLEM 2
Anemia due to blood loss
Oropharyngeal dysphagia
Anemia due to blood loss

## 2018-02-04 NOTE — PROGRESS NOTE ADULT - SUBJECTIVE AND OBJECTIVE BOX
CC: bleeding in colostomy bag (2018 16:07)    HPI: 77 y.o. 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    patient unable to voice concerns but follows some directions.  per wife at bedside, patient noted to have intermittent black and bloody output from colostomy bag since discharge.  Poor PO intake.  Endoscopy was deferred during last hospitalization due to co morbidities.  On coumadin and lasix at home. non ambulatory. (2018 16:07)    INTERVAL HPI/OVERNIGHT EVENTS: weak, tired, pale, +right HP and aphasia, no blood in ileostomy bag, elevated INR, + chronic SOB  Other ROS reviewed and neg     Vital Signs Last 24 Hrs  T(C): 36.4 (2018 08:47), Max: 36.7 (2018 16:23)  T(F): 97.5 (2018 08:47), Max: 98 (2018 16:23)  HR: 80 (2018 08:47) (66 - 85)  BP: 138/71 (2018 08:47) (111/67 - 138/71)  RR: 18 (2018 08:47) (18 - 18)  SpO2: 95% (2018 08:47) (95% - 98%)                           9.1    7.4   )-----------( 110      ( 2018 06:20 )             30.0     2018 06:20    148    |  115    |  62.0   ----------------------------<  105    3.9     |  17.0   |  2.69     Ca    8.9        2018 06:20    PT/INR - ( 2018 06:20 )   PT: 20.0 sec;   INR: 1.80 ratio       PTT - ( 2018 06:20 )  PTT:34.1 sec    Urinalysis Basic - ( 2018 12:39 )    Color: Yellow / Appearance: Turbid / S.015 / pH: x  Gluc: x / Ketone: Negative  / Bili: Negative / Urobili: Negative   Blood: x / Protein: 100 / Nitrite: Positive   Leuk Esterase: Moderate / RBC: >50 /HPF / WBC 26-50   Sq Epi: x / Non Sq Epi: Few / Bacteria: Moderate    MEDICATIONS  (STANDING):  ALBUTerol    0.083% 2.5 milliGRAM(s) Nebulizer every 6 hours  ALBUTerol    90 MICROgram(s) HFA Inhaler 1 Puff(s) Inhalation every 4 hours  amiodarone    Tablet 400 milliGRAM(s) Oral daily  atorvastatin 10 milliGRAM(s) Oral at bedtime  buPROPion XL . 150 milliGRAM(s) Oral daily  dronabinol 2.5 milliGRAM(s) Oral two times a day  finasteride 5 milliGRAM(s) Oral daily  metoprolol     tartrate 25 milliGRAM(s) Oral three times a day  nystatin Powder 1 Application(s) Topical daily  pantoprazole  Injectable 40 milliGRAM(s) IV Push every 12 hours  sodium chloride 0.45% 1000 milliLiter(s) (75 mL/Hr) IV Continuous <Continuous>  tamsulosin 0.4 milliGRAM(s) Oral at bedtime    MEDICATIONS  (PRN):    RADIOLOGY & ADDITIONAL TESTS: personally visualized    PHYSICAL EXAM:    General: elderly fragile male in no acute distress  Eyes: PERRLA, EOMI; conjunctiva and sclera clear  Head: Normocephalic; atraumatic  ENMT: No nasal discharge; airway clear, dry mucosal membranes  Neck: Supple; non tender; no masses  Respiratory: occasional end-exp wheezes, no rales or rhonchi, decreased BS at bases  Cardiovascular: Irregular rate and rhythm. S1 and S2   Gastrointestinal: Soft abd, PEG in place, colostomy bag with no leakage and brown stool in the bag  Genitourinary: No costovertebral angle tenderness  Extremities: Right HP, +PP, no edema  Vascular: Peripheral pulses palpable 2+ bilaterally  Neurological: Alert and oriented x3, Right HP, aphasic  Skin: Warm and dry. pale  Musculoskeletal: Normal tone  Psychiatric: Cooperative, debilitated, refers to wife for decision making

## 2018-02-04 NOTE — PROGRESS NOTE ADULT - PROBLEM SELECTOR PLAN 2
See above management plan.
monitor HH
s/p 2 units PRBCs  H&H responded appropriately   Trend H&H
s/p 2 units PRBCs  H&H responded appropriately   Trend H&H
transfuse PRBCs, monitor HH
monitor HH

## 2018-02-05 ENCOUNTER — TRANSCRIPTION ENCOUNTER (OUTPATIENT)
Age: 78
End: 2018-02-05

## 2018-02-05 LAB
-  AMIKACIN: SIGNIFICANT CHANGE UP
-  AZTREONAM: SIGNIFICANT CHANGE UP
-  CEFEPIME: SIGNIFICANT CHANGE UP
-  CEFTAZIDIME: SIGNIFICANT CHANGE UP
-  CIPROFLOXACIN: SIGNIFICANT CHANGE UP
-  GENTAMICIN: SIGNIFICANT CHANGE UP
-  IMIPENEM: SIGNIFICANT CHANGE UP
-  LEVOFLOXACIN: SIGNIFICANT CHANGE UP
-  MEROPENEM: SIGNIFICANT CHANGE UP
-  PIPERACILLIN/TAZOBACTAM: SIGNIFICANT CHANGE UP
-  TOBRAMYCIN: SIGNIFICANT CHANGE UP
ALBUMIN SERPL ELPH-MCNC: 2.9 G/DL — LOW (ref 3.3–5.2)
ALP SERPL-CCNC: 108 U/L — SIGNIFICANT CHANGE UP (ref 40–120)
ALT FLD-CCNC: 26 U/L — SIGNIFICANT CHANGE UP
ANION GAP SERPL CALC-SCNC: 15 MMOL/L — SIGNIFICANT CHANGE UP (ref 5–17)
APTT BLD: 30.7 SEC — SIGNIFICANT CHANGE UP (ref 27.5–37.4)
AST SERPL-CCNC: 22 U/L — SIGNIFICANT CHANGE UP
BILIRUB SERPL-MCNC: 0.7 MG/DL — SIGNIFICANT CHANGE UP (ref 0.4–2)
BUN SERPL-MCNC: 56 MG/DL — HIGH (ref 8–20)
CALCIUM SERPL-MCNC: 8.5 MG/DL — LOW (ref 8.6–10.2)
CHLORIDE SERPL-SCNC: 115 MMOL/L — HIGH (ref 98–107)
CO2 SERPL-SCNC: 17 MMOL/L — LOW (ref 22–29)
CREAT SERPL-MCNC: 2.8 MG/DL — HIGH (ref 0.5–1.3)
CULTURE RESULTS: SIGNIFICANT CHANGE UP
GLUCOSE SERPL-MCNC: 126 MG/DL — HIGH (ref 70–115)
HCT VFR BLD CALC: 29.1 % — LOW (ref 42–52)
HGB BLD-MCNC: 8.9 G/DL — LOW (ref 14–18)
INR BLD: 1.53 RATIO — HIGH (ref 0.88–1.16)
LACTATE SERPL-SCNC: 1.9 MMOL/L — SIGNIFICANT CHANGE UP (ref 0.5–2)
MCHC RBC-ENTMCNC: 29.6 PG — SIGNIFICANT CHANGE UP (ref 27–31)
MCHC RBC-ENTMCNC: 30.6 G/DL — LOW (ref 32–36)
MCV RBC AUTO: 96.7 FL — HIGH (ref 80–94)
METHOD TYPE: SIGNIFICANT CHANGE UP
ORGANISM # SPEC MICROSCOPIC CNT: SIGNIFICANT CHANGE UP
ORGANISM # SPEC MICROSCOPIC CNT: SIGNIFICANT CHANGE UP
PLATELET # BLD AUTO: 97 K/UL — LOW (ref 150–400)
POTASSIUM SERPL-MCNC: 4.1 MMOL/L — SIGNIFICANT CHANGE UP (ref 3.5–5.3)
POTASSIUM SERPL-SCNC: 4.1 MMOL/L — SIGNIFICANT CHANGE UP (ref 3.5–5.3)
PROCALCITONIN SERPL-MCNC: 0.25 NG/ML — HIGH (ref 0–0.04)
PROT SERPL-MCNC: 6.4 G/DL — LOW (ref 6.6–8.7)
PROTHROM AB SERPL-ACNC: 17 SEC — HIGH (ref 9.8–12.7)
RBC # BLD: 3.01 M/UL — LOW (ref 4.6–6.2)
RBC # FLD: 20.5 % — HIGH (ref 11–15.6)
SODIUM SERPL-SCNC: 147 MMOL/L — HIGH (ref 135–145)
SPECIMEN SOURCE: SIGNIFICANT CHANGE UP
WBC # BLD: 6.6 K/UL — SIGNIFICANT CHANGE UP (ref 4.8–10.8)
WBC # FLD AUTO: 6.6 K/UL — SIGNIFICANT CHANGE UP (ref 4.8–10.8)

## 2018-02-05 PROCEDURE — 43246 EGD PLACE GASTROSTOMY TUBE: CPT

## 2018-02-05 PROCEDURE — 71045 X-RAY EXAM CHEST 1 VIEW: CPT | Mod: 26

## 2018-02-05 PROCEDURE — 43247 EGD REMOVE FOREIGN BODY: CPT | Mod: 59

## 2018-02-05 PROCEDURE — 99233 SBSQ HOSP IP/OBS HIGH 50: CPT | Mod: GC

## 2018-02-05 PROCEDURE — 99233 SBSQ HOSP IP/OBS HIGH 50: CPT

## 2018-02-05 RX ORDER — CEFEPIME 1 G/1
1000 INJECTION, POWDER, FOR SOLUTION INTRAMUSCULAR; INTRAVENOUS ONCE
Qty: 0 | Refills: 0 | Status: COMPLETED | OUTPATIENT
Start: 2018-02-05 | End: 2018-02-05

## 2018-02-05 RX ORDER — CEFEPIME 1 G/1
INJECTION, POWDER, FOR SOLUTION INTRAMUSCULAR; INTRAVENOUS
Qty: 0 | Refills: 0 | Status: DISCONTINUED | OUTPATIENT
Start: 2018-02-05 | End: 2018-02-09

## 2018-02-05 RX ORDER — FUROSEMIDE 40 MG
40 TABLET ORAL ONCE
Qty: 0 | Refills: 0 | Status: COMPLETED | OUTPATIENT
Start: 2018-02-05 | End: 2018-02-05

## 2018-02-05 RX ORDER — CEFEPIME 1 G/1
1000 INJECTION, POWDER, FOR SOLUTION INTRAMUSCULAR; INTRAVENOUS EVERY 12 HOURS
Qty: 0 | Refills: 0 | Status: DISCONTINUED | OUTPATIENT
Start: 2018-02-06 | End: 2018-02-09

## 2018-02-05 RX ADMIN — PANTOPRAZOLE SODIUM 40 MILLIGRAM(S): 20 TABLET, DELAYED RELEASE ORAL at 17:42

## 2018-02-05 RX ADMIN — Medication 40 MILLIGRAM(S): at 17:49

## 2018-02-05 RX ADMIN — SODIUM CHLORIDE 75 MILLILITER(S): 9 INJECTION, SOLUTION INTRAVENOUS at 21:31

## 2018-02-05 RX ADMIN — ALBUTEROL 2.5 MILLIGRAM(S): 90 AEROSOL, METERED ORAL at 15:26

## 2018-02-05 RX ADMIN — CEFEPIME 100 MILLIGRAM(S): 1 INJECTION, POWDER, FOR SOLUTION INTRAMUSCULAR; INTRAVENOUS at 21:23

## 2018-02-05 RX ADMIN — Medication 25 MILLIGRAM(S): at 05:13

## 2018-02-05 RX ADMIN — SODIUM CHLORIDE 75 MILLILITER(S): 9 INJECTION, SOLUTION INTRAVENOUS at 05:14

## 2018-02-05 RX ADMIN — ATORVASTATIN CALCIUM 10 MILLIGRAM(S): 80 TABLET, FILM COATED ORAL at 21:23

## 2018-02-05 RX ADMIN — TAMSULOSIN HYDROCHLORIDE 0.4 MILLIGRAM(S): 0.4 CAPSULE ORAL at 21:23

## 2018-02-05 RX ADMIN — ALBUTEROL 2.5 MILLIGRAM(S): 90 AEROSOL, METERED ORAL at 21:12

## 2018-02-05 RX ADMIN — BUPROPION HYDROCHLORIDE 150 MILLIGRAM(S): 150 TABLET, EXTENDED RELEASE ORAL at 13:29

## 2018-02-05 RX ADMIN — ALBUTEROL 2.5 MILLIGRAM(S): 90 AEROSOL, METERED ORAL at 02:17

## 2018-02-05 RX ADMIN — PANTOPRAZOLE SODIUM 40 MILLIGRAM(S): 20 TABLET, DELAYED RELEASE ORAL at 05:13

## 2018-02-05 RX ADMIN — Medication 2.5 MILLIGRAM(S): at 05:13

## 2018-02-05 RX ADMIN — FINASTERIDE 5 MILLIGRAM(S): 5 TABLET, FILM COATED ORAL at 13:28

## 2018-02-05 RX ADMIN — AMIODARONE HYDROCHLORIDE 400 MILLIGRAM(S): 400 TABLET ORAL at 05:13

## 2018-02-05 RX ADMIN — Medication 25 MILLIGRAM(S): at 13:28

## 2018-02-05 RX ADMIN — NYSTATIN CREAM 1 APPLICATION(S): 100000 CREAM TOPICAL at 13:30

## 2018-02-05 RX ADMIN — ALBUTEROL 2.5 MILLIGRAM(S): 90 AEROSOL, METERED ORAL at 08:33

## 2018-02-05 NOTE — PROGRESS NOTE ADULT - SUBJECTIVE AND OBJECTIVE BOX
[facility] DIVISION OF KIDNEY DISEASES AND HYPERTENSION -- FOLLOW UP NOTE  --------------------------------------------------------------------------------  Chief Complaint: KAPIL on CKD    24 hour events/subjective:  Pt seen and examined this AM  EGD today  Lying in bed in NAD      PAST HISTORY  --------------------------------------------------------------------------------  No significant changes to PMH, PSH, FHx, SHx, unless otherwise noted    ALLERGIES & MEDICATIONS  --------------------------------------------------------------------------------  Allergies    penicillins (Hives)    Intolerances      Standing Inpatient Medications  ALBUTerol    0.083% 2.5 milliGRAM(s) Nebulizer every 6 hours  ALBUTerol    90 MICROgram(s) HFA Inhaler 1 Puff(s) Inhalation every 4 hours  amiodarone    Tablet 400 milliGRAM(s) Oral daily  atorvastatin 10 milliGRAM(s) Oral at bedtime  buPROPion XL . 150 milliGRAM(s) Oral daily  dextrose 5% 1000 milliLiter(s) IV Continuous <Continuous>  dronabinol 2.5 milliGRAM(s) Oral two times a day  finasteride 5 milliGRAM(s) Oral daily  metoprolol     tartrate 25 milliGRAM(s) Oral three times a day  nystatin Powder 1 Application(s) Topical daily  pantoprazole  Injectable 40 milliGRAM(s) IV Push every 12 hours  tamsulosin 0.4 milliGRAM(s) Oral at bedtime    PRN Inpatient Medications      REVIEW OF SYSTEMS  --------------------------------------------------------------------------------  Unable to obtain    VITALS/PHYSICAL EXAM  --------------------------------------------------------------------------------  T(C): 36.4 (02-05-18 @ 07:53), Max: 36.9 (02-04-18 @ 22:35)  HR: 74 (02-05-18 @ 07:53) (74 - 86)  BP: 114/60 (02-05-18 @ 07:53) (114/60 - 133/86)  RR: 18 (02-05-18 @ 07:53) (18 - 20)  SpO2: 97% (02-05-18 @ 07:53) (96% - 97%)  Wt(kg): --        02-04-18 @ 07:01  -  02-05-18 @ 07:00  --------------------------------------------------------  IN: 2175 mL / OUT: 1200 mL / NET: 975 mL      Physical Exam:  	Gen: NAD, chronically ill appearing, pale  	HEENT: supple neck  	Pulm: CTA B/L  	CV: RRR, S1S2; no rub  	Back: No spinal or CVA tenderness; no sacral edema  	Abd: + ostomy  	LE: Warm, FROM, no clubbing, intact strength; trace edema  	Neuro: No focal deficits, intact gait  	Psych: Normal affect and mood  	Skin: Warm, without rashes    LABS/STUDIES  --------------------------------------------------------------------------------              8.9    6.6   >-----------<  97       [02-05-18 @ 07:01]              29.1     147  |  115  |  56.0  ----------------------------<  126      [02-05-18 @ 06:11]  4.1   |  17.0  |  2.80        Ca     8.5     [02-05-18 @ 06:11]    TPro  6.4  /  Alb  2.9  /  TBili  0.7  /  DBili  x   /  AST  22  /  ALT  26  /  AlkPhos  108  [02-05-18 @ 06:11]    PT/INR: PT 17.0 , INR 1.53       [02-05-18 @ 06:48]  PTT: 30.7       [02-05-18 @ 06:48]      Creatinine Trend:  SCr 2.80 [02-05 @ 06:11]  SCr 2.69 [02-04 @ 06:20]  SCr 2.86 [02-03 @ 07:07]  SCr 2.79 [02-02 @ 05:50]  SCr 2.72 [02-01 @ 06:51]    Urinalysis - [02-03-18 @ 12:39]      Color Yellow / Appearance Turbid / SG 1.015 / pH 5.0      Gluc Negative / Ketone Negative  / Bili Negative / Urobili Negative       Blood Large / Protein 100 / Leuk Est Moderate / Nitrite Positive      RBC >50 / WBC 26-50 / Hyaline  / Gran  / Sq Epi  / Non Sq Epi Few / Bacteria Moderate    Urine Creatinine 86      [02-03-18 @ 14:56]  Urine Protein 66.0      [02-03-18 @ 14:56]  Urine Sodium <30      [02-03-18 @ 12:37]  Urine Chloride <27      [02-03-18 @ 12:37]  Urine Osmolality 447      [02-03-18 @ 12:39]    Iron 47, TIBC 385, %sat 12      [01-30-18 @ 13:15]  Ferritin 442.3      [01-30-18 @ 13:15]  HbA1c 6.2      [12-09-15 @ 12:54]  TSH 1.06      [09-01-17 @ 11:01]

## 2018-02-05 NOTE — PROGRESS NOTE ADULT - ASSESSMENT
1) KAPIL on CKD III in the setting of GIB and decreased access to po; hypoperfusion low BPs  2) Anemia  3) Acidosis  4) HTN  5) Hypernatremia    Patient had KAPIL on CKD III in the setting of GIB;  Likely prerenal azotemia; with element of hypoperfusion given low BPs;   Urine studies noted; Matthew < 30 and UCl < 30; consistent with prerenal azotemia;  cw D5W with 75MeQ of NaHCO3; hyperchloremic on labs today and hypernatremic (acting as 1/2NS without the chloride component)  Bicarb component can be supplemented in drip for now; when off fluids will start on Sodium Bicarbonate 650mg TID to slow progression of CKD; appears to be at his renal baseline

## 2018-02-05 NOTE — BRIEF OPERATIVE NOTE - PROCEDURE
<<-----Click on this checkbox to enter Procedure EGD, with endoscopic percutaneous gastrojejunostomy tube insertion  02/05/2018    Active  [id number]

## 2018-02-05 NOTE — PROGRESS NOTE ADULT - ASSESSMENT
77 y.o. 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    Problem/Plan - 1:  ·  Problem: Gastrointestinal hemorrhage with melena.     s/p 2 units PRBCs with improved HH - now trending down slowly H/H 8.9/29.1   EGD today   cont PPI  s/p Vit K po x1 on 2/3/18    PT/INR 17/1.53  GI f/u appreciated  no active hemorrhage  Monitor H/H  Monitor trending down PT/INR    Problem/Plan - 2:  ·  Problem: Anemia due to blood loss.  Plan: monitor HH. 8.9/29.1    Problem/Plan - 3:  ·  Problem: Chronic atrial fibrillation.  Plan: with recent drop in EF and RV dysfunction due to suspected PE + UE DVT and Hx of CVA with right HP/dyphagia s/p PEG - pt is high [id number] score as well as high bleeding risk. May not tolerate 6 weeks of VKA for WATCHMAN and it will not solve the problem of DVT/PE as well - doubt it will be beneficial.     Problem/Plan - 4:  ·  Problem: Hyponatremia.  Plan: with hypercloremic metabolic acidosid with GI fluid losses -   IV 1/2NS with sodium bicarbonate as discussed with renal - improving, creatinine at baseline BUN/cr 56/2.68    Problem/Plan- 5:  KAPIL on CKD III in the setting of GIB  low BPs  IVF D5W w/NaHCO3  urine studies specimen for protein  avoid nephrotoxic meds

## 2018-02-05 NOTE — BRIEF OPERATIVE NOTE - COMMENTS
G tube can be used immediately  Monitor CBC  No contraindications for anticoagulation. Resume as per primary team  PPI therapy once daily

## 2018-02-05 NOTE — PROGRESS NOTE ADULT - SUBJECTIVE AND OBJECTIVE BOX
Patient is a 77y old  Male who presents with a chief complaint of bleeding in colostomy bag (2018 16:07)      HPI:  76 yo male PMHx CAD s/p CABG, AFib on Coumadin, HTN, prostate CA s/p TURP, s/p PEG and colectomy, prior CVA with expressive aphasia and R hemiparesis, CKD 3, recent dc 3 weeks ago for resp failure s/p intubation due to aspiration pneumonia, Septic shock 2/2 UTI, KAPIL on CKD, recurrent NSVT, UE DVT presents from home for blood in colostomy bag.    patient unable to voice concerns but follows some directions.  per wife at bedside, patient noted to have intermittent black and bloody output from colostomy bag since discharge.  Poor PO intake.  Endoscopy was deferred during last hospitalization due to co morbidities.  On coumadin and lasix at home. non ambulatory. (2018 16:07)    FAMILY HISTORY:  No pertinent family history in first degree relatives      INTERVAL HPI/OVERNIGHT EVENTS:  pt. states weakness, layng in bed comfortably, going to Endo in am  no bloody output in colostomy currently (but had it before)  denies SOB, CP, chills/fever      PAST MEDICAL & SURGICAL HISTORY:  CAD (coronary artery disease)  Afib  CVA (cerebral vascular accident)  BPH (benign prostatic hyperplasia)  High cholesterol  HTN (hypertension)  H/O heart valve replacement with mechanical valve  S/P CABG x 1  H/O colectomy      Allergies    penicillins (Hives)    Intolerances        Vital Signs Last 24 Hrs  T(C): 36.4 (2018 07:53), Max: 36.9 (2018 22:35)  T(F): 97.6 (2018 07:53), Max: 98.4 (2018 22:35)  HR: 74 (2018 07:53) (74 - 86)  BP: 114/60 (2018 07:53) (114/60 - 133/86)  BP(mean): --  RR: 18 (2018 07:53) (18 - 20)  SpO2: 97% (2018 07:53) (96% - 97%)                                8.9    6.6   )-----------( 97       ( 2018 07:01 )             29.1     LIVER FUNCTIONS - ( 2018 06:11 )  Alb: 2.9 g/dL / Pro: 6.4 g/dL / ALK PHOS: 108 U/L / ALT: 26 U/L / AST: 22 U/L / GGT: x           PT/INR - ( 2018 06:48 )   PT: 17.0 sec;   INR: 1.53 ratio         PTT - ( 2018 06:48 )  PTT:30.7 sec  Urinalysis Basic - ( 2018 12:39 )    Color: Yellow / Appearance: Turbid / S.015 / pH: x  Gluc: x / Ketone: Negative  / Bili: Negative / Urobili: Negative   Blood: x / Protein: 100 / Nitrite: Positive   Leuk Esterase: Moderate / RBC: >50 /HPF / WBC 26-50   Sq Epi: x / Non Sq Epi: Few / Bacteria: Moderate        RADIOLOGY & ADDITIONAL STUDIES:    MEDICATIONS:  ALBUTerol    0.083% 2.5 milliGRAM(s) Nebulizer every 6 hours  ALBUTerol    90 MICROgram(s) HFA Inhaler 1 Puff(s) Inhalation every 4 hours  amiodarone    Tablet 400 milliGRAM(s) Oral daily  atorvastatin 10 milliGRAM(s) Oral at bedtime  buPROPion XL . 150 milliGRAM(s) Oral daily  dextrose 5% 1000 milliLiter(s) IV Continuous <Continuous>  dronabinol 2.5 milliGRAM(s) Oral two times a day  finasteride 5 milliGRAM(s) Oral daily  metoprolol     tartrate 25 milliGRAM(s) Oral three times a day  nystatin Powder 1 Application(s) Topical daily  pantoprazole  Injectable 40 milliGRAM(s) IV Push every 12 hours  tamsulosin 0.4 milliGRAM(s) Oral at bedtime

## 2018-02-05 NOTE — BRIEF OPERATIVE NOTE - OPERATION/FINDINGS
Normal EGD to D2  G tube in place  Removed endoscopically  New 22 Fr Balloon G tube placed with direct endoscopic visualization

## 2018-02-05 NOTE — PROGRESS NOTE ADULT - SUBJECTIVE AND OBJECTIVE BOX
[facility]                                   [address] [place] [address]                                                      PHONE: [phone]                                                         FAX: [phone]  [email or web address]  ---------------------------------------------------------------------------------------------------------------------------------    Overnight events/patient complaints: s/p EGD and replacement of PEG this AM.  No GIB noted. "Cleared" for anticoagulation.  Wife reports pt has been coughing and not eating much      penicillins (Hives)    MEDICATIONS  (STANDING):  ALBUTerol    0.083% 2.5 milliGRAM(s) Nebulizer every 6 hours  ALBUTerol    90 MICROgram(s) HFA Inhaler 1 Puff(s) Inhalation every 4 hours  amiodarone    Tablet 400 milliGRAM(s) Oral daily  atorvastatin 10 milliGRAM(s) Oral at bedtime  buPROPion XL . 150 milliGRAM(s) Oral daily  dextrose 5% 1000 milliLiter(s) (75 mL/Hr) IV Continuous <Continuous>  dronabinol 2.5 milliGRAM(s) Oral two times a day  finasteride 5 milliGRAM(s) Oral daily  metoprolol     tartrate 25 milliGRAM(s) Oral three times a day  nystatin Powder 1 Application(s) Topical daily  pantoprazole  Injectable 40 milliGRAM(s) IV Push every 12 hours  tamsulosin 0.4 milliGRAM(s) Oral at bedtime    MEDICATIONS  (PRN):      Vital Signs Last 24 Hrs  T(C): 36.4 (05 Feb 2018 07:53), Max: 36.9 (04 Feb 2018 22:35)  T(F): 97.6 (05 Feb 2018 07:53), Max: 98.4 (04 Feb 2018 22:35)  HR: 74 (05 Feb 2018 07:53) (74 - 86)  BP: 114/60 (05 Feb 2018 07:53) (114/60 - 130/62)  BP(mean): --  RR: 18 (05 Feb 2018 07:53) (18 - 20)  SpO2: 97% (05 Feb 2018 07:53) (96% - 97%)  ICU Vital Signs Last 24 Hrs  CRISTIN ROQUE  I&O's Detail    04 Feb 2018 07:01  -  05 Feb 2018 07:00  --------------------------------------------------------  IN:    dextrose 5%: 225 mL    Oral Fluid: 1500 mL    sodium chloride 0.45%: 450 mL  Total IN: 2175 mL    OUT:    Ileostomy: 1200 mL  Total OUT: 1200 mL    Total NET: 975 mL      05 Feb 2018 07:01  -  05 Feb 2018 17:28  --------------------------------------------------------  IN:  Total IN: 0 mL    OUT:    Ileostomy: 250 mL  Total OUT: 250 mL    Total NET: -250 mL        I&O's Summary    04 Feb 2018 07:01  -  05 Feb 2018 07:00  --------------------------------------------------------  IN: 2175 mL / OUT: 1200 mL / NET: 975 mL    05 Feb 2018 07:01  -  05 Feb 2018 17:28  --------------------------------------------------------  IN: 0 mL / OUT: 250 mL / NET: -250 mL      Drug Dosing Weight  CRISTIN ROQUE      PHYSICAL EXAM:  General: Appears well developed, well nourished alert and cooperative.  HEENT: Head; normocephalic, atraumatic.  Eyes: Pupils reactive, cornea wnl.  Neck: Supple, no nodes adenopathy, no NVD or carotid bruit or thyromegaly.  CARDIOVASCULAR: Normal S1 and S2, No murmur, rub, gallop or lift.   LUNGS: decreased breath sounds bilat bases, minimal insp crackles at bases  ABDOMEN: Soft, nontender without mass or organomegaly. bowel sounds normoactive.  EXTREMITIES: No clubbing, cyanosis or edema. Distal pulses wnl.   SKIN: warm and dry with normal turgor.    LABS:                        8.9    6.6   )-----------( 97       ( 05 Feb 2018 07:01 )             29.1     02-05    147<H>  |  115<H>  |  56.0<H>  ----------------------------<  126<H>  4.1   |  17.0<L>  |  2.80<H>    Ca    8.5<L>      05 Feb 2018 06:11    TPro  6.4<L>  /  Alb  2.9<L>  /  TBili  0.7  /  DBili  x   /  AST  22  /  ALT  26  /  AlkPhos  108  02-05    CRISTIN ROQUE      PT/INR - ( 05 Feb 2018 06:48 )   PT: 17.0 sec;   INR: 1.53 ratio         PTT - ( 05 Feb 2018 06:48 )  PTT:30.7 sec      RADIOLOGY & ADDITIONAL STUDIES:    INTERPRETATION OF TELEMETRY (personally reviewed):AF        STRESS TEST:    CARDIAC CATHETERIZATION:    ASSESSMENT AND PLAN:  CHF- IV lasix today  AF- rate ok.  Favor reintroduction of A/C rather than TATYANA occlusion device given likelihood of recent PE and absence of bleeding source on EGD.  Would restart Coumadin with goal INR 2-3  GIB- s/p EGD.  Hct stable. PPI.  Not eating- ?tube feeds  CKD  CAD stable

## 2018-02-05 NOTE — BRIEF OPERATIVE NOTE - POST-OP DX
Gastrostomy tube dysfunction  02/05/2018    Active  oCdy Hinkle  Normal appearance of tissue  02/05/2018    Active  Cody Hinkle

## 2018-02-06 LAB
ALBUMIN SERPL ELPH-MCNC: 2.8 G/DL — LOW (ref 3.3–5.2)
ALP SERPL-CCNC: 101 U/L — SIGNIFICANT CHANGE UP (ref 40–120)
ALT FLD-CCNC: 25 U/L — SIGNIFICANT CHANGE UP
ANION GAP SERPL CALC-SCNC: 16 MMOL/L — SIGNIFICANT CHANGE UP (ref 5–17)
APTT BLD: 31.7 SEC — SIGNIFICANT CHANGE UP (ref 27.5–37.4)
AST SERPL-CCNC: 17 U/L — SIGNIFICANT CHANGE UP
BILIRUB SERPL-MCNC: 1.1 MG/DL — SIGNIFICANT CHANGE UP (ref 0.4–2)
BUN SERPL-MCNC: 50 MG/DL — HIGH (ref 8–20)
CALCIUM SERPL-MCNC: 8.6 MG/DL — SIGNIFICANT CHANGE UP (ref 8.6–10.2)
CHLORIDE SERPL-SCNC: 109 MMOL/L — HIGH (ref 98–107)
CO2 SERPL-SCNC: 22 MMOL/L — SIGNIFICANT CHANGE UP (ref 22–29)
CREAT SERPL-MCNC: 2.81 MG/DL — HIGH (ref 0.5–1.3)
GLUCOSE SERPL-MCNC: 119 MG/DL — HIGH (ref 70–115)
HCT VFR BLD CALC: 28.1 % — LOW (ref 42–52)
HGB BLD-MCNC: 8.5 G/DL — LOW (ref 14–18)
INR BLD: 1.54 RATIO — HIGH (ref 0.88–1.16)
LACTATE SERPL-SCNC: 1.3 MMOL/L — SIGNIFICANT CHANGE UP (ref 0.5–2)
MAGNESIUM SERPL-MCNC: 1.7 MG/DL — SIGNIFICANT CHANGE UP (ref 1.6–2.6)
MCHC RBC-ENTMCNC: 29.6 PG — SIGNIFICANT CHANGE UP (ref 27–31)
MCHC RBC-ENTMCNC: 30.2 G/DL — LOW (ref 32–36)
MCV RBC AUTO: 97.9 FL — HIGH (ref 80–94)
PHOSPHATE SERPL-MCNC: 3.2 MG/DL — SIGNIFICANT CHANGE UP (ref 2.4–4.7)
PLATELET # BLD AUTO: 103 K/UL — LOW (ref 150–400)
POTASSIUM SERPL-MCNC: 3.8 MMOL/L — SIGNIFICANT CHANGE UP (ref 3.5–5.3)
POTASSIUM SERPL-SCNC: 3.8 MMOL/L — SIGNIFICANT CHANGE UP (ref 3.5–5.3)
PROT SERPL-MCNC: 6.3 G/DL — LOW (ref 6.6–8.7)
PROTHROM AB SERPL-ACNC: 17.1 SEC — HIGH (ref 9.8–12.7)
RBC # BLD: 2.87 M/UL — LOW (ref 4.6–6.2)
RBC # FLD: 20.2 % — HIGH (ref 11–15.6)
SODIUM SERPL-SCNC: 147 MMOL/L — HIGH (ref 135–145)
WBC # BLD: 8.8 K/UL — SIGNIFICANT CHANGE UP (ref 4.8–10.8)
WBC # FLD AUTO: 8.8 K/UL — SIGNIFICANT CHANGE UP (ref 4.8–10.8)

## 2018-02-06 PROCEDURE — 99233 SBSQ HOSP IP/OBS HIGH 50: CPT

## 2018-02-06 PROCEDURE — 99233 SBSQ HOSP IP/OBS HIGH 50: CPT | Mod: GC

## 2018-02-06 RX ORDER — ALBUTEROL 90 UG/1
1 AEROSOL, METERED ORAL EVERY 4 HOURS
Qty: 0 | Refills: 0 | Status: DISCONTINUED | OUTPATIENT
Start: 2018-02-06 | End: 2018-02-09

## 2018-02-06 RX ORDER — ALBUTEROL 90 UG/1
2.5 AEROSOL, METERED ORAL EVERY 6 HOURS
Qty: 0 | Refills: 0 | Status: DISCONTINUED | OUTPATIENT
Start: 2018-02-06 | End: 2018-02-09

## 2018-02-06 RX ORDER — SODIUM CHLORIDE 9 MG/ML
1000 INJECTION INTRAMUSCULAR; INTRAVENOUS; SUBCUTANEOUS
Qty: 0 | Refills: 0 | Status: DISCONTINUED | OUTPATIENT
Start: 2018-02-06 | End: 2018-02-08

## 2018-02-06 RX ORDER — SODIUM CHLORIDE 9 MG/ML
500 INJECTION, SOLUTION INTRAVENOUS ONCE
Qty: 0 | Refills: 0 | Status: COMPLETED | OUTPATIENT
Start: 2018-02-06 | End: 2018-02-06

## 2018-02-06 RX ORDER — WARFARIN SODIUM 2.5 MG/1
2.5 TABLET ORAL ONCE
Qty: 0 | Refills: 0 | Status: COMPLETED | OUTPATIENT
Start: 2018-02-06 | End: 2018-02-06

## 2018-02-06 RX ADMIN — CEFEPIME 100 MILLIGRAM(S): 1 INJECTION, POWDER, FOR SOLUTION INTRAMUSCULAR; INTRAVENOUS at 05:33

## 2018-02-06 RX ADMIN — SODIUM CHLORIDE 80 MILLILITER(S): 9 INJECTION INTRAMUSCULAR; INTRAVENOUS; SUBCUTANEOUS at 22:23

## 2018-02-06 RX ADMIN — ALBUTEROL 2.5 MILLIGRAM(S): 90 AEROSOL, METERED ORAL at 09:10

## 2018-02-06 RX ADMIN — WARFARIN SODIUM 2.5 MILLIGRAM(S): 2.5 TABLET ORAL at 22:22

## 2018-02-06 RX ADMIN — ATORVASTATIN CALCIUM 10 MILLIGRAM(S): 80 TABLET, FILM COATED ORAL at 22:22

## 2018-02-06 RX ADMIN — CEFEPIME 100 MILLIGRAM(S): 1 INJECTION, POWDER, FOR SOLUTION INTRAMUSCULAR; INTRAVENOUS at 17:50

## 2018-02-06 RX ADMIN — PANTOPRAZOLE SODIUM 40 MILLIGRAM(S): 20 TABLET, DELAYED RELEASE ORAL at 05:33

## 2018-02-06 RX ADMIN — Medication 2.5 MILLIGRAM(S): at 17:50

## 2018-02-06 RX ADMIN — PANTOPRAZOLE SODIUM 40 MILLIGRAM(S): 20 TABLET, DELAYED RELEASE ORAL at 17:50

## 2018-02-06 RX ADMIN — FINASTERIDE 5 MILLIGRAM(S): 5 TABLET, FILM COATED ORAL at 12:00

## 2018-02-06 RX ADMIN — BUPROPION HYDROCHLORIDE 150 MILLIGRAM(S): 150 TABLET, EXTENDED RELEASE ORAL at 11:59

## 2018-02-06 RX ADMIN — SODIUM CHLORIDE 500 MILLILITER(S): 9 INJECTION, SOLUTION INTRAVENOUS at 12:02

## 2018-02-06 RX ADMIN — SODIUM CHLORIDE 80 MILLILITER(S): 9 INJECTION INTRAMUSCULAR; INTRAVENOUS; SUBCUTANEOUS at 11:29

## 2018-02-06 RX ADMIN — AMIODARONE HYDROCHLORIDE 400 MILLIGRAM(S): 400 TABLET ORAL at 11:59

## 2018-02-06 RX ADMIN — NYSTATIN CREAM 1 APPLICATION(S): 100000 CREAM TOPICAL at 12:01

## 2018-02-06 RX ADMIN — TAMSULOSIN HYDROCHLORIDE 0.4 MILLIGRAM(S): 0.4 CAPSULE ORAL at 22:22

## 2018-02-06 NOTE — SWALLOW BEDSIDE ASSESSMENT ADULT - SWALLOW EVAL: DIAGNOSIS
Mild oral dysphagia. Pharyngeal stage clinically unremarkable for administered trials with NO overt s/s of penetration/aspiration. It should be noted that pt presents with overall disinterest in PO. Therefore, if PO presented when pt does not desire, this may impact overall swallow profile, impacting overall safety of swallow, thus placing pt at risk of aspiration.

## 2018-02-06 NOTE — SWALLOW BEDSIDE ASSESSMENT ADULT - SLP PERTINENT HISTORY OF CURRENT PROBLEM
Pt well known to this dept & seen last admission. MBS 1/12/18: Mild oral dysphagia for all PO & mild to moderate pharyngeal dysphagia for puree, mech soft, soft solids & nectar thick fluids, & thin fluids via teaspoon marked by reduced lingual strength, & decreased tongue base retraction. Moderate to severe pharyngeal dysphagia for thin fluids via cup, marked by reduced tongue base retraction, reduced laryngeal elevation & airway closure with resultant stasis & aspiration during the swallow with delayed cough. Compensatory postures not attempted due to cognition. Although aspiration not observed during study, pt does present at increased risk due to inability to clear valleculae stasis

## 2018-02-06 NOTE — PROGRESS NOTE ADULT - SUBJECTIVE AND OBJECTIVE BOX
Renal :    147<H>  |  109<H>  |  50.0<H>  ----------------------------<  119<H>  Ca:8.6   (2018 05:59)  3.8     |  22.0   |  2.81<H>      eGFR if Non : 20 <L>    TPro  6.3<L>  /  Alb  2.8<L>  /  TBili  1.1    /  DBili  x      /  AST  17     /  ALT  25     /  AlkPhos  101    2018 05:59                        8.5<L>  8.8   )-----------( 103<L>    ( 2018 05:59 )             28.1<L>    Phos:3.2 mg/dL M.7 mg/dL PTH:-- Uric acid:-- Serum Osm:--  Ferritin:-- Iron:-- TIBC:-- Tsat:--  B12:-- TSH:-- ( @ 05:59)    Urinalysis Basic - ( 2018 12:39 )  Color: Yellow / Appearance: Turbid / S.015 / pH: x  Gluc: x / Ketone: Negative  / Bili: Negative / Urobili: Negative   Blood: x / Protein: 100<!> / Nitrite: Positive<!>   Leuk Esterase: Moderate<!> / RBC: >50 /HPF<!> / WBC 26-50<!>   Sq Epi: x / Non Sq Epi: Few / Bacteria: Moderate<!>      UProt:-- UCr:86 mg/dL P/C Ratio:0.8 Ratio<H> 24 hour Prot:-- UVol:-- CrCl:--  Matthew:-- UOsm:-- UVol:-- UCl:-- UK:-- ( @ 14:56)      [facility] DIVISION OF KIDNEY DISEASES AND HYPERTENSION -- F.U :  --------------------------------------------------------------------------------  HPI:  77M with CAD, CABG, AFib on AC, HTN, Prostate ca s/p radioactive seed implants leading to bowel perforation and s/p colectomy; PEG placement after multiple strokes , expressive aphasia, R sided weakness, CKD III, DVTs, here for blood in ostomy bag. Pt seen and examined; lying in bed in no distress; history obtained from wife who was present at bedside. Pt is able to eat pureed diet at home; supplemented with 3-4 cans of jevity via PEG daily. For past few days; pt has been NPO; decreased oral intake; with passing of blood in ostomy bag; awaiting EGD at current.    PAST HISTORY  --------------------------------------------------------------------------------  PAST MEDICAL & SURGICAL HISTORY:  CAD (coronary artery disease)  Afib  CVA (cerebral vascular accident)  BPH (benign prostatic hyperplasia)  High cholesterol  HTN (hypertension)  H/O heart valve replacement with mechanical valve  S/P CABG x 1  H/O colectomy    FAMILY HISTORY:  No pertinent family history in first degree relatives    PAST SOCIAL HISTORY:    ALLERGIES & MEDICATIONS  --------------------------------------------------------------------------------  Allergies    penicillins (Hives)    Standing Inpatient Medications  amiodarone    Tablet 400 milliGRAM(s) Oral daily  atorvastatin 10 milliGRAM(s) Oral at bedtime  buPROPion XL . 150 milliGRAM(s) Oral daily  dronabinol 2.5 milliGRAM(s) Oral two times a day  finasteride 5 milliGRAM(s) Oral daily  metoprolol     tartrate 25 milliGRAM(s) Oral three times a day  multiple electrolytes Injection Type 1 1000 milliLiter(s) IV Continuous <Continuous>  nystatin Powder 1 Application(s) Topical daily  pantoprazole  Injectable 40 milliGRAM(s) IV Push every 12 hours  tamsulosin 0.4 milliGRAM(s) Oral at bedtime    REVIEW OF SYSTEMS  --------------------------------------------------------------------------------  unable to obtain    VITALS/PHYSICAL EXAM  --------------------------------------------------------------------------------  T(C): 36.3 (18 @ 09:49), Max: 36.9 (18 @ 05:14)  HR: 95 (18 @ 13:01) (73 - 95)  BP: 95/59 (18 @ 13:01) (94/60 - 123/61)  RR: 18 (18 @ 09:49) (18 - 20)  SpO2: 96% (18 @ 11:47) (95% - 96%)  Wt(kg): --    18 @ 07:01  -  18 @ 07:00  --------------------------------------------------------  IN: 1260 mL / OUT: 450 mL / NET: 810 mL    18 @ 07:01  -  18 @ 15:28  --------------------------------------------------------  IN: 1650 mL / OUT: 461 mL / NET: 1189 mL    Physical Exam:              Gen: NAD, chronically ill appearing, Pale,  	HEENT: supple neck  	Pulm: Pooe Excursion of chest,  	CV: RRR, S1S2; no rub  	Back: No spinal or CVA tenderness; no sacral edema  	Abd: + ostomy , PEG +  	LE: Warm, FROM, no clubbing,trace edema  	Neuro: No focal deficits,  	Psych: Lethargic,  	Skin: Warm, without rashes    LABS/STUDIES  --------------------------------------------------------------------------------               9.2    6.6   >-----------<  112      [18 @ 07:54]              29.5     141  |  113  |  72.0  ----------------------------<  118      [18 @ 05:50]  4.1   |  12.0  |  2.79        Ca     9.0     [18 @ 05:50]      PT/INR: PT 28.6 , INR 2.55       [18 @ 07:54]  PTT: 38.5       [18 @ 07:54]      Creatinine Trend:  SCr 2.79 [ 05:50]  SCr 2.72 [ @ 06:51]  SCr 3.01 [ @ 06:38]  SCr 3.06 [ @ 23:33]  SCr 3.28 [ @ 13:15]    Urinalysis - [17 @ 19:11]      Color Yellow / Appearance Slightly Turbid / SG 1.015 / pH 6.0      Gluc Negative / Ketone Negative  / Bili Negative / Urobili Negative       Blood Large / Protein 30 / Leuk Est Moderate / Nitrite Positive      RBC 25-50 / WBC 11-25 / Hyaline  / Gran  / Sq Epi  / Non Sq Epi  / Bacteria Moderate      Iron 47, TIBC 385, %sat 12      [18 @ 13:15]  Ferritin 442.3      [18 @ 13:15]  HbA1c 6.2      [12-09-15 @ 12:54]  TSH 1.06      [17 @ 11:01]    HBsAb Nonreact      [12-25-15 @ 19:58]  HBsAg Nonreact      [12-25-15 @ 19:58]  HBcAb Nonreact      [12-25-15 @ 19:58]  HCV 0.12, Nonreact      [12-25-15 @ 19:58]    XIN: titer 1:160, pattern Speckled      [12-27-15 @ 12:24]      Assessment and Recommendation:     1) KAPIL on CKD III in the setting of GIB and decreased access to po; hypoperfusion low BPs, Now CKD - 4 , New Baseline,  2) Anemia  3) Acidosis  4) HTN    Patient has KAPIL on CKD III in the setting of GIB;  Hydrate with Hypotonic IVF,  EGD - S/P , To begin TF,      D/W the RN,

## 2018-02-06 NOTE — ADVANCED PRACTICE NURSE CONSULT - RECOMMEDATIONS
Instructed pt and wife to contact ostomy nurse if any issues with colostomy during the hospital stay. Will follow up upon request.

## 2018-02-06 NOTE — SWALLOW BEDSIDE ASSESSMENT ADULT - ADDITIONAL RECOMMENDATIONS
Please re-consult PRN  No dual texture consistencies (i.e.: fruit cocktail, etc.) given aspiration risk

## 2018-02-06 NOTE — SWALLOW BEDSIDE ASSESSMENT ADULT - SWALLOW EVAL: RECOMMENDED FEEDING/EATING TECHNIQUES
maintain upright posture during/after eating for 30 mins/oral hygiene/allow for swallow between intakes/small sips/bites/crush medication (when feasible)/no straws/position upright (90 degrees)

## 2018-02-06 NOTE — PROGRESS NOTE ADULT - SUBJECTIVE AND OBJECTIVE BOX
Patient is a 77y old  Male who presents with a chief complaint of bleeding in colostomy bag (30 Jan 2018 16:07)      HPI:  78 yo male PMHx CAD s/p CABG, AFib on Coumadin, HTN, prostate CA s/p TURP, s/p PEG and colectomy, prior CVA with expressive aphasia and R hemiparesis, CKD 3, recent dc 3 weeks ago for resp failure s/p intubation due to aspiration pneumonia, Septic shock 2/2 UTI, KAPIL on CKD, recurrent NSVT, UE DVT presents from home for blood in colostomy bag.    patient unable to voice concerns but follows some directions.  per wife at bedside, patient noted to have intermittent black and bloody output from colostomy bag since discharge.  Poor PO intake.  Endoscopy was deferred during last hospitalization due to co morbidities.  On coumadin and lasix at home. non ambulatory. (30 Jan 2018 16:07)    FAMILY HISTORY:  No pertinent family history in first degree relatives      INTERVAL HPI/OVERNIGHT EVENTS:  Pt. denies c/o at present.  In bed with occasional coughs. Well known to Speech therapist  Nutritional needs assesed by Nutritionist    PAST MEDICAL & SURGICAL HISTORY:  CAD (coronary artery disease)  Afib  CVA (cerebral vascular accident)  BPH (benign prostatic hyperplasia)  High cholesterol  HTN (hypertension)  H/O heart valve replacement with mechanical valve  S/P CABG x 1  H/O colectomy      Allergies    penicillins (Hives)    Intolerances        Vital Signs Last 24 Hrs  T(C): 36.6 (06 Feb 2018 10:34), Max: 37.1 (06 Feb 2018 05:15)  T(F): 97.9 (06 Feb 2018 10:34), Max: 98.7 (06 Feb 2018 05:15)  HR: 100 (06 Feb 2018 10:34) (76 - 100)  BP: 92/64 (06 Feb 2018 13:28) (87/56 - 102/59)  BP(mean): --  RR: 20 (06 Feb 2018 10:34) (19 - 20)  SpO2: 97% (06 Feb 2018 10:34) (97% - 98%)                                8.5    8.8   )-----------( 103      ( 06 Feb 2018 05:59 )             28.1     LIVER FUNCTIONS - ( 06 Feb 2018 05:59 )  Alb: 2.8 g/dL / Pro: 6.3 g/dL / ALK PHOS: 101 U/L / ALT: 25 U/L / AST: 17 U/L / GGT: x           PT/INR - ( 06 Feb 2018 05:59 )   PT: 17.1 sec;   INR: 1.54 ratio         PTT - ( 06 Feb 2018 05:59 )  PTT:31.7 sec      RADIOLOGY & ADDITIONAL STUDIES:    MEDICATIONS:  ALBUTerol    0.083% 2.5 milliGRAM(s) Nebulizer every 6 hours PRN  ALBUTerol    90 MICROgram(s) HFA Inhaler 1 Puff(s) Inhalation every 4 hours  ALBUTerol    90 MICROgram(s) HFA Inhaler 1 Puff(s) Inhalation every 4 hours  amiodarone    Tablet 400 milliGRAM(s) Oral daily  atorvastatin 10 milliGRAM(s) Oral at bedtime  buPROPion XL . 150 milliGRAM(s) Oral daily  cefepime  IVPB      cefepime  IVPB 1000 milliGRAM(s) IV Intermittent every 12 hours  dronabinol 2.5 milliGRAM(s) Oral two times a day  finasteride 5 milliGRAM(s) Oral daily  metoprolol     tartrate 25 milliGRAM(s) Oral three times a day  nystatin Powder 1 Application(s) Topical daily  pantoprazole  Injectable 40 milliGRAM(s) IV Push every 12 hours  sodium chloride 0.225% 1000 milliLiter(s) IV Continuous <Continuous>  tamsulosin 0.4 milliGRAM(s) Oral at bedtime  warfarin 2.5 milliGRAM(s) Oral once Patient is a 77y old  Male who presents with a chief complaint of bleeding in colostomy bag (30 Jan 2018 16:07)      HPI: 78 yo male PMHx CAD s/p CABG, AFib on Coumadin, HTN, prostate CA s/p TURP, s/p PEG and colectomy, prior CVA with expressive aphasia and R hemiparesis, CKD 3, recent dc 3 weeks ago for resp failure s/p intubation due to aspiration pneumonia, Septic shock 2/2 UTI, KAPIL on CKD, recurrent NSVT, UE DVT presents from home for blood in colostomy bag.    patient unable to voice concerns but follows some directions.  per wife at bedside, patient noted to have intermittent black and bloody output from colostomy bag since discharge.  Poor PO intake.  Endoscopy was deferred during last hospitalization due to co morbidities.  On coumadin and lasix at home. non ambulatory. (30 Jan 2018 16:07)    FAMILY HISTORY:  No pertinent family history in first degree relatives      INTERVAL HPI/OVERNIGHT EVENTS:  Pt. denies c/o at present.  In bed with occasional coughs. Well known to Speech therapist  Nutritional needs assesed by Nutritionist    PAST MEDICAL & SURGICAL HISTORY:  CAD (coronary artery disease)  Afib  CVA (cerebral vascular accident)  BPH (benign prostatic hyperplasia)  High cholesterol  HTN (hypertension)  H/O heart valve replacement with mechanical valve  S/P CABG x 1  H/O colectomy      Allergies    penicillins (Hives)    Intolerances        Vital Signs Last 24 Hrs  T(C): 36.6 (06 Feb 2018 10:34), Max: 37.1 (06 Feb 2018 05:15)  T(F): 97.9 (06 Feb 2018 10:34), Max: 98.7 (06 Feb 2018 05:15)  HR: 100 (06 Feb 2018 10:34) (76 - 100)  BP: 92/64 (06 Feb 2018 13:28) (87/56 - 102/59)  BP(mean): --  RR: 20 (06 Feb 2018 10:34) (19 - 20)  SpO2: 97% (06 Feb 2018 10:34) (97% - 98%)                                8.5    8.8   )-----------( 103      ( 06 Feb 2018 05:59 )             28.1     LIVER FUNCTIONS - ( 06 Feb 2018 05:59 )  Alb: 2.8 g/dL / Pro: 6.3 g/dL / ALK PHOS: 101 U/L / ALT: 25 U/L / AST: 17 U/L / GGT: x           PT/INR - ( 06 Feb 2018 05:59 )   PT: 17.1 sec;   INR: 1.54 ratio         PTT - ( 06 Feb 2018 05:59 )  PTT:31.7 sec      RADIOLOGY & ADDITIONAL STUDIES:    MEDICATIONS:  ALBUTerol    0.083% 2.5 milliGRAM(s) Nebulizer every 6 hours PRN  ALBUTerol    90 MICROgram(s) HFA Inhaler 1 Puff(s) Inhalation every 4 hours  ALBUTerol    90 MICROgram(s) HFA Inhaler 1 Puff(s) Inhalation every 4 hours  amiodarone    Tablet 400 milliGRAM(s) Oral daily  atorvastatin 10 milliGRAM(s) Oral at bedtime  buPROPion XL . 150 milliGRAM(s) Oral daily  cefepime  IVPB      cefepime  IVPB 1000 milliGRAM(s) IV Intermittent every 12 hours  dronabinol 2.5 milliGRAM(s) Oral two times a day  finasteride 5 milliGRAM(s) Oral daily  metoprolol     tartrate 25 milliGRAM(s) Oral three times a day  nystatin Powder 1 Application(s) Topical daily  pantoprazole  Injectable 40 milliGRAM(s) IV Push every 12 hours  sodium chloride 0.225% 1000 milliLiter(s) IV Continuous <Continuous>  tamsulosin 0.4 milliGRAM(s) Oral at bedtime  warfarin 2.5 milliGRAM(s) Oral once

## 2018-02-06 NOTE — SWALLOW BEDSIDE ASSESSMENT ADULT - SLP GENERAL OBSERVATIONS
Pt received & seen seated upright in bed, +awake/alert, +O2 nasal canula, +encouragement to accept PO, +baseline dry cough, +dysarthria ( consistent with CVA history), +reduced cognition

## 2018-02-06 NOTE — SWALLOW BEDSIDE ASSESSMENT ADULT - COMMENTS
MD note: 77 y.o. 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    Pt with history of poor PO intake

## 2018-02-06 NOTE — SWALLOW BEDSIDE ASSESSMENT ADULT - ASR SWALLOW ASPIRATION MONITOR
cough/fever/gurgly voice/pneumonia/throat clearing/upper respiratory infection/oral hygiene/change of breathing pattern/position upright (88Y)

## 2018-02-06 NOTE — PROGRESS NOTE ADULT - SUBJECTIVE AND OBJECTIVE BOX
pt pod 1 sp EGD with G tube reinsertion under mac with sedation. Tolerated well. Denies any A/c.   pt with no n/v @ this time. using pain meds as ordered/needed with some pain relief. vss. spont vent. no issues with anesthesia at this time. pt resting comfortably @ this time.

## 2018-02-06 NOTE — SWALLOW BEDSIDE ASSESSMENT ADULT - SWALLOW EVAL: RECOMMENDED DIET
mechanical soft diet & nectar thick fluids mechanical soft diet & nectar thick fluids with supplemental PEG feedings as needed

## 2018-02-06 NOTE — PROGRESS NOTE ADULT - ASSESSMENT
77 y.o. 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    Problem/Plan - 1:  ·  Problem: Gastrointestinal hemorrhage with melena.     s/p 2 units PRBCs with improved HH - now trending down slowly H/H 8.9/29.1   EGD today   cont PPI  s/p Vit K po x1 on 2/3/18    PT/INR 17/1.53  GI f/u appreciated  no active hemorrhage  Monitor H/H  Monitor trending down PT/INR    Problem/Plan - 2:  ·  Problem: Anemia due to blood loss.  Plan: monitor HH. 8.9/29.1    Problem/Plan - 3:  ·  Problem: Chronic atrial fibrillation.  Plan: with recent drop in EF and RV dysfunction due to suspected PE + UE DVT and Hx of CVA with right HP/dyphagia s/p PEG - pt is high [id number] score as well as high bleeding risk. May not tolerate 6 weeks of VKA for WATCHMAN and it will not solve the problem of DVT/PE as well - doubt it will be beneficial.     Problem/Plan - 4:  ·  Problem: Hyponatremia.  Plan: with hypercloremic metabolic acidosid with GI fluid losses -   IV 1/2NS with sodium bicarbonate as discussed with renal - improving, creatinine at baseline BUN/cr 56/2.68    Problem/Plan- 5:  KAPIL on CKD III in the setting of GIB  low BPs  IVF D5W w/NaHCO3  urine studies specimen for protein  avoid nephrotoxic meds

## 2018-02-06 NOTE — PROGRESS NOTE ADULT - SUBJECTIVE AND OBJECTIVE BOX
[facility]                                   [address] [place] [address]                                                      PHONE: [phone]                                                         FAX: [phone]  [email or web address]  ---------------------------------------------------------------------------------------------------------------------------------    Overnight events/patient complaints: No events reported.  Pt lying flat, sleeping, arousable, no complaints      penicillins (Hives)    MEDICATIONS  (STANDING):  ALBUTerol    0.083% 2.5 milliGRAM(s) Nebulizer every 6 hours  ALBUTerol    90 MICROgram(s) HFA Inhaler 1 Puff(s) Inhalation every 4 hours  amiodarone    Tablet 400 milliGRAM(s) Oral daily  atorvastatin 10 milliGRAM(s) Oral at bedtime  buPROPion XL . 150 milliGRAM(s) Oral daily  cefepime  IVPB      cefepime  IVPB 1000 milliGRAM(s) IV Intermittent every 12 hours  dextrose 5% 1000 milliLiter(s) (75 mL/Hr) IV Continuous <Continuous>  dronabinol 2.5 milliGRAM(s) Oral two times a day  finasteride 5 milliGRAM(s) Oral daily  metoprolol     tartrate 25 milliGRAM(s) Oral three times a day  nystatin Powder 1 Application(s) Topical daily  pantoprazole  Injectable 40 milliGRAM(s) IV Push every 12 hours  tamsulosin 0.4 milliGRAM(s) Oral at bedtime    MEDICATIONS  (PRN):      Vital Signs Last 24 Hrs  T(C): 37.1 (06 Feb 2018 05:15), Max: 37.1 (06 Feb 2018 05:15)  T(F): 98.7 (06 Feb 2018 05:15), Max: 98.7 (06 Feb 2018 05:15)  HR: 88 (06 Feb 2018 05:19) (76 - 88)  BP: 98/56 (06 Feb 2018 05:19) (98/56 - 102/59)  BP(mean): --  RR: 19 (06 Feb 2018 05:15) (19 - 20)  SpO2: 98% (06 Feb 2018 05:15) (98% - 98%)  ICU Vital Signs Last 24 Hrs  CRISTIN ROQUE  I&O's Detail    05 Feb 2018 07:01  -  06 Feb 2018 07:00  --------------------------------------------------------  IN:    dextrose 5%: 900 mL  Total IN: 900 mL    OUT:    Ileostomy: 825 mL  Total OUT: 825 mL    Total NET: 75 mL        I&O's Summary    05 Feb 2018 07:01  -  06 Feb 2018 07:00  --------------------------------------------------------  IN: 900 mL / OUT: 825 mL / NET: 75 mL      Drug Dosing Weight  CRISTIN ROQUE      PHYSICAL EXAM:  General: Appears well developed, well nourished alert and cooperative.  HEENT: Head; normocephalic, atraumatic.  Eyes: Pupils reactive, cornea wnl.  Neck: Supple, no nodes adenopathy, no NVD or carotid bruit or thyromegaly.  CARDIOVASCULAR: Normal S1 and S2, No murmur, rub, gallop or lift.   LUNGS: No rales, rhonchi or wheeze. Normal breath sounds bilaterally.  ABDOMEN: Soft, +ostomy  EXTREMITIES: No clubbing orcyanosis.  Trace edema  SKIN: warm and dry with normal turgor.  NEURO: Alert/oriented x 3/normal motor exam. No pathologic reflexes.    PSYCH: normal affect.        LABS:                        8.5    8.8   )-----------( 103      ( 06 Feb 2018 05:59 )             28.1     02-06    147<H>  |  109<H>  |  50.0<H>  ----------------------------<  119<H>  3.8   |  22.0  |  2.81<H>    Ca    8.6      06 Feb 2018 05:59  Phos  3.2     02-06  Mg     1.7     02-06    TPro  6.3<L>  /  Alb  2.8<L>  /  TBili  1.1  /  DBili  x   /  AST  17  /  ALT  25  /  AlkPhos  101  02-06    CRISTIN ROQUE      PT/INR - ( 06 Feb 2018 05:59 )   PT: 17.1 sec;   INR: 1.54 ratio         PTT - ( 06 Feb 2018 05:59 )  PTT:31.7 sec      RADIOLOGY & ADDITIONAL STUDIES:    INTERPRETATION OF TELEMETRY (personally reviewed):AF        ASSESSMENT AND PLAN:  GIB- resolved- no bleeding on EGD. PPI.  AF- persistent/chronic.  Rate ok.  ?Restart Coumadin for CVA prophylaxis (high risk, prior CVA) as well as hx LUE DVT and probable PE recently  CKD/KAPIL- on D5/HCO3  CAD- stable  CHF- continue B-blocker. Hold lasix as urine studies consistent with volume depletion.  No ACE-I due to reduced CrCl  poor po intake- ?PEG feeds

## 2018-02-07 PROCEDURE — 99233 SBSQ HOSP IP/OBS HIGH 50: CPT

## 2018-02-07 PROCEDURE — 99497 ADVNCD CARE PLAN 30 MIN: CPT | Mod: GC

## 2018-02-07 PROCEDURE — 99233 SBSQ HOSP IP/OBS HIGH 50: CPT | Mod: GC

## 2018-02-07 RX ADMIN — Medication 25 MILLIGRAM(S): at 06:31

## 2018-02-07 RX ADMIN — NYSTATIN CREAM 1 APPLICATION(S): 100000 CREAM TOPICAL at 11:53

## 2018-02-07 RX ADMIN — CEFEPIME 100 MILLIGRAM(S): 1 INJECTION, POWDER, FOR SOLUTION INTRAMUSCULAR; INTRAVENOUS at 06:31

## 2018-02-07 RX ADMIN — PANTOPRAZOLE SODIUM 40 MILLIGRAM(S): 20 TABLET, DELAYED RELEASE ORAL at 06:31

## 2018-02-07 RX ADMIN — Medication 25 MILLIGRAM(S): at 20:41

## 2018-02-07 RX ADMIN — FINASTERIDE 5 MILLIGRAM(S): 5 TABLET, FILM COATED ORAL at 11:53

## 2018-02-07 RX ADMIN — TAMSULOSIN HYDROCHLORIDE 0.4 MILLIGRAM(S): 0.4 CAPSULE ORAL at 20:41

## 2018-02-07 RX ADMIN — BUPROPION HYDROCHLORIDE 150 MILLIGRAM(S): 150 TABLET, EXTENDED RELEASE ORAL at 11:53

## 2018-02-07 RX ADMIN — SODIUM CHLORIDE 80 MILLILITER(S): 9 INJECTION INTRAMUSCULAR; INTRAVENOUS; SUBCUTANEOUS at 13:24

## 2018-02-07 RX ADMIN — PANTOPRAZOLE SODIUM 40 MILLIGRAM(S): 20 TABLET, DELAYED RELEASE ORAL at 17:24

## 2018-02-07 RX ADMIN — CEFEPIME 100 MILLIGRAM(S): 1 INJECTION, POWDER, FOR SOLUTION INTRAMUSCULAR; INTRAVENOUS at 17:24

## 2018-02-07 RX ADMIN — ATORVASTATIN CALCIUM 10 MILLIGRAM(S): 80 TABLET, FILM COATED ORAL at 20:41

## 2018-02-07 RX ADMIN — AMIODARONE HYDROCHLORIDE 400 MILLIGRAM(S): 400 TABLET ORAL at 06:31

## 2018-02-07 NOTE — PROGRESS NOTE ADULT - RESPIRATORY
detailed exam
detailed exam
Breath Sounds equal & clear to percussion & auscultation, no accessory muscle use
Breath Sounds equal & clear to percussion & auscultation, no accessory muscle use

## 2018-02-07 NOTE — PROGRESS NOTE ADULT - ASSESSMENT
77 y.o. 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    Problem/Plan - 1:  ·  Problem: Gastrointestinal hemorrhage with melena.     s/p 2 units PRBCs with improved HH - now trending down slowly H/H 8.9/29.1   EGD today   cont PPI  s/p Vit K po x1 on 2/3/18    PT/INR 17/1.53  GI f/u appreciated  no active hemorrhage  Monitor H/H  Monitor trending down PT/INR    Problem/Plan - 2:  ·  Problem: Anemia due to blood loss.  Plan: monitor HH. 8.9/29.1    Problem/Plan - 3:  ·  Problem: Chronic atrial fibrillation.  Plan: with recent drop in EF and RV dysfunction due to suspected PE + UE DVT and Hx of CVA with right HP/dyphagia s/p PEG - pt is high [id number] score as well as high bleeding risk. May not tolerate 6 weeks of VKA for WATCHMAN and it will not solve the problem of DVT/PE as well - doubt it will be beneficial.     Problem/Plan - 4:  ·  Problem: Hyponatremia.  Plan: with hypercloremic metabolic acidosid with GI fluid losses -   IV 1/2NS with sodium bicarbonate as discussed with renal - improving, creatinine at baseline BUN/cr 56/2.68    Problem/Plan- 5:  KAPIL on CKD III in the setting of GIB  low BPs  IVF D5W w/NaHCO3  urine studies specimen for protein  avoid nephrotoxic meds 77 y.o. 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    Problem/Plan - 1:  ·  Problem: Gastrointestinal hemorrhage with melena.     no active hemorrhage   H/H 8.5/28.1  Restarted on Coumadin for CVA prophylaxis high risk and hx LUE DVT and probable PE  s/p 2 units PRBCs with improved HH - now trending down slowly H/H 8.9/29.1   EGD didn't reveal any sites of bleeding   cont PPI  s/p Vit K po x1 on 2/3/18    PT/INR 17/1.53  GI f/u appreciated    Problem/Plan - 2:  ·  Problem: Anemia due to blood loss.  Plan: monitor HH. 8.5/28.1      Problem/Plan - 3:  ·  Problem: Chronic atrial fibrillation.    Restarted on Coumadin for CVA prophylaxis high risk and hx LUE DVT and probable PE  Monitor PT/INR   with recent drop in EF and RV dysfunction due to suspected PE + UE DVT and Hx of CVA with right HP/dyphagia   s/p PEG - no residue continue to the pt's goal   pt is high [id number] score as well as high bleeding risk.   May not tolerate 6 weeks of VKA for WATCHMAN and it will not solve the problem of DVT/PE as well - doubt it will be beneficial.       Problem/Plan - 4:  ·  Problem: Hyponatremia.  Plan: with hypercloremic metabolic acidosid with GI fluid losses -   IV 1/2NS with sodium bicarbonate as discussed with renal - improving, creatinine at baseline BUN/cr 56/2.68  B-blocker. Hold lasix  No ACE-I due to reduced CrCl    Problem/Plan- 5:  KAPIL on CKD III in the setting of GIB  Hold lasix  No ACE-I due to reduced CrCl  poor PO intake on PEG feeds now  low BPs on BB monitor, follow parameters  IVF D5W w/NaHCO3  avoid nephrotoxic meds    Problem/Plan - 6:  Problem: CVA (cerebral vascular accident). Plan: -Supportive care  - coumadin restarted  Monitor PT/INR  Discussed with pt. and pt's wife at length r.e plan of care , guarded-poor prognosis, high aspiration precautions  -PEG care  -Feeds as tolerated, nectar thick w/ soft diet as per speech and swallow on 1/12/18, supplement PEG feeds as needed  -Continue statin    Problem/Plan - 7:  ·  Problem: CAD (coronary artery disease).  Plan: coumadin restarted, ASA on hold  -Continue statin  -Continue BB  Monitor BPs -- received bolus yesterday     Problem/Plan - 8:  Problem: Prophylactic measure.  Plan: -DVT-P: VCD boots  -GI prophylaxis: PPI  -Nystatin powder for diaper dermatitis 77 y.o. 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    Problem/Plan- 1:  UCx positive --UTI vs asymptomatic bacteriuria - cont IV abx for 2 more days  Asp PNA ?  lactate  CBC  IVF    Problem/Plan -2:  ·  Problem: Gastrointestinal hemorrhage with melena.     no active hemorrhage   H/H 8.5/28.1  Restarted on Coumadin for CVA prophylaxis high risk and hx LUE DVT and probable PE  s/p 2 units PRBCs with improved HH - now trending down slowly H/H 8.9/29.1   EGD didn't reveal any sites of bleeding   cont PPI  s/p Vit K po x1 on 2/3/18    PT/INR 17/1.53  GI f/u appreciated    Problem/Plan - 3:  ·  Problem: Anemia due to blood loss.  Plan: monitor HH. 8.5/28.1      Problem/Plan - 4:  ·  Problem: Chronic atrial fibrillation.    Restarted on Coumadin for CVA prophylaxis high risk and hx LUE DVT and probable PE  Monitor PT/INR   with recent drop in EF and RV dysfunction due to suspected PE + UE DVT and Hx of CVA with right HP/dyphagia   s/p PEG - no residue continue to the pt's goal   pt is high [id number] score as well as high bleeding risk.   May not tolerate 6 weeks of VKA for WATCHMAN and it will not solve the problem of DVT/PE as well - doubt it will be beneficial.       Problem/Plan - 5:  ·  Problem: Hyponatremia.  Plan: with hypercloremic metabolic acidosid with GI fluid losses -   IV 1/2NS with sodium bicarbonate as discussed with renal - improving, creatinine at baseline BUN/cr 56/2.68  B-blocker. Hold lasix  No ACE-I due to reduced CrCl    Problem/Plan- 6:  KAPIL on CKD III in the setting of GIB  Hold lasix  No ACE-I due to reduced CrCl  poor PO intake on PEG feeds now  low BPs on BB monitor, follow parameters  IVF D5W w/NaHCO3  avoid nephrotoxic meds    Problem/Plan - 7:  Problem: CVA (cerebral vascular accident). Plan: -Supportive care  - coumadin restarted  Monitor PT/INR  Discussed with pt. and pt's wife at length r.e plan of care , guarded-poor prognosis, and risk of deterioration with continuing the food  high aspiration precautions  Pt. had swallow eval, but unfortunately keeps coughing with introduction of any allowed food   Pt. and wife decide on pleasure feeds  Pt. is tearful and says he wants to live  ? Asp PNA  NO DNR/DNI at present  Palliative Consult ordered  -PEG care  -Feeds as tolerated, nectar thick w/ soft diet as per speech and swallow on 1/12/18, supplement PEG feeds as needed  -Continue statin    Problem/Plan -8:  ·  Problem: CAD (coronary artery disease).  Plan: coumadin restarted, ASA on hold  -Continue statin  -Continue BB  Monitor BPs -- received bolus yesterday     Problem/Plan - 9:  Problem: Prophylactic measure.  Plan: -DVT-P: VCD boots  -GI prophylaxis: PPI  -Nystatin powder for diaper dermatitis

## 2018-02-07 NOTE — PROGRESS NOTE ADULT - SUBJECTIVE AND OBJECTIVE BOX
Patient is a 77y old  Male who presents with a chief complaint of bleeding in colostomy bag (30 Jan 2018 16:07)      HPI:  76 yo male PMHx CAD s/p CABG, AFib on Coumadin, HTN, prostate CA s/p TURP, s/p PEG and colectomy, prior CVA with expressive aphasia and R hemiparesis, CKD 3, recent dc 3 weeks ago for resp failure s/p intubation due to aspiration pneumonia, Septic shock 2/2 UTI, KAPIL on CKD, recurrent NSVT, UE DVT presents from home for blood in colostomy bag.    patient unable to voice concerns but follows some directions.  per wife at bedside, patient noted to have intermittent black and bloody output from colostomy bag since discharge.  Poor PO intake.  Endoscopy was deferred during last hospitalization due to co morbidities.  On coumadin and lasix at home. non ambulatory. (30 Jan 2018 16:07)    FAMILY HISTORY:  No pertinent family history in first degree relatives      INTERVAL HPI/OVERNIGHT EVENTS:  Pt. states he feels good when asked about any c/o  Observed pt. during PO feeds pt. coughs when given any foods, liquid w/nectar thicket, mechanical soft diet, pudding        PAST MEDICAL & SURGICAL HISTORY:  CAD (coronary artery disease)  Afib  CVA (cerebral vascular accident)  BPH (benign prostatic hyperplasia)  High cholesterol  HTN (hypertension)  H/O heart valve replacement with mechanical valve  S/P CABG x 1  H/O colectomy      Allergies    penicillins (Hives)    Intolerances        Vital Signs Last 24 Hrs  T(C): 37.1 (07 Feb 2018 16:37), Max: 38 (06 Feb 2018 22:28)  T(F): 98.8 (07 Feb 2018 16:37), Max: 100.4 (06 Feb 2018 22:28)  HR: 82 (07 Feb 2018 16:37) (80 - 104)  BP: 113/65 (07 Feb 2018 16:37) (98/62 - 113/65)  BP(mean): --  RR: 18 (07 Feb 2018 16:37) (18 - 20)  SpO2: 98% (07 Feb 2018 16:37) (95% - 98%)                                8.5    8.8   )-----------( 103      ( 06 Feb 2018 05:59 )             28.1     LIVER FUNCTIONS - ( 06 Feb 2018 05:59 )  Alb: 2.8 g/dL / Pro: 6.3 g/dL / ALK PHOS: 101 U/L / ALT: 25 U/L / AST: 17 U/L / GGT: x           PT/INR - ( 06 Feb 2018 05:59 )   PT: 17.1 sec;   INR: 1.54 ratio         PTT - ( 06 Feb 2018 05:59 )  PTT:31.7 sec      RADIOLOGY & ADDITIONAL STUDIES:    MEDICATIONS:  ALBUTerol    0.083% 2.5 milliGRAM(s) Nebulizer every 6 hours PRN  ALBUTerol    90 MICROgram(s) HFA Inhaler 1 Puff(s) Inhalation every 4 hours  ALBUTerol    90 MICROgram(s) HFA Inhaler 1 Puff(s) Inhalation every 4 hours  amiodarone    Tablet 400 milliGRAM(s) Oral daily  atorvastatin 10 milliGRAM(s) Oral at bedtime  buPROPion XL . 150 milliGRAM(s) Oral daily  cefepime  IVPB      cefepime  IVPB 1000 milliGRAM(s) IV Intermittent every 12 hours  finasteride 5 milliGRAM(s) Oral daily  metoprolol     tartrate 25 milliGRAM(s) Oral three times a day  nystatin Powder 1 Application(s) Topical daily  pantoprazole  Injectable 40 milliGRAM(s) IV Push every 12 hours  sodium chloride 0.225% 1000 milliLiter(s) IV Continuous <Continuous>  tamsulosin 0.4 milliGRAM(s) Oral at bedtime Patient is a 77y old  Male who presents with a chief complaint of bleeding in colostomy bag (30 Jan 2018 16:07)      HPI:  76 yo male PMHx CAD s/p CABG, AFib on Coumadin, HTN, prostate CA s/p TURP, s/p PEG and colectomy, prior CVA with expressive aphasia and R hemiparesis, CKD 3, recent dc 3 weeks ago for resp failure s/p intubation due to aspiration pneumonia, Septic shock 2/2 UTI, KAPIL on CKD, recurrent NSVT, UE DVT presents from home for blood in colostomy bag.    patient unable to voice concerns but follows some directions.  per wife at bedside, patient noted to have intermittent black and bloody output from colostomy bag since discharge.  Poor PO intake.  Endoscopy was deferred during last hospitalization due to co morbidities.  On coumadin and lasix at home. non ambulatory. (30 Jan 2018 16:07)    FAMILY HISTORY:  No pertinent family history in first degree relatives      INTERVAL HPI/OVERNIGHT EVENTS:  Pt. states he feels good when asked about any c/o  Observed pt. during PO feeds pt. coughs when given any foods, liquid w/nectar thicket, mechanical soft diet, pudding  Discussed with pt. and pt's wife at length r.e plan of care , guarded-poor prognosis, and risk of deterioration with continuing the food,  high aspiration precautions        PAST MEDICAL & SURGICAL HISTORY:  CAD (coronary artery disease)  Afib  CVA (cerebral vascular accident)  BPH (benign prostatic hyperplasia)  High cholesterol  HTN (hypertension)  H/O heart valve replacement with mechanical valve  S/P CABG x 1  H/O colectomy      Allergies    penicillins (Hives)    Intolerances        Vital Signs Last 24 Hrs  T(C): 37.1 (07 Feb 2018 16:37), Max: 38 (06 Feb 2018 22:28)  T(F): 98.8 (07 Feb 2018 16:37), Max: 100.4 (06 Feb 2018 22:28)  HR: 82 (07 Feb 2018 16:37) (80 - 104)  BP: 113/65 (07 Feb 2018 16:37) (98/62 - 113/65)  BP(mean): --  RR: 18 (07 Feb 2018 16:37) (18 - 20)  SpO2: 98% (07 Feb 2018 16:37) (95% - 98%)                                8.5    8.8   )-----------( 103      ( 06 Feb 2018 05:59 )             28.1     LIVER FUNCTIONS - ( 06 Feb 2018 05:59 )  Alb: 2.8 g/dL / Pro: 6.3 g/dL / ALK PHOS: 101 U/L / ALT: 25 U/L / AST: 17 U/L / GGT: x           PT/INR - ( 06 Feb 2018 05:59 )   PT: 17.1 sec;   INR: 1.54 ratio         PTT - ( 06 Feb 2018 05:59 )  PTT:31.7 sec      RADIOLOGY & ADDITIONAL STUDIES:    MEDICATIONS:  ALBUTerol    0.083% 2.5 milliGRAM(s) Nebulizer every 6 hours PRN  ALBUTerol    90 MICROgram(s) HFA Inhaler 1 Puff(s) Inhalation every 4 hours  ALBUTerol    90 MICROgram(s) HFA Inhaler 1 Puff(s) Inhalation every 4 hours  amiodarone    Tablet 400 milliGRAM(s) Oral daily  atorvastatin 10 milliGRAM(s) Oral at bedtime  buPROPion XL . 150 milliGRAM(s) Oral daily  cefepime  IVPB      cefepime  IVPB 1000 milliGRAM(s) IV Intermittent every 12 hours  finasteride 5 milliGRAM(s) Oral daily  metoprolol     tartrate 25 milliGRAM(s) Oral three times a day  nystatin Powder 1 Application(s) Topical daily  pantoprazole  Injectable 40 milliGRAM(s) IV Push every 12 hours  sodium chloride 0.225% 1000 milliLiter(s) IV Continuous <Continuous>  tamsulosin 0.4 milliGRAM(s) Oral at bedtime

## 2018-02-07 NOTE — PROGRESS NOTE ADULT - SUBJECTIVE AND OBJECTIVE BOX
[facility] DIVISION OF KIDNEY DISEASES AND HYPERTENSION -- FOLLOW UP NOTE  --------------------------------------------------------------------------------  Chief Complaint: CKD     24 hour events/subjective:  Pt seen and examined this AM  Lying in bed in NAD      PAST HISTORY  --------------------------------------------------------------------------------  No significant changes to PMH, PSH, FHx, SHx, unless otherwise noted    ALLERGIES & MEDICATIONS  --------------------------------------------------------------------------------  Allergies    penicillins (Hives)    Intolerances      Standing Inpatient Medications  ALBUTerol    90 MICROgram(s) HFA Inhaler 1 Puff(s) Inhalation every 4 hours  ALBUTerol    90 MICROgram(s) HFA Inhaler 1 Puff(s) Inhalation every 4 hours  amiodarone    Tablet 400 milliGRAM(s) Oral daily  atorvastatin 10 milliGRAM(s) Oral at bedtime  buPROPion XL . 150 milliGRAM(s) Oral daily  cefepime  IVPB      cefepime  IVPB 1000 milliGRAM(s) IV Intermittent every 12 hours  finasteride 5 milliGRAM(s) Oral daily  metoprolol     tartrate 25 milliGRAM(s) Oral three times a day  nystatin Powder 1 Application(s) Topical daily  pantoprazole  Injectable 40 milliGRAM(s) IV Push every 12 hours  sodium chloride 0.225% 1000 milliLiter(s) IV Continuous <Continuous>  tamsulosin 0.4 milliGRAM(s) Oral at bedtime    PRN Inpatient Medications  ALBUTerol    0.083% 2.5 milliGRAM(s) Nebulizer every 6 hours PRN      REVIEW OF SYSTEMS  --------------------------------------------------------------------------------  Unable to obtain    VITALS/PHYSICAL EXAM  --------------------------------------------------------------------------------  T(C): 36.7 (02-07-18 @ 11:51), Max: 38 (02-06-18 @ 22:28)  HR: 82 (02-07-18 @ 11:51) (82 - 104)  BP: 110/68 (02-07-18 @ 11:51) (101/52 - 110/68)  RR: 18 (02-07-18 @ 11:51) (18 - 20)  SpO2: 98% (02-07-18 @ 11:51) (95% - 98%)  Wt(kg): --        02-06-18 @ 07:01  -  02-07-18 @ 07:00  --------------------------------------------------------  IN: 1000 mL / OUT: 1875 mL / NET: -875 mL    02-07-18 @ 07:01  -  02-07-18 @ 15:02  --------------------------------------------------------  IN: 600 mL / OUT: 475 mL / NET: 125 mL      Physical Exam:  	Gen: NAD, chronically ill appearing, Pale,  	HEENT: supple neck  	Pulm: Pooe Excursion of chest,  	CV: RRR, S1S2; no rub  	Back: No spinal or CVA tenderness; no sacral edema  	Abd: + ostomy , PEG +  	LE: Warm, FROM, no clubbing,trace edema  	Neuro: No focal deficits,  	Psych: Lethargic,  	Skin: Warm, without rashes    LABS/STUDIES  --------------------------------------------------------------------------------              8.5    8.8   >-----------<  103      [02-06-18 @ 05:59]              28.1     147  |  109  |  50.0  ----------------------------<  119      [02-06-18 @ 05:59]  3.8   |  22.0  |  2.81        Ca     8.6     [02-06-18 @ 05:59]      Mg     1.7     [02-06-18 @ 05:59]      Phos  3.2     [02-06-18 @ 05:59]    TPro  6.3  /  Alb  2.8  /  TBili  1.1  /  DBili  x   /  AST  17  /  ALT  25  /  AlkPhos  101  [02-06-18 @ 05:59]    PT/INR: PT 17.1 , INR 1.54       [02-06-18 @ 05:59]  PTT: 31.7       [02-06-18 @ 05:59]      Creatinine Trend:  SCr 2.81 [02-06 @ 05:59]  SCr 2.80 [02-05 @ 06:11]  SCr 2.69 [02-04 @ 06:20]  SCr 2.86 [02-03 @ 07:07]  SCr 2.79 [02-02 @ 05:50]    Urinalysis - [02-03-18 @ 12:39]      Color Yellow / Appearance Turbid / SG 1.015 / pH 5.0      Gluc Negative / Ketone Negative  / Bili Negative / Urobili Negative       Blood Large / Protein 100 / Leuk Est Moderate / Nitrite Positive      RBC >50 / WBC 26-50 / Hyaline  / Gran  / Sq Epi  / Non Sq Epi Few / Bacteria Moderate    Urine Creatinine 86      [02-03-18 @ 14:56]  Urine Protein 66.0      [02-03-18 @ 14:56]  Urine Sodium <30      [02-03-18 @ 12:37]  Urine Chloride <27      [02-03-18 @ 12:37]  Urine Osmolality 447      [02-03-18 @ 12:39]    Iron 47, TIBC 385, %sat 12      [01-30-18 @ 13:15]  Ferritin 442.3      [01-30-18 @ 13:15]  HbA1c 6.2      [12-09-15 @ 12:54]  TSH 1.06      [09-01-17 @ 11:01]

## 2018-02-07 NOTE — PROGRESS NOTE ADULT - SUBJECTIVE AND OBJECTIVE BOX
[facility]                                   [address] [place] [address]                                                      PHONE: [phone]                                                         FAX: [phone]  [email or web address]  ---------------------------------------------------------------------------------------------------------------------------------    Overnight events/patient complaints: No events reported.  Pt lying flat, sleeping, arousable, no complaints      penicillins (Hives)    MEDICATIONS  (STANDING):  ALBUTerol    0.083% 2.5 milliGRAM(s) Nebulizer every 6 hours  ALBUTerol    90 MICROgram(s) HFA Inhaler 1 Puff(s) Inhalation every 4 hours  amiodarone    Tablet 400 milliGRAM(s) Oral daily  atorvastatin 10 milliGRAM(s) Oral at bedtime  buPROPion XL . 150 milliGRAM(s) Oral daily  cefepime  IVPB      cefepime  IVPB 1000 milliGRAM(s) IV Intermittent every 12 hours  dextrose 5% 1000 milliLiter(s) (75 mL/Hr) IV Continuous <Continuous>  dronabinol 2.5 milliGRAM(s) Oral two times a day  finasteride 5 milliGRAM(s) Oral daily  metoprolol     tartrate 25 milliGRAM(s) Oral three times a day  nystatin Powder 1 Application(s) Topical daily  pantoprazole  Injectable 40 milliGRAM(s) IV Push every 12 hours  tamsulosin 0.4 milliGRAM(s) Oral at bedtime    MEDICATIONS  (PRN):      Vital Signs Last 24 Hrs  T(C): 37.1 (06 Feb 2018 05:15), Max: 37.1 (06 Feb 2018 05:15)  T(F): 98.7 (06 Feb 2018 05:15), Max: 98.7 (06 Feb 2018 05:15)  HR: 88 (06 Feb 2018 05:19) (76 - 88)  BP: 98/56 (06 Feb 2018 05:19) (98/56 - 102/59)  BP(mean): --  RR: 19 (06 Feb 2018 05:15) (19 - 20)  SpO2: 98% (06 Feb 2018 05:15) (98% - 98%)  ICU Vital Signs Last 24 Hrs  CRISTIN ROQUE  I&O's Detail    05 Feb 2018 07:01  -  06 Feb 2018 07:00  --------------------------------------------------------  IN:    dextrose 5%: 900 mL  Total IN: 900 mL    OUT:    Ileostomy: 825 mL  Total OUT: 825 mL    Total NET: 75 mL        I&O's Summary    05 Feb 2018 07:01  -  06 Feb 2018 07:00  --------------------------------------------------------  IN: 900 mL / OUT: 825 mL / NET: 75 mL      Drug Dosing Weight  CRISTIN ROQUE      PHYSICAL EXAM:  General: Appears well developed, well nourished alert and cooperative.  HEENT: Head; normocephalic, atraumatic.  Eyes: Pupils reactive, cornea wnl.  Neck: Supple, no nodes adenopathy, no NVD or carotid bruit or thyromegaly.  CARDIOVASCULAR: Normal S1 and S2, No murmur, rub, gallop or lift.   LUNGS: No rales, rhonchi or wheeze. Normal breath sounds bilaterally.  ABDOMEN: Soft, +ostomy  EXTREMITIES: No clubbing orcyanosis.  Trace edema  SKIN: warm and dry with normal turgor.  NEURO: Alert/oriented x 3/normal motor exam. No pathologic reflexes.    PSYCH: normal affect.        LABS:                        8.5    8.8   )-----------( 103      ( 06 Feb 2018 05:59 )             28.1     02-06    147<H>  |  109<H>  |  50.0<H>  ----------------------------<  119<H>  3.8   |  22.0  |  2.81<H>    Ca    8.6      06 Feb 2018 05:59  Phos  3.2     02-06  Mg     1.7     02-06    TPro  6.3<L>  /  Alb  2.8<L>  /  TBili  1.1  /  DBili  x   /  AST  17  /  ALT  25  /  AlkPhos  101  02-06    CRISTIN ROQUE      PT/INR - ( 06 Feb 2018 05:59 )   PT: 17.1 sec;   INR: 1.54 ratio         PTT - ( 06 Feb 2018 05:59 )  PTT:31.7 sec      RADIOLOGY & ADDITIONAL STUDIES:    INTERPRETATION OF TELEMETRY (personally reviewed):AF        ASSESSMENT AND PLAN:  GIB- resolved- no bleeding on EGD. PPI.  AF- persistent/chronic.  Rate ok.  Restarted on Coumadin for CVA prophylaxis (high risk, prior CVA) as well as hx LUE DVT and probable PE recently  CKD/KAPIL- on D5/HCO3  CAD- stable  CHF- continue B-blocker. Hold lasix as urine studies consistent with volume depletion.  No ACE-I due to reduced CrCl  poor po intake- ?PEG feeds  No further cardiac intervention

## 2018-02-07 NOTE — PROGRESS NOTE ADULT - ASSESSMENT
1) KAPIL on CKD III in the setting of GIB and decreased access to po; hypoperfusion low BPs  2) Anemia  3) Acidosis  4) HTN  5) Hypernatremia    Patient had KAPIL on CKD III in the setting of GIB;  prerenal azotemia; with element of hypoperfusion given low BPs;   Urine studies noted; Matthew < 30 and UCl < 30; consistent with prerenal azotemia;  On sodium chloride 0.225% for hyponatremia

## 2018-02-08 DIAGNOSIS — Z51.5 ENCOUNTER FOR PALLIATIVE CARE: ICD-10-CM

## 2018-02-08 DIAGNOSIS — I48.91 UNSPECIFIED ATRIAL FIBRILLATION: ICD-10-CM

## 2018-02-08 LAB
ALBUMIN SERPL ELPH-MCNC: 2.6 G/DL — LOW (ref 3.3–5.2)
ALP SERPL-CCNC: 126 U/L — HIGH (ref 40–120)
ALT FLD-CCNC: 30 U/L — SIGNIFICANT CHANGE UP
ANION GAP SERPL CALC-SCNC: 13 MMOL/L — SIGNIFICANT CHANGE UP (ref 5–17)
AST SERPL-CCNC: 27 U/L — SIGNIFICANT CHANGE UP
BILIRUB SERPL-MCNC: 1 MG/DL — SIGNIFICANT CHANGE UP (ref 0.4–2)
BUN SERPL-MCNC: 51 MG/DL — HIGH (ref 8–20)
CALCIUM SERPL-MCNC: 8.5 MG/DL — LOW (ref 8.6–10.2)
CHLORIDE SERPL-SCNC: 107 MMOL/L — SIGNIFICANT CHANGE UP (ref 98–107)
CO2 SERPL-SCNC: 18 MMOL/L — LOW (ref 22–29)
CREAT SERPL-MCNC: 2.86 MG/DL — HIGH (ref 0.5–1.3)
GLUCOSE SERPL-MCNC: 147 MG/DL — HIGH (ref 70–115)
HCT VFR BLD CALC: 28.4 % — LOW (ref 42–52)
HGB BLD-MCNC: 8.6 G/DL — LOW (ref 14–18)
INR BLD: 1.54 RATIO — HIGH (ref 0.88–1.16)
LACTATE BLDV-MCNC: 0.9 MMOL/L — SIGNIFICANT CHANGE UP (ref 0.5–2)
MAGNESIUM SERPL-MCNC: 1.7 MG/DL — SIGNIFICANT CHANGE UP (ref 1.6–2.6)
MCHC RBC-ENTMCNC: 30 PG — SIGNIFICANT CHANGE UP (ref 27–31)
MCHC RBC-ENTMCNC: 30.3 G/DL — LOW (ref 32–36)
MCV RBC AUTO: 99 FL — HIGH (ref 80–94)
PHOSPHATE SERPL-MCNC: 2.5 MG/DL — SIGNIFICANT CHANGE UP (ref 2.4–4.7)
PLATELET # BLD AUTO: 107 K/UL — LOW (ref 150–400)
POTASSIUM SERPL-MCNC: 4.9 MMOL/L — SIGNIFICANT CHANGE UP (ref 3.5–5.3)
POTASSIUM SERPL-SCNC: 4.9 MMOL/L — SIGNIFICANT CHANGE UP (ref 3.5–5.3)
PROT SERPL-MCNC: 6.4 G/DL — LOW (ref 6.6–8.7)
PROTHROM AB SERPL-ACNC: 17.1 SEC — HIGH (ref 9.8–12.7)
RBC # BLD: 2.87 M/UL — LOW (ref 4.6–6.2)
RBC # FLD: 19.9 % — HIGH (ref 11–15.6)
SODIUM SERPL-SCNC: 138 MMOL/L — SIGNIFICANT CHANGE UP (ref 135–145)
WBC # BLD: 7 K/UL — SIGNIFICANT CHANGE UP (ref 4.8–10.8)
WBC # FLD AUTO: 7 K/UL — SIGNIFICANT CHANGE UP (ref 4.8–10.8)

## 2018-02-08 PROCEDURE — 99222 1ST HOSP IP/OBS MODERATE 55: CPT

## 2018-02-08 PROCEDURE — 99233 SBSQ HOSP IP/OBS HIGH 50: CPT | Mod: GC

## 2018-02-08 PROCEDURE — 99233 SBSQ HOSP IP/OBS HIGH 50: CPT

## 2018-02-08 RX ORDER — SACCHAROMYCES BOULARDII 250 MG
250 POWDER IN PACKET (EA) ORAL
Qty: 0 | Refills: 0 | Status: DISCONTINUED | OUTPATIENT
Start: 2018-02-08 | End: 2018-02-09

## 2018-02-08 RX ORDER — WARFARIN SODIUM 2.5 MG/1
2.5 TABLET ORAL DAILY
Qty: 0 | Refills: 0 | Status: DISCONTINUED | OUTPATIENT
Start: 2018-02-08 | End: 2018-02-09

## 2018-02-08 RX ORDER — SODIUM CHLORIDE 9 MG/ML
1000 INJECTION, SOLUTION INTRAVENOUS
Qty: 0 | Refills: 0 | Status: DISCONTINUED | OUTPATIENT
Start: 2018-02-08 | End: 2018-02-08

## 2018-02-08 RX ADMIN — ATORVASTATIN CALCIUM 10 MILLIGRAM(S): 80 TABLET, FILM COATED ORAL at 21:28

## 2018-02-08 RX ADMIN — PANTOPRAZOLE SODIUM 40 MILLIGRAM(S): 20 TABLET, DELAYED RELEASE ORAL at 06:24

## 2018-02-08 RX ADMIN — FINASTERIDE 5 MILLIGRAM(S): 5 TABLET, FILM COATED ORAL at 12:04

## 2018-02-08 RX ADMIN — AMIODARONE HYDROCHLORIDE 400 MILLIGRAM(S): 400 TABLET ORAL at 06:24

## 2018-02-08 RX ADMIN — Medication 250 MILLIGRAM(S): at 17:36

## 2018-02-08 RX ADMIN — NYSTATIN CREAM 1 APPLICATION(S): 100000 CREAM TOPICAL at 12:04

## 2018-02-08 RX ADMIN — ALBUTEROL 2.5 MILLIGRAM(S): 90 AEROSOL, METERED ORAL at 06:44

## 2018-02-08 RX ADMIN — WARFARIN SODIUM 2.5 MILLIGRAM(S): 2.5 TABLET ORAL at 21:28

## 2018-02-08 RX ADMIN — TAMSULOSIN HYDROCHLORIDE 0.4 MILLIGRAM(S): 0.4 CAPSULE ORAL at 21:28

## 2018-02-08 RX ADMIN — SODIUM CHLORIDE 80 MILLILITER(S): 9 INJECTION INTRAMUSCULAR; INTRAVENOUS; SUBCUTANEOUS at 06:25

## 2018-02-08 RX ADMIN — Medication 25 MILLIGRAM(S): at 06:24

## 2018-02-08 RX ADMIN — BUPROPION HYDROCHLORIDE 150 MILLIGRAM(S): 150 TABLET, EXTENDED RELEASE ORAL at 12:04

## 2018-02-08 RX ADMIN — CEFEPIME 100 MILLIGRAM(S): 1 INJECTION, POWDER, FOR SOLUTION INTRAMUSCULAR; INTRAVENOUS at 06:24

## 2018-02-08 NOTE — CONSULT NOTE ADULT - ASSESSMENT
77yr man, hx of CVA with R hemiparesis, + PEG, multiple medical issues - CAD, Afib on Coumadin, prostate ca,  UE DVT, recent hospitalization for aspiration PNA requiring intubation admitted with blood from colostomy bag. EGD without significant findings. Patient with 77yr man, hx of CVA with R hemiparesis, + PEG, multiple medical issues - CAD, Afib on Coumadin, prostate ca,  UE DVT, recent hospitalization for aspiration PNA requiring intubation admitted with blood from colostomy bag. EGD without significant findings. Patient with PEG takes PO, with issues of dysphagia high risk for aspiration

## 2018-02-08 NOTE — GOALS OF CARE CONVERSATION - PERSONAL ADVANCE DIRECTIVE - TREATMENT GUIDELINE COMMENT
DNR in the event of a cardiopulmonary arrest.  Trial of intubation and NIV if having respiratory compromise.    Total time 25min

## 2018-02-08 NOTE — CONSULT NOTE ADULT - PROBLEM SELECTOR RECOMMENDATION 5
Met with patient and wife. Patient would become teary and cries when discussed his condition and goals of care. Wife states having difficulty bringing  for PMD visits. Discussed Advanced Illness program  and House calls Services, for which they were open to.   Completed a MOLST with patient. See Goals of Care note  In summary: Patient wishes DNR in the event of a CPA                    If having respiratory compromise, would WANT  Trial of Intubation and  Bipap but NO Tracheostomy.  Psychosocial support.

## 2018-02-08 NOTE — CONSULT NOTE ADULT - CONSULT REASON
AF
AFIB GI BLEEDING
CVA with PEG - hx of aspirations
Evaluate candidacy for Watchman
KAPIL
Blood in ileostomy

## 2018-02-08 NOTE — CONSULT NOTE ADULT - PROBLEM SELECTOR RECOMMENDATION 3
Patient with PEG with modified diet - still with high risk for aspiration. Patient has been discussed pleasure feeds understanding risk of aspiration  and respiratory compromise.   Continue Antibiotics

## 2018-02-08 NOTE — PROGRESS NOTE ADULT - ASSESSMENT
77 y.o. 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    Problem/Plan- 1:  UCx positive --UTI vs asymptomatic bacteriuria - cont IV abx for 1 more days  Asp PNA ?  Lactate neg  CBC without elevated WBC  afebrile  -Stop IVF    Problem/Plan -2:  ·  Problem: Gastrointestinal hemorrhage with melena.     no active hemorrhage   H/H stable   Restarted on Coumadin for CVA prophylaxis high risk and hx LUE DVT and probable PE   EGD didn't reveal any sites of bleeding   cont PPI  GI f/u appreciated    Problem/Plan - 3:  ·  Problem: Anemia due to blood loss.  Plan: monitor HH      Problem/Plan - 4:  ·  Problem: Chronic atrial fibrillation.    Restarted on Coumadin for CVA prophylaxis high risk and hx LUE DVT and probable PE  Monitor PT/INR   with recent drop in EF and RV dysfunction due to suspected PE + UE DVT and Hx of CVA with right HP/dyphagia   s/p PEG - no residue continue to the pt's goal   pt is high [id number] score as well as high bleeding risk.   May not tolerate 6 weeks of VKA for WATCHMAN and it will not solve the problem of DVT/PE as well - doubt it will be beneficial.       Problem/Plan - 5:  ·  Problem: Hyponatremia.  Plan: Resolved with IVF  -Stop IVF at this time    Problem/Plan- 6:  KAPIL on CKD III in the setting of GIB  Hold lasix  No ACE-I due to reduced CrCl  poor PO intake on PEG feeds now  low BPs on BB monitor, follow parameters  avoid nephrotoxic meds  Stop IVF, free water TID through PEG    Problem/Plan - 7:  Problem: CVA (cerebral vascular accident). Plan: -Supportive care  - coumadin restarted  Monitor PT/INR  Discussed with pt. and pt's wife at length r.e plan of care , guarded-poor prognosis, and risk of deterioration with continuing the food  high aspiration precautions  Pt. had swallow eval, but unfortunately keeps coughing with introduction of any allowed food   Pt. and wife decide on pleasure feeds  Pt. is tearful and says he wants to live  ? Asp PNA  -PEG care  -Feeds as tolerated, nectar thick w/ soft diet as per speech and swallow on 1/12/18, supplement PEG feeds as needed  -Continue statin    Problem/Plan -8:  ·  Problem: CAD (coronary artery disease).  Plan: coumadin restarted, ASA on hold  -Continue statin  -Continue BB  Monitor BPs     Problem/Plan - 9:  Problem: Prophylactic measure.  Plan: -DVT-P: VCD boots  -GI prophylaxis: PPI  -Nystatin powder for diaper dermatitis  -patient now DNR, discussed with patient and wife at bedside along with palliative care   -Dispo home tomorrow

## 2018-02-08 NOTE — GOALS OF CARE CONVERSATION - PERSONAL ADVANCE DIRECTIVE - CONVERSATION DETAILS
Discussed MOLST  form.  Patient wishes DNR in the case of a CPA  Patient would  want a TRIAL OF INTUBATION and NIV in the event of respiratory distress, but NO TRACHEOSTOMY.  If he cannot be successfully weaned from the ventilator, he wishes to be terminally weaned.

## 2018-02-08 NOTE — PROGRESS NOTE ADULT - NSHPATTENDINGPLANDISCUSS_GEN_ALL_CORE
RN and pt, Dr. Guzman and renal
PA, wife
RN and pt
RN and pt
PA, RN
pt, wife, PA, cardiology
wife
GI
pt, renal, PA

## 2018-02-08 NOTE — CONSULT NOTE ADULT - CONSULT REQUESTED DATE/TIME
01-Feb-2018 13:59
01-Feb-2018 18:46
02-Feb-2018 15:28
08-Feb-2018 16:56
30-Jan-2018 16:30
30-Jan-2018 16:35

## 2018-02-08 NOTE — PROGRESS NOTE ADULT - SUBJECTIVE AND OBJECTIVE BOX
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    patient unable to voice concerns but follows some directions.  per wife at bedside, patient noted to have intermittent black and bloody output from colostomy bag since discharge.  Poor PO intake.  Endoscopy was deferred during last hospitalization due to co morbidities.  On coumadin and lasix at home. non ambulatory. (30 Jan 2018 16:07)    HPI 2/8/18:    patient seen and examined at the bedside. he appears in NAD. Lying in bed. he offers no complaints. Wife at bedside going through MOLST form. He denies CP, fevers, chills, nausea, vomiting, abdominal pain.     I&O's Summary    07 Feb 2018 07:01  -  08 Feb 2018 07:00  --------------------------------------------------------  IN: 2250 mL / OUT: 1725 mL / NET: 525 mL    08 Feb 2018 07:01  -  08 Feb 2018 17:11  --------------------------------------------------------  IN: 240 mL / OUT: 400 mL / NET: -160 mL      Allergies    penicillins (Hives)    Intolerances      HEALTH ISSUES - PROBLEM Dx:  Hyperchloremic metabolic acidosis: Hyperchloremic metabolic acidosis  Oropharyngeal dysphagia: Oropharyngeal dysphagia  Prophylactic measure: Prophylactic measure  HTN (hypertension): HTN (hypertension)  CAD (coronary artery disease): CAD (coronary artery disease)  Anemia due to blood loss: Anemia due to blood loss  Gastrointestinal hemorrhage with melena: Gastrointestinal hemorrhage with melena  CVA (cerebral vascular accident): CVA (cerebral vascular accident)  Dysphagia: Dysphagia  Acute kidney injury superimposed on chronic kidney disease: Acute kidney injury superimposed on chronic kidney disease  Hyponatremia: Hyponatremia  Chronic atrial fibrillation: Chronic atrial fibrillation  Gastrointestinal hemorrhage, unspecified gastrointestinal hemorrhage type: Gastrointestinal hemorrhage, unspecified gastrointestinal hemorrhage type        PAST MEDICAL & SURGICAL HISTORY:  CAD (coronary artery disease)  Afib  CVA (cerebral vascular accident)  BPH (benign prostatic hyperplasia)  High cholesterol  HTN (hypertension)  H/O heart valve replacement with mechanical valve  S/P CABG x 1  H/O colectomy          Vital Signs Last 24 Hrs  T(C): 36.4 (08 Feb 2018 14:58), Max: 37.1 (08 Feb 2018 08:47)  T(F): 97.6 (08 Feb 2018 14:58), Max: 98.7 (08 Feb 2018 08:47)  HR: 78 (08 Feb 2018 14:58) (77 - 92)  BP: 94/54 (08 Feb 2018 14:58) (94/54 - 109/67)  RR: 18 (08 Feb 2018 14:58) (18 - 20)  SpO2: 99% (08 Feb 2018 14:58) (97% - 99%)    PHYSICAL EXAM:    GENERAL: NAD  HEAD:  Atraumatic, Normocephalic  EYES: EOMI, PERRLA, conjunctiva and sclera clear  ENMT:  Moist mucous membranes,  No lesions  CHEST/LUNG: Rales heard anteriorly bilaterally   HEART: Regular rate and rhythm; No murmurs,   ABDOMEN: Soft, Nontender, Nondistended; Bowel sounds present. Ostomy with liquid brown stool   EXTREMITIES:  Peripheral Pulses, No  cyanosis, or edema      ALBUTerol    0.083% 2.5 milliGRAM(s) Nebulizer every 6 hours PRN  ALBUTerol    90 MICROgram(s) HFA Inhaler 1 Puff(s) Inhalation every 4 hours  ALBUTerol    90 MICROgram(s) HFA Inhaler 1 Puff(s) Inhalation every 4 hours  amiodarone    Tablet 400 milliGRAM(s) Oral daily  atorvastatin 10 milliGRAM(s) Oral at bedtime  buPROPion XL . 150 milliGRAM(s) Oral daily  cefepime  IVPB      cefepime  IVPB 1000 milliGRAM(s) IV Intermittent every 12 hours  finasteride 5 milliGRAM(s) Oral daily  metoprolol     tartrate 25 milliGRAM(s) Oral three times a day  nystatin Powder 1 Application(s) Topical daily  pantoprazole  Injectable 40 milliGRAM(s) IV Push every 12 hours  saccharomyces boulardii 250 milliGRAM(s) Oral two times a day  tamsulosin 0.4 milliGRAM(s) Oral at bedtime  warfarin 2.5 milliGRAM(s) Oral daily      LABS:                          8.6    7.0   )-----------( 107      ( 08 Feb 2018 06:15 )             28.4     02-08    138  |  107  |  51.0<H>  ----------------------------<  147<H>  4.9   |  18.0<L>  |  2.86<H>    Ca    8.5<L>      08 Feb 2018 06:15  Phos  2.5     02-08  Mg     1.7     02-08    TPro  6.4<L>  /  Alb  2.6<L>  /  TBili  1.0  /  DBili  x   /  AST  27  /  ALT  30  /  AlkPhos  126<H>  02-08    LIVER FUNCTIONS - ( 08 Feb 2018 06:15 )  Alb: 2.6 g/dL / Pro: 6.4 g/dL / ALK PHOS: 126 U/L / ALT: 30 U/L / AST: 27 U/L / GGT: x           PT/INR - ( 08 Feb 2018 06:15 )   PT: 17.1 sec;   INR: 1.54 ratio        RADIOLOGY & ADDITIONAL TESTS:    EXAM:  XR CHEST PORTABLE URGENT 1V                          PROCEDURE DATE:  02/05/2018          INTERPRETATION:  Portable chest radiograph        CLINICAL INFORMATION:   Cough. Assess for aspiration pneumonia.    TECHNIQUE:  Portable  AP view of the chest was obtained.    COMPARISON: 1/30/2018 available for review.    FINDINGS:   There is cardiomegaly, vascular congestion and perihilar interstitial   infiltrates and elevated right hemidiaphragm. Posterior basilar M a lung   segment airspace consolidations and/or atelectasis cannot be excluded..   Constellation of findings suggestive of CHF.  A status post coronary artery bypass graft procedure.  Trachea midline. No hilar mass.   Visualized osseous structures are intact.        IMPRESSION:  Constellation of findings suggestive of CHF..     Superimposed infectious infiltrate should be considered.              ELISHA KIRAN M.D., ATTENDING RADIOLOGIST  This document has been electronically signed. Feb 5 2018  3:56PM      Consultant notes reviewed    Case discussed with consultant/provider/ family /patient

## 2018-02-08 NOTE — PROGRESS NOTE ADULT - SUBJECTIVE AND OBJECTIVE BOX
Renal :    138    |  107    |  51.0<H>  ----------------------------<  147<H>  Ca:8.5<L> (2018 06:15)  4.9     |  18.0<L>  |  2.86<H>      eGFR if Non : 20 <L>  eGFR if : 23 <L>    TPro  6.4<L>  /  Alb  2.6<L>  /  TBili  1.0    /  DBili  x      /  AST  27     /  ALT  30     /  AlkPhos  126<H>  2018 06:15                         8.6<L>  7.0   )-----------( 107<L>    ( 2018 06:15 )             28.4<L>    Phos:2.5 mg/dL M.7 mg/dL PTH:-- Uric acid:-- Serum Osm:--  Ferritin:-- Iron:-- TIBC:-- Tsat:--  B12:-- TSH:-- ( @ 06:15)    Urinalysis Basic - ( 2018 12:39 )  Color: Yellow / Appearance: Turbid / S.015 / pH: x  Gluc: x / Ketone: Negative  / Bili: Negative / Urobili: Negative   Blood: x / Protein: 100<!> / Nitrite: Positive<!>   Leuk Esterase: Moderate<!> / RBC: >50 /HPF<!> / WBC 26-50<!>   Sq Epi: x / Non Sq Epi: Few / Bacteria: Moderate<!>      UProt:-- UCr:86 mg/dL P/C Ratio:0.8 Ratio<H> 24 hour Prot:-- UVol:-- CrCl:--  Matthew:-- UOsm:-- UVol:-- UCl:-- UK:-- ( @ 14:56)      Patient is a 77y old  Male who presents with a chief complaint of bleeding in colostomy bag (2018 16:07)    HPI:  78 yo male PMHx CAD s/p CABG, AFib on Coumadin, HTN, prostate CA s/p TURP, s/p PEG and colectomy, prior CVA with expressive aphasia and R hemiparesis, CKD 3, recent dc 3 weeks ago for resp failure s/p intubation due to aspiration pneumonia, Septic shock 2/2 UTI, KAPIL on CKD, recurrent NSVT, UE DVT presents from home for blood in colostomy bag.    patient unable to voice concerns but follows some directions.  per wife at bedside, patient noted to have intermittent black and bloody output from colostomy bag since discharge.  Poor PO intake.  Endoscopy was deferred during last hospitalization due to co morbidities.  On coumadin and lasix at home. non ambulatory. (2018 16:07)    FAMILY HISTORY:  No pertinent family history in first degree relatives    INTERVAL HPI /OVERNIGHT EVENTS:    Alert, On TF,  high aspiration precautions    PAST MEDICAL & SURGICAL HISTORY:  CAD (coronary artery disease)  Afib  CVA (cerebral vascular accident)  BPH (benign prostatic hyperplasia)  High cholesterol  HTN (hypertension)  H/O heart valve replacement with mechanical valve  S/P CABG x 1  H/O colectomy    Allergies  penicillins (Hives)    Vital Signs Last 24 Hrs,    ICU Vital Signs Last 24 Hrs  T(C): 37.1 (2018 08:47), Max: 37.1 (2018 16:37)  T(F): 98.7 (2018 08:47), Max: 98.8 (2018 16:37)  HR: 84 (2018 08:47) (77 - 92)  BP: 106/56 (2018 08:47) (98/62 - 113/65)  BP(mean): --  ABP: --  ABP(mean): --  RR: 18 (2018 08:47) (18 - 20)  SpO2: 99% (2018 08:47) (97% - 99%)    T(C): 37.1 (2018 16:37), Max: 38 (2018 22:28)  T(F): 98.8 (2018 16:37), Max: 100.4 (2018 22:28)  HR: 82 (2018 16:37) (80 - 104)  BP: 113/65 (2018 16:37) (98/62 - 113/65)  BP(mean): --  RR: 18 (2018 16:37) (18 - 20)  SpO2: 98% (2018 16:37) (95% - 98%)                        8.5    8.8   )-----------( 103      ( 2018 05:59 )             28.1     LIVER FUNCTIONS - ( 2018 05:59 )  Alb: 2.8 g/dL / Pro: 6.3 g/dL / ALK PHOS: 101 U/L / ALT: 25 U/L / AST: 17 U/L / GGT: x           PT/INR - ( 2018 05:59 )   PT: 17.1 sec;   INR: 1.54 ratio         PTT - ( 2018 05:59 )  PTT:31.7 sec      RADIOLOGY & ADDITIONAL STUDIES:    MEDICATIONS:  ALBUTerol    0.083% 2.5 milliGRAM(s) Nebulizer every 6 hours PRN  ALBUTerol    90 MICROgram(s) HFA Inhaler 1 Puff(s) Inhalation every 4 hours  ALBUTerol    90 MICROgram(s) HFA Inhaler 1 Puff(s) Inhalation every 4 hours  amiodarone    Tablet 400 milliGRAM(s) Oral daily  atorvastatin 10 milliGRAM(s) Oral at bedtime  buPROPion XL . 150 milliGRAM(s) Oral daily  cefepime  IVPB      cefepime  IVPB 1000 milliGRAM(s) IV Intermittent every 12 hours  finasteride 5 milliGRAM(s) Oral daily  metoprolol     tartrate 25 milliGRAM(s) Oral three times a day  nystatin Powder 1 Application(s) Topical daily  pantoprazole  Injectable 40 milliGRAM(s) IV Push every 12 hours  sodium chloride 0.225% 1000 milliLiter(s) IV Continuous <Continuous>  tamsulosin 0.4 milliGRAM(s) Oral at bedtime        Review of Systems:   · General	negative	  · Respiratory and Thorax	negative	  · Cardiovascular	negative	  · Gastrointestinal	negative	    Physical Exam:   · Constitutional	detailed exam	  · Constitutional Comments	mild respiratory distress when starts eating	  · Respiratory	detailed exam	  · Respiratory Details	rhonchi; wheezes	  · Respiratory Comments	less than yesterday	  · Cardiovascular	Regular rate & rhythm, normal S1, S2; no murmurs, gallops or rubs; no S3, S4	  · Gastrointestinal	detailed exam	  · Gastrointestinal Comments	colostomy fxn well, PEG feeds in progress, no residual	  · Extremities	No cyanosis, clubbing or edema	    Assessment and Plan:     77 [facility] AFib on Coumadin, HTN, prostate CA s/p TURP, s/p PEG and colectomy, prior CVA with expressive aphasia and R hemiparesis, CKD st III, recent dc 3 weeks ago for resp failure s/p intubation due to aspiration pneumonia, Septic shock 2/2 UTI, KAPIL on CKD, recurrent NSVT, UE DVT/?PE with RC dysfunction and decreased EF presents from home for dark blood in colostomy bag.      Problem/Plan -1:  ·  Problem: Gastrointestinal hemorrhage with melena.     no active hemorrhage   H/H 8.5/28.1  Restarted on Coumadin for CVA prophylaxis high risk and hx LUE DVT and probable PE  s/p 2 units PRBCs with improved HH - now trending down slowly H/H 8.9/29.1   EGD didn't reveal any sites of bleeding   cont PPI  s/p Vit K po x1 on 2/3/18    PT/INR 17/1.53      Problem/Plan - 2:  ·  Problem: Chronic atrial fibrillation.    Restarted on Coumadin for CVA prophylaxis high risk and hx LUE DVT and probable PE  Monitor PT/INR   with recent drop in EF and RV dysfunction due to suspected PE + UE DVT and Hx of CVA with right HP/dysphagia   s/p PEG - no residue continue to the pt's goal   pt is high [id number] score as well as high bleeding risk.     Problem/Plan - 3 :  ·  Problem: Hyponatremia.      Plan: Resolved,  creatinine at baseline BUN/cr 56/2.68 mg.,  No ACE-I due to reduced CrCl    Problem/Plan- 4:  KAPIL on CKD III in the setting of GIB  Hold lasix  No ACE-I due to reduced CrCl  poor PO intake on PEG feeds now  low BPs on BB monitor, follow parameters  IVF D5W w/NaHCO3  avoid nephrotoxic meds

## 2018-02-08 NOTE — CONSULT NOTE ADULT - SUBJECTIVE AND OBJECTIVE BOX
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    PERTINENT PMH REVIEWED:  [x ] YES [ ] NO         Afib    BPH (benign prostatic hyperplasia)    CAD (coronary artery disease)    CVA (cerebral vascular accident)    High cholesterol    HTN (hypertension)    Mitral valve replaced.     Past Surgical History:  H/O colectomy    H/O heart valve replacement with mechanical valve    S/P CABG x 1.    SOCIAL HISTORY:  EtOH [ ] Yes  [x ] No                                    Drugs [ ] Yes [ x] No                                   [ ] smoker [ x] nonsmoker                                    Admitted from: [x ] home [ ] SNF _________ [ ] [facility] ________    Surrogate/HCP/Guardian:  Surrogate - Wife    FAMILY HISTORY:  No pertinent family history in first degree relatives      Baseline ADLs (prior to admission):  Independent [ ] moderately [ ] fully   Dependent   [ x] moderately [ ]fully    MEDICATIONS  (STANDING):  ALBUTerol    90 MICROgram(s) HFA Inhaler 1 Puff(s) Inhalation every 4 hours  ALBUTerol    90 MICROgram(s) HFA Inhaler 1 Puff(s) Inhalation every 4 hours  amiodarone    Tablet 400 milliGRAM(s) Oral daily  atorvastatin 10 milliGRAM(s) Oral at bedtime  buPROPion XL . 150 milliGRAM(s) Oral daily  cefepime  IVPB      cefepime  IVPB 1000 milliGRAM(s) IV Intermittent every 12 hours  finasteride 5 milliGRAM(s) Oral daily  metoprolol     tartrate 25 milliGRAM(s) Oral three times a day  nystatin Powder 1 Application(s) Topical daily  pantoprazole  Injectable 40 milliGRAM(s) IV Push every 12 hours  saccharomyces boulardii 250 milliGRAM(s) Oral two times a day  tamsulosin 0.4 milliGRAM(s) Oral at bedtime  warfarin 2.5 milliGRAM(s) Oral daily    MEDICATIONS  (PRN):  ALBUTerol    0.083% 2.5 milliGRAM(s) Nebulizer every 6 hours PRN Respiratory Distress      Allergies    penicillins (Hives)    Intolerances        REVIEW OF SYSTEMS   see HPI    [ ] Unable to obtain due to poor mentation     General: no fevers, no fatigue, no loss of appetite    Skin: no rashes, skin changes  	  Ophthalmologic: no eye pain or blurred vision  	  ENMT:	no sore throat, no ear pain    Respiratory and Thorax: + cough,  no  SOB 	    Cardiovascular:	no chest pain, no leg swelling    Gastrointestinal:	see HPI    Genitourinary:	no FUD    Musculoskeletal: no muscle pain	    Neurological: no seizures, no dizziness    Hematology/Lymphatics: no petechia or purpura	    Endocrine: no polyuria, no polydipsia	      Karnofsky Performance Score/Palliative Performance Status Version 2:  30    %    Vital Signs Last 24 Hrs  T(C): 36.4 (08 Feb 2018 14:58), Max: 37.1 (08 Feb 2018 08:47)  T(F): 97.6 (08 Feb 2018 14:58), Max: 98.7 (08 Feb 2018 08:47)  HR: 78 (08 Feb 2018 14:58) (77 - 92)  BP: 94/54 (08 Feb 2018 14:58) (94/54 - 109/67)  BP(mean): --  RR: 18 (08 Feb 2018 14:58) (18 - 20)  SpO2: 99% (08 Feb 2018 14:58) (97% - 99%)    PHYSICAL EXAM:    General: Elderly man, frail appearing,  NAD     HEENT: [x ] normal  [ ] dry mouth  [ ] ET tube/trach    Lungs: [ x] comfortable [ ] tachypnea/labored breathing  [ ] excessive secretions    CV: normal    GI:+ PEG  : [ x] normal  [ ] incontinent  [ ] oliguria/anuria  [ ] puckett    MSK: [ ] normal  [ x] weakness  [ ] edema             [ ] ambulatory  [ ] bedbound/wheelchair bound    Skin: [ ] normal  [ ] pressure ulcers- Stage_____  [ x] no rash    LABS:                        8.6    7.0   )-----------( 107      ( 08 Feb 2018 06:15 )             28.4     02-08    138  |  107  |  51.0<H>  ----------------------------<  147<H>  4.9   |  18.0<L>  |  2.86<H>    Ca    8.5<L>      08 Feb 2018 06:15  Phos  2.5     02-08  Mg     1.7     02-08    TPro  6.4<L>  /  Alb  2.6<L>  /  TBili  1.0  /  DBili  x   /  AST  27  /  ALT  30  /  AlkPhos  126<H>  02-08    PT/INR - ( 08 Feb 2018 06:15 )   PT: 17.1 sec;   INR: 1.54 ratio             I&O's Summary    07 Feb 2018 07:01  -  08 Feb 2018 07:00  --------------------------------------------------------  IN: 2250 mL / OUT: 1725 mL / NET: 525 mL    08 Feb 2018 07:01  -  08 Feb 2018 16:57  --------------------------------------------------------  IN: 240 mL / OUT: 400 mL / NET: -160 mL    RADIOLOGY & ADDITIONAL STUDIES:  < from: Xray Chest 1 View- PORTABLE-Urgent (02.05.18 @ 15:42) >     EXAM:  XR CHEST PORTABLE URGENT 1V                          PROCEDURE DATE:  02/05/2018          INTERPRETATION:  Portable chest radiograph        CLINICAL INFORMATION:   Cough. Assess for aspiration pneumonia.    TECHNIQUE:  Portable  AP view of the chest was obtained.    COMPARISON: 1/30/2018 available for review.    FINDINGS:   There is cardiomegaly, vascular congestion and perihilar interstitial   infiltrates and elevated right hemidiaphragm. Posterior basilar M a lung   segment airspace consolidations and/or atelectasis cannot be excluded..   Constellation of findings suggestive of CHF.  A status post coronary artery bypass graft procedure.  Trachea midline. No hilar mass.   Visualized osseous structures are intact.        IMPRESSION:  Constellation of findings suggestive of CHF..     Superimposed infectious infiltrate should be considered.          < end of copied text >    ASSESSMENT and PLAN:    ADVANCE DIRECTIVES:  [ ] YES [ ] NO   DNR [ ] YES [ ] NO  Completed on:                     MOLST  [ ] YES [ ] NO   Completed on:  Living Will  [ ] YES [ ] NO   Completed on:    COUNSELING:  Advanced Care Planning Discussion - Time Spent ______Minutes.   More than 50% time spent in counseling and coordinating care. ______ Minutes.     Thank you for the opportunity to assist with the care of this patient.   [facility] Consult [facility] [phone]. 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    PERTINENT PMH REVIEWED:  [x ] YES [ ] NO         Afib    BPH (benign prostatic hyperplasia)    CAD (coronary artery disease)    CVA (cerebral vascular accident)    High cholesterol    HTN (hypertension)    Mitral valve replaced.     Past Surgical History:  H/O colectomy    H/O heart valve replacement with mechanical valve    S/P CABG x 1.    SOCIAL HISTORY:  EtOH [ ] Yes  [x ] No                                    Drugs [ ] Yes [ x] No                                   [ ] smoker [ x] nonsmoker                                    Admitted from: [x ] home [ ] SNF _________ [ ] [facility] ________    Surrogate/HCP/Guardian:  Surrogate - Wife    FAMILY HISTORY:  No pertinent family history in first degree relatives      Baseline ADLs (prior to admission):  Independent [ ] moderately [ ] fully   Dependent   [ x] moderately [ ]fully    MEDICATIONS  (STANDING):  ALBUTerol    90 MICROgram(s) HFA Inhaler 1 Puff(s) Inhalation every 4 hours  ALBUTerol    90 MICROgram(s) HFA Inhaler 1 Puff(s) Inhalation every 4 hours  amiodarone    Tablet 400 milliGRAM(s) Oral daily  atorvastatin 10 milliGRAM(s) Oral at bedtime  buPROPion XL . 150 milliGRAM(s) Oral daily  cefepime  IVPB      cefepime  IVPB 1000 milliGRAM(s) IV Intermittent every 12 hours  finasteride 5 milliGRAM(s) Oral daily  metoprolol     tartrate 25 milliGRAM(s) Oral three times a day  nystatin Powder 1 Application(s) Topical daily  pantoprazole  Injectable 40 milliGRAM(s) IV Push every 12 hours  saccharomyces boulardii 250 milliGRAM(s) Oral two times a day  tamsulosin 0.4 milliGRAM(s) Oral at bedtime  warfarin 2.5 milliGRAM(s) Oral daily    MEDICATIONS  (PRN):  ALBUTerol    0.083% 2.5 milliGRAM(s) Nebulizer every 6 hours PRN Respiratory Distress      Allergies    penicillins (Hives)    Intolerances        REVIEW OF SYSTEMS   see HPI    [ ] Unable to obtain due to poor mentation     General: no fevers, no fatigue, no loss of appetite    Skin: no rashes, skin changes  	  Ophthalmologic: no eye pain or blurred vision  	  ENMT:	no sore throat, no ear pain    Respiratory and Thorax: + cough,  no  SOB 	    Cardiovascular:	no chest pain, no leg swelling    Gastrointestinal:	see HPI    Genitourinary:	no FUD    Musculoskeletal: no muscle pain	    Neurological: no seizures, no dizziness    Hematology/Lymphatics: no petechia or purpura	    Endocrine: no polyuria, no polydipsia	      Karnofsky Performance Score/Palliative Performance Status Version 2:  30    %    Vital Signs Last 24 Hrs  T(C): 36.4 (08 Feb 2018 14:58), Max: 37.1 (08 Feb 2018 08:47)  T(F): 97.6 (08 Feb 2018 14:58), Max: 98.7 (08 Feb 2018 08:47)  HR: 78 (08 Feb 2018 14:58) (77 - 92)  BP: 94/54 (08 Feb 2018 14:58) (94/54 - 109/67)  BP(mean): --  RR: 18 (08 Feb 2018 14:58) (18 - 20)  SpO2: 99% (08 Feb 2018 14:58) (97% - 99%)    PHYSICAL EXAM:    General: Elderly man, frail appearing,  NAD     HEENT: [x ] normal  [ ] dry mouth  [ ] ET tube/trach    Lungs: [ x] comfortable [ ] tachypnea/labored breathing  [ ] excessive secretions    CV: normal    GI:+ PEG  : [ x] normal  [ ] incontinent  [ ] oliguria/anuria  [ ] pucektt    MSK: [ ] normal  [ x] weakness  [ ] edema             [ ] ambulatory  [ ] bedbound/wheelchair bound    Skin: [ ] normal  [ ] pressure ulcers- Stage_____  [ x] no rash    Psych:  mood sad, cries at times    LABS:                        8.6    7.0   )-----------( 107      ( 08 Feb 2018 06:15 )             28.4     02-08    138  |  107  |  51.0<H>  ----------------------------<  147<H>  4.9   |  18.0<L>  |  2.86<H>    Ca    8.5<L>      08 Feb 2018 06:15  Phos  2.5     02-08  Mg     1.7     02-08    TPro  6.4<L>  /  Alb  2.6<L>  /  TBili  1.0  /  DBili  x   /  AST  27  /  ALT  30  /  AlkPhos  126<H>  02-08    PT/INR - ( 08 Feb 2018 06:15 )   PT: 17.1 sec;   INR: 1.54 ratio             I&O's Summary    07 Feb 2018 07:01  -  08 Feb 2018 07:00  --------------------------------------------------------  IN: 2250 mL / OUT: 1725 mL / NET: 525 mL    08 Feb 2018 07:01  -  08 Feb 2018 16:57  --------------------------------------------------------  IN: 240 mL / OUT: 400 mL / NET: -160 mL    RADIOLOGY & ADDITIONAL STUDIES:  < from: Xray Chest 1 View- PORTABLE-Urgent (02.05.18 @ 15:42) >     EXAM:  XR CHEST PORTABLE URGENT 1V                          PROCEDURE DATE:  02/05/2018          INTERPRETATION:  Portable chest radiograph        CLINICAL INFORMATION:   Cough. Assess for aspiration pneumonia.    TECHNIQUE:  Portable  AP view of the chest was obtained.    COMPARISON: 1/30/2018 available for review.    FINDINGS:   There is cardiomegaly, vascular congestion and perihilar interstitial   infiltrates and elevated right hemidiaphragm. Posterior basilar M a lung   segment airspace consolidations and/or atelectasis cannot be excluded..   Constellation of findings suggestive of CHF.  A status post coronary artery bypass graft procedure.  Trachea midline. No hilar mass.   Visualized osseous structures are intact.        IMPRESSION:  Constellation of findings suggestive of CHF..     Superimposed infectious infiltrate should be considered.          < end of copied text >    ASSESSMENT and PLAN:    ADVANCE DIRECTIVES:  [ ] YES [x ] NO   DNR [ ] YES [x ] NO  Completed on:                     MOLST  [ ] YES [x ] NO   Completed on:  Living Will  [ ] YES [ x] NO   Completed on:      Thank you for the opportunity to assist with the care of this patient.   [facility] Consult Service [phone].

## 2018-02-08 NOTE — PROGRESS NOTE ADULT - ATTENDING COMMENTS
Poor prognosis. monitor HH, EGD
Pt seen and examined with PA. A&P reviewed.
Poor prognosis. monitor HH, EGD
Poor prognosis. Transfusion, monitor HH, EGD
Pt seen and examined with NP. A&P reviewed.   F/u EGD results, monitor HH  ? UTI vs asymptomatic bacteriuria - will obtain procalcitonin - if elevated will treat with cefepime due to pt's allergy and medications interactions profile
Pt seen and examined with PA. A&P reviewed.   UCx pos for PSEAER - ? UTI vs asymptomatic bacteriuria - cont IV abx for 3 days  ? Asp PNA - TF restarted after EGD, pleasure feeds - will change to po abx on DC
Pt seen and examined with PA. A&P reviewed.  Discussion with pt and wife at bedside about GOC - 25 min, remains full code at the moment.  Discussed pt's condition and risks in details and pt demonstrated understanding, but unable to make any decision at the moment
Pt seen and examined with NP. A&P reviewed.  Improved HH with transfusion - no active hemorrhage.  Elevated INR - may use FFP to obtain EGD 4h prior to procedure
Pt seen and examined with PA. A&P reviewed.  Monitor renal fx  F/u INR and HH

## 2018-02-08 NOTE — PROGRESS NOTE ADULT - ASSESSMENT
1) KAPIL on CKD III in the setting of GIB and decreased access to po; hypoperfusion low BPs  2) Anemia  3) Acidosis  4) HTN  5) Hypernatremia    Patient had KAPIL - CKD III in the setting of GIB;  prerenal azotemia; with element of hypoperfusion,    Urine studies noted; Matthew < 30 and UCl < 30; consistent with prerenal  KAPIL,   On IVF,

## 2018-02-09 ENCOUNTER — TRANSCRIPTION ENCOUNTER (OUTPATIENT)
Age: 78
End: 2018-02-09

## 2018-02-09 VITALS
HEART RATE: 98 BPM | RESPIRATION RATE: 20 BRPM | SYSTOLIC BLOOD PRESSURE: 109 MMHG | DIASTOLIC BLOOD PRESSURE: 56 MMHG | TEMPERATURE: 98 F

## 2018-02-09 LAB
ANION GAP SERPL CALC-SCNC: 10 MMOL/L — SIGNIFICANT CHANGE UP (ref 5–17)
BUN SERPL-MCNC: 52 MG/DL — HIGH (ref 8–20)
CALCIUM SERPL-MCNC: 8.7 MG/DL — SIGNIFICANT CHANGE UP (ref 8.6–10.2)
CHLORIDE SERPL-SCNC: 105 MMOL/L — SIGNIFICANT CHANGE UP (ref 98–107)
CO2 SERPL-SCNC: 21 MMOL/L — LOW (ref 22–29)
CREAT SERPL-MCNC: 2.85 MG/DL — HIGH (ref 0.5–1.3)
GLUCOSE SERPL-MCNC: 167 MG/DL — HIGH (ref 70–115)
HCT VFR BLD CALC: 29.4 % — LOW (ref 42–52)
HGB BLD-MCNC: 8.9 G/DL — LOW (ref 14–18)
INR BLD: 1.41 RATIO — HIGH (ref 0.88–1.16)
MCHC RBC-ENTMCNC: 29.9 PG — SIGNIFICANT CHANGE UP (ref 27–31)
MCHC RBC-ENTMCNC: 30.3 G/DL — LOW (ref 32–36)
MCV RBC AUTO: 98.7 FL — HIGH (ref 80–94)
PLATELET # BLD AUTO: 106 K/UL — LOW (ref 150–400)
POTASSIUM SERPL-MCNC: 4.4 MMOL/L — SIGNIFICANT CHANGE UP (ref 3.5–5.3)
POTASSIUM SERPL-SCNC: 4.4 MMOL/L — SIGNIFICANT CHANGE UP (ref 3.5–5.3)
PROTHROM AB SERPL-ACNC: 15.6 SEC — HIGH (ref 9.8–12.7)
RBC # BLD: 2.98 M/UL — LOW (ref 4.6–6.2)
RBC # FLD: 19.4 % — HIGH (ref 11–15.6)
SODIUM SERPL-SCNC: 136 MMOL/L — SIGNIFICANT CHANGE UP (ref 135–145)
WBC # BLD: 5.9 K/UL — SIGNIFICANT CHANGE UP (ref 4.8–10.8)
WBC # FLD AUTO: 5.9 K/UL — SIGNIFICANT CHANGE UP (ref 4.8–10.8)

## 2018-02-09 PROCEDURE — 84145 PROCALCITONIN (PCT): CPT

## 2018-02-09 PROCEDURE — 84466 ASSAY OF TRANSFERRIN: CPT

## 2018-02-09 PROCEDURE — 85730 THROMBOPLASTIN TIME PARTIAL: CPT

## 2018-02-09 PROCEDURE — 80053 COMPREHEN METABOLIC PANEL: CPT

## 2018-02-09 PROCEDURE — 94640 AIRWAY INHALATION TREATMENT: CPT

## 2018-02-09 PROCEDURE — 86850 RBC ANTIBODY SCREEN: CPT

## 2018-02-09 PROCEDURE — 86900 BLOOD TYPING SEROLOGIC ABO: CPT

## 2018-02-09 PROCEDURE — 86901 BLOOD TYPING SEROLOGIC RH(D): CPT

## 2018-02-09 PROCEDURE — 86922 COMPATIBILITY TEST ANTIGLOB: CPT

## 2018-02-09 PROCEDURE — 83735 ASSAY OF MAGNESIUM: CPT

## 2018-02-09 PROCEDURE — 84100 ASSAY OF PHOSPHORUS: CPT

## 2018-02-09 PROCEDURE — 93005 ELECTROCARDIOGRAM TRACING: CPT

## 2018-02-09 PROCEDURE — 82436 ASSAY OF URINE CHLORIDE: CPT

## 2018-02-09 PROCEDURE — 84300 ASSAY OF URINE SODIUM: CPT

## 2018-02-09 PROCEDURE — 36415 COLL VENOUS BLD VENIPUNCTURE: CPT

## 2018-02-09 PROCEDURE — 87186 SC STD MICRODIL/AGAR DIL: CPT

## 2018-02-09 PROCEDURE — 80048 BASIC METABOLIC PNL TOTAL CA: CPT

## 2018-02-09 PROCEDURE — 71045 X-RAY EXAM CHEST 1 VIEW: CPT

## 2018-02-09 PROCEDURE — 83935 ASSAY OF URINE OSMOLALITY: CPT

## 2018-02-09 PROCEDURE — P9016: CPT

## 2018-02-09 PROCEDURE — 85610 PROTHROMBIN TIME: CPT

## 2018-02-09 PROCEDURE — 85027 COMPLETE CBC AUTOMATED: CPT

## 2018-02-09 PROCEDURE — 36430 TRANSFUSION BLD/BLD COMPNT: CPT

## 2018-02-09 PROCEDURE — 86902 BLOOD TYPE ANTIGEN DONOR EA: CPT

## 2018-02-09 PROCEDURE — C1889: CPT

## 2018-02-09 PROCEDURE — 83605 ASSAY OF LACTIC ACID: CPT

## 2018-02-09 PROCEDURE — 82570 ASSAY OF URINE CREATININE: CPT

## 2018-02-09 PROCEDURE — 82728 ASSAY OF FERRITIN: CPT

## 2018-02-09 PROCEDURE — 87086 URINE CULTURE/COLONY COUNT: CPT

## 2018-02-09 PROCEDURE — 99285 EMERGENCY DEPT VISIT HI MDM: CPT | Mod: 25

## 2018-02-09 PROCEDURE — 84156 ASSAY OF PROTEIN URINE: CPT

## 2018-02-09 PROCEDURE — 83550 IRON BINDING TEST: CPT

## 2018-02-09 PROCEDURE — 99239 HOSP IP/OBS DSCHRG MGMT >30: CPT

## 2018-02-09 PROCEDURE — 81001 URINALYSIS AUTO W/SCOPE: CPT

## 2018-02-09 PROCEDURE — 99233 SBSQ HOSP IP/OBS HIGH 50: CPT

## 2018-02-09 RX ORDER — CEFUROXIME AXETIL 250 MG
500 TABLET ORAL ONCE
Qty: 0 | Refills: 0 | Status: COMPLETED | OUTPATIENT
Start: 2018-02-09 | End: 2018-02-09

## 2018-02-09 RX ORDER — FUROSEMIDE 40 MG
40 TABLET ORAL DAILY
Qty: 0 | Refills: 0 | Status: DISCONTINUED | OUTPATIENT
Start: 2018-02-09 | End: 2018-02-09

## 2018-02-09 RX ORDER — WARFARIN SODIUM 2.5 MG/1
1 TABLET ORAL
Qty: 0 | Refills: 0 | COMMUNITY
Start: 2018-02-09

## 2018-02-09 RX ORDER — DRONABINOL 2.5 MG
1 CAPSULE ORAL
Qty: 60 | Refills: 0 | OUTPATIENT
Start: 2018-02-09 | End: 2018-03-10

## 2018-02-09 RX ORDER — ALBUTEROL 90 UG/1
2 AEROSOL, METERED ORAL
Qty: 1 | Refills: 0 | OUTPATIENT
Start: 2018-02-09

## 2018-02-09 RX ORDER — PANTOPRAZOLE SODIUM 20 MG/1
40 TABLET, DELAYED RELEASE ORAL
Qty: 1200 | Refills: 0 | OUTPATIENT
Start: 2018-02-09 | End: 2018-03-10

## 2018-02-09 RX ADMIN — NYSTATIN CREAM 1 APPLICATION(S): 100000 CREAM TOPICAL at 12:55

## 2018-02-09 RX ADMIN — BUPROPION HYDROCHLORIDE 150 MILLIGRAM(S): 150 TABLET, EXTENDED RELEASE ORAL at 12:54

## 2018-02-09 RX ADMIN — AMIODARONE HYDROCHLORIDE 400 MILLIGRAM(S): 400 TABLET ORAL at 05:47

## 2018-02-09 RX ADMIN — Medication 40 MILLIGRAM(S): at 12:54

## 2018-02-09 RX ADMIN — Medication 500 MILLIGRAM(S): at 15:29

## 2018-02-09 RX ADMIN — Medication 250 MILLIGRAM(S): at 05:47

## 2018-02-09 RX ADMIN — Medication 250 MILLIGRAM(S): at 17:38

## 2018-02-09 RX ADMIN — FINASTERIDE 5 MILLIGRAM(S): 5 TABLET, FILM COATED ORAL at 12:54

## 2018-02-09 NOTE — DISCHARGE NOTE ADULT - HOSPITAL COURSE
76 yo male PMHx CAD s/p CABG, AFib on Coumadin, HTN, prostate CA s/p TURP, s/p PEG and colectomy, prior CVA with expressive aphasia and R hemiparesis, CKD 3, recent dc 3 weeks ago for resp failure s/p intubation due to aspiration pneumonia, Septic shock 2/2 UTI, KAPIL on CKD, recurrent NSVT, UE DVT presents from home for blood in colostomy bag. Initially started on fluids, PPI, and transfused 2 units PRBCs. Gastroenterology was consulted and coumadin was placed on hold. INR was checked daily, patient needed a few doses of FFP before scheduled EGD. EGD did not reveal and obvious source of bleeding, G tube was replaced at this time as well. During his hospital course he was found to be dehydrated with KAPIL, and was given fluids with minimal improvement. Urine studies were obtained and it was found that the patient had a UTI. he was started on cefepime and will finish the course with 1 more oral dose of ceftin today. Speech and swallow recommended mechanical soft diet with supplemental tube feeds which was continued during admission. While admitted goals of care discussion was had with the patient, wife, myself, and palliative care team. Patient decided on DNR, with a trial of intubation, but not permeant tracheostomy. MOLST form was filled out. Patient will be discharged home today with an albuterol inhaler. He would have completed his abx for UTI, H&H stable. He should continue tube feeds, and pleasure feeds. He will need to follow up with the cardiologist, gastroenterologist, and nephrologist.     HPI 2/9/18:    Patient seen and examined at bedside. He does not offer any complaints. He states he would like to go home. He denies CP, SOB, nausea, vomiting, abdominal pain, fever, or chills.     PHYSICAL EXAM:    Vital Signs Last 24 Hrs  T(C): 36.9 (09 Feb 2018 04:14), Max: 36.9 (09 Feb 2018 04:14)  T(F): 98.4 (09 Feb 2018 04:14), Max: 98.4 (09 Feb 2018 04:14)  HR: 82 (09 Feb 2018 04:14) (78 - 86)  BP: 109/63 (09 Feb 2018 04:14) (94/54 - 109/63)  RR: 17 (09 Feb 2018 04:14) (17 - 18)  SpO2: 99% (09 Feb 2018 04:14) (99% - 99%)    GEN: NAD, lying in bed  HEENT: mouth dry mucus membranes, PERRL  HEART: S1, S2, regular rate and rhythm no MRG  LUNGS: rales, anteriorly, minor wheeze  ABDOMEN: soft, non tender, nondistended, ileostomy with liquid brown stool  EXTREMITIES: no swelling noted    Time spent on discharge >30min 78 yo male PMHx CAD s/p CABG, AFib on Coumadin, HTN, prostate CA s/p TURP, s/p PEG and colectomy, prior CVA with expressive aphasia and R hemiparesis, CKD 3, recent dc 3 weeks ago for resp failure s/p intubation due to aspiration pneumonia, Septic shock 2/2 UTI, KAPIL on CKD, recurrent NSVT, UE DVT presents from home for blood in colostomy bag. Initially started on fluids, PPI, and transfused 2 units PRBCs. Gastroenterology was consulted and coumadin was placed on hold. INR was checked daily, patient needed a few doses of FFP before scheduled EGD. EGD did not reveal and obvious source of bleeding, G tube was replaced at this time as well. During his hospital course he was found to be dehydrated with KAPIL, and was given fluids with minimal improvement. Urine studies were obtained and it was found that the patient had a UTI. he was started on cefepime and will finish the course with 1 more oral dose of ceftin today. Speech and swallow recommended mechanical soft diet with supplemental tube feeds which was continued during admission. While admitted goals of care discussion was had with the patient, wife, myself, and palliative care team. Patient decided on DNR, with a trial of intubation, but not permeant tracheostomy. MOLST form was filled out. Patient will be discharged home today with an albuterol inhaler. He would have completed his abx for UTI, H&H stable. He should continue tube feeds, and pleasure feeds. He will need to follow up with the cardiologist, gastroenterologist, and nephrologist.     HPI 2/9/18:    Patient seen and examined at bedside. He does not offer any complaints. He states he would like to go home. He denies CP, SOB, nausea, vomiting, abdominal pain, fever, or chills.     PHYSICAL EXAM:    Vital Signs Last 24 Hrs  T(C): 36.9 (09 Feb 2018 04:14), Max: 36.9 (09 Feb 2018 04:14)  T(F): 98.4 (09 Feb 2018 04:14), Max: 98.4 (09 Feb 2018 04:14)  HR: 82 (09 Feb 2018 04:14) (78 - 86)  BP: 109/63 (09 Feb 2018 04:14) (94/54 - 109/63)  RR: 17 (09 Feb 2018 04:14) (17 - 18)  SpO2: 99% (09 Feb 2018 04:14) (99% - 99%)    GEN: NAD, lying in bed  HEENT: mouth dry mucus membranes, PERRL  HEART: S1, S2, regular rate and rhythm no MRG  LUNGS: rales, anteriorly, minor wheeze  ABDOMEN: soft, non tender, nondistended, ileostomy with liquid brown stool  EXTREMITIES: no swelling noted    Time spent on discharge > 65 min   Pt seen and examined with PA. A&P reviewed.   Stable for DC  High risk for readmission

## 2018-02-09 NOTE — PROGRESS NOTE ADULT - ASSESSMENT
1) KAPIL on CKD III in the setting of GIB and decreased access to po; hypoperfusion low BPs  2) Anemia  3) Acidosis  4) HTN  5) Hypernatremia    Patient had KAPIL on CKD III in the setting of GIB;  prerenal azotemia; with element of hypoperfusion given low BPs;   Urine studies noted; Matthew < 30 and UCl < 30; consistent with prerenal azotemia;  Renal function at his baseline  Discharge planning  dw Dr Howe

## 2018-02-09 NOTE — PROGRESS NOTE ADULT - SUBJECTIVE AND OBJECTIVE BOX
[facility] DIVISION OF KIDNEY DISEASES AND HYPERTENSION -- FOLLOW UP NOTE  --------------------------------------------------------------------------------  Chief Complaint: CKD    24 hour events/subjective:  Pt seen and examined  No distress  Comfortably resting in bed       PAST HISTORY  --------------------------------------------------------------------------------  No significant changes to PMH, PSH, FHx, SHx, unless otherwise noted    ALLERGIES & MEDICATIONS  --------------------------------------------------------------------------------  Allergies    penicillins (Hives)    Intolerances      Standing Inpatient Medications  ALBUTerol    90 MICROgram(s) HFA Inhaler 1 Puff(s) Inhalation every 4 hours  ALBUTerol    90 MICROgram(s) HFA Inhaler 1 Puff(s) Inhalation every 4 hours  amiodarone    Tablet 400 milliGRAM(s) Oral daily  atorvastatin 10 milliGRAM(s) Oral at bedtime  buPROPion XL . 150 milliGRAM(s) Oral daily  cefepime  IVPB      cefepime  IVPB 1000 milliGRAM(s) IV Intermittent every 12 hours  finasteride 5 milliGRAM(s) Oral daily  furosemide    Tablet 40 milliGRAM(s) Oral daily  metoprolol     tartrate 25 milliGRAM(s) Oral three times a day  nystatin Powder 1 Application(s) Topical daily  pantoprazole  Injectable 40 milliGRAM(s) IV Push every 12 hours  saccharomyces boulardii 250 milliGRAM(s) Oral two times a day  tamsulosin 0.4 milliGRAM(s) Oral at bedtime  warfarin 2.5 milliGRAM(s) Oral daily    PRN Inpatient Medications  ALBUTerol    0.083% 2.5 milliGRAM(s) Nebulizer every 6 hours PRN      REVIEW OF SYSTEMS  --------------------------------------------------------------------------------  Unable to obtain    VITALS/PHYSICAL EXAM  --------------------------------------------------------------------------------  T(C): 36.5 (02-09-18 @ 12:50), Max: 36.9 (02-09-18 @ 04:14)  HR: 95 (02-09-18 @ 12:50) (78 - 95)  BP: 105/55 (02-09-18 @ 12:50) (94/54 - 109/63)  RR: 20 (02-09-18 @ 12:50) (17 - 20)  SpO2: 98% (02-09-18 @ 12:50) (98% - 99%)  Wt(kg): --        02-08-18 @ 07:01  -  02-09-18 @ 07:00  --------------------------------------------------------  IN: 360 mL / OUT: 2200 mL / NET: -1840 mL    02-09-18 @ 07:01  -  02-09-18 @ 12:54  --------------------------------------------------------  IN: 0 mL / OUT: 350 mL / NET: -350 mL      Physical Exam:  	Gen: NAD, chronically ill appearing, Pale,  	HEENT: supple neck  	Pulm: Pooe Excursion of chest,  	CV: RRR, S1S2; no rub  	Back: No spinal or CVA tenderness; no sacral edema  	Abd: + ostomy , PEG +  	LE: Warm, FROM, no clubbing,trace edema  	Neuro: No focal deficits,  	Psych: Lethargic,  	Skin: Warm, without rashes    LABS/STUDIES  --------------------------------------------------------------------------------              8.9    5.9   >-----------<  106      [02-09-18 @ 07:27]              29.4     136  |  105  |  52.0  ----------------------------<  167      [02-09-18 @ 07:27]  4.4   |  21.0  |  2.85        Ca     8.7     [02-09-18 @ 07:27]      Mg     1.7     [02-08-18 @ 06:15]      Phos  2.5     [02-08-18 @ 06:15]    TPro  6.4  /  Alb  2.6  /  TBili  1.0  /  DBili  x   /  AST  27  /  ALT  30  /  AlkPhos  126  [02-08-18 @ 06:15]    PT/INR: PT 15.6 , INR 1.41       [02-09-18 @ 07:27]      Creatinine Trend:  SCr 2.85 [02-09 @ 07:27]  SCr 2.86 [02-08 @ 06:15]  SCr 2.81 [02-06 @ 05:59]  SCr 2.80 [02-05 @ 06:11]  SCr 2.69 [02-04 @ 06:20]    Urinalysis - [02-03-18 @ 12:39]      Color Yellow / Appearance Turbid / SG 1.015 / pH 5.0      Gluc Negative / Ketone Negative  / Bili Negative / Urobili Negative       Blood Large / Protein 100 / Leuk Est Moderate / Nitrite Positive      RBC >50 / WBC 26-50 / Hyaline  / Gran  / Sq Epi  / Non Sq Epi Few / Bacteria Moderate    Urine Creatinine 86      [02-03-18 @ 14:56]  Urine Protein 66.0      [02-03-18 @ 14:56]  Urine Sodium <30      [02-03-18 @ 12:37]  Urine Chloride <27      [02-03-18 @ 12:37]  Urine Osmolality 447      [02-03-18 @ 12:39]    Iron 47, TIBC 385, %sat 12      [01-30-18 @ 13:15]  Ferritin 442.3      [01-30-18 @ 13:15]  HbA1c 6.2      [12-09-15 @ 12:54]  TSH 1.06      [09-01-17 @ 11:01]

## 2018-02-09 NOTE — DISCHARGE NOTE ADULT - PATIENT PORTAL LINK FT
You can access the [facility] Patient Portal, offered by [facility], by registering with the following website: [email or web address]

## 2018-02-09 NOTE — DISCHARGE NOTE ADULT - INSTRUCTIONS
Pleasure feeds as tolerated. Please continue tube feeds at night. Pleasure feeds as tolerated. Please continue tube feeds at night with jevity 1.5 samira. 12 hours feeds at rate of 55cc per hour Pleasure feeds as tolerated. Please continue tube feeds at night with jevity 1.5 samira. 12 hours feeds at rate of 55cc per hour. Please eat a soft diet.

## 2018-02-09 NOTE — DISCHARGE NOTE ADULT - PLAN OF CARE
To prevent future bleeding Please go to the pharmacy and  your medications. Please take your medications as prescribed. please follow up with the Gastroenterology within 1 -2 weeks. You may continue to take your coumadin. To prevent further injury Please go to the pharmacy and  your medications. Please take your medications as prescribed. please follow up with your nephrologist with 1 - 2 weeks. Be sure to keep hydrated. Please flush G tube with 250ml of water three times a day. To control rate and prevent stroke Please go to the pharmacy and  your medications. Please take your medications as prescribed. Please follow up with your cardiologist within 1-2 weeks. you may continue to take your coumadin. You will need to get your INR checked next week by Either your primary care doctor (Loren) or your cardiologist. Please go to the pharmacy and  your medications. Please take your medications as prescribed.  Please follow up with your cardiologist within 1-2 weeks Please go to the pharmacy and  your medications. Please take your medications as prescribed.

## 2018-02-09 NOTE — DISCHARGE NOTE ADULT - ADDITIONAL INSTRUCTIONS
1) 1) please go to your pharmacy and  your medications  2) Please take your medications as prescribed  3) please follow up with the cardiologist, gastroenterologist, and nephrologist within 1- 2 weeks.   4) please continue to take coumadin 2.5mg daily, you will need to get blood work next week to check the INR value. Please contact Dr. baptiste to get an order for a PT/INR.

## 2018-02-09 NOTE — DISCHARGE NOTE ADULT - CARE PROVIDER_API CALL
Randy Pimentel), Internal Medicine  [address] [facility]  [place] [address]  Phone: [phone]  Fax: [phone]    Carey Ashley), Internal Medicine; Nephrology  [address] [place] [address]  Phone: [phone]  Fax: [phone]    Thiago Solano), Cardiovascular Disease; Internal Medicine  [address] [place] [address]  Phone: [phone]  Fax: [phone]    Marixa Guzman (DO), Gastroenterology  [address] [facility]  [address]  [place] [address]  Phone: [phone]  Fax: [phone]

## 2018-02-09 NOTE — PROGRESS NOTE ADULT - PROVIDER SPECIALTY LIST ADULT
Anesthesia
Anesthesia
Cardiology
Gastroenterology
Hospitalist
Nephrology
Electrophysiology
Nephrology
Hospitalist

## 2018-02-09 NOTE — DISCHARGE NOTE ADULT - CARE PROVIDERS DIRECT ADDRESSES
,[email or web address]

## 2018-02-09 NOTE — DISCHARGE NOTE ADULT - SECONDARY DIAGNOSIS.
Acute kidney injury superimposed on chronic kidney disease Afib CAD (coronary artery disease) CVA (cerebral vascular accident) HTN (hypertension) Oropharyngeal dysphagia

## 2018-02-09 NOTE — DISCHARGE NOTE ADULT - MEDICATION SUMMARY - MEDICATIONS TO TAKE
I will START or STAY ON the medications listed below when I get home from the hospital:    finasteride 5 mg oral tablet  -- 1 tab(s) by mouth once a day  -- Indication: For BPH    aspirin 81 mg oral tablet, chewable  -- 1 tab(s) by mouth once a day  -- Indication: For Heart disease    tamsulosin 0.4 mg oral capsule  -- 1 cap(s) by mouth once a day (at bedtime)  -- Indication: For BPH    amiodarone 200 mg oral tablet  -- 2 tab(s) by mouth once a day  -- Indication: For Afib    warfarin 2.5 mg oral tablet  -- 1 tab(s) by mouth once a day  -- Indication: For Afib    dronabinol 2.5 mg oral capsule  -- 1 cap(s) by mouth 2 times a day  -- Indication: For Hunger    atorvastatin 10 mg oral tablet  -- 1 tab(s) by gastrostomy tube once a day (at bedtime)  -- Indication: For HLD    metoprolol tartrate 25 mg oral tablet  -- 1 tab(s) by mouth 3 times a day  -- Indication: For Afib    ProAir HFA 90 mcg/inh inhalation aerosol  -- 2 puff(s) inhaled every 4 hours, As Needed -for shortness of breath and/or wheezing   -- Indication: For Shortness of breath     zinc oxide 20% topical ointment  -- 1 application on skin 2 times a day  -- Indication: For Skin breakdown    furosemide 40 mg oral tablet  -- 1 tab(s) by mouth once a day  -- Indication: For Heart failure    ferrous sulfate 325 mg (65 mg elemental iron) oral delayed release tablet  -- 1 tab(s) by gastrostomy tube once a day  -- Indication: For Anemia    sucralfate 1 g oral tablet  -- 1 tab(s) by mouth 4 times a day  -- Indication: For Acid reflux    pantoprazole 40 mg oral granule, delayed release  -- 40 milligram(s) by mouth once a day   -- Indication: For Acid reflux    buPROPion 75 mg oral tablet  -- 1 tab(s) by mouth 2 times a day  -- Indication: For Depression    B-12 Resin 1000 mcg oral tablet  -- 1 tab(s) by mouth once a day  -- Indication: For Vitamins    folic acid 1 mg oral tablet  -- 1 tab(s) by mouth once a day  -- Indication: For Vitamins

## 2018-02-13 ENCOUNTER — TRANSCRIPTION ENCOUNTER (OUTPATIENT)
Age: 78
End: 2018-02-13

## 2018-02-13 ENCOUNTER — RESULT REVIEW (OUTPATIENT)
Age: 78
End: 2018-02-13

## 2018-02-13 LAB — INR PPP: 1.84

## 2018-02-16 LAB — INR PPP: 1.83

## 2018-02-20 DIAGNOSIS — K21.9 GASTRO-ESOPHAGEAL REFLUX DISEASE W/OUT ESOPHAGITIS: ICD-10-CM

## 2018-02-20 LAB — INR PPP: 1.94

## 2018-02-20 RX ORDER — SUCRALFATE 1 G/1
1 TABLET ORAL
Qty: 360 | Refills: 1 | Status: ACTIVE | COMMUNITY
Start: 2018-02-20 | End: 1900-01-01

## 2018-02-20 RX ORDER — FUROSEMIDE 40 MG/1
40 TABLET ORAL DAILY
Qty: 90 | Refills: 1 | Status: ACTIVE | COMMUNITY
Start: 2018-02-20 | End: 1900-01-01

## 2018-02-20 RX ORDER — TAMSULOSIN HYDROCHLORIDE 0.4 MG/1
0.4 CAPSULE ORAL
Qty: 90 | Refills: 1 | Status: ACTIVE | COMMUNITY
Start: 2018-02-20 | End: 1900-01-01

## 2018-02-26 LAB — INR PPP: 2.67

## 2018-03-04 ENCOUNTER — INPATIENT (INPATIENT)
Facility: HOSPITAL | Age: 78
LOS: 16 days | DRG: 377 | End: 2018-03-21
Attending: HOSPITALIST | Admitting: HOSPITALIST
Payer: MEDICARE

## 2018-03-04 VITALS
OXYGEN SATURATION: 98 % | DIASTOLIC BLOOD PRESSURE: 86 MMHG | RESPIRATION RATE: 30 BRPM | SYSTOLIC BLOOD PRESSURE: 118 MMHG | HEART RATE: 89 BPM

## 2018-03-04 DIAGNOSIS — Z95.1 PRESENCE OF AORTOCORONARY BYPASS GRAFT: Chronic | ICD-10-CM

## 2018-03-04 DIAGNOSIS — Z98.89 OTHER SPECIFIED POSTPROCEDURAL STATES: Chronic | ICD-10-CM

## 2018-03-04 DIAGNOSIS — Z95.2 PRESENCE OF PROSTHETIC HEART VALVE: Chronic | ICD-10-CM

## 2018-03-04 DIAGNOSIS — K92.2 GASTROINTESTINAL HEMORRHAGE, UNSPECIFIED: ICD-10-CM

## 2018-03-04 LAB
ABO RH CONFIRMATION: SIGNIFICANT CHANGE UP
ALBUMIN SERPL ELPH-MCNC: 3.3 G/DL — SIGNIFICANT CHANGE UP (ref 3.3–5.2)
ALP SERPL-CCNC: 95 U/L — SIGNIFICANT CHANGE UP (ref 40–120)
ALT FLD-CCNC: 32 U/L — SIGNIFICANT CHANGE UP
ANION GAP SERPL CALC-SCNC: 20 MMOL/L — HIGH (ref 5–17)
ANISOCYTOSIS BLD QL: SLIGHT — SIGNIFICANT CHANGE UP
APTT BLD: 47.3 SEC — HIGH (ref 27.5–37.4)
AST SERPL-CCNC: 29 U/L — SIGNIFICANT CHANGE UP
BASOPHILS # BLD AUTO: 0 K/UL — SIGNIFICANT CHANGE UP (ref 0–0.2)
BASOPHILS NFR BLD AUTO: 0.2 % — SIGNIFICANT CHANGE UP (ref 0–2)
BILIRUB SERPL-MCNC: 0.5 MG/DL — SIGNIFICANT CHANGE UP (ref 0.4–2)
BLD GP AB SCN SERPL QL: SIGNIFICANT CHANGE UP
BUN SERPL-MCNC: 91 MG/DL — HIGH (ref 8–20)
CALCIUM SERPL-MCNC: 8.5 MG/DL — LOW (ref 8.6–10.2)
CHLORIDE SERPL-SCNC: 84 MMOL/L — LOW (ref 98–107)
CO2 SERPL-SCNC: 17 MMOL/L — LOW (ref 22–29)
CREAT SERPL-MCNC: 3.1 MG/DL — HIGH (ref 0.5–1.3)
ELLIPTOCYTES BLD QL SMEAR: SLIGHT — SIGNIFICANT CHANGE UP
EOSINOPHIL # BLD AUTO: 0.4 K/UL — SIGNIFICANT CHANGE UP (ref 0–0.5)
EOSINOPHIL NFR BLD AUTO: 4.9 % — SIGNIFICANT CHANGE UP (ref 0–5)
GLUCOSE SERPL-MCNC: 123 MG/DL — HIGH (ref 70–115)
HCT VFR BLD CALC: 23.6 % — LOW (ref 42–52)
HGB BLD-MCNC: 7.8 G/DL — LOW (ref 14–18)
INR BLD: 3.74 RATIO — HIGH (ref 0.88–1.16)
LYMPHOCYTES # BLD AUTO: 0.7 K/UL — LOW (ref 1–4.8)
LYMPHOCYTES # BLD AUTO: 8.7 % — LOW (ref 20–55)
MACROCYTES BLD QL: SLIGHT — SIGNIFICANT CHANGE UP
MCHC RBC-ENTMCNC: 30.5 PG — SIGNIFICANT CHANGE UP (ref 27–31)
MCHC RBC-ENTMCNC: 33.1 G/DL — SIGNIFICANT CHANGE UP (ref 32–36)
MCV RBC AUTO: 92.2 FL — SIGNIFICANT CHANGE UP (ref 80–94)
MICROCYTES BLD QL: SLIGHT — SIGNIFICANT CHANGE UP
MONOCYTES # BLD AUTO: 1 K/UL — HIGH (ref 0–0.8)
MONOCYTES NFR BLD AUTO: 12.8 % — HIGH (ref 3–10)
NEUTROPHILS # BLD AUTO: 6 K/UL — SIGNIFICANT CHANGE UP (ref 1.8–8)
NEUTROPHILS NFR BLD AUTO: 73.2 % — HIGH (ref 37–73)
NT-PROBNP SERPL-SCNC: 8153 PG/ML — HIGH (ref 0–300)
OVALOCYTES BLD QL SMEAR: SLIGHT — SIGNIFICANT CHANGE UP
PLAT MORPH BLD: NORMAL — SIGNIFICANT CHANGE UP
PLATELET # BLD AUTO: 171 K/UL — SIGNIFICANT CHANGE UP (ref 150–400)
POIKILOCYTOSIS BLD QL AUTO: SLIGHT — SIGNIFICANT CHANGE UP
POLYCHROMASIA BLD QL SMEAR: SLIGHT — SIGNIFICANT CHANGE UP
POTASSIUM SERPL-MCNC: 4.2 MMOL/L — SIGNIFICANT CHANGE UP (ref 3.5–5.3)
POTASSIUM SERPL-SCNC: 4.2 MMOL/L — SIGNIFICANT CHANGE UP (ref 3.5–5.3)
PROT SERPL-MCNC: 7.6 G/DL — SIGNIFICANT CHANGE UP (ref 6.6–8.7)
PROTHROM AB SERPL-ACNC: 42.3 SEC — HIGH (ref 9.8–12.7)
RBC # BLD: 2.56 M/UL — LOW (ref 4.6–6.2)
RBC # FLD: 18.4 % — HIGH (ref 11–15.6)
RBC BLD AUTO: ABNORMAL
SODIUM SERPL-SCNC: 121 MMOL/L — LOW (ref 135–145)
TROPONIN T SERPL-MCNC: 0.08 NG/ML — HIGH (ref 0–0.06)
TYPE + AB SCN PNL BLD: SIGNIFICANT CHANGE UP
WBC # BLD: 8.1 K/UL — SIGNIFICANT CHANGE UP (ref 4.8–10.8)
WBC # FLD AUTO: 8.1 K/UL — SIGNIFICANT CHANGE UP (ref 4.8–10.8)

## 2018-03-04 PROCEDURE — 71045 X-RAY EXAM CHEST 1 VIEW: CPT | Mod: 26

## 2018-03-04 PROCEDURE — 93010 ELECTROCARDIOGRAM REPORT: CPT

## 2018-03-04 PROCEDURE — 99223 1ST HOSP IP/OBS HIGH 75: CPT

## 2018-03-04 PROCEDURE — 99285 EMERGENCY DEPT VISIT HI MDM: CPT

## 2018-03-04 PROCEDURE — 74176 CT ABD & PELVIS W/O CONTRAST: CPT | Mod: 26

## 2018-03-04 RX ORDER — SODIUM CHLORIDE 9 MG/ML
1000 INJECTION INTRAMUSCULAR; INTRAVENOUS; SUBCUTANEOUS ONCE
Qty: 0 | Refills: 0 | Status: COMPLETED | OUTPATIENT
Start: 2018-03-04 | End: 2018-03-04

## 2018-03-04 RX ORDER — ONDANSETRON 8 MG/1
4 TABLET, FILM COATED ORAL ONCE
Qty: 0 | Refills: 0 | Status: COMPLETED | OUTPATIENT
Start: 2018-03-04 | End: 2018-03-04

## 2018-03-04 RX ADMIN — SODIUM CHLORIDE 200 MILLILITER(S): 9 INJECTION INTRAMUSCULAR; INTRAVENOUS; SUBCUTANEOUS at 18:51

## 2018-03-04 RX ADMIN — SODIUM CHLORIDE 1000 MILLILITER(S): 9 INJECTION INTRAMUSCULAR; INTRAVENOUS; SUBCUTANEOUS at 21:23

## 2018-03-04 RX ADMIN — ONDANSETRON 4 MILLIGRAM(S): 8 TABLET, FILM COATED ORAL at 18:51

## 2018-03-04 NOTE — ED ADULT NURSE NOTE - OBJECTIVE STATEMENT
pt brought in by EMS wife called 911 because pt told wife that he wanted to go to the hospital as per EMS, pt has colostomy bag filled with black tarry liquid stool, pt nonverbal upon arrival to ED, no family at bedside, MD Nava at bedside, code team called. pt brought in by EMS wife called 911 because pt told wife that he wanted to go to the hospital as per EMS, pt has ileostomy bag filled with black tarry liquid stool, pt nonverbal upon arrival to ED, no family at bedside, MD Nava at bedside, code team called. pt able to make needs known.

## 2018-03-04 NOTE — H&P ADULT - ASSESSMENT
78 y/o male present with Recurrent Gastrointestinal hemorrhage with melena.  On Coudamin with supra-therapeutic INR (3.74).  Admit to medical unit  Gastrointestinal hemorrhage, unspecified gastrointestinal hemorrhage type.    S/P One unit PRBC transfusion   Monitor trend of h/h  PRBC if needed  PPI BID  NPO for now.   EGD completed on recent admission did not reveal any sites of bleeding    Chronic atrial fibrillation.  rate controlled.   Pt wife d/c aspirin 2 weeks ago.  c/w amiodarone  Held Coumadin due to elevated INR, will need to cont for CVA prophylaxis high risk and hx LUE DVT and probable PE    Acute kidney injury superimposed on chronic kidney disease. Hyponatremia.  - likely due to dehydration  monitor on IVF  trend sodium  HOLD lasix  gentle hydration.     h/o Dysphagia.  NPO for now. Nutrition consult – please evaluate tube feeds pt on Jevity 1.5 with a/c renal failure and c/o increased bloating with its use.   h/o CVA,  c/w statin. Bedrest, elevate HOB, high risk of aspirations, turn Q2hrs, high risk of pressure ulcers.

## 2018-03-04 NOTE — H&P ADULT - NSHPPHYSICALEXAM_GEN_ALL_CORE
Vital Signs Last 24 Hrs  T(C): 36.7 (05 Mar 2018 04:07), Max: 36.7 (04 Mar 2018 23:33)  T(F): 98 (05 Mar 2018 04:07), Max: 98.1 (04 Mar 2018 23:33)  HR: 88 (05 Mar 2018 05:12) (82 - 95)  BP: 102/58 (05 Mar 2018 05:12) (89/49 - 124/69)  BP(mean): --  RR: 18 (05 Mar 2018 04:07) (16 - 30)  SpO2: 96% (05 Mar 2018 04:07) (94% - 98%)    Constitutional/General: aphasic, elderly male, laying in bed, multiple episodes of non productive coughing noted. vitals reviewed  EYE: PERRL, conjunctiva clear  ENT: Good dentition, oropharynx clear  NECK: No masses, thyroid normal  RESP: Dimished bilateral BS, no wheezes, + rhonchi, increased effort  CV: RRR, normal S1S2, no murmur, 2+ DP pulses, no JVD, no edema  ABDOMEN: Soft, nontender, no HSM, colostomy bag with coffee ground liquid feces  LYMPH: No cervical or supraclavicular adenopathy  SKIN: Warm, dry, no rashes  NEURO: expressive aphasia,   PSYCHIATRIC: Oriented x3, normal mood and affect Vital Signs Last 24 Hrs  T(C): 36.7 (05 Mar 2018 04:07), Max: 36.7 (04 Mar 2018 23:33)  T(F): 98 (05 Mar 2018 04:07), Max: 98.1 (04 Mar 2018 23:33)  HR: 88 (05 Mar 2018 05:12) (82 - 95)  BP: 102/58 (05 Mar 2018 05:12) (89/49 - 124/69)  BP(mean): --  RR: 18 (05 Mar 2018 04:07) (16 - 30)  SpO2: 96% (05 Mar 2018 04:07) (94% - 98%)    Constitutional/General: aphasic, elderly male, laying in bed, multiple episodes of non productive coughing noted. vitals reviewed  EYE: PERRL, conjunctiva clear  ENT: Good dentition, oropharynx clear  NECK: No masses, thyroid normal  RESP: Dimished bilateral BS, no wheezes, + rhonchi, increased effort  CV: RRR, normal S1S2, no murmur, 2+ DP pulses, no JVD, no edema  ABDOMEN: Soft, nontender, no HSM, colostomy bag with coffee ground liquid feces  LYMPH: No cervical or supraclavicular adenopathy  SKIN: Warm, dry, no rashes  NEURO: expressive aphasia, right sided hemipareses   PSYCHIATRIC: Oriented x3, normal mood and affect

## 2018-03-04 NOTE — ED ADULT NURSE REASSESSMENT NOTE - NS ED NURSE REASSESS COMMENT FT1
Pt. received at 1930, History obtained from wife at bedside. pt. nonverbal due to prior CVA (most frequent in July), bedbound, colostomy draining black tarry stools infrequently since last monday. on coumadin. MD Nava aware of lab results. will continue to monitor and maintain safety.

## 2018-03-04 NOTE — H&P ADULT - HISTORY OF PRESENT ILLNESS
History obtained from patient wife at bedside, due to patient underlying medical condition of expressive aphasia status post CVA in the past.   Mr. Kristofer Salomon (prior [id number]) is a 78 yo male PMHx CAD s/p CABG, AFib on Coumadin, HTN, prostate CA s/p TURP, s/p PEG and colectomy, prior CVA with expressive aphasia and R hemiparesis, CKD 3, recently discharged 3 weeks ago for blood in the Colostomy bag requiring 2 units PRBC transfusion. Coumadin was held, FFP was given and EGD completed which did not reveal source of bleeding. G- tube was placed during EGD. 3 weeks prior to that he was discharged from [facility] s/p respiratory failure s/p intubation due to aspiration pneumonia, Septic shock 2/2 UTI, KAPIL on CKD, recurrent NSVT, UE DVT.    As per wife at bedside, pt is now refusing oral intake, pt use Jevity 1.5, 4 to 5 cans per day. She has noticed that he has an increased about of bloating, vomiting episodes and coughing. In addition she reports that pt stopped bleeding bleeding for 5 days after discharge but has been ongoing since that time. His Coumadin dose has been up titrated to 5mg M-W-F, followed by 2.5mg the remainder of the week. As a result of the recurrent bleeding she stopped giving him his Aspirin (2weeks now), Dronabinol was never started as the pharmacy indicated his insurance did not approve it. She reports no other signs or symptoms.

## 2018-03-05 LAB
ANION GAP SERPL CALC-SCNC: 18 MMOL/L — HIGH (ref 5–17)
ANION GAP SERPL CALC-SCNC: 20 MMOL/L — HIGH (ref 5–17)
APTT BLD: 46.4 SEC — HIGH (ref 27.5–37.4)
BUN SERPL-MCNC: 84 MG/DL — HIGH (ref 8–20)
BUN SERPL-MCNC: 85 MG/DL — HIGH (ref 8–20)
CALCIUM SERPL-MCNC: 8.4 MG/DL — LOW (ref 8.6–10.2)
CALCIUM SERPL-MCNC: 8.9 MG/DL — SIGNIFICANT CHANGE UP (ref 8.6–10.2)
CHLORIDE SERPL-SCNC: 92 MMOL/L — LOW (ref 98–107)
CHLORIDE SERPL-SCNC: 95 MMOL/L — LOW (ref 98–107)
CO2 SERPL-SCNC: 16 MMOL/L — LOW (ref 22–29)
CO2 SERPL-SCNC: 18 MMOL/L — LOW (ref 22–29)
CREAT SERPL-MCNC: 2.96 MG/DL — HIGH (ref 0.5–1.3)
CREAT SERPL-MCNC: 3.01 MG/DL — HIGH (ref 0.5–1.3)
GLUCOSE SERPL-MCNC: 103 MG/DL — SIGNIFICANT CHANGE UP (ref 70–115)
GLUCOSE SERPL-MCNC: 89 MG/DL — SIGNIFICANT CHANGE UP (ref 70–115)
HCT VFR BLD CALC: 23.2 % — LOW (ref 42–52)
HCT VFR BLD CALC: 28.5 % — LOW (ref 42–52)
HGB BLD-MCNC: 7.6 G/DL — LOW (ref 14–18)
HGB BLD-MCNC: 9.3 G/DL — LOW (ref 14–18)
INR BLD: 3.41 RATIO — HIGH (ref 0.88–1.16)
MAGNESIUM SERPL-MCNC: 2.1 MG/DL — SIGNIFICANT CHANGE UP (ref 1.6–2.6)
MCHC RBC-ENTMCNC: 29.7 PG — SIGNIFICANT CHANGE UP (ref 27–31)
MCHC RBC-ENTMCNC: 32.8 G/DL — SIGNIFICANT CHANGE UP (ref 32–36)
MCV RBC AUTO: 90.6 FL — SIGNIFICANT CHANGE UP (ref 80–94)
PLATELET # BLD AUTO: 135 K/UL — LOW (ref 150–400)
POTASSIUM SERPL-MCNC: 4.3 MMOL/L — SIGNIFICANT CHANGE UP (ref 3.5–5.3)
POTASSIUM SERPL-MCNC: 4.3 MMOL/L — SIGNIFICANT CHANGE UP (ref 3.5–5.3)
POTASSIUM SERPL-SCNC: 4.3 MMOL/L — SIGNIFICANT CHANGE UP (ref 3.5–5.3)
POTASSIUM SERPL-SCNC: 4.3 MMOL/L — SIGNIFICANT CHANGE UP (ref 3.5–5.3)
PROTHROM AB SERPL-ACNC: 38.5 SEC — HIGH (ref 9.8–12.7)
RBC # BLD: 2.56 M/UL — LOW (ref 4.6–6.2)
RBC # FLD: 18.6 % — HIGH (ref 11–15.6)
SODIUM SERPL-SCNC: 128 MMOL/L — LOW (ref 135–145)
SODIUM SERPL-SCNC: 131 MMOL/L — LOW (ref 135–145)
WBC # BLD: 7.5 K/UL — SIGNIFICANT CHANGE UP (ref 4.8–10.8)
WBC # FLD AUTO: 7.5 K/UL — SIGNIFICANT CHANGE UP (ref 4.8–10.8)

## 2018-03-05 PROCEDURE — 99233 SBSQ HOSP IP/OBS HIGH 50: CPT | Mod: GC

## 2018-03-05 RX ORDER — AMIODARONE HYDROCHLORIDE 400 MG/1
200 TABLET ORAL DAILY
Qty: 0 | Refills: 0 | Status: DISCONTINUED | OUTPATIENT
Start: 2018-03-05 | End: 2018-03-05

## 2018-03-05 RX ORDER — PANTOPRAZOLE SODIUM 20 MG/1
40 TABLET, DELAYED RELEASE ORAL
Qty: 0 | Refills: 0 | Status: DISCONTINUED | OUTPATIENT
Start: 2018-03-05 | End: 2018-03-12

## 2018-03-05 RX ORDER — AMIODARONE HYDROCHLORIDE 400 MG/1
200 TABLET ORAL DAILY
Qty: 0 | Refills: 0 | Status: DISCONTINUED | OUTPATIENT
Start: 2018-03-05 | End: 2018-03-21

## 2018-03-05 RX ORDER — SODIUM CHLORIDE 9 MG/ML
1000 INJECTION INTRAMUSCULAR; INTRAVENOUS; SUBCUTANEOUS
Qty: 0 | Refills: 0 | Status: DISCONTINUED | OUTPATIENT
Start: 2018-03-05 | End: 2018-03-09

## 2018-03-05 RX ORDER — ATORVASTATIN CALCIUM 80 MG/1
10 TABLET, FILM COATED ORAL AT BEDTIME
Qty: 0 | Refills: 0 | Status: DISCONTINUED | OUTPATIENT
Start: 2018-03-05 | End: 2018-03-21

## 2018-03-05 RX ORDER — SODIUM CHLORIDE 9 MG/ML
250 INJECTION INTRAMUSCULAR; INTRAVENOUS; SUBCUTANEOUS ONCE
Qty: 0 | Refills: 0 | Status: COMPLETED | OUTPATIENT
Start: 2018-03-05 | End: 2018-03-05

## 2018-03-05 RX ORDER — SUCRALFATE 1 G
1 TABLET ORAL
Qty: 0 | Refills: 0 | Status: DISCONTINUED | OUTPATIENT
Start: 2018-03-05 | End: 2018-03-21

## 2018-03-05 RX ORDER — FINASTERIDE 5 MG/1
5 TABLET, FILM COATED ORAL DAILY
Qty: 0 | Refills: 0 | Status: DISCONTINUED | OUTPATIENT
Start: 2018-03-05 | End: 2018-03-21

## 2018-03-05 RX ORDER — TAMSULOSIN HYDROCHLORIDE 0.4 MG/1
0.4 CAPSULE ORAL AT BEDTIME
Qty: 0 | Refills: 0 | Status: DISCONTINUED | OUTPATIENT
Start: 2018-03-05 | End: 2018-03-21

## 2018-03-05 RX ADMIN — PANTOPRAZOLE SODIUM 40 MILLIGRAM(S): 20 TABLET, DELAYED RELEASE ORAL at 19:03

## 2018-03-05 RX ADMIN — SODIUM CHLORIDE 100 MILLILITER(S): 9 INJECTION INTRAMUSCULAR; INTRAVENOUS; SUBCUTANEOUS at 05:30

## 2018-03-05 RX ADMIN — SODIUM CHLORIDE 1000 MILLILITER(S): 9 INJECTION INTRAMUSCULAR; INTRAVENOUS; SUBCUTANEOUS at 06:47

## 2018-03-05 RX ADMIN — SODIUM CHLORIDE 100 MILLILITER(S): 9 INJECTION INTRAMUSCULAR; INTRAVENOUS; SUBCUTANEOUS at 05:29

## 2018-03-05 RX ADMIN — PANTOPRAZOLE SODIUM 40 MILLIGRAM(S): 20 TABLET, DELAYED RELEASE ORAL at 06:53

## 2018-03-05 RX ADMIN — SODIUM CHLORIDE 100 MILLILITER(S): 9 INJECTION INTRAMUSCULAR; INTRAVENOUS; SUBCUTANEOUS at 19:03

## 2018-03-05 RX ADMIN — Medication 1 GRAM(S): at 06:54

## 2018-03-05 RX ADMIN — ATORVASTATIN CALCIUM 10 MILLIGRAM(S): 80 TABLET, FILM COATED ORAL at 23:12

## 2018-03-05 RX ADMIN — Medication 1 GRAM(S): at 12:52

## 2018-03-05 RX ADMIN — TAMSULOSIN HYDROCHLORIDE 0.4 MILLIGRAM(S): 0.4 CAPSULE ORAL at 23:12

## 2018-03-05 RX ADMIN — Medication 1 GRAM(S): at 23:12

## 2018-03-05 RX ADMIN — AMIODARONE HYDROCHLORIDE 200 MILLIGRAM(S): 400 TABLET ORAL at 12:52

## 2018-03-05 NOTE — ED ADULT NURSE REASSESSMENT NOTE - NS ED NURSE REASSESS COMMENT FT1
Pt. changed into Hospital Bed for increased pt. comfort. Pt. remains stable. Pt. changed into Hospital Bed for increased pt. comfort. Pt. remains stable. Skin tear dressing to right elbow replaced.

## 2018-03-05 NOTE — PROGRESS NOTE ADULT - ASSESSMENT
Assessment and Plan:   Assessment:  · Assessment		  76 y/o male present with Recurrent Gastrointestinal hemorrhage with melena.  On Coudamin with supra-therapeutic INR (3.74).  1. Gastrointestinal hemorrhage, unspecified gastrointestinal hemorrhage type - monitor HH, transfuse additional unit of PRBC, continue PPI, NPO for now, GI reevaluation as EGD completed on recent admission did not reveal any sites of bleeding  2. Chronic atrial fibrillation - rate controlled with amiodarone, off ASA, coumadin held, monitor INR. High risk for CVA and VTE as + Hx of CVA and PE/DVT  3. Acute kidney injury superimposed on chronic kidney disease with hypovolemic Hyponatremia - HOLD lasix, mild IVF hydration  4. H/o Dysphagia - NPO for now. Nutrition consult – please evaluate tube feeds pt on Jevity 1.5 with a/c renal failure and c/o increased bloating with its use.   5. H/o CVA - c/w statin. Bedrest, elevate HOB, high risk of aspirations, turn Q2hrs, high risk of pressure ulcers.

## 2018-03-05 NOTE — PROGRESS NOTE ADULT - SUBJECTIVE AND OBJECTIVE BOX
CC: Bleeding into colostomy (04 Mar 2018 23:39)    HPI: History obtained from patient wife at bedside, due to patient underlying medical condition of expressive aphasia status post CVA in the past.   Mr. Kristofer Salomon (prior [id number]) is a 76 yo male PMHx CAD s/p CABG, AFib on Coumadin, HTN, prostate CA s/p TURP, s/p PEG and colectomy, prior CVA with expressive aphasia and R hemiparesis, CKD 3, recently discharged 3 weeks ago for blood in the Colostomy bag requiring 2 units PRBC transfusion. Coumadin was held, FFP was given and EGD completed which did not reveal source of bleeding. G- tube was placed during EGD. 3 weeks prior to that he was discharged from [facility] s/p respiratory failure s/p intubation due to aspiration pneumonia, Septic shock 2/2 UTI, KAPIL on CKD, recurrent NSVT, UE DVT.    As per wife at bedside, pt is now refusing oral intake, pt use Jevity 1.5, 4 to 5 cans per day. She has noticed that he has an increased about of bloating, vomiting episodes and coughing. In addition she reports that pt stopped bleeding bleeding for 5 days after discharge but has been ongoing since that time. His Coumadin dose has been up titrated to 5mg M-W-F, followed by 2.5mg the remainder of the week. As a result of the recurrent bleeding she stopped giving him his Aspirin (2weeks now), Dronabinol was never started as the pharmacy indicated his insurance did not approve it. She reports no other signs or symptoms. (04 Mar 2018 23:39)    INTERVAL HPI/OVERNIGHT EVENTS: Pt is lethargic, but arousable, difficult to obtain Hx due to dysarthria    Vital Signs Last 24 Hrs  T(C): 36.5 (05 Mar 2018 14:21), Max: 37 (05 Mar 2018 12:30)  T(F): 97.7 (05 Mar 2018 14:21), Max: 98.6 (05 Mar 2018 12:30)  HR: 75 (05 Mar 2018 14:21) (65 - 95)  BP: 101/61 (05 Mar 2018 14:21) (89/49 - 124/69)  RR: 18 (05 Mar 2018 14:21) (16 - 30)  SpO2: 96% (05 Mar 2018 14:21) (94% - 98%)    CARDIAC MARKERS ( 04 Mar 2018 18:37 )  x     / 0.08 ng/mL / x     / x     / x                          7.6    7.5   )-----------( 135      ( 05 Mar 2018 09:26 )             23.2   05 Mar 2018 09:26  128    |  92     |  84.0   ----------------------------<  103    4.3     |  16.0   |  2.96   Ca    8.4        05 Mar 2018 09:26  Mg     2.1       05 Mar 2018 09:26  TPro  7.6    /  Alb  3.3    /  TBili  0.5    /  DBili  x      /  AST  29     /  ALT  32     /  AlkPhos  95     04 Mar 2018 18:37  PT/INR - ( 05 Mar 2018 09:26 )   PT: 38.5 sec;   INR: 3.41 ratio    PTT - ( 05 Mar 2018 09:26 )  PTT:46.4 sec  LIVER FUNCTIONS - ( 04 Mar 2018 18:37 )  Alb: 3.3 g/dL / Pro: 7.6 g/dL / ALK PHOS: 95 U/L / ALT: 32 U/L / AST: 29 U/L / GGT: x           MEDICATIONS  (STANDING):  amiodarone    Tablet 200 milliGRAM(s) Oral daily  atorvastatin 10 milliGRAM(s) Oral at bedtime  finasteride 5 milliGRAM(s) Oral daily  pantoprazole  Injectable 40 milliGRAM(s) IV Push two times a day  sodium chloride 0.9%. 1000 milliLiter(s) (100 mL/Hr) IV Continuous <Continuous>  sucralfate 1 Gram(s) Oral Before meals and at bedtime  tamsulosin 0.4 milliGRAM(s) Oral at bedtime    RADIOLOGY & ADDITIONAL TESTS: personally visualized    PHYSICAL EXAM:    General: elderly male in no acute distress  Eyes: conjunctiva and sclera clear  Head: Normocephalic; atraumatic  ENMT: No nasal discharge; airway clear  Neck: Supple; non tender; no masses  Respiratory: No wheezes, rales or rhonchi, mildly decreased at bases  Cardiovascular: Regular rate and rhythm. S1 and S2  Gastrointestinal: Soft abd, NT, + colostomy with dark stool, + PEG  Genitourinary: No costovertebral angle tenderness  Extremities: No clubbing, cyanosis or edema  Vascular: Peripheral pulses palpable 2+ bilaterally  Neurological: Alert, lethargic, but easily arousable, Right HP  Skin: Warm and dry.   Musculoskeletal: Normal tone, without deformities  Psychiatric: Cooperative

## 2018-03-06 DIAGNOSIS — K92.2 GASTROINTESTINAL HEMORRHAGE, UNSPECIFIED: ICD-10-CM

## 2018-03-06 LAB
ANION GAP SERPL CALC-SCNC: 17 MMOL/L — SIGNIFICANT CHANGE UP (ref 5–17)
BUN SERPL-MCNC: 79 MG/DL — HIGH (ref 8–20)
CALCIUM SERPL-MCNC: 8.4 MG/DL — LOW (ref 8.6–10.2)
CHLORIDE SERPL-SCNC: 99 MMOL/L — SIGNIFICANT CHANGE UP (ref 98–107)
CO2 SERPL-SCNC: 17 MMOL/L — LOW (ref 22–29)
CREAT SERPL-MCNC: 3.03 MG/DL — HIGH (ref 0.5–1.3)
GLUCOSE SERPL-MCNC: 90 MG/DL — SIGNIFICANT CHANGE UP (ref 70–115)
HCT VFR BLD CALC: 25.1 % — LOW (ref 42–52)
HGB BLD-MCNC: 8.2 G/DL — LOW (ref 14–18)
INR BLD: 3.52 RATIO — HIGH (ref 0.88–1.16)
MCHC RBC-ENTMCNC: 29.8 PG — SIGNIFICANT CHANGE UP (ref 27–31)
MCHC RBC-ENTMCNC: 32.7 G/DL — SIGNIFICANT CHANGE UP (ref 32–36)
MCV RBC AUTO: 91.3 FL — SIGNIFICANT CHANGE UP (ref 80–94)
PLATELET # BLD AUTO: 123 K/UL — LOW (ref 150–400)
POTASSIUM SERPL-MCNC: 3.6 MMOL/L — SIGNIFICANT CHANGE UP (ref 3.5–5.3)
POTASSIUM SERPL-SCNC: 3.6 MMOL/L — SIGNIFICANT CHANGE UP (ref 3.5–5.3)
PROTHROM AB SERPL-ACNC: 39.7 SEC — HIGH (ref 9.8–12.7)
RBC # BLD: 2.75 M/UL — LOW (ref 4.6–6.2)
RBC # FLD: 18.5 % — HIGH (ref 11–15.6)
SODIUM SERPL-SCNC: 133 MMOL/L — LOW (ref 135–145)
WBC # BLD: 5.8 K/UL — SIGNIFICANT CHANGE UP (ref 4.8–10.8)
WBC # FLD AUTO: 5.8 K/UL — SIGNIFICANT CHANGE UP (ref 4.8–10.8)

## 2018-03-06 PROCEDURE — 99223 1ST HOSP IP/OBS HIGH 75: CPT

## 2018-03-06 PROCEDURE — 99233 SBSQ HOSP IP/OBS HIGH 50: CPT | Mod: GC

## 2018-03-06 RX ADMIN — TAMSULOSIN HYDROCHLORIDE 0.4 MILLIGRAM(S): 0.4 CAPSULE ORAL at 22:19

## 2018-03-06 RX ADMIN — ATORVASTATIN CALCIUM 10 MILLIGRAM(S): 80 TABLET, FILM COATED ORAL at 22:19

## 2018-03-06 RX ADMIN — AMIODARONE HYDROCHLORIDE 200 MILLIGRAM(S): 400 TABLET ORAL at 05:44

## 2018-03-06 RX ADMIN — Medication 1 GRAM(S): at 05:46

## 2018-03-06 RX ADMIN — Medication 1 GRAM(S): at 18:31

## 2018-03-06 RX ADMIN — PANTOPRAZOLE SODIUM 40 MILLIGRAM(S): 20 TABLET, DELAYED RELEASE ORAL at 18:31

## 2018-03-06 RX ADMIN — FINASTERIDE 5 MILLIGRAM(S): 5 TABLET, FILM COATED ORAL at 13:51

## 2018-03-06 RX ADMIN — SODIUM CHLORIDE 100 MILLILITER(S): 9 INJECTION INTRAMUSCULAR; INTRAVENOUS; SUBCUTANEOUS at 05:47

## 2018-03-06 RX ADMIN — PANTOPRAZOLE SODIUM 40 MILLIGRAM(S): 20 TABLET, DELAYED RELEASE ORAL at 05:44

## 2018-03-06 RX ADMIN — Medication 1 GRAM(S): at 13:51

## 2018-03-06 RX ADMIN — Medication 1 GRAM(S): at 22:19

## 2018-03-06 RX ADMIN — SODIUM CHLORIDE 100 MILLILITER(S): 9 INJECTION INTRAMUSCULAR; INTRAVENOUS; SUBCUTANEOUS at 18:32

## 2018-03-06 NOTE — PROGRESS NOTE ADULT - SUBJECTIVE AND OBJECTIVE BOX
CC: Bleeding into colostomy    HPI:  Mr. Kristofer Salomon (prior [id number]) is a 76 yo male PMHx CAD s/p CABG, AFib on Coumadin, HTN, prostate CA s/p TURP, s/p PEG and colectomy, prior CVA with expressive aphasia and R hemiparesis, CKD 3, recently discharged 3 weeks ago for blood in the Colostomy bag requiring 2 units PRBC transfusion. Coumadin was held, FFP was given and EGD completed which did not reveal source of bleeding. G- tube was placed during EGD. 3 weeks prior to that he was discharged from [facility] s/p respiratory failure s/p intubation due to aspiration pneumonia, Septic shock 2/2 UTI, KAPIL on CKD, recurrent NSVT, UE DVT.    As per wife at bedside, pt is now refusing oral intake, pt uses Jevity 1.5, 4 to 5 cans per day. She has noticed that he has an increased about of bloating, vomiting episodes and coughing. In addition she reports that pt stopped bleeding for 5 days after discharge but has been ongoing since that time. His Coumadin dose has been up titrated to 5mg M-W-F, followed by 2.5mg the remainder of the week. As a result of the recurrent bleeding she stopped giving him his Aspirin (2weeks now), Dronabinol was never started as the pharmacy indicated his insurance did not approve it.     INTERVAL HPI/OVERNIGHT EVENTS: Patient seen and examined lying in bed.  Patient with expressive aphasia and ROS is limited.  Patient denies any abdominal pain, nausea, SOB, CP.    Vital Signs Last 24 Hrs  T(C): 36.5 (06 Mar 2018 08:19), Max: 36.9 (06 Mar 2018 00:21)  T(F): 97.7 (06 Mar 2018 08:19), Max: 98.5 (06 Mar 2018 06:00)  HR: 91 (06 Mar 2018 08:19) (75 - 91)  BP: 105/60 (06 Mar 2018 08:19) (101/61 - 111/54)  BP(mean): --  RR: 18 (06 Mar 2018 08:19) (17 - 18)  SpO2: 97% (06 Mar 2018 08:19) (96% - 98%)  I&O's Detail    PHYSICAL EXAM:  GENERAL: NAD  HEAD:  Atraumatic, Normocephalic  NECK: Supple, No JVD, Normal thyroid  NERVOUS SYSTEM:  Alert & Oriented X3, expressive aphasia; Good concentration; Right hemiparesis  CHEST/LUNG: Clear to auscultation bilaterally; No rales, rhonchi, wheezing, or rubs  HEART: Regular rate and rhythm; No murmurs, rubs, or gallops  ABDOMEN: Soft, Nontender, Nondistended; Bowel sounds present; (+) peg; (+) colostomy  EXTREMITIES:  2+ Peripheral Pulses, No clubbing, cyanosis, or edema          CARDIAC MARKERS ( 04 Mar 2018 18:37 )  x     / 0.08 ng/mL / x     / x     / x                                8.2    5.8   )-----------( 123      ( 06 Mar 2018 06:30 )             25.1     06 Mar 2018 06:33    133    |  99     |  79.0   ----------------------------<  90     3.6     |  17.0   |  3.03     Ca    8.4        06 Mar 2018 06:33  Mg     2.1       05 Mar 2018 09:26    TPro  7.6    /  Alb  3.3    /  TBili  0.5    /  DBili  x      /  AST  29     /  ALT  32     /  AlkPhos  95     04 Mar 2018 18:37    PT/INR - ( 06 Mar 2018 06:29 )   PT: 39.7 sec;   INR: 3.52 ratio         PTT - ( 05 Mar 2018 09:26 )  PTT:46.4 sec        LIVER FUNCTIONS - ( 04 Mar 2018 18:37 )  Alb: 3.3 g/dL / Pro: 7.6 g/dL / ALK PHOS: 95 U/L / ALT: 32 U/L / AST: 29 U/L / GGT: x               MEDICATIONS  (STANDING):  amiodarone    Tablet 200 milliGRAM(s) Oral daily  atorvastatin 10 milliGRAM(s) Oral at bedtime  finasteride 5 milliGRAM(s) Oral daily  pantoprazole  Injectable 40 milliGRAM(s) IV Push two times a day  sodium chloride 0.9%. 1000 milliLiter(s) (100 mL/Hr) IV Continuous <Continuous>  sucralfate 1 Gram(s) Oral Before meals and at bedtime  tamsulosin 0.4 milliGRAM(s) Oral at bedtime    MEDICATIONS  (PRN):      RADIOLOGY & ADDITIONAL TESTS:  < from: CT Abdomen and Pelvis No Cont (03.04.18 @ 19:00) >   EXAM:  CT ABDOMEN AND PELVIS                          PROCEDURE DATE:  03/04/2018          INTERPRETATION:  EXAM:  CT Abdomen and Pelvis Without Intravenous Contrast    CLINICAL HISTORY:  The patient is a 73 years  male; vomiting, assess for obstruction    TECHNIQUE:  Axial computed tomography images of the abdomen and pelvis without   intravenous   contrast.  Coronal and sagittal reformatted images were created and reviewed.    COMPARISON:  No relevant prior studies available.    FINDINGS:  LOWER THORAX:  Small right and trace left pleural effusions.  Surgical   clips   seen adjacent to the cardiac apex partially imaged. Bibasilar traction   bronchiectasis. Spiculated, reticular and tree-in-bud opacities in the   right middle lobe, may beinfectious or inflammatory, follow-up is   recommended in 3 months.     ABDOMEN:  LIVER: Possible congestive hepatopathy and cirrhosis.  GALLBLADDER AND BILE DUCTS:  Cholelithiasis.  No ductal dilation.  PANCREAS:  Unremarkable.  No ductal dilation.  SPLEEN:  Unremarkable.  No splenomegaly.  ADRENALS: 2.5 cm right and 2.3 cm left adrenal nodules, indeterminate.  KIDNEYS AND URETERS: 2.7 cm indeterminate left renal lesion. Multiple   right intrarenal calculi at the largest upper pole 1.3 cm. No   hydronephrosis.  STOMACH AND BOWEL:  Status post subtotal colectomy with right lower   quadrant   ileostomy in place with small parastomal hernia.  Subtle fatty   infiltration   rather quadrant abdomen.  Moderate bilateral fat containing inguinal   hernias   with nonobstructed small bowel segments and fluid seen on the right.   Percutaneous gastrostomy in place. The stomach is nondistended.  APPENDIX:  See above.    PELVIS:  BLADDER:  Bladder mildly thick walled with internal air.  No stones.  REPRODUCTIVE:  Multiple brachytherapy seeds in prostate bed.    ABDOMEN and PELVIS:  INTRAPERITONEAL SPACE:  Unremarkable.  No free air.  No significant fluid   collection.  BONES/JOINTS:  Moderate multilevel degenerative changes thoracolumbar   spine.    No acute fracture.  No dislocation.  SOFT TISSUES:  See above.  VASCULATURE:  Infrarenal IVC filter in place.    Extensive atherosclerotic calcification abdominal aorta, mesenteric and   iliac vessels.  No abdominal aortic aneurysm.  LYMPH NODES:  Unremarkable.  No enlarged lymph nodes.  TUBES, LINES AND DEVICES:  Gastrostomy tube in place.    IMPRESSION:    No acute obstruction.    Large bowel and fluid containing right inguinal hernia. Small bowel   containing right parastomal hernia. Other incidental findings as above.    Incidental findings in the lungs which follow-up is recommended as above.    Incidental lesions of the adrenal glands left kidney for which follow-up   contrast enhanced MRI is recommended.                  NADIRA QUINONEZ M.D., ATTENDING RADIOLOGIST  This document has been electronically signed. Mar  5 2018  9:00AM              < end of copied text >

## 2018-03-06 NOTE — PROGRESS NOTE ADULT - ASSESSMENT
Assessment and Plan:   		  78 y/o male present with Recurrent Gastrointestinal hemorrhage with melena.  On Coumadin with supra-therapeutic INR (3.74).  1. Gastrointestinal hemorrhage, unspecified gastrointestinal hemorrhage type - monitor HH, s/p 2units PRBC, continue PPI, NPO for now, GI reevaluated as EGD completed on recent admission did not reveal any sites of bleeding. No active bleeding at this time.  Will monitor for now.  2. Chronic atrial fibrillation - rate controlled with amiodarone, off ASA, coumadin held, monitor INR. High risk for CVA and VTE as + Hx of CVA and PE/DVT  3. Acute kidney injury superimposed on chronic kidney disease with hypovolemic Hyponatremia - HOLD lasix, mild IVF hydration  4. H/o Dysphagia - NPO for now. Nutrition consult – to evaluate tube feeds pt on Jevity 1.5 with a/c renal failure and c/o increased bloating with its use.   5. H/o CVA - c/w statin. Bedrest, elevate HOB, high risk of aspirations, turn Q2hrs, high risk of pressure ulcers.

## 2018-03-07 DIAGNOSIS — K92.1 MELENA: ICD-10-CM

## 2018-03-07 LAB
ANION GAP SERPL CALC-SCNC: 17 MMOL/L — SIGNIFICANT CHANGE UP (ref 5–17)
BUN SERPL-MCNC: 66 MG/DL — HIGH (ref 8–20)
CALCIUM SERPL-MCNC: 8.5 MG/DL — LOW (ref 8.6–10.2)
CHLORIDE SERPL-SCNC: 108 MMOL/L — HIGH (ref 98–107)
CO2 SERPL-SCNC: 16 MMOL/L — LOW (ref 22–29)
CREAT SERPL-MCNC: 2.73 MG/DL — HIGH (ref 0.5–1.3)
GLUCOSE SERPL-MCNC: 94 MG/DL — SIGNIFICANT CHANGE UP (ref 70–115)
HCT VFR BLD CALC: 26.3 % — LOW (ref 42–52)
HGB BLD-MCNC: 8.4 G/DL — LOW (ref 14–18)
INR BLD: 3.62 RATIO — HIGH (ref 0.88–1.16)
MCHC RBC-ENTMCNC: 31 PG — SIGNIFICANT CHANGE UP (ref 27–31)
MCHC RBC-ENTMCNC: 31.9 G/DL — LOW (ref 32–36)
MCV RBC AUTO: 97 FL — HIGH (ref 80–94)
PLATELET # BLD AUTO: 120 K/UL — LOW (ref 150–400)
POTASSIUM SERPL-MCNC: 3.8 MMOL/L — SIGNIFICANT CHANGE UP (ref 3.5–5.3)
POTASSIUM SERPL-SCNC: 3.8 MMOL/L — SIGNIFICANT CHANGE UP (ref 3.5–5.3)
PROTHROM AB SERPL-ACNC: 40.9 SEC — HIGH (ref 9.8–12.7)
RBC # BLD: 2.71 M/UL — LOW (ref 4.6–6.2)
RBC # FLD: 19.2 % — HIGH (ref 11–15.6)
SODIUM SERPL-SCNC: 141 MMOL/L — SIGNIFICANT CHANGE UP (ref 135–145)
WBC # BLD: 5.3 K/UL — SIGNIFICANT CHANGE UP (ref 4.8–10.8)
WBC # FLD AUTO: 5.3 K/UL — SIGNIFICANT CHANGE UP (ref 4.8–10.8)

## 2018-03-07 PROCEDURE — 99497 ADVNCD CARE PLAN 30 MIN: CPT | Mod: GC

## 2018-03-07 PROCEDURE — 99233 SBSQ HOSP IP/OBS HIGH 50: CPT | Mod: GC

## 2018-03-07 PROCEDURE — 99232 SBSQ HOSP IP/OBS MODERATE 35: CPT

## 2018-03-07 RX ORDER — PHYTONADIONE (VIT K1) 5 MG
5 TABLET ORAL ONCE
Qty: 0 | Refills: 0 | Status: COMPLETED | OUTPATIENT
Start: 2018-03-07 | End: 2018-03-07

## 2018-03-07 RX ADMIN — Medication 1 GRAM(S): at 11:58

## 2018-03-07 RX ADMIN — Medication 5 MILLIGRAM(S): at 11:58

## 2018-03-07 RX ADMIN — FINASTERIDE 5 MILLIGRAM(S): 5 TABLET, FILM COATED ORAL at 11:59

## 2018-03-07 RX ADMIN — ATORVASTATIN CALCIUM 10 MILLIGRAM(S): 80 TABLET, FILM COATED ORAL at 22:19

## 2018-03-07 RX ADMIN — AMIODARONE HYDROCHLORIDE 200 MILLIGRAM(S): 400 TABLET ORAL at 06:43

## 2018-03-07 RX ADMIN — Medication 1 GRAM(S): at 22:19

## 2018-03-07 RX ADMIN — Medication 1 GRAM(S): at 18:32

## 2018-03-07 RX ADMIN — Medication 1 GRAM(S): at 06:43

## 2018-03-07 RX ADMIN — TAMSULOSIN HYDROCHLORIDE 0.4 MILLIGRAM(S): 0.4 CAPSULE ORAL at 22:19

## 2018-03-07 RX ADMIN — PANTOPRAZOLE SODIUM 40 MILLIGRAM(S): 20 TABLET, DELAYED RELEASE ORAL at 06:43

## 2018-03-07 RX ADMIN — SODIUM CHLORIDE 100 MILLILITER(S): 9 INJECTION INTRAMUSCULAR; INTRAVENOUS; SUBCUTANEOUS at 13:19

## 2018-03-07 RX ADMIN — PANTOPRAZOLE SODIUM 40 MILLIGRAM(S): 20 TABLET, DELAYED RELEASE ORAL at 19:03

## 2018-03-07 NOTE — DIETITIAN INITIAL EVALUATION ADULT. - OTHER INFO
Pt admitted with GI hemorrhage. s/p PEG, Colostomy + ileostomy. Chronic bleeds through colostomy site. Poor prognosis given from MD to Pts wife during interview. Plan to start PEG feeds of Vital 1.5 @55ml/hr x24hr to minimize bloating.

## 2018-03-07 NOTE — PROGRESS NOTE ADULT - SUBJECTIVE AND OBJECTIVE BOX
CC: Bleeding into colostomy    HPI:  Mr. 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 Coumadin was held, FFP was given and EGD completed which did not reveal source of bleeding. G- tube was placed during EGD. 3 weeks prior to that he was discharged from [facility] s/p respiratory failure s/p intubation due to aspiration pneumonia, Septic shock 2/2 UTI, KAPIL on CKD, recurrent NSVT, UE DVT.    As per wife at bedside, pt is now refusing oral intake, pt uses Jevity 1.5, 4 to 5 cans per day. She has noticed that he has an increased about of bloating, vomiting episodes and coughing. In addition she reports that pt stopped bleeding for 5 days after discharge but has been ongoing since that time. His Coumadin dose has been up titrated to 5mg M-W-F, followed by 2.5mg the remainder of the week. As a result of the recurrent bleeding she stopped giving him his Aspirin (2weeks now), Dronabinol was never started as the pharmacy indicated his insurance did not approve it.     INTERVAL HPI/OVERNIGHT EVENTS:  Patient seen and examined lying in bed. Patient without bleeding from ileostomy.  Patient denies any SOB, CP, abdominal pain, nausea, vomiting.  ROS limited secondary to expressive aphasia.    Vital Signs Last 24 Hrs  T(C): 36.7 (07 Mar 2018 07:49), Max: 36.9 (07 Mar 2018 06:37)  T(F): 98.1 (07 Mar 2018 07:49), Max: 98.4 (07 Mar 2018 06:37)  HR: 89 (07 Mar 2018 07:49) (84 - 89)  BP: 105/63 (07 Mar 2018 07:49) (105/63 - 114/58)  BP(mean): --  RR: 18 (07 Mar 2018 07:49) (17 - 18)  SpO2: 97% (07 Mar 2018 07:49) (94% - 98%)  I&O's Detail    07 Mar 2018 07:01  -  07 Mar 2018 14:29  --------------------------------------------------------  IN:  Total IN: 0 mL    OUT:    Colostomy: 150 mL  Total OUT: 150 mL    Total NET: -150 mL      PHYSICAL EXAM:  GENERAL: NAD  HEAD:  Atraumatic, Normocephalic  NECK: Supple, No JVD, Normal thyroid  NERVOUS SYSTEM:  Alert & Oriented X3, Good concentration; expressive aphasia; right hemiparesis  CHEST/LUNG: Clear to auscultation bilaterally; No rales, rhonchi, wheezing, or rubs  HEART: Regular rate and rhythm; No murmurs, rubs, or gallops  ABDOMEN: Soft, Nontender, Nondistended; Bowel sounds present; (+) peg; (+) Ileostomy  EXTREMITIES:  2+ Peripheral Pulses, No clubbing, cyanosis, or edema                                8.4    5.3   )-----------( 120      ( 07 Mar 2018 09:28 )             26.3     07 Mar 2018 09:28    141    |  108    |  66.0   ----------------------------<  94     3.8     |  16.0   |  2.73     Ca    8.5        07 Mar 2018 09:28      PT/INR - ( 07 Mar 2018 09:28 )   PT: 40.9 sec;   INR: 3.62 ratio            MEDICATIONS  (STANDING):  amiodarone    Tablet 200 milliGRAM(s) Oral daily  atorvastatin 10 milliGRAM(s) Oral at bedtime  finasteride 5 milliGRAM(s) Oral daily  pantoprazole  Injectable 40 milliGRAM(s) IV Push two times a day  sodium chloride 0.9%. 1000 milliLiter(s) (100 mL/Hr) IV Continuous <Continuous>  sucralfate 1 Gram(s) Oral Before meals and at bedtime  tamsulosin 0.4 milliGRAM(s) Oral at bedtime    MEDICATIONS  (PRN):

## 2018-03-07 NOTE — PROGRESS NOTE ADULT - ASSESSMENT
Assessment and Plan:   		  78 y/o male present with Recurrent Gastrointestinal hemorrhage with melena.  On Coumadin with supra-therapeutic INR (3.74).  1. Gastrointestinal hemorrhage, unspecified gastrointestinal hemorrhage type - monitor HH, s/p 2units PRBC, continue PPI, restarted mechanical soft with nectar liquids and tube feeds, GI reevaluated as EGD completed on recent admission did not reveal any sites of bleeding. No active bleeding at this time.  No further testing as per GI.  Will monitor for now.  2. Chronic atrial fibrillation - rate controlled with amiodarone, off ASA, coumadin held, monitor INR. High risk for CVA and VTE as + Hx of CVA and PE/DVT  3. Acute kidney injury superimposed on chronic kidney disease with hypovolemic Hyponatremia - HOLD lasix, mild IVF hydration  4. H/o Dysphagia - Mechanical soft with nectar thick and tube feeds started. Nutrition consulted.   5. H/o CVA - c/w statin. Bedrest, elevate HOB, high risk of aspirations, turn Q2hrs, high risk of pressure ulcers.

## 2018-03-07 NOTE — PROGRESS NOTE ADULT - SUBJECTIVE AND OBJECTIVE BOX
Pt seen and examined f/u for occasional blood in ileostomy  This morning he has no complaints. Th ileostomy bag continues to be without blood.  REVIEW OF SYSTEMS:    CONSTITUTIONAL: No fever, weight loss, or fatigue  EYES: No eye pain, visual disturbances, or discharge  ENMT:  No difficulty hearing, tinnitus, vertigo; No sinus or throat pain  RESPIRATORY: No cough, wheezing, chills or hemoptysis; No shortness of breath  CARDIOVASCULAR: No chest pain, palpitations, dizziness, or leg swelling  GASTROINTESTINAL: No abdominal or epigastric pain. No nausea, vomiting, or hematemesis; No diarrhea or constipation. No melena or hematochezia.    MEDICATIONS:  MEDICATIONS  (STANDING):  amiodarone    Tablet 200 milliGRAM(s) Oral daily  atorvastatin 10 milliGRAM(s) Oral at bedtime  finasteride 5 milliGRAM(s) Oral daily  pantoprazole  Injectable 40 milliGRAM(s) IV Push two times a day  sodium chloride 0.9%. 1000 milliLiter(s) (100 mL/Hr) IV Continuous <Continuous>  sucralfate 1 Gram(s) Oral Before meals and at bedtime  tamsulosin 0.4 milliGRAM(s) Oral at bedtime    MEDICATIONS  (PRN):      Allergies    Allergy Status Unknown    Intolerances        Vital Signs Last 24 Hrs  T(C): 36.7 (07 Mar 2018 07:49), Max: 36.9 (07 Mar 2018 06:37)  T(F): 98.1 (07 Mar 2018 07:49), Max: 98.4 (07 Mar 2018 06:37)  HR: 89 (07 Mar 2018 07:49) (84 - 89)  BP: 105/63 (07 Mar 2018 07:49) (105/63 - 114/58)  BP(mean): --  RR: 18 (07 Mar 2018 07:49) (17 - 18)  SpO2: 97% (07 Mar 2018 07:49) (94% - 98%)      PHYSICAL EXAM:    General: Well developed; well nourished; in no acute distress  HEENT: MMM, conjunctiva and sclera clear  Gastrointestinal:Abdomen: Soft non-tender non-distended; Normal bowel sounds; No hepatosplenomegaly, ileostomy in RLG with loose bown stool and no blood.  Extremities: no cyanosis, clubbing or edema.  Skin: Warm and dry. No obvious rash    LABS:      CBC Full  -  ( 06 Mar 2018 06:30 )  WBC Count : 5.8 K/uL  Hemoglobin : 8.2 g/dL  Hematocrit : 25.1 %  Platelet Count - Automated : 123 K/uL  Mean Cell Volume : 91.3 fl  Mean Cell Hemoglobin : 29.8 pg  Mean Cell Hemoglobin Concentration : 32.7 g/dL  Auto Neutrophil # : x  Auto Lymphocyte # : x  Auto Monocyte # : x  Auto Eosinophil # : x  Auto Basophil # : x  Auto Neutrophil % : x  Auto Lymphocyte % : x  Auto Monocyte % : x  Auto Eosinophil % : x  Auto Basophil % : x    03-06    133<L>  |  99  |  79.0<H>  ----------------------------<  90  3.6   |  17.0<L>  |  3.03<H>    Ca    8.4<L>      06 Mar 2018 06:33  Mg     2.1     03-05      PT/INR - ( 06 Mar 2018 06:29 )   PT: 39.7 sec;   INR: 3.52 ratio         PTT - ( 05 Mar 2018 09:26 )  PTT:46.4 sec                  RADIOLOGY & ADDITIONAL STUDIES (The following images were personally reviewed):

## 2018-03-07 NOTE — DIETITIAN INITIAL EVALUATION ADULT. - NS AS NUTRI INTERV MEALS SNACK
Initiate Mechanical soft diet as tolerated- required feeding assistance/ encouragement/Texture-modified diet

## 2018-03-08 LAB
ANION GAP SERPL CALC-SCNC: 13 MMOL/L — SIGNIFICANT CHANGE UP (ref 5–17)
BUN SERPL-MCNC: 54 MG/DL — HIGH (ref 8–20)
CALCIUM SERPL-MCNC: 7.4 MG/DL — LOW (ref 8.6–10.2)
CHLORIDE SERPL-SCNC: 114 MMOL/L — HIGH (ref 98–107)
CO2 SERPL-SCNC: 14 MMOL/L — LOW (ref 22–29)
CREAT SERPL-MCNC: 2.57 MG/DL — HIGH (ref 0.5–1.3)
GLUCOSE SERPL-MCNC: 138 MG/DL — HIGH (ref 70–115)
HCT VFR BLD CALC: 25.3 % — LOW (ref 42–52)
HGB BLD-MCNC: 7.9 G/DL — LOW (ref 14–18)
INR BLD: 1.96 RATIO — HIGH (ref 0.88–1.16)
MCHC RBC-ENTMCNC: 29.6 PG — SIGNIFICANT CHANGE UP (ref 27–31)
MCHC RBC-ENTMCNC: 31.2 G/DL — LOW (ref 32–36)
MCV RBC AUTO: 94.8 FL — HIGH (ref 80–94)
PLATELET # BLD AUTO: 108 K/UL — LOW (ref 150–400)
POTASSIUM SERPL-MCNC: 3.3 MMOL/L — LOW (ref 3.5–5.3)
POTASSIUM SERPL-SCNC: 3.3 MMOL/L — LOW (ref 3.5–5.3)
PROTHROM AB SERPL-ACNC: 21.9 SEC — HIGH (ref 9.8–12.7)
RBC # BLD: 2.67 M/UL — LOW (ref 4.6–6.2)
RBC # FLD: 19.4 % — HIGH (ref 11–15.6)
SODIUM SERPL-SCNC: 141 MMOL/L — SIGNIFICANT CHANGE UP (ref 135–145)
WBC # BLD: 5 K/UL — SIGNIFICANT CHANGE UP (ref 4.8–10.8)
WBC # FLD AUTO: 5 K/UL — SIGNIFICANT CHANGE UP (ref 4.8–10.8)

## 2018-03-08 PROCEDURE — 99233 SBSQ HOSP IP/OBS HIGH 50: CPT | Mod: GC

## 2018-03-08 RX ORDER — POTASSIUM CHLORIDE 20 MEQ
40 PACKET (EA) ORAL ONCE
Qty: 0 | Refills: 0 | Status: COMPLETED | OUTPATIENT
Start: 2018-03-08 | End: 2018-03-08

## 2018-03-08 RX ADMIN — Medication 40 MILLIEQUIVALENT(S): at 13:28

## 2018-03-08 RX ADMIN — PANTOPRAZOLE SODIUM 40 MILLIGRAM(S): 20 TABLET, DELAYED RELEASE ORAL at 18:42

## 2018-03-08 RX ADMIN — FINASTERIDE 5 MILLIGRAM(S): 5 TABLET, FILM COATED ORAL at 13:28

## 2018-03-08 RX ADMIN — Medication 1 GRAM(S): at 13:28

## 2018-03-08 RX ADMIN — SODIUM CHLORIDE 100 MILLILITER(S): 9 INJECTION INTRAMUSCULAR; INTRAVENOUS; SUBCUTANEOUS at 08:08

## 2018-03-08 RX ADMIN — AMIODARONE HYDROCHLORIDE 200 MILLIGRAM(S): 400 TABLET ORAL at 06:26

## 2018-03-08 RX ADMIN — Medication 1 GRAM(S): at 18:42

## 2018-03-08 RX ADMIN — Medication 1 GRAM(S): at 08:07

## 2018-03-08 RX ADMIN — PANTOPRAZOLE SODIUM 40 MILLIGRAM(S): 20 TABLET, DELAYED RELEASE ORAL at 06:26

## 2018-03-08 NOTE — PROGRESS NOTE ADULT - SUBJECTIVE AND OBJECTIVE BOX
CC: Bleeding into colostomy    HPI:  Mr. Kristofer Salomon (prior [id number]) is a 76 yo male PMHx CAD s/p CABG, AFib on Coumadin, HTN, prostate CA s/p TURP, s/p PEG and colectomy, prior CVA with expressive aphasia and R hemiparesis, CKD 3, recently discharged 3 weeks ago for blood in the Colostomy bag requiring 2 units PRBC transfusion. Coumadin was held, FFP was given and EGD completed which did not reveal source of bleeding. G- tube was placed during EGD. 3 weeks prior to that he was discharged from [facility] s/p respiratory failure s/p intubation due to aspiration pneumonia, Septic shock 2/2 UTI, KAPIL on CKD, recurrent NSVT, UE DVT.    As per wife at bedside, pt is now refusing oral intake, pt uses Jevity 1.5, 4 to 5 cans per day. She has noticed that he has an increased about of bloating, vomiting episodes and coughing. In addition she reports that pt stopped bleeding for 5 days after discharge but has been ongoing since that time. His Coumadin dose has been up titrated to 5mg M-W-F, followed by 2.5mg the remainder of the week. As a result of the recurrent bleeding she stopped giving him his Aspirin (2weeks now), Dronabinol was never started as the pharmacy indicated his insurance did not approve it.     INTERVAL HPI/OVERNIGHT EVENTS: Patient seen and examined lying in bed.  Patient tolerating tube feeds at this time.  Patient denies any headache, dizziness, SOB, CP, abdominal pain, nausea, dysuria.  ROS limited secondary to expressive aphasia.    Vital Signs Last 24 Hrs  T(C): 36.5 (08 Mar 2018 08:13), Max: 36.5 (08 Mar 2018 08:13)  T(F): 97.7 (08 Mar 2018 08:13), Max: 97.7 (08 Mar 2018 08:13)  HR: 75 (08 Mar 2018 08:13) (75 - 90)  BP: 91/51 (08 Mar 2018 08:13) (91/51 - 119/69)  BP(mean): --  RR: 18 (08 Mar 2018 08:13) (18 - 18)  SpO2: 98% (08 Mar 2018 08:13) (97% - 98%)  I&O's Detail    07 Mar 2018 07:01  -  08 Mar 2018 07:00  --------------------------------------------------------  IN:  Total IN: 0 mL    OUT:    Colostomy: 1275 mL  Total OUT: 1275 mL    Total NET: -1275 mL      08 Mar 2018 07:01  -  08 Mar 2018 12:07  --------------------------------------------------------  IN:  Total IN: 0 mL    OUT:    Colostomy: 350 mL  Total OUT: 350 mL    Total NET: -350 mL      PHYSICAL EXAM:  GENERAL: NAD  HEAD:  Atraumatic, Normocephalic  NECK: Supple, No JVD, Normal thyroid  NERVOUS SYSTEM:  Alert, Good concentration; expressive aphasia; right hemiparesis  CHEST/LUNG: Clear to auscultation bilaterally; No rales, rhonchi, wheezing, or rubs  HEART: Regular rate and rhythm; No murmurs, rubs, or gallops  ABDOMEN: Soft, Nontender, Nondistended; Bowel sounds present; (+) peg; (+) Ileostomy with light brown stool  EXTREMITIES:  2+ Peripheral Pulses, No clubbing, cyanosis, or edema                                  7.9    5.0   )-----------( 108      ( 08 Mar 2018 09:17 )             25.3     08 Mar 2018 09:17    141    |  114    |  54.0   ----------------------------<  138    3.3     |  14.0   |  2.57     Ca    7.4        08 Mar 2018 09:17      PT/INR - ( 08 Mar 2018 09:17 )   PT: 21.9 sec;   INR: 1.96 ratio          MEDICATIONS  (STANDING):  amiodarone    Tablet 200 milliGRAM(s) Oral daily  atorvastatin 10 milliGRAM(s) Oral at bedtime  finasteride 5 milliGRAM(s) Oral daily  pantoprazole  Injectable 40 milliGRAM(s) IV Push two times a day  potassium chloride   Powder 40 milliEquivalent(s) Oral once  sodium chloride 0.9%. 1000 milliLiter(s) (100 mL/Hr) IV Continuous <Continuous>  sucralfate 1 Gram(s) Oral Before meals and at bedtime  tamsulosin 0.4 milliGRAM(s) Oral at bedtime    MEDICATIONS  (PRN):

## 2018-03-08 NOTE — PROGRESS NOTE ADULT - ASSESSMENT
Assessment and Plan:   		  78 y/o male present with Recurrent Gastrointestinal hemorrhage with melena.  On Coumadin with supra-therapeutic INR (3.74).  1. Gastrointestinal hemorrhage, unspecified gastrointestinal hemorrhage type - monitor H/H, s/p 2units PRBC, continue PPI, restarted mechanical soft with nectar liquids and tube feeds and tolerating, GI reevaluated as EGD completed on recent admission did not reveal any sites of bleeding. No active bleeding at this time.  No further testing as per GI.  Will continue monitor for now.  2. Chronic atrial fibrillation - rate controlled with amiodarone, off ASA, coumadin held, monitor INR, now 1.96 after Vitamin K. High risk for CVA and VTE as + Hx of CVA and PE/DVT  3. Acute kidney injury superimposed on chronic kidney disease with hypovolemic Hyponatremia - HOLD lasix, mild IVF hydration  4. H/o Dysphagia - Mechanical soft with nectar thick and tube feeds started. Nutrition consulted. Patient tolerating.  5. H/o CVA - c/w statin. Bedrest, elevate HOB, high risk of aspirations, turn Q2hrs, high risk of pressure ulcers.

## 2018-03-09 LAB
ANION GAP SERPL CALC-SCNC: 12 MMOL/L — SIGNIFICANT CHANGE UP (ref 5–17)
BUN SERPL-MCNC: 50 MG/DL — HIGH (ref 8–20)
CALCIUM SERPL-MCNC: 8.6 MG/DL — SIGNIFICANT CHANGE UP (ref 8.6–10.2)
CHLORIDE SERPL-SCNC: 116 MMOL/L — HIGH (ref 98–107)
CO2 SERPL-SCNC: 15 MMOL/L — LOW (ref 22–29)
CREAT SERPL-MCNC: 2.42 MG/DL — HIGH (ref 0.5–1.3)
GLUCOSE SERPL-MCNC: 110 MG/DL — SIGNIFICANT CHANGE UP (ref 70–115)
HCT VFR BLD CALC: 24.6 % — LOW (ref 42–52)
HGB BLD-MCNC: 7.8 G/DL — LOW (ref 14–18)
INR BLD: 1.5 RATIO — HIGH (ref 0.88–1.16)
MAGNESIUM SERPL-MCNC: 1.8 MG/DL — SIGNIFICANT CHANGE UP (ref 1.6–2.6)
MCHC RBC-ENTMCNC: 30.6 PG — SIGNIFICANT CHANGE UP (ref 27–31)
MCHC RBC-ENTMCNC: 31.7 G/DL — LOW (ref 32–36)
MCV RBC AUTO: 96.5 FL — HIGH (ref 80–94)
PLATELET # BLD AUTO: 109 K/UL — LOW (ref 150–400)
POTASSIUM SERPL-MCNC: 4.4 MMOL/L — SIGNIFICANT CHANGE UP (ref 3.5–5.3)
POTASSIUM SERPL-SCNC: 4.4 MMOL/L — SIGNIFICANT CHANGE UP (ref 3.5–5.3)
PROTHROM AB SERPL-ACNC: 16.6 SEC — HIGH (ref 9.8–12.7)
RBC # BLD: 2.55 M/UL — LOW (ref 4.6–6.2)
RBC # FLD: 19.5 % — HIGH (ref 11–15.6)
SODIUM SERPL-SCNC: 143 MMOL/L — SIGNIFICANT CHANGE UP (ref 135–145)
WBC # BLD: 5.7 K/UL — SIGNIFICANT CHANGE UP (ref 4.8–10.8)
WBC # FLD AUTO: 5.7 K/UL — SIGNIFICANT CHANGE UP (ref 4.8–10.8)

## 2018-03-09 PROCEDURE — 99233 SBSQ HOSP IP/OBS HIGH 50: CPT | Mod: GC

## 2018-03-09 PROCEDURE — 71045 X-RAY EXAM CHEST 1 VIEW: CPT | Mod: 26

## 2018-03-09 RX ORDER — IPRATROPIUM/ALBUTEROL SULFATE 18-103MCG
3 AEROSOL WITH ADAPTER (GRAM) INHALATION EVERY 6 HOURS
Qty: 0 | Refills: 0 | Status: DISCONTINUED | OUTPATIENT
Start: 2018-03-09 | End: 2018-03-11

## 2018-03-09 RX ADMIN — PANTOPRAZOLE SODIUM 40 MILLIGRAM(S): 20 TABLET, DELAYED RELEASE ORAL at 06:44

## 2018-03-09 RX ADMIN — Medication 1 GRAM(S): at 13:19

## 2018-03-09 RX ADMIN — TAMSULOSIN HYDROCHLORIDE 0.4 MILLIGRAM(S): 0.4 CAPSULE ORAL at 21:47

## 2018-03-09 RX ADMIN — Medication 1 GRAM(S): at 18:05

## 2018-03-09 RX ADMIN — TAMSULOSIN HYDROCHLORIDE 0.4 MILLIGRAM(S): 0.4 CAPSULE ORAL at 00:08

## 2018-03-09 RX ADMIN — ATORVASTATIN CALCIUM 10 MILLIGRAM(S): 80 TABLET, FILM COATED ORAL at 21:48

## 2018-03-09 RX ADMIN — Medication 3 MILLILITER(S): at 15:50

## 2018-03-09 RX ADMIN — ATORVASTATIN CALCIUM 10 MILLIGRAM(S): 80 TABLET, FILM COATED ORAL at 00:08

## 2018-03-09 RX ADMIN — Medication 3 MILLILITER(S): at 20:37

## 2018-03-09 RX ADMIN — FINASTERIDE 5 MILLIGRAM(S): 5 TABLET, FILM COATED ORAL at 13:19

## 2018-03-09 RX ADMIN — PANTOPRAZOLE SODIUM 40 MILLIGRAM(S): 20 TABLET, DELAYED RELEASE ORAL at 18:05

## 2018-03-09 RX ADMIN — Medication 1 GRAM(S): at 21:48

## 2018-03-09 RX ADMIN — AMIODARONE HYDROCHLORIDE 200 MILLIGRAM(S): 400 TABLET ORAL at 06:44

## 2018-03-09 RX ADMIN — Medication 1 GRAM(S): at 06:44

## 2018-03-09 RX ADMIN — Medication 1 GRAM(S): at 00:08

## 2018-03-09 NOTE — PROGRESS NOTE ADULT - ASSESSMENT
Assessment and Plan:   		  78 y/o male present with Recurrent Gastrointestinal hemorrhage with melena.  On Coumadin with supra-therapeutic INR.   78 yo male (prior [id number]) PMHx CAD s/p CABG, AFib on Coumadin, HTN, prostate CA s/p TURP, s/p PEG and colectomy, prior CVA with expressive aphasia and R hemiparesis, CKD presents with recurrent gastrointestinal hemorrhage, on coumadin with supra-theraputic INR (3.74).  Pt is now refusing oral intake, pt uses Jevity 1.5, 4 to 5 cans per day. She has noticed that he has an increased about of bloating, vomiting episodes and coughing. His Coumadin dose has been up titrated to 5mg M-W-F, followed by 2.5mg the remainder of the week. As a result of the recurrent bleeding she stopped giving him his Aspirin (2weeks now), Dronabinol was never started as the pharmacy indicated his insurance did not approve it. Patient wheezing/ rhonchi, C-xray and duonebs.    1. Gastrointestinal hemorrhage, unspecified gastrointestinal hemorrhage type - monitor H/H (7.8/24.6 today), s/p 2units PRBC, continue PPI, restarted mechanical soft with nectar liquids and tube feeds and tolerating, GI reevaluated as EGD completed on recent admission did not reveal any sites of bleeding. No active bleeding at this time.  No further testing as per GI.  Will continue monitor for now.  2. Chronic atrial fibrillation - rate controlled with amiodarone, off ASA, coumadin held, monitor INR, now 1.5. High risk for CVA and VTE as + Hx of CVA and PE/DVT  3. Acute kidney injury superimposed on chronic kidney disease with hypovolemic Hyponatremia - HOLD lasix, mild IVF hydration  4. H/o Dysphagia - Mechanical soft with nectar thick and tube feeds started. Nutrition consult appreciated. Patient tolerating feedings.  Patient will require Vital bolus feeds at home as patient could not tolerate Jevity.  5. H/o CVA - c/w statin. Bedrest, elevate HOB, high risk of aspirations, turn Q2hrs, high risk of pressure ulcers.   6. Respiratory - Wheezing/ rhonchi -C-xray, duoneb, Chest PT

## 2018-03-09 NOTE — CHART NOTE - NSCHARTNOTEFT_GEN_A_CORE
Nutrition note:    Pt tolerating Vital 1.5 @55ml/hr via PEG + Mechanical soft, nectar diet.   Pt with very limited amount of food consumed via PO. Refused food this morning per RN, generally 10% or less of nutrition met via PO x 5 days.     Pt has necessity for Vital formula, as he was having severe bloating and gas output in colostomy bag with Jevity 1.5 (concentrated calories with added fiber).   Vital suits Pts needs as it is a peptide based, low residue, elemental formula for patients who are experiencing maldigestion and symptoms of GI intolerance.     Recommendations for discharge: Vital 1.5 via PEG-  4 cans/day (1420 kcal, 64 gm pro, 960 ml) + Mechanical soft, nectar diet as tolerated.       Please contact prn,    Shruthi Culver RD Nutrition note:    Pt tolerating Vital 1.5 @55ml/hr via PEG + Mechanical soft, nectar diet.   Pt with very limited amount of food consumed via PO. Refused food this morning per RN, generally 10% or less of nutrition met via PO x 5 days.     Pt has necessity for Vital formula, as he was having severe bloating and gas output in colostomy bag with Jevity 1.5 (concentrated calories with added fiber).   Vital suits Pts needs as it is a peptide based, low residue, elemental formula for patients who are experiencing maldigestion and symptoms of GI intolerance.     Recommendations for discharge: Bolus feeds of Vital 1.5 via PEG-  4 cans/day (1420 kcal, 64 gm pro, 960 ml) + Mechanical soft, nectar diet as tolerated.       Please contact prn,    Shruthi Culver RD

## 2018-03-09 NOTE — PROGRESS NOTE ADULT - SUBJECTIVE AND OBJECTIVE BOX
CC: Bleeding into colostomy      INTERVAL HPI/OVERNIGHT EVENTS: Patient seen and examined lying in bed. Patient with nonproductive cough/wheezing. Patient denies any headache, dizziness, SOB, CP, abdominal pain, nausea, dysuria.  ROS limited secondary to expressive aphasia.      Vital Signs Last 24 Hrs  T(C): 36.6 (09 Mar 2018 08:07), Max: 37.1 (08 Mar 2018 15:28)  T(F): 97.8 (09 Mar 2018 08:07), Max: 98.7 (08 Mar 2018 15:28)  HR: 103 (09 Mar 2018 08:07) (80 - 103)  BP: 125/72 (09 Mar 2018 08:07) (108/61 - 139/71)  BP(mean): --  RR: 18 (09 Mar 2018 08:07) (18 - 18)  SpO2: 97% (09 Mar 2018 08:07) (97% - 99%)  I&O's Detail    08 Mar 2018 07:01  -  09 Mar 2018 07:00  --------------------------------------------------------  IN:    Jevity: 495 mL    sodium chloride 0.9%.: 900 mL  Total IN: 1395 mL    OUT:    Colostomy: 1050 mL    Ileostomy: 50 mL  Total OUT: 1100 mL    Total NET: 295 mL                            7.8    5.7   )-----------( 109      ( 09 Mar 2018 09:39 )             24.6     08 Mar 2018 09:17    141    |  114    |  54.0   ----------------------------<  138    3.3     |  14.0   |  2.57     Ca    7.4        08 Mar 2018 09:17      PT/INR - ( 09 Mar 2018 09:39 )   PT: 16.6 sec;   INR: 1.50 ratio           CAPILLARY BLOOD GLUCOSE      PHYSICAL EXAM:    GENERAL: NAD, well-groomed, well-developed  HEAD:  Atraumatic, Normocephalic  NECK: Supple, No JVD, Normal thyroid  NERVOUS SYSTEM:  Alert, Good concentration; expressive aphasia, right hemiparesis  CHEST/LUNG: Rhonchi doesn't clear with coughing, wheezing b/l; No rales or rubs  HEART: Regular rate and rhythm; No murmurs, rubs, or gallops  ABDOMEN: Soft, Nontender, Nondistended; Bowel sounds present, PEG, ileostomy draining light brown stool  EXTREMITIES:  2+ Peripheral Pulses, No clubbing, cyanosis, or edema          MEDICATIONS  (STANDING):  ALBUTerol/ipratropium for Nebulization 3 milliLiter(s) Nebulizer every 6 hours  amiodarone    Tablet 200 milliGRAM(s) Oral daily  atorvastatin 10 milliGRAM(s) Oral at bedtime  finasteride 5 milliGRAM(s) Oral daily  pantoprazole  Injectable 40 milliGRAM(s) IV Push two times a day  sodium chloride 0.9%. 1000 milliLiter(s) (100 mL/Hr) IV Continuous <Continuous>  sucralfate 1 Gram(s) Oral Before meals and at bedtime  tamsulosin 0.4 milliGRAM(s) Oral at bedtime    MEDICATIONS  (PRN):      RADIOLOGY & ADDITIONAL TESTS:

## 2018-03-10 LAB
ANION GAP SERPL CALC-SCNC: 16 MMOL/L — SIGNIFICANT CHANGE UP (ref 5–17)
BLD GP AB SCN SERPL QL: SIGNIFICANT CHANGE UP
BUN SERPL-MCNC: 50 MG/DL — HIGH (ref 8–20)
CALCIUM SERPL-MCNC: 8.8 MG/DL — SIGNIFICANT CHANGE UP (ref 8.6–10.2)
CHLORIDE SERPL-SCNC: 118 MMOL/L — HIGH (ref 98–107)
CO2 SERPL-SCNC: 15 MMOL/L — LOW (ref 22–29)
CREAT SERPL-MCNC: 2.54 MG/DL — HIGH (ref 0.5–1.3)
DAT IGG-SP REAG RBC-IMP: SIGNIFICANT CHANGE UP
DIR ANTIGLOB POLYSPECIFIC INTERPRETATION: SIGNIFICANT CHANGE UP
GLUCOSE SERPL-MCNC: 166 MG/DL — HIGH (ref 70–115)
HCT VFR BLD CALC: 24.6 % — LOW (ref 42–52)
HGB BLD-MCNC: 7.7 G/DL — LOW (ref 14–18)
IAT COMP-SP REAG SERPL QL: SIGNIFICANT CHANGE UP
MAGNESIUM SERPL-MCNC: 1.7 MG/DL — LOW (ref 1.8–2.6)
MCHC RBC-ENTMCNC: 30.6 PG — SIGNIFICANT CHANGE UP (ref 27–31)
MCHC RBC-ENTMCNC: 31.3 G/DL — LOW (ref 32–36)
MCV RBC AUTO: 97.6 FL — HIGH (ref 80–94)
PLATELET # BLD AUTO: 92 K/UL — LOW (ref 150–400)
POTASSIUM SERPL-MCNC: 4.2 MMOL/L — SIGNIFICANT CHANGE UP (ref 3.5–5.3)
POTASSIUM SERPL-SCNC: 4.2 MMOL/L — SIGNIFICANT CHANGE UP (ref 3.5–5.3)
RBC # BLD: 2.52 M/UL — LOW (ref 4.6–6.2)
RBC # FLD: 19.9 % — HIGH (ref 11–15.6)
SODIUM SERPL-SCNC: 149 MMOL/L — HIGH (ref 135–145)
TYPE + AB SCN PNL BLD: SIGNIFICANT CHANGE UP
WBC # BLD: 7.6 K/UL — SIGNIFICANT CHANGE UP (ref 4.8–10.8)
WBC # FLD AUTO: 7.6 K/UL — SIGNIFICANT CHANGE UP (ref 4.8–10.8)

## 2018-03-10 PROCEDURE — 36000 PLACE NEEDLE IN VEIN: CPT

## 2018-03-10 PROCEDURE — 99233 SBSQ HOSP IP/OBS HIGH 50: CPT | Mod: GC

## 2018-03-10 PROCEDURE — 71250 CT THORAX DX C-: CPT | Mod: 26

## 2018-03-10 PROCEDURE — 99223 1ST HOSP IP/OBS HIGH 75: CPT

## 2018-03-10 PROCEDURE — 99232 SBSQ HOSP IP/OBS MODERATE 35: CPT

## 2018-03-10 RX ORDER — FUROSEMIDE 40 MG
40 TABLET ORAL ONCE
Qty: 0 | Refills: 0 | Status: COMPLETED | OUTPATIENT
Start: 2018-03-10 | End: 2018-03-11

## 2018-03-10 RX ADMIN — ATORVASTATIN CALCIUM 10 MILLIGRAM(S): 80 TABLET, FILM COATED ORAL at 21:35

## 2018-03-10 RX ADMIN — Medication 3 MILLILITER(S): at 02:23

## 2018-03-10 RX ADMIN — FINASTERIDE 5 MILLIGRAM(S): 5 TABLET, FILM COATED ORAL at 13:33

## 2018-03-10 RX ADMIN — Medication 3 MILLILITER(S): at 20:18

## 2018-03-10 RX ADMIN — PANTOPRAZOLE SODIUM 40 MILLIGRAM(S): 20 TABLET, DELAYED RELEASE ORAL at 18:52

## 2018-03-10 RX ADMIN — Medication 3 MILLILITER(S): at 08:31

## 2018-03-10 RX ADMIN — Medication 1 GRAM(S): at 18:52

## 2018-03-10 RX ADMIN — AMIODARONE HYDROCHLORIDE 200 MILLIGRAM(S): 400 TABLET ORAL at 06:10

## 2018-03-10 RX ADMIN — Medication 40 MILLIGRAM(S): at 18:52

## 2018-03-10 RX ADMIN — Medication 1 GRAM(S): at 06:10

## 2018-03-10 RX ADMIN — TAMSULOSIN HYDROCHLORIDE 0.4 MILLIGRAM(S): 0.4 CAPSULE ORAL at 21:36

## 2018-03-10 RX ADMIN — PANTOPRAZOLE SODIUM 40 MILLIGRAM(S): 20 TABLET, DELAYED RELEASE ORAL at 06:10

## 2018-03-10 RX ADMIN — Medication 1 GRAM(S): at 21:35

## 2018-03-10 RX ADMIN — Medication 1 GRAM(S): at 13:34

## 2018-03-10 NOTE — PROGRESS NOTE ADULT - SUBJECTIVE AND OBJECTIVE BOX
Patient is a 77y old  Male who presents with a chief complaint of Bleeding into colostomy (04 Mar 2018 23:39)      HPI:  History obtained from patient wife at bedside, due to patient underlying medical condition of expressive aphasia status post CVA in the past.   Mr. Kristofer Salomon (prior [id number]) is a 78 yo male PMHx CAD s/p CABG, AFib on Coumadin, HTN, prostate CA s/p TURP, s/p PEG and colectomy, prior CVA with expressive aphasia and R hemiparesis, CKD 3,.       Wife wanted to discuss any further options from a GI standpoint regarding bleeding . Patient is awake and alert. He has no complaints of abdominal pain. There is brown stool from the ileostomy. Hgb stable at 7.7.     REVIEW OF SYSTEMS:  Constitutional: No fever, weight loss or fatigue  ENMT:  No difficulty hearing, tinnitus, vertigo; No sinus or throat pain  Respiratory: No cough, wheezing, chills or hemoptysis  Cardiovascular: No chest pain, palpitations, dizziness or leg swelling  Gastrointestinal: No abdominal or epigastric pain. No nausea, vomiting or hematemesis; No diarrhea or constipation. No melena or hematochezia.  Skin: No itching, burning, rashes or lesions   Musculoskeletal: No joint pain or swelling; No muscle, back or extremity pain    PAST MEDICAL & SURGICAL HISTORY:  Heart attack  High cholesterol  HTN (hypertension)  H/O coronary artery bypass surgery      FAMILY HISTORY:  No pertinent family history in first degree relatives      SOCIAL HISTORY:  Smoking Status: [ ] Current, [ ] Former, [ ] Never  Pack Years:  [  ] EtOH-no   [  ] IVDA-no    MEDICATIONS:  MEDICATIONS  (STANDING):  ALBUTerol/ipratropium for Nebulization 3 milliLiter(s) Nebulizer every 6 hours  amiodarone    Tablet 200 milliGRAM(s) Oral daily  atorvastatin 10 milliGRAM(s) Oral at bedtime  finasteride 5 milliGRAM(s) Oral daily  pantoprazole  Injectable 40 milliGRAM(s) IV Push two times a day  sucralfate 1 Gram(s) Oral Before meals and at bedtime  tamsulosin 0.4 milliGRAM(s) Oral at bedtime    MEDICATIONS  (PRN):      Allergies    Allergy Status Unknown    Intolerances        Vital Signs Last 24 Hrs  T(C): 36.9 (10 Mar 2018 07:40), Max: 36.9 (10 Mar 2018 07:40)  T(F): 98.4 (10 Mar 2018 07:40), Max: 98.4 (10 Mar 2018 07:40)  HR: 122 (10 Mar 2018 07:40) (92 - 122)  BP: 100/58 (10 Mar 2018 07:40) (100/58 - 156/59)  BP(mean): --  RR: 20 (10 Mar 2018 07:40) (18 - 20)  SpO2: 100% (10 Mar 2018 08:35) (97% - 100%)    03-09 @ 07:01  -  03-10 @ 07:00  --------------------------------------------------------  IN: 55 mL / OUT: 0 mL / NET: 55 mL    03-10 @ 06:01  -  03-10 @ 15:32  --------------------------------------------------------  IN: 350 mL / OUT: 200 mL / NET: 150 mL          PHYSICAL EXAM:    General: Well developed; well nourished; in no acute distress  HEENT: MMM, conjunctiva and sclera clear  H- RRR  L- CTA  Gastrointestinal: Soft, non-tender non-distended; Normal bowel sounds; No rebound or guarding  Extremities: Normal range of motion, No clubbing, cyanosis or edema  Neurological: Alert and oriented x3  Skin: Warm and dry. No obvious rash      LABS:                        7.7    7.6   )-----------( 92       ( 10 Mar 2018 10:11 )             24.6     10 Mar 2018 10:11    149    |  118    |  50.0   ----------------------------<  166    4.2     |  15.0   |  2.54     Ca    8.8        10 Mar 2018 10:11  Mg     1.7       10 Mar 2018 07:09                RADIOLOGY & ADDITIONAL STUDIES:     < from: CT Abdomen and Pelvis No Cont (03.04.18 @ 19:00) >  No acute obstruction.    Large bowel and fluid containing right inguinal hernia. Small bowel   containing right parastomal hernia. Other incidental findings as above.    Incidental findings in the lungs which follow-up is recommended as above.    Incidental lesions of the adrenal glands left kidney for which follow-up   contrast enhanced MRI is recommended.      < end of copied text >

## 2018-03-10 NOTE — CONSULT NOTE ADULT - ASSESSMENT
CAD s/p CABG  VHD  AF  CRI  Possible COPD given smoking hx  Hypoxia  B/l pleural effusions R>L  Concern for aspiration  Prior CVA  Hypernatremia    Plan:    Drug nebs  IV steroids  Diurese as able  Follow renal function  ? repeat swallow evaluation  Follow H/H  Transfuse as needed  Rate control of AF  Optimize cardiac function  Follow CXR/CT for improvement in effusions  Cardiology and GI f/u  Consider renal evaluation  Follow Na  ABG  On PPI  Optimize cardiac function  If no improvement with diuresis, consider drainage of R effusion    D/w wife at bedside  D/w medicine

## 2018-03-10 NOTE — PROGRESS NOTE ADULT - ASSESSMENT
Assessment and Plan:   Assessment:  · Assessment		  78 y/o male present with Recurrent Gastrointestinal hemorrhage with melena.  On Coudamin with supra-therapeutic INR (3.74).  1. Gastrointestinal hemorrhage, unspecified gastrointestinal hemorrhage type with anemia of acute blood loss on anemia of chronic disease - monitor HH, transfuse 2 additional unit of PRBC, continue PPI, diet advanced and tolerated, GI reevaluation at family request - earlier no intervention suggested  2. Chronic atrial fibrillation - rate controlled with amiodarone, off ASA, coumadin held, s/p Vit K. High risk for CVA and VTE as + Hx of CVA and PE/DVT  3. Acute kidney injury superimposed on chronic kidney disease with hypovolemic Hyponatremia - held lasix, stopped IVF hydration  - back to baseline renal function  4. H/o Dysphagia - resumed  TF at nutritionist's recommendations  5. H/o CVA - c/w statin. Bedrest, elevate HOB, high risk of aspirations, turn Q2hrs, high risk of pressure ulcers.   6. SOB with wheezing - on BDs, CXR reviewed - no evidence of PNA or fluid overload, clinically dry with hypovolemic hypernatremia - add free water, ? URI - pulmonary  evaluation

## 2018-03-10 NOTE — PROGRESS NOTE ADULT - SUBJECTIVE AND OBJECTIVE BOX
CC: Bleeding into colostomy (04 Mar 2018 23:39)    HPI: History obtained from patient wife at bedside, due to patient underlying medical condition of expressive aphasia status post CVA in the past.   Mr. 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 Coumadin was held, FFP was given and EGD completed which did not reveal source of bleeding. G- tube was placed during EGD. 3 weeks prior to that he was discharged from [facility] s/p respiratory failure s/p intubation due to aspiration pneumonia, Septic shock 2/2 UTI, KAPIL on CKD, recurrent NSVT, UE DVT.    As per wife at bedside, pt is now refusing oral intake, pt use Jevity 1.5, 4 to 5 cans per day. She has noticed that he has an increased about of bloating, vomiting episodes and coughing. In addition she reports that pt stopped bleeding bleeding for 5 days after discharge but has been ongoing since that time. His Coumadin dose has been up titrated to 5mg M-W-F, followed by 2.5mg the remainder of the week. As a result of the recurrent bleeding she stopped giving him his Aspirin (2weeks now), Dronabinol was never started as the pharmacy indicated his insurance did not approve it. She reports no other signs or symptoms. (04 Mar 2018 23:39)    INTERVAL HPI/OVERNIGHT EVENTS: Pt is c/o SOB, + cough, more  tired, audible wheezing  Other ROS reviewed and neg     Vital Signs Last 24 Hrs  T(C): 36.9 (10 Mar 2018 07:40), Max: 36.9 (10 Mar 2018 07:40)  T(F): 98.4 (10 Mar 2018 07:40), Max: 98.4 (10 Mar 2018 07:40)  HR: 122 (10 Mar 2018 07:40) (92 - 122)  BP: 100/58 (10 Mar 2018 07:40) (100/58 - 156/59)  RR: 20 (10 Mar 2018 07:40) (18 - 20)  SpO2: 100% (10 Mar 2018 08:35) (97% - 100%)                       7.7    7.6   )-----------( 92       ( 10 Mar 2018 10:11 )             24.6     10 Mar 2018 10:11    149    |  118    |  50.0   ----------------------------<  166    4.2     |  15.0   |  2.54     Ca    8.8        10 Mar 2018 10:11  Mg     1.7       10 Mar 2018 07:09      PT/INR - ( 09 Mar 2018 09:39 )   PT: 16.6 sec;   INR: 1.50 ratio      MEDICATIONS  (STANDING):  ALBUTerol/ipratropium for Nebulization 3 milliLiter(s) Nebulizer every 6 hours  amiodarone    Tablet 200 milliGRAM(s) Oral daily  atorvastatin 10 milliGRAM(s) Oral at bedtime  finasteride 5 milliGRAM(s) Oral daily  pantoprazole  Injectable 40 milliGRAM(s) IV Push two times a day  sucralfate 1 Gram(s) Oral Before meals and at bedtime  tamsulosin 0.4 milliGRAM(s) Oral at bedtime    RADIOLOGY & ADDITIONAL TESTS: personally visualized    PHYSICAL EXAM:    General: elderly male in mild repiratory distress  Eyes: conjunctiva and sclera clear  Head: Normocephalic; atraumatic  ENMT: No nasal discharge; airway clear  Neck: Supple; non tender; no masses  Respiratory: + wheezes, rhonchi, mildly decreased at bases  Cardiovascular: Irregular rate and rhythm. S1 and S2  Gastrointestinal: Soft abd, NT, + colostomy with light brown stool, + PEG  Genitourinary: No costovertebral angle tenderness  Extremities: No clubbing, cyanosis or edema  Vascular: Peripheral pulses palpable 2+ bilaterally  Neurological: Alert, lethargic, but easily arousable, Right HP  Skin: Warm and dry.   Musculoskeletal: Normal tone, without deformities  Psychiatric: Cooperative

## 2018-03-11 LAB
ANION GAP SERPL CALC-SCNC: 14 MMOL/L — SIGNIFICANT CHANGE UP (ref 5–17)
BUN SERPL-MCNC: 52 MG/DL — HIGH (ref 8–20)
CALCIUM SERPL-MCNC: 9 MG/DL — SIGNIFICANT CHANGE UP (ref 8.6–10.2)
CHLORIDE SERPL-SCNC: 117 MMOL/L — HIGH (ref 98–107)
CO2 SERPL-SCNC: 16 MMOL/L — LOW (ref 22–29)
CREAT SERPL-MCNC: 2.59 MG/DL — HIGH (ref 0.5–1.3)
GLUCOSE SERPL-MCNC: 219 MG/DL — HIGH (ref 70–115)
HCT VFR BLD CALC: 28.8 % — LOW (ref 42–52)
HGB BLD-MCNC: 9 G/DL — LOW (ref 14–18)
INR BLD: 1.57 RATIO — HIGH (ref 0.88–1.16)
LDH SERPL L TO P-CCNC: 172 U/L — SIGNIFICANT CHANGE UP (ref 98–192)
MCHC RBC-ENTMCNC: 30.3 PG — SIGNIFICANT CHANGE UP (ref 27–31)
MCHC RBC-ENTMCNC: 31.3 G/DL — LOW (ref 32–36)
MCV RBC AUTO: 97 FL — HIGH (ref 80–94)
PLATELET # BLD AUTO: 88 K/UL — LOW (ref 150–400)
POTASSIUM SERPL-MCNC: 4.6 MMOL/L — SIGNIFICANT CHANGE UP (ref 3.5–5.3)
POTASSIUM SERPL-SCNC: 4.6 MMOL/L — SIGNIFICANT CHANGE UP (ref 3.5–5.3)
PROTHROM AB SERPL-ACNC: 17.4 SEC — HIGH (ref 9.8–12.7)
RBC # BLD: 2.97 M/UL — LOW (ref 4.6–6.2)
RBC # FLD: 19.1 % — HIGH (ref 11–15.6)
SODIUM SERPL-SCNC: 147 MMOL/L — HIGH (ref 135–145)
WBC # BLD: 7.7 K/UL — SIGNIFICANT CHANGE UP (ref 4.8–10.8)
WBC # FLD AUTO: 7.7 K/UL — SIGNIFICANT CHANGE UP (ref 4.8–10.8)

## 2018-03-11 PROCEDURE — 99233 SBSQ HOSP IP/OBS HIGH 50: CPT | Mod: GC

## 2018-03-11 PROCEDURE — 99232 SBSQ HOSP IP/OBS MODERATE 35: CPT

## 2018-03-11 RX ORDER — IPRATROPIUM/ALBUTEROL SULFATE 18-103MCG
3 AEROSOL WITH ADAPTER (GRAM) INHALATION EVERY 6 HOURS
Qty: 0 | Refills: 0 | Status: DISCONTINUED | OUTPATIENT
Start: 2018-03-11 | End: 2018-03-14

## 2018-03-11 RX ADMIN — Medication 1 GRAM(S): at 21:28

## 2018-03-11 RX ADMIN — AMIODARONE HYDROCHLORIDE 200 MILLIGRAM(S): 400 TABLET ORAL at 05:55

## 2018-03-11 RX ADMIN — Medication 1 GRAM(S): at 05:55

## 2018-03-11 RX ADMIN — Medication 1 GRAM(S): at 17:42

## 2018-03-11 RX ADMIN — Medication 3 MILLILITER(S): at 08:28

## 2018-03-11 RX ADMIN — FINASTERIDE 5 MILLIGRAM(S): 5 TABLET, FILM COATED ORAL at 17:43

## 2018-03-11 RX ADMIN — Medication 40 MILLIGRAM(S): at 12:00

## 2018-03-11 RX ADMIN — TAMSULOSIN HYDROCHLORIDE 0.4 MILLIGRAM(S): 0.4 CAPSULE ORAL at 21:28

## 2018-03-11 RX ADMIN — Medication 40 MILLIGRAM(S): at 17:41

## 2018-03-11 RX ADMIN — PANTOPRAZOLE SODIUM 40 MILLIGRAM(S): 20 TABLET, DELAYED RELEASE ORAL at 12:00

## 2018-03-11 RX ADMIN — Medication 3 MILLILITER(S): at 04:13

## 2018-03-11 RX ADMIN — Medication 40 MILLIGRAM(S): at 00:55

## 2018-03-11 RX ADMIN — ATORVASTATIN CALCIUM 10 MILLIGRAM(S): 80 TABLET, FILM COATED ORAL at 21:28

## 2018-03-11 RX ADMIN — Medication 3 MILLILITER(S): at 14:18

## 2018-03-11 RX ADMIN — PANTOPRAZOLE SODIUM 40 MILLIGRAM(S): 20 TABLET, DELAYED RELEASE ORAL at 07:04

## 2018-03-11 RX ADMIN — Medication 40 MILLIGRAM(S): at 07:03

## 2018-03-11 RX ADMIN — Medication 40 MILLIGRAM(S): at 01:46

## 2018-03-11 NOTE — PROGRESS NOTE ADULT - ASSESSMENT
Assessment and Plan:   Assessment:  · Assessment		  76 y/o male present with Recurrent Gastrointestinal hemorrhage with melena.  On Coudamin with supra-therapeutic INR (3.74).  1. Gastrointestinal hemorrhage, unspecified gastrointestinal hemorrhage type with anemia of acute blood loss on anemia of chronic disease - monitor HH, transfused 2 additional unit of PRBC with lasix in between, continue PPI, diet advanced and tolerated, GI reevaluation at family request - earlier no intervention suggested  2. Chronic atrial fibrillation - rate controlled with amiodarone, off ASA, coumadin held, s/p Vit K. High risk for CVA and VTE as + Hx of CVA and PE/DVT  3. Acute kidney injury superimposed on chronic kidney disease with hypovolemic Hyponatremia - held lasix, stopped IVF hydration  - back to baseline renal function  4. H/o Dysphagia - resumed  TF at nutritionist's recommendations  5. H/o CVA - c/w statin. Bedrest, elevate HOB, high risk of aspirations, turn Q2hrs, high risk of pressure ulcers.   6. SOB with wheezing - on BDs, CXR reviewed - no evidence of PNA or fluid overload, clinically dry with hypovolemic hypernatremia - add free water, ? URI - pulmonary  evaluation appreciated, CT chest showed BL pleural effusion R>L, no evidence of infiltrate, started on steroids, may need thoracocentesis if remains symptomatic  7. Anemia of ABL - now improved with transfusion, but previously HH was dropping despite no evidence of active hemorrhage, further discussion with blood bank supervisor indicated that pt received blood transfusion when was admitted as [facility] which had positive for previously transfused blood antibodies - likely caused delayed onset hemolytic anemia - will continue to monitor HH, f/u haptoglobin/LDH

## 2018-03-11 NOTE — PROGRESS NOTE ADULT - SUBJECTIVE AND OBJECTIVE BOX
CC: Bleeding into colostomy (04 Mar 2018 23:39)    HPI: History obtained from patient wife at bedside, due to patient underlying medical condition of expressive aphasia status post CVA in the past.   Mr. 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 Coumadin was held, FFP was given and EGD completed which did not reveal source of bleeding. G- tube was placed during EGD. 3 weeks prior to that he was discharged from [facility] s/p respiratory failure s/p intubation due to aspiration pneumonia, Septic shock 2/2 UTI, KAPIL on CKD, recurrent NSVT, UE DVT.    As per wife at bedside, pt is now refusing oral intake, pt use Jevity 1.5, 4 to 5 cans per day. She has noticed that he has an increased about of bloating, vomiting episodes and coughing. In addition she reports that pt stopped bleeding bleeding for 5 days after discharge but has been ongoing since that time. His Coumadin dose has been up titrated to 5mg M-W-F, followed by 2.5mg the remainder of the week. As a result of the recurrent bleeding she stopped giving him his Aspirin (2weeks now), Dronabinol was never started as the pharmacy indicated his insurance did not approve it. She reports no other signs or symptoms. (04 Mar 2018 23:39)    INTERVAL HPI/OVERNIGHT EVENTS: Pt is c/o SOB, + cough, more tired, audible wheezing when alert - none when asleep  Other ROS reviewed and neg     Vital Signs Last 24 Hrs  T(C): 36.4 (11 Mar 2018 07:58), Max: 37 (10 Mar 2018 16:24)  T(F): 97.5 (11 Mar 2018 07:58), Max: 98.6 (10 Mar 2018 16:24)  HR: 90 (11 Mar 2018 10:41) (79 - 116)  BP: 112/68 (11 Mar 2018 10:41) (110/64 - 144/84)  RR: 18 (11 Mar 2018 07:58) (18 - 20)  SpO2: 99% (11 Mar 2018 07:58) (99% - 100%)                       9.0    7.7   )-----------( 88       ( 11 Mar 2018 09:02 )             28.8     11 Mar 2018 09:02    147    |  117    |  52.0   ----------------------------<  219    4.6     |  16.0   |  2.59     Ca    9.0        11 Mar 2018 09:02  Mg     1.7       10 Mar 2018 07:09    PT/INR - ( 11 Mar 2018 09:02 )   PT: 17.4 sec;   INR: 1.57 ratio      MEDICATIONS  (STANDING):  ALBUTerol/ipratropium for Nebulization 3 milliLiter(s) Nebulizer every 6 hours  amiodarone    Tablet 200 milliGRAM(s) Oral daily  atorvastatin 10 milliGRAM(s) Oral at bedtime  finasteride 5 milliGRAM(s) Oral daily  methylPREDNISolone sodium succinate Injectable 40 milliGRAM(s) IV Push every 6 hours  pantoprazole  Injectable 40 milliGRAM(s) IV Push two times a day  sucralfate 1 Gram(s) Oral Before meals and at bedtime  tamsulosin 0.4 milliGRAM(s) Oral at bedtime    RADIOLOGY & ADDITIONAL TESTS: personally visualized    PHYSICAL EXAM:    General: elderly male in no repiratory distress  Eyes: conjunctiva and sclera clear  Head: Normocephalic; atraumatic  ENMT: No nasal discharge; airway clear  Neck: Supple; non tender; no masses  Respiratory: no wheezes, rhonchi, mildly decreased at bases R>L  Cardiovascular: Irregular rate and rhythm. S1 and S2  Gastrointestinal: Soft abd, NT, + colostomy with light brown stool, + PEG  Genitourinary: No costovertebral angle tenderness  Extremities: No clubbing, cyanosis or edema  Vascular: Peripheral pulses palpable 2+ bilaterally  Neurological: Alert, lethargic, but easily arousable, Right HP  Skin: Warm and dry.   Musculoskeletal: Normal tone, without deformities  Psychiatric: Cooperative

## 2018-03-11 NOTE — PROGRESS NOTE ADULT - SUBJECTIVE AND OBJECTIVE BOX
PULMONARY PROGRESS NOTE      CRISTIN ROQUE  [id number]    Patient is a 77y old  Male who presents with a chief complaint of Bleeding into colostomy (04 Mar 2018 23:39)      INTERVAL HPI/OVERNIGHT EVENTS:    Patient is comfortable  Somewhat better but with mild residual cough    MEDICATIONS  (STANDING):  ALBUTerol/ipratropium for Nebulization 3 milliLiter(s) Nebulizer every 6 hours  amiodarone    Tablet 200 milliGRAM(s) Oral daily  atorvastatin 10 milliGRAM(s) Oral at bedtime  finasteride 5 milliGRAM(s) Oral daily  methylPREDNISolone sodium succinate Injectable 40 milliGRAM(s) IV Push every 6 hours  pantoprazole  Injectable 40 milliGRAM(s) IV Push two times a day  sucralfate 1 Gram(s) Oral Before meals and at bedtime  tamsulosin 0.4 milliGRAM(s) Oral at bedtime      MEDICATIONS  (PRN):      Allergies    Allergy Status Unknown    Intolerances        PAST MEDICAL & SURGICAL HISTORY:  Heart attack  High cholesterol  HTN (hypertension)  H/O coronary artery bypass surgery      Vital Signs Last 24 Hrs  T(C): 36.4 (11 Mar 2018 07:58), Max: 37 (10 Mar 2018 16:24)  T(F): 97.5 (11 Mar 2018 07:58), Max: 98.6 (10 Mar 2018 16:24)  HR: 90 (11 Mar 2018 10:41) (79 - 116)  BP: 112/68 (11 Mar 2018 10:41) (110/64 - 144/84)  BP(mean): --  RR: 18 (11 Mar 2018 07:58) (18 - 20)  SpO2: 99% (11 Mar 2018 07:58) (99% - 100%)    PHYSICAL EXAMINATION:    GENERAL: The patient is awake and alert in no apparent distress.     HEENT: Head is normocephalic and atraumatic. Extraocular muscles are intact. Mucous membranes are moist.    NECK: Supple.    LUNGS: Clear to auscultation without wheezing, rales or rhonchi; respirations unlabored    HEART: Irregular rhythm without murmur.    ABDOMEN: Soft, nontender, and nondistended.      EXTREMITIES: Without any cyanosis, clubbing, rash, lesions or edema.    NEURO: Responsive, R weakness, expressive aphasia    LABS:                        9.0    7.7   )-----------( 88       ( 11 Mar 2018 09:02 )             28.8     03-11    147<H>  |  117<H>  |  52.0<H>  ----------------------------<  219<H>  4.6   |  16.0<L>  |  2.59<H>    Ca    9.0      11 Mar 2018 09:02  Mg     1.7     03-10      PT/INR - ( 11 Mar 2018 09:02 )   PT: 17.4 sec;   INR: 1.57 ratio           RADIOLOGY & ADDITIONAL STUDIES:     EXAM:  CT CHEST                          PROCEDURE DATE:  03/10/2018          INTERPRETATION:  EXAM:    CT Chest Without Intravenous Contrast    EXAM DATE/TIME:    Exam ordered 3/10/2018 4:08 PM    CLINICAL HISTORY:    77 years old, male; Signs and symptoms; Shortness of breath    TECHNIQUE:    Axial computed tomography images of the chest without intravenous   contrast.    MIP reconstructed images were created and reviewed.    Coronal and sagittal reformatted images were created and reviewed.    COMPARISON:    CR - XR CHEST URGENT 2018-03-09 11:16    FINDINGS:    Lungs:  Multifocal pulmonary emphysema, scarring, and mild fibrosis.   Mild   bronchiectasis in the right lower lobe.    Pleural space:  Moderate right pleural effusion. Small left pleural   effusion.    No pneumothorax.    Heart:  Cardiomegaly. History of sternotomy and CABG. Coronary artery   calcifications. No significant pericardial effusion.    Mediastinum:  The esophagus is unremarkable.    Bones/joints:  Unremarkable.No acute fracture.  No dislocation.    Soft tissues:  Unremarkable.    Vasculature:  IVC filter present.  No thoracic aortic aneurysm.    Lymph nodes:  Mild enlarged mediastinal lymph nodes measuring up to 1.1   cm in   short axis, indeterminate.    Gallbladder and bile ducts:  Incompletely visualized cholelithiasis.    Spleen:  Splenomegaly.    Adrenals:  Bilateral adrenal masses, measuring 2.5 cm on the right and   2.2 cm   on the left, do not meet strict criteria for adenomas and are   indeterminate.    In the absence of a history of malignancy, this is likely benign.    Kidneys and ureters:  3 cm homogeneously hyperdense round left renal   lesion   is indeterminate, potentially a hyperdense cyst. Neoplasm not excluded.    Nonobstructing stones in the kidneys bilaterally.    Intraperitoneal space:  Small volume perihepatic ascites.    IMPRESSION:       1.  Small volume perihepatic ascites.  2.  3 cm homogeneously hyperdense round left renal lesion is   indeterminate,   potentially a hyperdense cyst. Neoplasm not excluded.  3.  Splenomegaly.  4.  Mild enlarged mediastinal lymph nodes measuring up to 1.1 cm in short   axis,   indeterminate.  5.  Moderate right pleural effusion. Small left pleural effusion.  6.  Multifocal pulmonary emphysema, scarring, and mild fibrosis. Mild   bronchiectasis in the right lower lobe.                JOSSELYN RASCON M.D. ATTENDING RADIOLOGIST  This document has been electronically signed. Mar 11 2018 11:10AM

## 2018-03-11 NOTE — PROGRESS NOTE ADULT - ASSESSMENT
CAD s/p CABG  VHD  AF  CRI  Possible COPD given smoking hx  Hypoxia  B/l pleural effusions R>L  Concern for aspiration  Prior CVA  Hypernatremia  Somewhat improved    Plan:    Drug nebs  IV steroids  Diurese as able  Follow renal function  ? repeat swallow evaluation  Follow H/H  Transfuse as needed  Rate control of AF  Optimize cardiac function  Follow CXR/CT for improvement in effusions  Cardiology and GI f/u  Consider renal evaluation  Follow Na  On PPI  Optimize cardiac function  If no improvement with diuresis, consider drainage of R effusion    D/w wife at bedside  D/w medicine

## 2018-03-12 LAB
ANION GAP SERPL CALC-SCNC: 15 MMOL/L — SIGNIFICANT CHANGE UP (ref 5–17)
BUN SERPL-MCNC: 71 MG/DL — HIGH (ref 8–20)
CALCIUM SERPL-MCNC: 9 MG/DL — SIGNIFICANT CHANGE UP (ref 8.6–10.2)
CHLORIDE SERPL-SCNC: 115 MMOL/L — HIGH (ref 98–107)
CO2 SERPL-SCNC: 14 MMOL/L — LOW (ref 22–29)
CREAT SERPL-MCNC: 2.92 MG/DL — HIGH (ref 0.5–1.3)
GLUCOSE SERPL-MCNC: 203 MG/DL — HIGH (ref 70–115)
HAPTOGLOB SERPL-MCNC: 148 MG/DL — SIGNIFICANT CHANGE UP (ref 34–200)
HCT VFR BLD CALC: 30 % — LOW (ref 42–52)
HGB BLD-MCNC: 9.2 G/DL — LOW (ref 14–18)
MCHC RBC-ENTMCNC: 29.3 PG — SIGNIFICANT CHANGE UP (ref 27–31)
MCHC RBC-ENTMCNC: 30.7 G/DL — LOW (ref 32–36)
MCV RBC AUTO: 95.5 FL — HIGH (ref 80–94)
PLATELET # BLD AUTO: 98 K/UL — LOW (ref 150–400)
POTASSIUM SERPL-MCNC: 4.9 MMOL/L — SIGNIFICANT CHANGE UP (ref 3.5–5.3)
POTASSIUM SERPL-SCNC: 4.9 MMOL/L — SIGNIFICANT CHANGE UP (ref 3.5–5.3)
RBC # BLD: 3.14 M/UL — LOW (ref 4.6–6.2)
RBC # FLD: 19.3 % — HIGH (ref 11–15.6)
SODIUM SERPL-SCNC: 144 MMOL/L — SIGNIFICANT CHANGE UP (ref 135–145)
WBC # BLD: 14.8 K/UL — HIGH (ref 4.8–10.8)
WBC # FLD AUTO: 14.8 K/UL — HIGH (ref 4.8–10.8)

## 2018-03-12 PROCEDURE — 71045 X-RAY EXAM CHEST 1 VIEW: CPT | Mod: 26

## 2018-03-12 PROCEDURE — 99233 SBSQ HOSP IP/OBS HIGH 50: CPT

## 2018-03-12 PROCEDURE — 99233 SBSQ HOSP IP/OBS HIGH 50: CPT | Mod: GC

## 2018-03-12 RX ORDER — SODIUM CHLORIDE 0.65 %
1 AEROSOL, SPRAY (ML) NASAL
Qty: 0 | Refills: 0 | Status: DISCONTINUED | OUTPATIENT
Start: 2018-03-12 | End: 2018-03-21

## 2018-03-12 RX ORDER — PANTOPRAZOLE SODIUM 20 MG/1
40 TABLET, DELAYED RELEASE ORAL
Qty: 0 | Refills: 0 | Status: DISCONTINUED | OUTPATIENT
Start: 2018-03-12 | End: 2018-03-21

## 2018-03-12 RX ORDER — FUROSEMIDE 40 MG
40 TABLET ORAL ONCE
Qty: 0 | Refills: 0 | Status: COMPLETED | OUTPATIENT
Start: 2018-03-12 | End: 2018-03-12

## 2018-03-12 RX ADMIN — FINASTERIDE 5 MILLIGRAM(S): 5 TABLET, FILM COATED ORAL at 16:45

## 2018-03-12 RX ADMIN — Medication 1 GRAM(S): at 19:32

## 2018-03-12 RX ADMIN — ATORVASTATIN CALCIUM 10 MILLIGRAM(S): 80 TABLET, FILM COATED ORAL at 21:59

## 2018-03-12 RX ADMIN — PANTOPRAZOLE SODIUM 40 MILLIGRAM(S): 20 TABLET, DELAYED RELEASE ORAL at 06:08

## 2018-03-12 RX ADMIN — Medication 40 MILLIGRAM(S): at 14:00

## 2018-03-12 RX ADMIN — Medication 1 GRAM(S): at 13:00

## 2018-03-12 RX ADMIN — Medication 1 GRAM(S): at 06:08

## 2018-03-12 RX ADMIN — Medication 40 MILLIGRAM(S): at 18:00

## 2018-03-12 RX ADMIN — TAMSULOSIN HYDROCHLORIDE 0.4 MILLIGRAM(S): 0.4 CAPSULE ORAL at 21:59

## 2018-03-12 RX ADMIN — Medication 40 MILLIGRAM(S): at 00:56

## 2018-03-12 RX ADMIN — Medication 3 MILLILITER(S): at 13:49

## 2018-03-12 RX ADMIN — AMIODARONE HYDROCHLORIDE 200 MILLIGRAM(S): 400 TABLET ORAL at 06:08

## 2018-03-12 RX ADMIN — Medication 40 MILLIGRAM(S): at 06:08

## 2018-03-12 RX ADMIN — PANTOPRAZOLE SODIUM 40 MILLIGRAM(S): 20 TABLET, DELAYED RELEASE ORAL at 16:00

## 2018-03-12 RX ADMIN — Medication 1 GRAM(S): at 21:59

## 2018-03-12 RX ADMIN — Medication 40 MILLIGRAM(S): at 21:59

## 2018-03-12 NOTE — PROGRESS NOTE ADULT - SUBJECTIVE AND OBJECTIVE BOX
PULMONARY PROGRESS NOTE      [id number]    Patient is a 77y old  Male who presents with a chief complaint of Bleeding into colostomy (04 Mar 2018 23:39)      INTERVAL HPI/OVERNIGHT EVENTS: He says is breathing is "okay". Some cough. No wheeze, no cp. He has orthopnea at baseline.     MEDICATIONS  (STANDING):  amiodarone    Tablet 200 milliGRAM(s) Oral daily  atorvastatin 10 milliGRAM(s) Oral at bedtime  finasteride 5 milliGRAM(s) Oral daily  methylPREDNISolone sodium succinate Injectable 40 milliGRAM(s) IV Push every 8 hours  pantoprazole   Suspension 40 milliGRAM(s) Oral two times a day before meals  sucralfate 1 Gram(s) Oral Before meals and at bedtime  tamsulosin 0.4 milliGRAM(s) Oral at bedtime      MEDICATIONS  (PRN):  ALBUTerol/ipratropium for Nebulization 3 milliLiter(s) Nebulizer every 6 hours PRN Shortness of Breath and/or Wheezing  sodium chloride 0.65% Nasal 1 Spray(s) Both Nostrils every 2 hours PRN Nasal Congestion      Allergies    Allergy Status Unknown    Intolerances        PAST MEDICAL & SURGICAL HISTORY:  Heart attack  High cholesterol  HTN (hypertension)  H/O coronary artery bypass surgery      SOCIAL HISTORY  Smoking History: former      REVIEW OF SYSTEMS:    CONSTITUTIONAL:  No distress    HEENT:  Eyes:  No diplopia or blurred vision. ENT:  No earache, sore throat or runny nose. Weak voice secondary to CVA    CARDIOVASCULAR:  No pressure, squeezing, tightness, heaviness or aching about the chest; no palpitations.    RESPIRATORY:  per HPI     GASTROINTESTINAL:  No nausea, vomiting or diarrhea.    GENITOURINARY:  No dysuria, frequency or urgency.    NEUROLOGIC:  Hx CVA,.    PSYCHIATRIC:  No disorder of thought or mood.    Vital Signs Last 24 Hrs  T(C): 37 (12 Mar 2018 08:31), Max: 37 (12 Mar 2018 08:31)  T(F): 98.6 (12 Mar 2018 08:31), Max: 98.6 (12 Mar 2018 08:31)  HR: 115 (12 Mar 2018 08:31) (61 - 115)  BP: 112/62 (12 Mar 2018 08:31) (111/51 - 132/68)  BP(mean): 51 (12 Mar 2018 08:30) (51 - 51)  RR: 18 (12 Mar 2018 08:31) (18 - 20)  SpO2: 98% (12 Mar 2018 08:31) (94% - 99%)    PHYSICAL EXAMINATION:    GENERAL: The patient is awake and alert in no apparent distress.     HEENT: Head is normocephalic and atraumatic. Extraocular muscles are intact. Mucous membranes are moist.    NECK: Supple.    LUNGS: Crackles b/l, wheeze b/l, decreased at bases, respirations unlabored    HEART: Regular rate and rhythm      ABDOMEN: Soft, nontender, and nondistended.      EXTREMITIES: Without any cyanosis, clubbing, rash, lesions or edema.    NEUROLOGIC: Grossly intact.    LABS:                        9.2    14.8  )-----------( 98       ( 12 Mar 2018 06:44 )             30.0     03-12    144  |  115<H>  |  71.0<H>  ----------------------------<  203<H>  4.9   |  14.0<L>  |  2.92<H>    Ca    9.0      12 Mar 2018 06:44      PT/INR - ( 11 Mar 2018 09:02 )   PT: 17.4 sec;   INR: 1.57 ratio       Serum Pro-Brain Natriuretic Peptide (03.04.18 @ 18:37)    Serum Pro-Brain Natriuretic Peptide: 8153: NT-proBNP Interpretive comments:  Acute Congestive Heart Failure is unlikely if NT-proBNP is less than 300  pg/mL, for any age.  Consider Acute Congestive Heart Failure if:  AGE               NT-proBNP Result  ___               ________________  < 50year           > 450 pg/mL  50 - 75 years     > 900 pg/mL  > 75 years         > 1800 pg/ml  All results require clinical correlation. Consider obtaining a baseline or  "dry" NT-proBNP level when the patient is stabilized, so that subsequent  levels can be related to that. Patients with recurrent CHF may have  elevated  NT-proBNP levels. Acute failure episodes generally produce levels at  least 25  % greater than base line levels. The above values are derived from a large  multi-center international study, "Steffen, EMMANUEL, et al, European Heart  Journal,  2006; 27:330-337. pg/mL              RADIOLOGY & ADDITIONAL STUDIES:  < from: Xray Chest 1 View AP/PA. (03.12.18 @ 05:31) >     EXAM:  XR CHEST AP OR PA 1V                          PROCEDURE DATE:  03/12/2018          INTERPRETATION:  CHEST AP PORTABLE:    History: f/u effusions.     Date and time of exam: 3/12/2018 4:57 AM.    Technique: A single AP view of the chest was obtained.    Comparison exam: 3/9/2018.    Findings:  Cardio megaly. Evidence of prior cardiac surgery. Pulmonary vascular   congestion and bilateral pulmonary edema, new since the prior study.   Right pleural effusion, a new finding..    Impression:  Development of congestive heart failure, pulmonary edema, and right   pleural effusion since 3/9/2018..                GURDEEP BUCK M.D., ATTENDING RADIOLOGIST    < end of copied text >

## 2018-03-12 NOTE — PROGRESS NOTE ADULT - SUBJECTIVE AND OBJECTIVE BOX
CC: Bleeding into colostomy     HPI:  Mr. 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 Coumadin was held, FFP was given and EGD completed which did not reveal source of bleeding. G- tube was placed during EGD. 3 weeks prior to that he was discharged from [facility] s/p respiratory failure s/p intubation due to aspiration pneumonia, Septic shock 2/2 UTI, KAPIL on CKD, recurrent NSVT, UE DVT.    As per wife at bedside, pt is now refusing oral intake, pt uses Jevity 1.5, 4 to 5 cans per day. She has noticed that he has an increased about of bloating, vomiting episodes and coughing. In addition she reports that pt stopped bleeding for 5 days after discharge but has been ongoing since that time. His Coumadin dose has been up titrated to 5mg M-W-F, followed by 2.5mg the remainder of the week. As a result of the recurrent bleeding she stopped giving him his Aspirin (2weeks now), Dronabinol was never started as the pharmacy indicated his insurance did not approve it.     INTERVAL HPI/OVERNIGHT EVENTS: Patient seen and examined lying in bed.  Patient denies any headache, dizziness, SOB, CP, abdominal pain, nausea, vomiting, dysuria.  No bleeding in Ileostomy.  Per RN, patient had nosebleed last night with some old blood noted in mouth.  Other ROS reviewed and are negative.    Vital Signs Last 24 Hrs  T(C): 37 (12 Mar 2018 08:31), Max: 37 (12 Mar 2018 08:31)  T(F): 98.6 (12 Mar 2018 08:31), Max: 98.6 (12 Mar 2018 08:31)  HR: 115 (12 Mar 2018 08:31) (61 - 115)  BP: 112/62 (12 Mar 2018 08:31) (111/51 - 132/68)  BP(mean): 51 (12 Mar 2018 08:30) (51 - 51)  RR: 18 (12 Mar 2018 08:31) (18 - 20)  SpO2: 98% (12 Mar 2018 08:31) (94% - 99%)  I&O's Detail    11 Mar 2018 07:01  -  12 Mar 2018 07:00  --------------------------------------------------------  IN:    Jevity: 715 mL  Total IN: 715 mL    OUT:    Colostomy: 400 mL  Total OUT: 400 mL    Total NET: 315 mL      PHYSICAL EXAM:  GENERAL: NAD  HEAD:  Atraumatic, Normocephalic  NECK: Supple, No JVD, Normal thyroid  NERVOUS SYSTEM:  Alert & Oriented X3, expressive aphasia, generalized weakness.  CHEST/LUNG: Coarse BS bilaterally with fine crackles in bases  HEART: Regular rate and rhythm; No murmurs, rubs, or gallops  ABDOMEN: Soft, Nontender, Nondistended; Bowel sounds present; (+) Peg; (+) Ileostomy  EXTREMITIES:  2+ Peripheral Pulses, No clubbing, cyanosis, or edema                            9.2    14.8  )-----------( 98       ( 12 Mar 2018 06:44 )             30.0     12 Mar 2018 06:44    144    |  115    |  71.0   ----------------------------<  203    4.9     |  14.0   |  2.92     Ca    9.0        12 Mar 2018 06:44      PT/INR - ( 11 Mar 2018 09:02 )   PT: 17.4 sec;   INR: 1.57 ratio            MEDICATIONS  (STANDING):  amiodarone    Tablet 200 milliGRAM(s) Oral daily  atorvastatin 10 milliGRAM(s) Oral at bedtime  finasteride 5 milliGRAM(s) Oral daily  methylPREDNISolone sodium succinate Injectable 40 milliGRAM(s) IV Push every 8 hours  pantoprazole   Suspension 40 milliGRAM(s) Oral two times a day before meals  sucralfate 1 Gram(s) Oral Before meals and at bedtime  tamsulosin 0.4 milliGRAM(s) Oral at bedtime    MEDICATIONS  (PRN):  ALBUTerol/ipratropium for Nebulization 3 milliLiter(s) Nebulizer every 6 hours PRN Shortness of Breath and/or Wheezing  sodium chloride 0.65% Nasal 1 Spray(s) Both Nostrils every 2 hours PRN Nasal Congestion      RADIOLOGY & ADDITIONAL TESTS:  < from: Xray Chest 1 View AP/PA. (03.12.18 @ 05:31) >   EXAM:  XR CHEST AP OR PA 1V                          PROCEDURE DATE:  03/12/2018          INTERPRETATION:  CHEST AP PORTABLE:    History: f/u effusions.     Date and time of exam: 3/12/2018 4:57 AM.    Technique: A single AP view of the chest was obtained.    Comparison exam: 3/9/2018.    Findings:  Cardio megaly. Evidence of prior cardiac surgery. Pulmonary vascular   congestion and bilateral pulmonary edema, new since the prior study.   Right pleural effusion, a new finding..    Impression:  Development of congestive heart failure, pulmonary edema, and right   pleural effusion since 3/9/2018..                GURDEEP BUCK M.D., ATTENDING RADIOLOGIST  This document has been electronically signed. Mar 12 2018  9:55AM                < end of copied text >  < from: CT Chest No Cont (03.10.18 @ 16:34) >   EXAM:  CT CHEST                          PROCEDURE DATE:  03/10/2018          INTERPRETATION:  EXAM:    CT Chest Without Intravenous Contrast    EXAM DATE/TIME:    Exam ordered 3/10/2018 4:08 PM    CLINICAL HISTORY:    77 years old, male; Signs and symptoms; Shortness of breath    TECHNIQUE:    Axial computed tomography images of the chest without intravenous   contrast.    MIP reconstructed images were created and reviewed.    Coronal and sagittal reformatted images were created and reviewed.    COMPARISON:    CR - XR CHEST URGENT 2018-03-09 11:16    FINDINGS:    Lungs:  Multifocal pulmonary emphysema, scarring, and mild fibrosis.   Mild   bronchiectasis in the right lower lobe.    Pleural space:  Moderate right pleural effusion. Small left pleural   effusion.    No pneumothorax.    Heart:  Cardiomegaly. History of sternotomy and CABG. Coronary artery   calcifications. No significant pericardial effusion.    Mediastinum:  The esophagus is unremarkable.    Bones/joints:  Unremarkable.No acute fracture.  No dislocation.    Soft tissues:  Unremarkable.    Vasculature:  IVC filter present.  No thoracic aortic aneurysm.    Lymph nodes:  Mild enlarged mediastinal lymph nodes measuring up to 1.1   cm in   short axis, indeterminate.    Gallbladder and bile ducts:  Incompletely visualized cholelithiasis.    Spleen:  Splenomegaly.    Adrenals:  Bilateral adrenal masses, measuring 2.5 cm on the right and   2.2 cm   on the left, do not meet strict criteria for adenomas and are   indeterminate.    In the absence of a history of malignancy, this is likely benign.    Kidneys and ureters:  3 cm homogeneously hyperdense round left renal   lesion   is indeterminate, potentially a hyperdense cyst. Neoplasm not excluded.    Nonobstructing stones in the kidneys bilaterally.    Intraperitoneal space:  Small volume perihepatic ascites.    IMPRESSION:       1.  Small volume perihepatic ascites.  2.  3 cm homogeneously hyperdense round left renal lesion is   indeterminate,   potentially a hyperdense cyst. Neoplasm not excluded.  3.  Splenomegaly.  4.  Mild enlarged mediastinal lymph nodes measuring up to 1.1 cm in short   axis,   indeterminate.  5.  Moderate right pleural effusion. Small left pleural effusion.  6.  Multifocal pulmonary emphysema, scarring, and mild fibrosis. Mild   bronchiectasis in the right lower lobe.                JOSSELYN RASCON M.D. ATTENDING RADIOLOGIST  This document has been electronically signed. Mar 11 2018 11:10AM                < end of copied text >

## 2018-03-12 NOTE — CONSULT NOTE ADULT - ASSESSMENT
1. Elderly male with multiple serious and chronic comorbidites.  2. Recurrent GI bleeding-upper-prior colectomy with resultant ileostomy on coumadin. (his adm INR was moderately supratheraputic). Prior gi w/u has not revealed the source.  3. chronic atrial fib with hx of embolic cva/dvt and PE-his coumadin is on hold. He is a high risk GI bleed but has had a prior embolic cva/dvt and PE (has an ivc filter)  4.CAD with prior MI and TVCABG and bio AVR. Echo reveals preserved LVEF.  5. anemia secondary to above  6. CRI making diuresis of pleural effusions difficult. Consider thoracentesis.  7. ileostomy and PEG feeds.

## 2018-03-12 NOTE — PROGRESS NOTE ADULT - ASSESSMENT
Assessment and Plan:   Assessment:  · Assessment		  78 y/o male present with Recurrent Gastrointestinal hemorrhage with melena.  On Coumadin with supra-therapeutic INR (3.74).  1. Gastrointestinal hemorrhage, unspecified gastrointestinal hemorrhage type with anemia of acute blood loss on anemia of chronic disease - monitor HH, transfused 2 additional unit of PRBC with lasix in between, continue PPI, diet advanced and tolerated, GI reevaluation at family request - no intervention suggested  2. Chronic atrial fibrillation - rate controlled with amiodarone, off ASA, coumadin held, s/p Vit K. High risk for CVA and VTE as + Hx of CVA and PE/DVT  3. Acute kidney injury superimposed on chronic kidney disease with hypovolemic Hyponatremia - held lasix, stopped IVF hydration  - back to baseline renal function  4. H/o Dysphagia - resumed  TF at nutritionist's recommendations  5. H/o CVA - c/w statin. Bedrest, elevate HOB, high risk of aspirations, turn Q2hrs, high risk of pressure ulcers.   6. SOB with wheezing - on BDs, CXR reviewed - no evidence of PNA or fluid overload, clinically dry with hypovolemic hypernatremia - add free water, ? URI - pulmonary  evaluation appreciated, CT chest showed BL pleural effusion R>L, no evidence of infiltrate, tapering steroids, may need thoracocentesis if remains symptomatic  7. Anemia of ABL - now improved with transfusion, but previously HH was dropping despite no evidence of active hemorrhage, further discussion with blood bank supervisor indicated that pt received blood transfusion when was admitted as [facility] which had positive for previously transfused blood antibodies - likely caused delayed onset hemolytic anemia - will continue to monitor HH, f/u haptoglobin/LDH Assessment and Plan:   Assessment:  · Assessment		  78 y/o male present with Recurrent Gastrointestinal hemorrhage with melena.  On Coumadin with supra-therapeutic INR (3.74).  1. Gastrointestinal hemorrhage, unspecified gastrointestinal hemorrhage type with anemia of acute blood loss on anemia of chronic disease - monitor HH, transfused 2 additional unit of PRBC with lasix in between, continue PPI, diet advanced and tolerated, GI reevaluation at family request - no intervention suggested  2. Chronic atrial fibrillation - rate controlled with amiodarone, off ASA, coumadin held, s/p Vit K. High risk for CVA and VTE as + Hx of CVA and PE/DVT  3. Acute kidney injury superimposed on chronic kidney disease with hypovolemic Hyponatremia - held lasix, stopped IVF hydration  - back to baseline renal function - resume low dose lasix due to increased pleural effusions  4. H/o Dysphagia - resumed  TF at nutritionist's recommendations  5. H/o CVA - c/w statin. Bedrest, elevate HOB, high risk of aspirations, turn Q2hrs, high risk of pressure ulcers.   6. SOB with wheezing - on BDs, CXR reviewed - no evidence of PNA or fluid overload, clinically dry with hypovolemic hypernatremia - add free water, ? URI - pulmonary  evaluation appreciated, CT chest showed BL pleural effusion R>L, no evidence of infiltrate, tapering steroids, may need thoracocentesis if remains symptomatic  7. Anemia of ABL - now improved with transfusion, but previously HH was dropping despite no evidence of active hemorrhage, further discussion with blood bank supervisor indicated that pt received blood transfusion when was admitted as [facility] which had positive for previously transfused blood antibodies - likely caused delayed onset hemolytic anemia - will continue to monitor HH, f/u haptoglobin/LDH - wife notified about incident

## 2018-03-12 NOTE — PROGRESS NOTE ADULT - ASSESSMENT
CAD s/p CABG  Now with CHF appearance on CXR  B/L pleural effusion R>L, see above  VHD  AF  CRI  Possible COPD given smoking hx  Hypoxia  Concern for aspiration  Prior CVA  Hypernatremia       Plan:    Drug nebs  IV steroids  Diurese as able  Cardiology eval; known to Dr Llamas  Follow renal function  ? repeat swallow evaluation  Follow H/H  Transfuse as needed  Rate control of AF  Optimize cardiac function  GI f/u  Consider renal evaluation  Follow Na  On PPI  Optimize cardiac function  If no improvement with diuresis, consider drainage of R effusion    D/w wife at bedside  D/w medicine NP

## 2018-03-13 LAB
ALLERGY+IMMUNOLOGY DIAG STUDY NOTE: SIGNIFICANT CHANGE UP
ANION GAP SERPL CALC-SCNC: 16 MMOL/L — SIGNIFICANT CHANGE UP (ref 5–17)
BLD GP AB SCN SERPL QL: ABNORMAL
BLD GP AB SCN SERPL QL: ABNORMAL
BUN SERPL-MCNC: 97 MG/DL — HIGH (ref 8–20)
CALCIUM SERPL-MCNC: 9.2 MG/DL — SIGNIFICANT CHANGE UP (ref 8.6–10.2)
CHLORIDE SERPL-SCNC: 114 MMOL/L — HIGH (ref 98–107)
CO2 SERPL-SCNC: 17 MMOL/L — LOW (ref 22–29)
CREAT SERPL-MCNC: 2.94 MG/DL — HIGH (ref 0.5–1.3)
DAT IGG-SP REAG RBC-IMP: ABNORMAL
DIR ANTIGLOB POLYSPECIFIC INTERPRETATION: ABNORMAL
GLUCOSE SERPL-MCNC: 197 MG/DL — HIGH (ref 70–115)
HAPTOGLOB SERPL-MCNC: 182 MG/DL — SIGNIFICANT CHANGE UP (ref 34–200)
HCT VFR BLD CALC: 32.1 % — LOW (ref 42–52)
HGB BLD-MCNC: 9.8 G/DL — LOW (ref 14–18)
IAT COMP-SP REAG SERPL QL: ABNORMAL
MCHC RBC-ENTMCNC: 29.8 PG — SIGNIFICANT CHANGE UP (ref 27–31)
MCHC RBC-ENTMCNC: 30.5 G/DL — LOW (ref 32–36)
MCV RBC AUTO: 97.6 FL — HIGH (ref 80–94)
PLATELET # BLD AUTO: 127 K/UL — LOW (ref 150–400)
POTASSIUM SERPL-MCNC: 4.4 MMOL/L — SIGNIFICANT CHANGE UP (ref 3.5–5.3)
POTASSIUM SERPL-SCNC: 4.4 MMOL/L — SIGNIFICANT CHANGE UP (ref 3.5–5.3)
RBC # BLD: 3.29 M/UL — LOW (ref 4.6–6.2)
RBC # FLD: 19.4 % — HIGH (ref 11–15.6)
SODIUM SERPL-SCNC: 147 MMOL/L — HIGH (ref 135–145)
TYPE + AB SCN PNL BLD: SIGNIFICANT CHANGE UP
TYPE + AB SCN PNL BLD: SIGNIFICANT CHANGE UP
WBC # BLD: 11.9 K/UL — HIGH (ref 4.8–10.8)
WBC # FLD AUTO: 11.9 K/UL — HIGH (ref 4.8–10.8)

## 2018-03-13 PROCEDURE — 99233 SBSQ HOSP IP/OBS HIGH 50: CPT | Mod: GC

## 2018-03-13 PROCEDURE — 86077 PHYS BLOOD BANK SERV XMATCH: CPT

## 2018-03-13 PROCEDURE — 99232 SBSQ HOSP IP/OBS MODERATE 35: CPT

## 2018-03-13 RX ADMIN — PANTOPRAZOLE SODIUM 40 MILLIGRAM(S): 20 TABLET, DELAYED RELEASE ORAL at 16:36

## 2018-03-13 RX ADMIN — Medication 1 GRAM(S): at 05:28

## 2018-03-13 RX ADMIN — Medication 1 GRAM(S): at 12:50

## 2018-03-13 RX ADMIN — Medication 40 MILLIGRAM(S): at 05:28

## 2018-03-13 RX ADMIN — ATORVASTATIN CALCIUM 10 MILLIGRAM(S): 80 TABLET, FILM COATED ORAL at 21:54

## 2018-03-13 RX ADMIN — FINASTERIDE 5 MILLIGRAM(S): 5 TABLET, FILM COATED ORAL at 13:14

## 2018-03-13 RX ADMIN — AMIODARONE HYDROCHLORIDE 200 MILLIGRAM(S): 400 TABLET ORAL at 05:28

## 2018-03-13 RX ADMIN — Medication 1 GRAM(S): at 21:54

## 2018-03-13 RX ADMIN — TAMSULOSIN HYDROCHLORIDE 0.4 MILLIGRAM(S): 0.4 CAPSULE ORAL at 21:54

## 2018-03-13 RX ADMIN — Medication 40 MILLIGRAM(S): at 21:55

## 2018-03-13 RX ADMIN — Medication 40 MILLIGRAM(S): at 13:14

## 2018-03-13 RX ADMIN — PANTOPRAZOLE SODIUM 40 MILLIGRAM(S): 20 TABLET, DELAYED RELEASE ORAL at 05:28

## 2018-03-13 RX ADMIN — Medication 1 GRAM(S): at 16:36

## 2018-03-13 NOTE — PROGRESS NOTE ADULT - ASSESSMENT
78 yo male with multiple problems including ASHD, CHF and AFib.  Bilateral effusions R greater than Left.\  Renal failure with creat about 3  Case discussed with Dr. Howe    Altho he doesnt have a massive effusion on Right side, thoracentesis may be more effective in improving his dyspnea at this time, altho I would continue diuresis.   Agree with proceeding with thoracentesis

## 2018-03-13 NOTE — PROGRESS NOTE ADULT - SUBJECTIVE AND OBJECTIVE BOX
[facility], THE HEART CENTER                                   [address] [place] [address]                                                      PHONE: [phone]                                                         FAX: [phone]  [email or web address]  ---------------------------------------------------------------------------------------------------------------------------------    Overnight events/patient complaints:  no events    PAST MEDICAL & SURGICAL HISTORY:  Heart attack  High cholesterol  HTN (hypertension)  H/O coronary artery bypass surgery      Allergy Status Unknown    MEDICATIONS  (STANDING):  amiodarone    Tablet 200 milliGRAM(s) Oral daily  atorvastatin 10 milliGRAM(s) Oral at bedtime  finasteride 5 milliGRAM(s) Oral daily  methylPREDNISolone sodium succinate Injectable 40 milliGRAM(s) IV Push every 8 hours  pantoprazole   Suspension 40 milliGRAM(s) Oral two times a day before meals  sucralfate 1 Gram(s) Oral Before meals and at bedtime  tamsulosin 0.4 milliGRAM(s) Oral at bedtime    MEDICATIONS  (PRN):  ALBUTerol/ipratropium for Nebulization 3 milliLiter(s) Nebulizer every 6 hours PRN Shortness of Breath and/or Wheezing  sodium chloride 0.65% Nasal 1 Spray(s) Both Nostrils every 2 hours PRN Nasal Congestion      Vital Signs Last 24 Hrs  T(C): 37.2 (12 Mar 2018 23:03), Max: 37.2 (12 Mar 2018 23:03)  T(F): 98.9 (12 Mar 2018 23:03), Max: 98.9 (12 Mar 2018 23:03)  HR: 106 (13 Mar 2018 05:30) (98 - 106)  BP: 132/76 (13 Mar 2018 05:30) (105/57 - 132/76)  BP(mean): --  RR: 20 (12 Mar 2018 23:03) (18 - 20)  SpO2: 99% (12 Mar 2018 23:03) (99% - 99%)  ICU Vital Signs Last 24 Hrs  CRISTIN ROQUE  I&O's Detail    12 Mar 2018 07:01  -  13 Mar 2018 07:00  --------------------------------------------------------  IN:    Free Water: 200 mL    Jevity: 660 mL  Total IN: 860 mL    OUT:  Total OUT: 0 mL    Total NET: 860 mL        I&O's Summary    12 Mar 2018 07:01  -  13 Mar 2018 07:00  --------------------------------------------------------  IN: 860 mL / OUT: 0 mL / NET: 860 mL      Drug Dosing Weight  CRISTIN ROQUE      PHYSICAL EXAM:  General: Appears well developed, well nourished alert and cooperative.  HEENT: Head; normocephalic, atraumatic.  Eyes: Pupils reactive, cornea wnl.  Neck: Supple, no nodes adenopathy, no NVD or carotid bruit or thyromegaly.  CARDIOVASCULAR: Normal S1 and S2, No murmur, rub, gallop or lift.   LUNGS: No rales, rhonchi or wheeze. Normal breath sounds bilaterally.  ABDOMEN: Soft, nontender without mass or organomegaly. bowel sounds normoactive.  EXTREMITIES: No clubbing, cyanosis or edema. Distal pulses wnl.   SKIN: warm and dry with normal turgor.  NEURO: Alert/oriented x 3/normal motor exam. No pathologic reflexes.    PSYCH: normal affect.        LABS:                        9.2    14.8  )-----------( 98       ( 12 Mar 2018 06:44 )             30.0     03-12    144  |  115<H>  |  71.0<H>  ----------------------------<  203<H>  4.9   |  14.0<L>  |  2.92<H>    Ca    9.0      12 Mar 2018 06:44      CRISTIN ROQUE            RADIOLOGY & ADDITIONAL STUDIES:    INTERPRETATION OF TELEMETRY (personally reviewed):   Elderly male with multiple serious and chronic comorbidites.    -Recurrent GI bleeding-upper-prior colectomy with resultant ileostomy on coumadin. (this adm INR was moderately supratheraputic).   -Prior gi w/u has not revealed the source.    -chronic atrial fib with hx of embolic cva/dvt and PE-  -his coumadin is on hold. He is a high risk GI bleed but has had a prior embolic cva/dvt and PE (has an ivc filter)    -CAD with prior MI and TV CABG and bio AVR. Echo reveals preserved LVEF.    -CKD making diuresis of pleural effusions difficult. Consider thoracentesis.       Thank you for allowing [facility] to participate in the care of this patient.  Please feel free to call with any questions or concerns. [facility], THE HEART CENTER                                   [address] [place] [address]                                                      PHONE: [phone]                                                         FAX: [phone]  [email or web address]  ---------------------------------------------------------------------------------------------------------------------------------    Overnight events/patient complaints:  no events    PAST MEDICAL & SURGICAL HISTORY:  Heart attack  High cholesterol  HTN (hypertension)  H/O coronary artery bypass surgery      Allergy Status Unknown    MEDICATIONS  (STANDING):  amiodarone    Tablet 200 milliGRAM(s) Oral daily  atorvastatin 10 milliGRAM(s) Oral at bedtime  finasteride 5 milliGRAM(s) Oral daily  methylPREDNISolone sodium succinate Injectable 40 milliGRAM(s) IV Push every 8 hours  pantoprazole   Suspension 40 milliGRAM(s) Oral two times a day before meals  sucralfate 1 Gram(s) Oral Before meals and at bedtime  tamsulosin 0.4 milliGRAM(s) Oral at bedtime    MEDICATIONS  (PRN):  ALBUTerol/ipratropium for Nebulization 3 milliLiter(s) Nebulizer every 6 hours PRN Shortness of Breath and/or Wheezing  sodium chloride 0.65% Nasal 1 Spray(s) Both Nostrils every 2 hours PRN Nasal Congestion      Vital Signs Last 24 Hrs  T(C): 37.2 (12 Mar 2018 23:03), Max: 37.2 (12 Mar 2018 23:03)  T(F): 98.9 (12 Mar 2018 23:03), Max: 98.9 (12 Mar 2018 23:03)  HR: 106 (13 Mar 2018 05:30) (98 - 106)  BP: 132/76 (13 Mar 2018 05:30) (105/57 - 132/76)  BP(mean): --  RR: 20 (12 Mar 2018 23:03) (18 - 20)  SpO2: 99% (12 Mar 2018 23:03) (99% - 99%)  ICU Vital Signs Last 24 Hrs  CRISTIN ROQUE  I&O's Detail    12 Mar 2018 07:01  -  13 Mar 2018 07:00  --------------------------------------------------------  IN:    Free Water: 200 mL    Jevity: 660 mL  Total IN: 860 mL    OUT:  Total OUT: 0 mL    Total NET: 860 mL        I&O's Summary    12 Mar 2018 07:01  -  13 Mar 2018 07:00  --------------------------------------------------------  IN: 860 mL / OUT: 0 mL / NET: 860 mL      Drug Dosing Weight  CRISTIN ROQUE      PHYSICAL EXAM:  General: Appears well developed, well nourished alert and cooperative.  HEENT: Head; normocephalic, atraumatic.  Eyes: Pupils reactive, cornea wnl.  Neck: Supple, no nodes adenopathy, no NVD or carotid bruit or thyromegaly.  CARDIOVASCULAR: Normal S1 and S2, No murmur, rub, gallop or lift.   LUNGS: No rales, rhonchi or wheeze. Normal breath sounds bilaterally.  ABDOMEN: Soft, nontender without mass or organomegaly. bowel sounds normoactive.  EXTREMITIES: No clubbing, cyanosis or edema. Distal pulses wnl.   SKIN: warm and dry with normal turgor.  NEURO: Alert/oriented x 3/normal motor exam. No pathologic reflexes.    PSYCH: normal affect.        LABS:                        9.2    14.8  )-----------( 98       ( 12 Mar 2018 06:44 )             30.0     03-12    144  |  115<H>  |  71.0<H>  ----------------------------<  203<H>  4.9   |  14.0<L>  |  2.92<H>    Ca    9.0      12 Mar 2018 06:44      CRISTIN ROQUE            RADIOLOGY & ADDITIONAL STUDIES:    INTERPRETATION OF TELEMETRY (personally reviewed):   Elderly male with multiple serious and chronic comorbidites.    1-Recurrent GI bleeding-upper-prior colectomy with resultant ileostomy on coumadin. (this adm INR was moderately supratheraputic).   -Prior gi w/u has not revealed the source.    2chronic atrial fib   -hx of embolic cva/dvt and PE-  -his coumadin is on hold. He is a high risk GI bleed but has had a prior embolic cva/dvt and PE (has an ivc filter)    3-CAD with prior MI and TV CABG and bio AVR.   -Echo reveals preserved LVEF.    4-CKD making diuresis of pleural effusions difficult.   -Consider thoracentesis.      Thank you for allowing [facility] to participate in the care of this patient.  Please feel free to call with any questions or concerns.

## 2018-03-13 NOTE — PROGRESS NOTE ADULT - SUBJECTIVE AND OBJECTIVE BOX
PULMONARY PROGRESS NOTE      CRISTIN ROQUERegency Meridian-[id number]    Patient is a 77y old  Male who presents with a chief complaint of Bleeding into colostomy (04 Mar 2018 23:39)  CAD s/p CABG  Now with CHF appearance on CXR  B/L pleural effusion R>L, see above  VHD  AF      INTERVAL HPI/OVERNIGHT EVENTS:    MEDICATIONS  (STANDING):  amiodarone    Tablet 200 milliGRAM(s) Oral daily  atorvastatin 10 milliGRAM(s) Oral at bedtime  finasteride 5 milliGRAM(s) Oral daily  methylPREDNISolone sodium succinate Injectable 40 milliGRAM(s) IV Push every 8 hours  pantoprazole   Suspension 40 milliGRAM(s) Oral two times a day before meals  sucralfate 1 Gram(s) Oral Before meals and at bedtime  tamsulosin 0.4 milliGRAM(s) Oral at bedtime      MEDICATIONS  (PRN):  ALBUTerol/ipratropium for Nebulization 3 milliLiter(s) Nebulizer every 6 hours PRN Shortness of Breath and/or Wheezing  sodium chloride 0.65% Nasal 1 Spray(s) Both Nostrils every 2 hours PRN Nasal Congestion      Allergies    Allergy Status Unknown    Intolerances        PAST MEDICAL & SURGICAL HISTORY:  Heart attack  High cholesterol  HTN (hypertension)  H/O coronary artery bypass surgery      SOCIAL HISTORY  Smoking History:       REVIEW OF SYSTEMS:    CONSTITUTIONAL:  No distress    HEENT:  Eyes:  No diplopia or blurred vision. ENT:  No earache, sore throat or runny nose.    CARDIOVASCULAR:  No pressure, squeezing, tightness, heaviness or aching about the chest; no palpitations.    RESPIRATORY:  No cough, shortness of breath, PND or orthopnea. Mild SOBOE    GASTROINTESTINAL:  No nausea, vomiting or diarrhea.    GENITOURINARY:  No dysuria, frequency or urgency.    MUSCULOSKELETAL:  No joint pain    SKIN:  No new lesions.    NEUROLOGIC:  No paresthesias, fasciculations, seizures or weakness.    PSYCHIATRIC:  No disorder of thought or mood.    ENDOCRINE:  No heat or cold intolerance, polyuria or polydipsia.    HEMATOLOGICAL:  No easy bruising or bleeding.     Vital Signs Last 24 Hrs  T(C): 36.8 (13 Mar 2018 08:07), Max: 37.2 (12 Mar 2018 23:03)  T(F): 98.3 (13 Mar 2018 08:07), Max: 98.9 (12 Mar 2018 23:03)  HR: 108 (13 Mar 2018 08:07) (98 - 108)  BP: 118/70 (13 Mar 2018 08:07) (105/57 - 132/76)  BP(mean): --  RR: 20 (13 Mar 2018 08:07) (18 - 20)  SpO2: 98% (13 Mar 2018 08:07) (98% - 99%)    PHYSICAL EXAMINATION:    GENERAL: The patient is awake and alert in no apparent distress.     HEENT: Head is normocephalic and atraumatic. Extraocular muscles are intact. Mucous membranes are moist.    NECK: Supple.    LUNGS: Clear to auscultation without wheezing, rales or rhonchi; respirations unlabored    HEART: Regular rate and rhythm without murmur.    ABDOMEN: Soft, nontender, and nondistended.      EXTREMITIES: Without any cyanosis, clubbing, rash, lesions or edema.    NEUROLOGIC: Grossly intact.    SKIN: No ulceration or induration present.      LABS:                        9.8    11.9  )-----------( 127      ( 13 Mar 2018 11:22 )             32.1     03-13    147<H>  |  114<H>  |  97.0<H>  ----------------------------<  197<H>  4.4   |  17.0<L>  |  2.94<H>    Ca    9.2      13 Mar 2018 11:22                          MICROBIOLOGY:    RADIOLOGY & ADDITIONAL STUDIES:

## 2018-03-13 NOTE — PROGRESS NOTE ADULT - ASSESSMENT
Assessment and Plan:   Assessment:  · Assessment		  78 y/o male present with Recurrent Gastrointestinal hemorrhage with melena.  On Coumadin with supra-therapeutic INR (3.74).  1. Gastrointestinal hemorrhage, unspecified gastrointestinal hemorrhage type with anemia of acute blood loss on anemia of chronic disease - monitor HH, transfused 2 additional unit of PRBC with lasix in between, continue PPI, diet advanced and tolerated, GI reevaluation at family request - no intervention suggested  2. Chronic atrial fibrillation - rate controlled with amiodarone, off ASA, coumadin held, s/p Vit K. High risk for CVA and VTE as + Hx of CVA and PE/DVT  3. Acute kidney injury superimposed on chronic kidney disease with hypovolemic Hyponatremia - held lasix, stopped IVF hydration  - back to baseline renal function - resumed low dose lasix due to increased pleural effusions, will consult CTS for thoracocentesis  4. H/o Dysphagia - resumed  TF at nutritionist's recommendations  5. H/o CVA - c/w statin. Bedrest, elevate HOB, high risk of aspirations, turn Q2hrs, high risk of pressure ulcers.   6. SOB with wheezing - on BDs, CXR reviewed - no evidence of PNA or fluid overload, clinically dry with hypovolemic hypernatremia - add free water, ? URI - pulmonary  evaluation appreciated, CT chest showed BL pleural effusion R>L, no evidence of infiltrate, tapering steroids, may need thoracocentesis if remains symptomatic  7. Anemia of ABL - now improved with transfusion, but previously HH was dropping despite no evidence of active hemorrhage, further discussion with blood bank supervisor indicated that pt received blood transfusion when was admitted as [facility] which had positive for previously transfused blood antibodies - likely caused delayed onset hemolytic anemia - will continue to monitor HH, f/u haptoglobin/LDH - wife notified about incident    Will f/u with team

## 2018-03-13 NOTE — PROGRESS NOTE ADULT - SUBJECTIVE AND OBJECTIVE BOX
CC: Bleeding into colostomy     HPI: Mr. 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 Coumadin was held, FFP was given and EGD completed which did not reveal source of bleeding. G- tube was placed during EGD. 3 weeks prior to that he was discharged from [facility] s/p respiratory failure s/p intubation due to aspiration pneumonia, Septic shock 2/2 UTI, KAPIL on CKD, recurrent NSVT, UE DVT.    As per wife at bedside, pt is now refusing oral intake, pt uses Jevity 1.5, 4 to 5 cans per day. She has noticed that he has an increased about of bloating, vomiting episodes and coughing. In addition she reports that pt stopped bleeding for 5 days after discharge but has been ongoing since that time. His Coumadin dose has been up titrated to 5mg M-W-F, followed by 2.5mg the remainder of the week. As a result of the recurrent bleeding she stopped giving him his Aspirin (2weeks now), Dronabinol was never started as the pharmacy indicated his insurance did not approve it.     INTERVAL HPI/OVERNIGHT EVENTS: Patient seen and examined lying in bed.  Patient denies any headache, dizziness, SOB, CP, abdominal pain, nausea, vomiting, dysuria.  No bleeding in Ileostomy.  Per RN, patient had nosebleed last night with some old blood noted in mouth.  Other ROS reviewed and are negative.    Vital Signs Last 24 Hrs  T(C): 36.8 (13 Mar 2018 08:07), Max: 37.2 (12 Mar 2018 23:03)  T(F): 98.3 (13 Mar 2018 08:07), Max: 98.9 (12 Mar 2018 23:03)  HR: 108 (13 Mar 2018 08:07) (98 - 108)  BP: 118/70 (13 Mar 2018 08:07) (105/57 - 132/76)  RR: 20 (13 Mar 2018 08:07) (18 - 20)  SpO2: 98% (13 Mar 2018 08:07) (98% - 99%)    PHYSICAL EXAM:  GENERAL: NAD  HEAD:  Atraumatic, Normocephalic  NECK: Supple, No JVD, Normal thyroid  NERVOUS SYSTEM:  Alert & Oriented X3, expressive aphasia, generalized weakness, + Right HP.  CHEST/LUNG: Coarse BS bilaterally with fine crackles in bases  HEART: Irregular rate and rhythm; No murmurs, rubs, or gallops  ABDOMEN: Soft, Nontender, Nondistended; Bowel sounds present; (+) Peg; (+) Ileostomy  EXTREMITIES:  2+ Peripheral Pulses, No clubbing, cyanosis, or edema    MEDICATIONS  (STANDING):  amiodarone    Tablet 200 milliGRAM(s) Oral daily  atorvastatin 10 milliGRAM(s) Oral at bedtime  finasteride 5 milliGRAM(s) Oral daily  methylPREDNISolone sodium succinate Injectable 40 milliGRAM(s) IV Push every 8 hours  pantoprazole   Suspension 40 milliGRAM(s) Oral two times a day before meals  sucralfate 1 Gram(s) Oral Before meals and at bedtime  tamsulosin 0.4 milliGRAM(s) Oral at bedtime    MEDICATIONS  (PRN):  ALBUTerol/ipratropium for Nebulization 3 milliLiter(s) Nebulizer every 6 hours PRN Shortness of Breath and/or Wheezing  sodium chloride 0.65% Nasal 1 Spray(s) Both Nostrils every 2 hours PRN Nasal Congestion    RADIOLOGY & ADDITIONAL TESTS:  < from: Xray Chest 1 View AP/PA. (03.12.18 @ 05:31) >   EXAM:  XR CHEST AP OR PA 1V                          PROCEDURE DATE:  03/12/2018      INTERPRETATION:  CHEST AP PORTABLE:    History: f/u effusions.     Date and time of exam: 3/12/2018 4:57 AM.    Technique: A single AP view of the chest was obtained.    Comparison exam: 3/9/2018.    Findings:  Cardio megaly. Evidence of prior cardiac surgery. Pulmonary vascular   congestion and bilateral pulmonary edema, new since the prior study.   Right pleural effusion, a new finding..    Impression:  Development of congestive heart failure, pulmonary edema, and right   pleural effusion since 3/9/2018..    INTERPRETATION:  EXAM:    CT Chest Without Intravenous Contrast    EXAM DATE/TIME:    Exam ordered 3/10/2018 4:08 PM    FINDINGS:    Lungs:  Multifocal pulmonary emphysema, scarring, and mild fibrosis.   Mild   bronchiectasis in the right lower lobe.    Pleural space:  Moderate right pleural effusion. Small left pleural   effusion.    No pneumothorax.    Heart:  Cardiomegaly. History of sternotomy and CABG. Coronary artery   calcifications. No significant pericardial effusion.    Mediastinum:  The esophagus is unremarkable.    Bones/joints:  Unremarkable.No acute fracture.  No dislocation.    Soft tissues:  Unremarkable.    Vasculature:  IVC filter present.  No thoracic aortic aneurysm.    Lymph nodes:  Mild enlarged mediastinal lymph nodes measuring up to 1.1   cm in   short axis, indeterminate.    Gallbladder and bile ducts:  Incompletely visualized cholelithiasis.    Spleen:  Splenomegaly.    Adrenals:  Bilateral adrenal masses, measuring 2.5 cm on the right and   2.2 cm   on the left, do not meet strict criteria for adenomas and are   indeterminate.    In the absence of a history of malignancy, this is likely benign.    Kidneys and ureters:  3 cm homogeneously hyperdense round left renal   lesion   is indeterminate, potentially a hyperdense cyst. Neoplasm not excluded.    Nonobstructing stones in the kidneys bilaterally.    Intraperitoneal space:  Small volume perihepatic ascites.    IMPRESSION:       1.  Small volume perihepatic ascites.  2.  3 cm homogeneously hyperdense round left renal lesion is   indeterminate,   potentially a hyperdense cyst. Neoplasm not excluded.  3.  Splenomegaly.  4.  Mild enlarged mediastinal lymph nodes measuring up to 1.1 cm in short   axis,   indeterminate.  5.  Moderate right pleural effusion. Small left pleural effusion.  6.  Multifocal pulmonary emphysema, scarring, and mild fibrosis. Mild   bronchiectasis in the right lower lobe.

## 2018-03-14 DIAGNOSIS — J90 PLEURAL EFFUSION, NOT ELSEWHERE CLASSIFIED: ICD-10-CM

## 2018-03-14 DIAGNOSIS — N18.3 CHRONIC KIDNEY DISEASE, STAGE 3 (MODERATE): ICD-10-CM

## 2018-03-14 LAB
ANION GAP SERPL CALC-SCNC: 16 MMOL/L — SIGNIFICANT CHANGE UP (ref 5–17)
BUN SERPL-MCNC: 105 MG/DL — HIGH (ref 8–20)
CALCIUM SERPL-MCNC: 9.2 MG/DL — SIGNIFICANT CHANGE UP (ref 8.6–10.2)
CHLORIDE SERPL-SCNC: 117 MMOL/L — HIGH (ref 98–107)
CO2 SERPL-SCNC: 15 MMOL/L — LOW (ref 22–29)
CREAT SERPL-MCNC: 2.91 MG/DL — HIGH (ref 0.5–1.3)
GLUCOSE SERPL-MCNC: 227 MG/DL — HIGH (ref 70–115)
HCT VFR BLD CALC: 32.6 % — LOW (ref 42–52)
HGB BLD-MCNC: 10 G/DL — LOW (ref 14–18)
MCHC RBC-ENTMCNC: 29.9 PG — SIGNIFICANT CHANGE UP (ref 27–31)
MCHC RBC-ENTMCNC: 30.7 G/DL — LOW (ref 32–36)
MCV RBC AUTO: 97.3 FL — HIGH (ref 80–94)
PLATELET # BLD AUTO: 133 K/UL — LOW (ref 150–400)
POTASSIUM SERPL-MCNC: 4.2 MMOL/L — SIGNIFICANT CHANGE UP (ref 3.5–5.3)
POTASSIUM SERPL-SCNC: 4.2 MMOL/L — SIGNIFICANT CHANGE UP (ref 3.5–5.3)
RBC # BLD: 3.35 M/UL — LOW (ref 4.6–6.2)
RBC # FLD: 19.5 % — HIGH (ref 11–15.6)
SODIUM SERPL-SCNC: 148 MMOL/L — HIGH (ref 135–145)
WBC # BLD: 10.8 K/UL — SIGNIFICANT CHANGE UP (ref 4.8–10.8)
WBC # FLD AUTO: 10.8 K/UL — SIGNIFICANT CHANGE UP (ref 4.8–10.8)

## 2018-03-14 PROCEDURE — 99233 SBSQ HOSP IP/OBS HIGH 50: CPT | Mod: GC

## 2018-03-14 PROCEDURE — 99232 SBSQ HOSP IP/OBS MODERATE 35: CPT

## 2018-03-14 RX ORDER — INFLUENZA VIRUS VACCINE 15; 15; 15; 15 UG/.5ML; UG/.5ML; UG/.5ML; UG/.5ML
0.5 SUSPENSION INTRAMUSCULAR ONCE
Qty: 0 | Refills: 0 | Status: COMPLETED | OUTPATIENT
Start: 2018-03-14 | End: 2018-03-14

## 2018-03-14 RX ORDER — IPRATROPIUM/ALBUTEROL SULFATE 18-103MCG
3 AEROSOL WITH ADAPTER (GRAM) INHALATION EVERY 6 HOURS
Qty: 0 | Refills: 0 | Status: DISCONTINUED | OUTPATIENT
Start: 2018-03-14 | End: 2018-03-21

## 2018-03-14 RX ORDER — BUMETANIDE 0.25 MG/ML
0.5 INJECTION INTRAMUSCULAR; INTRAVENOUS ONCE
Qty: 0 | Refills: 0 | Status: COMPLETED | OUTPATIENT
Start: 2018-03-14 | End: 2018-03-14

## 2018-03-14 RX ORDER — BUMETANIDE 0.25 MG/ML
1 INJECTION INTRAMUSCULAR; INTRAVENOUS
Qty: 0 | Refills: 0 | Status: DISCONTINUED | OUTPATIENT
Start: 2018-03-14 | End: 2018-03-14

## 2018-03-14 RX ADMIN — BUMETANIDE 104 MILLIGRAM(S): 0.25 INJECTION INTRAMUSCULAR; INTRAVENOUS at 13:30

## 2018-03-14 RX ADMIN — PANTOPRAZOLE SODIUM 40 MILLIGRAM(S): 20 TABLET, DELAYED RELEASE ORAL at 06:21

## 2018-03-14 RX ADMIN — ATORVASTATIN CALCIUM 10 MILLIGRAM(S): 80 TABLET, FILM COATED ORAL at 21:38

## 2018-03-14 RX ADMIN — Medication 40 MILLIGRAM(S): at 06:21

## 2018-03-14 RX ADMIN — Medication 3 MILLILITER(S): at 20:39

## 2018-03-14 RX ADMIN — TAMSULOSIN HYDROCHLORIDE 0.4 MILLIGRAM(S): 0.4 CAPSULE ORAL at 21:38

## 2018-03-14 RX ADMIN — AMIODARONE HYDROCHLORIDE 200 MILLIGRAM(S): 400 TABLET ORAL at 06:21

## 2018-03-14 RX ADMIN — Medication 1 GRAM(S): at 06:21

## 2018-03-14 RX ADMIN — Medication 1 GRAM(S): at 13:30

## 2018-03-14 RX ADMIN — Medication 40 MILLIGRAM(S): at 17:19

## 2018-03-14 RX ADMIN — FINASTERIDE 5 MILLIGRAM(S): 5 TABLET, FILM COATED ORAL at 13:30

## 2018-03-14 RX ADMIN — Medication 3 MILLILITER(S): at 15:35

## 2018-03-14 RX ADMIN — Medication 1 GRAM(S): at 21:38

## 2018-03-14 NOTE — PROGRESS NOTE ADULT - ASSESSMENT
Assessment and Plan:   Assessment:  · Assessment		  76 y/o male present with Recurrent Gastrointestinal hemorrhage with melena.  On Coumadin with supra-therapeutic INR (3.74).  1. Gastrointestinal hemorrhage, unspecified gastrointestinal hemorrhage type with anemia of acute blood loss on anemia of chronic disease - monitor HH, transfused 2 additional unit of PRBC with lasix in between, continue PPI, diet advanced and tolerated, GI reevaluation at family request - no intervention suggested  2. Chronic atrial fibrillation - rate controlled with amiodarone, off ASA, coumadin held, s/p Vit K. High risk for CVA and VTE as + Hx of CVA and PE/DVT  3. Acute kidney injury superimposed on chronic kidney disease with hypovolemic Hyponatremia - held lasix, stopped IVF hydration  - back to baseline renal function - resumed low dose lasix due to increased pleural effusions, will consult CTS for thoracocentesis - recommended IR.  4. H/o Dysphagia - resumed  TF at nutritionist's recommendations  5. H/o CVA - c/w statin. Bedrest, elevate HOB, high risk of aspirations, turn Q2hrs, high risk of pressure ulcers.   6. SOB with wheezing - on BDs, CXR reviewed - no evidence of PNA or fluid overload, clinically dry with hypovolemic hypernatremia - add free water, ? URI - pulmonary  evaluation appreciated, CT chest showed BL pleural effusion R>L, no evidence of infiltrate, tapering steroids, Will discuss with IR for thoracocentesis  7. Anemia of ABL - now improved with transfusion, but previously HH was dropping despite no evidence of active hemorrhage, further discussion with blood bank supervisor indicated that pt received blood transfusion when was admitted as [facility] which had positive for previously transfused blood antibodies - likely caused delayed onset hemolytic anemia - will continue to monitor HH, f/u haptoglobin/LDH - wife notified about incident    Will f/u with team Assessment and Plan:   Assessment:  · Assessment		  78 y/o male present with Recurrent Gastrointestinal hemorrhage with melena.  On Coumadin with supra-therapeutic INR (3.74).  1. Gastrointestinal hemorrhage, unspecified gastrointestinal hemorrhage type with anemia of acute blood loss on anemia of chronic disease - monitor HH, transfused 2 additional unit of PRBC with lasix in between, continue PPI, diet advanced and tolerated, GI reevaluation at family request - no intervention suggested  2. Chronic atrial fibrillation - rate controlled with amiodarone, off ASA, coumadin held, s/p Vit K. High risk for CVA and VTE as + Hx of CVA and PE/DVT  3. Acute kidney injury superimposed on chronic kidney disease with hypovolemic Hyponatremia - held lasix, stopped IVF hydration  - back to baseline renal function - resumed low dose lasix due to increased pleural effusions with worsening renal fx - will attempt diuresis with bumex BID  4. H/o Dysphagia - resumed  TF at nutritionist's recommendations - change to bolus feeds  5. H/o CVA - c/w statin. Bedrest, elevate HOB, high risk of aspirations, turn Q2hrs, high risk of pressure ulcers.   6. SOB with wheezing - on BDs, CXR reviewed - no evidence of PNA or fluid overload, clinically dry with hypovolemic hypernatremia - add free water, ? URI - pulmonary  evaluation appreciated, CT chest showed BL pleural effusion R>L, no evidence of infiltrate, tapering steroids, No thoracocentesis as likely fluid overload  7. Anemia of ABL - now improved with transfusion, but previously HH was dropping despite no evidence of active hemorrhage, further discussion with blood bank supervisor indicated that pt received blood transfusion when was admitted as [facility] which had positive for previously transfused blood antibodies - likely caused delayed onset hemolytic anemia - will continue to monitor HH, f/u haptoglobin/LDH - wife notified about incident  Will monitor renal fx, taper steroids, monitor HH  Pt seen and examined with NP. A&P reviewed.   Time spent 55 min

## 2018-03-14 NOTE — PROGRESS NOTE ADULT - SUBJECTIVE AND OBJECTIVE BOX
CC: Bleeding into colostomy     HPI:  Mr. Kristofer Salomon (prior [id number]) is a 76 yo male PMHx CAD s/p CABG, AFib on Coumadin, HTN, prostate CA s/p TURP, s/p PEG and colectomy, prior CVA with expressive aphasia and R hemiparesis, CKD 3, recently discharged 3 weeks ago for blood in the Colostomy bag requiring 2 units PRBC transfusion. Coumadin was held, FFP was given and EGD completed which did not reveal source of bleeding. G- tube was placed during EGD. 3 weeks prior to that he was discharged from [facility] s/p respiratory failure s/p intubation due to aspiration pneumonia, Septic shock 2/2 UTI, KAPIL on CKD, recurrent NSVT, UE DVT.    As per wife at bedside, pt is now refusing oral intake, pt uses Jevity 1.5, 4 to 5 cans per day. She has noticed that he has an increased about of bloating, vomiting episodes and coughing. In addition she reports that pt stopped bleeding for 5 days after discharge but has been ongoing since that time. His Coumadin dose has been up titrated to 5mg M-W-F, followed by 2.5mg the remainder of the week. As a result of the recurrent bleeding she stopped giving him his Aspirin (2weeks now), Dronabinol was never started as the pharmacy indicated his insurance did not approve it.     INTERVAL HPI/OVERNIGHT EVENTS: Patient seen and examined lying in bed.  Patient with cough, nonproductive.  Patient denies any headache, dizziness, CP, abdominal pain.  Patient appears SOB intermittently.  (+) expressive aphasia - ROS limited.  No blood in Ileostomy.      Vital Signs Last 24 Hrs  T(C): 36.6 (14 Mar 2018 07:59), Max: 36.7 (13 Mar 2018 15:40)  T(F): 97.8 (14 Mar 2018 07:59), Max: 98.1 (13 Mar 2018 15:40)  HR: 111 (14 Mar 2018 07:59) (98 - 111)  BP: 112/70 (14 Mar 2018 07:59) (109/72 - 141/71)  BP(mean): --  RR: 20 (14 Mar 2018 07:59) (18 - 20)  SpO2: 99% (14 Mar 2018 07:59) (98% - 99%)  I&O's Detail    13 Mar 2018 07:01  -  14 Mar 2018 07:00  --------------------------------------------------------  IN:    Free Water: 450 mL    Jevity: 1100 mL  Total IN: 1550 mL    OUT:    Colostomy: 1700 mL  Total OUT: 1700 mL    Total NET: -150 mL      14 Mar 2018 07:01  -  14 Mar 2018 11:39  --------------------------------------------------------  IN:  Total IN: 0 mL    OUT:    Colostomy: 500 mL  Total OUT: 500 mL    Total NET: -500 mL        PHYSICAL EXAM:  GENERAL: NAD  HEAD:  Atraumatic, Normocephalic  NECK: Supple, No JVD, Normal thyroid  NERVOUS SYSTEM:  Alert & Oriented X3, expressive aphasia, generalized weakness  CHEST/LUNG: Coarse BS bilaterally  HEART: Regular rate and rhythm; No murmurs, rubs, or gallops  ABDOMEN: Soft, Nontender, Nondistended; Bowel sounds present; (+) peg; (+) Ileostomy  EXTREMITIES:  2+ Peripheral Pulses, No clubbing, cyanosis, or edema                                10.0   10.8  )-----------( 133      ( 14 Mar 2018 08:55 )             32.6     14 Mar 2018 08:55    148    |  117    |  105.0  ----------------------------<  227    4.2     |  15.0   |  2.91     Ca    9.2        14 Mar 2018 08:55      MEDICATIONS  (STANDING):  ALBUTerol/ipratropium for Nebulization 3 milliLiter(s) Nebulizer every 6 hours  amiodarone    Tablet 200 milliGRAM(s) Oral daily  atorvastatin 10 milliGRAM(s) Oral at bedtime  finasteride 5 milliGRAM(s) Oral daily  influenza   Vaccine 0.5 milliLiter(s) IntraMuscular once  methylPREDNISolone sodium succinate Injectable 40 milliGRAM(s) IV Push every 8 hours  pantoprazole   Suspension 40 milliGRAM(s) Oral two times a day before meals  sucralfate 1 Gram(s) Oral Before meals and at bedtime  tamsulosin 0.4 milliGRAM(s) Oral at bedtime    MEDICATIONS  (PRN):  sodium chloride 0.65% Nasal 1 Spray(s) Both Nostrils every 2 hours PRN Nasal Congestion CC: Bleeding into colostomy     HPI:  Mr. Kristofer Salomon (prior [id number]) is a 76 yo male PMHx CAD s/p CABG, AFib on Coumadin, HTN, prostate CA s/p TURP, s/p PEG and colectomy, prior CVA with expressive aphasia and R hemiparesis, CKD 3, recently discharged 3 weeks ago for blood in the Colostomy bag requiring 2 units PRBC transfusion. Coumadin was held, FFP was given and EGD completed which did not reveal source of bleeding. G- tube was placed during EGD. 3 weeks prior to that he was discharged from [facility] s/p respiratory failure s/p intubation due to aspiration pneumonia, Septic shock 2/2 UTI, KAPIL on CKD, recurrent NSVT, UE DVT.    As per wife at bedside, pt is now refusing oral intake, pt uses Jevity 1.5, 4 to 5 cans per day. She has noticed that he has an increased about of bloating, vomiting episodes and coughing. In addition she reports that pt stopped bleeding for 5 days after discharge but has been ongoing since that time. His Coumadin dose has been up titrated to 5mg M-W-F, followed by 2.5mg the remainder of the week. As a result of the recurrent bleeding she stopped giving him his Aspirin (2weeks now), Dronabinol was never started as the pharmacy indicated his insurance did not approve it.     INTERVAL HPI/OVERNIGHT EVENTS: Patient seen and examined lying in bed.  Patient with cough, nonproductive.  Patient denies any headache, dizziness, CP, abdominal pain.  Patient appears SOB intermittently.  (+) expressive aphasia - ROS limited.  No blood in Ileostomy.      Vital Signs Last 24 Hrs  T(C): 36.6 (14 Mar 2018 07:59), Max: 36.7 (13 Mar 2018 15:40)  T(F): 97.8 (14 Mar 2018 07:59), Max: 98.1 (13 Mar 2018 15:40)  HR: 111 (14 Mar 2018 07:59) (98 - 111)  BP: 112/70 (14 Mar 2018 07:59) (109/72 - 141/71)  RR: 20 (14 Mar 2018 07:59) (18 - 20)  SpO2: 99% (14 Mar 2018 07:59) (98% - 99%)  I&O's Detail    13 Mar 2018 07:01  -  14 Mar 2018 07:00  --------------------------------------------------------  IN:    Free Water: 450 mL    Jevity: 1100 mL  Total IN: 1550 mL    OUT:    Colostomy: 1700 mL  Total OUT: 1700 mL    Total NET: -150 mL      14 Mar 2018 07:01  -  14 Mar 2018 11:39  --------------------------------------------------------  IN:  Total IN: 0 mL    OUT:    Colostomy: 500 mL  Total OUT: 500 mL    Total NET: -500 mL        PHYSICAL EXAM:  GENERAL: NAD  HEAD:  Atraumatic, Normocephalic  NECK: Supple, No JVD, Normal thyroid  NERVOUS SYSTEM:  Alert & Oriented X3, expressive aphasia, generalized weakness  CHEST/LUNG: Coarse BS bilaterally  HEART: Regular rate and rhythm; No murmurs, rubs, or gallops  ABDOMEN: Soft, Nontender, Nondistended; Bowel sounds present; (+) peg; (+) Ileostomy  EXTREMITIES:  2+ Peripheral Pulses, No clubbing, cyanosis, or edema                                10.0   10.8  )-----------( 133      ( 14 Mar 2018 08:55 )             32.6     14 Mar 2018 08:55    148    |  117    |  105.0  ----------------------------<  227    4.2     |  15.0   |  2.91     Ca    9.2        14 Mar 2018 08:55    MEDICATIONS: reviewed

## 2018-03-14 NOTE — PROGRESS NOTE ADULT - ASSESSMENT
CAD s/p CABG  VHD  AF  CRI  Possible COPD given smoking hx  Hypoxia  B/l pleural effusions R>L  Concern for aspiration  Prior CVA  Hypernatremia  Somewhat improved    Plan:    Drug nebs  IV steroids  Diurese as able  Follow renal function  ? repeat swallow evaluation  Follow H/H  Transfuse as needed  Rate control of AF  Optimize cardiac function  Follow CXR/CT for improvement in effusions  Cardiology and GI f/u  Consider renal evaluation  Follow Na  On PPI  Optimize cardiac function  Could consider drainage of R effusion if pt agreeable though currently improved

## 2018-03-14 NOTE — PROGRESS NOTE ADULT - SUBJECTIVE AND OBJECTIVE BOX
[place] [facility], [facility]                                   [address] [place] [address]                                                      PHONE: [phone]                                                         FAX: [phone]  [email or web address]  ---------------------------------------------------------------------------------------------------------------------------------  CC: bleeding from ostomy site     Reason for consult: ADHF     Overnight events/patient complaints: No acute events overnight.     PAST MEDICAL & SURGICAL HISTORY:  Heart attack  High cholesterol  HTN (hypertension)  H/O coronary artery bypass surgery    Allergy Status Unknown    MEDICATIONS  (STANDING):  amiodarone    Tablet 200 milliGRAM(s) Oral daily  atorvastatin 10 milliGRAM(s) Oral at bedtime  finasteride 5 milliGRAM(s) Oral daily  influenza   Vaccine 0.5 milliLiter(s) IntraMuscular once  methylPREDNISolone sodium succinate Injectable 40 milliGRAM(s) IV Push every 8 hours  pantoprazole   Suspension 40 milliGRAM(s) Oral two times a day before meals  sucralfate 1 Gram(s) Oral Before meals and at bedtime  tamsulosin 0.4 milliGRAM(s) Oral at bedtime    MEDICATIONS  (PRN):  ALBUTerol/ipratropium for Nebulization 3 milliLiter(s) Nebulizer every 6 hours PRN Shortness of Breath and/or Wheezing  sodium chloride 0.65% Nasal 1 Spray(s) Both Nostrils every 2 hours PRN Nasal Congestion    Vital Signs Last 24 Hrs  T(C): 36.6 (14 Mar 2018 07:59), Max: 36.7 (13 Mar 2018 15:40)  T(F): 97.8 (14 Mar 2018 07:59), Max: 98.1 (13 Mar 2018 15:40)  HR: 111 (14 Mar 2018 07:59) (98 - 111)  BP: 112/70 (14 Mar 2018 07:59) (109/72 - 141/71)  BP(mean): --  RR: 20 (14 Mar 2018 07:59) (18 - 20)  SpO2: 99% (14 Mar 2018 07:59) (98% - 99%)  ICU Vital Signs Last 24 Hrs    PHYSICAL EXAM:  General: Appears well developed, well nourished alert and cooperative.  HEENT: Head; normocephalic, atraumatic.  Eyes: Pupils reactive, cornea wnl.  Neck: Supple, no nodes adenopathy, no NVD or carotid bruit or thyromegaly.  CARDIOVASCULAR: irregularly irregular, normal S1 and S2, 1/6 murmur, rub, gallop or lift.   LUNGS: No rales, rhonchi or wheeze. Normal breath sounds bilaterally.  ABDOMEN: Soft, nontender without mass or organomegaly. bowel sounds normoactive. ileostomy, G tube   EXTREMITIES: No clubbing, cyanosis or edema.   SKIN: warm and dry with normal turgor.  NEURO: Right sided hemiparesis with expressive aphasia   PSYCH: normal affect.        LABS:                        9.8    11.9  )-----------( 127      ( 13 Mar 2018 11:22 )             32.1     03-13    147<H>  |  114<H>  |  97.0<H>  ----------------------------<  197<H>  4.4   |  17.0<L>  |  2.94<H>    Ca    9.2      13 Mar 2018 11:22      RADIOLOGY & ADDITIONAL STUDIES:     INTERPRETATION OF TELEMETRY (personally reviewed): off tele    ECG: < from: 12 Lead ECG (03.04.18 @ 22:56) >  Atrial fibrillation at a rate at 90 beat per minute   Non-specific intra-ventricular conduction block  Inferior infarct , age undetermined    ASSESSMENT AND PLAN:  In summary, CRISTIN ROQUE is an 77y Male with past medical history significant for CAD with remote MI s/p CABG and bioprosthetic AVR, chronic atrial fibrillation on coumadin, 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 (this adm INR was moderately supratheraputic).   - management per GI, source unclear     # chronic atrial fib   -hx of embolic cva/dvt and PE  -his coumadin is on hold. He is a high risk GI bleed but has had a prior embolic cva/dvt and PE (has an ivc filter)  - continue amiodarone     # CAD with prior MI and TV CABG and bio AVR.   -Echo reveals preserved LVEF.  -continue statin therapy, ASA on hold     No cardiac contraindication to proceeding with ultrasound guided thoracentesis which is a low risk procedure which can per performed with local anesthesia               Thank you for allowing [facility] to participate in the care of this patient.  Please feel free to call with any questions or concerns. [place] [facility], [facility]                                   [address] [place] [address]                                                      PHONE: [phone]                                                         FAX: [phone]  [email or web address]  ---------------------------------------------------------------------------------------------------------------------------------  CC: bleeding from ostomy site     Reason for consult: ADHF     Overnight events/patient complaints: No acute events overnight.     PAST MEDICAL & SURGICAL HISTORY:  Heart attack  High cholesterol  HTN (hypertension)  H/O coronary artery bypass surgery    Allergy Status Unknown    MEDICATIONS  (STANDING):  amiodarone    Tablet 200 milliGRAM(s) Oral daily  atorvastatin 10 milliGRAM(s) Oral at bedtime  finasteride 5 milliGRAM(s) Oral daily  influenza   Vaccine 0.5 milliLiter(s) IntraMuscular once  methylPREDNISolone sodium succinate Injectable 40 milliGRAM(s) IV Push every 8 hours  pantoprazole   Suspension 40 milliGRAM(s) Oral two times a day before meals  sucralfate 1 Gram(s) Oral Before meals and at bedtime  tamsulosin 0.4 milliGRAM(s) Oral at bedtime    MEDICATIONS  (PRN):  ALBUTerol/ipratropium for Nebulization 3 milliLiter(s) Nebulizer every 6 hours PRN Shortness of Breath and/or Wheezing  sodium chloride 0.65% Nasal 1 Spray(s) Both Nostrils every 2 hours PRN Nasal Congestion    Vital Signs Last 24 Hrs  T(C): 36.6 (14 Mar 2018 07:59), Max: 36.7 (13 Mar 2018 15:40)  T(F): 97.8 (14 Mar 2018 07:59), Max: 98.1 (13 Mar 2018 15:40)  HR: 111 (14 Mar 2018 07:59) (98 - 111)  BP: 112/70 (14 Mar 2018 07:59) (109/72 - 141/71)  BP(mean): --  RR: 20 (14 Mar 2018 07:59) (18 - 20)  SpO2: 99% (14 Mar 2018 07:59) (98% - 99%)  ICU Vital Signs Last 24 Hrs    PHYSICAL EXAM:  General: chronically ill man in no acute distress   HEENT: Head; normocephalic, atraumatic.  Eyes: Pupils reactive, cornea wnl.  Neck: Supple, no nodes adenopathy, no NVD or carotid bruit or thyromegaly.  CARDIOVASCULAR: irregularly irregular, normal S1 and S2, 1/6 murmur, rub, gallop or lift.   LUNGS: No rales, rhonchi or wheeze. Normal breath sounds bilaterally.  ABDOMEN: Soft, nontender without mass or organomegaly. bowel sounds normoactive. ileostomy, G tube   EXTREMITIES: No clubbing, cyanosis or edema.   SKIN: warm and dry with normal turgor.  NEURO: Right sided hemiparesis with expressive aphasia   PSYCH: normal affect.        LABS:                        9.8    11.9  )-----------( 127      ( 13 Mar 2018 11:22 )             32.1     03-13    147<H>  |  114<H>  |  97.0<H>  ----------------------------<  197<H>  4.4   |  17.0<L>  |  2.94<H>    Ca    9.2      13 Mar 2018 11:22      RADIOLOGY & ADDITIONAL STUDIES:     INTERPRETATION OF TELEMETRY (personally reviewed): off tele    ECG: < from: 12 Lead ECG (03.04.18 @ 22:56) >  Atrial fibrillation at a rate at 90 beat per minute   Non-specific intra-ventricular conduction block  Inferior infarct , age undetermined    ASSESSMENT AND PLAN:  In summary, CRISTIN ROQUE is an 77y Male with past medical history significant for CAD with remote MI s/p CABG and bioprosthetic AVR, chronic atrial fibrillation on coumadin, 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 (this adm INR was moderately supratheraputic).   - management per GI, source unclear     # chronic atrial fib   -hx of embolic cva/dvt and PE  -his coumadin is on hold. He is a high risk GI bleed but has had a prior embolic cva/dvt and PE (has an ivc filter)  - continue amiodarone     # CAD with prior MI and TV CABG and bio AVR.   -Echo reveals preserved LVEF.  -continue statin therapy, ASA on hold     No cardiac contraindication to proceeding with ultrasound guided thoracentesis which is a low risk procedure which can per performed with local anesthesia               Thank you for allowing [facility] to participate in the care of this patient.  Please feel free to call with any questions or concerns.

## 2018-03-14 NOTE — PROGRESS NOTE ADULT - SUBJECTIVE AND OBJECTIVE BOX
PULMONARY PROGRESS NOTE      CRISTIN ROQUE  MRN-[id number]    Patient is a 77y old  Male who presents with a chief complaint of Bleeding into colostomy (04 Mar 2018 23:39)      INTERVAL HPI/OVERNIGHT EVENTS:    Patient is comfortable, responsive  Feels better    MEDICATIONS  (STANDING):  ALBUTerol/ipratropium for Nebulization 3 milliLiter(s) Nebulizer every 6 hours  amiodarone    Tablet 200 milliGRAM(s) Oral daily  atorvastatin 10 milliGRAM(s) Oral at bedtime  finasteride 5 milliGRAM(s) Oral daily  influenza   Vaccine 0.5 milliLiter(s) IntraMuscular once  methylPREDNISolone sodium succinate Injectable 40 milliGRAM(s) IV Push every 8 hours  pantoprazole   Suspension 40 milliGRAM(s) Oral two times a day before meals  sucralfate 1 Gram(s) Oral Before meals and at bedtime  tamsulosin 0.4 milliGRAM(s) Oral at bedtime      MEDICATIONS  (PRN):  sodium chloride 0.65% Nasal 1 Spray(s) Both Nostrils every 2 hours PRN Nasal Congestion      Allergies    Allergy Status Unknown    Intolerances        PAST MEDICAL & SURGICAL HISTORY:  Heart attack  High cholesterol  HTN (hypertension)  H/O coronary artery bypass surgery        REVIEW OF SYSTEMS:    CONSTITUTIONAL:  No distress    HEENT:  Eyes:  No diplopia or blurred vision. ENT:  No earache, sore throat or runny nose.    CARDIOVASCULAR:  No pressure, squeezing, tightness, heaviness or aching about the chest; no palpitations.    RESPIRATORY:  Improved cough, shortness of breath, no PND or orthopnea. Mild SOBOE    GASTROINTESTINAL:  No nausea, vomiting or diarrhea.    GENITOURINARY:  No dysuria, frequency or urgency.    NEUROLOGIC:  No paresthesias, fasciculations, seizures or weakness.    PSYCHIATRIC:  No disorder of thought or mood.    Vital Signs Last 24 Hrs  T(C): 36.6 (14 Mar 2018 07:59), Max: 36.7 (13 Mar 2018 15:40)  T(F): 97.8 (14 Mar 2018 07:59), Max: 98.1 (13 Mar 2018 15:40)  HR: 111 (14 Mar 2018 07:59) (98 - 111)  BP: 112/70 (14 Mar 2018 07:59) (109/72 - 141/71)  BP(mean): --  RR: 20 (14 Mar 2018 07:59) (18 - 20)  SpO2: 99% (14 Mar 2018 07:59) (98% - 99%)    PHYSICAL EXAMINATION:    GENERAL: The patient is awake and alert in no apparent distress.     HEENT: Head is normocephalic and atraumatic. Extraocular muscles are intact. Mucous membranes are moist.    NECK: Supple.    LUNGS: Clear to auscultation without wheezing, rales but with mild rhonchi; respirations unlabored    HEART: Irregular rhythm without murmur. Obese    ABDOMEN: Soft, nontender, and nondistended.      EXTREMITIES: Without any cyanosis, clubbing, rash, lesions or edema.    NEUROLOGIC: R sided weakness.    LABS:                        10.0   10.8  )-----------( 133      ( 14 Mar 2018 08:55 )             32.6     03-14    148<H>  |  117<H>  |  105.0<H>  ----------------------------<  227<H>  4.2   |  15.0<L>  |  2.91<H>    Ca    9.2      14 Mar 2018 08:55      RADIOLOGY & ADDITIONAL STUDIES:     EXAM:  XR CHEST AP OR PA 1V                          PROCEDURE DATE:  03/12/2018          INTERPRETATION:  CHEST AP PORTABLE:    History: f/u effusions.     Date and time of exam: 3/12/2018 4:57 AM.    Technique: A single AP view of the chest was obtained.    Comparison exam: 3/9/2018.    Findings:  Cardio megaly. Evidence of prior cardiac surgery. Pulmonary vascular   congestion and bilateral pulmonary edema, new since the prior study.   Right pleural effusion, a new finding..    Impression:  Development of congestive heart failure, pulmonary edema, and right   pleural effusion since 3/9/2018..        GURDEEP BUCK M.D., ATTENDING RADIOLOGIST  This document has been electronically signed. Mar 12 2018  9:55AM

## 2018-03-14 NOTE — CHART NOTE - NSCHARTNOTEFT_GEN_A_CORE
Source: Patient [ ]  Family [ ]   other [X]: EMR    Current Diet:   Diet, Dysphagia 2 Mechanical Soft-Nectar Consistency Fluid:   Jevity 1.5 Aquiles    Tube Feeding Modality: Gastrostomy  Total Volume for 24 Hours (mL): 1320  Continuous  Starting Tube Feed Rate {mL per Hour}: 55  Until Goal Tube Feed Rate (mL per Hour): 55  Tube Feed Duration (in Hours): 24  Tube Feed Start Time: 15:00 (03-12-18 @ 13:54)    PO intake:  < 50% [X]   50-75%  [ ]   %  [ ]  other :    Source for PO intake [ ] Patient [ ] family [X] chart [ ] staff [ ] other    Current Weight:   140lbs (03-04-18)    % Weight Change     Pertinent Medications: MEDICATIONS  (STANDING):  ALBUTerol/ipratropium for Nebulization 3 milliLiter(s) Nebulizer every 6 hours  amiodarone    Tablet 200 milliGRAM(s) Oral daily  atorvastatin 10 milliGRAM(s) Oral at bedtime  finasteride 5 milliGRAM(s) Oral daily  methylPREDNISolone sodium succinate Injectable 40 milliGRAM(s) IV Push every 12 hours  pantoprazole   Suspension 40 milliGRAM(s) Oral two times a day before meals  sucralfate 1 Gram(s) Oral Before meals and at bedtime  tamsulosin 0.4 milliGRAM(s) Oral at bedtime    MEDICATIONS  (PRN):  sodium chloride 0.65% Nasal 1 Spray(s) Both Nostrils every 2 hours PRN Nasal Congestion    Pertinent Labs: CBC Full  -  ( 14 Mar 2018 08:55 )  WBC Count : 10.8 K/uL  Hemoglobin : 10.0 g/dL  Hematocrit : 32.6 %  Platelet Count - Automated : 133 K/uL  Mean Cell Volume : 97.3 fl  Mean Cell Hemoglobin : 29.9 pg  Mean Cell Hemoglobin Concentration : 30.7 g/dL  Auto Neutrophil # : x  Auto Lymphocyte # : x  Auto Monocyte # : x  Auto Eosinophil # : x  Auto Basophil # : x  Auto Neutrophil % : x  Auto Lymphocyte % : x  Auto Monocyte % : x  Auto Eosinophil % : x  Auto Basophil % : x    Skin: Intact    Estimated Needs:   [ ] no change since previous assessment  [ ] recalculated:     Current Nutrition Diagnosis:  Continues with Inadequate oral intake related to GI hemorrhage, ileostomy bleed. TF formula changed back to Jevity 1.5 and PO intake continues to be poor.     Recommendations:   1) Continue current diet order  2) Monitor for formula tolerance     Monitoring and Evaluation:   [X] PO intake [X] Tolerance to diet prescription [X] Weights  [X] Follow up per protocol [X] Labs:

## 2018-03-14 NOTE — CONSULT NOTE ADULT - ASSESSMENT
Mr. Kristofer Salomon is a 76 yo male PMHx CAD s/p CABG, AFib on Coumadin, HTN, prostate CA s/p TURP, s/p PEG and colectomy, prior CVA with expressive aphasia and R hemiparesis, CKD 3, currently in CHF with b/l plural effusions.

## 2018-03-15 LAB
ALLERGY+IMMUNOLOGY DIAG STUDY NOTE: SIGNIFICANT CHANGE UP
ANION GAP SERPL CALC-SCNC: 17 MMOL/L — SIGNIFICANT CHANGE UP (ref 5–17)
ANION GAP SERPL CALC-SCNC: 18 MMOL/L — HIGH (ref 5–17)
APPEARANCE UR: ABNORMAL
BACTERIA # UR AUTO: ABNORMAL
BILIRUB UR-MCNC: NEGATIVE — SIGNIFICANT CHANGE UP
BUN SERPL-MCNC: 115 MG/DL — HIGH (ref 8–20)
BUN SERPL-MCNC: 122 MG/DL — HIGH (ref 8–20)
CALCIUM SERPL-MCNC: 9 MG/DL — SIGNIFICANT CHANGE UP (ref 8.6–10.2)
CALCIUM SERPL-MCNC: 9.3 MG/DL — SIGNIFICANT CHANGE UP (ref 8.6–10.2)
CHLORIDE SERPL-SCNC: 119 MMOL/L — HIGH (ref 98–107)
CHLORIDE SERPL-SCNC: 124 MMOL/L — HIGH (ref 98–107)
CO2 SERPL-SCNC: 16 MMOL/L — LOW (ref 22–29)
CO2 SERPL-SCNC: 18 MMOL/L — LOW (ref 22–29)
COLOR SPEC: YELLOW — SIGNIFICANT CHANGE UP
COMMENT - URINE: SIGNIFICANT CHANGE UP
CREAT SERPL-MCNC: 3.09 MG/DL — HIGH (ref 0.5–1.3)
CREAT SERPL-MCNC: 3.1 MG/DL — HIGH (ref 0.5–1.3)
DIFF PNL FLD: ABNORMAL
GLUCOSE SERPL-MCNC: 240 MG/DL — HIGH (ref 70–115)
GLUCOSE SERPL-MCNC: 293 MG/DL — HIGH (ref 70–115)
GLUCOSE UR QL: NEGATIVE MG/DL — SIGNIFICANT CHANGE UP
HCT VFR BLD CALC: 37.6 % — LOW (ref 42–52)
HGB BLD-MCNC: 11.5 G/DL — LOW (ref 14–18)
KETONES UR-MCNC: NEGATIVE — SIGNIFICANT CHANGE UP
LEUKOCYTE ESTERASE UR-ACNC: ABNORMAL
MCHC RBC-ENTMCNC: 29.9 PG — SIGNIFICANT CHANGE UP (ref 27–31)
MCHC RBC-ENTMCNC: 30.6 G/DL — LOW (ref 32–36)
MCV RBC AUTO: 97.7 FL — HIGH (ref 80–94)
NITRITE UR-MCNC: NEGATIVE — SIGNIFICANT CHANGE UP
PH UR: 6 — SIGNIFICANT CHANGE UP (ref 5–8)
PLATELET # BLD AUTO: 151 K/UL — SIGNIFICANT CHANGE UP (ref 150–400)
POTASSIUM SERPL-MCNC: 4 MMOL/L — SIGNIFICANT CHANGE UP (ref 3.5–5.3)
POTASSIUM SERPL-MCNC: 4 MMOL/L — SIGNIFICANT CHANGE UP (ref 3.5–5.3)
POTASSIUM SERPL-SCNC: 4 MMOL/L — SIGNIFICANT CHANGE UP (ref 3.5–5.3)
POTASSIUM SERPL-SCNC: 4 MMOL/L — SIGNIFICANT CHANGE UP (ref 3.5–5.3)
PROT UR-MCNC: 30 MG/DL
RBC # BLD: 3.85 M/UL — LOW (ref 4.6–6.2)
RBC # FLD: 19.7 % — HIGH (ref 11–15.6)
RBC CASTS # UR COMP ASSIST: ABNORMAL /HPF (ref 0–4)
SODIUM SERPL-SCNC: 155 MMOL/L — HIGH (ref 135–145)
SODIUM SERPL-SCNC: 157 MMOL/L — HIGH (ref 135–145)
SP GR SPEC: 1.01 — SIGNIFICANT CHANGE UP (ref 1.01–1.02)
UROBILINOGEN FLD QL: NEGATIVE MG/DL — SIGNIFICANT CHANGE UP
WBC # BLD: 11 K/UL — HIGH (ref 4.8–10.8)
WBC # FLD AUTO: 11 K/UL — HIGH (ref 4.8–10.8)
WBC UR QL: ABNORMAL

## 2018-03-15 PROCEDURE — 71045 X-RAY EXAM CHEST 1 VIEW: CPT | Mod: 26

## 2018-03-15 PROCEDURE — 99233 SBSQ HOSP IP/OBS HIGH 50: CPT | Mod: GC

## 2018-03-15 PROCEDURE — 99233 SBSQ HOSP IP/OBS HIGH 50: CPT

## 2018-03-15 PROCEDURE — 99222 1ST HOSP IP/OBS MODERATE 55: CPT

## 2018-03-15 PROCEDURE — 99223 1ST HOSP IP/OBS HIGH 75: CPT

## 2018-03-15 PROCEDURE — 76775 US EXAM ABDO BACK WALL LIM: CPT | Mod: 26

## 2018-03-15 RX ORDER — SODIUM CHLORIDE 9 MG/ML
1000 INJECTION, SOLUTION INTRAVENOUS
Qty: 0 | Refills: 0 | Status: DISCONTINUED | OUTPATIENT
Start: 2018-03-15 | End: 2018-03-16

## 2018-03-15 RX ADMIN — Medication 1 GRAM(S): at 12:26

## 2018-03-15 RX ADMIN — TAMSULOSIN HYDROCHLORIDE 0.4 MILLIGRAM(S): 0.4 CAPSULE ORAL at 21:35

## 2018-03-15 RX ADMIN — Medication 3 MILLILITER(S): at 20:24

## 2018-03-15 RX ADMIN — SODIUM CHLORIDE 75 MILLILITER(S): 9 INJECTION, SOLUTION INTRAVENOUS at 12:28

## 2018-03-15 RX ADMIN — ATORVASTATIN CALCIUM 10 MILLIGRAM(S): 80 TABLET, FILM COATED ORAL at 21:34

## 2018-03-15 RX ADMIN — Medication 40 MILLIGRAM(S): at 17:08

## 2018-03-15 RX ADMIN — Medication 1 GRAM(S): at 17:07

## 2018-03-15 RX ADMIN — Medication 3 MILLILITER(S): at 08:50

## 2018-03-15 RX ADMIN — FINASTERIDE 5 MILLIGRAM(S): 5 TABLET, FILM COATED ORAL at 12:26

## 2018-03-15 RX ADMIN — PANTOPRAZOLE SODIUM 40 MILLIGRAM(S): 20 TABLET, DELAYED RELEASE ORAL at 17:07

## 2018-03-15 RX ADMIN — Medication 3 MILLILITER(S): at 02:59

## 2018-03-15 RX ADMIN — Medication 3 MILLILITER(S): at 15:25

## 2018-03-15 RX ADMIN — PANTOPRAZOLE SODIUM 40 MILLIGRAM(S): 20 TABLET, DELAYED RELEASE ORAL at 06:23

## 2018-03-15 RX ADMIN — Medication 40 MILLIGRAM(S): at 06:23

## 2018-03-15 RX ADMIN — AMIODARONE HYDROCHLORIDE 200 MILLIGRAM(S): 400 TABLET ORAL at 06:23

## 2018-03-15 RX ADMIN — Medication 1 GRAM(S): at 06:22

## 2018-03-15 RX ADMIN — Medication 1 GRAM(S): at 21:34

## 2018-03-15 NOTE — CONSULT NOTE ADULT - ASSESSMENT
1. KAPIL on CKD   2. Anemia from acute blood loss  3. GI Bleed/hemorrhage  4. Hypovolemic hypernatremia  5. Chronic A-Fib    Bicarb gtt @ 75ml/hr  Monitor renal fx  Monitor H&H  U/A, lytes, osmo ordered  renal US ordered and completed  Will follow        Will monitor renal fx, taper steroids, monitor HH

## 2018-03-15 NOTE — PROGRESS NOTE ADULT - ASSESSMENT
Assessment and Plan:   Assessment:  · Assessment		  78 y/o male present with Recurrent Gastrointestinal hemorrhage with melena.  On Coumadin with supra-therapeutic INR (3.74).  1. Gastrointestinal hemorrhage, unspecified gastrointestinal hemorrhage type with anemia of acute blood loss on anemia of chronic disease - monitor HH, transfused 4 units of PRBC, continue PPI, diet advanced and tolerated, GI reevaluation at family request - no intervention suggested  2. Chronic atrial fibrillation - rate controlled with amiodarone, off ASA, coumadin held, s/p Vit K. High risk for CVA and VTE as + Hx of CVA and PE/DVT  3. Acute kidney injury superimposed on chronic kidney disease with hypovolemic Hyponatremia - now hypernatremic with worsening BUN/Cr.  Renal consulted.  Bicarb gtt started.  Renal US reviewed.  UA, Urine lytes and Osm ordered.  4. H/o Dysphagia - resumed  TF at nutritionist's recommendations - will change to bolus feeds as patient's Po intake is poor.  5. H/o CVA - c/w statin. Bedrest, elevate HOB, high risk of aspirations, turn Q2hrs, high risk of pressure ulcers.   6. SOB with wheezing - on BDs, CXR reviewed - no evidence of PNA or fluid overload, clinically dry with hypovolemic hypernatremia - add free water, ? URI - pulmonary  evaluation appreciated, CT chest showed BL pleural effusion R>L, no evidence of infiltrate, tapering steroids, No thoracocentesis as likely fluid overload.  renal consulted.  7. Anemia of ABL - now improved with transfusion, but previously H/H was dropping despite no evidence of active hemorrhage, further discussion with blood bank supervisor indicated that pt received blood transfusion when was admitted as [facility] which had positive for previously transfused blood antibodies - likely caused delayed onset hemolytic anemia - will continue to monitor HH, f/u haptoglobin/LDH - wife notified about incident  Will monitor renal fx, taper steroids, monitor HH

## 2018-03-15 NOTE — PROGRESS NOTE ADULT - SUBJECTIVE AND OBJECTIVE BOX
PULMONARY PROGRESS NOTE      CRISTIN ROQUE  MRN-[id number]    Patient is a 77y old  Male who presents with a chief complaint of Bleeding into colostomy (04 Mar 2018 23:39)      INTERVAL HPI/OVERNIGHT EVENTS:    Patient is awake and alert  Comfortable  Congested but non-productive cough    MEDICATIONS  (STANDING):  ALBUTerol/ipratropium for Nebulization 3 milliLiter(s) Nebulizer every 6 hours  amiodarone    Tablet 200 milliGRAM(s) Oral daily  atorvastatin 10 milliGRAM(s) Oral at bedtime  dextrose 5% 1000 milliLiter(s) (75 mL/Hr) IV Continuous <Continuous>  finasteride 5 milliGRAM(s) Oral daily  methylPREDNISolone sodium succinate Injectable 40 milliGRAM(s) IV Push every 12 hours  pantoprazole   Suspension 40 milliGRAM(s) Oral two times a day before meals  sucralfate 1 Gram(s) Oral Before meals and at bedtime  tamsulosin 0.4 milliGRAM(s) Oral at bedtime      MEDICATIONS  (PRN):  sodium chloride 0.65% Nasal 1 Spray(s) Both Nostrils every 2 hours PRN Nasal Congestion      Allergies    Allergy Status Unknown    Intolerances        PAST MEDICAL & SURGICAL HISTORY:  Heart attack  High cholesterol  HTN (hypertension)  H/O coronary artery bypass surgery        REVIEW OF SYSTEMS:    CONSTITUTIONAL:  No distress    HEENT:  Eyes:  No diplopia or blurred vision. ENT:  No earache, sore throat or runny nose.    CARDIOVASCULAR:  No pressure, squeezing, tightness, heaviness or aching about the chest; no palpitations.    RESPIRATORY:  Improved cough, shortness of breath, no PND or orthopnea. Mild SOBOE    GASTROINTESTINAL:  No nausea, vomiting or diarrhea.    GENITOURINARY:  No dysuria, frequency or urgency.    NEUROLOGIC:  No paresthesias, fasciculations, seizures or weakness.    PSYCHIATRIC:  No disorder of thought or mood.    Vital Signs Last 24 Hrs  T(C): 36.6 (15 Mar 2018 08:27), Max: 36.6 (15 Mar 2018 08:27)  T(F): 97.8 (15 Mar 2018 08:27), Max: 97.8 (15 Mar 2018 08:27)  HR: 119 (15 Mar 2018 08:27) (104 - 119)  BP: 132/64 (15 Mar 2018 08:27) (113/67 - 161/62)  BP(mean): --  RR: 20 (15 Mar 2018 08:27) (20 - 20)  SpO2: 98% (15 Mar 2018 08:27) (98% - 100%)    PHYSICAL EXAMINATION:    GENERAL: The patient is awake and alert in no apparent distress.     HEENT: Head is normocephalic and atraumatic. Extraocular muscles are intact. Mucous membranes are moist.    NECK: Supple.    LUNGS: Clear to auscultation without wheezing, rales but mild rhonchi; respirations unlabored    HEART: Regular rate and rhythm without murmur.    ABDOMEN: Soft, nontender, and nondistended.  Obese    EXTREMITIES: Without any cyanosis, clubbing, with mild edema.    NEUROLOGIC: R hemiparesis.    LABS:                        11.5   11.0  )-----------( 151      ( 15 Mar 2018 07:23 )             37.6     03-15    157<H>  |  124<H>  |  115.0<H>  ----------------------------<  240<H>  4.0   |  16.0<L>  |  3.10<H>    Ca    9.3      15 Mar 2018 07:23        RADIOLOGY & ADDITIONAL STUDIES:       EXAM:  XR CHEST AP OR PA 1V                          PROCEDURE DATE:  03/12/2018          INTERPRETATION:  CHEST AP PORTABLE:    History: f/u effusions.     Date and time of exam: 3/12/2018 4:57 AM.    Technique: A single AP view of the chest was obtained.    Comparison exam: 3/9/2018.    Findings:  Cardio megaly. Evidence of prior cardiac surgery. Pulmonary vascular   congestion and bilateral pulmonary edema, new since the prior study.   Right pleural effusion, a new finding..    Impression:  Development of congestive heart failure, pulmonary edema, and right   pleural effusion since 3/9/2018..                GURDEEP BUCK M.D., ATTENDING RADIOLOGIST  This document has been electronically signed. Mar 12 2018  9:55AM

## 2018-03-15 NOTE — PROGRESS NOTE ADULT - ASSESSMENT
CAD s/p CABG  VHD  AF  CRI  Possible COPD given smoking hx  Hypoxia  B/l pleural effusions R>L  Concern for aspiration  Prior CVA with R hemiparesis  Hypernatremia  Somewhat improved though persistent congested cough    Plan:    Drug nebs  IV steroids - decrease as able  Diurese as able  Follow renal function  ? repeat swallow evaluation  Follow H/H  Transfuse as needed  Rate control of AF  Optimize cardiac function  Follow CXR/CT for improvement in effusions  Cardiology and GI f/u  Consider renal evaluation  Follow Na  On PPI  Optimize cardiac function  Could consider drainage of R effusion if pt agreeable though currently improved; per IR pt without sufficient effusion for drainage. Also pt cannot sit up so thoracentesis may be more difficult unless significant effusion    D/w floor team  D/w wife at bedside

## 2018-03-15 NOTE — PROGRESS NOTE ADULT - SUBJECTIVE AND OBJECTIVE BOX
CC: Bleeding into colostomy     HPI:  Mr. 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 Coumadin was held, FFP was given and EGD completed which did not reveal source of bleeding. G- tube was placed during EGD. 3 weeks prior to that he was discharged from [facility] s/p respiratory failure s/p intubation due to aspiration pneumonia, Septic shock 2/2 UTI, KAPIL on CKD, recurrent NSVT, UE DVT.    As per wife at bedside, pt is now refusing oral intake, pt uses Jevity 1.5, 4 to 5 cans per day. She has noticed that he has an increased about of bloating, vomiting episodes and coughing. In addition she reports that pt stopped bleeding for 5 days after discharge but has been ongoing since that time. His Coumadin dose has been up titrated to 5mg M-W-F, followed by 2.5mg the remainder of the week. As a result of the recurrent bleeding she stopped giving him his Aspirin (2weeks now), Dronabinol was never started as the pharmacy indicated his insurance did not approve it.     INTERVAL HPI/OVERNIGHT EVENTS: Patient seen and examined lying in bed.  Per RN, patient had bleeding in ileostomy yesterday.  Patient with intermittent cough, nonproductive with associated SOB.  Patient denies any headache, dizziness, CP, abdominal pain, nausea, vomiting.  [place] catheter placed to monitor I&Os.  ROS limited secondary to expressive aphasia.      Vital Signs Last 24 Hrs  T(C): 36.6 (15 Mar 2018 08:27), Max: 36.6 (15 Mar 2018 08:27)  T(F): 97.8 (15 Mar 2018 08:27), Max: 97.8 (15 Mar 2018 08:27)  HR: 119 (15 Mar 2018 08:27) (104 - 119)  BP: 132/64 (15 Mar 2018 08:27) (113/67 - 161/62)  BP(mean): --  RR: 20 (15 Mar 2018 08:27) (20 - 20)  SpO2: 98% (15 Mar 2018 08:27) (98% - 100%)  I&O's Detail    14 Mar 2018 07:01  -  15 Mar 2018 07:00  --------------------------------------------------------  IN:    Free Water: 600 mL    IV PiggyBack: 50 mL    Jevity: 605 mL    Oral Fluid: 360 mL  Total IN: 1615 mL    OUT:    Colostomy: 2700 mL  Total OUT: 2700 mL    Total NET: -1085 mL      15 Mar 2018 07:01  -  15 Mar 2018 12:45  --------------------------------------------------------  IN:  Total IN: 0 mL    OUT:    Colostomy: 550 mL  Total OUT: 550 mL    Total NET: -550 mL      PHYSICAL EXAM:  GENERAL: NAD  HEAD:  Atraumatic, Normocephalic  NECK: Supple, No JVD, Normal thyroid  NERVOUS SYSTEM:  Alert & Oriented X3, expressive aphasia; generalized weakness  CHEST/LUNG: Coarse BS bilaterally  HEART: Regular rate and rhythm; No murmurs, rubs, or gallops  ABDOMEN: Soft, Nontender, Nondistended; Bowel sounds present; (+) peg; (+) ileostomy  EXTREMITIES:  2+ Peripheral Pulses, No clubbing, cyanosis, or edema                                11.5   11.0  )-----------( 151      ( 15 Mar 2018 07:23 )             37.6     15 Mar 2018 07:23    157    |  124    |  115.0  ----------------------------<  240    4.0     |  16.0   |  3.10     Ca    9.3        15 Mar 2018 07:23          MEDICATIONS  (STANDING):  ALBUTerol/ipratropium for Nebulization 3 milliLiter(s) Nebulizer every 6 hours  amiodarone    Tablet 200 milliGRAM(s) Oral daily  atorvastatin 10 milliGRAM(s) Oral at bedtime  dextrose 5% 1000 milliLiter(s) (75 mL/Hr) IV Continuous <Continuous>  finasteride 5 milliGRAM(s) Oral daily  methylPREDNISolone sodium succinate Injectable 40 milliGRAM(s) IV Push every 12 hours  pantoprazole   Suspension 40 milliGRAM(s) Oral two times a day before meals  sucralfate 1 Gram(s) Oral Before meals and at bedtime  tamsulosin 0.4 milliGRAM(s) Oral at bedtime    MEDICATIONS  (PRN):  sodium chloride 0.65% Nasal 1 Spray(s) Both Nostrils every 2 hours PRN Nasal Congestion      RADIOLOGY & ADDITIONAL TESTS:  < from: US Renal (03.15.18 @ 11:06) >   EXAM:  US KIDNEY(S)                          PROCEDURE DATE:  03/15/2018          INTERPRETATION:  CLINICAL INFORMATION: Renal failure    COMPARISON: CT 3/10    TECHNIQUE: Sonography of the kidneys and bladder.     FINDINGS:    Right kidney:  9.8 cm. Atrophic. 2.6 cm cyst. Increased echogenicity. No   hydronephrosis. Multiple subcentimeter nonobstructing calculi.    1.2 cm right adrenal nodule.    Left kidney:  10.7 cm. 2.1 cm upper pole cyst. No hydronephrosis. Mildly   increased echogenicity. 2.3 cm indeterminate left interpolar lesion seen   on CT was probably not visualized sonographically. Follow-up contrast MRI   may be considered as previously recommended.    Incidentally noted cirrhosis and perihepatic ascites. Pleural effusions.    IMPRESSION:     Atrophic right kidney. No hydronephrosis. Incidental findings as above.                    NADIRA QUINONEZ M.D., ATTENDING RADIOLOGIST  This document has been electronically signed. Mar 15 2018 11:11AM                < end of copied text > CC: Bleeding into colostomy     HPI:  Mr. Kristofer Salomon (prior [id number]) is a 78 yo male PMHx CAD s/p CABG, AFib on Coumadin, HTN, prostate CA s/p TURP, s/p PEG and colectomy, prior CVA with expressive aphasia and R hemiparesis, CKD 3, recently discharged 3 weeks ago for blood in the Colostomy bag requiring 2 units PRBC transfusion. Coumadin was held, FFP was given and EGD completed which did not reveal source of bleeding. G- tube was placed during EGD. 3 weeks prior to that he was discharged from [facility] s/p respiratory failure s/p intubation due to aspiration pneumonia, Septic shock 2/2 UTI, KAPIL on CKD, recurrent NSVT, UE DVT.    As per wife at bedside, pt is now refusing oral intake, pt uses Jevity 1.5, 4 to 5 cans per day. She has noticed that he has an increased about of bloating, vomiting episodes and coughing. In addition she reports that pt stopped bleeding for 5 days after discharge but has been ongoing since that time. His Coumadin dose has been up titrated to 5mg M-W-F, followed by 2.5mg the remainder of the week. As a result of the recurrent bleeding she stopped giving him his Aspirin (2weeks now), Dronabinol was never started as the pharmacy indicated his insurance did not approve it.     INTERVAL HPI/OVERNIGHT EVENTS: Patient seen and examined lying in bed.  Per RN, patient had bleeding in ileostomy yesterday.  Patient with intermittent cough, nonproductive with associated SOB.  Patient denies any headache, dizziness, CP, abdominal pain, nausea, vomiting.  [place] catheter placed to monitor I&Os.  ROS limited secondary to expressive aphasia.      Vital Signs Last 24 Hrs  T(C): 36.6 (15 Mar 2018 08:27), Max: 36.6 (15 Mar 2018 08:27)  T(F): 97.8 (15 Mar 2018 08:27), Max: 97.8 (15 Mar 2018 08:27)  HR: 119 (15 Mar 2018 08:27) (104 - 119)  BP: 132/64 (15 Mar 2018 08:27) (113/67 - 161/62)  RR: 20 (15 Mar 2018 08:27) (20 - 20)  SpO2: 98% (15 Mar 2018 08:27) (98% - 100%)  I&O's Detail    14 Mar 2018 07:01  -  15 Mar 2018 07:00  --------------------------------------------------------  IN:    Free Water: 600 mL    IV PiggyBack: 50 mL    Jevity: 605 mL    Oral Fluid: 360 mL  Total IN: 1615 mL    OUT:    Colostomy: 2700 mL  Total OUT: 2700 mL    Total NET: -1085 mL      15 Mar 2018 07:01  -  15 Mar 2018 12:45  --------------------------------------------------------  IN:  Total IN: 0 mL    OUT:    Colostomy: 550 mL  Total OUT: 550 mL    Total NET: -550 mL      PHYSICAL EXAM:  GENERAL: NAD  HEAD:  Atraumatic, Normocephalic  NECK: Supple, No JVD, Normal thyroid  NERVOUS SYSTEM:  Alert & Oriented X3, expressive aphasia; generalized weakness, + Right HP  CHEST/LUNG: Coarse BS bilaterally  HEART: Regular rate and rhythm; No murmurs, rubs, or gallops  ABDOMEN: Soft, Nontender, Nondistended; Bowel sounds present; (+) peg; (+) ileostomy  EXTREMITIES:  2+ Peripheral Pulses, No clubbing, cyanosis, or edema                                11.5   11.0  )-----------( 151      ( 15 Mar 2018 07:23 )             37.6     15 Mar 2018 07:23    157    |  124    |  115.0  ----------------------------<  240    4.0     |  16.0   |  3.10     Ca    9.3        15 Mar 2018 07:23          MEDICATIONS  (STANDING):  ALBUTerol/ipratropium for Nebulization 3 milliLiter(s) Nebulizer every 6 hours  amiodarone    Tablet 200 milliGRAM(s) Oral daily  atorvastatin 10 milliGRAM(s) Oral at bedtime  dextrose 5% 1000 milliLiter(s) (75 mL/Hr) IV Continuous <Continuous>  finasteride 5 milliGRAM(s) Oral daily  methylPREDNISolone sodium succinate Injectable 40 milliGRAM(s) IV Push every 12 hours  pantoprazole   Suspension 40 milliGRAM(s) Oral two times a day before meals  sucralfate 1 Gram(s) Oral Before meals and at bedtime  tamsulosin 0.4 milliGRAM(s) Oral at bedtime    MEDICATIONS  (PRN):  sodium chloride 0.65% Nasal 1 Spray(s) Both Nostrils every 2 hours PRN Nasal Congestion      RADIOLOGY & ADDITIONAL TESTS:  < from: US Renal (03.15.18 @ 11:06) >   EXAM:  US KIDNEY(S)                          PROCEDURE DATE:  03/15/2018          INTERPRETATION:  CLINICAL INFORMATION: Renal failure    COMPARISON: CT 3/10    TECHNIQUE: Sonography of the kidneys and bladder.     FINDINGS:    Right kidney:  9.8 cm. Atrophic. 2.6 cm cyst. Increased echogenicity. No   hydronephrosis. Multiple subcentimeter nonobstructing calculi.    1.2 cm right adrenal nodule.    Left kidney:  10.7 cm. 2.1 cm upper pole cyst. No hydronephrosis. Mildly   increased echogenicity. 2.3 cm indeterminate left interpolar lesion seen   on CT was probably not visualized sonographically. Follow-up contrast MRI   may be considered as previously recommended.    Incidentally noted cirrhosis and perihepatic ascites. Pleural effusions.    IMPRESSION:     Atrophic right kidney. No hydronephrosis. Incidental findings as above.                    NADIRA QUINONEZ M.D., ATTENDING RADIOLOGIST  This document has been electronically signed. Mar 15 2018 11:11AM                < end of copied text >

## 2018-03-16 LAB
ANION GAP SERPL CALC-SCNC: 15 MMOL/L — SIGNIFICANT CHANGE UP (ref 5–17)
BUN SERPL-MCNC: 120 MG/DL — HIGH (ref 8–20)
CALCIUM SERPL-MCNC: 9.2 MG/DL — SIGNIFICANT CHANGE UP (ref 8.6–10.2)
CALCIUM UR-MCNC: 6 MG/DL — SIGNIFICANT CHANGE UP
CHLORIDE SERPL-SCNC: 125 MMOL/L — HIGH (ref 98–107)
CHLORIDE UR-SCNC: <27 MMOL/L — SIGNIFICANT CHANGE UP
CO2 SERPL-SCNC: 20 MMOL/L — LOW (ref 22–29)
CREAT ?TM UR-MCNC: 56 MG/DL — SIGNIFICANT CHANGE UP
CREAT SERPL-MCNC: 3.19 MG/DL — HIGH (ref 0.5–1.3)
GLUCOSE SERPL-MCNC: 225 MG/DL — HIGH (ref 70–115)
HCT VFR BLD CALC: 36.3 % — LOW (ref 42–52)
HGB BLD-MCNC: 11.3 G/DL — LOW (ref 14–18)
INR BLD: 2.85 RATIO — HIGH (ref 0.88–1.16)
MAGNESIUM UR-MCNC: 3.1 MG/DL — SIGNIFICANT CHANGE UP
MCHC RBC-ENTMCNC: 30.1 PG — SIGNIFICANT CHANGE UP (ref 27–31)
MCHC RBC-ENTMCNC: 31.1 G/DL — LOW (ref 32–36)
MCV RBC AUTO: 96.5 FL — HIGH (ref 80–94)
OSMOLALITY UR: 500 MOSM/KG — SIGNIFICANT CHANGE UP (ref 300–1000)
PHOSPHATE 24H UR-MCNC: 11.4 MG/DL — SIGNIFICANT CHANGE UP
PLATELET # BLD AUTO: 129 K/UL — LOW (ref 150–400)
POTASSIUM SERPL-MCNC: 4.1 MMOL/L — SIGNIFICANT CHANGE UP (ref 3.5–5.3)
POTASSIUM SERPL-SCNC: 4.1 MMOL/L — SIGNIFICANT CHANGE UP (ref 3.5–5.3)
POTASSIUM UR-SCNC: 39 MMOL/L — SIGNIFICANT CHANGE UP
PROTHROM AB SERPL-ACNC: 32 SEC — HIGH (ref 9.8–12.7)
RBC # BLD: 3.76 M/UL — LOW (ref 4.6–6.2)
RBC # FLD: 19.8 % — HIGH (ref 11–15.6)
SODIUM SERPL-SCNC: 160 MMOL/L — CRITICAL HIGH (ref 135–145)
SODIUM UR-SCNC: <30 MMOL/L — SIGNIFICANT CHANGE UP
WBC # BLD: 8.8 K/UL — SIGNIFICANT CHANGE UP (ref 4.8–10.8)
WBC # FLD AUTO: 8.8 K/UL — SIGNIFICANT CHANGE UP (ref 4.8–10.8)

## 2018-03-16 PROCEDURE — 93010 ELECTROCARDIOGRAM REPORT: CPT

## 2018-03-16 PROCEDURE — 99233 SBSQ HOSP IP/OBS HIGH 50: CPT

## 2018-03-16 RX ORDER — PHYTONADIONE (VIT K1) 5 MG
5 TABLET ORAL ONCE
Qty: 0 | Refills: 0 | Status: COMPLETED | OUTPATIENT
Start: 2018-03-16 | End: 2018-03-16

## 2018-03-16 RX ORDER — SERTRALINE 25 MG/1
25 TABLET, FILM COATED ORAL DAILY
Qty: 0 | Refills: 0 | Status: DISCONTINUED | OUTPATIENT
Start: 2018-03-16 | End: 2018-03-21

## 2018-03-16 RX ORDER — SODIUM CHLORIDE 9 MG/ML
1000 INJECTION, SOLUTION INTRAVENOUS
Qty: 0 | Refills: 0 | Status: DISCONTINUED | OUTPATIENT
Start: 2018-03-16 | End: 2018-03-17

## 2018-03-16 RX ADMIN — FINASTERIDE 5 MILLIGRAM(S): 5 TABLET, FILM COATED ORAL at 12:14

## 2018-03-16 RX ADMIN — AMIODARONE HYDROCHLORIDE 200 MILLIGRAM(S): 400 TABLET ORAL at 05:35

## 2018-03-16 RX ADMIN — Medication 5 MILLIGRAM(S): at 15:28

## 2018-03-16 RX ADMIN — Medication 40 MILLIGRAM(S): at 05:42

## 2018-03-16 RX ADMIN — ATORVASTATIN CALCIUM 10 MILLIGRAM(S): 80 TABLET, FILM COATED ORAL at 22:06

## 2018-03-16 RX ADMIN — Medication 1 GRAM(S): at 08:13

## 2018-03-16 RX ADMIN — Medication 3 MILLILITER(S): at 09:10

## 2018-03-16 RX ADMIN — TAMSULOSIN HYDROCHLORIDE 0.4 MILLIGRAM(S): 0.4 CAPSULE ORAL at 22:06

## 2018-03-16 RX ADMIN — Medication 1 GRAM(S): at 22:06

## 2018-03-16 RX ADMIN — Medication 1 GRAM(S): at 17:16

## 2018-03-16 RX ADMIN — Medication 3 MILLILITER(S): at 15:40

## 2018-03-16 RX ADMIN — PANTOPRAZOLE SODIUM 40 MILLIGRAM(S): 20 TABLET, DELAYED RELEASE ORAL at 17:16

## 2018-03-16 RX ADMIN — Medication 3 MILLILITER(S): at 02:54

## 2018-03-16 RX ADMIN — Medication 1 GRAM(S): at 12:14

## 2018-03-16 RX ADMIN — SODIUM CHLORIDE 75 MILLILITER(S): 9 INJECTION, SOLUTION INTRAVENOUS at 06:40

## 2018-03-16 RX ADMIN — SODIUM CHLORIDE 125 MILLILITER(S): 9 INJECTION, SOLUTION INTRAVENOUS at 13:36

## 2018-03-16 RX ADMIN — PANTOPRAZOLE SODIUM 40 MILLIGRAM(S): 20 TABLET, DELAYED RELEASE ORAL at 05:42

## 2018-03-16 RX ADMIN — SODIUM CHLORIDE 125 MILLILITER(S): 9 INJECTION, SOLUTION INTRAVENOUS at 22:07

## 2018-03-16 RX ADMIN — Medication 3 MILLILITER(S): at 20:46

## 2018-03-16 NOTE — PROGRESS NOTE ADULT - SUBJECTIVE AND OBJECTIVE BOX
[facility] DIVISION OF KIDNEY DISEASES AND HYPERTENSION -- FOLLOW UP NOTE  --------------------------------------------------------------------------------  Chief Complaint: Hypernatremia; KAPIL    24 hour events/subjective:  Pt seen and examined this AM  Hypernatremia worsening  On bicarb drip      PAST HISTORY  --------------------------------------------------------------------------------  No significant changes to PMH, PSH, FHx, SHx, unless otherwise noted    ALLERGIES & MEDICATIONS  --------------------------------------------------------------------------------  Allergies    Allergy Status Unknown    Intolerances      Standing Inpatient Medications  ALBUTerol/ipratropium for Nebulization 3 milliLiter(s) Nebulizer every 6 hours  amiodarone    Tablet 200 milliGRAM(s) Oral daily  atorvastatin 10 milliGRAM(s) Oral at bedtime  dextrose 5%. 1000 milliLiter(s) IV Continuous <Continuous>  finasteride 5 milliGRAM(s) Oral daily  pantoprazole   Suspension 40 milliGRAM(s) Oral two times a day before meals  phytonadione   Solution 5 milliGRAM(s) Oral once  predniSONE   Tablet 40 milliGRAM(s) Oral daily  sucralfate 1 Gram(s) Oral Before meals and at bedtime  tamsulosin 0.4 milliGRAM(s) Oral at bedtime    PRN Inpatient Medications  sodium chloride 0.65% Nasal 1 Spray(s) Both Nostrils every 2 hours PRN      REVIEW OF SYSTEMS  --------------------------------------------------------------------------------  Unable to obtain    VITALS/PHYSICAL EXAM  --------------------------------------------------------------------------------  T(C): 36.3 (03-16-18 @ 07:57), Max: 36.7 (03-15-18 @ 16:22)  HR: 111 (03-16-18 @ 07:57) (109 - 122)  BP: 108/56 (03-16-18 @ 07:57) (108/56 - 117/64)  RR: 20 (03-16-18 @ 07:57) (20 - 20)  SpO2: 99% (03-16-18 @ 07:57) (99% - 99%)  Wt(kg): --        03-15-18 @ 07:01  -  03-16-18 @ 07:00  --------------------------------------------------------  IN: 3237 mL / OUT: 1850 mL / NET: 1387 mL      Physical Exam:  	Gen: NAD, chronically ill appearing  	HEENT: PERRL, supple neck, clear oropharynx  	Pulm: CTA B/L  	CV: RRR, S1S2; no rub  	Back: No spinal or CVA tenderness; no sacral edema  	Abd: +stoma with watery outpt  	: No suprapubic tenderness  	UE: Warm, FROM, no clubbing, intact strength; no edema; no asterixis  	LE: Warm, FROM, no clubbing, intact strength; no edema  	Neuro: No focal deficits, intact gait  	Psych: Normal affect and mood  	Skin: Warm, without rashes  	  LABS/STUDIES  --------------------------------------------------------------------------------              11.3   8.8   >-----------<  129      [03-16-18 @ 09:10]              36.3     160  |  125  |  120.0  ----------------------------<  225      [03-16-18 @ 09:10]  4.1   |  20.0  |  3.19        Ca     9.2     [03-16-18 @ 09:10]      PT/INR: PT 32.0 , INR 2.85       [03-16-18 @ 09:10]      Creatinine Trend:  SCr 3.19 [03-16 @ 09:10]  SCr 3.09 [03-15 @ 18:40]  SCr 3.10 [03-15 @ 07:23]  SCr 2.91 [03-14 @ 08:55]  SCr 2.94 [03-13 @ 11:22]    Urinalysis - [03-15-18 @ 20:20]      Color Yellow / Appearance Slightly Turbid / SG 1.010 / pH 6.0      Gluc Negative / Ketone Negative  / Bili Negative / Urobili Negative       Blood Large / Protein 30 / Leuk Est Moderate / Nitrite Negative      RBC 11-25 / WBC 11-25 / Hyaline  / Gran  / Sq Epi  / Non Sq Epi  / Bacteria Many    Urine Creatinine 56      [03-16-18 @ 09:32]  Urine Sodium <30      [03-16-18 @ 09:32]  Urine Potassium 39      [03-16-18 @ 09:32]  Urine Chloride <27      [03-16-18 @ 09:32]  Urine Osmolality 500      [03-16-18 @ 09:32]

## 2018-03-16 NOTE — PROGRESS NOTE ADULT - ASSESSMENT
Assessment and Plan:   Assessment:  · Assessment		  76 y/o male present with Recurrent Gastrointestinal hemorrhage with melena.  On Coumadin with supra-therapeutic INR (3.74).  1. Gastrointestinal hemorrhage, unspecified gastrointestinal hemorrhage type with anemia of acute blood loss on anemia of chronic disease - monitor HH, transfused 4 units of PRBC, continue PPI, diet advanced and tolerated, GI reevaluation at family request - no intervention suggested  2. Chronic atrial fibrillation - rate controlled with amiodarone, off ASA, coumadin held, s/p Vit K. High risk for CVA and VTE as + Hx of CVA and PE/DVT  3. Acute kidney injury superimposed on chronic kidney disease with hypovolemic Hyponatremia - now hypernatremic with worsening BUN/Cr.  Renal consulted.  Bicarb gtt started and changed to D5W at 125cc/hr given worsening hypernatremia.  Renal US reviewed.  UA, Urine lytes and Osm reviewed.  4. H/o Dysphagia - resumed  TF at nutritionist's recommendations - will change to bolus feeds as patient's Po intake is poor.  5. H/o CVA - c/w statin. Bedrest, elevate HOB, high risk of aspirations, turn Q2hrs, high risk of pressure ulcers.   6. SOB with wheezing - on BDs, CXR reviewed - no evidence of PNA or fluid overload, clinically dry with hypovolemic hypernatremia - add free water, ? URI - pulmonary  evaluation appreciated, CT chest showed BL pleural effusion R>L, no evidence of infiltrate, tapering steroids, No thoracocentesis as likely fluid overload.  renal consulted.  7. Anemia of ABL - now improved with transfusion, but previously H/H was dropping despite no evidence of active hemorrhage, further discussion with blood bank supervisor indicated that pt received blood transfusion when was admitted as [facility] which had positive for previously transfused blood antibodies - likely caused delayed onset hemolytic anemia - will continue to monitor HH, f/u haptoglobin/LDH - wife notified about incident  Will monitor renal fx, taper steroids, monitor HH

## 2018-03-16 NOTE — PROGRESS NOTE ADULT - ASSESSMENT
Pleural effusions and PVC resolved or resolving  secondary to decompensated HFpEF  Hypernatremia without change  Aspiration remains a risk    Plan:  per renal and cardiology  no need for thoracentesis  f/u chest xray 3/19  will f/u 3/19/128 unless alled

## 2018-03-16 NOTE — PROGRESS NOTE ADULT - SUBJECTIVE AND OBJECTIVE BOX
CC: Bleeding into colostomy     HPI:  Mr. Kristofer Salomon (prior [id number]) is a 76 yo male PMHx CAD s/p CABG, AFib on Coumadin, HTN, prostate CA s/p TURP, s/p PEG and colectomy, prior CVA with expressive aphasia and R hemiparesis, CKD 3, recently discharged 3 weeks ago for blood in the Colostomy bag requiring 2 units PRBC transfusion. Coumadin was held, FFP was given and EGD completed which did not reveal source of bleeding. G- tube was placed during EGD. 3 weeks prior to that he was discharged from [facility] s/p respiratory failure s/p intubation due to aspiration pneumonia, Septic shock 2/2 UTI, KAPIL on CKD, recurrent NSVT, UE DVT.    As per wife at bedside, pt is now refusing oral intake, pt uses Jevity 1.5, 4 to 5 cans per day. She has noticed that he has an increased about of bloating, vomiting episodes and coughing. In addition she reports that pt stopped bleeding for 5 days after discharge but has been ongoing since that time. His Coumadin dose has been up titrated to 5mg --, followed by 2.5mg the remainder of the week. As a result of the recurrent bleeding she stopped giving him his Aspirin (2weeks now), Dronabinol was never started as the pharmacy indicated his insurance did not approve it.     INTERVAL HPI/OVERNIGHT EVENTS: Patient seen and examined lying in bed.  Patient states he feels lousy with SOB.  Patient denies any headache, dizziness, CP, abdominal pain, nausea, vomiting.  RN reports that [place] catheter keeps coming off.  Other ROS limited secondary to expressive aphasia.    Vital Signs Last 24 Hrs  T(C): 36.3 (16 Mar 2018 07:57), Max: 36.7 (15 Mar 2018 16:22)  T(F): 97.3 (16 Mar 2018 07:57), Max: 98 (15 Mar 2018 16:22)  HR: 111 (16 Mar 2018 07:57) (109 - 122)  BP: 108/56 (16 Mar 2018 07:57) (108/56 - 117/64)  BP(mean): --  RR: 20 (16 Mar 2018 07:57) (20 - 20)  SpO2: 99% (16 Mar 2018 07:57) (99% - 99%)  I&O's Detail    15 Mar 2018 07:01  -  16 Mar 2018 07:00  --------------------------------------------------------  IN:    dextrose 5%: 1502 mL    Free Water: 360 mL    Jevity: 1375 mL  Total IN: 3237 mL    OUT:    Colostomy: 1750 mL    Voided: 100 mL  Total OUT: 1850 mL    Total NET: 1387 mL      PHYSICAL EXAM:  GENERAL: NAD, well-groomed, well-developed  HEAD:  Atraumatic, Normocephalic  NECK: Supple, No JVD, Normal thyroid  NERVOUS SYSTEM:  Alert & Oriented X3, expressive aphasia, generalized weakness  CHEST/LUNG: Coarse BS bilaterally  HEART: Regular rate and rhythm; No murmurs, rubs, or gallops  ABDOMEN: Soft, Nontender, Nondistended; Bowel sounds present; (+) peg; (+) ileostomy with blood noted on stool  EXTREMITIES:  2+ Peripheral Pulses, No clubbing, cyanosis, or edema                                  11.3   8.8   )-----------( 129      ( 16 Mar 2018 09:10 )             36.3     16 Mar 2018 09:10    160    |  125    |  120.0  ----------------------------<  225    4.1     |  20.0   |  3.19     Ca    9.2        16 Mar 2018 09:10      PT/INR - ( 16 Mar 2018 09:10 )   PT: 32.0 sec;   INR: 2.85 ratio          Urinalysis Basic - ( 15 Mar 2018 20:20 )    Color: Yellow / Appearance: Slightly Turbid / S.010 / pH: x  Gluc: x / Ketone: Negative  / Bili: Negative / Urobili: Negative mg/dL   Blood: x / Protein: 30 mg/dL / Nitrite: Negative   Leuk Esterase: Moderate / RBC: 11-25 /HPF / WBC 11-25   Sq Epi: x / Non Sq Epi: x / Bacteria: Many        MEDICATIONS  (STANDING):  ALBUTerol/ipratropium for Nebulization 3 milliLiter(s) Nebulizer every 6 hours  amiodarone    Tablet 200 milliGRAM(s) Oral daily  atorvastatin 10 milliGRAM(s) Oral at bedtime  dextrose 5%. 1000 milliLiter(s) (125 mL/Hr) IV Continuous <Continuous>  finasteride 5 milliGRAM(s) Oral daily  pantoprazole   Suspension 40 milliGRAM(s) Oral two times a day before meals  predniSONE   Tablet 40 milliGRAM(s) Oral daily  sucralfate 1 Gram(s) Oral Before meals and at bedtime  tamsulosin 0.4 milliGRAM(s) Oral at bedtime    MEDICATIONS  (PRN):  sodium chloride 0.65% Nasal 1 Spray(s) Both Nostrils every 2 hours PRN Nasal Congestion      RADIOLOGY & ADDITIONAL TESTS:  < from: Xray Chest 1 View AP/PA. (03.15.18 @ 17:11) >   EXAM:  XR CHEST AP OR PA 1V                          PROCEDURE DATE:  03/15/2018          INTERPRETATION:  CHEST AP PORTABLE:    History: f/u effusions.     Date and time of exam: 3/15/2018 4:32 PM.    Technique: A single AP view of the chest was obtained.    Comparison exam: 3/12/2018 4:57 AM.    Findings:  Resolution of bilateral pulmonary edema since the prior study. Mild   residual pulmonary vascular congestion. Small residual right pleural   effusion. Evidence of prior cardiac surgery. Noevidence of pneumothorax..    Impression:  Resolution of pulmonary edema as well as decreasing right pleural   effusion since 3/12/2018..                GURDEEP BUCK M.D., ATTENDING RADIOLOGIST  This document has been electronically signed. Mar [id number]  8:59AM              < end of copied text >

## 2018-03-16 NOTE — PROGRESS NOTE ADULT - ASSESSMENT
1. KAPIL on CKD   2. Anemia from acute blood loss  3. GI Bleed/hemorrhage  4. Hypovolemic hypernatremia  5. Chronic A-Fib    Reviewed ULytes; prerenal  Would stop bicarb drip  Start D5 @ 125cc/hr  Free water flushes 250ml q8 for now  Monitor renal fx  Monitor H&H  U/A, lytes, osmo ordered  renal US ordered and completed  Will follow    D[facility] PA

## 2018-03-16 NOTE — PROGRESS NOTE ADULT - SUBJECTIVE AND OBJECTIVE BOX
PULMONARY PROGRESS NOTE      [id number]    Patient is a 77y old  Male who presents with a chief complaint of Bleeding into colostomy (04 Mar 2018 23:39)      INTERVAL HPI/OVERNIGHT EVENTS:  comfortable on 2lpm NCO  family and wife at bedsids  expressive aphasia    MEDICATIONS  (STANDING):  ALBUTerol/ipratropium for Nebulization 3 milliLiter(s) Nebulizer every 6 hours  amiodarone    Tablet 200 milliGRAM(s) Oral daily  atorvastatin 10 milliGRAM(s) Oral at bedtime  dextrose 5%. 1000 milliLiter(s) (125 mL/Hr) IV Continuous <Continuous>  finasteride 5 milliGRAM(s) Oral daily  pantoprazole   Suspension 40 milliGRAM(s) Oral two times a day before meals  phytonadione   Solution 5 milliGRAM(s) Oral once  predniSONE   Tablet 40 milliGRAM(s) Oral daily  sucralfate 1 Gram(s) Oral Before meals and at bedtime  tamsulosin 0.4 milliGRAM(s) Oral at bedtime      MEDICATIONS  (PRN):  sodium chloride 0.65% Nasal 1 Spray(s) Both Nostrils every 2 hours PRN Nasal Congestion      Allergies    Allergy Status Unknown    Intolerances        PAST MEDICAL & SURGICAL HISTORY:  Heart attack  High cholesterol  HTN (hypertension)  H/O coronary artery bypass surgery      SOCIAL HISTORY  Smoking History:       REVIEW OF SYSTEMS:    CONSTITUTIONAL:  No distress    HEENT:  Eyes:  No diplopia or blurred vision. ENT:  No earache, sore throat or runny nose.    CARDIOVASCULAR:  No pressure, squeezing, tightness, heaviness or aching about the chest; no palpitations.    RESPIRATORY:  above    GASTROINTESTINAL:  No nausea, vomiting or diarrhea.    GENITOURINARY:  No dysuria, frequency or urgency.    NEUROLOGIC:  no change paralysis    Extremities: No cyanosis, clubbing or edema    PSYCHIATRIC:  No disorder of thought or mood.    Vital Signs Last 24 Hrs  T(C): 36.3 (16 Mar 2018 07:57), Max: 36.7 (15 Mar 2018 16:22)  T(F): 97.3 (16 Mar 2018 07:57), Max: 98 (15 Mar 2018 16:22)  HR: 111 (16 Mar 2018 07:57) (109 - 122)  BP: 108/56 (16 Mar 2018 07:57) (108/56 - 117/64)  BP(mean): --  RR: 20 (16 Mar 2018 07:57) (20 - 20)  SpO2: 99% (16 Mar 2018 07:57) (99% - 99%)    PHYSICAL EXAMINATION:    GENERAL: The patient is awake and alert in no apparent distress.     HEENT: Head is normocephalic and atraumatic. Extraocular muscles are intact. Mucous membranes are moist.    NECK: Supple.    LUNGS: Clear to auscultation without wheezing, rales or rhonchi; respirations unlabored    HEART: Regular rate and rhythm without murmur.    ABDOMEN: Soft, nontender, and nondistended.      EXTREMITIES: Without any cyanosis, clubbing, rash, lesions or edema.    NEUROLOGIC: without change    LABS:                        11.3   8.8   )-----------( 129      ( 16 Mar 2018 09:10 )             36.3     03-16    160<HH>  |  125<H>  |  120.0<H>  ----------------------------<  225<H>  4.1   |  20.0<L>  |  3.19<H>    Ca    9.2      16 Mar 2018 09:10      PT/INR - ( 16 Mar 2018 09:10 )   PT: 32.0 sec;   INR: 2.85 ratio           Urinalysis Basic - ( 15 Mar 2018 20:20 )    Color: Yellow / Appearance: Slightly Turbid / S.010 / pH: x  Gluc: x / Ketone: Negative  / Bili: Negative / Urobili: Negative mg/dL   Blood: x / Protein: 30 mg/dL / Nitrite: Negative   Leuk Esterase: Moderate / RBC: 11-25 /HPF / WBC 11-25   Sq Epi: x / Non Sq Epi: x / Bacteria: Many                      MICROBIOLOGY:    RADIOLOGY & ADDITIONAL STUDIES:  < from: Xray Chest 1 View AP/PA. (03.15.18 @ 17:11) >     EXAM:  XR CHEST AP OR PA 1V                          PROCEDURE DATE:  03/15/2018          INTERPRETATION:  CHEST AP PORTABLE:    History: f/u effusions.     Date and time of exam: 3/15/2018 4:32 PM.    Technique: A single AP view of the chest was obtained.    Comparison exam: 3/12/2018 4:57 AM.    Findings:  Resolution of bilateral pulmonary edema since the prior study. Mild   residual pulmonary vascular congestion. Small residual right pleural   effusion. Evidence of prior cardiac surgery. Noevidence of pneumothorax..    Impression:  Resolution of pulmonary edema as well as decreasing right pleural   effusion since 3/12/2018..                GURDEEP BUCK M.D., ATTENDING RADIOLOGIST  This document has been electronically signed. Mar [id number]  8:59AM    < end of copied text >

## 2018-03-16 NOTE — ADVANCED PRACTICE NURSE CONSULT - ASSESSMENT
Pt is 78 y/o male, weak, lethargy, need complete care. Pt's wife at the bedside. Pt is known to ostomy from previous admission -- ostomy nurse was ordering ostomy supplies for the pt during the previous admission as per wife's request. Old pouching system removed, stoma red, moist and protuberant (more than 3cm), peristomal hernia noted. Peristomal skin intact, wife stated that his skin was excoriated due to previous leakage, but wife was using ostomy powder and skin prep to crust the area, now the skin is intact. Stoma measured 1 1/4 inch, pt at high risk for stoma prolapse due to long protruding stoma and peristomal hernia. Today, ostomy nurse reapply two piece Karval high output pouching system to pt's colostomy, instructed wife to cut the wafer larger than the stoma to 1 1/2 inch to prevent more pressure at stoma site. Step by step pouching change procedure demonstrated to wife. Extra pouching supplies left to wife at the bedside for future use.

## 2018-03-17 LAB
ANION GAP SERPL CALC-SCNC: 15 MMOL/L — SIGNIFICANT CHANGE UP (ref 5–17)
BUN SERPL-MCNC: 121 MG/DL — HIGH (ref 8–20)
CALCIUM SERPL-MCNC: 8.5 MG/DL — LOW (ref 8.6–10.2)
CHLORIDE SERPL-SCNC: 117 MMOL/L — HIGH (ref 98–107)
CO2 SERPL-SCNC: 18 MMOL/L — LOW (ref 22–29)
CREAT SERPL-MCNC: 3.35 MG/DL — HIGH (ref 0.5–1.3)
GLUCOSE SERPL-MCNC: 268 MG/DL — HIGH (ref 70–115)
HCT VFR BLD CALC: 31.7 % — LOW (ref 42–52)
HGB BLD-MCNC: 9.8 G/DL — LOW (ref 14–18)
INR BLD: 1.75 RATIO — HIGH (ref 0.88–1.16)
MCHC RBC-ENTMCNC: 29.6 PG — SIGNIFICANT CHANGE UP (ref 27–31)
MCHC RBC-ENTMCNC: 30.9 G/DL — LOW (ref 32–36)
MCV RBC AUTO: 95.8 FL — HIGH (ref 80–94)
PLATELET # BLD AUTO: 134 K/UL — LOW (ref 150–400)
POTASSIUM SERPL-MCNC: 3.5 MMOL/L — SIGNIFICANT CHANGE UP (ref 3.5–5.3)
POTASSIUM SERPL-SCNC: 3.5 MMOL/L — SIGNIFICANT CHANGE UP (ref 3.5–5.3)
PROTHROM AB SERPL-ACNC: 19.5 SEC — HIGH (ref 9.8–12.7)
RBC # BLD: 3.31 M/UL — LOW (ref 4.6–6.2)
RBC # FLD: 19.9 % — HIGH (ref 11–15.6)
SODIUM SERPL-SCNC: 150 MMOL/L — HIGH (ref 135–145)
WBC # BLD: 5.9 K/UL — SIGNIFICANT CHANGE UP (ref 4.8–10.8)
WBC # FLD AUTO: 5.9 K/UL — SIGNIFICANT CHANGE UP (ref 4.8–10.8)

## 2018-03-17 PROCEDURE — 99233 SBSQ HOSP IP/OBS HIGH 50: CPT

## 2018-03-17 RX ORDER — SODIUM CHLORIDE 9 MG/ML
1000 INJECTION, SOLUTION INTRAVENOUS
Qty: 0 | Refills: 0 | Status: DISCONTINUED | OUTPATIENT
Start: 2018-03-17 | End: 2018-03-18

## 2018-03-17 RX ADMIN — Medication 1 GRAM(S): at 06:03

## 2018-03-17 RX ADMIN — Medication 1 GRAM(S): at 21:55

## 2018-03-17 RX ADMIN — AMIODARONE HYDROCHLORIDE 200 MILLIGRAM(S): 400 TABLET ORAL at 06:03

## 2018-03-17 RX ADMIN — Medication 1 GRAM(S): at 17:20

## 2018-03-17 RX ADMIN — Medication 40 MILLIGRAM(S): at 06:03

## 2018-03-17 RX ADMIN — Medication 3 MILLILITER(S): at 09:34

## 2018-03-17 RX ADMIN — SODIUM CHLORIDE 75 MILLILITER(S): 9 INJECTION, SOLUTION INTRAVENOUS at 21:56

## 2018-03-17 RX ADMIN — FINASTERIDE 5 MILLIGRAM(S): 5 TABLET, FILM COATED ORAL at 12:31

## 2018-03-17 RX ADMIN — PANTOPRAZOLE SODIUM 40 MILLIGRAM(S): 20 TABLET, DELAYED RELEASE ORAL at 17:20

## 2018-03-17 RX ADMIN — TAMSULOSIN HYDROCHLORIDE 0.4 MILLIGRAM(S): 0.4 CAPSULE ORAL at 21:55

## 2018-03-17 RX ADMIN — ATORVASTATIN CALCIUM 10 MILLIGRAM(S): 80 TABLET, FILM COATED ORAL at 21:55

## 2018-03-17 RX ADMIN — Medication 3 MILLILITER(S): at 20:53

## 2018-03-17 RX ADMIN — Medication 1 GRAM(S): at 12:31

## 2018-03-17 RX ADMIN — PANTOPRAZOLE SODIUM 40 MILLIGRAM(S): 20 TABLET, DELAYED RELEASE ORAL at 06:02

## 2018-03-17 RX ADMIN — Medication 3 MILLILITER(S): at 03:01

## 2018-03-17 RX ADMIN — Medication 3 MILLILITER(S): at 15:58

## 2018-03-17 RX ADMIN — SERTRALINE 25 MILLIGRAM(S): 25 TABLET, FILM COATED ORAL at 12:33

## 2018-03-17 NOTE — PROGRESS NOTE ADULT - SUBJECTIVE AND OBJECTIVE BOX
[facility] DIVISION OF KIDNEY DISEASES AND HYPERTENSION -- FOLLOW UP NOTE  --------------------------------------------------------------------------------  Chief Complaint: Hypernatremia; KAPIL    24 hour events/subjective:  Pt seen and examined this AM  Hypernatremia improving  On free water      PAST HISTORY  --------------------------------------------------------------------------------  No significant changes to PMH, PSH, FHx, SHx, unless otherwise noted    ALLERGIES & MEDICATIONS  --------------------------------------------------------------------------------  Allergies    Allergy Status Unknown    Intolerances      Standing Inpatient Medications  ALBUTerol/ipratropium for Nebulization 3 milliLiter(s) Nebulizer every 6 hours  amiodarone    Tablet 200 milliGRAM(s) Oral daily  atorvastatin 10 milliGRAM(s) Oral at bedtime  dextrose 5%. 1000 milliLiter(s) IV Continuous <Continuous>  finasteride 5 milliGRAM(s) Oral daily  pantoprazole   Suspension 40 milliGRAM(s) Oral two times a day before meals  sertraline 25 milliGRAM(s) Oral daily  sucralfate 1 Gram(s) Oral Before meals and at bedtime  tamsulosin 0.4 milliGRAM(s) Oral at bedtime    PRN Inpatient Medications  sodium chloride 0.65% Nasal 1 Spray(s) Both Nostrils every 2 hours PRN      REVIEW OF SYSTEMS  --------------------------------------------------------------------------------  Unable to obtain    VITALS/PHYSICAL EXAM  --------------------------------------------------------------------------------  T(C): 36.4 (03-17-18 @ 07:55), Max: 36.4 (03-16-18 @ 15:59)  HR: 114 (03-17-18 @ 07:55) (114 - 116)  BP: 95/52 (03-17-18 @ 07:55) (95/52 - 123/67)  RR: 18 (03-17-18 @ 07:55) (16 - 18)  SpO2: 100% (03-17-18 @ 07:55) (98% - 100%)  Wt(kg): --        03-16-18 @ 07:01  -  03-17-18 @ 07:00  --------------------------------------------------------  IN: 0 mL / OUT: 1025 mL / NET: -1025 mL      Physical Exam:  	Gen: NAD, chronically ill appearing  	HEENT: PERRL, supple neck, clear oropharynx  	Pulm: CTA B/L  	CV: RRR, S1S2; no rub  	Back: No spinal or CVA tenderness; no sacral edema  	Abd: +stoma with watery outpt  	: No suprapubic tenderness  	UE: Warm, FROM, no clubbing, intact strength; no edema; no asterixis  	LE: Warm, FROM, no clubbing, intact strength; no edema  	Neuro: No focal deficits, intact gait  	Psych: Normal affect and mood  	Skin: Warm, without rashes    LABS/STUDIES  --------------------------------------------------------------------------------              9.8    5.9   >-----------<  134      [03-17-18 @ 06:59]              31.7     150  |  117  |  121.0  ----------------------------<  268      [03-17-18 @ 06:59]  3.5   |  18.0  |  3.35        Ca     8.5     [03-17-18 @ 06:59]      PT/INR: PT 19.5 , INR 1.75       [03-17-18 @ 06:59]      Creatinine Trend:  SCr 3.35 [03-17 @ 06:59]  SCr 3.19 [03-16 @ 09:10]  SCr 3.09 [03-15 @ 18:40]  SCr 3.10 [03-15 @ 07:23]  SCr 2.91 [03-14 @ 08:55]    Urinalysis - [03-15-18 @ 20:20]      Color Yellow / Appearance Slightly Turbid / SG 1.010 / pH 6.0      Gluc Negative / Ketone Negative  / Bili Negative / Urobili Negative       Blood Large / Protein 30 / Leuk Est Moderate / Nitrite Negative      RBC 11-25 / WBC 11-25 / Hyaline  / Gran  / Sq Epi  / Non Sq Epi  / Bacteria Many    Urine Creatinine 56      [03-16-18 @ 09:32]  Urine Sodium <30      [03-16-18 @ 09:32]  Urine Potassium 39      [03-16-18 @ 09:32]  Urine Chloride <27      [03-16-18 @ 09:32]  Urine Phosphorus 11.4      [03-16-18 @ 19:16]  Urine Osmolality 500      [03-16-18 @ 09:32]

## 2018-03-17 NOTE — PROGRESS NOTE ADULT - SUBJECTIVE AND OBJECTIVE BOX
KRISTOFER SALOMON Patient is a 77y old  Male who presents with a chief complaint of Bleeding into colostomy (04 Mar 2018 23:39)     HPI:  History obtained from patient wife at bedside, due to patient underlying medical condition of expressive aphasia status post CVA in the past.   Mr. 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 Coumadin was held, FFP was given and EGD completed which did not reveal source of bleeding. G- tube was placed during EGD. 3 weeks prior to that he was discharged from [facility] s/p respiratory failure s/p intubation due to aspiration pneumonia, Septic shock 2/2 UTI, KAPIL on CKD, recurrent NSVT, UE DVT.    As per wife at bedside, pt is now refusing oral intake, pt use Jevity 1.5, 4 to 5 cans per day. She has noticed that he has an increased about of bloating, vomiting episodes and coughing. In addition she reports that pt stopped bleeding bleeding for 5 days after discharge but has been ongoing since that time. His Coumadin dose has been up titrated to 5mg M-W-, followed by 2.5mg the remainder of the week. As a result of the recurrent bleeding she stopped giving him his Aspirin (2weeks now), Dronabinol was never started as the pharmacy indicated his insurance did not approve it. She reports no other signs or symptoms. (04 Mar 2018 23:39)    The patient was seen and evaluated NAD, colostomy blood   The patient is in no acute distress.      I&O's Summary    16 Mar 2018 07:01  -  17 Mar 2018 07:00  --------------------------------------------------------  IN: 0 mL / OUT: 1025 mL / NET: -1025 mL      Allergies    Allergy Status Unknown    Intolerances      HEALTH ISSUES - PROBLEM Dx:  Stage 3 chronic kidney disease: Stage 3 chronic kidney disease  Pleural effusion, bilateral: Pleural effusion, bilateral  Blood in stool: Blood in stool  Gastrointestinal hemorrhage, unspecified gastrointestinal hemorrhage type: Gastrointestinal hemorrhage, unspecified gastrointestinal hemorrhage type        PAST MEDICAL & SURGICAL HISTORY:  Heart attack  High cholesterol  HTN (hypertension)  H/O coronary artery bypass surgery          Vital Signs Last 24 Hrs  T(C): 36.4 (17 Mar 2018 07:55), Max: 36.4 (16 Mar 2018 15:59)  T(F): 97.5 (17 Mar 2018 07:55), Max: 97.6 (16 Mar 2018 23:19)  HR: 114 (17 Mar 2018 07:55) (114 - 116)  BP: 95/52 (17 Mar 2018 07:55) (95/52 - 123/67)  BP(mean): --  RR: 18 (17 Mar 2018 07:55) (16 - 18)  SpO2: 100% (17 Mar 2018 07:55) (98% - 100%)T(C): 36.4 (18 @ 07:55), Max: 36.4 (18 @ 15:59)  HR: 114 (18 @ 07:55) (114 - 116)  BP: 95/52 (18 @ 07:55) (95/52 - 123/67)  RR: 18 (18 @ 07:55) (16 - 18)  SpO2: 100% (18 @ 07:55) (98% - 100%)  Wt(kg): --    PHYSICAL EXAM:    GENERAL: NAD, elderly ill appearing   HEAD:  Atraumatic, Normocephalic  NERVOUS SYSTEM: lethargic and barely  Moves upper and lower extremities right side ; CNS-II-XII  CHEST/LUNG: Clear to auscultation bilaterally; No rales, rhonchi, wheezing,   HEART: Regular rate and rhythm; No murmurs,   ABDOMEN: Soft, Nontender, Nondistended; Bowel sounds present  EXTREMITIES:  Peripheral Pulses, No  cyanosis, or edema  SKIN: No rashes or lesions  psychiatry- mood and affect difficult to assess     ALBUTerol/ipratropium for Nebulization 3 milliLiter(s) Nebulizer every 6 hours  amiodarone    Tablet 200 milliGRAM(s) Oral daily  atorvastatin 10 milliGRAM(s) Oral at bedtime  dextrose 5%. 1000 milliLiter(s) IV Continuous <Continuous>  finasteride 5 milliGRAM(s) Oral daily  pantoprazole   Suspension 40 milliGRAM(s) Oral two times a day before meals  sertraline 25 milliGRAM(s) Oral daily  sodium chloride 0.65% Nasal 1 Spray(s) Both Nostrils every 2 hours PRN  sucralfate 1 Gram(s) Oral Before meals and at bedtime  tamsulosin 0.4 milliGRAM(s) Oral at bedtime      LABS:                          9.8    5.9   )-----------( 134      ( 17 Mar 2018 06:59 )             31.7     03-17    150<H>  |  117<H>  |  121.0<H>  ----------------------------<  268<H>  3.5   |  18.0<L>  |  3.35<H>    Ca    8.5<L>      17 Mar 2018 06:59        PT/INR - ( 17 Mar 2018 06:59 )   PT: 19.5 sec;   INR: 1.75 ratio               Urinalysis Basic - ( 15 Mar 2018 20:20 )    Color: Yellow / Appearance: Slightly Turbid / S.010 / pH: x  Gluc: x / Ketone: Negative  / Bili: Negative / Urobili: Negative mg/dL   Blood: x / Protein: 30 mg/dL / Nitrite: Negative   Leuk Esterase: Moderate / RBC: 11-25 /HPF / WBC 11-25   Sq Epi: x / Non Sq Epi: x / Bacteria: Many      CAPILLARY BLOOD GLUCOSE          RADIOLOGY & ADDITIONAL TESTS:      Consultant notes reviewed    Case discussed with consultant/provider/ family /patient

## 2018-03-17 NOTE — PROGRESS NOTE ADULT - ASSESSMENT
1. KAPIL on CKD   2. Anemia from acute blood loss  3. GI Bleed/hemorrhage  4. Hypovolemic hypernatremia  5. Chronic A-Fib    Reviewed ULytes; prerenal  Decrease D5 IVF to 75cc/hr  cw water flushes 250ml q8 for now  Monitor renal fx  Monitor H&H  U/A, lytes, osmo ordered  renal US ordered and completed  Will follow    Arvind Page

## 2018-03-17 NOTE — PROGRESS NOTE ADULT - ASSESSMENT
76 y/o male present with Recurrent Gastrointestinal hemorrhage with melena.  On Coumadin with supra-therapeutic INR (3.74).  1. Gastrointestinal hemorrhage, unspecified gastrointestinal hemorrhage type with anemia of acute blood loss on anemia of chronic disease - monitor HH, transfused 4 units of PRBC, continue PPI, diet advanced and tolerated, GI reevaluation at family request - no intervention suggested  2. Chronic atrial fibrillation - rate controlled with amiodarone, off ASA, coumadin held, s/p Vit K. High risk for CVA and VTE as + Hx of CVA and PE/DVT  3. Acute kidney injury superimposed on chronic kidney disease with hypovolemic Hyponatremia - now hypernatremic with worsening BUN/Cr.  Renal consulted.  Bicarb gtt started and changed to D5W at 125cc/hr given worsening hypernatremia.  Renal US reviewed.  UA, Urine lytes and Osm reviewed.  4. H/o Dysphagia - resumed  TF at nutritionist's recommendations - will change to bolus feeds as patient's Po intake is poor.  5. H/o CVA - c/w statin. Bedrest, elevate HOB, high risk of aspirations, turn Q2hrs, high risk of pressure ulcers. PT   6. SOB with wheezing - on BDs, CXR reviewed - no evidence of PNA or fluid overload, clinically dry with hypovolemic hypernatremia - free water,URI - pulmonary  evaluation appreciated, CT chest showed BL pleural effusion R>L, no evidence of infiltrate, tapering steroids, No thoracocentesis as likely fluid overload.  renal consulted.  7. Anemia of ABL - now improved with transfusion, but previously H/H was dropping despite no evidence of active hemorrhage, further discussion with blood bank supervisor indicated that pt received blood transfusion when was admitted as [facility] which had positive for previously transfused blood antibodies - likely caused delayed onset hemolytic anemia

## 2018-03-18 LAB
ALBUMIN SERPL ELPH-MCNC: 2.3 G/DL — LOW (ref 3.3–5.2)
ALP SERPL-CCNC: 60 U/L — SIGNIFICANT CHANGE UP (ref 40–120)
ALT FLD-CCNC: 119 U/L — HIGH
ANION GAP SERPL CALC-SCNC: 13 MMOL/L — SIGNIFICANT CHANGE UP (ref 5–17)
ANION GAP SERPL CALC-SCNC: 15 MMOL/L — SIGNIFICANT CHANGE UP (ref 5–17)
AST SERPL-CCNC: 19 U/L — SIGNIFICANT CHANGE UP
BILIRUB SERPL-MCNC: 1.2 MG/DL — SIGNIFICANT CHANGE UP (ref 0.4–2)
BUN SERPL-MCNC: 108 MG/DL — HIGH (ref 8–20)
BUN SERPL-MCNC: 89 MG/DL — HIGH (ref 8–20)
CALCIUM SERPL-MCNC: 6.8 MG/DL — LOW (ref 8.6–10.2)
CALCIUM SERPL-MCNC: 8.2 MG/DL — LOW (ref 8.6–10.2)
CHLORIDE SERPL-SCNC: 105 MMOL/L — SIGNIFICANT CHANGE UP (ref 98–107)
CHLORIDE SERPL-SCNC: 88 MMOL/L — LOW (ref 98–107)
CO2 SERPL-SCNC: 13 MMOL/L — LOW (ref 22–29)
CO2 SERPL-SCNC: 18 MMOL/L — LOW (ref 22–29)
CREAT SERPL-MCNC: 2.54 MG/DL — HIGH (ref 0.5–1.3)
CREAT SERPL-MCNC: 3.18 MG/DL — HIGH (ref 0.5–1.3)
GLUCOSE BLDC GLUCOMTR-MCNC: 277 MG/DL — HIGH (ref 70–99)
GLUCOSE SERPL-MCNC: 1064 MG/DL — CRITICAL HIGH (ref 70–115)
GLUCOSE SERPL-MCNC: 339 MG/DL — HIGH (ref 70–115)
HCT VFR BLD CALC: 31.1 % — LOW (ref 42–52)
HGB BLD-MCNC: 9.7 G/DL — LOW (ref 14–18)
MCHC RBC-ENTMCNC: 29.6 PG — SIGNIFICANT CHANGE UP (ref 27–31)
MCHC RBC-ENTMCNC: 31.2 G/DL — LOW (ref 32–36)
MCV RBC AUTO: 94.8 FL — HIGH (ref 80–94)
PLATELET # BLD AUTO: 130 K/UL — LOW (ref 150–400)
POTASSIUM SERPL-MCNC: 4.4 MMOL/L — SIGNIFICANT CHANGE UP (ref 3.5–5.3)
POTASSIUM SERPL-MCNC: 4.7 MMOL/L — SIGNIFICANT CHANGE UP (ref 3.5–5.3)
POTASSIUM SERPL-SCNC: 4.4 MMOL/L — SIGNIFICANT CHANGE UP (ref 3.5–5.3)
POTASSIUM SERPL-SCNC: 4.7 MMOL/L — SIGNIFICANT CHANGE UP (ref 3.5–5.3)
PROT SERPL-MCNC: 5.3 G/DL — LOW (ref 6.6–8.7)
RBC # BLD: 3.28 M/UL — LOW (ref 4.6–6.2)
RBC # FLD: 19.2 % — HIGH (ref 11–15.6)
SODIUM SERPL-SCNC: 114 MMOL/L — CRITICAL LOW (ref 135–145)
SODIUM SERPL-SCNC: 138 MMOL/L — SIGNIFICANT CHANGE UP (ref 135–145)
WBC # BLD: 8.1 K/UL — SIGNIFICANT CHANGE UP (ref 4.8–10.8)
WBC # FLD AUTO: 8.1 K/UL — SIGNIFICANT CHANGE UP (ref 4.8–10.8)

## 2018-03-18 PROCEDURE — 99233 SBSQ HOSP IP/OBS HIGH 50: CPT

## 2018-03-18 PROCEDURE — 71045 X-RAY EXAM CHEST 1 VIEW: CPT | Mod: 26

## 2018-03-18 RX ORDER — FUROSEMIDE 40 MG
40 TABLET ORAL ONCE
Qty: 0 | Refills: 0 | Status: COMPLETED | OUTPATIENT
Start: 2018-03-18 | End: 2018-03-18

## 2018-03-18 RX ADMIN — Medication 1 GRAM(S): at 06:23

## 2018-03-18 RX ADMIN — FINASTERIDE 5 MILLIGRAM(S): 5 TABLET, FILM COATED ORAL at 12:00

## 2018-03-18 RX ADMIN — SERTRALINE 25 MILLIGRAM(S): 25 TABLET, FILM COATED ORAL at 12:00

## 2018-03-18 RX ADMIN — AMIODARONE HYDROCHLORIDE 200 MILLIGRAM(S): 400 TABLET ORAL at 06:23

## 2018-03-18 RX ADMIN — Medication 3 MILLILITER(S): at 03:39

## 2018-03-18 RX ADMIN — PANTOPRAZOLE SODIUM 40 MILLIGRAM(S): 20 TABLET, DELAYED RELEASE ORAL at 17:44

## 2018-03-18 RX ADMIN — Medication 40 MILLIGRAM(S): at 17:44

## 2018-03-18 RX ADMIN — Medication 1 GRAM(S): at 12:00

## 2018-03-18 RX ADMIN — ATORVASTATIN CALCIUM 10 MILLIGRAM(S): 80 TABLET, FILM COATED ORAL at 21:34

## 2018-03-18 RX ADMIN — Medication 20 MILLIGRAM(S): at 06:23

## 2018-03-18 RX ADMIN — Medication 1 GRAM(S): at 21:34

## 2018-03-18 RX ADMIN — Medication 3 MILLILITER(S): at 20:34

## 2018-03-18 RX ADMIN — Medication 1 GRAM(S): at 17:44

## 2018-03-18 RX ADMIN — TAMSULOSIN HYDROCHLORIDE 0.4 MILLIGRAM(S): 0.4 CAPSULE ORAL at 21:34

## 2018-03-18 RX ADMIN — PANTOPRAZOLE SODIUM 40 MILLIGRAM(S): 20 TABLET, DELAYED RELEASE ORAL at 06:22

## 2018-03-18 RX ADMIN — Medication 3 MILLILITER(S): at 09:36

## 2018-03-18 RX ADMIN — Medication 3 MILLILITER(S): at 15:40

## 2018-03-18 NOTE — PROGRESS NOTE ADULT - SUBJECTIVE AND OBJECTIVE BOX
[facility] DIVISION OF KIDNEY DISEASES AND HYPERTENSION -- FOLLOW UP NOTE  --------------------------------------------------------------------------------  Chief Complaint:  Hypernatremia; KAPIL    24 hour events/subjective:  Pt seen and examined this AM  Morning BMP drawn from D5 line; false results;redrawing  Lying in bed in NAD      PAST HISTORY  --------------------------------------------------------------------------------  No significant changes to PMH, PSH, FHx, SHx, unless otherwise noted    ALLERGIES & MEDICATIONS  --------------------------------------------------------------------------------  Allergies    Allergy Status Unknown    Intolerances      Standing Inpatient Medications  ALBUTerol/ipratropium for Nebulization 3 milliLiter(s) Nebulizer every 6 hours  amiodarone    Tablet 200 milliGRAM(s) Oral daily  atorvastatin 10 milliGRAM(s) Oral at bedtime  dextrose 5%. 1000 milliLiter(s) IV Continuous <Continuous>  finasteride 5 milliGRAM(s) Oral daily  pantoprazole   Suspension 40 milliGRAM(s) Oral two times a day before meals  predniSONE   Tablet 20 milliGRAM(s) Oral daily  sertraline 25 milliGRAM(s) Oral daily  sucralfate 1 Gram(s) Oral Before meals and at bedtime  tamsulosin 0.4 milliGRAM(s) Oral at bedtime    PRN Inpatient Medications  sodium chloride 0.65% Nasal 1 Spray(s) Both Nostrils every 2 hours PRN      REVIEW OF SYSTEMS  --------------------------------------------------------------------------------  Unable to obtain    VITALS/PHYSICAL EXAM  --------------------------------------------------------------------------------  T(C): 36.3 (03-18-18 @ 07:45), Max: 36.8 (03-18-18 @ 00:57)  HR: 104 (03-18-18 @ 07:45) (104 - 115)  BP: 114/61 (03-18-18 @ 07:45) (114/61 - 121/59)  RR: 18 (03-18-18 @ 07:45) (18 - 20)  SpO2: 99% (03-18-18 @ 07:45) (92% - 100%)  Wt(kg): --        03-17-18 @ 07:01  -  03-18-18 @ 07:00  --------------------------------------------------------  IN: 1900 mL / OUT: 1350 mL / NET: 550 mL      Physical Exam:  	Gen: NAD, chronically ill appearing  	HEENT: PERRL, supple neck, clear oropharynx  	Pulm: CTA B/L  	CV: RRR, S1S2; no rub  	Back: No spinal or CVA tenderness; no sacral edema  	Abd: +stoma with watery outpt  	: No suprapubic tenderness  	UE: Warm, FROM, no clubbing, intact strength; no edema; no asterixis  	LE: Warm, FROM, no clubbing, intact strength; no edema  	Neuro: No focal deficits, intact gait  	Psych: Normal affect and mood  	Skin: Warm, without rashes    LABS/STUDIES  --------------------------------------------------------------------------------              9.7    8.1   >-----------<  130      [03-18-18 @ 11:20]              31.1     114  |  88  |  89.0  ----------------------------<  1064      [03-18-18 @ 06:30]  4.4   |  13.0  |  2.54        Ca     6.8     [03-18-18 @ 06:30]      PT/INR: PT 19.5 , INR 1.75       [03-17-18 @ 06:59]      Creatinine Trend:  SCr 2.54 [03-18 @ 06:30]  SCr 3.35 [03-17 @ 06:59]  SCr 3.19 [03-16 @ 09:10]  SCr 3.09 [03-15 @ 18:40]  SCr 3.10 [03-15 @ 07:23]    Urinalysis - [03-15-18 @ 20:20]      Color Yellow / Appearance Slightly Turbid / SG 1.010 / pH 6.0      Gluc Negative / Ketone Negative  / Bili Negative / Urobili Negative       Blood Large / Protein 30 / Leuk Est Moderate / Nitrite Negative      RBC 11-25 / WBC 11-25 / Hyaline  / Gran  / Sq Epi  / Non Sq Epi  / Bacteria Many    Urine Creatinine 56      [03-16-18 @ 09:32]  Urine Sodium <30      [03-16-18 @ 09:32]  Urine Potassium 39      [03-16-18 @ 09:32]  Urine Chloride <27      [03-16-18 @ 09:32]  Urine Phosphorus 11.4      [03-16-18 @ 19:16]  Urine Osmolality 500      [03-16-18 @ 09:32]

## 2018-03-18 NOTE — PROGRESS NOTE ADULT - ASSESSMENT
1. KAPIL on CKD   2. Anemia from acute blood loss  3. GI Bleed/hemorrhage  4. Hypovolemic hypernatremia  5. Chronic A-Fib  Reviewed ULytes; prerenal  Decrease D5 IVF to 75cc/hr  cw water flushes 250ml q8 for now  Monitor renal fx ; rechecking BMP stat as this morning's result was false; drawn from D5 line  Will follow    Arvind Page

## 2018-03-18 NOTE — PROGRESS NOTE ADULT - SUBJECTIVE AND OBJECTIVE BOX
KRISTOFER SALOMON Patient is a 77y old  Male who presents with a chief complaint of Bleeding into colostomy (04 Mar 2018 23:39)     HPI:  History obtained from patient wife at bedside, due to patient underlying medical condition of expressive aphasia status post CVA in the past.   Mr. 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 Coumadin was held, FFP was given and EGD completed which did not reveal source of bleeding. G- tube was placed during EGD. 3 weeks prior to that he was discharged from [facility] s/p respiratory failure s/p intubation due to aspiration pneumonia, Septic shock 2/2 UTI, KAPIL on CKD, recurrent NSVT, UE DVT.    As per wife at bedside, pt is now refusing oral intake, pt use Jevity 1.5, 4 to 5 cans per day. She has noticed that he has an increased about of bloating, vomiting episodes and coughing. In addition she reports that pt stopped bleeding bleeding for 5 days after discharge but has been ongoing since that time. His Coumadin dose has been up titrated to 5mg M-W-F, followed by 2.5mg the remainder of the week. As a result of the recurrent bleeding she stopped giving him his Aspirin (2weeks now), Dronabinol was never started as the pharmacy indicated his insurance did not approve it. She reports no other signs or symptoms. (04 Mar 2018 23:39)    The patient was seen and evaluated   The patient is in no acute distress.  Denied any fever , abdominal pain, fever, dysuria, edema   Complains of some coughing     I&O's Summary    17 Mar 2018 07:01  -  18 Mar 2018 07:00  --------------------------------------------------------  IN: 1900 mL / OUT: 1350 mL / NET: 550 mL      Allergies    Allergy Status Unknown    Intolerances      HEALTH ISSUES - PROBLEM Dx:  Stage 3 chronic kidney disease: Stage 3 chronic kidney disease  Pleural effusion, bilateral: Pleural effusion, bilateral  Blood in stool: Blood in stool  Gastrointestinal hemorrhage, unspecified gastrointestinal hemorrhage type: Gastrointestinal hemorrhage, unspecified gastrointestinal hemorrhage type        PAST MEDICAL & SURGICAL HISTORY:  Heart attack  High cholesterol  HTN (hypertension)  H/O coronary artery bypass surgery          Vital Signs Last 24 Hrs  T(C): 36.3 (18 Mar 2018 07:45), Max: 36.8 (18 Mar 2018 00:57)  T(F): 97.3 (18 Mar 2018 07:45), Max: 98.2 (18 Mar 2018 00:57)  HR: 104 (18 Mar 2018 07:45) (104 - 115)  BP: 114/61 (18 Mar 2018 07:45) (114/61 - 121/59)  BP(mean): --  RR: 18 (18 Mar 2018 07:45) (18 - 20)  SpO2: 99% (18 Mar 2018 07:45) (92% - 100%)T(C): 36.3 (03-18-18 @ 07:45), Max: 36.8 (03-18-18 @ 00:57)  HR: 104 (03-18-18 @ 07:45) (104 - 115)  BP: 114/61 (03-18-18 @ 07:45) (114/61 - 121/59)  RR: 18 (03-18-18 @ 07:45) (18 - 20)  SpO2: 99% (03-18-18 @ 07:45) (92% - 100%)  Wt(kg): --    PHYSICAL EXAM:    GENERAL: NAD, frail elderly, coughing    HEAD:  Atraumatic,   NERVOUS SYSTEM:   barely, Moves upper and lower extremities; CNS-II-XII  CHEST/LUNG: Clear to auscultation bilaterally rales, rhonchi  HEART: Regular rate and rhythm; No murmurs,   ABDOMEN: Soft, Nontender, Nondistended; Bowel sounds present  EXTREMITIES:  Peripheral Pulses, No  cyanosis, or edema  SKIN: No rashes or lesions  psychiatry- mood and affect flat    ALBUTerol/ipratropium for Nebulization 3 milliLiter(s) Nebulizer every 6 hours  amiodarone    Tablet 200 milliGRAM(s) Oral daily  atorvastatin 10 milliGRAM(s) Oral at bedtime  finasteride 5 milliGRAM(s) Oral daily  furosemide   Injectable 40 milliGRAM(s) IV Push once  pantoprazole   Suspension 40 milliGRAM(s) Oral two times a day before meals  predniSONE   Tablet 20 milliGRAM(s) Oral daily  sertraline 25 milliGRAM(s) Oral daily  sodium chloride 0.65% Nasal 1 Spray(s) Both Nostrils every 2 hours PRN  sucralfate 1 Gram(s) Oral Before meals and at bedtime  tamsulosin 0.4 milliGRAM(s) Oral at bedtime      LABS:                          9.7    8.1   )-----------( 130      ( 18 Mar 2018 11:20 )             31.1     03-18    138  |  105  |  108.0<H>  ----------------------------<  339<H>  4.7   |  18.0<L>  |  3.18<H>    Ca    8.2<L>      18 Mar 2018 11:20    TPro  5.3<L>  /  Alb  2.3<L>  /  TBili  1.2  /  DBili  x   /  AST  19  /  ALT  119<H>  /  AlkPhos  60  03-18    LIVER FUNCTIONS - ( 18 Mar 2018 11:20 )  Alb: 2.3 g/dL / Pro: 5.3 g/dL / ALK PHOS: 60 U/L / ALT: 119 U/L / AST: 19 U/L / GGT: x           PT/INR - ( 17 Mar 2018 06:59 )   PT: 19.5 sec;   INR: 1.75 ratio                 CAPILLARY BLOOD GLUCOSE      POCT Blood Glucose.: 277 mg/dL (18 Mar 2018 08:04)      RADIOLOGY & ADDITIONAL TESTS:      Consultant notes reviewed    Case discussed with consultant/provider/ family /patient

## 2018-03-19 DIAGNOSIS — R53.2 FUNCTIONAL QUADRIPLEGIA: ICD-10-CM

## 2018-03-19 DIAGNOSIS — Z51.5 ENCOUNTER FOR PALLIATIVE CARE: ICD-10-CM

## 2018-03-19 DIAGNOSIS — R09.89 OTHER SPECIFIED SYMPTOMS AND SIGNS INVOLVING THE CIRCULATORY AND RESPIRATORY SYSTEMS: ICD-10-CM

## 2018-03-19 DIAGNOSIS — D64.9 ANEMIA, UNSPECIFIED: ICD-10-CM

## 2018-03-19 LAB
ALBUMIN SERPL ELPH-MCNC: 2.5 G/DL — LOW (ref 3.3–5.2)
ALP SERPL-CCNC: 73 U/L — SIGNIFICANT CHANGE UP (ref 40–120)
ALT FLD-CCNC: 94 U/L — HIGH
ANION GAP SERPL CALC-SCNC: 16 MMOL/L — SIGNIFICANT CHANGE UP (ref 5–17)
ANISOCYTOSIS BLD QL: SLIGHT — SIGNIFICANT CHANGE UP
APTT BLD: 26.9 SEC — LOW (ref 27.5–37.4)
AST SERPL-CCNC: 20 U/L — SIGNIFICANT CHANGE UP
BILIRUB SERPL-MCNC: 1.1 MG/DL — SIGNIFICANT CHANGE UP (ref 0.4–2)
BUN SERPL-MCNC: 118 MG/DL — HIGH (ref 8–20)
CALCIUM SERPL-MCNC: 8.5 MG/DL — LOW (ref 8.6–10.2)
CHLORIDE SERPL-SCNC: 106 MMOL/L — SIGNIFICANT CHANGE UP (ref 98–107)
CO2 SERPL-SCNC: 20 MMOL/L — LOW (ref 22–29)
CREAT SERPL-MCNC: 3.69 MG/DL — HIGH (ref 0.5–1.3)
ELLIPTOCYTES BLD QL SMEAR: SLIGHT — SIGNIFICANT CHANGE UP
EOSINOPHIL # BLD AUTO: 0 K/UL — SIGNIFICANT CHANGE UP (ref 0–0.5)
EOSINOPHIL NFR BLD AUTO: 1 % — SIGNIFICANT CHANGE UP (ref 0–6)
GLUCOSE SERPL-MCNC: 376 MG/DL — HIGH (ref 70–115)
HCT VFR BLD CALC: 28.1 % — LOW (ref 42–52)
HGB BLD-MCNC: 8.8 G/DL — LOW (ref 14–18)
HYPOCHROMIA BLD QL: SLIGHT — SIGNIFICANT CHANGE UP
INR BLD: 1.61 RATIO — HIGH (ref 0.88–1.16)
LYMPHOCYTES # BLD AUTO: 0.3 K/UL — LOW (ref 1–4.8)
LYMPHOCYTES # BLD AUTO: 4 % — LOW (ref 20–55)
MACROCYTES BLD QL: SLIGHT — SIGNIFICANT CHANGE UP
MCHC RBC-ENTMCNC: 29.7 PG — SIGNIFICANT CHANGE UP (ref 27–31)
MCHC RBC-ENTMCNC: 31.3 G/DL — LOW (ref 32–36)
MCV RBC AUTO: 94.9 FL — HIGH (ref 80–94)
MICROCYTES BLD QL: SLIGHT — SIGNIFICANT CHANGE UP
MONOCYTES # BLD AUTO: 0.6 K/UL — SIGNIFICANT CHANGE UP (ref 0–0.8)
MONOCYTES NFR BLD AUTO: 8 % — SIGNIFICANT CHANGE UP (ref 3–10)
NEUTROPHILS # BLD AUTO: 7.5 K/UL — SIGNIFICANT CHANGE UP (ref 1.8–8)
NEUTROPHILS NFR BLD AUTO: 81 % — HIGH (ref 37–73)
NEUTS BAND # BLD: 5 % — SIGNIFICANT CHANGE UP (ref 0–8)
OVALOCYTES BLD QL SMEAR: SLIGHT — SIGNIFICANT CHANGE UP
PLAT MORPH BLD: ABNORMAL
PLATELET # BLD AUTO: 127 K/UL — LOW (ref 150–400)
POIKILOCYTOSIS BLD QL AUTO: SLIGHT — SIGNIFICANT CHANGE UP
POTASSIUM SERPL-MCNC: 4 MMOL/L — SIGNIFICANT CHANGE UP (ref 3.5–5.3)
POTASSIUM SERPL-SCNC: 4 MMOL/L — SIGNIFICANT CHANGE UP (ref 3.5–5.3)
PROT SERPL-MCNC: 5.5 G/DL — LOW (ref 6.6–8.7)
PROTHROM AB SERPL-ACNC: 17.9 SEC — HIGH (ref 9.8–12.7)
RBC # BLD: 2.96 M/UL — LOW (ref 4.6–6.2)
RBC # FLD: 18.9 % — HIGH (ref 11–15.6)
RBC BLD AUTO: ABNORMAL
SODIUM SERPL-SCNC: 142 MMOL/L — SIGNIFICANT CHANGE UP (ref 135–145)
VARIANT LYMPHS # BLD: 1 % — SIGNIFICANT CHANGE UP (ref 0–6)
WBC # BLD: 8.5 K/UL — SIGNIFICANT CHANGE UP (ref 4.8–10.8)
WBC # FLD AUTO: 8.5 K/UL — SIGNIFICANT CHANGE UP (ref 4.8–10.8)

## 2018-03-19 PROCEDURE — 99222 1ST HOSP IP/OBS MODERATE 55: CPT

## 2018-03-19 PROCEDURE — 99233 SBSQ HOSP IP/OBS HIGH 50: CPT

## 2018-03-19 PROCEDURE — 99497 ADVNCD CARE PLAN 30 MIN: CPT | Mod: 25

## 2018-03-19 PROCEDURE — 71045 X-RAY EXAM CHEST 1 VIEW: CPT | Mod: 26

## 2018-03-19 RX ORDER — ROBINUL 0.2 MG/ML
0.1 INJECTION INTRAMUSCULAR; INTRAVENOUS EVERY 8 HOURS
Qty: 0 | Refills: 0 | Status: DISCONTINUED | OUTPATIENT
Start: 2018-03-19 | End: 2018-03-20

## 2018-03-19 RX ORDER — SODIUM CHLORIDE 9 MG/ML
1000 INJECTION INTRAMUSCULAR; INTRAVENOUS; SUBCUTANEOUS
Qty: 0 | Refills: 0 | Status: DISCONTINUED | OUTPATIENT
Start: 2018-03-19 | End: 2018-03-20

## 2018-03-19 RX ORDER — ROBINUL 0.2 MG/ML
0.1 INJECTION INTRAMUSCULAR; INTRAVENOUS EVERY 6 HOURS
Qty: 0 | Refills: 0 | Status: DISCONTINUED | OUTPATIENT
Start: 2018-03-19 | End: 2018-03-19

## 2018-03-19 RX ORDER — SCOPALAMINE 1 MG/3D
1 PATCH, EXTENDED RELEASE TRANSDERMAL
Qty: 0 | Refills: 0 | Status: DISCONTINUED | OUTPATIENT
Start: 2018-03-19 | End: 2018-03-21

## 2018-03-19 RX ADMIN — Medication 3 MILLILITER(S): at 08:52

## 2018-03-19 RX ADMIN — Medication 1 GRAM(S): at 05:18

## 2018-03-19 RX ADMIN — ATORVASTATIN CALCIUM 10 MILLIGRAM(S): 80 TABLET, FILM COATED ORAL at 22:16

## 2018-03-19 RX ADMIN — ROBINUL 0.1 MILLIGRAM(S): 0.2 INJECTION INTRAMUSCULAR; INTRAVENOUS at 19:42

## 2018-03-19 RX ADMIN — Medication 1 GRAM(S): at 22:16

## 2018-03-19 RX ADMIN — SCOPALAMINE 1 PATCH: 1 PATCH, EXTENDED RELEASE TRANSDERMAL at 13:15

## 2018-03-19 RX ADMIN — Medication 3 MILLILITER(S): at 03:08

## 2018-03-19 RX ADMIN — FINASTERIDE 5 MILLIGRAM(S): 5 TABLET, FILM COATED ORAL at 13:15

## 2018-03-19 RX ADMIN — Medication 3 MILLILITER(S): at 14:49

## 2018-03-19 RX ADMIN — PANTOPRAZOLE SODIUM 40 MILLIGRAM(S): 20 TABLET, DELAYED RELEASE ORAL at 05:18

## 2018-03-19 RX ADMIN — SERTRALINE 25 MILLIGRAM(S): 25 TABLET, FILM COATED ORAL at 13:15

## 2018-03-19 RX ADMIN — PANTOPRAZOLE SODIUM 40 MILLIGRAM(S): 20 TABLET, DELAYED RELEASE ORAL at 17:46

## 2018-03-19 RX ADMIN — TAMSULOSIN HYDROCHLORIDE 0.4 MILLIGRAM(S): 0.4 CAPSULE ORAL at 22:16

## 2018-03-19 RX ADMIN — Medication 3 MILLILITER(S): at 20:20

## 2018-03-19 RX ADMIN — Medication 1 GRAM(S): at 13:15

## 2018-03-19 RX ADMIN — Medication 20 MILLIGRAM(S): at 05:17

## 2018-03-19 RX ADMIN — AMIODARONE HYDROCHLORIDE 200 MILLIGRAM(S): 400 TABLET ORAL at 05:17

## 2018-03-19 RX ADMIN — SODIUM CHLORIDE 60 MILLILITER(S): 9 INJECTION INTRAMUSCULAR; INTRAVENOUS; SUBCUTANEOUS at 17:46

## 2018-03-19 RX ADMIN — Medication 1 GRAM(S): at 17:45

## 2018-03-19 NOTE — PROGRESS NOTE ADULT - ASSESSMENT
1. KAPIL on CKD   2. Anemia from acute blood loss  3. GI Bleed/hemorrhage  4. Hypovolemic hypernatremia  5. Chronic A-Fib  cw water flushes 250ml q8 for now  BP running soft; SCr up; will gently hydrate today with NS @ 60cc/hr for 24 hours  Repeat urine lytes  Pallative consulted; poor prognosis  Will follow    Arvind Page

## 2018-03-19 NOTE — GOALS OF CARE CONVERSATION - PERSONAL ADVANCE DIRECTIVE - WHAT MATTERS MOST
She would like to see how he does and is awaiting a call back from the surgeon. However, if he continues to decline, she would like to take him home to die.

## 2018-03-19 NOTE — PHYSICAL THERAPY INITIAL EVALUATION ADULT - ADDITIONAL COMMENTS
pt following commands, unable to give verbal response, but responds yes or no with head movement. Pt reports living with his wife, requires her assistance. Unclear patient level of assistance prior to admission, however due to right sided weakness, would have required assistance for mobility.

## 2018-03-19 NOTE — CONSULT NOTE ADULT - PROBLEM SELECTOR RECOMMENDATION 6
Met with wife with Ifrah Terry NP.   See [facility] note  In summary - wife agrees to DNR/I.  She understands that he has been declining and current state is poor. She is waiting to hear from his surgeon at [facility] to see if anything could be done. She had been told by Dr. Potter that would be  unlikely, for which I agreed. However, she wants to hear for herself and is awaiting a call back. For now, she is hoping for the best, but told her to be prepared for the worst.   Discussed consideration for hospice services, and transfer to the [facility] if continues to decline. She wishes to take him home.   Will follow up.

## 2018-03-19 NOTE — PROGRESS NOTE ADULT - ATTENDING COMMENTS
Pt seen and examined with NP. A&P reviewed.
Pt seen and examined with NP. A&P reviewed.   Drop in HH noted, brown stool in colostomy now, INR<2 - will f/u HH in am - if no further drop and no evidence of hemorrhage will DC off VKA with plan to restart as outpt with PMD and close monitoring of INR
Pt seen and examined with NP. A&P reviewed.   No active hemorrhage with high risk for CVA/VTE - monitor INR and HH, hold VKA
Pt seen and examined with NP. A&P reviewed.   Prolonged discussion with pt and wife at bedside about GOC and prognosis  Decision to resume diet, monitor HH and if no more active hemorrhage DC with plan to resume VKA at lower dose and frequent monitoring due to labile INR to goal INR 2
discussed with  length with dr sparrow Palliative care who has been with the family discussing the care before and will follow up
Pt seen and examined with NP. A&P reviewed.  Hypernatremia with metabolic acidosis and worsening KAPIL - started D5W with bicarbonate - monitor Na level, no change in MS noted - repeat BMP tonight and in am  Recurrent hemorrhage via ostomy - monitor HH, not a candidate for VKA in the light of hemorrhage off VKA
Pt seen and examined with NP. A&P reviewed.
Pt seen and examined with NP. A&P reviewed.

## 2018-03-19 NOTE — PROGRESS NOTE ADULT - SUBJECTIVE AND OBJECTIVE BOX
[facility] DIVISION OF KIDNEY DISEASES AND HYPERTENSION -- FOLLOW UP NOTE  --------------------------------------------------------------------------------  Chief Complaint: Hypernatremia ; CKD    24 hour events/subjective:  Pt seen and examined this AM  On free water flushes  Jevity running with peg  No distress      PAST HISTORY  --------------------------------------------------------------------------------  No significant changes to PMH, PSH, FHx, SHx, unless otherwise noted    ALLERGIES & MEDICATIONS  --------------------------------------------------------------------------------  Allergies    Allergy Status Unknown    Intolerances      Standing Inpatient Medications  ALBUTerol/ipratropium for Nebulization 3 milliLiter(s) Nebulizer every 6 hours  amiodarone    Tablet 200 milliGRAM(s) Oral daily  atorvastatin 10 milliGRAM(s) Oral at bedtime  finasteride 5 milliGRAM(s) Oral daily  pantoprazole   Suspension 40 milliGRAM(s) Oral two times a day before meals  scopolamine   Patch 1 Patch Transdermal every 3 days  sertraline 25 milliGRAM(s) Oral daily  sucralfate 1 Gram(s) Oral Before meals and at bedtime  tamsulosin 0.4 milliGRAM(s) Oral at bedtime    PRN Inpatient Medications  sodium chloride 0.65% Nasal 1 Spray(s) Both Nostrils every 2 hours PRN      REVIEW OF SYSTEMS  --------------------------------------------------------------------------------  unable to obtain    VITALS/PHYSICAL EXAM  --------------------------------------------------------------------------------  T(C): 36.9 (03-19-18 @ 08:00), Max: 36.9 (03-18-18 @ 16:09)  HR: 116 (03-19-18 @ 08:00) (105 - 116)  BP: 95/54 (03-19-18 @ 08:00) (95/54 - 112/62)  RR: 20 (03-19-18 @ 08:00) (20 - 20)  SpO2: 99% (03-19-18 @ 08:00) (93% - 99%)  Wt(kg): --        03-18-18 @ 07:01  -  03-19-18 @ 07:00  --------------------------------------------------------  IN: 0 mL / OUT: 325 mL / NET: -325 mL      Physical Exam:  	Gen: NAD, chronically ill appearing  	HEENT: PERRL, supple neck, clear oropharynx  	Pulm: CTA B/L  	CV: RRR, S1S2; no rub  	Back: No spinal or CVA tenderness; no sacral edema  	Abd: +stoma  	: No suprapubic tenderness  	UE: Warm, FROM, no clubbing, intact strength; no edema; no asterixis  	LE: Warm, FROM, no clubbing, intact strength; no edema  	Neuro: No focal deficits, intact gait  	Psych: Normal affect and mood  	Skin: Warm, without rashes    LABS/STUDIES  --------------------------------------------------------------------------------              8.8    8.5   >-----------<  127      [03-19-18 @ 07:48]              28.1     142  |  106  |  118.0  ----------------------------<  376      [03-19-18 @ 07:48]  4.0   |  20.0  |  3.69        Ca     8.5     [03-19-18 @ 07:48]    TPro  5.5  /  Alb  2.5  /  TBili  1.1  /  DBili  x   /  AST  20  /  ALT  94  /  AlkPhos  73  [03-19-18 @ 07:48]    PT/INR: PT 17.9 , INR 1.61       [03-19-18 @ 09:32]  PTT: 26.9       [03-19-18 @ 09:32]      Creatinine Trend:  SCr 3.69 [03-19 @ 07:48]  SCr 3.18 [03-18 @ 11:20]  SCr 2.54 [03-18 @ 06:30]  SCr 3.35 [03-17 @ 06:59]  SCr 3.19 [03-16 @ 09:10]    Urinalysis - [03-15-18 @ 20:20]      Color Yellow / Appearance Slightly Turbid / SG 1.010 / pH 6.0      Gluc Negative / Ketone Negative  / Bili Negative / Urobili Negative       Blood Large / Protein 30 / Leuk Est Moderate / Nitrite Negative      RBC 11-25 / WBC 11-25 / Hyaline  / Gran  / Sq Epi  / Non Sq Epi  / Bacteria Many    Urine Creatinine 56      [03-16-18 @ 09:32]  Urine Sodium <30      [03-16-18 @ 09:32]  Urine Potassium 39      [03-16-18 @ 09:32]  Urine Chloride <27      [03-16-18 @ 09:32]  Urine Phosphorus 11.4      [03-16-18 @ 19:16]  Urine Osmolality 500      [03-16-18 @ 09:32]

## 2018-03-19 NOTE — PROGRESS NOTE ADULT - SUBJECTIVE AND OBJECTIVE BOX
CC: Bleeding into colostomy     HPI:  Mr. Kristofer Salomon (prior [id number]) is a 76 yo male PMHx CAD s/p CABG, AFib on Coumadin, HTN, prostate CA s/p TURP, s/p PEG and colectomy, prior CVA with expressive aphasia and R hemiparesis, CKD 3, recently discharged 3 weeks ago for blood in the Colostomy bag requiring 2 units PRBC transfusion. Coumadin was held, FFP was given and EGD completed which did not reveal source of bleeding. G- tube was placed during EGD. 3 weeks prior to that he was discharged from [facility] s/p respiratory failure s/p intubation due to aspiration pneumonia, Septic shock 2/2 UTI, KAPIL on CKD, recurrent NSVT, UE DVT.    As per wife at bedside, pt is now refusing oral intake, pt uses Jevity 1.5, 4 to 5 cans per day. She has noticed that he has an increased about of bloating, vomiting episodes and coughing. In addition she reports that pt stopped bleeding for 5 days after discharge but has been ongoing since that time. His Coumadin dose has been up titrated to 5mg M-W-F, followed by 2.5mg the remainder of the week. As a result of the recurrent bleeding she stopped giving him his Aspirin (2weeks now), Dronabinol was never started as the pharmacy indicated his insurance did not approve it.     INTERVAL HPI/OVERNIGHT EVENTS: Patient seen and examined lying in bed. Patient sounds congested and SOB.  Patient denies any pain.  Patient suctioned with thick secretions.  ROS limited secondary to expressive aphasia and respiratory status.    Vital Signs Last 24 Hrs  T(C): 36.9 (19 Mar 2018 08:00), Max: 36.9 (18 Mar 2018 16:09)  T(F): 98.5 (19 Mar 2018 08:00), Max: 98.5 (18 Mar 2018 16:09)  HR: 116 (19 Mar 2018 08:00) (105 - 116)  BP: 95/54 (19 Mar 2018 08:00) (95/54 - 112/62)  BP(mean): --  RR: 20 (19 Mar 2018 08:00) (20 - 20)  SpO2: 99% (19 Mar 2018 08:00) (93% - 99%)  I&O's Detail    18 Mar 2018 07:01  -  19 Mar 2018 07:00  --------------------------------------------------------  IN:  Total IN: 0 mL    OUT:    Colostomy: 325 mL  Total OUT: 325 mL    Total NET: -325 mL      PHYSICAL EXAM:  GENERAL: (+) respiratory distress  HEAD:  Atraumatic, Normocephalic  NECK: Supple, No JVD, Normal thyroid  NERVOUS SYSTEM:  Alert & Oriented X3, expressive aphasia, generalized weakness  CHEST/LUNG: Coarse BS bilaterally  HEART: Regular rate and rhythm; No murmurs, rubs, or gallops  ABDOMEN: Soft, Nontender, Nondistended; Bowel sounds present; (+) peg; (+) ileostomy  EXTREMITIES:  2+ Peripheral Pulses, No clubbing, cyanosis, or edema                                  8.8    8.5   )-----------( 127      ( 19 Mar 2018 07:48 )             28.1     19 Mar 2018 07:48    142    |  106    |  118.0  ----------------------------<  376    4.0     |  20.0   |  3.69     Ca    8.5        19 Mar 2018 07:48    TPro  5.5    /  Alb  2.5    /  TBili  1.1    /  DBili  x      /  AST  20     /  ALT  94     /  AlkPhos  73     19 Mar 2018 07:48    PT/INR - ( 19 Mar 2018 09:32 )   PT: 17.9 sec;   INR: 1.61 ratio         PTT - ( 19 Mar 2018 09:32 )  PTT:26.9 sec        LIVER FUNCTIONS - ( 19 Mar 2018 07:48 )  Alb: 2.5 g/dL / Pro: 5.5 g/dL / ALK PHOS: 73 U/L / ALT: 94 U/L / AST: 20 U/L / GGT: x               MEDICATIONS  (STANDING):  ALBUTerol/ipratropium for Nebulization 3 milliLiter(s) Nebulizer every 6 hours  amiodarone    Tablet 200 milliGRAM(s) Oral daily  atorvastatin 10 milliGRAM(s) Oral at bedtime  finasteride 5 milliGRAM(s) Oral daily  pantoprazole   Suspension 40 milliGRAM(s) Oral two times a day before meals  scopolamine   Patch 1 Patch Transdermal every 3 days  sertraline 25 milliGRAM(s) Oral daily  sodium chloride 0.9%. 1000 milliLiter(s) (60 mL/Hr) IV Continuous <Continuous>  sucralfate 1 Gram(s) Oral Before meals and at bedtime  tamsulosin 0.4 milliGRAM(s) Oral at bedtime    MEDICATIONS  (PRN):  sodium chloride 0.65% Nasal 1 Spray(s) Both Nostrils every 2 hours PRN Nasal Congestion

## 2018-03-19 NOTE — PROGRESS NOTE ADULT - NSHPATTENDINGPLANDISCUSS_GEN_ALL_CORE
Dr. Finney
Dr. Shea
pt, wife, RN
pt, wife, RN
pt, wife, nursing student
renal patient and family at bedside
renal patient and family at bedside
wife at bedside
NP, wife at bedside
bernard
wife at bedside
wife at bedside
NP
wife
RN

## 2018-03-19 NOTE — CONSULT NOTE ADULT - ASSESSMENT
77yr  frail and debilitated man, multiple medical issues-  CAD s/p CABG, AFib on Coumadin, HTN, prostate CA s/p TURP, s/p PEG and colectomy, prior CVA with expressive aphasia and R hemiparesis, CKD, aspiration PNAs with recurrent hospitalizations 77yr  frail and debilitated man, multiple medical issues-  CAD s/p CABG, AFib on Coumadin, HTN, prostate CA s/p TURP, s/p PEG and colectomy, prior CVA with expressive aphasia and R hemiparesis, CKD, aspiration PNAs with recurrent hospitalizations. Poor continues to decline - Poor prognosis

## 2018-03-19 NOTE — PROGRESS NOTE ADULT - ASSESSMENT
78 y/o male present with Recurrent Gastrointestinal hemorrhage with melena. not on Coumadin with possible Vit K deficiency with GI loss? supra-therapeutic INR (3.74).  1. Gastrointestinal hemorrhage, unspecified gastrointestinal hemorrhage type with anemia of acute blood loss on anemia of chronic disease - monitor HH, transfused 4 units of PRBC, continue PPI, diet as tolerated, GI reevaluation at family request - no intervention suggested discussed with Dr Koch - recommends holding   2. Chronic atrial fibrillation - rate controlled with amiodarone, off ASA, coumadin held, s/p Vit K. High risk for CVA and VTE as + Hx of CVA and PE/DVT  3. Acute kidney injury superimposed on chronic kidney disease with hypovolemic Hyponatremia - then hypernatremic with worsening BUN/Cr.  Renal consulted.  Continue IVF per renal.  Renal US reviewed.  UA, Urine lytes and Osm reviewed.  4. H/o Dysphagia - resumed  TF at nutritionist's recommendations.  5. H/o CVA - c/w statin. Bedrest, elevate HOB, high risk of aspirations, turn Q2hrs, high risk of pressure ulcers. PT recommended BRIDGET/LTC  6. Possible CHF -SOB with gurgling -  Worsening renal failure on Lasix; renal consulted.  7. Anemia of ABL - now improved with transfusion, but previously H/H was dropping despite no evidence of active hemorrhage, further discussion with blood bank supervisor indicated that pt received blood transfusion when was admitted as [facility] which had positive for previously transfused blood antibodies - likely caused delayed onset hemolytic anemia  8. Disposition - PT recommended BRIDGET/LTC; Palliative consulted.

## 2018-03-20 DIAGNOSIS — J69.0 PNEUMONITIS DUE TO INHALATION OF FOOD AND VOMIT: ICD-10-CM

## 2018-03-20 DIAGNOSIS — N17.9 ACUTE KIDNEY FAILURE, UNSPECIFIED: ICD-10-CM

## 2018-03-20 LAB
ALBUMIN SERPL ELPH-MCNC: 2.5 G/DL — LOW (ref 3.3–5.2)
ALP SERPL-CCNC: 69 U/L — SIGNIFICANT CHANGE UP (ref 40–120)
ALT FLD-CCNC: 69 U/L — HIGH
ANION GAP SERPL CALC-SCNC: 20 MMOL/L — HIGH (ref 5–17)
ANISOCYTOSIS BLD QL: SLIGHT — SIGNIFICANT CHANGE UP
APPEARANCE UR: ABNORMAL
AST SERPL-CCNC: 17 U/L — SIGNIFICANT CHANGE UP
BACTERIA # UR AUTO: ABNORMAL
BASOPHILS # BLD AUTO: 0 K/UL — SIGNIFICANT CHANGE UP (ref 0–0.2)
BASOPHILS NFR BLD AUTO: 0 % — SIGNIFICANT CHANGE UP (ref 0–2)
BILIRUB SERPL-MCNC: 1.6 MG/DL — SIGNIFICANT CHANGE UP (ref 0.4–2)
BILIRUB UR-MCNC: NEGATIVE — SIGNIFICANT CHANGE UP
BUN SERPL-MCNC: 118 MG/DL — HIGH (ref 8–20)
BURR CELLS BLD QL SMEAR: PRESENT — SIGNIFICANT CHANGE UP
CALCIUM SERPL-MCNC: 9 MG/DL — SIGNIFICANT CHANGE UP (ref 8.6–10.2)
CHLORIDE SERPL-SCNC: 111 MMOL/L — HIGH (ref 98–107)
CO2 SERPL-SCNC: 18 MMOL/L — LOW (ref 22–29)
COLOR SPEC: ABNORMAL
CREAT SERPL-MCNC: 4.19 MG/DL — HIGH (ref 0.5–1.3)
DACRYOCYTES BLD QL SMEAR: SLIGHT — SIGNIFICANT CHANGE UP
DIFF PNL FLD: ABNORMAL
EOSINOPHIL # BLD AUTO: 0 K/UL — SIGNIFICANT CHANGE UP (ref 0–0.5)
EOSINOPHIL NFR BLD AUTO: 1 % — SIGNIFICANT CHANGE UP (ref 0–6)
EPI CELLS # UR: SIGNIFICANT CHANGE UP
GLUCOSE SERPL-MCNC: 371 MG/DL — HIGH (ref 70–115)
GLUCOSE UR QL: NEGATIVE MG/DL — SIGNIFICANT CHANGE UP
HCT VFR BLD CALC: 30.1 % — LOW (ref 42–52)
HGB BLD-MCNC: 9.4 G/DL — LOW (ref 14–18)
HYPOCHROMIA BLD QL: SLIGHT — SIGNIFICANT CHANGE UP
INR BLD: 1.84 RATIO — HIGH (ref 0.88–1.16)
KETONES UR-MCNC: ABNORMAL
LEUKOCYTE ESTERASE UR-ACNC: ABNORMAL
LYMPHOCYTES # BLD AUTO: 0.4 K/UL — LOW (ref 1–4.8)
LYMPHOCYTES # BLD AUTO: 5 % — LOW (ref 20–55)
MCHC RBC-ENTMCNC: 29.5 PG — SIGNIFICANT CHANGE UP (ref 27–31)
MCHC RBC-ENTMCNC: 31.2 G/DL — LOW (ref 32–36)
MCV RBC AUTO: 94.4 FL — HIGH (ref 80–94)
METAMYELOCYTES # FLD: 1 % — HIGH (ref 0–0)
MONOCYTES # BLD AUTO: 0.3 K/UL — SIGNIFICANT CHANGE UP (ref 0–0.8)
MONOCYTES NFR BLD AUTO: 3 % — SIGNIFICANT CHANGE UP (ref 3–10)
MYELOCYTES NFR BLD: 2 % — HIGH (ref 0–0)
NEUTROPHILS # BLD AUTO: 13.6 K/UL — HIGH (ref 1.8–8)
NEUTROPHILS NFR BLD AUTO: 74 % — HIGH (ref 37–73)
NEUTS BAND # BLD: 14 % — HIGH (ref 0–8)
NITRITE UR-MCNC: NEGATIVE — SIGNIFICANT CHANGE UP
OSMOLALITY UR: 387 MOSM/KG — SIGNIFICANT CHANGE UP (ref 300–1000)
OVALOCYTES BLD QL SMEAR: SLIGHT — SIGNIFICANT CHANGE UP
PH UR: 6.5 — SIGNIFICANT CHANGE UP (ref 5–8)
PLAT MORPH BLD: NORMAL — SIGNIFICANT CHANGE UP
PLATELET # BLD AUTO: 163 K/UL — SIGNIFICANT CHANGE UP (ref 150–400)
POIKILOCYTOSIS BLD QL AUTO: SLIGHT — SIGNIFICANT CHANGE UP
POTASSIUM SERPL-MCNC: 4.6 MMOL/L — SIGNIFICANT CHANGE UP (ref 3.5–5.3)
POTASSIUM SERPL-SCNC: 4.6 MMOL/L — SIGNIFICANT CHANGE UP (ref 3.5–5.3)
PROT SERPL-MCNC: 5.7 G/DL — LOW (ref 6.6–8.7)
PROT UR-MCNC: 100 MG/DL
PROTHROM AB SERPL-ACNC: 20.5 SEC — HIGH (ref 9.8–12.7)
RAPID RVP RESULT: SIGNIFICANT CHANGE UP
RBC # BLD: 3.19 M/UL — LOW (ref 4.6–6.2)
RBC # FLD: 19.3 % — HIGH (ref 11–15.6)
RBC BLD AUTO: ABNORMAL
RBC CASTS # UR COMP ASSIST: >50 /HPF (ref 0–4)
SCHISTOCYTES BLD QL AUTO: SLIGHT — SIGNIFICANT CHANGE UP
SODIUM SERPL-SCNC: 149 MMOL/L — HIGH (ref 135–145)
SODIUM UR-SCNC: <30 MMOL/L — SIGNIFICANT CHANGE UP
SP GR SPEC: 1.01 — SIGNIFICANT CHANGE UP (ref 1.01–1.02)
UROBILINOGEN FLD QL: NEGATIVE MG/DL — SIGNIFICANT CHANGE UP
WBC # BLD: 15 K/UL — HIGH (ref 4.8–10.8)
WBC # FLD AUTO: 15 K/UL — HIGH (ref 4.8–10.8)
WBC UR QL: ABNORMAL

## 2018-03-20 PROCEDURE — 99233 SBSQ HOSP IP/OBS HIGH 50: CPT

## 2018-03-20 PROCEDURE — 76937 US GUIDE VASCULAR ACCESS: CPT | Mod: 26

## 2018-03-20 PROCEDURE — 71250 CT THORAX DX C-: CPT | Mod: 26

## 2018-03-20 PROCEDURE — 99223 1ST HOSP IP/OBS HIGH 75: CPT

## 2018-03-20 PROCEDURE — 36000 PLACE NEEDLE IN VEIN: CPT

## 2018-03-20 PROCEDURE — 74018 RADEX ABDOMEN 1 VIEW: CPT | Mod: 26

## 2018-03-20 RX ORDER — CEFEPIME 1 G/1
1000 INJECTION, POWDER, FOR SOLUTION INTRAMUSCULAR; INTRAVENOUS EVERY 24 HOURS
Qty: 0 | Refills: 0 | Status: DISCONTINUED | OUTPATIENT
Start: 2018-03-20 | End: 2018-03-20

## 2018-03-20 RX ORDER — LIDOCAINE HCL 20 MG/ML
5 VIAL (ML) INJECTION ONCE
Qty: 0 | Refills: 0 | Status: DISCONTINUED | OUTPATIENT
Start: 2018-03-20 | End: 2018-03-21

## 2018-03-20 RX ORDER — SODIUM BICARBONATE 1 MEQ/ML
0.35 SYRINGE (ML) INTRAVENOUS
Qty: 150 | Refills: 0 | Status: DISCONTINUED | OUTPATIENT
Start: 2018-03-20 | End: 2018-03-21

## 2018-03-20 RX ORDER — CEFEPIME 1 G/1
1000 INJECTION, POWDER, FOR SOLUTION INTRAMUSCULAR; INTRAVENOUS EVERY 24 HOURS
Qty: 0 | Refills: 0 | Status: DISCONTINUED | OUTPATIENT
Start: 2018-03-20 | End: 2018-03-21

## 2018-03-20 RX ORDER — SODIUM CHLORIDE 9 MG/ML
1000 INJECTION INTRAMUSCULAR; INTRAVENOUS; SUBCUTANEOUS
Qty: 0 | Refills: 0 | Status: DISCONTINUED | OUTPATIENT
Start: 2018-03-20 | End: 2018-03-20

## 2018-03-20 RX ORDER — ATROPINE SULFATE 1 %
2 DROPS OPHTHALMIC (EYE) EVERY 4 HOURS
Qty: 0 | Refills: 0 | Status: DISCONTINUED | OUTPATIENT
Start: 2018-03-20 | End: 2018-03-21

## 2018-03-20 RX ADMIN — Medication 1 GRAM(S): at 22:31

## 2018-03-20 RX ADMIN — TAMSULOSIN HYDROCHLORIDE 0.4 MILLIGRAM(S): 0.4 CAPSULE ORAL at 22:31

## 2018-03-20 RX ADMIN — Medication 1 GRAM(S): at 17:28

## 2018-03-20 RX ADMIN — Medication 3 MILLILITER(S): at 03:05

## 2018-03-20 RX ADMIN — Medication 3 MILLILITER(S): at 20:53

## 2018-03-20 RX ADMIN — PANTOPRAZOLE SODIUM 40 MILLIGRAM(S): 20 TABLET, DELAYED RELEASE ORAL at 17:28

## 2018-03-20 RX ADMIN — Medication 150 MEQ/KG/HR: at 13:58

## 2018-03-20 RX ADMIN — PANTOPRAZOLE SODIUM 40 MILLIGRAM(S): 20 TABLET, DELAYED RELEASE ORAL at 05:28

## 2018-03-20 RX ADMIN — Medication 3 MILLILITER(S): at 14:57

## 2018-03-20 RX ADMIN — Medication 1 GRAM(S): at 12:43

## 2018-03-20 RX ADMIN — ROBINUL 0.1 MILLIGRAM(S): 0.2 INJECTION INTRAMUSCULAR; INTRAVENOUS at 01:24

## 2018-03-20 RX ADMIN — Medication 1 GRAM(S): at 05:28

## 2018-03-20 RX ADMIN — ATORVASTATIN CALCIUM 10 MILLIGRAM(S): 80 TABLET, FILM COATED ORAL at 22:31

## 2018-03-20 RX ADMIN — Medication 2 DROP(S): at 12:53

## 2018-03-20 RX ADMIN — Medication 150 MEQ/KG/HR: at 22:33

## 2018-03-20 RX ADMIN — Medication 3 MILLILITER(S): at 08:35

## 2018-03-20 RX ADMIN — FINASTERIDE 5 MILLIGRAM(S): 5 TABLET, FILM COATED ORAL at 12:43

## 2018-03-20 RX ADMIN — CEFEPIME 1000 MILLIGRAM(S): 1 INJECTION, POWDER, FOR SOLUTION INTRAMUSCULAR; INTRAVENOUS at 17:39

## 2018-03-20 RX ADMIN — SERTRALINE 25 MILLIGRAM(S): 25 TABLET, FILM COATED ORAL at 12:43

## 2018-03-20 NOTE — PROGRESS NOTE ADULT - SUBJECTIVE AND OBJECTIVE BOX
CC: Bleeding into colostomy    HPI:    Mr. 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 Coumadin was held, FFP was given and EGD completed which did not reveal source of bleeding. G- tube was placed during EGD. 3 weeks prior to that he was discharged from [facility] s/p respiratory failure s/p intubation due to aspiration pneumonia, Septic shock 2/2 UTI, KAPIL on CKD, recurrent NSVT, UE DVT.    As per wife at bedside, pt is now refusing oral intake, pt uses Jevity 1.5, 4 to 5 cans per day. She has noticed that he has an increased about of bloating, vomiting episodes and coughing. In addition she reports that pt stopped bleeding for 5 days after discharge but has been ongoing since that time. His Coumadin dose has been up titrated to 5mg M-W-F, followed by 2.5mg the remainder of the week. As a result of the recurrent bleeding she stopped giving him his Aspirin (2weeks now), Dronabinol was never started as the pharmacy indicated his insurance did not approve it.     INTERVAL HPI/OVERNIGHT EVENTS: Patient seen and examined lying in bed.  Patient tachypneic and congested. RN suctioning with thick secretions.  Patient lethargic this am with expressive aphasia and ROS limited.  Patient denies any pain.      Vital Signs Last 24 Hrs  T(C): 37.2 (20 Mar 2018 08:02), Max: 37.2 (19 Mar 2018 15:45)  T(F): 99 (20 Mar 2018 08:02), Max: 99 (20 Mar 2018 08:02)  HR: 116 (20 Mar 2018 08:02) (94 - 131)  BP: 81/42 (20 Mar 2018 08:02) (81/42 - 137/68)  BP(mean): --  RR: 19 (20 Mar 2018 08:02) (19 - 20)  SpO2: 96% (20 Mar 2018 08:02) (92% - 97%)  I&O's Detail    19 Mar 2018 07:01  -  20 Mar 2018 07:00  --------------------------------------------------------  IN:    Free Water: 250 mL    Jevity: 715 mL    sodium chloride 0.9%: 780 mL  Total IN: 1745 mL    OUT:    Colostomy: 650 mL    Voided: 1100 mL  Total OUT: 1750 mL    Total NET: -5 mL      20 Mar 2018 07:01  -  20 Mar 2018 15:01  --------------------------------------------------------  IN:    Free Water: 500 mL    sodium bicarbonate  Infusion: 300 mL    sodium chloride 0.9%: 135 mL    sodium chloride 0.9%: 75 mL  Total IN: 1010 mL    OUT:    Ileostomy: 200 mL  Total OUT: 200 mL    Total NET: 810 mL    PHYSICAL EXAM:  GENERAL: mild respiratory distress  HEAD:  Atraumatic, Normocephalic  NECK: Supple, No JVD, Normal thyroid  NERVOUS SYSTEM:  Alert, expressive aphasia, generalized weakness  CHEST/LUNG: Coarse BS bilaterally  HEART: Regular rate and rhythm; No murmurs, rubs, or gallops  ABDOMEN: Soft, Nontender, Nondistended; Bowel sounds present; (+) peg; (+) ileostomy  EXTREMITIES:  2+ Peripheral Pulses, No clubbing, cyanosis, or edema                      9.4    15.0  )-----------( 163      ( 20 Mar 2018 08:00 )             30.1     20 Mar 2018 08:00    149    |  111    |  118.0  ----------------------------<  371    4.6     |  18.0   |  4.19     Ca    9.0        20 Mar 2018 08:00    TPro  5.7    /  Alb  2.5    /  TBili  1.6    /  DBili  x      /  AST  17     /  ALT  69     /  AlkPhos  69     20 Mar 2018 08:00    PT/INR - ( 20 Mar 2018 08:00 )   PT: 20.5 sec;   INR: 1.84 ratio         PTT - ( 19 Mar 2018 09:32 )  PTT:26.9 sec        LIVER FUNCTIONS - ( 20 Mar 2018 08:00 )  Alb: 2.5 g/dL / Pro: 5.7 g/dL / ALK PHOS: 69 U/L / ALT: 69 U/L / AST: 17 U/L / GGT: x               MEDICATIONS  (STANDING):  ALBUTerol/ipratropium for Nebulization 3 milliLiter(s) Nebulizer every 6 hours  amiodarone    Tablet 200 milliGRAM(s) Oral daily  atorvastatin 10 milliGRAM(s) Oral at bedtime  finasteride 5 milliGRAM(s) Oral daily  lidocaine 2% Jelly 5 milliLiter(s) IntraUrethral once  pantoprazole   Suspension 40 milliGRAM(s) Oral two times a day before meals  scopolamine   Patch 1 Patch Transdermal every 3 days  sertraline 25 milliGRAM(s) Oral daily  sodium bicarbonate  Infusion 0.354 mEq/kG/Hr (150 mL/Hr) IV Continuous <Continuous>  sucralfate 1 Gram(s) Oral Before meals and at bedtime  tamsulosin 0.4 milliGRAM(s) Oral at bedtime    MEDICATIONS  (PRN):  atropine 1% Ophthalmic Solution for SubLingual Use 2 Drop(s) SubLingual every 4 hours PRN secretions  sodium chloride 0.65% Nasal 1 Spray(s) Both Nostrils every 2 hours PRN Nasal Congestion          Assessment and Plan:     76 y/o male present with Recurrent Gastrointestinal hemorrhage with melena. not on Coumadin with possible Vit K deficiency with GI loss? supra-therapeutic INR (3.74).  1. Gastrointestinal hemorrhage, unspecified gastrointestinal hemorrhage type with anemia of acute blood loss on anemia of chronic disease - monitor HH, transfused 4 units of PRBC, continue PPI, diet as tolerated, GI reevaluation at family request - no intervention suggested discussed with Dr Koch. Palliative consulted.  2. Chronic atrial fibrillation - rate controlled with amiodarone, off ASA, coumadin held, s/p Vit K. High risk for CVA and VTE as + Hx of CVA and PE/DVT  3. Acute kidney injury superimposed on chronic kidney disease - Cr continues to worsen.  Restarted bicarb gtt.  Hewitt ordered.  RN with difficulty placing as patient has a history of prostate cancer.  will place condom catheter.  Poor prognosis.  Patient is DNR / DNI, but wife does not want comfort measures at this time.    4. H/o Dysphagia - Tube feeds held secondary to congestion.  Keep NPO.  WBC 15, likely aspiration - ID consulted.  5. H/o CVA - c/w statin. Bedrest, elevate HOB, high risk of aspirations, turn Q2hrs, high risk of pressure ulcers. PT recommended BRIDGET/LTC  6. Possible CHF -SOB with gurgling -  Worsening renal failure; renal and Palliative following.  Wife does not want comfort care at this time.  7. Anemia of ABL - now improved with transfusion, but previously H/H was dropping despite no evidence of active hemorrhage, further discussion with blood bank supervisor indicated that pt received blood transfusion when was admitted as [facility] which had positive for previously transfused blood antibodies - likely caused delayed onset hemolytic anemia  8. Leukocytosis - likely secondary to Aspiration pneumonia - ID consulted.  Blood and sputum cultures pending.  RVP pending.      Discussed w. NP & Attending,

## 2018-03-20 NOTE — CONSULT NOTE ADULT - PROVIDER SPECIALTY LIST ADULT
Cardiology
Infectious Disease
Nephrology
Palliative Care
Pulmonology
Thoracic Surgery
Gastroenterology

## 2018-03-20 NOTE — CONSULT NOTE ADULT - ATTENDING COMMENTS
Patient is very well known to me from his previous admission  Started patient on sodium bicarb drip this morning;  D5 with 75meq of NaHCO3 @ 75cc/hr  Would also start 200mL of free water flush via PEG q 8  Consider palliative consult Dr. Velazquez  Will follow
Pt seen and images reviewed.  CHF by CXR with ?small Rt pleural effusion.  Would continue with diuresis as tolerated (elev Cr) and IR for thora if doesn't improve.
Will Follow

## 2018-03-20 NOTE — PROGRESS NOTE ADULT - SUBJECTIVE AND OBJECTIVE BOX
CC: Bleeding into colostomy    HPI:  Mr. 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 Coumadin was held, FFP was given and EGD completed which did not reveal source of bleeding. G- tube was placed during EGD. 3 weeks prior to that he was discharged from [facility] s/p respiratory failure s/p intubation due to aspiration pneumonia, Septic shock 2/2 UTI, KAPIL on CKD, recurrent NSVT, UE DVT.    As per wife at bedside, pt is now refusing oral intake, pt uses Jevity 1.5, 4 to 5 cans per day. She has noticed that he has an increased about of bloating, vomiting episodes and coughing. In addition she reports that pt stopped bleeding for 5 days after discharge but has been ongoing since that time. His Coumadin dose has been up titrated to 5mg M-W-F, followed by 2.5mg the remainder of the week. As a result of the recurrent bleeding she stopped giving him his Aspirin (2weeks now), Dronabinol was never started as the pharmacy indicated his insurance did not approve it.     INTERVAL HPI/OVERNIGHT EVENTS: Patient seen and examined lying in bed.  Patient tachypneic and congested. RN suctioning with thick secretions.  Patient lethargic this am with expressive aphasia and ROS limited.  Patient denies any pain.      Vital Signs Last 24 Hrs  T(C): 37.2 (20 Mar 2018 08:02), Max: 37.2 (19 Mar 2018 15:45)  T(F): 99 (20 Mar 2018 08:02), Max: 99 (20 Mar 2018 08:02)  HR: 116 (20 Mar 2018 08:02) (94 - 131)  BP: 81/42 (20 Mar 2018 08:02) (81/42 - 137/68)  BP(mean): --  RR: 19 (20 Mar 2018 08:02) (19 - 20)  SpO2: 96% (20 Mar 2018 08:02) (92% - 97%)  I&O's Detail    19 Mar 2018 07:01  -  20 Mar 2018 07:00  --------------------------------------------------------  IN:    Free Water: 250 mL    Jevity: 715 mL    sodium chloride 0.9%: 780 mL  Total IN: 1745 mL    OUT:    Colostomy: 650 mL    Voided: 1100 mL  Total OUT: 1750 mL    Total NET: -5 mL      20 Mar 2018 07:01  -  20 Mar 2018 15:01  --------------------------------------------------------  IN:    Free Water: 500 mL    sodium bicarbonate  Infusion: 300 mL    sodium chloride 0.9%: 135 mL    sodium chloride 0.9%: 75 mL  Total IN: 1010 mL    OUT:    Ileostomy: 200 mL  Total OUT: 200 mL    Total NET: 810 mL      PHYSICAL EXAM:  GENERAL: mild respiratory distress  HEAD:  Atraumatic, Normocephalic  NECK: Supple, No JVD, Normal thyroid  NERVOUS SYSTEM:  Alert, expressive aphasia, generalized weakness  CHEST/LUNG: Coarse BS bilaterally  HEART: Regular rate and rhythm; No murmurs, rubs, or gallops  ABDOMEN: Soft, Nontender, Nondistended; Bowel sounds present; (+) peg; (+) ileostomy  EXTREMITIES:  2+ Peripheral Pulses, No clubbing, cyanosis, or edema                                  9.4    15.0  )-----------( 163      ( 20 Mar 2018 08:00 )             30.1     20 Mar 2018 08:00    149    |  111    |  118.0  ----------------------------<  371    4.6     |  18.0   |  4.19     Ca    9.0        20 Mar 2018 08:00    TPro  5.7    /  Alb  2.5    /  TBili  1.6    /  DBili  x      /  AST  17     /  ALT  69     /  AlkPhos  69     20 Mar 2018 08:00    PT/INR - ( 20 Mar 2018 08:00 )   PT: 20.5 sec;   INR: 1.84 ratio         PTT - ( 19 Mar 2018 09:32 )  PTT:26.9 sec        LIVER FUNCTIONS - ( 20 Mar 2018 08:00 )  Alb: 2.5 g/dL / Pro: 5.7 g/dL / ALK PHOS: 69 U/L / ALT: 69 U/L / AST: 17 U/L / GGT: x               MEDICATIONS  (STANDING):  ALBUTerol/ipratropium for Nebulization 3 milliLiter(s) Nebulizer every 6 hours  amiodarone    Tablet 200 milliGRAM(s) Oral daily  atorvastatin 10 milliGRAM(s) Oral at bedtime  finasteride 5 milliGRAM(s) Oral daily  lidocaine 2% Jelly 5 milliLiter(s) IntraUrethral once  pantoprazole   Suspension 40 milliGRAM(s) Oral two times a day before meals  scopolamine   Patch 1 Patch Transdermal every 3 days  sertraline 25 milliGRAM(s) Oral daily  sodium bicarbonate  Infusion 0.354 mEq/kG/Hr (150 mL/Hr) IV Continuous <Continuous>  sucralfate 1 Gram(s) Oral Before meals and at bedtime  tamsulosin 0.4 milliGRAM(s) Oral at bedtime    MEDICATIONS  (PRN):  atropine 1% Ophthalmic Solution for SubLingual Use 2 Drop(s) SubLingual every 4 hours PRN secretions  sodium chloride 0.65% Nasal 1 Spray(s) Both Nostrils every 2 hours PRN Nasal Congestion

## 2018-03-20 NOTE — PROCEDURE NOTE - NSSITEPREP_SKIN_A_CORE
alcohol
Adherence to aseptic technique: hand hygiene prior to donning barriers (gown, gloves), don cap and mask, sterile drape over patient/chlorhexidine

## 2018-03-20 NOTE — PROCEDURE NOTE - NSPOSTCAREGUIDE_GEN_A_CORE
Instructed patient/caregiver to follow-up with primary care physician/Keep the cast/splint/dressing clean and dry/Instructed patient/caregiver regarding signs and symptoms of infection/Verbal/written post procedure instructions were given to patient/caregiver/Care for catheter as per unit/ICU protocols

## 2018-03-20 NOTE — CHART NOTE - NSCHARTNOTEFT_GEN_A_CORE
Called by RN to examine patients abdomen for distention and peg tub leaking around abd insertion site. Patient is lying supine in bed ill-looking but in NAD. patient appears gravely ill with labored respiration, mouth breathing, and appears weak and frail.  Abdomen soft but somewhat distended. No masses palpated. B/S not heard on auscultation.  Peg tube flushes well without leakage around abd insertion site.  Stat abdominal x-ray ordered with gastrografin pushed by PA. X-ray performed and read at bedside by PA  peg tube in place.  RN to call PA if any further concerns regarding patient tonight.

## 2018-03-20 NOTE — PROGRESS NOTE ADULT - SUBJECTIVE AND OBJECTIVE BOX
INTERVAL HPI/OVERNIGHT EVENTS:  Low BPs    PRESENT SYMPTOMS: SOURCE:  Patient/Family/Team    PAIN SCALE:  0 = none  1 = mild   2 = moderate  3 = severe    Pain:      Dyspnea:  [x ] YES [ ] NO  Anxiety:  [ ] YES [ ] NO  na  Fatigue: [x ] YES [ ] NO  Nausea: [ ] YES [x ] NO  Loss of Appetite: [ ] YES [ ] NO  on TF now d/c  Other symptoms: __________    MEDICATIONS  (STANDING):  ALBUTerol/ipratropium for Nebulization 3 milliLiter(s) Nebulizer every 6 hours  amiodarone    Tablet 200 milliGRAM(s) Oral daily  atorvastatin 10 milliGRAM(s) Oral at bedtime  finasteride 5 milliGRAM(s) Oral daily  pantoprazole   Suspension 40 milliGRAM(s) Oral two times a day before meals  scopolamine   Patch 1 Patch Transdermal every 3 days  sertraline 25 milliGRAM(s) Oral daily  sodium bicarbonate  Infusion 0.354 mEq/kG/Hr (150 mL/Hr) IV Continuous <Continuous>  sodium chloride 0.9%. 1000 milliLiter(s) (75 mL/Hr) IV Continuous <Continuous>  sucralfate 1 Gram(s) Oral Before meals and at bedtime  tamsulosin 0.4 milliGRAM(s) Oral at bedtime    MEDICATIONS  (PRN):  atropine 1% Ophthalmic Solution for SubLingual Use 2 Drop(s) SubLingual every 4 hours PRN secretions  sodium chloride 0.65% Nasal 1 Spray(s) Both Nostrils every 2 hours PRN Nasal Congestion      Allergies    Allergy Status Unknown    Intolerances         Karnofsky Performance Score/Palliative Performance Status Version 2:   30  %    Vital Signs Last 24 Hrs  T(C): 37.2 (20 Mar 2018 08:02), Max: 37.2 (19 Mar 2018 15:45)  T(F): 99 (20 Mar 2018 08:02), Max: 99 (20 Mar 2018 08:02)  HR: 116 (20 Mar 2018 08:02) (94 - 131)  BP: 81/42 (20 Mar 2018 08:02) (81/42 - 137/68)  BP(mean): --  RR: 19 (20 Mar 2018 08:02) (19 - 20)  SpO2: 96% (20 Mar 2018 08:02) (92% - 97%)    PHYSICAL EXAM:    General: Elderly man, weak, opens eyes periodically  to verbal stimuli    HEENT: NCAT    Lungs: [ ] comfortable [x ] tachypnea/labored breathing  [x ] excessive secretions    CV: [ ] normal  [ x] tachycardia    GI: [ ] normal  [ ] distended  [ ] tender  [ ] no BS               [x ] PEG/NG/OG tube    : [ x] normal  [ ] incontinent  [ ] oliguria/anuria  [ ] puckett    MSK: [ ] normal  x[ ] weakness  [ ] edema             [ ] ambulatory  [ x] bedbound/wheelchair bound    Skin: [ ] normal  [ ] pressure ulcers- Stage_____  [ x] no rash    LABS:                        9.4    15.0  )-----------( 163      ( 20 Mar 2018 08:00 )             30.1     03-20    149<H>  |  111<H>  |  118.0<H>  ----------------------------<  371<H>  4.6   |  18.0<L>  |  4.19<H>    Ca    9.0      20 Mar 2018 08:00    TPro  5.7<L>  /  Alb  2.5<L>  /  TBili  1.6  /  DBili  x   /  AST  17  /  ALT  69<H>  /  AlkPhos  69  03-20    PT/INR - ( 20 Mar 2018 08:00 )   PT: 20.5 sec;   INR: 1.84 ratio         PTT - ( 19 Mar 2018 09:32 )  PTT:26.9 sec    I&O's Summary    19 Mar 2018 07:01  -  20 Mar 2018 07:00  --------------------------------------------------------  IN: 1745 mL / OUT: 1750 mL / NET: -5 mL    20 Mar 2018 07:01  -  20 Mar 2018 13:39  --------------------------------------------------------  IN: 460 mL / OUT: 200 mL / NET: 260 mL      ADVANCE DIRECTIVES:  [ x] YES [ ] NO    DNR: [ x] YES [ ] NO      Date Completed:                    MOLST [ x] YES [ ] NO   Date Completed:     Thank you for the opportunity to assist with the care of this patient.   [facility] [phone].

## 2018-03-20 NOTE — PROCEDURE NOTE - PROCEDURE
<<-----Click on this checkbox to enter Procedure Peripheral intravenous catheter assessment  03/20/2018  #20 G  1.75"  IV  ns flush good heme back right cephalic vein  Active  JSTEELE  Vascular access with ultrasound guidance  03/20/2018  Patent right cephalic vein  Active  JSTEELE

## 2018-03-20 NOTE — CONSULT NOTE ADULT - CONSULT REQUESTED DATE/TIME
10-Mar-2018 17:15
12-Mar-2018 15:41
14-Mar-2018 11:57
15-Mar-2018
19-Mar-2018 17:24
20-Mar-2018 14:55
06-Mar-2018 11:00

## 2018-03-20 NOTE — CONSULT NOTE ADULT - CONSULT REASON
Goals of Care
KAPIL on CKD
Pneumonia
SOB
b/l Plural effusions
cardiac evaluation
Blood in colostomy bag

## 2018-03-20 NOTE — PROGRESS NOTE ADULT - PROBLEM SELECTOR PLAN 3
Spoke to Dr. Olea - recommending central line for IV fluids and puckett over next 48-72 hours. HD only if family insists.

## 2018-03-20 NOTE — PROGRESS NOTE ADULT - PROBLEM SELECTOR PROBLEM 1
Blood in stool
Gastrointestinal hemorrhage, unspecified gastrointestinal hemorrhage type
Gastrointestinal hemorrhage, unspecified gastrointestinal hemorrhage type

## 2018-03-20 NOTE — CONSULT NOTE ADULT - PROBLEM SELECTOR RECOMMENDATION 9
Blood in colostomy bag no bleeding presently. Reverse anticoagulation. No plans for colonoscopy presently. Keep NPO for now. Repeat labs ordered for the AM.
Recommend IR drainage if symptomatic vs Diuresis   in light of CKD, would obtain renal consult
Will check Blood cultures  Check Sputum culture  Check RVP  Do CT Chest w/o contrast  CXR reviewed looks more like congestion  Start Cefepime 1gm IV q 24hours, adjusted to renal function  Follow up work up  Trend WBC  Afebrile
s/p transfusions. Cont PPI- no intervention as per GI. Monitor Hg

## 2018-03-20 NOTE — PROGRESS NOTE ADULT - PROVIDER SPECIALTY LIST ADULT
Cardiology
Cardiology
Gastroenterology
Gastroenterology
Hospitalist
Nephrology
Palliative Care
Pulmonology
Hospitalist
Hospitalist
Pulmonology

## 2018-03-20 NOTE — PROGRESS NOTE ADULT - ASSESSMENT
77yr  frail and debilitated man, multiple medical issues-  CAD s/p CABG, AFib on Coumadin, HTN, prostate CA s/p TURP, s/p PEG and colectomy, prior CVA with expressive aphasia and R hemiparesis, CKD, aspiration PNAs with recurrent hospitalizations. Poor continues to decline - Poor prognosis

## 2018-03-20 NOTE — CONSULT NOTE ADULT - PROBLEM SELECTOR PROBLEM 1
Gastrointestinal hemorrhage, unspecified gastrointestinal hemorrhage type
Gastrointestinal hemorrhage, unspecified gastrointestinal hemorrhage type
Pleural effusion, bilateral
Pneumonia, aspiration

## 2018-03-20 NOTE — PROCEDURE NOTE - NSPROCDETAILS_GEN_ALL_CORE
dressing applied/location identified, draped/prepped, sterile technique used/blood seen on insertion/flushes easily/secured in place/sterile technique, catheter placed
secured in place/location identified, draped/prepped, sterile technique used/blood seen on insertion/dressing applied/flushes easily/sterile technique, catheter placed/ultrasound utilization

## 2018-03-20 NOTE — PROGRESS NOTE ADULT - PROBLEM SELECTOR PLAN 5
Met with wife, with Dr. Padgett  and Meenu Draper NP.   Discussed current condition and care options. Option to continue disease directing therapies vs comfort care. Wife is hesitant about inserting puckett and explained need for central line. Informed if not improved with these measures, possible HD? Wife adamant against HD. Explained if does have some improvement with proposed plan, overall outlook  still remains poor given patient very debilitated, multiple comorbid medical issues with continued risk for aspirations.  Wife wishes to speak to the family and will decide. Support.

## 2018-03-20 NOTE — GOALS OF CARE CONVERSATION - PERSONAL ADVANCE DIRECTIVE - CONVERSATION DETAILS
collaborated  with Dr Edelmira Romano who offered  family a hospice consult at  present the spouse is absorbing the information and wishes to discuss further with family before speaking with hospice . Hospice will continue to collaborated with PCT a and if family open to speak with hospice , hospice will contact at that time to discuss services- Thank you for this referral
Discussed current condition. Patient with multiple hospitalizations. Informed her that with each hospitalization, it becomes very difficult for patient to return to their baseline functioning. Patient at baseline is frail and debilitated.   Wife agrees that patient has been  declining and is very distraught. She is awaiting a call back from her 's surgeon to see if there is anything else that could be done ( for the bleeding) and if so, will have him transferred.   She had been told by Dr. Potter that would be unlikely, for which I agreed. She is anxiously awaiting a call back.     Patient had been seen by me in past admission for which I  recollect DNR for which a MOLST was completed.  I cannot find that MOLST in alpha.  I did discuss with the wife advance directives/CPR and she agrees, she would not want him to suffer and would want to allow for a natural death. She agrees to DNR/I.

## 2018-03-20 NOTE — CONSULT NOTE ADULT - SUBJECTIVE AND OBJECTIVE BOX
Patient is a 73y old  Male who presents with a chief complaint of Bleeding into colostomy (04 Mar 2018 23:39)      HPI:  History obtained from patient wife at bedside, due to patient underlying medical condition of expressive aphasia status post CVA in the past.   Mr. 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 Coumadin was held, FFP was given and EGD completed which did not reveal source of bleeding. G- tube was placed during EGD. 3 weeks prior to that he was discharged from [facility] s/p respiratory failure s/p intubation due to aspiration pneumonia, Septic shock 2/2 UTI, KAPIL on CKD, recurrent NSVT, UE DVT.    As per wife at bedside, pt is now refusing oral intake, pt use Jevity 1.5, 4 to 5 cans per day. She has noticed that he has an increased about of bloating, vomiting episodes and coughing. In addition she reports that pt stopped bleeding bleeding for 5 days after discharge but has been ongoing since that time. His Coumadin dose has been up titrated to 5mg M-W-F, followed by 2.5mg the remainder of the week. As a result of the recurrent bleeding she stopped giving him his Aspirin (2weeks now), Dronabinol was never started as the pharmacy indicated his insurance did not approve it. She reports no other signs or symptoms. (04 Mar 2018 23:39)      REVIEW OF SYSTEMS:  Constitutional: No fever, weight loss or fatigue  ENMT:  No difficulty hearing, tinnitus, vertigo; No sinus or throat pain  Respiratory: No cough, wheezing, chills or hemoptysis  Cardiovascular: No chest pain, palpitations, dizziness or leg swelling  Gastrointestinal:  As per HPI. No abdominal or epigastric pain. No nausea, vomiting or hematemesis; No diarrhea or constipation. No melena.  Skin: No itching, burning, rashes or lesions   Musculoskeletal: No joint pain or swelling; No muscle, back or extremity pain  Patient has no cardiopulmonary, peripheral vascular, musculoskeletal, dermatological, neurological, or psychological symptoms or complaints at this time.    PAST MEDICAL & SURGICAL HISTORY:  Heart attack  High cholesterol  HTN (hypertension)  H/O coronary artery bypass surgery  H/O CVA    FAMILY HISTORY:  No pertinent family history in first degree relatives      SOCIAL HISTORY:  Smoking Status: [ ] Current, [ ] Former, [ ] Never Unknown  Pack Years: Unknown    MEDICATIONS:  MEDICATIONS  (STANDING):  amiodarone    Tablet 200 milliGRAM(s) Oral daily  atorvastatin 10 milliGRAM(s) Oral at bedtime  finasteride 5 milliGRAM(s) Oral daily  pantoprazole  Injectable 40 milliGRAM(s) IV Push two times a day  sodium chloride 0.9%. 1000 milliLiter(s) (100 mL/Hr) IV Continuous <Continuous>  sucralfate 1 Gram(s) Oral Before meals and at bedtime  tamsulosin 0.4 milliGRAM(s) Oral at bedtime    MEDICATIONS  (PRN):      Allergies    Allergy Status Unknown    Intolerances        Vital Signs Last 24 Hrs  T(C): 36.5 (06 Mar 2018 08:19), Max: 37 (05 Mar 2018 12:30)  T(F): 97.7 (06 Mar 2018 08:19), Max: 98.6 (05 Mar 2018 12:30)  HR: 91 (06 Mar 2018 08:19) (74 - 91)  BP: 105/60 (06 Mar 2018 08:19) (101/61 - 111/54)  BP(mean): --  RR: 18 (06 Mar 2018 08:19) (17 - 20)  SpO2: 97% (06 Mar 2018 08:19) (96% - 98%)        PHYSICAL EXAM:    General: Well developed; well nourished; in no acute distress  HEENT: MMM, conjunctiva pink and sclera anicteric.  Lungs: Clear bilaterally.  Cor: RRR S1, S2 only  Gastrointestinal: Soft, non-tender non-distended; Normal bowel sounds; No rebound or guarding + PEG tube in place, + RLQ colostomy with liquid brown stool in colostomy bag w/o blood or melena.  Extremities: Normal range of motion, No clubbing, cyanosis or edema  Neurological: Alert and oriented x3  Skin: Warm and dry. No obvious rash      LABS:                        8.2    5.8   )-----------( 123      ( 06 Mar 2018 06:30 )             25.1     03-06    133<L>  |  99  |  79.0<H>  ----------------------------<  90  3.6   |  17.0<L>  |  3.03<H>    Ca    8.4<L>      06 Mar 2018 06:33  Mg     2.1     03-05    TPro  7.6  /  Alb  3.3  /  TBili  0.5  /  DBili  x   /  AST  29  /  ALT  32  /  AlkPhos  95  03-04          RADIOLOGY & ADDITIONAL STUDIES:
Chief Complaint: Elderly male with multiple serious and chronic comorbidites adm with recurrent GI bleeding on coumadin.    HPI: 78 yo male (old records reviewed and additional hx from chart and wife) admitted with recurrent GI bleeding. He has had a colectomy and has been bleeding into the ileostomy bag. This is a recurrent problem and prior gi evaluation has not localized the source. He has an extensive pmh that includes an MI/remote TVCABG with bio AVR/chronic afib/cri/cva-likely embolic with a marked right hemiparesis/plegia and expressive aphasia/prostate ca with injury to colon from the radiation/pulmonary emboluspeg/ivc filter/dvt. Allergy to pcn. Non smoker for 30 yrs.  current meds reviewed.    PAST MEDICAL & SURGICAL HISTORY:  Heart attack  High cholesterol  HTN (hypertension)  H/O coronary artery bypass surgery      PREVIOUS DIAGNOSTIC TESTING:      ECHO  FINDINGS: 9/17-preservedLVEF/moderately severe MR/severe TR/mild AI with a bioprosthetic AVR/atrial dilation.    STRESS  FINDINGS:    CATHETERIZATION  FINDINGS:    MEDICATIONS  (STANDING):  amiodarone    Tablet 200 milliGRAM(s) Oral daily  atorvastatin 10 milliGRAM(s) Oral at bedtime  finasteride 5 milliGRAM(s) Oral daily  methylPREDNISolone sodium succinate Injectable 40 milliGRAM(s) IV Push every 8 hours  pantoprazole   Suspension 40 milliGRAM(s) Oral two times a day before meals  sucralfate 1 Gram(s) Oral Before meals and at bedtime  tamsulosin 0.4 milliGRAM(s) Oral at bedtime    MEDICATIONS  (PRN):  ALBUTerol/ipratropium for Nebulization 3 milliLiter(s) Nebulizer every 6 hours PRN Shortness of Breath and/or Wheezing  sodium chloride 0.65% Nasal 1 Spray(s) Both Nostrils every 2 hours PRN Nasal Congestion      FAMILY HISTORY:  No pertinent family history in first degree relatives      SOCIAL HISTORY: lives with wife    CIGARETTES:0    ALCOHOL:0    ROS: Negative other than as mentioned in HPI.    Vital Signs Last 24 Hrs  T(C): 37 (12 Mar 2018 08:31), Max: 37 (12 Mar 2018 08:31)  T(F): 98.6 (12 Mar 2018 08:31), Max: 98.6 (12 Mar 2018 08:31)  HR: 115 (12 Mar 2018 08:31) (61 - 115)  BP: 112/62 (12 Mar 2018 08:31) (111/51 - 132/68)  BP(mean): 51 (12 Mar 2018 08:30) (51 - 51)  RR: 14 non labored. (12 Mar 2018 08:31) (18 - 20)  SpO2: 98% (12 Mar 2018 08:31) (94% - 99%)    PHYSICAL EXAM:  General: Appears well developed, well nourished alert and cooperative. elderly afebrile minimally verbal with wife at bedside.  HEENT: Head; normocephalic, atraumatic.  Eyes;   Pupils reactive, cornea wnl.  Neck; Supple, no nodes adenopathy, no NVD or carotid bruit or thyromegaly.  CARDIOVASCULAR; No murmur, rub, gallop or lift. Normal S1 and S2. irreg rhythm  LUNGS; difficult to evaluate-cxr show pleural effusions R>L  ABDOMEN ; Soft, nontender without mass or organomegaly. +ileostomy and PEG  EXTREMITIES; No clubbing, cyanosis or edema.   SKIN; warm and dry with normal turgor.  NEURO; Alert/oriented x 3. Minimally verbal. Right arm and hand contracted and plegic. right leg markedly paretic.   PSYCH; not assessable.            INTERPRETATION OF TELEMETRY:    ECG: afib with IWMI  CXR +PVH with R>L pleuraleffusions.  I&O's Detail    11 Mar 2018 07:01  -  12 Mar 2018 07:00  --------------------------------------------------------  IN:    Jevity: 715 mL  Total IN: 715 mL    OUT:    Colostomy: 400 mL  Total OUT: 400 mL    Total NET: 315 mL          LABS:                        9.2    14.8  )-----------( 98       ( 12 Mar 2018 06:44 )             30.0     03-12    144  |  115<H>  |  71.0<H>  ----------------------------<  203<H>  4.9   |  14.0<L>  |  2.92<H>    Ca    9.0      12 Mar 2018 06:44          PT/INR - ( 11 Mar 2018 09:02 )   PT: 17.4 sec;   INR: 1.57 ratio             I&O's Summary    11 Mar 2018 07:01  -  12 Mar 2018 07:00  --------------------------------------------------------  IN: 715 mL / OUT: 400 mL / NET: 315 mL        RADIOLOGY & ADDITIONAL STUDIES:
[facility]  INFECTIOUS DISEASES AND INTERNAL MEDICINE at [place]  =======================================================  Terence Mcneal MD  Diplomates American Board of Internal Medicine and Infectious Diseases  =======================================================      [id number]  CRISTIN ROQUE   History obtained from the chart, patient not able to provide history    CC: Patient is a 77y old  Male who presents with a chief complaint of Bleeding into colostomy (04 Mar 2018 23:39)    76y/o  Male (prior MR # [id number]), h/o CAD s/p CABG, AFib, HTN, prostate CA s/p TURP, s/p PEG and colectomy, prior CVA with expressive aphasia and R hemiparesis, CKD, recently discharged for blood in the Colostomy bag requiring 2 units PRBC transfusion, s/p EGD and G- tube placement. Prior to that admission patient was admitted here s/p respiratory failure s/p intubation due to aspiration pneumonia, Septic shock. Patient came in with poor appetite, not taking his feedings. He was admitted since 3/4/18 and managed for GI blood, KAPIL, SOB, CHF, and now ID input requested to r/o Pneumonia.       Past Medical & Surgical Hx:  Heart attack  High cholesterol  HTN (hypertension)  H/O coronary artery bypass surgery      Social Hx:  Unable to obtain, no meaningful communication       FAMILY HISTORY:  No pertinent family history in first degree relatives      Allergies  Allergy Status Unknown      ANTIBIOTICS:   None       REVIEW OF SYSTEMS:  Unable to obtain, no meaningful communication       Physical Exam:  Vital Signs Last 24 Hrs  T(C): 37.2 (20 Mar 2018 08:02), Max: 37.2 (19 Mar 2018 15:45)  T(F): 99 (20 Mar 2018 08:02), Max: 99 (20 Mar 2018 08:02)  HR: 116 (20 Mar 2018 08:02) (94 - 131)  BP: 81/42 (20 Mar 2018 08:02) (81/42 - 137/68)  RR: 19 (20 Mar 2018 08:02) (19 - 20)  SpO2: 96% (20 Mar 2018 08:02) (92% - 97%)      GEN: NAD  HEENT: normocephalic and atraumatic. EOMI. PERRL.    NECK: Supple.  LUNGS: B/L Rales and crackles   HEART: Regular rate and rhythm  ABDOMEN: Soft, nontender, and nondistended.  Positive bowel sounds.  + Colostomy + PEG  : No CVA tenderness  EXTREMITIES: Trace edema.  MSK: No joint swelling  NEUROLOGIC: Awake, non verbal, no meaningful communication   PSYCHIATRIC: no meaningful communication   SKIN: No rash      Labs:  03-20    149<H>  |  111<H>  |  118.0<H>  ----------------------------<  371<H>  4.6   |  18.0<L>  |  4.19<H>    Ca    9.0      20 Mar 2018 08:00    TPro  5.7<L>  /  Alb  2.5<L>  /  TBili  1.6  /  DBili  x   /  AST  17  /  ALT  69<H>  /  AlkPhos  69  03-20                          9.4    15.0  )-----------( 163      ( 20 Mar 2018 08:00 )             30.1       PT/INR - ( 20 Mar 2018 08:00 )   PT: 20.5 sec;   INR: 1.84 ratio         PTT - ( 19 Mar 2018 09:32 )  PTT:26.9 sec    LIVER FUNCTIONS - ( 20 Mar 2018 08:00 )  Alb: 2.5 g/dL / Pro: 5.7 g/dL / ALK PHOS: 69 U/L / ALT: 69 U/L / AST: 17 U/L / GGT: x                 EXAM:  XR CHEST PORTABLE ROUTINE 1V                        PROCEDURE DATE:  03/19/2018    INTERPRETATION:  Chest, single portable view  HISTORY: Follow-up pleural effusion  Comparison: 3/18  IMPRESSION:  Support Devices: None  Heart: Status post coronary artery bypass graft  Mediastinum:  Unremarkable  Lungs/Airways: Bilateral reticular interstitial opacities stable. No   significant pleural effusion.  Bones/Soft tissues: Unremarkable
PULMONARY CONSULT NOTE      KRISTOFER SALOMON  MRN-[id number]    Patient is a 77y old  Male who presents with a chief complaint of Bleeding into colostomy (04 Mar 2018 23:39)      HISTORY OF PRESENT ILLNESS:    Mr. Kristofer Salomon (prior [id number]) is a 76 yo male PMHx CAD s/p CABG, VHD, AFib on Coumadin, HTN, prostate CA s/p TURP, s/p PEG and colectomy, prior CVA with expressive aphasia and R hemiparesis, CKD 3, recently discharged 3 weeks ago for blood in the Colostomy bag requiring 2 units PRBC transfusion. Coumadin was held, FFP was given and EGD completed which did not reveal source of bleeding. G- tube was placed during EGD. 3 weeks prior to that he was discharged from [facility] s/p respiratory failure s/p intubation due to aspiration pneumonia, Septic shock 2/2 UTI, KAPIL on CKD, recurrent NSVT, UE DVT.  He is now again admitted with GIB for which GI is following. Today he developed increased SOB and wheeze. Per wife, he is usually sustained on TF but does eat on his own at times and did eat 3 days ago. He has a h/o aspiration. He is an ex-smoker of 1 ppd x 30 years but quit in 1993. Wife denies any h/o COPD but he does have a nebulizer to use when needed.     MEDICATIONS  (STANDING):  ALBUTerol/ipratropium for Nebulization 3 milliLiter(s) Nebulizer every 6 hours  amiodarone    Tablet 200 milliGRAM(s) Oral daily  atorvastatin 10 milliGRAM(s) Oral at bedtime  finasteride 5 milliGRAM(s) Oral daily  pantoprazole  Injectable 40 milliGRAM(s) IV Push two times a day  sucralfate 1 Gram(s) Oral Before meals and at bedtime  tamsulosin 0.4 milliGRAM(s) Oral at bedtime      MEDICATIONS  (PRN):      Allergies    Allergy Status Unknown    Intolerances        PAST MEDICAL & SURGICAL HISTORY:  Heart attack  High cholesterol  HTN (hypertension)  H/O coronary artery bypass surgery      FAMILY HISTORY:  No pertinent family history in first degree relatives      SOCIAL HISTORY  Smoking History: as above    REVIEW OF SYSTEMS:    CONSTITUTIONAL:  No fevers, chills, sweats    HEENT:  Eyes:  No diplopia or blurred vision. ENT:  No earache, sore throat or runny nose.    CARDIOVASCULAR:  No pressure, squeezing, tightness, or heaviness about the chest; no palpitations.    RESPIRATORY:  Per HPI    GASTROINTESTINAL:  No abdominal pain, nausea, vomiting or diarrhea.    GENITOURINARY:  No dysuria, frequency or urgency.    NEUROLOGIC:  No paresthesias, fasciculations, seizures or weakness.    PSYCHIATRIC:  No disorder of thought or mood.    Vital Signs Last 24 Hrs  T(C): 36.9 (10 Mar 2018 07:40), Max: 36.9 (10 Mar 2018 07:40)  T(F): 98.4 (10 Mar 2018 07:40), Max: 98.4 (10 Mar 2018 07:40)  HR: 122 (10 Mar 2018 07:40) (92 - 122)  BP: 100/58 (10 Mar 2018 07:40) (100/58 - 156/59)  BP(mean): --  RR: 20 (10 Mar 2018 07:40) (18 - 20)  SpO2: 100% (10 Mar 2018 08:35) (97% - 100%)    PHYSICAL EXAMINATION:    GENERAL: The patient is a well-developed, well-nourished male in no apparent distress. Obese.    HEENT: Head is normocephalic and atraumatic. Extraocular muscles are intact. Mucous membranes are moist.     NECK: Supple.     LUNGS: B/l wheezing, no rales, or rhonchi. Respirations unlabored    HEART: Regular rate and rhythm without murmur.    ABDOMEN: Soft, nontender, and nondistended.  No hepatosplenomegaly is noted.    EXTREMITIES: Without any cyanosis, clubbing, rash, lesions or edema.    NEUROLOGIC: R weakness with expressive aphasia but understands conversation.      LABS:                        7.7    7.6   )-----------( 92       ( 10 Mar 2018 10:11 )             24.6     03-10    149<H>  |  118<H>  |  50.0<H>  ----------------------------<  166<H>  4.2   |  15.0<L>  |  2.54<H>    Ca    8.8      10 Mar 2018 10:11  Mg     1.7     03-10      PT/INR - ( 09 Mar 2018 09:39 )   PT: 16.6 sec;   INR: 1.50 ratio           RADIOLOGY & ADDITIONAL STUDIES:       EXAM:  XR CHEST PORTABLE URGENT 1V                          PROCEDURE DATE:  03/09/2018          INTERPRETATION:  CHEST AP PORTABLE:    History: SOB, wheezing.     Date and time of exam: 3/9/2018 11:16 AM.    Technique: A single AP view of the chest was obtained.    Comparison exam: 3/4/2018.    Findings:  The heart is enlarged. There is evidence of prior cardiac surgery.   Pulmonary vascular congestion is noted, unchanged. The right   hemidiaphragm is elevated, unchanged. No definite evidence of pleural   effusion or pneumothorax.    Impression:  Post cardiac surgery changes. Evidence of congestive heart failure. No   significant change since 3/4/2018..        GURDEEP BUCK M.D., ATTENDING RADIOLOGIST  This document has been electronically signed. Mar  9 2018  1:04PM
Palliative Medicine Initial Consultation Note    HPI:  History from chart- patient cannot give history  Mr. Kristofer Salomon (prior [id number]) is a 76 yo male PMHx CAD s/p CABG, AFib on Coumadin, HTN, prostate CA s/p TURP, s/p PEG and colectomy, prior CVA with expressive aphasia and R hemiparesis, CKD 3, recently discharged 3 weeks ago for blood in the Colostomy bag requiring 2 units PRBC transfusion. Coumadin was held, FFP was given and EGD completed which did not reveal source of bleeding. G- tube was placed during EGD. 3 weeks prior to that he was discharged from [facility] s/p respiratory failure s/p intubation due to aspiration pneumonia, Septic shock 2/2 UTI, KAPIL on CKD, recurrent NSVT, UE DVT.    As per wife at bedside, pt is now refusing oral intake, pt use Jevity 1.5, 4 to 5 cans per day. She has noticed that he has an increased about of bloating, vomiting episodes and coughing. In addition she reports that pt stopped bleeding bleeding for 5 days after discharge but has been ongoing since that time. His Coumadin dose has been up titrated to 5mg M-W-F, followed by 2.5mg the remainder of the week. As a result of the recurrent bleeding she stopped giving him his Aspirin (2weeks now), Dronabinol was never started as the pharmacy indicated his insurance did not approve it. She reports no other signs or symptoms.     PERTINENT PMH REVIEWED:  [x ] YES [ ] NO         MI    High cholesterol    HTN (hypertension).     Past Surgical History:  H/O coronary artery bypass surgery.     Family History:  No pertinent family history in first degree relatives.    SOCIAL HISTORY:  EtOH [ ] Yes  [ x] No                                    Drugs [ ] Yes [x ] No                                   [ ] smoker [x ] nonsmoker                                    Admitted from: [ x] home [ ] SNF _________ [ ] [facility] ________    Surrogate/HCP/Guardian:  surrogate wife Sissy Salomon     FAMILY HISTORY:  No pertinent family history in first degree relatives      Baseline ADLs (prior to admission):  Independent [ ] moderately [ ] fully   Dependent   [x ] moderately [ ]fully    MEDICATIONS  (STANDING):  ALBUTerol/ipratropium for Nebulization 3 milliLiter(s) Nebulizer every 6 hours  amiodarone    Tablet 200 milliGRAM(s) Oral daily  atorvastatin 10 milliGRAM(s) Oral at bedtime  finasteride 5 milliGRAM(s) Oral daily  pantoprazole   Suspension 40 milliGRAM(s) Oral two times a day before meals  scopolamine   Patch 1 Patch Transdermal every 3 days  sertraline 25 milliGRAM(s) Oral daily  sodium chloride 0.9%. 1000 milliLiter(s) (60 mL/Hr) IV Continuous <Continuous>  sucralfate 1 Gram(s) Oral Before meals and at bedtime  tamsulosin 0.4 milliGRAM(s) Oral at bedtime    MEDICATIONS  (PRN):  sodium chloride 0.65% Nasal 1 Spray(s) Both Nostrils every 2 hours PRN Nasal Congestion      Allergies    Allergy Status Unknown    Intolerances      REVIEW OF SYSTEMS       [x ] Unable to obtain due to poor mentation       Karnofsky Performance Score/Palliative Performance Status Version 2:  30   %    Vital Signs Last 24 Hrs  T(C): 37.2 (19 Mar 2018 15:45), Max: 37.2 (19 Mar 2018 15:45)  T(F): 98.9 (19 Mar 2018 15:45), Max: 98.9 (19 Mar 2018 15:45)  HR: 94 (19 Mar 2018 15:45) (94 - 116)  BP: 110/62 (19 Mar 2018 15:45) (95/54 - 112/62)  BP(mean): --  RR: 20 (19 Mar 2018 15:45) (20 - 20)  SpO2: 97% (19 Mar 2018 15:45) (93% - 99%)    PHYSICAL EXAM:    General:  Elderly debilitated man weak - audible gurgle    HEENT: [ x] normal  [ ] dry mouth  [ ] ET tube/trach    Lungs: mild tachypnea/labored breathing  [x ] excessive secretions    CV: [x ] normal  [ ] tachycardia    GI: [ ] normal  [ ] distended  [ ] tender  [ ] no BS               [x ] PEG/NG/OG tube    : [ x] normal  [ ] incontinent  [ ] oliguria/anuria  [ ] puckett    MSK: [ ] normal  [ x] weakness  [ ] edema             [ ] ambulatory  [x ] bedbound/wheelchair bound    Skin: [ ] normal  [ ] pressure ulcers- Stage_____  [x ] no rash    LABS:                        8.8    8.5   )-----------( 127      ( 19 Mar 2018 07:48 )             28.1     03-19    142  |  106  |  118.0<H>  ----------------------------<  376<H>  4.0   |  20.0<L>  |  3.69<H>    Ca    8.5<L>      19 Mar 2018 07:48    TPro  5.5<L>  /  Alb  2.5<L>  /  TBili  1.1  /  DBili  x   /  AST  20  /  ALT  94<H>  /  AlkPhos  73  03-19    PT/INR - ( 19 Mar 2018 09:32 )   PT: 17.9 sec;   INR: 1.61 ratio         PTT - ( 19 Mar 2018 09:32 )  PTT:26.9 sec    I&O's Summary    18 Mar 2018 07:01  -  19 Mar 2018 07:00  --------------------------------------------------------  IN: 0 mL / OUT: 325 mL / NET: -325 mL    19 Mar 2018 07:01  -  19 Mar 2018 17:39  --------------------------------------------------------  IN: 0 mL / OUT: 400 mL / NET: -400 mL        RADIOLOGY & ADDITIONAL STUDIES:  < from: Xray Chest 1 View- PORTABLE-Routine (03.19.18 @ 05:39) >   EXAM:  XR CHEST PORTABLE ROUTINE 1V                          PROCEDURE DATE:  03/19/2018          INTERPRETATION:  Chest, single portable view    HISTORY: Follow-up pleural effusion    Comparison: 3/18    IMPRESSION:    Support Devices: None  Heart: Status post coronary artery bypass graft  Mediastinum:  Unremarkable  Lungs/Airways: Bilateral reticular interstitial opacities stable. No   significant pleural effusion.  Bones/Soft tissues: Unremarkable        < end of copied text >      ADVANCE DIRECTIVES:  [ ] YES [x ] NO   DNR [ ] YES [x ] NO  Completed on:                     MOLST  [ ] YES [x ] NO   Completed on:  Living Will  [ ] YES [x ] NO   Completed on:    Thank you for the opportunity to assist with the care of this patient.   [place] [facility] Consult Service [phone].
[facility] DIVISION OF KIDNEY DISEASES AND HYPERTENSION -- INITIAL CONSULT NOTE  --------------------------------------------------------------------------------  HPI:  76 yo male PMHx CAD s/p CABG, AFib on Coumadin, HTN, prostate CA s/p TURP, s/p PEG and colectomy, prior CVA with expressive aphasia and R hemiparesis, CKD 3.  Admitted for GI hemorrhage via ostomy.          PAST HISTORY  --------------------------------------------------------------------------------  PAST MEDICAL & SURGICAL HISTORY:  Heart attack  High cholesterol  HTN (hypertension)  H/O coronary artery bypass surgery    FAMILY HISTORY:  No pertinent family history in first degree relatives    PAST SOCIAL HISTORY:    ALLERGIES & MEDICATIONS  --------------------------------------------------------------------------------  Allergies    Allergy Status Unknown    Intolerances      Standing Inpatient Medications  ALBUTerol/ipratropium for Nebulization 3 milliLiter(s) Nebulizer every 6 hours  amiodarone    Tablet 200 milliGRAM(s) Oral daily  atorvastatin 10 milliGRAM(s) Oral at bedtime  dextrose 5% 1000 milliLiter(s) IV Continuous <Continuous>  finasteride 5 milliGRAM(s) Oral daily  methylPREDNISolone sodium succinate Injectable 40 milliGRAM(s) IV Push every 12 hours  pantoprazole   Suspension 40 milliGRAM(s) Oral two times a day before meals  sucralfate 1 Gram(s) Oral Before meals and at bedtime  tamsulosin 0.4 milliGRAM(s) Oral at bedtime    PRN Inpatient Medications  sodium chloride 0.65% Nasal 1 Spray(s) Both Nostrils every 2 hours PRN      REVIEW OF SYSTEMS  --------------------------------------------------------------------------------  Unable to obtain      VITALS/PHYSICAL EXAM  --------------------------------------------------------------------------------  T(C): 36.6 (03-15-18 @ 08:27), Max: 36.6 (03-15-18 @ 08:27)  HR: 119 (03-15-18 @ 08:27) (104 - 119)  BP: 132/64 (03-15-18 @ 08:27) (113/67 - 161/62)  RR: 20 (03-15-18 @ 08:27) (20 - 20)  SpO2: 98% (03-15-18 @ 08:27) (98% - 100%)  Wt(kg): --        03-14-18 @ 07:01  -  03-15-18 @ 07:00  --------------------------------------------------------  IN: 1615 mL / OUT: 2700 mL / NET: -1085 mL    03-15-18 @ 07:01  -  03-15-18 @ 11:09  --------------------------------------------------------  IN: 0 mL / OUT: 250 mL / NET: -250 mL      Physical Exam:  	Gen: NAD  	HEENT: PERRL, supple neck, clear oropharynx, no JVD, normal thyroid  	Pulm: Course  B/L  	CV: RRR, S1S2; no rub  	Back: No spinal or CVA tenderness; no sacral edema  	Abd: +BS, soft, nontender/nondistended, +Peg, +Ileostomy  	: No suprapubic tenderness  	UE: Warm, FROM, no clubbing, intact strength; no edema; no asterixis  	LE: Warm, FROM, no clubbing, intact strength; no edema  	Neuro: A/Ox4, expressive aphasia, generalized weakness  	Psych: Normal affect and mood  	Skin: Warm, without rashes      LABS/STUDIES  --------------------------------------------------------------------------------              11.5   11.0  >-----------<  151      [03-15-18 @ 07:23]              37.6     157  |  124  |  115.0  ----------------------------<  240      [03-15-18 @ 07:23]  4.0   |  16.0  |  3.10        Ca     9.3     [03-15-18 @ 07:23]      Creatinine Trend:  SCr 3.10 [03-15 @ 07:23]  SCr 2.91 [03-14 @ 08:55]  SCr 2.94 [03-13 @ 11:22]  SCr 2.92 [03-12 @ 06:44]  SCr 2.59 [03-11 @ 09:02]         EXAM:  US KIDNEY(S)                          PROCEDURE DATE:  03/15/2018          INTERPRETATION:  CLINICAL INFORMATION: Renal failure    COMPARISON: CT 3/10    TECHNIQUE: Sonography of the kidneys and bladder.     FINDINGS:    Right kidney:  9.8 cm. Atrophic. 2.6 cm cyst. Increased echogenicity. No   hydronephrosis. Multiple subcentimeter nonobstructing calculi.    1.2 cm right adrenal nodule.    Left kidney:  10.7 cm. 2.1 cm upper pole cyst. No hydronephrosis. Mildly   increased echogenicity. 2.3 cm indeterminate left interpolar lesion seen   on CT was probably not visualized sonographically. Follow-up contrast MRI   may be considered as previously recommended.    Incidentally noted cirrhosis and perihepatic ascites. Pleural effusions.    IMPRESSION:     Atrophic right kidney. No hydronephrosis. Incidental findings as above.
Surgeon: Katie    Consult requesting by: Carley    HISTORY OF PRESENT ILLNESS:  History obtained from patient wife at bedside, due to patient underlying medical condition of expressive aphasia status post CVA in the past.   Mr. 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 Coumadin was held, FFP was given and EGD completed which did not reveal source of bleeding. G- tube was placed during EGD. 3 weeks prior to that he was discharged from [facility] s/p respiratory failure s/p intubation due to aspiration pneumonia, Septic shock 2/2 UTI, KAPIL on CKD, recurrent NSVT, UE DVT.    As per wife at bedside, pt is now refusing oral intake, pt use Jevity 1.5, 4 to 5 cans per day. She has noticed that he has an increased about of bloating, vomiting episodes and coughing. In addition she reports that pt stopped bleeding bleeding for 5 days after discharge but has been ongoing since that time. His Coumadin dose has been up titrated to 5mg M-W-F, followed by 2.5mg the remainder of the week. As a result of the recurrent bleeding she stopped giving him his Aspirin (2weeks now), Dronabinol was never started as the pharmacy indicated his insurance did not approve it. She reports no other signs or symptoms.     Pt was noted to have b/l pleural effusions on CXR.      PAST MEDICAL & SURGICAL HISTORY:  Heart attack  High cholesterol  HTN (hypertension)  H/O coronary artery bypass surgery      MEDICATIONS  (STANDING):  ALBUTerol/ipratropium for Nebulization 3 milliLiter(s) Nebulizer every 6 hours  amiodarone    Tablet 200 milliGRAM(s) Oral daily  atorvastatin 10 milliGRAM(s) Oral at bedtime  finasteride 5 milliGRAM(s) Oral daily  influenza   Vaccine 0.5 milliLiter(s) IntraMuscular once  methylPREDNISolone sodium succinate Injectable 40 milliGRAM(s) IV Push every 12 hours  pantoprazole   Suspension 40 milliGRAM(s) Oral two times a day before meals  sucralfate 1 Gram(s) Oral Before meals and at bedtime  tamsulosin 0.4 milliGRAM(s) Oral at bedtime    MEDICATIONS  (PRN):  sodium chloride 0.65% Nasal 1 Spray(s) Both Nostrils every 2 hours PRN Nasal Congestion    Antiplatelet therapy:   none                        Last dose/amt:    Allergies    Allergy Status Unknown    Intolerances        SOCIAL HISTORY:  Smoker: [ ] Yes  [ x] No        PACK YEARS:                         WHEN QUIT?  ETOH use: [ ] Yes  [ x] No              FREQUENCY / QUANTITY:  Ilicit Drug use:  [ ] Yes  [x ] No      FAMILY HISTORY:  No pertinent family history in first degree relatives      Review of Systems Pt poor historian ** Pt aphasic in bed   CONSTITUTIONAL:  Fevers[ ] chills[ ] sweats[ ] fatigue[ ] weight loss[ ] weight gain [ ]                                     NEGATIVE [ ]   NEURO:  parathesias[ ] seizures [ ]  syncope [ ]  confusion [ ]                                                                                NEGATIVE[ ]   EYES: glasses[ ]  blurry vision[ ]  discharge[ ] pain[ ] glaucoma [ ]                                                                          NEGATIVE[ ]   ENMT:  difficulty hearing [ ]  vertigo[ ]  dysphagia[ ] epistaxis[ ] recent dental work [ ]                                    NEGATIVE[ ]   CV:  chest pain[ ] palpitations[ ] SCHOFIELD [ ] diaphoresis [ ]                                                                                           NEGATIVE[ ]   RESPIRATORY:  wheezing[ ] SOB[ ] cough [ ] sputum[ ] hemoptysis[ ]                                                                  NEGATIVE[ ]   GI:  nausea[ ]  vommiting [ ]  diarrhea[ ] constipation [ ] melena [ ]                                                                      NEGATIVE[ ]   : hematuria[ ]  dysuria[ ] urgency[ ] incontinence[ ]                                                                                            NEGATIVE[ ]   MUSKULOSKELETAL:  arthritis[ ]  joint swelling [ ] muscle weakness [ ]                                                                NEGATIVE[ ]   SKIN/BREAST:  rash[ ] itching [ ]  hair loss[ ] masses[ ]                                                                                              NEGATIVE[ ]   PSYCH:  dementia [ ] depresion [ ] anxiety[ ]                                                                                                               NEGATIVE[ ]   HEME/LYMPH:  bruises easily[ ] enlarged lymph nodes[ ] tender lymph nodes[ ]                                               NEGATIVE[ ]   ENDOCRINE:  cold intolerance[ ] heat intolerance[ ] polydipsia[ ]                                                                          NEGATIVE[ ]     PHYSICAL EXAM  Vital Signs Last 24 Hrs  T(C): 36.6 (14 Mar 2018 07:59), Max: 36.7 (13 Mar 2018 15:40)  T(F): 97.8 (14 Mar 2018 07:59), Max: 98.1 (13 Mar 2018 15:40)  HR: 111 (14 Mar 2018 07:59) (98 - 111)  BP: 112/70 (14 Mar 2018 07:59) (109/72 - 141/71)  BP(mean): --  RR: 20 (14 Mar 2018 07:59) (18 - 20)  SpO2: 99% (14 Mar 2018 07:59) (98% - 99%)    CONSTITUTIONAL:                                                                          WNL[ ]   Neuro: WNL[ ] Normal exam oriented to person/place & time with no focal motor or sensory  deficits. Other       aphasic               Eyes: WNL[x ]   Normal exam of conjunctiva & lids, pupils equally reactive. Other     ENT: WNL[ ]    Normal exam of nasal/oral mucosa with absence of cyanosis. Other dry mucosa   Neck: WNL[ ]  Normal exam of jugular veins, trachea & thyroid. Other + JVD  Chest: WNL[ ] Normal lung exam with good air movement absence of wheezes, rales,  : Other       +  rhonchi decreased at base b/l                                                                      CV:  Auscultation: normal [x ] S3[ ] S4[ ] Irregular [ ] Rub[ ] Clicks[ ]    Murmurs none:[ ]systolic [x ]  diastolic [ ] holosystolic [ ]  Carotids: No Bruits[x ] Other                 Abdominal Aorta: normal [x ] nonpalpable[ ]Other                                                                                      GI:           WNL[x ] Normal exam of abdomen, liver & spleen with no noted masses or tenderness. Other  + colostomy bag noted                                                                                                    Extremities: WNL[ x] Normal no evidence of cyanosis or deformity Edema: none[x ]trace[ ]1+[ ]2+[ ]3+[ ]4+[ ]  Lower Extremity Pulses: Right[2+ ] Left[2+ ]Varicosities[ ]  SKIN :WNL[ x] Normal exam to inspection & palation. Other:                                                          LABS:                        10.0   10.8  )-----------( 133      ( 14 Mar 2018 08:55 )             32.6     03-14    148<H>  |  117<H>  |  105.0<H>  ----------------------------<  227<H>  4.2   |  15.0<L>  |  2.91<H>    Ca    9.2      14 Mar 2018 08:55        < from: Xray Chest 1 View AP/PA. (03.12.18 @ 05:31) >  Cardio megaly. Evidence of prior cardiac surgery. Pulmonary vascular   congestion and bilateral pulmonary edema, new since the prior study.   Right pleural effusion, a new finding..    Impression:  Development of congestive heart failure, pulmonary edema, and right   pleural effusion since 3/9/2018..    < end of copied text >

## 2018-03-20 NOTE — PROGRESS NOTE ADULT - PROBLEM SELECTOR PLAN 1
anemia- hemoglobin stable  Pt may have recurrent bleeding if placed on coumadin. Wife is aware , but concerned about CVA ( She states coumadin held for one year secondary to hematuria in the past. he had cva on asa alone).  If she decides to have coumadin restarted, would suggest keeping at a low therapeutic level.
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 Nothing to shade from GI standpoint. If continues suggest surgical reevaluation. Would refeed thru G tube. Will see prn your request only.
s/p transfusions. Cont PPI- no intervention as per GI. Monitor Hg.

## 2018-03-20 NOTE — PROGRESS NOTE ADULT - ASSESSMENT
76 y/o male present with Recurrent Gastrointestinal hemorrhage with melena. not on Coumadin with possible Vit K deficiency with GI loss? supra-therapeutic INR (3.74).  1. Gastrointestinal hemorrhage, unspecified gastrointestinal hemorrhage type with anemia of acute blood loss on anemia of chronic disease - monitor HH, transfused 4 units of PRBC, continue PPI, diet as tolerated, GI reevaluation at family request - no intervention suggested discussed with Dr Koch. Palliative consulted.  2. Chronic atrial fibrillation - rate controlled with amiodarone, off ASA, coumadin held, s/p Vit K. High risk for CVA and VTE as + Hx of CVA and PE/DVT  3. Acute kidney injury superimposed on chronic kidney disease - Cr continues to worsen.  Renal following.  Restarted bicarb gtt.  Hewitt ordered.  RN with difficulty placing as patient has a history of prostate cancer.  Discussed with Dr. Ashley - will place condom catheter.  Palliative following - Poor prognosis.  Patient is DNR/DNI, but wife does not want comfort measures at this time.    4. H/o Dysphagia - Tube feeds held secondary to congestion.  Keep NPO.  WBC 15, likely aspiration - ID consulted.  5. H/o CVA - c/w statin. Bedrest, elevate HOB, high risk of aspirations, turn Q2hrs, high risk of pressure ulcers. PT recommended BRIDGET/LTC  6. Possible CHF -SOB with gurgling -  Worsening renal failure; renal and Palliative following.  Wife does not want comfort care at this time.  7. Anemia of ABL - now improved with transfusion, but previously H/H was dropping despite no evidence of active hemorrhage, further discussion with blood bank supervisor indicated that pt received blood transfusion when was admitted as [facility] which had positive for previously transfused blood antibodies - likely caused delayed onset hemolytic anemia  8. Leukocytosis - likely secondary to Aspiration pneumonia - ID consulted.  Blood and sputum cultures pending.  RVP pending.    9. Disposition - PT recommended BRIDGET/LTC; Palliative following - patient will poor prognosis - DNR/DNI, wife does not want comfort measures at this time.

## 2018-03-21 VITALS
TEMPERATURE: 99 F | DIASTOLIC BLOOD PRESSURE: 68 MMHG | HEART RATE: 128 BPM | SYSTOLIC BLOOD PRESSURE: 108 MMHG | RESPIRATION RATE: 18 BRPM

## 2018-03-21 PROCEDURE — 97163 PT EVAL HIGH COMPLEX 45 MIN: CPT

## 2018-03-21 PROCEDURE — 87581 M.PNEUMON DNA AMP PROBE: CPT

## 2018-03-21 PROCEDURE — 86901 BLOOD TYPING SEROLOGIC RH(D): CPT

## 2018-03-21 PROCEDURE — 86923 COMPATIBILITY TEST ELECTRIC: CPT

## 2018-03-21 PROCEDURE — 85730 THROMBOPLASTIN TIME PARTIAL: CPT

## 2018-03-21 PROCEDURE — 82962 GLUCOSE BLOOD TEST: CPT

## 2018-03-21 PROCEDURE — 84300 ASSAY OF URINE SODIUM: CPT

## 2018-03-21 PROCEDURE — 86900 BLOOD TYPING SEROLOGIC ABO: CPT

## 2018-03-21 PROCEDURE — 31720 CLEARANCE OF AIRWAYS: CPT

## 2018-03-21 PROCEDURE — 84484 ASSAY OF TROPONIN QUANT: CPT

## 2018-03-21 PROCEDURE — 80053 COMPREHEN METABOLIC PANEL: CPT

## 2018-03-21 PROCEDURE — 80048 BASIC METABOLIC PNL TOTAL CA: CPT

## 2018-03-21 PROCEDURE — 86922 COMPATIBILITY TEST ANTIGLOB: CPT

## 2018-03-21 PROCEDURE — 83615 LACTATE (LD) (LDH) ENZYME: CPT

## 2018-03-21 PROCEDURE — 85610 PROTHROMBIN TIME: CPT

## 2018-03-21 PROCEDURE — 81001 URINALYSIS AUTO W/SCOPE: CPT

## 2018-03-21 PROCEDURE — 83935 ASSAY OF URINE OSMOLALITY: CPT

## 2018-03-21 PROCEDURE — 86870 RBC ANTIBODY IDENTIFICATION: CPT

## 2018-03-21 PROCEDURE — 96374 THER/PROPH/DIAG INJ IV PUSH: CPT

## 2018-03-21 PROCEDURE — 87798 DETECT AGENT NOS DNA AMP: CPT

## 2018-03-21 PROCEDURE — P9016: CPT

## 2018-03-21 PROCEDURE — 74176 CT ABD & PELVIS W/O CONTRAST: CPT

## 2018-03-21 PROCEDURE — 76775 US EXAM ABDO BACK WALL LIM: CPT

## 2018-03-21 PROCEDURE — 36430 TRANSFUSION BLD/BLD COMPNT: CPT

## 2018-03-21 PROCEDURE — 71045 X-RAY EXAM CHEST 1 VIEW: CPT

## 2018-03-21 PROCEDURE — 94640 AIRWAY INHALATION TREATMENT: CPT

## 2018-03-21 PROCEDURE — 86902 BLOOD TYPE ANTIGEN DONOR EA: CPT

## 2018-03-21 PROCEDURE — 93005 ELECTROCARDIOGRAM TRACING: CPT

## 2018-03-21 PROCEDURE — 87486 CHLMYD PNEUM DNA AMP PROBE: CPT

## 2018-03-21 PROCEDURE — 85027 COMPLETE CBC AUTOMATED: CPT

## 2018-03-21 PROCEDURE — 85018 HEMOGLOBIN: CPT

## 2018-03-21 PROCEDURE — 87633 RESP VIRUS 12-25 TARGETS: CPT

## 2018-03-21 PROCEDURE — 83735 ASSAY OF MAGNESIUM: CPT

## 2018-03-21 PROCEDURE — 86850 RBC ANTIBODY SCREEN: CPT

## 2018-03-21 PROCEDURE — 71250 CT THORAX DX C-: CPT

## 2018-03-21 PROCEDURE — 82570 ASSAY OF URINE CREATININE: CPT

## 2018-03-21 PROCEDURE — 82436 ASSAY OF URINE CHLORIDE: CPT

## 2018-03-21 PROCEDURE — 84133 ASSAY OF URINE POTASSIUM: CPT

## 2018-03-21 PROCEDURE — 99285 EMERGENCY DEPT VISIT HI MDM: CPT | Mod: 25

## 2018-03-21 PROCEDURE — 84105 ASSAY OF URINE PHOSPHORUS: CPT

## 2018-03-21 PROCEDURE — 74018 RADEX ABDOMEN 1 VIEW: CPT

## 2018-03-21 PROCEDURE — 36415 COLL VENOUS BLD VENIPUNCTURE: CPT

## 2018-03-21 PROCEDURE — 83010 ASSAY OF HAPTOGLOBIN QUANT: CPT

## 2018-03-21 PROCEDURE — 83880 ASSAY OF NATRIURETIC PEPTIDE: CPT

## 2018-03-21 PROCEDURE — 86860 RBC ANTIBODY ELUTION: CPT

## 2018-03-21 PROCEDURE — 86880 COOMBS TEST DIRECT: CPT

## 2018-03-21 PROCEDURE — 82340 ASSAY OF CALCIUM IN URINE: CPT

## 2018-03-21 RX ORDER — FUROSEMIDE 40 MG
40 TABLET ORAL ONCE
Qty: 0 | Refills: 0 | Status: DISCONTINUED | OUTPATIENT
Start: 2018-03-21 | End: 2018-03-21

## 2018-03-21 RX ORDER — ROBINUL 0.2 MG/ML
0.1 INJECTION INTRAMUSCULAR; INTRAVENOUS EVERY 6 HOURS
Qty: 0 | Refills: 0 | Status: DISCONTINUED | OUTPATIENT
Start: 2018-03-21 | End: 2018-03-21

## 2018-03-21 RX ADMIN — Medication 1 GRAM(S): at 06:02

## 2018-03-21 RX ADMIN — ROBINUL 0.1 MILLIGRAM(S): 0.2 INJECTION INTRAMUSCULAR; INTRAVENOUS at 07:51

## 2018-03-21 RX ADMIN — Medication 3 MILLILITER(S): at 03:43

## 2018-03-21 RX ADMIN — PANTOPRAZOLE SODIUM 40 MILLIGRAM(S): 20 TABLET, DELAYED RELEASE ORAL at 06:02

## 2018-03-21 NOTE — DISCHARGE NOTE FOR THE EXPIRED PATIENT - HOSPITAL COURSE
78 y/o male present with Recurrent Gastrointestinal hemorrhage with melena.  Coumadin held and with possible Vit K deficiency with GI loss? supra-therapeutic INR (3.74).  Patient evaluated by GI, likely parastomal inflammation with bleeding, recommended no further intervention at this time.  Patient received 4units PRBC.  Hgb remained stable.  Patient developed SOB and congestion.  Patient followed by     2. Chronic atrial fibrillation - rate controlled with amiodarone, off ASA, coumadin held, s/p Vit K. High risk for CVA and VTE as + Hx of CVA and PE/DVT  3. Acute kidney injury superimposed on chronic kidney disease - Cr continues to worsen.  Renal following.  Restarted bicarb gtt.  Hewitt ordered.  RN with difficulty placing as patient has a history of prostate cancer.  Discussed with Dr. Ashley - will place condom catheter.  Palliative following - Poor prognosis.  Patient is DNR/DNI, but wife does not want comfort measures at this time.    4. H/o Dysphagia - Tube feeds held secondary to congestion.  Keep NPO.  WBC 15, likely aspiration - ID consulted.  5. H/o CVA - c/w statin. Bedrest, elevate HOB, high risk of aspirations, turn Q2hrs, high risk of pressure ulcers. PT recommended BRIDGET/LTC  6. Possible CHF -SOB with gurgling -  Worsening renal failure; renal and Palliative following.  Wife does not want comfort care at this time.  7. Anemia of ABL - now improved with transfusion, but previously H/H was dropping despite no evidence of active hemorrhage, further discussion with blood bank supervisor indicated that pt received blood transfusion when was admitted as [facility] which had positive for previously transfused blood antibodies - likely caused delayed onset hemolytic anemia  8. Leukocytosis - likely secondary to Aspiration pneumonia - ID consulted.  Blood and sputum cultures pending.  RVP pending.    9. Disposition - PT recommended BRIDGET/LTC; Palliative following - patient will poor prognosis - DNR/DNI, wife does not want comfort measures at this time. 78 y/o male present with Recurrent Gastrointestinal hemorrhage with melena.  Coumadin held and with possible Vit K deficiency with GI loss? supra-therapeutic INR (3.74).  Patient evaluated by GI, likely parastomal inflammation with bleeding, recommended no further intervention at this time.  Patient received 4units PRBC.  Hgb remained stable.  Patient developed SOB and congestion.  Patient followed by nephrology as patient had worsening renal failure after diuresis.  Patient received Bicarb gtt with no improvement.  Discussed with wife.  Palliative consulted.  Patient continued to have worsening renal failure and increased congestion, with leukocytosis.  Patient's tube feeds held.  Patient DNR/DNI.  Patient continued to decline.  Patient had worsening SOB required increased O2 requirement.  Patient  at 0823 3/21/18.  Wife and son at bedside.  Clergy and staff provided emotional support to family.

## 2018-03-28 RX ORDER — SUCRALFATE 1 G
1 TABLET ORAL
Qty: 0 | Refills: 0 | COMMUNITY

## 2018-03-28 RX ORDER — WARFARIN SODIUM 2.5 MG/1
1 TABLET ORAL
Qty: 0 | Refills: 0 | COMMUNITY

## 2018-03-28 RX ORDER — METOPROLOL TARTRATE 50 MG
1 TABLET ORAL
Qty: 0 | Refills: 0 | COMMUNITY

## 2018-03-28 RX ORDER — FINASTERIDE 5 MG/1
1 TABLET, FILM COATED ORAL
Qty: 0 | Refills: 0 | COMMUNITY

## 2018-03-28 RX ORDER — FUROSEMIDE 40 MG
1 TABLET ORAL
Qty: 0 | Refills: 0 | COMMUNITY

## 2018-03-28 RX ORDER — AMIODARONE HYDROCHLORIDE 400 MG/1
2 TABLET ORAL
Qty: 0 | Refills: 0 | COMMUNITY

## 2018-03-28 RX ORDER — TAMSULOSIN HYDROCHLORIDE 0.4 MG/1
1 CAPSULE ORAL
Qty: 0 | Refills: 0 | COMMUNITY

## 2018-03-28 RX ORDER — FERROUS SULFATE 325(65) MG
1 TABLET ORAL
Qty: 0 | Refills: 0 | COMMUNITY

## 2018-03-28 RX ORDER — AMIODARONE HYDROCHLORIDE 400 MG/1
1 TABLET ORAL
Qty: 0 | Refills: 0 | COMMUNITY

## 2018-03-28 RX ORDER — ATORVASTATIN CALCIUM 80 MG/1
1 TABLET, FILM COATED ORAL
Qty: 0 | Refills: 0 | COMMUNITY

## 2018-03-28 RX ORDER — ALBUTEROL 90 UG/1
2 AEROSOL, METERED ORAL
Qty: 0 | Refills: 0 | COMMUNITY

## 2018-03-28 RX ORDER — PANTOPRAZOLE SODIUM 20 MG/1
1 TABLET, DELAYED RELEASE ORAL
Qty: 0 | Refills: 0 | COMMUNITY

## 2018-03-28 RX ORDER — FERROUS SULFATE 325(65) MG
0 TABLET ORAL
Qty: 0 | Refills: 0 | COMMUNITY

## 2018-04-11 NOTE — PATIENT PROFILE ADULT. - CENTRAL VENOUS CATHETER
Problem: VTE, Risk for  Goal: # No s/s of VTE  Outcome: Outcome Met, Continue evaluating goal progress toward completion  Patient remains free of VTE.  Outcome met - continue to monitor.       no

## 2018-04-21 NOTE — PATIENT PROFILE ADULT. - ASSIST WITH
Pt delivered vaginally yesterday afternoon to a  mom at 34 6/7 weeks gestation. Infant admitted to NICU. Pt will successfully establish breast milk supply by pumping with a hospital grade pump every 2-3 hours for approximately 20 minutes/8-10 x day with the correct size flange, and suction level for mother's comfort. To maximize milk production, mom taught to incorporate breast massage and hand expression into pumping sessions. All expressed breast milk (EBM) will be provided for infant use, in clean bottles/syringes for storage in NICU breastmilk refrigerator. Patient label with barcode,date and time applied to each container prior to transport to NICU. I reviewed with mom the amount of breast milk she could expect to see in the first few day. The breast will be offered as baby is ready; with the goal of eventual transition to breastfeeding. Pt has wide spaced small breasts. The right breast is smaller than the left breast. Pt states she did notice breast changes at the beginning of her pregnancy. Mom began pumping this evening. There was some moisture on the right flange after 15 minutes of pumping. I showed mom hand expression but we were not able to express any more colostrum. standing/toileting/walking

## 2018-07-24 PROBLEM — R63.0 POOR APPETITE: Status: ACTIVE | Noted: 2017-08-11

## 2018-09-05 NOTE — PROGRESS NOTE ADULT - MINUTES
Final Anesthesia Post-op Assessment    Patient: Abiodun Anne  Procedure(s) Performed: C5-6 ANTERIOR CERVICAL DISCECTOMY AND CERVICAL DISC ARTHROPLASTY  Anesthesia type: General    Vital Last Value   Temperature 36.7 °C (98 °F) (09/05/18 1414)   Pulse 86 (09/05/18 1514)   Respiratory Rate 16 (09/05/18 1514)   Non-Invasive   Blood Pressure (!) 141/94 (09/05/18 1514)   Arterial  Blood Pressure     Pulse Oximetry 95 % (09/05/18 1514)     Last 24 I/O:   Intake/Output Summary (Last 24 hours) at 09/05/18 1526  Last data filed at 09/05/18 1405   Gross per 24 hour   Intake             1825 ml   Output               20 ml   Net             1805 ml       PATIENT LOCATION: PACU Phase 1  POST-OP VITAL SIGNS: stable  LEVEL OF CONSCIOUSNESS: participates in exam, answers questions appropriately, awake, alert and oriented  RESPIRATORY STATUS: spontaneous ventilation  CARDIOVASCULAR: blood pressure returned to baseline and stable  HYDRATION: euvolemic    PAIN MANAGEMENT: adequately controlled  NAUSEA: None  AIRWAY PATENCY: patent  POST-OP ASSESSMENT: no complications, patient tolerated procedure well with no complications, no evidence of recall and sufficiently recovered from acute administration of anesthesia effects and able to participate in evaluation  COMPLICATIONS: none      
55
50
20
20
30
30
55
20
55
80
55
55

## 2018-10-18 NOTE — PROVIDER CONTACT NOTE (OTHER) - ACTION/TREATMENT ORDERED:
MD will review and make recommendations
GI called, Cardiology to be notified.
consult ordered for urology
cardio consult ordered
MD will order fluids
18-Oct-2018 16:27

## 2018-10-22 NOTE — ED PROVIDER NOTE - OBJECTIVE STATEMENT
Reason for Call:  Other -  Mother wondering if [place] offers phone counseling and if they do can Cathi be referred.    Detailed comments: Mother states she cannot take time off every time Cathi has an appt for counseling and Nystroms wont see him unless mother is with.    Phone Number Patient can be reached at: Home number on file [phone] (home)    Best Time: any    Can we leave a detailed message on this number? YES    Call taken on 10/22/2018 at 11:26 AM by Lillian Cohen       72 yo wm pmh colostomy, peg tube comes to ed with 3 days of blood from colostomy; pt with similar problems in past with admission for blood transfusions

## 2018-12-23 NOTE — H&P ADULT - NSHPPOADEEPVENOUSTHROMB_GEN_A_CORE
Patient was unable to participate in skilled therapy at this time secondary to RN reported pt is off unit for bone scan. Skilled therapy will attempt to see patient later in the day as schedules permits. no

## 2019-01-08 NOTE — PROGRESS NOTE ADULT - PROBLEM SELECTOR PROBLEM 1
Gastrointestinal hemorrhage with melena
Gastrointestinal hemorrhage, unspecified gastrointestinal hemorrhage type
Gastrointestinal hemorrhage with melena
Not applicable

## 2019-04-08 NOTE — DISCHARGE NOTE ADULT - CARE PLAN
0 = independent Principal Discharge DX:	GI bleeding  Secondary Diagnosis:	Acute kidney injury superimposed on chronic kidney disease  Secondary Diagnosis:	Afib  Secondary Diagnosis:	CAD (coronary artery disease)  Secondary Diagnosis:	CVA (cerebral vascular accident)  Secondary Diagnosis:	HTN (hypertension)  Secondary Diagnosis:	Oropharyngeal dysphagia Principal Discharge DX:	GI bleeding  Goal:	To prevent future bleeding  Assessment and plan of treatment:	Please go to the pharmacy and  your medications. Please take your medications as prescribed. please follow up with the Gastroenterology within 1 -2 weeks. You may continue to take your coumadin.  Secondary Diagnosis:	Acute kidney injury superimposed on chronic kidney disease  Goal:	To prevent further injury  Assessment and plan of treatment:	Please go to the pharmacy and  your medications. Please take your medications as prescribed. please follow up with your nephrologist with 1 - 2 weeks. Be sure to keep hydrated. Please flush G tube with 250ml of water three times a day.  Secondary Diagnosis:	Afib  Goal:	To control rate and prevent stroke  Assessment and plan of treatment:	Please go to the pharmacy and  your medications. Please take your medications as prescribed. Please follow up with your cardiologist within 1-2 weeks. you may continue to take your coumadin. You will need to get your INR checked next week by Either your primary care doctor (Loren) or your cardiologist.  Secondary Diagnosis:	CAD (coronary artery disease)  Assessment and plan of treatment:	Please go to the pharmacy and  your medications. Please take your medications as prescribed.  Please follow up with your cardiologist within 1-2 weeks  Secondary Diagnosis:	CVA (cerebral vascular accident)  Assessment and plan of treatment:	Please go to the pharmacy and  your medications. Please take your medications as prescribed.  Secondary Diagnosis:	HTN (hypertension)  Assessment and plan of treatment:	Please go to the pharmacy and  your medications. Please take your medications as prescribed.  Secondary Diagnosis:	Oropharyngeal dysphagia  Assessment and plan of treatment:	Please go to the pharmacy and  your medications. Please take your medications as prescribed.

## 2019-10-23 NOTE — H&P ADULT - CARDIOVASCULAR
Faxed received from [employer]  Benefits change form    On behalf of your patient, Antonio YUNG, we are requesting that you select the [employer] preferred lower cost alternative on the right as an approved change from [employer], provided that you deem this clinically appropriate. This change could potentially have a cost savings for your patient.    Forms placed in derm triage tray   details… Regular rate & rhythm, normal S1, S2; no murmurs, gallops or rubs; no S3, S4

## 2020-09-08 NOTE — SWALLOW BEDSIDE ASSESSMENT ADULT - COMMENTS
No 76 yo male, PMHx CAD s/p CABG, AFib on Coumadin, HTN, prostate CA s/p TURP, s/p PEG and colectomy, prior CVA with expressive aphasia and R hemiparesis, admitted with hypoxemic respiratory failure requiring intubation secondary to aspiration pneumonia, septic shock requiring vasopressors secondary to Gram negative urosepsis. 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 RRT called for 48 seconds of sustained VT.

## 2022-01-03 NOTE — DIETITIAN INITIAL EVALUATION ADULT. - NS AS NUTRI INTERV ENTERAL NUTRITION
Composition/Concentration/Rate/Initiate PEG feeds- Vital 1.5 @55ml/[id number] (1650kcal, 74gm pro, 1100ml) based on 20hrs
Yes

## 2022-01-04 NOTE — PATIENT PROFILE ADULT. - SURGICAL SITE INCISION
Occupational Therapy     Referred by: Robbie Hankins MD; Medical Diagnosis (from order):    Diagnosis Information      Diagnosis    728.87 (ICD-9-CM) - R53.1 (ICD-10-CM) - Right sided weakness    784.3 (ICD-9-CM) - R47.01 (ICD-10-CM) - Expressive aphasia    434.91 (ICD-9-CM) - I63.512 (ICD-10-CM) - Acute ischemic left MCA stroke (CMS/HCC)                  Daily Treatment Note    Visit:  6     SUBJECTIVE                                                                                                                 Pt reports improvement in RA due to prednisone    Function (patient/family/caregiver reported):   Functional Change: Eating with right hand better depending on what he eats      OBJECTIVE                                                                                                                          TREATMENT                                                                                                                re-evaluation completed    Therapeutic Exercise:  Pt performed nu-step for approximately 10 minutes on work force 5. Circuit training weights for BUE for pectoralis, latissimus dorsi, deltoid.  (65#, 25#, 25#, 25#) 15 reps x 2.    Neuromuscular Re-Education:  Pt reports using right hand for all functional tasks.    Activities of Daily Living/Self Care:  Clothespin task and fine motor task, pt complained of fatigue in right hand after activities.    Skilled input: verbal instruction/cues and tactile instruction/cues    Writer verbally educated and received verbal consent for hand placement, positioning of patient, and techniques to be performed today from patient for therapist position for techniques as described above and how they are pertinent to the patient's plan of care.    Home Exercise Program/Education Materials: Reviewed home exercise program including putty, coordination exercises and hand weights.         ASSESSMENT                                                                                                              Pt has improved right hand function - able to complete fine motor task and improved strength. Pt complained of fatigue in right hand after task. Pt tolerating increased weight for proximal RUE strength and reports both arms \"feel the same\" on weight machines.   Pt continues to have difficulty with RUE hand manipulation and coordination during functional tasks and decreased activity tolerance.  Pt will continue to benefit from OT to address RUE function and control.   Patient/Caregiver Education:   Results of above outlined education: Verbalizes understanding    PLAN                                                                                                                           Suggestions for next session as indicated: Progress per plan of care progress note, RUE activity tolerance, fine motor, strengthening,writing        Long Term Goals: to be met by end of plan of care  1. Patient will score WFL on Clinical  Screening including reaction time and visual scanning assessments for return to independent community mobility. Partially met  2. Use of effected upper extremity for fine motor needs such as  manipulating buttons, manipulating zippers, tying shoes as a fine assist 50% of the time or greater in at least three activities. Partially met  3. Perform eating Independent incorporating right upper extremity  for grasping and manipulating food and utensils with use with adaptive equipment as needed. Partially met  4. Pt will write legibly with right hand to fill out a check. Partially met  5. Pt will  case of water with both hands. Partially met       Therapy procedure time and total treatment time can be found documented on the Time Entry flowsheet.   no

## 2022-02-03 NOTE — ED ADULT NURSE NOTE - NIH STROKE SCALE: 1C. LOC COMMANDS, QM
----- Message from Silvia Schaffer sent at 2/3/2022  2:20 PM CST -----  Pt calling for recent UA test  # [phone]     (0) Performs both tasks correctly

## 2022-03-07 NOTE — ED PROVIDER NOTE - RESPIRATORY, MLM
OB H&P Update    Rita Martinez Radha Chew MD  OB/GYN & Infertility  Pager [phone]  22    
Breath sounds clear and equal bilaterally.

## 2023-01-10 NOTE — ED ADULT NURSE NOTE - AS SC BRADEN SENSORY
Chief Complaint  Wound Check (Venous stasis ulcer to bilateral lower legs)    Subjective      History of Present Illness    Isha Llanes  is a 55 y.o. female with a history of venous stasis in BLE with recurrent ulcerations and cellulitis.  She has a history of bilateral venous stasis with recurrent ulcerations.  Unfortunately she is not compliant with various wound care modalities and does not always take her medications as prescribed and also is not compliant with recommendations to elevate her lower extremities.  She repeatedly and adamantly refuses Unna boots which would be the most helpful for her.  She says that she has tried them before and they are just way too itchy and she just cannot do it.  She says she could only leave them on for a few hours.   In the past she has been resistant to most of our suggestions.  She has not been here since April 2022.      She had home health until about a week ago when her insurance changed.  She says that she has been applying calamine to her legs and wraps them when she can.  She is doing this herself at home.  She still does not like compression and is very unwilling to try any more aggressive compression measures to get her legs to heal.  However, she is very frustrated that she has been dealing with this problem for so long and that it will not get better.  She sits in her jessica chair almost all the time with and tolerates because of her hips making it painful to do so.  She has been trying to sleep on her side but is only able to do that for a few hours.  She would like to have surgery to fix her problem or simply have her legs cut off because she is tired of dealing with it she says.    Allergies:  Amoxicillin, Ceclor [cefaclor], Penicillins, Sulfa antibiotics, Valium [diazepam], Ambien [zolpidem], Aspirin, Ativan [lorazepam], Benadryl [diphenhydramine], Biaxin [clarithromycin], Cefazolin, Cephalexin, Cephalosporins, Clindamycin, Compazine [prochlorperazine  edisylate], Contrast dye (echo or unknown ct/mr), Doxycycline, Eggs or egg-derived products, Nsaids, Phenergan [promethazine hcl], Promethazine, and Vancomycin      Current Outpatient Medications:   •  acetaminophen (TYLENOL) 500 MG tablet, Take 2-3 tablets by mouth Every 6 (Six) Hours As Needed for Mild Pain., Disp: 60 tablet, Rfl: 3  •  B-D UF III MINI PEN NEEDLES 31G X 5 MM misc, , Disp: , Rfl:   •  bacitracin 500 UNIT/GM ointment, Apply 1 application topically to the appropriate area as directed 2 (Two) Times a Day., Disp: 28 g, Rfl: 5  •  bacitracin 500 UNIT/GM ointment, Apply 1 application topically to the appropriate area as directed 2 (Two) Times a Day., Disp: 28 g, Rfl: 1  •  Benzethonium Chloride (Dermal Wound Cleanser) 0.13 % liquid, Spray on wounds daily as needed on both legs, Disp: 236 mL, Rfl: 1  •  clopidogrel (Plavix) 75 MG tablet, Take 1 tablet by mouth Daily. (Patient taking differently: Take 75 mg by mouth Daily. Last dose Thursday 9/22/22), Disp: 90 tablet, Rfl: 0  •  Continuous Blood Gluc Sensor (Dexcom G6 Sensor), See Admin Instructions., Disp: , Rfl:   •  Continuous Blood Gluc Transmit (Dexcom G6 Transmitter) misc, See Admin Instructions., Disp: , Rfl:   •  Continuous Glucose Monitor Sup kit, 1 kit Continuous. One continuous glucose meter, and supplies needed. Give one month worth of supplies, with 3 refills. ICD-10: e11.9, Disp: 1 kit, Rfl: 3  •  DAPTOmycin (CUBICIN) 500 MG injection, , Disp: , Rfl:   •  dicyclomine (BENTYL) 20 MG tablet, Take 1 tablet by mouth Every 6 (Six) Hours., Disp: 20 tablet, Rfl: 0  •  Elastic Bandages & Supports (ACE Elastic Bandage 4\") misc, 1 Device Daily. One on each leg, bilateral.   #24 = one month supply., Disp: 24 each, Rfl: 3  •  Elastic Bandages & Supports (Medical Compression Socks) misc, 1 Device Daily. bilateral compression socks, Disp: 2 each, Rfl: 1  •  EPINEPHrine (EPIPEN) 0.3 MG/0.3ML solution auto-injector injection, TAKE AS DIRECTED INCASE OF  ALLERGIC REACTION., Disp: , Rfl:   •  famotidine (PEPCID) 20 MG tablet, Take 1 tablet by mouth 2 (Two) Times a Day., Disp: 30 tablet, Rfl: 0  •  Flovent  MCG/ACT inhaler, inhale 2 puffs by mouth twice daily (Patient taking differently: Inhale 1 puff 2 (Two) Times a Day.), Disp: 12 g, Rfl: 2  •  fluconazole (DIFLUCAN) 150 MG tablet, TAKE ONE tablet by MOUTH AS ONE DOSE, Disp: , Rfl:   •  FREESTYLE LITE test strip, by Other route 4 (Four) Times a Day. Use to test blood sugar 4 times daily, Disp: , Rfl:   •  furosemide (LASIX) 40 MG tablet, Take 1 tablet by mouth Daily., Disp: 90 tablet, Rfl: 1  •  gabapentin (NEURONTIN) 300 MG capsule, TAKE ONE CAPSULE BY MOUTH THREE TIMES DAILY (Patient taking differently: Take 300 mg by mouth 4 (Four) Times a Day.), Disp: 270 capsule, Rfl: 1  •  HYDROmorphone (DILAUDID) 1 mg/mL solution, Infuse  into a venous catheter Dose Adjusted By Provider As Needed. PER PAIN PUMP, Disp: , Rfl:   •  Insulin Lispro (ADMELOG SOLOSTAR) 100 UNIT/ML injection pen, SLIDING SCALE R/T BS. Takes 2-12 units tid (Patient taking differently: SLIDING SCALE R/T BS. Takes 2-12 units tid not taking for over 2 weeks), Disp: 2 pen, Rfl: 0  •  ipratropium-albuterol (DUO-NEB) 0.5-2.5 mg/3 ml nebulizer, Inhale 1 ampule., Disp: , Rfl:   •  Lancets (freestyle) lancets, Use as directed. Check sugars tid E11.9, Disp: 100 each, Rfl: 0  •  Lantus SoloStar 100 UNIT/ML injection pen, Inject 20 Units under the skin into the appropriate area as directed. Pt does not have this med, Disp: , Rfl:   •  lidocaine (Lidoderm) 5 %, Place 1 patch on the skin as directed by provider Daily. Remove & Discard patch within 12 hours or as directed by MD (Patient taking differently: Place 1 patch on the skin as directed by provider Daily. Remove & Discard patch within 12 hours or as directed by MD not using), Disp: 30 each, Rfl: 0  •  methylPREDNISolone (MEDROL) 4 MG dose pack, Take  by mouth 2 (Two) Times a Day. Take as directed on  package instructions., Disp: , Rfl:   •  ondansetron ODT (ZOFRAN-ODT) 4 MG disintegrating tablet, Place 1 tablet on the tongue Every 8 (Eight) Hours As Needed for Nausea or Vomiting., Disp: 15 tablet, Rfl: 0  •  pantoprazole (PROTONIX) 40 MG EC tablet, Take 1 tablet by mouth Daily., Disp: 90 tablet, Rfl: 0  •  predniSONE (DELTASONE) 5 MG tablet, Take 2 pills at once on Day 1 then one pill once a day on Day 2-7., Disp: 8 tablet, Rfl: 0  •  ProAir  (90 Base) MCG/ACT inhaler, Inhale 2 puffs Every 4 (Four) Hours As Needed for Wheezing., Disp: 8.5 g, Rfl: 2  •  Probiotic Product (Florajen3) capsule, Take 1 capsule by mouth Daily., Disp: 30 capsule, Rfl: 2  •  sodium chloride (NS) 0.9 % irrigation, Irrigate with 250 mL to the affected area as directed by provider Daily. Cleanse wound daily with sterile water., Disp: , Rfl:   •  triamcinolone (KENALOG) 0.1 % ointment, Apply  topically to the appropriate area as directed 2 (Two) Times a Day., Disp: 30 g, Rfl: 0  •  triamcinolone (KENALOG) 0.1 % ointment, Apply 1 application topically to the appropriate area as directed 2 (Two) Times a Day., Disp: 30 g, Rfl: 0    Current Facility-Administered Medications:   •  heparin injection 500 Units, 5 mL, Intravenous, PRN, Chloé, Vidal, APRN  •  sodium chloride 0.9 % flush 10 mL, 10 mL, Intravenous, Q12H, Chloé, Vidal, APRN  •  sodium chloride 0.9 % flush 10 mL, 10 mL, Intravenous, PRN, Chloé, Vidal, APRN  •  sodium chloride 0.9 % flush 20 mL, 20 mL, Intravenous, PRN, Chloé, Vidal, APRN  •  sterile water irrigation solution 500 mL, 500 mL, Irrigation, Once, Damari Cornejo PA-C    Past Medical History:   Diagnosis Date   • Anxiety     NO CURRENT MEDICATION   • Aortic stenosis, severe    • Arthritis    • Asthma    • Back pain    • Blood clotting disorder (HCC)    • Cancer associated pain    • Chronic low back pain with sciatica    • Chronic pain disorder    • Diabetes mellitus (HCC)     TYPE 2   • Dyspnea on effort     • GERD (gastroesophageal reflux disease)    • H/O lumpectomy    • H/O: hysterectomy    • Hiatal hernia    • History of degenerative disc disease    • History of kidney stones    • History of pulmonary embolus (PE)    • History of transfusion    • Hodgkin's lymphoma (HCC)    • Immobility    • Liver disease    • Lupus (HCC)    • Mitral valve prolapse    • Panic disorder    • Pelvic pain    • Peripheral neuropathy    • Personal history of DVT (deep vein thrombosis)    • Presence of intrathecal pump    • PTSD (post-traumatic stress disorder)    • Sacroiliac joint disease    • SI (sacroiliac) joint inflammation (HCC)    • Venous insufficiency      Past Surgical History:   Procedure Laterality Date   • BACK SURGERY      SPINAL FUSION    • BLADDER REPAIR     • CARDIAC CATHETERIZATION N/A 3/6/2019    Procedure: Valvuloplasty;  Surgeon: Wayne Johnson MD;  Location: [facility] INVASIVE LOCATION;  Service: Cardiology   • CARDIAC VALVE REPLACEMENT  2021   • CHOLECYSTECTOMY     • COLONOSCOPY N/A 12/29/2021    Procedure: COLONOSCOPY;  Surgeon: Karine Orlando MD;  Location: [facility] ENDOSCOPY;  Service: Gastroenterology;  Laterality: N/A;  POOR PREP   • COLONOSCOPY N/A 4/13/2022    Procedure: COLONOSCOPY WITH POLYPECTOMY;  Surgeon: Karine Orlando MD;  Location: [facility] ENDOSCOPY;  Service: Gastroenterology;  Laterality: N/A;  COLON POLYP, HEMORRHOIDS, POOR PREP   • ENDOSCOPY N/A 12/29/2021    Procedure: ESOPHAGOGASTRODUODENOSCOPY;  Surgeon: Karine Orlando MD;  Location: [facility] ENDOSCOPY;  Service: Gastroenterology;  Laterality: N/A;  ESOPHAGITIS, GASTRITIS, HIATAL HERNIA   • HYSTERECTOMY     • LYMPHADENECTOMY     • PACEMAKER IMPLANTATION     • PAIN PUMP INSERTION/REVISION N/A 10/18/2019    Procedure: PAIN PUMP INSERTION [facility] 10-18-19 [place];  Surgeon: Horace Rios MD;  Location: [facility] OR;  Service: Pain Management   • PAIN PUMP INSERTION/REVISION N/A 11/23/2020    Procedure:  pain pump removal;  Surgeon: Horace Rios MD;  Location: [facility] OR;  Service: Pain Management;  Laterality: N/A;   • TONSILLECTOMY     • TUBAL ABDOMINAL LIGATION     • TUMOR REMOVAL       Social History     Socioeconomic History   • Marital status:    Tobacco Use   • Smoking status: Every Day     Packs/day: 2.00     Years: 41.00     Pack years: 82.00     Types: Cigarettes   • Smokeless tobacco: Never   Vaping Use   • Vaping Use: Never used   Substance and Sexual Activity   • Alcohol use: No   • Drug use: No   • Sexual activity: Not Currently           Objective     Vitals:    01/10/23 1019   BP: 126/56   BP Location: Right arm   Patient Position: Sitting   Cuff Size: Adult   Pulse: 79   Resp: 18   Temp: 97.8 °F (36.6 °C)   TempSrc: Temporal   PainSc:   7   PainLoc: Leg  Comment: legs,hips, and back     There is no height or weight on file to calculate BMI.    STEADI Fall Risk Assessment has not been completed.     Review of Systems     ROS:  No fevers, chills, sweats, or body aches.     I have reviewed the HPI and ROS as documented by MA/RN. Chelo Villeda MD    Physical Exam     NAD  Normocephalic, atraumatic  EOMI, no scleral icterus  No respiratory distress, no cough  AAOx3, cooperative  Dopplerable DP and PT pulses BLE  Edema is improved from when we were seeing her last year.  She has multiple shallow wounds with dry eschar and severe stasis changes BLE.  Dry skin and stasis dermatitis noted.      LLE:            RLE:                Result Review :  The following data was reviewed by: Chelo Villeda MD on 01/10/2023:    Prior notes and images.  Hemoglobin A1c, CBC, CMP             Assessment and Plan   Diagnoses and all orders for this visit:    1. Venous stasis ulcer of left calf limited to breakdown of skin, unspecified whether varicose veins present (HCC) (Primary)  -     bacitracin 500 UNIT/GM ointment; Apply 1 application topically to the appropriate area as directed 2 (Two) Times a  Day.  Dispense: 28 g; Refill: 1    2. Venous stasis ulcer of right calf limited to breakdown of skin, unspecified whether varicose veins present (HCC)  -     bacitracin 500 UNIT/GM ointment; Apply 1 application topically to the appropriate area as directed 2 (Two) Times a Day.  Dispense: 28 g; Refill: 1    3. Venous stasis of both lower extremities    4. Venous stasis dermatitis of both lower extremities  -     triamcinolone (KENALOG) 0.1 % ointment; Apply 1 application topically to the appropriate area as directed 2 (Two) Times a Day.  Dispense: 30 g; Refill: 0    5. Venous stasis ulcer of left calf with fat layer exposed without varicose veins (HCC)    6. Venous stasis ulcer of right calf with fat layer exposed without varicose veins (HCC)        As with prior visits last year, I spent a long time with the patient discussing her condition and that compression is the most important management and treatment for the issue she has.  She is very frustrated about the wounds not healing as well as being concerned about the discoloration of her legs.  However, she is unwilling to consistently use compression or elevation.  Elevation is too painful due to her hip problems so I explained that compression would help decrease the edema and heal the wounds without her having to keep her legs up in bed all the time.  She says she is doing the best she can and wishes to continue with her current regimen.    I have recommended that she use bacitracin to the wounds and triamcinolone to the other areas of dermatitis as well as Aquaphor or Vaseline or similar thick ointment to help moisturize the tissues and keep the skin health improving.  She should wrap her legs every day with compression stockings or Ace bandages to help with compression.      Continue all home meds including diuretics as prescribed.    Recheck 4 weeks.    Patient was given instructions and counseling regarding their condition or for health maintenance advice,  as well as the wound care plan and recommendations. They understand and agree with the plan.  They will follow back up here in the clinic but are instructed to contact us in the interim should they have any new, returning, or worsening symptoms or concerns. Please see specific information pulled into the AVS if appropriate.     Dragon Dictation utilized for chart completion.    Follow Up   Return in about 4 weeks (around 2/7/2023) for Recheck.      Chelo Villeda MD   (2) very limited

## 2024-02-05 NOTE — PROGRESS NOTE ADULT - SUBJECTIVE AND OBJECTIVE BOX
Patient in bed with iv noted.    Vital Signs Last 24 Hrs    T(C): 36.6 (25 Dec 2017 05:17), Max: 36.7 (24 Dec 2017 16:46)  T(F): 97.9 (25 Dec 2017 05:17), Max: 98.1 (24 Dec 2017 16:46)  HR: 94 (25 Dec 2017 05:17) (94 - 108)  BP: 130/67 (25 Dec 2017 05:17) (129/76 - 164/82)  BP(mean): --  RR: 17 (25 Dec 2017 05:17) (16 - 18)  SpO2: 97% (25 Dec 2017 05:17) (96% - 98%)  T(C): 36.7 (24 Dec 2017 16:46), Max: 37 (24 Dec 2017 05:02)  T(F): 98.1 (24 Dec 2017 16:46), Max: 98.6 (24 Dec 2017 05:02)  HR: 95 (24 Dec 2017 16:46) (95 - 108)  BP: 132/70 (24 Dec 2017 17:04) (126/74 - 164/82)  BP(mean): --  RR: 16 (24 Dec 2017 16:46) (16 - 21)  SpO2: 97% (24 Dec 2017 16:46) (97% - 100%)    PHYSICAL EXAM    GENERAL: NAD,   EYES:  conjunctiva and sclera clear  NECK: Supple, No JVD/Bruit  NERVOUS SYSTEM:  Same, not verbal  CHEST:  CTA ,No rales few rhonchi  HEART:  RRR, No murmurs  ABDOMEN: Soft, Not tender  EXTREMITIES: no Edema;  SKIN: No rashes    lavern noted, I&O noted    12-25    153<H>  |  113<H>  |  99.0<H>  ----------------------------<  323<H>  4.0   |  23.0  |  2.59<H>    Ca    8.7      25 Dec 2017 06:22      24 Dec 2017 07:11    154    |  114    |  107.0  ----------------------------<  218    3.6     |  23.0   |  2.80     Ca    8.7        24 Dec 2017 07:11                            8.3    8.9   )-----------( 123      ( 24 Dec 2017 07:11 )             26.0         Na worse  change IVFs to 1/4 NS; already on free water 200cc q 6H  check iron panel  creat sl better General

## 2024-04-10 NOTE — DISCHARGE NOTE ADULT - NS MD DC FALL RISK RISK
Ambulative Assistive Device/Event Note
Orthopaedic Surgery/Event Note
For information on Fall & Injury Prevention, visit [email or web address]
